# Patient Record
Sex: MALE | Race: BLACK OR AFRICAN AMERICAN | Employment: UNEMPLOYED
[De-identification: names, ages, dates, MRNs, and addresses within clinical notes are randomized per-mention and may not be internally consistent; named-entity substitution may affect disease eponyms.]

---

## 2017-01-05 ENCOUNTER — CARE COORDINATOR VISIT (OUTPATIENT)
Dept: CARE COORDINATION | Facility: CLINIC | Age: 77
End: 2017-01-05

## 2017-01-05 ENCOUNTER — OFFICE VISIT (OUTPATIENT)
Dept: INTERNAL MEDICINE | Facility: CLINIC | Age: 77
End: 2017-01-05

## 2017-01-05 VITALS
SYSTOLIC BLOOD PRESSURE: 120 MMHG | WEIGHT: 127.6 LBS | HEART RATE: 83 BPM | BODY MASS INDEX: 19.98 KG/M2 | DIASTOLIC BLOOD PRESSURE: 75 MMHG

## 2017-01-05 DIAGNOSIS — F17.200 SMOKER: ICD-10-CM

## 2017-01-05 DIAGNOSIS — N40.0 BENIGN PROSTATIC HYPERPLASIA WITHOUT LOWER URINARY TRACT SYMPTOMS, UNSPECIFIED MORPHOLOGY: ICD-10-CM

## 2017-01-05 DIAGNOSIS — G30.9 ALZHEIMER'S DEMENTIA WITHOUT BEHAVIORAL DISTURBANCE, UNSPECIFIED TIMING OF DEMENTIA ONSET: Primary | ICD-10-CM

## 2017-01-05 DIAGNOSIS — N40.0 BENIGN NON-NODULAR PROSTATIC HYPERPLASIA WITHOUT LOWER URINARY TRACT SYMPTOMS: ICD-10-CM

## 2017-01-05 DIAGNOSIS — B20 HIV (HUMAN IMMUNODEFICIENCY VIRUS INFECTION) (HCC): ICD-10-CM

## 2017-01-05 DIAGNOSIS — F02.80 ALZHEIMER'S DEMENTIA WITHOUT BEHAVIORAL DISTURBANCE, UNSPECIFIED TIMING OF DEMENTIA ONSET: Primary | ICD-10-CM

## 2017-01-05 DIAGNOSIS — M81.0 OSTEOPOROSIS: ICD-10-CM

## 2017-01-05 DIAGNOSIS — K21.9 GASTROESOPHAGEAL REFLUX DISEASE WITHOUT ESOPHAGITIS: ICD-10-CM

## 2017-01-05 PROCEDURE — 99213 OFFICE O/P EST LOW 20 MIN: CPT | Performed by: INTERNAL MEDICINE

## 2017-01-05 RX ORDER — DONEPEZIL HYDROCHLORIDE 10 MG/1
TABLET, FILM COATED ORAL
Qty: 30 TABLET | Refills: 5 | Status: SHIPPED | OUTPATIENT
Start: 2017-01-05 | End: 2017-09-16 | Stop reason: SDUPTHER

## 2017-01-05 RX ORDER — VITAMIN E 268 MG
CAPSULE ORAL
Qty: 30 CAPSULE | Refills: 5 | Status: SHIPPED | OUTPATIENT
Start: 2017-01-05 | End: 2017-03-10 | Stop reason: SDUPTHER

## 2017-01-05 RX ORDER — OMEPRAZOLE 20 MG/1
CAPSULE, DELAYED RELEASE ORAL
Qty: 30 CAPSULE | Refills: 5 | Status: SHIPPED | OUTPATIENT
Start: 2017-01-05 | End: 2017-04-29 | Stop reason: SDUPTHER

## 2017-01-05 RX ORDER — MEMANTINE HYDROCHLORIDE 5 MG/1
TABLET ORAL
Qty: 60 TABLET | Refills: 5 | Status: SHIPPED | OUTPATIENT
Start: 2017-01-05 | End: 2017-04-29 | Stop reason: SDUPTHER

## 2017-01-05 RX ORDER — FINASTERIDE 5 MG/1
TABLET, FILM COATED ORAL
Qty: 30 TABLET | Refills: 5 | Status: SHIPPED | OUTPATIENT
Start: 2017-01-05 | End: 2017-09-16 | Stop reason: SDUPTHER

## 2017-01-05 RX ORDER — ALENDRONATE SODIUM 70 MG/1
TABLET ORAL
Qty: 4 TABLET | Refills: 5 | Status: SHIPPED | OUTPATIENT
Start: 2017-01-05 | End: 2017-09-16 | Stop reason: SDUPTHER

## 2017-01-05 RX ORDER — MULTIVITAMIN WITH FOLIC ACID 400 MCG
TABLET ORAL
Qty: 60 TABLET | Refills: 5 | Status: SHIPPED | OUTPATIENT
Start: 2017-01-05 | End: 2018-02-07 | Stop reason: SDUPTHER

## 2017-01-05 RX ORDER — B-COMPLEX WITH VITAMIN C
TABLET ORAL
Qty: 60 TABLET | Refills: 5 | Status: SHIPPED | OUTPATIENT
Start: 2017-01-05 | End: 2017-06-23 | Stop reason: SDUPTHER

## 2017-01-06 ENCOUNTER — TELEPHONE (OUTPATIENT)
Dept: FAMILY MEDICINE CLINIC | Facility: CLINIC | Age: 77
End: 2017-01-06

## 2017-01-06 NOTE — TELEPHONE ENCOUNTER
Writer faxed over referral for pt to be assessed for [employer]. IKZZY/Marcelle Keenan is working with this pt and is seeking assistance for him. Writer will keep [facility] informed of the progress.

## 2017-01-09 ENCOUNTER — TELEPHONE (OUTPATIENT)
Dept: FAMILY MEDICINE CLINIC | Facility: CLINIC | Age: 77
End: 2017-01-09

## 2017-01-25 ENCOUNTER — CARE COORDINATION (OUTPATIENT)
Dept: CARE COORDINATION | Facility: CLINIC | Age: 77
End: 2017-01-25

## 2017-02-10 ENCOUNTER — CARE COORDINATION (OUTPATIENT)
Dept: CARE COORDINATION | Facility: CLINIC | Age: 77
End: 2017-02-10

## 2017-02-13 ENCOUNTER — TELEPHONE (OUTPATIENT)
Dept: FAMILY MEDICINE CLINIC | Facility: CLINIC | Age: 77
End: 2017-02-13

## 2017-02-14 ENCOUNTER — TELEPHONE (OUTPATIENT)
Dept: FAMILY MEDICINE CLINIC | Facility: CLINIC | Age: 77
End: 2017-02-14

## 2017-02-21 ENCOUNTER — HOSPITAL ENCOUNTER (EMERGENCY)
Age: 77
Discharge: HOME | End: 2017-02-21
Attending: EMERGENCY MEDICINE
Payer: MEDICARE

## 2017-02-21 VITALS
OXYGEN SATURATION: 98 % | SYSTOLIC BLOOD PRESSURE: 130 MMHG | RESPIRATION RATE: 18 BRPM | HEART RATE: 79 BPM | TEMPERATURE: 98 F | DIASTOLIC BLOOD PRESSURE: 87 MMHG

## 2017-02-21 DIAGNOSIS — K04.7 DENTAL ABSCESS: Primary | ICD-10-CM

## 2017-02-21 PROCEDURE — 41800 DRAINAGE OF GUM LESION: CPT

## 2017-02-21 PROCEDURE — 6370000000 HC RX 637 (ALT 250 FOR IP): Performed by: EMERGENCY MEDICINE

## 2017-02-21 PROCEDURE — 6360000002 HC RX W HCPCS: Performed by: EMERGENCY MEDICINE

## 2017-02-21 PROCEDURE — 90715 TDAP VACCINE 7 YRS/> IM: CPT | Performed by: EMERGENCY MEDICINE

## 2017-02-21 PROCEDURE — 90471 IMMUNIZATION ADMIN: CPT | Performed by: EMERGENCY MEDICINE

## 2017-02-21 PROCEDURE — G0381 LEV 2 HOSP TYPE B ED VISIT: HCPCS

## 2017-02-21 RX ORDER — IBUPROFEN 600 MG/1
600 TABLET ORAL EVERY 6 HOURS PRN
Qty: 30 TABLET | Refills: 0 | Status: SHIPPED | OUTPATIENT
Start: 2017-02-21 | End: 2017-10-06 | Stop reason: ALTCHOICE

## 2017-02-21 RX ORDER — PENICILLIN V POTASSIUM 500 MG/1
500 TABLET ORAL 4 TIMES DAILY
Qty: 28 TABLET | Refills: 0 | Status: SHIPPED | OUTPATIENT
Start: 2017-02-21 | End: 2017-02-28

## 2017-02-21 RX ORDER — IBUPROFEN 400 MG/1
400 TABLET ORAL ONCE
Status: COMPLETED | OUTPATIENT
Start: 2017-02-21 | End: 2017-02-21

## 2017-02-21 RX ORDER — PENICILLIN V POTASSIUM 250 MG/1
500 TABLET ORAL ONCE
Status: COMPLETED | OUTPATIENT
Start: 2017-02-21 | End: 2017-02-21

## 2017-02-21 RX ADMIN — PENICILLIN V POTASIUM 500 MG: 250 TABLET ORAL at 14:02

## 2017-02-21 RX ADMIN — BENZOCAINE: 220 GEL, DENTIFRICE DENTAL at 14:03

## 2017-02-21 RX ADMIN — IBUPROFEN 400 MG: 400 TABLET, FILM COATED ORAL at 14:02

## 2017-02-21 RX ADMIN — TETANUS TOXOID, REDUCED DIPHTHERIA TOXOID AND ACELLULAR PERTUSSIS VACCINE, ADSORBED 0.5 ML: 5; 2.5; 8; 8; 2.5 SUSPENSION INTRAMUSCULAR at 15:08

## 2017-02-21 ASSESSMENT — PAIN SCALES - GENERAL: PAINLEVEL_OUTOF10: 8

## 2017-02-21 ASSESSMENT — PAIN DESCRIPTION - PAIN TYPE: TYPE: ACUTE PAIN

## 2017-02-21 ASSESSMENT — PAIN SCALES - WONG BAKER: WONGBAKER_NUMERICALRESPONSE: 8

## 2017-02-22 ENCOUNTER — TELEPHONE (OUTPATIENT)
Dept: INTERNAL MEDICINE | Facility: CLINIC | Age: 77
End: 2017-02-22

## 2017-02-24 ENCOUNTER — OFFICE VISIT (OUTPATIENT)
Dept: PODIATRY | Facility: CLINIC | Age: 77
End: 2017-02-24

## 2017-02-24 ENCOUNTER — HOSPITAL ENCOUNTER (OUTPATIENT)
Age: 77
Discharge: HOME | End: 2017-02-24
Payer: MEDICARE

## 2017-02-24 VITALS
BODY MASS INDEX: 19.3 KG/M2 | WEIGHT: 123 LBS | DIASTOLIC BLOOD PRESSURE: 73 MMHG | HEIGHT: 67 IN | SYSTOLIC BLOOD PRESSURE: 105 MMHG | HEART RATE: 74 BPM

## 2017-02-24 DIAGNOSIS — B35.1 ONYCHOMYCOSIS: ICD-10-CM

## 2017-02-24 DIAGNOSIS — M79.672 PAIN IN BOTH FEET: ICD-10-CM

## 2017-02-24 DIAGNOSIS — B20 HIV (HUMAN IMMUNODEFICIENCY VIRUS INFECTION) (HCC): ICD-10-CM

## 2017-02-24 DIAGNOSIS — I73.9 PVD (PERIPHERAL VASCULAR DISEASE) (HCC): Primary | ICD-10-CM

## 2017-02-24 DIAGNOSIS — M79.671 PAIN IN BOTH FEET: ICD-10-CM

## 2017-02-24 PROCEDURE — 99213 OFFICE O/P EST LOW 20 MIN: CPT | Performed by: PODIATRIST

## 2017-02-24 PROCEDURE — 11721 DEBRIDE NAIL 6 OR MORE: CPT | Performed by: PODIATRIST

## 2017-03-09 DIAGNOSIS — G30.9 ALZHEIMER'S DEMENTIA WITHOUT BEHAVIORAL DISTURBANCE (HCC): ICD-10-CM

## 2017-03-09 DIAGNOSIS — F02.80 ALZHEIMER'S DEMENTIA WITHOUT BEHAVIORAL DISTURBANCE (HCC): ICD-10-CM

## 2017-03-10 RX ORDER — VITAMIN E 268 MG
CAPSULE ORAL
Qty: 30 CAPSULE | Refills: 5 | Status: SHIPPED | OUTPATIENT
Start: 2017-03-10 | End: 2017-04-13

## 2017-04-06 ENCOUNTER — HOSPITAL ENCOUNTER (OUTPATIENT)
Age: 77
Discharge: HOME | End: 2017-04-06
Payer: MEDICAID

## 2017-04-06 ENCOUNTER — HOSPITAL ENCOUNTER (OUTPATIENT)
Age: 77
Discharge: HOME | End: 2017-04-06
Payer: MEDICARE

## 2017-04-06 ENCOUNTER — OFFICE VISIT (OUTPATIENT)
Dept: INTERNAL MEDICINE | Age: 77
End: 2017-04-06

## 2017-04-06 ENCOUNTER — HOSPITAL ENCOUNTER (OUTPATIENT)
Dept: GENERAL RADIOLOGY | Age: 77
Discharge: HOME | End: 2017-04-06
Payer: MEDICAID

## 2017-04-06 VITALS
SYSTOLIC BLOOD PRESSURE: 112 MMHG | DIASTOLIC BLOOD PRESSURE: 77 MMHG | OXYGEN SATURATION: 98 % | HEIGHT: 67 IN | HEART RATE: 90 BPM | BODY MASS INDEX: 19.49 KG/M2 | WEIGHT: 124.2 LBS

## 2017-04-06 DIAGNOSIS — J18.9 CAP (COMMUNITY ACQUIRED PNEUMONIA): Primary | ICD-10-CM

## 2017-04-06 DIAGNOSIS — J18.9 CAP (COMMUNITY ACQUIRED PNEUMONIA): ICD-10-CM

## 2017-04-06 DIAGNOSIS — B20 HIV (HUMAN IMMUNODEFICIENCY VIRUS INFECTION) (HCC): ICD-10-CM

## 2017-04-06 PROCEDURE — 99213 OFFICE O/P EST LOW 20 MIN: CPT | Performed by: INTERNAL MEDICINE

## 2017-04-06 PROCEDURE — 99211 OFF/OP EST MAY X REQ PHY/QHP: CPT | Performed by: INTERNAL MEDICINE

## 2017-04-06 PROCEDURE — 71020 XR CHEST STANDARD TWO VW: CPT

## 2017-04-06 RX ORDER — LEVOFLOXACIN 500 MG/1
500 TABLET, FILM COATED ORAL DAILY
Qty: 7 TABLET | Refills: 0 | Status: SHIPPED | OUTPATIENT
Start: 2017-04-06 | End: 2017-04-13

## 2017-04-13 ENCOUNTER — OFFICE VISIT (OUTPATIENT)
Dept: INTERNAL MEDICINE | Age: 77
End: 2017-04-13
Payer: MEDICARE

## 2017-04-13 VITALS
HEIGHT: 67 IN | WEIGHT: 122 LBS | BODY MASS INDEX: 19.15 KG/M2 | DIASTOLIC BLOOD PRESSURE: 73 MMHG | HEART RATE: 82 BPM | SYSTOLIC BLOOD PRESSURE: 99 MMHG

## 2017-04-13 DIAGNOSIS — F17.200 SMOKER: ICD-10-CM

## 2017-04-13 DIAGNOSIS — F02.80 ALZHEIMER'S DEMENTIA WITHOUT BEHAVIORAL DISTURBANCE, UNSPECIFIED TIMING OF DEMENTIA ONSET: ICD-10-CM

## 2017-04-13 DIAGNOSIS — G30.9 ALZHEIMER'S DEMENTIA WITHOUT BEHAVIORAL DISTURBANCE, UNSPECIFIED TIMING OF DEMENTIA ONSET: ICD-10-CM

## 2017-04-13 DIAGNOSIS — K21.9 GASTROESOPHAGEAL REFLUX DISEASE WITHOUT ESOPHAGITIS: ICD-10-CM

## 2017-04-13 DIAGNOSIS — B20 HIV (HUMAN IMMUNODEFICIENCY VIRUS INFECTION) (HCC): Primary | ICD-10-CM

## 2017-04-13 PROCEDURE — 1123F ACP DISCUSS/DSCN MKR DOCD: CPT | Performed by: INTERNAL MEDICINE

## 2017-04-13 PROCEDURE — G8427 DOCREV CUR MEDS BY ELIG CLIN: HCPCS | Performed by: INTERNAL MEDICINE

## 2017-04-13 PROCEDURE — 99213 OFFICE O/P EST LOW 20 MIN: CPT | Performed by: INTERNAL MEDICINE

## 2017-04-13 PROCEDURE — G8419 CALC BMI OUT NRM PARAM NOF/U: HCPCS | Performed by: INTERNAL MEDICINE

## 2017-04-13 PROCEDURE — 99211 OFF/OP EST MAY X REQ PHY/QHP: CPT

## 2017-04-13 PROCEDURE — 4040F PNEUMOC VAC/ADMIN/RCVD: CPT | Performed by: INTERNAL MEDICINE

## 2017-04-13 PROCEDURE — 4004F PT TOBACCO SCREEN RCVD TLK: CPT | Performed by: INTERNAL MEDICINE

## 2017-04-13 RX ORDER — IBUPROFEN 600 MG/1
600 TABLET ORAL EVERY 6 HOURS PRN
Qty: 30 TABLET | Refills: 0 | Status: CANCELLED | OUTPATIENT
Start: 2017-04-13

## 2017-04-28 DIAGNOSIS — F02.80 ALZHEIMER'S DEMENTIA WITHOUT BEHAVIORAL DISTURBANCE (HCC): ICD-10-CM

## 2017-04-28 DIAGNOSIS — K21.9 GASTROESOPHAGEAL REFLUX DISEASE WITHOUT ESOPHAGITIS: ICD-10-CM

## 2017-04-28 DIAGNOSIS — G30.9 ALZHEIMER'S DEMENTIA WITHOUT BEHAVIORAL DISTURBANCE (HCC): ICD-10-CM

## 2017-04-29 RX ORDER — OMEPRAZOLE 20 MG/1
CAPSULE, DELAYED RELEASE ORAL
Qty: 30 CAPSULE | Refills: 3 | Status: SHIPPED | OUTPATIENT
Start: 2017-04-29 | End: 2017-08-17 | Stop reason: SDUPTHER

## 2017-04-29 RX ORDER — MEMANTINE HYDROCHLORIDE 5 MG/1
TABLET ORAL
Qty: 60 TABLET | Refills: 3 | Status: SHIPPED | OUTPATIENT
Start: 2017-04-29 | End: 2017-08-17 | Stop reason: SDUPTHER

## 2017-05-10 ENCOUNTER — HOSPITAL ENCOUNTER (OUTPATIENT)
Age: 77
Discharge: HOME | End: 2017-05-10
Payer: MEDICARE

## 2017-05-10 LAB
% NK (CD56): 18 % (ref 6–29)
AB NK (CD56): 287 /UL (ref 90–600)
ABSOLUTE CD 3: 1163 /UL (ref 411–2061)
ABSOLUTE CD 4 HELPER: 462 /UL (ref 309–1571)
ABSOLUTE CD 8 (SUPP): 669 /UL (ref 282–999)
ABSOLUTE CD19 B CELL: 80 /UL (ref 71–567)
ABSOLUTE EOS #: 0.1 K/UL (ref 0–0.4)
ABSOLUTE LYMPH #: 1.6 K/UL (ref 1–4.8)
ABSOLUTE MONO #: 0.6 K/UL (ref 0.1–1.2)
ALBUMIN SERPL-MCNC: 3.9 G/DL (ref 3.5–5.2)
ALBUMIN/GLOBULIN RATIO: 1 (ref 1–2.5)
ALP BLD-CCNC: 201 U/L (ref 40–129)
ALT SERPL-CCNC: 12 U/L (ref 5–41)
ANION GAP SERPL CALCULATED.3IONS-SCNC: 12 MMOL/L (ref 9–17)
AST SERPL-CCNC: 25 U/L
BASOPHILS # BLD: 1 %
BASOPHILS ABSOLUTE: 0 K/UL (ref 0–0.2)
BILIRUB SERPL-MCNC: 0.25 MG/DL (ref 0.3–1.2)
BUN BLDV-MCNC: 6 MG/DL (ref 8–23)
BUN/CREAT BLD: ABNORMAL (ref 9–20)
CALCIUM SERPL-MCNC: 9.1 MG/DL (ref 8.6–10.4)
CD19 B CELL: 5 % (ref 5–25)
CD3 % TOTAL T CELLS: 73 % (ref 57–94)
CD4 % HELPER T CELL: 29 % (ref 27–64)
CD4:CD8: 0.69 (ref 0.6–2.8)
CD8 % SUPPRESSOR T CELL: 42 % (ref 17–48)
CHLORIDE BLD-SCNC: 101 MMOL/L (ref 98–107)
CHOLESTEROL/HDL RATIO: 3
CHOLESTEROL: 242 MG/DL
CO2: 26 MMOL/L (ref 20–31)
CREAT SERPL-MCNC: 0.88 MG/DL (ref 0.7–1.2)
DIFFERENTIAL TYPE: ABNORMAL
EOSINOPHILS RELATIVE PERCENT: 1 %
GFR AFRICAN AMERICAN: >60 ML/MIN
GFR NON-AFRICAN AMERICAN: >60 ML/MIN
GFR SERPL CREATININE-BSD FRML MDRD: ABNORMAL ML/MIN/{1.73_M2}
GFR SERPL CREATININE-BSD FRML MDRD: ABNORMAL ML/MIN/{1.73_M2}
GLUCOSE BLD-MCNC: 114 MG/DL (ref 70–99)
HBV SURFACE AB TITR SER: <3.5 MIU/ML
HCT VFR BLD CALC: 38.9 % (ref 41–53)
HDLC SERPL-MCNC: 82 MG/DL
HEMOGLOBIN: 12.5 G/DL (ref 13.5–17.5)
HEPATITIS C ANTIBODY: NONREACTIVE
LDL CHOLESTEROL: 131 MG/DL (ref 0–130)
LYMPHOCYTES # BLD: 27 %
LYMPHOCYTES # BLD: 27 % (ref 24–44)
MCH RBC QN AUTO: 28 PG (ref 26–34)
MCHC RBC AUTO-ENTMCNC: 32.2 G/DL (ref 31–37)
MCV RBC AUTO: 87.1 FL (ref 80–100)
MONOCYTES # BLD: 10 %
PDW BLD-RTO: 15.5 % (ref 12.5–15.4)
PLATELET # BLD: 212 K/UL (ref 140–450)
PLATELET ESTIMATE: ABNORMAL
PMV BLD AUTO: 7.7 FL (ref 6–12)
POTASSIUM SERPL-SCNC: 5 MMOL/L (ref 3.7–5.3)
RBC # BLD: 4.47 M/UL (ref 4.5–5.9)
RBC # BLD: ABNORMAL 10*6/UL
SEG NEUTROPHILS: 61 %
SEGMENTED NEUTROPHILS ABSOLUTE COUNT: 3.6 K/UL (ref 1.8–7.7)
SODIUM BLD-SCNC: 139 MMOL/L (ref 135–144)
T. PALLIDUM, IGG: REACTIVE
TOTAL PROTEIN: 7.9 G/DL (ref 6.4–8.3)
TRIGL SERPL-MCNC: 146 MG/DL
VLDLC SERPL CALC-MCNC: ABNORMAL MG/DL (ref 1–30)
WBC # BLD: 5.9 K/UL (ref 3.5–11)
WBC # BLD: 5.9 K/UL (ref 3.5–11)
WBC # BLD: ABNORMAL 10*3/UL

## 2017-05-10 PROCEDURE — 86803 HEPATITIS C AB TEST: CPT

## 2017-05-10 PROCEDURE — 86706 HEP B SURFACE ANTIBODY: CPT

## 2017-05-10 PROCEDURE — 80053 COMPREHEN METABOLIC PANEL: CPT

## 2017-05-10 PROCEDURE — 80061 LIPID PANEL: CPT

## 2017-05-10 PROCEDURE — 86780 TREPONEMA PALLIDUM: CPT

## 2017-05-10 PROCEDURE — 86355 B CELLS TOTAL COUNT: CPT

## 2017-05-10 PROCEDURE — 86593 SYPHILIS TEST NON-TREP QUANT: CPT

## 2017-05-10 PROCEDURE — 87536 HIV-1 QUANT&REVRSE TRNSCRPJ: CPT

## 2017-05-10 PROCEDURE — 85025 COMPLETE CBC W/AUTO DIFF WBC: CPT

## 2017-05-10 PROCEDURE — 86357 NK CELLS TOTAL COUNT: CPT

## 2017-05-10 PROCEDURE — 86360 T CELL ABSOLUTE COUNT/RATIO: CPT

## 2017-05-10 PROCEDURE — 36415 COLL VENOUS BLD VENIPUNCTURE: CPT

## 2017-05-10 PROCEDURE — 86359 T CELLS TOTAL COUNT: CPT

## 2017-05-11 LAB — VDRL, QUANTITATIVE: 1 TITER

## 2017-05-12 LAB
DIRECT EXAM: NORMAL
Lab: NORMAL
SPECIMEN DESCRIPTION: NORMAL
STATUS: NORMAL

## 2017-06-23 DIAGNOSIS — M81.0 OSTEOPOROSIS: ICD-10-CM

## 2017-06-26 RX ORDER — B-COMPLEX WITH VITAMIN C
TABLET ORAL
Qty: 60 TABLET | Refills: 0 | Status: SHIPPED | OUTPATIENT
Start: 2017-06-26 | End: 2017-07-22 | Stop reason: SDUPTHER

## 2017-07-07 ENCOUNTER — PROCEDURE VISIT (OUTPATIENT)
Dept: PODIATRY | Age: 77
End: 2017-07-07
Payer: MEDICARE

## 2017-07-07 VITALS
SYSTOLIC BLOOD PRESSURE: 140 MMHG | WEIGHT: 118.6 LBS | HEART RATE: 59 BPM | BODY MASS INDEX: 18.62 KG/M2 | DIASTOLIC BLOOD PRESSURE: 85 MMHG | HEIGHT: 67 IN

## 2017-07-07 DIAGNOSIS — B35.1 ONYCHOMYCOSIS: ICD-10-CM

## 2017-07-07 DIAGNOSIS — M79.672 PAIN IN BOTH FEET: ICD-10-CM

## 2017-07-07 DIAGNOSIS — I73.9 PVD (PERIPHERAL VASCULAR DISEASE) (HCC): Primary | ICD-10-CM

## 2017-07-07 DIAGNOSIS — M79.671 PAIN IN BOTH FEET: ICD-10-CM

## 2017-07-07 DIAGNOSIS — B20 HIV (HUMAN IMMUNODEFICIENCY VIRUS INFECTION) (HCC): ICD-10-CM

## 2017-07-07 PROCEDURE — 4004F PT TOBACCO SCREEN RCVD TLK: CPT | Performed by: PODIATRIST

## 2017-07-07 PROCEDURE — G8420 CALC BMI NORM PARAMETERS: HCPCS | Performed by: PODIATRIST

## 2017-07-07 PROCEDURE — 4040F PNEUMOC VAC/ADMIN/RCVD: CPT | Performed by: PODIATRIST

## 2017-07-07 PROCEDURE — 11721 DEBRIDE NAIL 6 OR MORE: CPT | Performed by: PODIATRIST

## 2017-07-07 PROCEDURE — 1123F ACP DISCUSS/DSCN MKR DOCD: CPT | Performed by: PODIATRIST

## 2017-07-07 PROCEDURE — 99213 OFFICE O/P EST LOW 20 MIN: CPT

## 2017-07-07 PROCEDURE — G8427 DOCREV CUR MEDS BY ELIG CLIN: HCPCS | Performed by: PODIATRIST

## 2017-07-07 RX ORDER — VITAMIN E 268 MG
400 CAPSULE ORAL 2 TIMES DAILY
COMMUNITY
End: 2017-08-21

## 2017-07-22 DIAGNOSIS — M81.0 OSTEOPOROSIS: ICD-10-CM

## 2017-07-24 RX ORDER — B-COMPLEX WITH VITAMIN C
TABLET ORAL
Qty: 60 TABLET | Refills: 3 | Status: SHIPPED | OUTPATIENT
Start: 2017-07-24 | End: 2017-11-10 | Stop reason: SDUPTHER

## 2017-08-17 DIAGNOSIS — G30.9 ALZHEIMER'S DEMENTIA WITHOUT BEHAVIORAL DISTURBANCE (HCC): ICD-10-CM

## 2017-08-17 DIAGNOSIS — K21.9 GASTROESOPHAGEAL REFLUX DISEASE WITHOUT ESOPHAGITIS: ICD-10-CM

## 2017-08-17 DIAGNOSIS — F02.80 ALZHEIMER'S DEMENTIA WITHOUT BEHAVIORAL DISTURBANCE (HCC): ICD-10-CM

## 2017-08-21 RX ORDER — MEMANTINE HYDROCHLORIDE 5 MG/1
TABLET ORAL
Qty: 60 TABLET | Refills: 2 | Status: SHIPPED | OUTPATIENT
Start: 2017-08-21 | End: 2017-11-10 | Stop reason: SDUPTHER

## 2017-08-21 RX ORDER — VITAMIN E 268 MG
CAPSULE ORAL
Qty: 30 CAPSULE | Refills: 2 | Status: SHIPPED | OUTPATIENT
Start: 2017-08-21 | End: 2017-11-10 | Stop reason: SDUPTHER

## 2017-08-21 RX ORDER — OMEPRAZOLE 20 MG/1
CAPSULE, DELAYED RELEASE ORAL
Qty: 30 CAPSULE | Refills: 2 | Status: SHIPPED | OUTPATIENT
Start: 2017-08-21 | End: 2017-11-10 | Stop reason: SDUPTHER

## 2017-09-16 DIAGNOSIS — G30.9 ALZHEIMER'S DEMENTIA WITHOUT BEHAVIORAL DISTURBANCE, UNSPECIFIED TIMING OF DEMENTIA ONSET: ICD-10-CM

## 2017-09-16 DIAGNOSIS — F02.80 ALZHEIMER'S DEMENTIA WITHOUT BEHAVIORAL DISTURBANCE, UNSPECIFIED TIMING OF DEMENTIA ONSET: ICD-10-CM

## 2017-09-16 DIAGNOSIS — N40.0 BENIGN NON-NODULAR PROSTATIC HYPERPLASIA WITHOUT LOWER URINARY TRACT SYMPTOMS: ICD-10-CM

## 2017-09-16 DIAGNOSIS — M81.0 OSTEOPOROSIS: ICD-10-CM

## 2017-09-18 RX ORDER — ALENDRONATE SODIUM 70 MG/1
TABLET ORAL
Qty: 4 TABLET | Refills: 0 | Status: SHIPPED | OUTPATIENT
Start: 2017-09-18 | End: 2017-10-13 | Stop reason: SDUPTHER

## 2017-09-18 RX ORDER — FINASTERIDE 5 MG/1
TABLET, FILM COATED ORAL
Qty: 30 TABLET | Refills: 0 | Status: SHIPPED | OUTPATIENT
Start: 2017-09-18 | End: 2017-10-13 | Stop reason: SDUPTHER

## 2017-09-18 RX ORDER — DONEPEZIL HYDROCHLORIDE 10 MG/1
TABLET, FILM COATED ORAL
Qty: 30 TABLET | Refills: 0 | Status: SHIPPED | OUTPATIENT
Start: 2017-09-18 | End: 2017-10-13 | Stop reason: SDUPTHER

## 2017-10-06 ENCOUNTER — OFFICE VISIT (OUTPATIENT)
Dept: PODIATRY | Age: 77
End: 2017-10-06
Payer: MEDICARE

## 2017-10-06 VITALS
BODY MASS INDEX: 19.77 KG/M2 | DIASTOLIC BLOOD PRESSURE: 72 MMHG | WEIGHT: 123 LBS | HEART RATE: 75 BPM | SYSTOLIC BLOOD PRESSURE: 117 MMHG | HEIGHT: 66 IN

## 2017-10-06 DIAGNOSIS — B20 HIV (HUMAN IMMUNODEFICIENCY VIRUS INFECTION) (HCC): ICD-10-CM

## 2017-10-06 DIAGNOSIS — M79.672 PAIN IN BOTH FEET: ICD-10-CM

## 2017-10-06 DIAGNOSIS — I73.9 PVD (PERIPHERAL VASCULAR DISEASE) (HCC): Primary | ICD-10-CM

## 2017-10-06 DIAGNOSIS — B35.1 ONYCHOMYCOSIS: ICD-10-CM

## 2017-10-06 DIAGNOSIS — M79.671 PAIN IN BOTH FEET: ICD-10-CM

## 2017-10-06 PROCEDURE — 1123F ACP DISCUSS/DSCN MKR DOCD: CPT | Performed by: STUDENT IN AN ORGANIZED HEALTH CARE EDUCATION/TRAINING PROGRAM

## 2017-10-06 PROCEDURE — G8428 CUR MEDS NOT DOCUMENT: HCPCS | Performed by: STUDENT IN AN ORGANIZED HEALTH CARE EDUCATION/TRAINING PROGRAM

## 2017-10-06 PROCEDURE — 4004F PT TOBACCO SCREEN RCVD TLK: CPT | Performed by: STUDENT IN AN ORGANIZED HEALTH CARE EDUCATION/TRAINING PROGRAM

## 2017-10-06 PROCEDURE — 11721 DEBRIDE NAIL 6 OR MORE: CPT | Performed by: STUDENT IN AN ORGANIZED HEALTH CARE EDUCATION/TRAINING PROGRAM

## 2017-10-06 PROCEDURE — G8484 FLU IMMUNIZE NO ADMIN: HCPCS | Performed by: STUDENT IN AN ORGANIZED HEALTH CARE EDUCATION/TRAINING PROGRAM

## 2017-10-06 PROCEDURE — 99212 OFFICE O/P EST SF 10 MIN: CPT

## 2017-10-06 PROCEDURE — G8420 CALC BMI NORM PARAMETERS: HCPCS | Performed by: STUDENT IN AN ORGANIZED HEALTH CARE EDUCATION/TRAINING PROGRAM

## 2017-10-06 PROCEDURE — 4040F PNEUMOC VAC/ADMIN/RCVD: CPT | Performed by: STUDENT IN AN ORGANIZED HEALTH CARE EDUCATION/TRAINING PROGRAM

## 2017-10-06 NOTE — PROGRESS NOTES
Patient instructed to remove shoes and socks, instructed to sit in exam chair. Current PCP name is Dr. Darius Burch and date of last visit 4/13/17. Do you have a follow up visit scheduled?   no

## 2017-10-06 NOTE — PROGRESS NOTES
Subjective:      Patient ID: [id number]    Madeleine Palmer comes to clinic with a complaint of painful, elongated toenails. Patient is a smoker and states he smokes approximately 1/4 ppd. Patient is nondiabetic. Patient states nails are painful in shoegear. He is here to review his PVR studies. Review of Systems   Denies n/v/f/c/cp/sob. Objective:   Physical Exam     Vitals:    10/06/17 1439   BP: 117/72   Pulse: 75     General: AAO x 3 in NAD. Derm: Nails 1-5 , bilateral are brittle and thickened > 3mm, elongated, yellow, dystrophic with subungual debris. Nails are painful with direct palpation, especially the nail of hallux left foot. No open lesions, HPKs, or interidigital macerations noted, antoni. Skin is atrophic and xerotic bilateral lower extremities. Vasc: DP pulse is 1/4 bilateral. PT pulses 1/4 bilaterally. CFT < 3 seconds. No edema, erythema, or varicosities noted. Hair growth absent bilaterally. Neuro: Sensation intact via light touch, bilateral feet. M/S: Muscle strength 5/5 . ROM is WNL without pain or crepitation in bilateraly ankle and pedal joints. Reducible,  Pain on palpation to nails of bilateral feet. Assessment:    63 yo male with   1. PVD (peripheral vascular disease) [facility])  Cooper Cordero MD   2. Onychomycosis     3. Pain in both feet     4. HIV (human immunodeficiency virus infection) ([facility] [address]           Plan:      Pt was evaluated and examined. Treatment options discussed in detail. Debridement of nails 1-5, bilateral with nail nippers and dremel without incidence. Encouraged use of lotion to hydrate skin. Smoking cessation encouraged. Reviewed PVR studies which showed mild femoral popliteal occlusive disease. Patient does not have any lower extremity pain. His extremity is cool to touch and has weakly palpable pulses.  Will refer pt to vascular surgeon   Pt RTC in 4 months  Electronically signed by Anita Humphrey DPM on

## 2017-10-06 NOTE — MR AVS SNAPSHOT
After Visit Summary             Nik Palmer   10/6/2017 2:45 PM   Office Visit    Description:  Male : 1940   Provider:  Anita Humphrey DPM   Department:  [address] and Future Appointments         Below is a list of your follow-up and future appointments. This may not be a complete list as you may have made appointments directly with providers that we are not aware of or your providers may have made some for you. Please call your providers to confirm appointments. It is important to keep your appointments. Please bring your current insurance card, photo ID, co-pay, and all medication bottles to your appointment. If self-pay, payment is expected at the time of service.         Your To-Do List     Future Appointments Provider Department Dept Phone    10/27/2017 9:45 AM MD LAWRENCE Aparicio/ Tyrone Myers  [phone]    Please arrive 15 minutes prior to appointment time, bring insurance card and photo ID.     2018 2:15 PM Du[address] V[address] [phone]    Patient must bring photo ID, insurance card, co-pay and medication bottles to appointment All patient appointments must be kept or cancelled within 24 hours Patient should be prepared to provide PCP name and date of last PCP visit during time of appointment         Information from Your Visit        Department     Name Address Phone Fax    [address] [place] [address] [address] [place] [address] [phone]      You Were Seen for:         Comments    PVD (peripheral vascular disease) [facility])   [[id number]]         Vital Signs     Blood Pressure Pulse Height Weight Body Mass Index Smoking Status    117/72 (Site: Left Arm, Position: Sitting, Cuff Size: Small Adult) 75 5' 6\" (1.676 m) 123 lb (55.8 kg) 19.85 kg/m2 Current Every Day Smoker         Today's Medication Changes Osteoporosis right hip fracture dec 08      Your Goals as of 10/6/2017 at 3:13 PM              2/10/17       Lifestyle    assistance in home- pt remains safe   On track    Notes    Care Coordination Goal: Independent Living  Goal: For wife to be able to go out shop/appointments without taking pt or fearful he will wander   Barriers: impairment:  cognitive and dementia, financial and stress  Plan for overcoming my barriers: N/A  Confidence: 5/10              Immunizations as of 10/6/2017     Name Date    Influenza Vaccine, unspecified formulation 10/29/2014    Influenza Virus Vaccine 2/4/2016, 11/8/2013    Pneumococcal 13-valent Conjugate (Zobrakz19) 2/4/2016    Pneumococcal Polysaccharide ([id number]) 11/8/2013    Tdap (Boostrix, Adacel) 2/21/2017      Preventive Care        Date Due    Zoster Vaccine 10/6/2017 (Originally 10/3/1998)    Yearly Flu Vaccine (1) 10/7/2017 (Originally 9/1/2017)    Cholesterol Screening 5/10/2022    Tetanus Combination Vaccine (2 - Td) 2/21/2027            MyChart Signup           Our records indicate that you have declined MyChart signup.

## 2017-10-09 ENCOUNTER — OFFICE VISIT (OUTPATIENT)
Dept: INTERNAL MEDICINE | Age: 77
End: 2017-10-09
Payer: MEDICARE

## 2017-10-09 VITALS
DIASTOLIC BLOOD PRESSURE: 77 MMHG | SYSTOLIC BLOOD PRESSURE: 132 MMHG | HEART RATE: 76 BPM | WEIGHT: 119 LBS | BODY MASS INDEX: 19.21 KG/M2

## 2017-10-09 DIAGNOSIS — Z23 NEEDS FLU SHOT: ICD-10-CM

## 2017-10-09 DIAGNOSIS — K64.0 FIRST DEGREE HEMORRHOIDS: Primary | ICD-10-CM

## 2017-10-09 PROCEDURE — G8427 DOCREV CUR MEDS BY ELIG CLIN: HCPCS | Performed by: STUDENT IN AN ORGANIZED HEALTH CARE EDUCATION/TRAINING PROGRAM

## 2017-10-09 PROCEDURE — 1123F ACP DISCUSS/DSCN MKR DOCD: CPT | Performed by: STUDENT IN AN ORGANIZED HEALTH CARE EDUCATION/TRAINING PROGRAM

## 2017-10-09 PROCEDURE — 99211 OFF/OP EST MAY X REQ PHY/QHP: CPT

## 2017-10-09 PROCEDURE — G8484 FLU IMMUNIZE NO ADMIN: HCPCS | Performed by: STUDENT IN AN ORGANIZED HEALTH CARE EDUCATION/TRAINING PROGRAM

## 2017-10-09 PROCEDURE — G8420 CALC BMI NORM PARAMETERS: HCPCS | Performed by: STUDENT IN AN ORGANIZED HEALTH CARE EDUCATION/TRAINING PROGRAM

## 2017-10-09 PROCEDURE — 4004F PT TOBACCO SCREEN RCVD TLK: CPT | Performed by: STUDENT IN AN ORGANIZED HEALTH CARE EDUCATION/TRAINING PROGRAM

## 2017-10-09 PROCEDURE — 90471 IMMUNIZATION ADMIN: CPT | Performed by: STUDENT IN AN ORGANIZED HEALTH CARE EDUCATION/TRAINING PROGRAM

## 2017-10-09 PROCEDURE — 4040F PNEUMOC VAC/ADMIN/RCVD: CPT | Performed by: STUDENT IN AN ORGANIZED HEALTH CARE EDUCATION/TRAINING PROGRAM

## 2017-10-09 PROCEDURE — 90688 IIV4 VACCINE SPLT 0.5 ML IM: CPT

## 2017-10-09 PROCEDURE — 99213 OFFICE O/P EST LOW 20 MIN: CPT | Performed by: STUDENT IN AN ORGANIZED HEALTH CARE EDUCATION/TRAINING PROGRAM

## 2017-10-09 ASSESSMENT — PATIENT HEALTH QUESTIONNAIRE - PHQ9
2. FEELING DOWN, DEPRESSED OR HOPELESS: 0
SUM OF ALL RESPONSES TO PHQ9 QUESTIONS 1 & 2: 0
10. IF YOU CHECKED OFF ANY PROBLEMS, HOW DIFFICULT HAVE THESE PROBLEMS MADE IT FOR YOU TO DO YOUR WORK, TAKE CARE OF THINGS AT HOME, OR GET ALONG WITH OTHER PEOPLE: 0
9. THOUGHTS THAT YOU WOULD BE BETTER OFF DEAD, OR OF HURTING YOURSELF: 0
3. TROUBLE FALLING OR STAYING ASLEEP: 0
7. TROUBLE CONCENTRATING ON THINGS, SUCH AS READING THE NEWSPAPER OR WATCHING TELEVISION: 0
8. MOVING OR SPEAKING SO SLOWLY THAT OTHER PEOPLE COULD HAVE NOTICED. OR THE OPPOSITE, BEING SO FIGETY OR RESTLESS THAT YOU HAVE BEEN MOVING AROUND A LOT MORE THAN USUAL: 0
5. POOR APPETITE OR OVEREATING: 0
SUM OF ALL RESPONSES TO PHQ QUESTIONS 1-9: 0
4. FEELING TIRED OR HAVING LITTLE ENERGY: 0
6. FEELING BAD ABOUT YOURSELF - OR THAT YOU ARE A FAILURE OR HAVE LET YOURSELF OR YOUR FAMILY DOWN: 0
1. LITTLE INTEREST OR PLEASURE IN DOING THINGS: 0

## 2017-10-09 NOTE — PROGRESS NOTES
Internal Medicine Clinic Progress Note    Date of patient's visit: 10/9/2017  Patient's Name:  José Manuel Erazo                   YOB: 1940        PCP:  Allyson Underwood MD    José Manuel Erazo is a 76 y.o. male who presents for   Chief Complaint   Patient presents with    Hemorrhoids: pt has hx of hemorrhoids that were removed years ago. Pt is currently complaining of painful hemorrhoids and anne marie red blood per stool since last month. He presented to the ED on Sept 20th, for similar problem for which he received stool softener. Pt's wife reports that his stool is already soft so he did not use the stool softener. Pt used some Prep-H at home with no relief. Patient Active Problem List   Diagnosis    HIV (human immunodeficiency virus infection) ([facility] [address]    Dementia    Smoker    Osteoporosis right hip fracture dec 08    Hemorrhoids    BPH (benign prostatic hyperplasia)    GERD (gastroesophageal reflux disease)    Need for prophylactic vaccination with Streptococcus pneumoniae (Pneumococcus) and Influenza vaccines    Meralgia paraesthetica    Mixed hyperlipidemia    Herpes zoster without complication       HISTORY OF PRESENT ILLNESS:    Please refer to above note for full visit description   Patient's medications, allergies, past medical, surgical, social and family histories were reviewed and updated as appropriate. Patient's allergies, medications, past medical, surgical, social and family histories were reviewed and updated as appropriate.     ALLERGIES    No Known Allergies      MEDICATIONS:      Current Outpatient Prescriptions   Medication Sig Dispense Refill    finasteride (PROSCAR) 5 MG tablet TAKE 1 TABLET DAILY 30 tablet 0    donepezil (ARICEPT) 10 MG tablet TAKE 1 TABLET IN THE EVENING 30 tablet 0    alendronate (FOSAMAX) 70 MG tablet TAKE 1 TABLET EVERY WEEK 4 tablet 0    omeprazole (PRILOSEC) 20 MG delayed release capsule TAKE 1 CAPSULE BY MOUTH DAILY 30 capsule 2    memantine (NAMENDA) 5 MG tablet TAKE 1 TABLET BY MOUTH TWICE A DAY 60 tablet 2    vitamin E 400 UNIT capsule TAKE 1 CAPSULE TWICE A DAY 30 capsule 2    Calcium Carbonate-Vitamin D (OYSTER SHELL CALCIUM/D) 500-200 MG-UNIT TABS TAKE 2 TABLETS IN THE MORNING 60 tablet 3    Multiple Vitamin (MULTIVITAMIN) tablet TAKE 1 TABLET DAILY 60 tablet 5     No current facility-administered medications for this visit. Patient Care Team:  Patience Morales MD as PCP - General (Internal Medicine)  Marquis Jeanne MD as PCP - S Attributed Provider  Prudence Lennox, MD as Consulting Physician (Infectious Diseases)  [facility] ([facility])    PAST MEDICAL AND SURGICAL HISTORY:      Past Medical History:   Diagnosis Date    Dementia     GERD (gastroesophageal reflux disease) 4/28/2015    Headache     History of shingles     [facility] virus infection) ([facility] [address]     Human immunodeficiency virus (HIV) ([facility] [address]     Meralgia paraesthetica 2/4/2016    Mixed hyperlipidemia 5/5/2016    Osteoporosis      Past Surgical History:   Procedure Laterality Date    HERNIA REPAIR      TOTAL HIP ARTHROPLASTY         SOCIAL HISTORY      Social History   Substance Use Topics    Smoking status: Current Every Day Smoker     Packs/day: 0.50     Years: 50.00     Types: Cigarettes    Smokeless tobacco: Never Used    Alcohol use 2.4 oz/week     4 Cans of beer per week     Lemon  reports that he has been smoking Cigarettes. He has a 25.00 pack-year smoking history. He has never used smokeless tobacco.    FAMILY HISTORY:      Family History   Problem Relation Age of Onset    Heart Disease Mother     High Blood Pressure Mother     Cancer Father     Diabetes Sister     Heart Disease Sister     High Blood Pressure Sister     Heart Disease Brother     High Blood Pressure Brother        REVIEW OF SYSTEMS:    · General: no significant weight changes. · Dermatological: no abnormal pigmentation, rash, or itching. Lab Results   Component Value Date    CHOL 242 05/10/2017    TRIG 146 05/10/2017    HDL 82 05/10/2017     Thyroid functions:   Lab Results   Component Value Date    TSH 1.32 03/09/2015      Hepatic functions:   Lab Results   Component Value Date    ALT 12 05/10/2017    AST 25 05/10/2017    PROT 7.9 05/10/2017    BILITOT 0.25 05/10/2017    LABALBU 3.9 05/10/2017    LABALBU 3.9 04/18/2012     Urine Analysis: No results found for: [id number] [address]:      Health Maintenance Due   Topic Date Due    Zostavax vaccine  10/03/1998    Flu vaccine (1) 09/01/2017       ASSESSMENT AND PLAN:    1. Needs flu shot    - INFLUENZA, QUADV, 6 MO AND OLDER, IM, MDV, 0.5ML (FLULAVAL QUADV)  - IN IMMUNIZ ADMIN,1 SINGLE/COMB VAC/TOXOID    2. Hemorrhoids  Pt counseled on high fiber diet and increase flood intake in addition to stool softener. Pt might benefit from topical steroid cream     FOLLOW UP:   1. Lemon received counseling on the following healthy behaviors: nutrition    2. Reviewed prior labs and health maintenance. 3.  Discussed use, benefit, and side effects of prescribed medications. Barriers to medication compliance addressed. All patient questions answered. Pt voiced understanding. 4.  Continue current medications, diet and exercise. No orders of the defined types were placed in this encounter. Completed Refills               Requested Prescriptions      No prescriptions requested or ordered in this encounter       5. Patient given educational materials - see patient instructions    6. Was a self-tracking handout given in paper form or via [employer]? Yes  If yes, see orders or list here. Orders Placed This Encounter   Procedures    INFLUENZA, QUADV, 6 MO AND OLDER, IM, MDV, 0.5ML (FLULAVAL QUADV)    IN IMMUNIZ ADMIN,1 SINGLE/COMB VAC/TOXOID       No Follow-up on file. Patient voiced understanding and agreed to treatment plan.      Ej Mohan MD PGY-1

## 2017-10-09 NOTE — PATIENT INSTRUCTIONS
children 6 months and older who needed medical care or were in a hospital during the past year because of diabetes, chronic kidney disease, or a weak immune system (including HIV or AIDS). · Women who will be pregnant during the flu season. · People who have any condition that can make it hard to breathe or swallow (such as a brain injury or muscle disorders). · People who can give the flu to others who are at high risk for problems from the flu. This includes all health care workers and close contacts of people age 72 or older. Who should not get the flu vaccine? The person who gives the vaccine may tell you not to get it if you:  · Have a severe allergy to eggs or any part of the vaccine. · Have had a severe reaction to a flu vaccine in the past.  · Have had Guillain-Barré syndrome (GBS). · Are sick with a fever. (Get the vaccine when symptoms are gone.)  How can you care for yourself at home? · If you or your child has a sore arm or a slight fever after the shot, take an over-the-counter pain medicine, such as acetaminophen (Tylenol) or ibuprofen (Advil, Motrin). Read and follow all instructions on the label. Do not give aspirin to anyone younger than 20. It has been linked to Reye syndrome, a serious illness. · Do not take two or more pain medicines at the same time unless the doctor told you to. Many pain medicines have acetaminophen, which is Tylenol. Too much acetaminophen (Tylenol) can be harmful. When should you call for help? Call 911 anytime you think you may need emergency care. For example, call if after getting the flu vaccine:  · You have symptoms of a severe reaction to the flu vaccine. Symptoms of a severe reaction may include:  ¨ Severe difficulty breathing. ¨ Sudden raised, red areas (hives) all over your body. ¨ Severe lightheadedness.   Call your doctor now or seek immediate medical care if after getting the flu vaccine:  · You think you are having a reaction to the flu vaccine, such as a new fever. Watch closely for changes in your health, and be sure to contact your doctor if you have any problems. Where can you learn more? Go to [email or web address] and sign in to your [employer] account. Enter L941 in the [facility] box to learn more about \"Influenza (Flu) Vaccine: Care Instructions. \"     If you do not have an account, please click on the \"Sign Up Now\" link. Current as of: August 1, 2016  Content Version: 11.3  © 2781-9295 [employer]. Care instructions adapted under license by [facility] ([facility]). If you have questions about a medical condition or this instruction, always ask your healthcare professional. [place] [address] any warranty or liability for your use of this information. Follow-up appointment scheduled for 10/27/17   , AVS given to patient.   Referral to  [facility] general surgery [phone]   was placed, summary of care printed and faxed to office, phone numbers given to pt, they will contact office for an appt    TV    TV

## 2017-10-27 ENCOUNTER — OFFICE VISIT (OUTPATIENT)
Dept: INTERNAL MEDICINE | Age: 77
End: 2017-10-27
Payer: MEDICARE

## 2017-10-27 VITALS
HEIGHT: 66 IN | WEIGHT: 119 LBS | HEART RATE: 80 BPM | DIASTOLIC BLOOD PRESSURE: 73 MMHG | BODY MASS INDEX: 19.13 KG/M2 | SYSTOLIC BLOOD PRESSURE: 105 MMHG

## 2017-10-27 DIAGNOSIS — K64.0 GRADE I HEMORRHOIDS: Primary | ICD-10-CM

## 2017-10-27 PROCEDURE — 99212 OFFICE O/P EST SF 10 MIN: CPT

## 2017-10-27 PROCEDURE — G8484 FLU IMMUNIZE NO ADMIN: HCPCS | Performed by: STUDENT IN AN ORGANIZED HEALTH CARE EDUCATION/TRAINING PROGRAM

## 2017-10-27 PROCEDURE — G8427 DOCREV CUR MEDS BY ELIG CLIN: HCPCS | Performed by: STUDENT IN AN ORGANIZED HEALTH CARE EDUCATION/TRAINING PROGRAM

## 2017-10-27 PROCEDURE — 4004F PT TOBACCO SCREEN RCVD TLK: CPT | Performed by: STUDENT IN AN ORGANIZED HEALTH CARE EDUCATION/TRAINING PROGRAM

## 2017-10-27 PROCEDURE — G8420 CALC BMI NORM PARAMETERS: HCPCS | Performed by: STUDENT IN AN ORGANIZED HEALTH CARE EDUCATION/TRAINING PROGRAM

## 2017-10-27 PROCEDURE — 1123F ACP DISCUSS/DSCN MKR DOCD: CPT | Performed by: STUDENT IN AN ORGANIZED HEALTH CARE EDUCATION/TRAINING PROGRAM

## 2017-10-27 PROCEDURE — 4040F PNEUMOC VAC/ADMIN/RCVD: CPT | Performed by: STUDENT IN AN ORGANIZED HEALTH CARE EDUCATION/TRAINING PROGRAM

## 2017-10-27 PROCEDURE — 99213 OFFICE O/P EST LOW 20 MIN: CPT | Performed by: STUDENT IN AN ORGANIZED HEALTH CARE EDUCATION/TRAINING PROGRAM

## 2017-10-27 NOTE — PATIENT INSTRUCTIONS
Return To Clinic 1/29/2018. After Visit Summary  given and reviewed. --MA    It is very important for your care that you keep your appointment. If for some reason you are unable to keep your appointment it is equally important that you call our office at [phone] to cancel your appointment and reschedule. Failure to do so may result in your termination from our practice.

## 2017-10-27 NOTE — PROGRESS NOTES
[facility]    New Patient Note/History and Physical    Date of patient's visit: 10/27/2017    Name:  Balaji Gregory      YOB: 1940    Patient Care Team:  Allena Najjar, MD as PCP - General (Internal Medicine)  Shruti Hernadnez MD as PCP - [facility] Attributed Provider  Viki Naik MD as Consulting Physician (Infectious Diseases)  Jeevan Marrero DPM (Podiatry)    REASON FOR VISIT: 1 degree hemorrhoids     HISTORY OF PRESENTING ILLNESS:    History was obtained from the patient. Balaji Gregory is a 76 y.o. is here for follow up visit. The patient has dementia and history was obtained from his wife. Apparently patient has history of hemorrhoids for which he underwent surgery many years ago. The wife stated that he has bleeding per rectum on and off 2 to 3 times a week, pain +. Recent Hb done was 12.5 on 5/10/2015. He has no low blood pressure. He doesn't have any dizziness. As history of HIV with normal CD4 count. He follows up with ID specialist.  Problem list, medications and blood work reviewed. He was seen by Dr Camacho Sarkar on 10/9/2017 for the same problem and referred to General surgery . Having an appointment on 11/2 / 2017 at 1.20 pm. Pt doesn't know that and came today C/o of the same Problem which is constant.     Plan is to follow with general surgery on nov 2nd 2017              PAST MEDICAL AND SURGICAL HISTORY:          Diagnosis Date    Benign prostatic hyperplasia     Dementia     GERD (gastroesophageal reflux disease) 4/28/2015    YAYCQVXG(520.0)     History of shingles     [facility]) [facility] [address]     Human immunodeficiency virus (HIV) ([facility] [address]     Meralgia paraesthetica 2/4/2016    Mixed hyperlipidemia 5/5/2016    Osteoporosis     Syphilis     Treated           Procedure Laterality Date    HEMORRHOID SURGERY      HERNIA REPAIR      TONSILLECTOMY      TOTAL HIP ARTHROPLASTY         SOCIAL HISTORY: TOBACCO:   reports that he has been smoking Cigarettes. He has a 25.00 pack-year smoking history. He has never used smokeless tobacco.  ETOH:   reports that he drinks about 2.4 oz of alcohol per week . DRUGS:  reports that he does not use drugs. OCCUPATION:      ALLERGIES:    No Known Allergies      HOME MEDICATION:      Current Outpatient Prescriptions on File Prior to Visit   Medication Sig Dispense Refill    finasteride (PROSCAR) 5 MG tablet TAKE 1 TABLET BY MOUTH DAILY 30 tablet 0    donepezil (ARICEPT) 10 MG tablet TAKE 1 TABLET BY MOUTH EVERY EVENING 30 tablet 0    alendronate (FOSAMAX) 70 MG tablet TAKE 1 TABLET EVERY WEEK 4 tablet 0    omeprazole (PRILOSEC) 20 MG delayed release capsule TAKE 1 CAPSULE BY MOUTH DAILY 30 capsule 2    memantine (NAMENDA) 5 MG tablet TAKE 1 TABLET BY MOUTH TWICE A DAY 60 tablet 2    vitamin E 400 UNIT capsule TAKE 1 CAPSULE TWICE A DAY 30 capsule 2    Calcium Carbonate-Vitamin D (OYSTER SHELL CALCIUM/D) 500-200 MG-UNIT TABS TAKE 2 TABLETS IN THE MORNING 60 tablet 3    Multiple Vitamin (MULTIVITAMIN) tablet TAKE 1 TABLET DAILY 60 tablet 5     No current facility-administered medications on file prior to visit. FAMILY HISTORY:          Problem Relation Age of Onset    Heart Disease Mother     High Blood Pressure Mother     Cancer Father     Diabetes Sister     Heart Disease Sister     High Blood Pressure Sister     Heart Disease Brother     High Blood Pressure Brother        REVIEW OF SYSTEMS:    · General: no new significant weight loss +  · Dermatological: no abnormal pigmentation, rash, or itching.   · Ears/Nose/Throat: denies any hearing loss, abnormal smelling or throat pain  · Respiratory: no cough, pleuritic chest pain, dyspnea, or wheezing  · Cardiovascular: no pain, dyspnea on exertion, orthopnea, palpitations, or claudication  · Gastrointestinal: no nausea, vomiting, heartburn, bleeding per rectum +  · Genito-Urinary: no urinary urgency, frequency, dysuria, nocturia, hesitancy, incontinence, or pain. No hematuria  · Musculoskeletal: no arthralgia, myalgia, weakness, or morning stiffness  · Neurologic: no TIA or stroke symptoms. no paralysis, or frequent or severe headaches  · Hematologic/Lymphatic/Immunologic: no abnormal bleeding/bruising, fever, chills, night sweats orswollen glands. · Endocrine: no heat or cold intolerance and no polyuria        PHYSICAL EXAM:      Vitals:    10/27/17 0945   BP: 105/73   Site: Left Arm   Position: Sitting   Cuff Size: Medium Adult   Pulse: 80   Weight: 119 lb (54 kg)   Height: 5' 6\" (1.676 m)     General appearance - cachetic , dementic  Mental status - alert to person.  place  Eyes - pupils equal and reactive, extraocular eye movements intact  Chest - clear to auscultation, no wheezes, rales or rhonchi, symmetric air entry  Heart - normal rate, regular rhythm, normal S1, S2, no murmurs, rubs, clicks or gallops  Abdomen - soft, nontender, nondistended, no masses or organomegaly    LABORATORY FINDINGS:    CBC:   Lab Results   Component Value Date    WBC 5.9 05/10/2017    WBC 5.9 05/10/2017    HGB 12.5 05/10/2017     05/10/2017     04/18/2012     BMP:    Lab Results   Component Value Date     05/10/2017    K 5.0 05/10/2017     05/10/2017    CO2 26 05/10/2017    BUN 6 05/10/2017    CREATININE 0.88 05/10/2017    GLUCOSE 114 05/10/2017    GLUCOSE 76 04/18/2012     Hemoglobin A1C:   Lab Results   Component Value Date    LABA1C 5.8 09/15/2016     Lipid profile:   Lab Results   Component Value Date    CHOL 242 05/10/2017    TRIG 146 05/10/2017    HDL 82 05/10/2017     Thyroid functions:   Lab Results   Component Value Date    TSH 1.32 03/09/2015      Hepatic functions:   Lab Results   Component Value Date    ALT 12 05/10/2017    AST 25 05/10/2017    PROT 7.9 05/10/2017    BILITOT 0.25 05/10/2017    LABALBU 3.9 05/10/2017    LABALBU 3.9 04/18/2012     ASSESSMENT AND PLAN:      Hemorrhoids  · Pt counseled on high fiber diet  flood intake in addition to stool softener. · Pt should adher to preparation H topical cream  · Followup with general surgery on nov 2nd 2017      INSTRUCTIONS:   · No Follow-up on file. · Lemon received counseling on the following healthy behaviors: medication adherence    · Reviewed prior labs and health maintenance.         Apollo Seth MD      Department of Internal Medicine  [facility]         10/27/2017, 10:20 AM

## 2017-11-02 ENCOUNTER — OFFICE VISIT (OUTPATIENT)
Dept: SURGERY | Age: 77
End: 2017-11-02
Payer: MEDICARE

## 2017-11-02 VITALS
HEIGHT: 66 IN | BODY MASS INDEX: 18.96 KG/M2 | HEART RATE: 84 BPM | WEIGHT: 118 LBS | DIASTOLIC BLOOD PRESSURE: 74 MMHG | SYSTOLIC BLOOD PRESSURE: 109 MMHG

## 2017-11-02 DIAGNOSIS — K62.89 ANAL PAIN: ICD-10-CM

## 2017-11-02 DIAGNOSIS — K64.0 GRADE I HEMORRHOIDS: Primary | ICD-10-CM

## 2017-11-02 PROCEDURE — 4040F PNEUMOC VAC/ADMIN/RCVD: CPT | Performed by: SURGERY

## 2017-11-02 PROCEDURE — 4004F PT TOBACCO SCREEN RCVD TLK: CPT | Performed by: SURGERY

## 2017-11-02 PROCEDURE — G8420 CALC BMI NORM PARAMETERS: HCPCS | Performed by: SURGERY

## 2017-11-02 PROCEDURE — G8427 DOCREV CUR MEDS BY ELIG CLIN: HCPCS | Performed by: SURGERY

## 2017-11-02 PROCEDURE — 99204 OFFICE O/P NEW MOD 45 MIN: CPT

## 2017-11-02 PROCEDURE — G8484 FLU IMMUNIZE NO ADMIN: HCPCS | Performed by: SURGERY

## 2017-11-02 PROCEDURE — 99203 OFFICE O/P NEW LOW 30 MIN: CPT | Performed by: SURGERY

## 2017-11-02 PROCEDURE — 1123F ACP DISCUSS/DSCN MKR DOCD: CPT | Performed by: SURGERY

## 2017-11-02 RX ORDER — SODIUM PHOSPHATE, DIBASIC AND SODIUM PHOSPHATE, MONOBASIC 7; 19 G/133ML; G/133ML
1 ENEMA RECTAL ONCE
Qty: 1 BOTTLE | Refills: 0 | Status: SHIPPED | OUTPATIENT
Start: 2017-11-02 | End: 2017-11-02

## 2017-11-06 ENCOUNTER — TELEPHONE (OUTPATIENT)
Dept: SURGERY | Age: 77
End: 2017-11-06

## 2017-11-06 NOTE — TELEPHONE ENCOUNTER
Pt states that he unable to use that fleets for his bowel prep, he states that his rectum hurts. Pt's wife states that she will give him epsom salt to clean him out . Writer informed the pt and his wife that epsom salt should not be ingested that it is toxic to consume. Pt's wife states that she dose it all the time. Writer suggested that he should try Magnesium Citrate, and again warned to pt not to drink epsom salt.

## 2017-11-10 DIAGNOSIS — F02.80 ALZHEIMER'S DEMENTIA WITHOUT BEHAVIORAL DISTURBANCE (HCC): ICD-10-CM

## 2017-11-10 DIAGNOSIS — K21.9 GASTROESOPHAGEAL REFLUX DISEASE WITHOUT ESOPHAGITIS: ICD-10-CM

## 2017-11-10 DIAGNOSIS — N40.0 BENIGN NON-NODULAR PROSTATIC HYPERPLASIA WITHOUT LOWER URINARY TRACT SYMPTOMS: ICD-10-CM

## 2017-11-10 DIAGNOSIS — G30.9 ALZHEIMER'S DEMENTIA WITHOUT BEHAVIORAL DISTURBANCE (HCC): ICD-10-CM

## 2017-11-10 DIAGNOSIS — G30.9 ALZHEIMER'S DEMENTIA WITHOUT BEHAVIORAL DISTURBANCE, UNSPECIFIED TIMING OF DEMENTIA ONSET: ICD-10-CM

## 2017-11-10 DIAGNOSIS — M81.0 OSTEOPOROSIS: ICD-10-CM

## 2017-11-10 DIAGNOSIS — F02.80 ALZHEIMER'S DEMENTIA WITHOUT BEHAVIORAL DISTURBANCE, UNSPECIFIED TIMING OF DEMENTIA ONSET: ICD-10-CM

## 2017-11-10 RX ORDER — VITAMIN E 268 MG
CAPSULE ORAL
Qty: 30 CAPSULE | Refills: 0 | Status: SHIPPED | OUTPATIENT
Start: 2017-11-10 | End: 2017-12-09 | Stop reason: SDUPTHER

## 2017-11-10 NOTE — TELEPHONE ENCOUNTER
joseph request for VITAMIN E 400 UNIT CAPS  future appointment scheduled.     Health Maintenance   Topic Date Due    Zostavax vaccine  10/03/1998    Lipid screen  05/10/2022    DTaP/Tdap/Td vaccine (2 - Td) 02/21/2027    Flu vaccine  Completed    Pneumococcal high/highest risk  Completed             (applicable per patient's age: Cancer Screenings, Depression Screening, Fall Risk Screening, Immunizations)    Hemoglobin A1C (%)   Date Value   09/15/2016 5.8     LDL Cholesterol (mg/dL)   Date Value   05/10/2017 131 (H)     AST (U/L)   Date Value   05/10/2017 25     ALT (U/L)   Date Value   05/10/2017 12     BUN (mg/dL)   Date Value   05/10/2017 6 (L)      (goal A1C is < 7)   (goal LDL is <100) need 30-50% reduction from baseline     BP Readings from Last 3 Encounters:   11/02/17 109/74   10/27/17 105/73   10/09/17 132/77    (goal /80)      All Future Testing planned in [facility]:  Lab Frequency Next Occurrence   VL Arterial PVR Lower w PPG Once 11/21/2016       Next Visit Date:  Future Appointments  Date Time Provider Jocelyn Ansari   1/29/2018 10:30 AM Radha Crook MD [facility]   2/9/2018 2:15 PM [address]            Patient Active Problem List:     HIV (human immunodeficiency virus infection) ([facility] [address]     Dementia     Osteoporosis right hip fracture dec 08     Hemorrhoids     BPH (benign prostatic hyperplasia)     GERD (gastroesophageal reflux disease)     Meralgia paraesthetica     Mixed hyperlipidemia

## 2017-11-10 NOTE — TELEPHONE ENCOUNTER
Health Maintenance   Topic Date Due    Zostavax vaccine  10/03/1998    Lipid screen  05/10/2022    DTaP/Tdap/Td vaccine (2 - Td) 02/21/2027    Flu vaccine  Completed    Pneumococcal high/highest risk  Completed             (applicable per patient's age: Cancer Screenings, Depression Screening, Fall Risk Screening, Immunizations)    Hemoglobin A1C (%)   Date Value   09/15/2016 5.8     LDL Cholesterol (mg/dL)   Date Value   05/10/2017 131 (H)     AST (U/L)   Date Value   05/10/2017 25     ALT (U/L)   Date Value   05/10/2017 12     BUN (mg/dL)   Date Value   05/10/2017 6 (L)      (goal A1C is < 7)   (goal LDL is <100) need 30-50% reduction from baseline     BP Readings from Last 3 Encounters:   11/02/17 109/74   10/27/17 105/73   10/09/17 132/77    (goal /80)      All Future Testing planned in [facility]:  Lab Frequency Next Occurrence   VL Arterial PVR Lower w PPG Once 11/21/2016       Next Visit Date:  Future Appointments  Date Time Provider Jocelyn Ansari   1/29/2018 10:30 AM Angel Jaeger MD [facility]   2/9/2018 2:15 PM CEASAR Diaz 33            Patient Active Problem List:     HIV (human immunodeficiency virus infection) ([facility] [address]     Dementia     Osteoporosis right hip fracture dec 08     Hemorrhoids     BPH (benign prostatic hyperplasia)     GERD (gastroesophageal reflux disease)     Meralgia paraesthetica     Mixed hyperlipidemia

## 2017-11-12 RX ORDER — B-COMPLEX WITH VITAMIN C
TABLET ORAL
Qty: 60 TABLET | Refills: 0 | Status: SHIPPED | OUTPATIENT
Start: 2017-11-12 | End: 2017-12-09 | Stop reason: SDUPTHER

## 2017-11-12 RX ORDER — ALENDRONATE SODIUM 70 MG/1
TABLET ORAL
Qty: 4 TABLET | Refills: 0 | Status: SHIPPED | OUTPATIENT
Start: 2017-11-12 | End: 2017-12-09 | Stop reason: SDUPTHER

## 2017-11-12 RX ORDER — OMEPRAZOLE 20 MG/1
CAPSULE, DELAYED RELEASE ORAL
Qty: 30 CAPSULE | Refills: 0 | Status: SHIPPED | OUTPATIENT
Start: 2017-11-12 | End: 2017-12-09 | Stop reason: SDUPTHER

## 2017-11-12 RX ORDER — FINASTERIDE 5 MG/1
TABLET, FILM COATED ORAL
Qty: 30 TABLET | Refills: 0 | Status: SHIPPED | OUTPATIENT
Start: 2017-11-12 | End: 2017-12-09 | Stop reason: SDUPTHER

## 2017-11-12 RX ORDER — MEMANTINE HYDROCHLORIDE 5 MG/1
TABLET ORAL
Qty: 60 TABLET | Refills: 0 | Status: SHIPPED | OUTPATIENT
Start: 2017-11-12 | End: 2017-12-09 | Stop reason: SDUPTHER

## 2017-11-12 RX ORDER — DONEPEZIL HYDROCHLORIDE 10 MG/1
TABLET, FILM COATED ORAL
Qty: 30 TABLET | Refills: 0 | Status: SHIPPED | OUTPATIENT
Start: 2017-11-12 | End: 2017-12-09 | Stop reason: SDUPTHER

## 2017-11-17 ENCOUNTER — TELEPHONE (OUTPATIENT)
Dept: SURGERY | Age: 77
End: 2017-11-17

## 2017-11-22 ENCOUNTER — HOSPITAL ENCOUNTER (OUTPATIENT)
Age: 77
Setting detail: OUTPATIENT SURGERY
Discharge: HOME | End: 2017-11-22
Attending: SURGERY | Admitting: SURGERY
Payer: MEDICARE

## 2017-11-22 ENCOUNTER — ANESTHESIA EVENT (OUTPATIENT)
Dept: ENDOSCOPY | Age: 77
End: 2017-11-22
Payer: MEDICARE

## 2017-11-22 ENCOUNTER — ANESTHESIA (OUTPATIENT)
Dept: ENDOSCOPY | Age: 77
End: 2017-11-22
Payer: MEDICARE

## 2017-11-22 VITALS
TEMPERATURE: 98.6 F | HEART RATE: 68 BPM | OXYGEN SATURATION: 100 % | RESPIRATION RATE: 17 BRPM | DIASTOLIC BLOOD PRESSURE: 66 MMHG | SYSTOLIC BLOOD PRESSURE: 120 MMHG | HEIGHT: 66 IN | WEIGHT: 119 LBS | BODY MASS INDEX: 19.13 KG/M2

## 2017-11-22 VITALS
OXYGEN SATURATION: 99 % | RESPIRATION RATE: 24 BRPM | SYSTOLIC BLOOD PRESSURE: 99 MMHG | DIASTOLIC BLOOD PRESSURE: 60 MMHG

## 2017-11-22 PROCEDURE — 3700000001 HC ADD 15 MINUTES (ANESTHESIA): Performed by: SURGERY

## 2017-11-22 PROCEDURE — 7100000011 HC PHASE II RECOVERY - ADDTL 15 MIN: Performed by: SURGERY

## 2017-11-22 PROCEDURE — 2500000003 HC RX 250 WO HCPCS: Performed by: NURSE ANESTHETIST, CERTIFIED REGISTERED

## 2017-11-22 PROCEDURE — 88305 TISSUE EXAM BY PATHOLOGIST: CPT

## 2017-11-22 PROCEDURE — 2580000003 HC RX 258: Performed by: SURGERY

## 2017-11-22 PROCEDURE — 6360000002 HC RX W HCPCS: Performed by: NURSE ANESTHETIST, CERTIFIED REGISTERED

## 2017-11-22 PROCEDURE — 7100000010 HC PHASE II RECOVERY - FIRST 15 MIN: Performed by: SURGERY

## 2017-11-22 PROCEDURE — 3609008900 HC SIGMOIDOSCOPY POLYP SNARE: Performed by: SURGERY

## 2017-11-22 PROCEDURE — 3700000000 HC ANESTHESIA ATTENDED CARE: Performed by: SURGERY

## 2017-11-22 RX ORDER — PROPOFOL 10 MG/ML
INJECTION, EMULSION INTRAVENOUS PRN
Status: DISCONTINUED | OUTPATIENT
Start: 2017-11-22 | End: 2017-11-22 | Stop reason: SDUPTHER

## 2017-11-22 RX ORDER — SODIUM CHLORIDE 9 MG/ML
INJECTION, SOLUTION INTRAVENOUS CONTINUOUS
Status: DISCONTINUED | OUTPATIENT
Start: 2017-11-22 | End: 2017-11-22 | Stop reason: HOSPADM

## 2017-11-22 RX ORDER — LIDOCAINE HYDROCHLORIDE 10 MG/ML
INJECTION, SOLUTION EPIDURAL; INFILTRATION; INTRACAUDAL; PERINEURAL PRN
Status: DISCONTINUED | OUTPATIENT
Start: 2017-11-22 | End: 2017-11-22 | Stop reason: SDUPTHER

## 2017-11-22 RX ADMIN — PROPOFOL 100 MG: 10 INJECTION, EMULSION INTRAVENOUS at 11:24

## 2017-11-22 RX ADMIN — SODIUM CHLORIDE: 9 INJECTION, SOLUTION INTRAVENOUS at 08:40

## 2017-11-22 RX ADMIN — LIDOCAINE HYDROCHLORIDE 30 MG: 10 INJECTION, SOLUTION EPIDURAL; INFILTRATION; INTRACAUDAL; PERINEURAL at 11:24

## 2017-11-22 RX ADMIN — PROPOFOL 170 MG: 10 INJECTION, EMULSION INTRAVENOUS at 11:30

## 2017-11-22 ASSESSMENT — PAIN - FUNCTIONAL ASSESSMENT: PAIN_FUNCTIONAL_ASSESSMENT: 0-10

## 2017-11-22 NOTE — H&P
History and Physical    Pt Name: José Manuel Erazo  MRN: [id number]  YOB: 1940  Date of evaluation: 11/22/2017  Primary Care Physician: Allyson Underwood MD    SUBJECTIVE:   History of Chief Complaint:    José Manuel Erazo is a 76 y.o. male who is scheduled for flex sigmoid. Hx hemorrhoids. Has had hemorrhoidectomy before. Hx dementia- his wife is present and provides history. Allergies  has No Known Allergies. Medications  Prior to Admission medications    Medication Sig Start Date End Date Taking? Authorizing Provider   memantine (NAMENDA) 5 MG tablet TAKE 1 TABLET BY MOUTH TWICE A DAY 11/12/17  Yes Allyson Underwood MD   omeprazole (PRILOSEC) 20 MG delayed release capsule TAKE 1 CAPSULE BY MOUTH DAILY 11/12/17  Yes Allyson Underwood MD   Calcium Carbonate-Vitamin D (OYSTER SHELL CALCIUM/D) 500-200 MG-UNIT TABS TAKE 2 TABLETS IN THE MORNING 11/12/17  Yes Allyson Underwood MD   donepezil (ARICEPT) 10 MG tablet TAKE 1 TABLET IN THE EVENING 11/12/17  Yes Allyson Underwood MD   finasteride (PROSCAR) 5 MG tablet TAKE 1 TABLET DAILY 11/12/17  Yes Allyson Underwood MD   vitamin E 400 UNIT capsule TAKE 1 CAPSULE TWICE A DAY 11/10/17  Yes Allyson Underwood MD   Multiple Vitamin (MULTIVITAMIN) tablet TAKE 1 TABLET DAILY 1/5/17  Yes Deb Sheppard MD   alendronate (FOSAMAX) 70 MG tablet TAKE 1 TABLET EVERY WEEK 11/12/17   Allyson Underwood MD     Past Medical History    has a past medical history of Benign prostatic hyperplasia; Dementia; GERD (gastroesophageal reflux disease); Headache(784.0); Hemorrhoids; History of shingles; HIV (human immunodeficiency virus infection) ([facility] [address] Human immunodeficiency virus (HIV) ([facility] [address] Meralgia paraesthetica; Mixed hyperlipidemia; Osteoporosis; and Syphilis. Past Surgical History   has a past surgical history that includes hernia repair; Total hip arthroplasty; Tonsillectomy; and Hemorrhoid surgery. Social History   reports that he has been smoking Cigarettes.   He has a 25.00 pack-year smoking

## 2017-11-22 NOTE — OP NOTE
COLONOSCOPY NOTE      Patient:   Renny Torres    :    1940    Referring/PCP: Minna Martinez MD  Facility:  [address] [place] [address]  Procedure:   Colonoscopy with polypectomy (hot biopsy)  Date:     2017  Endoscopist:  Teir White MD      Preoperative Diagnosis:    1. Anal pain. Postoperative Diagnosis:    1. transverse colon polyp  2. Grade 2 internal hemorrhoid without bleeding  3. External hemorrhoid without bleeding or thrombosis    Anesthesia: MAC    Complications:  None    Description of Procedure:  Informed consent was obtained after explanation of the procedure including indications, description of the procedure,  benefits and possible risks and complications of the procedure, and alternatives. Questions were answered. The patient's history was reviewed and a directed physical examination was performed prior to the procedure. Patient was monitored throughout the procedure with pulse oximetry and periodic assessment of vital signs. Patient was sedated as noted above. With the patient initially in the left lateral decubitus position, a digital rectal examination was performed. The Olympus video colonoscope was placed in the patient's rectum and advanced without difficulty  to the transverse colon. The prep was poor. Examination of the mucosa was performed during both introduction and withdrawal of the colonoscope. Retroflexed view of the rectum was performed. The patient  was taken to the recovery area in good condition. Findings:     1. 6 mm transverse colon pedunculated polyp, hot snare polypectomy, polyp was retrieved. 2. Grade 2 internal hemorrhoid without hemorrhage. 3. External hemorrhoid without bleeding or thrombosis. Recommendations:   1. Await pathology. 2. Follow up with me. 3. Sitz bath.           Electronically signed by Teri White MD on 2017 at 12:01 PM

## 2017-11-24 LAB — SURGICAL PATHOLOGY REPORT: NORMAL

## 2017-11-24 NOTE — ANESTHESIA POSTPROCEDURE EVALUATION
Department of Anesthesiology  Postprocedure Note    Patient: Villa Aguilar  MRN: [id number]  YOB: 1940  Date of evaluation: 11/24/2017  Time:  11:34 AM     Procedure Summary     Date:  11/22/17 Room / Location:  [facility] 04 / [facility]    Anesthesia Start:  1121 Anesthesia Stop:  0398    Procedure:  SIGMOIDOSCOPY POLYP SNARE (N/A ) Diagnosis:  (HEMORRHOIDS )    Surgeon:  Tigist Haskins MD Responsible Provider:  Tono Washington MD    Anesthesia Type:  general, MAC ASA Status:  3          Anesthesia Type: general, MAC    Ke Phase I: Ke Score: 10    Ke Phase II: Ke Score: 10    Last vitals: Reviewed and per EMR flowsheets.        Anesthesia Post Evaluation    Patient participation: complete - patient participated  Level of consciousness: awake and alert  Airway patency: patent  Nausea & Vomiting: no nausea and no vomiting  Complications: no  Cardiovascular status: hemodynamically stable  Respiratory status: room air  Hydration status: euvolemic

## 2017-12-09 DIAGNOSIS — G30.9 ALZHEIMER'S DEMENTIA WITHOUT BEHAVIORAL DISTURBANCE (HCC): ICD-10-CM

## 2017-12-09 DIAGNOSIS — F02.80 ALZHEIMER'S DEMENTIA WITHOUT BEHAVIORAL DISTURBANCE (HCC): ICD-10-CM

## 2017-12-09 DIAGNOSIS — F02.80 ALZHEIMER'S DEMENTIA WITHOUT BEHAVIORAL DISTURBANCE, UNSPECIFIED TIMING OF DEMENTIA ONSET: ICD-10-CM

## 2017-12-09 DIAGNOSIS — M81.0 OSTEOPOROSIS: ICD-10-CM

## 2017-12-09 DIAGNOSIS — K21.9 GASTROESOPHAGEAL REFLUX DISEASE WITHOUT ESOPHAGITIS: ICD-10-CM

## 2017-12-09 DIAGNOSIS — G30.9 ALZHEIMER'S DEMENTIA WITHOUT BEHAVIORAL DISTURBANCE, UNSPECIFIED TIMING OF DEMENTIA ONSET: ICD-10-CM

## 2017-12-09 DIAGNOSIS — N40.0 BENIGN NON-NODULAR PROSTATIC HYPERPLASIA WITHOUT LOWER URINARY TRACT SYMPTOMS: ICD-10-CM

## 2017-12-11 ENCOUNTER — OFFICE VISIT (OUTPATIENT)
Dept: INFECTIOUS DISEASES | Age: 77
End: 2017-12-11
Payer: MEDICARE

## 2017-12-11 ENCOUNTER — HOSPITAL ENCOUNTER (OUTPATIENT)
Age: 77
Discharge: HOME | End: 2017-12-11
Payer: MEDICARE

## 2017-12-11 VITALS
SYSTOLIC BLOOD PRESSURE: 153 MMHG | TEMPERATURE: 97.3 F | OXYGEN SATURATION: 100 % | DIASTOLIC BLOOD PRESSURE: 86 MMHG | RESPIRATION RATE: 12 BRPM | WEIGHT: 119 LBS | HEIGHT: 67 IN | HEART RATE: 75 BPM | BODY MASS INDEX: 18.68 KG/M2

## 2017-12-11 DIAGNOSIS — G30.9 ALZHEIMER'S DEMENTIA WITHOUT BEHAVIORAL DISTURBANCE, UNSPECIFIED TIMING OF DEMENTIA ONSET: ICD-10-CM

## 2017-12-11 DIAGNOSIS — Z72.0 TOBACCO ABUSE: ICD-10-CM

## 2017-12-11 DIAGNOSIS — B20 HUMAN IMMUNODEFICIENCY VIRUS (HCC): Primary | ICD-10-CM

## 2017-12-11 DIAGNOSIS — B20 HUMAN IMMUNODEFICIENCY VIRUS (HCC): ICD-10-CM

## 2017-12-11 DIAGNOSIS — F02.80 ALZHEIMER'S DEMENTIA WITHOUT BEHAVIORAL DISTURBANCE, UNSPECIFIED TIMING OF DEMENTIA ONSET: ICD-10-CM

## 2017-12-11 DIAGNOSIS — E78.2 MIXED HYPERLIPIDEMIA: ICD-10-CM

## 2017-12-11 LAB
ALBUMIN SERPL-MCNC: 3.8 G/DL (ref 3.5–5.2)
ALBUMIN/GLOBULIN RATIO: 1 (ref 1–2.5)
ALP BLD-CCNC: 215 U/L (ref 40–129)
ALT SERPL-CCNC: 11 U/L (ref 5–41)
ANION GAP SERPL CALCULATED.3IONS-SCNC: 10 MMOL/L (ref 9–17)
AST SERPL-CCNC: 27 U/L
BILIRUB SERPL-MCNC: 0.16 MG/DL (ref 0.3–1.2)
BILIRUBIN DIRECT: <0.08 MG/DL
BILIRUBIN, INDIRECT: ABNORMAL MG/DL (ref 0–1)
BUN BLDV-MCNC: 7 MG/DL (ref 8–23)
CALCIUM SERPL-MCNC: 9.3 MG/DL (ref 8.6–10.4)
CHLORIDE BLD-SCNC: 100 MMOL/L (ref 98–107)
CO2: 27 MMOL/L (ref 20–31)
CREAT SERPL-MCNC: 0.92 MG/DL (ref 0.7–1.2)
GFR AFRICAN AMERICAN: >60 ML/MIN
GFR NON-AFRICAN AMERICAN: >60 ML/MIN
GFR SERPL CREATININE-BSD FRML MDRD: ABNORMAL ML/MIN/{1.73_M2}
GFR SERPL CREATININE-BSD FRML MDRD: ABNORMAL ML/MIN/{1.73_M2}
GLUCOSE BLD-MCNC: 104 MG/DL (ref 70–99)
HCT VFR BLD CALC: 43.1 % (ref 40.7–50.3)
HEMOGLOBIN: 13.1 G/DL (ref 13–17)
MCH RBC QN AUTO: 27.5 PG (ref 25.2–33.5)
MCHC RBC AUTO-ENTMCNC: 30.4 G/DL (ref 28.4–34.8)
MCV RBC AUTO: 90.5 FL (ref 82.6–102.9)
PDW BLD-RTO: 15.2 % (ref 11.8–14.4)
PLATELET # BLD: 247 K/UL (ref 138–453)
PMV BLD AUTO: 9.7 FL (ref 8.1–13.5)
POTASSIUM SERPL-SCNC: 4.6 MMOL/L (ref 3.7–5.3)
RBC # BLD: 4.76 M/UL (ref 4.21–5.77)
SODIUM BLD-SCNC: 137 MMOL/L (ref 135–144)
TOTAL PROTEIN: 7.8 G/DL (ref 6.4–8.3)
WBC # BLD: 8.1 K/UL (ref 3.5–11.3)

## 2017-12-11 PROCEDURE — 86357 NK CELLS TOTAL COUNT: CPT

## 2017-12-11 PROCEDURE — 86355 B CELLS TOTAL COUNT: CPT

## 2017-12-11 PROCEDURE — 85027 COMPLETE CBC AUTOMATED: CPT

## 2017-12-11 PROCEDURE — 87536 HIV-1 QUANT&REVRSE TRNSCRPJ: CPT

## 2017-12-11 PROCEDURE — 4004F PT TOBACCO SCREEN RCVD TLK: CPT | Performed by: INTERNAL MEDICINE

## 2017-12-11 PROCEDURE — 86360 T CELL ABSOLUTE COUNT/RATIO: CPT

## 2017-12-11 PROCEDURE — 36415 COLL VENOUS BLD VENIPUNCTURE: CPT

## 2017-12-11 PROCEDURE — 1123F ACP DISCUSS/DSCN MKR DOCD: CPT | Performed by: INTERNAL MEDICINE

## 2017-12-11 PROCEDURE — G8420 CALC BMI NORM PARAMETERS: HCPCS | Performed by: INTERNAL MEDICINE

## 2017-12-11 PROCEDURE — 86359 T CELLS TOTAL COUNT: CPT

## 2017-12-11 PROCEDURE — 99213 OFFICE O/P EST LOW 20 MIN: CPT | Performed by: INTERNAL MEDICINE

## 2017-12-11 PROCEDURE — G8427 DOCREV CUR MEDS BY ELIG CLIN: HCPCS | Performed by: INTERNAL MEDICINE

## 2017-12-11 PROCEDURE — 82248 BILIRUBIN DIRECT: CPT

## 2017-12-11 PROCEDURE — 80053 COMPREHEN METABOLIC PANEL: CPT

## 2017-12-11 PROCEDURE — 4040F PNEUMOC VAC/ADMIN/RCVD: CPT | Performed by: INTERNAL MEDICINE

## 2017-12-11 PROCEDURE — G8484 FLU IMMUNIZE NO ADMIN: HCPCS | Performed by: INTERNAL MEDICINE

## 2017-12-11 RX ORDER — OMEPRAZOLE 20 MG/1
CAPSULE, DELAYED RELEASE ORAL
Qty: 30 CAPSULE | Refills: 0 | Status: SHIPPED | OUTPATIENT
Start: 2017-12-11 | End: 2018-01-10 | Stop reason: SDUPTHER

## 2017-12-11 RX ORDER — ALENDRONATE SODIUM 70 MG/1
TABLET ORAL
Qty: 4 TABLET | Refills: 0 | Status: SHIPPED | OUTPATIENT
Start: 2017-12-11 | End: 2018-01-10 | Stop reason: SDUPTHER

## 2017-12-11 RX ORDER — FINASTERIDE 5 MG/1
TABLET, FILM COATED ORAL
Qty: 30 TABLET | Refills: 0 | Status: SHIPPED | OUTPATIENT
Start: 2017-12-11 | End: 2018-01-10 | Stop reason: SDUPTHER

## 2017-12-11 RX ORDER — B-COMPLEX WITH VITAMIN C
TABLET ORAL
Qty: 60 TABLET | Refills: 0 | Status: SHIPPED | OUTPATIENT
Start: 2017-12-11 | End: 2018-01-10 | Stop reason: SDUPTHER

## 2017-12-11 RX ORDER — VITAMIN E 268 MG
CAPSULE ORAL
Qty: 30 CAPSULE | Refills: 0 | Status: SHIPPED | OUTPATIENT
Start: 2017-12-11 | End: 2018-01-10 | Stop reason: SDUPTHER

## 2017-12-11 RX ORDER — MEMANTINE HYDROCHLORIDE 5 MG/1
TABLET ORAL
Qty: 60 TABLET | Refills: 0 | Status: SHIPPED | OUTPATIENT
Start: 2017-12-11 | End: 2018-01-10 | Stop reason: SDUPTHER

## 2017-12-11 RX ORDER — DONEPEZIL HYDROCHLORIDE 10 MG/1
TABLET, FILM COATED ORAL
Qty: 30 TABLET | Refills: 0 | Status: SHIPPED | OUTPATIENT
Start: 2017-12-11 | End: 2018-01-10 | Stop reason: SDUPTHER

## 2017-12-11 NOTE — TELEPHONE ENCOUNTER
E-scribing request for medications. Pt has future appt.      Health Maintenance   Topic Date Due    Zostavax vaccine  10/03/1998    Lipid screen  05/10/2022    DTaP/Tdap/Td vaccine (2 - Td) 02/21/2027    Flu vaccine  Completed    Pneumococcal high/highest risk  Completed       Hemoglobin A1C (%)   Date Value   09/15/2016 5.8             ( goal A1C is < 7)   No results found for: LABMICR  LDL Cholesterol (mg/dL)   Date Value   05/10/2017 131 (H)       (goal LDL is <100)   AST (U/L)   Date Value   05/10/2017 25     ALT (U/L)   Date Value   05/10/2017 12     BUN (mg/dL)   Date Value   05/10/2017 6 (L)     BP Readings from Last 3 Encounters:   12/11/17 (!) 153/86   11/22/17 120/66   11/22/17 99/60          (goal 120/80)      Next Visit Date:  12/9/2017    Patient Active Problem List:     HIV (human immunodeficiency virus infection) ([facility] [address]     Dementia     Osteoporosis right hip fracture dec 08     Hemorrhoids     BPH (benign prostatic hyperplasia)     GERD (gastroesophageal reflux disease)     Meralgia paraesthetica     Mixed hyperlipidemia

## 2017-12-11 NOTE — PROGRESS NOTES
1 CAPSULE BY MOUTH DAILY 30 capsule 0    Calcium Carbonate-Vitamin D (OYSTER SHELL CALCIUM/D) 500-200 MG-UNIT TABS TAKE 2 TABLETS IN THE MORNING 60 tablet 0    alendronate (FOSAMAX) 70 MG tablet TAKE 1 TABLET EVERY WEEK 4 tablet 0    donepezil (ARICEPT) 10 MG tablet TAKE 1 TABLET IN THE EVENING 30 tablet 0    finasteride (PROSCAR) 5 MG tablet TAKE 1 TABLET DAILY 30 tablet 0    vitamin E 400 UNIT capsule TAKE 1 CAPSULE TWICE A DAY 30 capsule 0    Multiple Vitamin (MULTIVITAMIN) tablet TAKE 1 TABLET DAILY 60 tablet 5     No current facility-administered medications for this visit. Social History:     Social History     Social History    Marital status:      Spouse name: N/A    Number of children: N/A    Years of education: N/A     Occupational History    Not on file. Social History Main Topics    Smoking status: Current Every Day Smoker     Packs/day: 0.50     Years: 50.00     Types: Cigarettes    Smokeless tobacco: Never Used      Comment: 10 cigarettes a day, only smokes 1/2 of cigarettes     Alcohol use 2.4 oz/week     4 Cans of beer per week    Drug use: No    Sexual activity: Yes     Partners: Female     Other Topics Concern    Not on file     Social History Narrative    No narrative on file     Family History:     Family History   Problem Relation Age of Onset    Heart Disease Mother     High Blood Pressure Mother     Cancer Father     Diabetes Sister     Heart Disease Sister     High Blood Pressure Sister     Heart Disease Brother     High Blood Pressure Brother       Allergies:   Review of patient's allergies indicates no known allergies. Review of Systems:   Review of Systems   Constitutional: Negative. HENT: Negative. Eyes: Negative. Respiratory: Negative. Cardiovascular: Negative. Gastrointestinal: Negative. Endocrine: Negative. Genitourinary: Negative. Musculoskeletal: Negative. Skin: Negative. Allergic/Immunologic: Negative. Neurological:        Poor memory   Hematological: Negative. Psychiatric/Behavioral: Negative. Physical Examination :   BP (!) 153/86 (Site: Left Arm, Position: Sitting)   Pulse 75   Temp 97.3 °F (36.3 °C)   Resp 12   Ht 5' 7\" (1.702 m)   Wt 119 lb (54 kg)   SpO2 100% Comment: Room air  BMI 18.64 kg/m²     Physical Exam   Constitutional: He is oriented to person, place, and time and well-developed, well-nourished, and in no distress. No distress. HENT:   Head: Normocephalic and atraumatic. Nose: Nose normal.   Mouth/Throat: Oropharynx is clear and moist. No oropharyngeal exudate. Eyes: EOM are normal. Pupils are equal, round, and reactive to light. No scleral icterus. Neck: Normal range of motion. Neck supple. No JVD present. No tracheal deviation present. No thyromegaly present. Cardiovascular: Normal rate, regular rhythm and normal heart sounds. Exam reveals no gallop and no friction rub. No murmur heard. Pulmonary/Chest: Effort normal and breath sounds normal. No respiratory distress. He has no wheezes. He has no rales. Abdominal: Soft. Bowel sounds are normal. He exhibits no distension and no mass. There is no tenderness. There is no guarding. Musculoskeletal: Normal range of motion. He exhibits no edema. Lymphadenopathy:     He has no cervical adenopathy. Neurological: He is alert and oriented to person, place, and time. No cranial nerve deficit. Gait normal.   Skin: Skin is warm and dry. No rash noted. He is not diaphoretic. No erythema.    Psychiatric: Affect and judgment normal.       Medical Decision Making:   I have independently reviewed the following labs:    CBC:   WBC   Date Value Ref Range Status   05/10/2017 5.9 3.5 - 11.0 k/uL Final   05/10/2017 5.9 3.5 - 11.0 k/uL Final   11/09/2016 6.3 3.5 - 11.0 k/uL Final   11/09/2016 6.3 3.5 - 11.0 k/uL Final     RBC   Date Value Ref Range Status   05/10/2017 4.47 (L) 4.5 - 5.9 m/uL Final   11/09/2016 4.52 4.5 - 5.9 m/uL 05/10/2017    ALT 12 05/10/2017    LABGLOM >60 05/10/2017    GFRAA >60 05/10/2017       HIV VIRAL LOAD:   Direct Exam   Date Value Ref Range Status   05/10/2017   Final    nts should have confirmed HIV-1 infection prior to RNA quantification. The test   05/10/2017   Final     has been developed to monitor disease progression and efficacy of Anti-HIV drug   05/10/2017  therapy. Final   05/10/2017   Final    [facility] [address] [facility], [address] [place] [address] [phone]   05/10/2017 HIV-1 RNA DETECTED, <20 CP/ML (<1.30 LOG CP/ML)  Final   05/10/2017   Final                                                            This test is a sensitive   05/10/2017   Final     method for quantitating HIV-1 RNA viral loads in plasma. It utilizes RT-PCR in   05/10/2017   Final     the FDA approved Roche Amplicor/Taqman 48 system. This test is intended for   05/10/2017   Final     detecting and quantifying HIV-1 RNA viral loads in the range of 20 - 10,000,000   05/10/2017   Final     cp/ml (1.30 - 7.00 log cp/ml). The reference value for this assay is <20 cp/ml   05/10/2017   Final     (<1.30 log cp/ml).                                                          Patie     LYMPHOCYTE SUBSET:  Absolute CD 3   Date Value Ref Range Status   05/10/2017 1163 411 - 2061 /uL Final   11/09/2016 1532 411 - 2061 /uL Final   04/28/2016 1344 411 - 2061 /uL Final     Absolute CD 4 [place]   Date Value Ref Range Status   05/10/2017 462 309 - 1571 /uL Final   11/09/2016 605 309 - 1571 /uL Final   04/28/2016 499 309 - 1571 /uL Final     CD4 % [place] T Cell   Date Value Ref Range Status   05/10/2017 29 27 - 64 % Final   11/09/2016 30 27 - 64 % Final   04/28/2016 26 (L) 27 - 64 % Final     CD4/CD8 Ratio   Date Value Ref Range Status   05/10/2017 0.69 0.6 - 2.8 Final   11/09/2016 0.65 0.6 - 2.8 Final   04/28/2016 0.60 0.6 - 2.8 Final       LIPID PANEL:  Lab Results   Component Value Date    CHOL 242 (H) 05/10/2017    CHOL 234 (H) 04/28/2016     Lab messaging: [phone]

## 2017-12-12 LAB
% NK (CD56): 17 % (ref 6–29)
AB NK (CD56): 344 /UL (ref 90–600)
ABSOLUTE CD 3: 1539 /UL (ref 411–2061)
ABSOLUTE CD 4 HELPER: 628 /UL (ref 309–1571)
ABSOLUTE CD 8 (SUPP): 911 /UL (ref 282–999)
ABSOLUTE CD19 B CELL: 101 /UL (ref 71–567)
CD19 B CELL: 5 % (ref 5–25)
CD3 % TOTAL T CELLS: 76 % (ref 57–94)
CD4 % HELPER T CELL: 31 % (ref 27–64)
CD4:CD8: 0.69 (ref 0.6–2.8)
CD8 % SUPPRESSOR T CELL: 45 % (ref 17–48)
LYMPHOCYTES # BLD: 25 % (ref 24–44)
WBC # BLD: 8.1 K/UL (ref 3.5–11)

## 2017-12-13 RX ORDER — EFAVIRENZ, EMTRICITABINE, AND TENOFOVIR DISOPROXIL FUMARATE 600; 200; 300 MG/1; MG/1; MG/1
TABLET, FILM COATED ORAL
Qty: 30 TABLET | Refills: 0 | Status: SHIPPED | OUTPATIENT
Start: 2017-12-13 | End: 2017-12-27 | Stop reason: SDUPTHER

## 2017-12-15 LAB
DIRECT EXAM: 12
DIRECT EXAM: NORMAL
Lab: NORMAL
SPECIMEN DESCRIPTION: NORMAL
STATUS: NORMAL

## 2017-12-15 ASSESSMENT — ENCOUNTER SYMPTOMS
GASTROINTESTINAL NEGATIVE: 1
ALLERGIC/IMMUNOLOGIC NEGATIVE: 1
EYES NEGATIVE: 1
RESPIRATORY NEGATIVE: 1

## 2018-01-10 DIAGNOSIS — F02.80 ALZHEIMER'S DEMENTIA WITHOUT BEHAVIORAL DISTURBANCE, UNSPECIFIED TIMING OF DEMENTIA ONSET: ICD-10-CM

## 2018-01-10 DIAGNOSIS — G30.9 ALZHEIMER'S DEMENTIA WITHOUT BEHAVIORAL DISTURBANCE (HCC): ICD-10-CM

## 2018-01-10 DIAGNOSIS — N40.0 BENIGN NON-NODULAR PROSTATIC HYPERPLASIA WITHOUT LOWER URINARY TRACT SYMPTOMS: ICD-10-CM

## 2018-01-10 DIAGNOSIS — F02.80 ALZHEIMER'S DEMENTIA WITHOUT BEHAVIORAL DISTURBANCE (HCC): ICD-10-CM

## 2018-01-10 DIAGNOSIS — K21.9 GASTROESOPHAGEAL REFLUX DISEASE WITHOUT ESOPHAGITIS: ICD-10-CM

## 2018-01-10 DIAGNOSIS — G30.9 ALZHEIMER'S DEMENTIA WITHOUT BEHAVIORAL DISTURBANCE, UNSPECIFIED TIMING OF DEMENTIA ONSET: ICD-10-CM

## 2018-01-10 DIAGNOSIS — M81.0 OSTEOPOROSIS: ICD-10-CM

## 2018-01-10 RX ORDER — MEMANTINE HYDROCHLORIDE 5 MG/1
TABLET ORAL
Qty: 60 TABLET | Refills: 0 | Status: SHIPPED | OUTPATIENT
Start: 2018-01-10 | End: 2018-02-02 | Stop reason: SDUPTHER

## 2018-01-10 RX ORDER — DONEPEZIL HYDROCHLORIDE 10 MG/1
TABLET, FILM COATED ORAL
Qty: 30 TABLET | Refills: 0 | Status: SHIPPED | OUTPATIENT
Start: 2018-01-10 | End: 2018-02-02 | Stop reason: SDUPTHER

## 2018-01-10 RX ORDER — OMEPRAZOLE 20 MG/1
CAPSULE, DELAYED RELEASE ORAL
Qty: 30 CAPSULE | Refills: 0 | Status: SHIPPED | OUTPATIENT
Start: 2018-01-10 | End: 2018-02-02 | Stop reason: SDUPTHER

## 2018-01-10 RX ORDER — VITAMIN E 268 MG
CAPSULE ORAL
Qty: 30 CAPSULE | Refills: 0 | Status: SHIPPED | OUTPATIENT
Start: 2018-01-10 | End: 2018-02-07 | Stop reason: SDUPTHER

## 2018-01-10 RX ORDER — B-COMPLEX WITH VITAMIN C
TABLET ORAL
Qty: 60 TABLET | Refills: 0 | Status: SHIPPED | OUTPATIENT
Start: 2018-01-10 | End: 2018-02-02 | Stop reason: SDUPTHER

## 2018-01-10 RX ORDER — FINASTERIDE 5 MG/1
TABLET, FILM COATED ORAL
Qty: 30 TABLET | Refills: 0 | Status: SHIPPED | OUTPATIENT
Start: 2018-01-10 | End: 2018-02-02 | Stop reason: SDUPTHER

## 2018-01-10 RX ORDER — ALENDRONATE SODIUM 70 MG/1
TABLET ORAL
Qty: 4 TABLET | Refills: 0 | Status: SHIPPED | OUTPATIENT
Start: 2018-01-10 | End: 2018-02-02 | Stop reason: SDUPTHER

## 2018-01-13 ENCOUNTER — HOSPITAL ENCOUNTER (INPATIENT)
Age: 78
LOS: 7 days | Discharge: HOME HEALTH | DRG: 100 | End: 2018-01-20
Attending: EMERGENCY MEDICINE | Admitting: FAMILY MEDICINE
Payer: MEDICARE

## 2018-01-13 ENCOUNTER — APPOINTMENT (OUTPATIENT)
Dept: CT IMAGING | Age: 78
DRG: 100 | End: 2018-01-13
Payer: MEDICARE

## 2018-01-13 ENCOUNTER — APPOINTMENT (OUTPATIENT)
Dept: GENERAL RADIOLOGY | Age: 78
DRG: 100 | End: 2018-01-13
Payer: MEDICARE

## 2018-01-13 DIAGNOSIS — E87.29 RESPIRATORY ACIDOSIS: ICD-10-CM

## 2018-01-13 DIAGNOSIS — R56.9 CONVULSIONS, UNSPECIFIED CONVULSION TYPE (HCC): ICD-10-CM

## 2018-01-13 DIAGNOSIS — R06.2 WHEEZING: ICD-10-CM

## 2018-01-13 DIAGNOSIS — R55 SYNCOPE AND COLLAPSE: Primary | ICD-10-CM

## 2018-01-13 DIAGNOSIS — R06.02 SHORTNESS OF BREATH: ICD-10-CM

## 2018-01-13 LAB
-: ABNORMAL
ABSOLUTE EOS #: 0.07 K/UL (ref 0–0.44)
ABSOLUTE IMMATURE GRANULOCYTE: 0.07 K/UL (ref 0–0.3)
ABSOLUTE LYMPH #: 2.23 K/UL (ref 1.1–3.7)
ABSOLUTE MONO #: 0.86 K/UL (ref 0.1–1.2)
ACETAMINOPHEN LEVEL: <10 UG/ML (ref 10–30)
ALLEN TEST: ABNORMAL
AMORPHOUS: ABNORMAL
ANION GAP SERPL CALCULATED.3IONS-SCNC: 12 MMOL/L (ref 9–17)
ANION GAP: 8 MMOL/L (ref 7–16)
BACTERIA: ABNORMAL
BASOPHILS # BLD: 0 % (ref 0–2)
BASOPHILS ABSOLUTE: <0.03 K/UL (ref 0–0.2)
BILIRUBIN URINE: NEGATIVE
BUN BLDV-MCNC: 7 MG/DL (ref 8–23)
BUN/CREAT BLD: ABNORMAL (ref 9–20)
CALCIUM SERPL-MCNC: 8.5 MG/DL (ref 8.6–10.4)
CASTS UA: ABNORMAL /LPF (ref 0–8)
CHLORIDE BLD-SCNC: 99 MMOL/L (ref 98–107)
CO2: 26 MMOL/L (ref 20–31)
COLOR: YELLOW
CREAT SERPL-MCNC: 0.83 MG/DL (ref 0.7–1.2)
CRYSTALS, UA: ABNORMAL /HPF
DIFFERENTIAL TYPE: ABNORMAL
EOSINOPHILS RELATIVE PERCENT: 1 % (ref 1–4)
EPITHELIAL CELLS UA: ABNORMAL /HPF (ref 0–5)
ETHANOL PERCENT: <0.01 %
ETHANOL: <10 MG/DL
FIO2: ABNORMAL
GFR AFRICAN AMERICAN: >60 ML/MIN
GFR NON-AFRICAN AMERICAN: >60 ML/MIN
GFR NON-AFRICAN AMERICAN: >60 ML/MIN
GFR SERPL CREATININE-BSD FRML MDRD: >60 ML/MIN
GFR SERPL CREATININE-BSD FRML MDRD: ABNORMAL ML/MIN/{1.73_M2}
GFR SERPL CREATININE-BSD FRML MDRD: ABNORMAL ML/MIN/{1.73_M2}
GFR SERPL CREATININE-BSD FRML MDRD: NORMAL ML/MIN/{1.73_M2}
GLUCOSE BLD-MCNC: 107 MG/DL (ref 74–100)
GLUCOSE BLD-MCNC: 114 MG/DL (ref 70–99)
GLUCOSE URINE: NEGATIVE
HCO3 VENOUS: 29.1 MMOL/L (ref 22–29)
HCT VFR BLD CALC: 40.5 % (ref 40.7–50.3)
HEMOGLOBIN: 12.4 G/DL (ref 13–17)
IMMATURE GRANULOCYTES: 1 %
KETONES, URINE: NEGATIVE
LEUKOCYTE ESTERASE, URINE: ABNORMAL
LYMPHOCYTES # BLD: 29 % (ref 24–43)
MCH RBC QN AUTO: 27.6 PG (ref 25.2–33.5)
MCHC RBC AUTO-ENTMCNC: 30.6 G/DL (ref 28.4–34.8)
MCV RBC AUTO: 90.2 FL (ref 82.6–102.9)
MODE: ABNORMAL
MONOCYTES # BLD: 11 % (ref 3–12)
MUCUS: ABNORMAL
NEGATIVE BASE EXCESS, VEN: ABNORMAL (ref 0–2)
NITRITE, URINE: NEGATIVE
O2 DEVICE/FLOW/%: ABNORMAL
O2 SAT, VEN: 27 % (ref 60–85)
OTHER OBSERVATIONS UA: ABNORMAL
PATIENT TEMP: ABNORMAL
PCO2, VEN: 61.1 MM HG (ref 41–51)
PDW BLD-RTO: 14.7 % (ref 11.8–14.4)
PH UA: 5 (ref 5–8)
PH VENOUS: 7.29 (ref 7.32–7.43)
PLATELET # BLD: 243 K/UL (ref 138–453)
PLATELET ESTIMATE: ABNORMAL
PMV BLD AUTO: 9.7 FL (ref 8.1–13.5)
PO2, VEN: 20.7 MM HG (ref 30–50)
POC CHLORIDE: 102 MMOL/L (ref 98–107)
POC CREATININE: 0.83 MG/DL (ref 0.51–1.19)
POC HEMATOCRIT: 45 % (ref 41–53)
POC HEMOGLOBIN: 15.2 G/DL (ref 13.5–17.5)
POC IONIZED CALCIUM: 1.2 MMOL/L (ref 1.15–1.33)
POC LACTIC ACID: 1.84 MMOL/L (ref 0.56–1.39)
POC PCO2 TEMP: ABNORMAL MM HG
POC PH TEMP: ABNORMAL
POC PO2 TEMP: ABNORMAL MM HG
POC POTASSIUM: 4.3 MMOL/L (ref 3.5–4.5)
POC SODIUM: 139 MMOL/L (ref 138–146)
POC TROPONIN I: 0 NG/ML (ref 0–0.1)
POC TROPONIN INTERP: NORMAL
POSITIVE BASE EXCESS, VEN: 1 (ref 0–3)
POTASSIUM SERPL-SCNC: 4.5 MMOL/L (ref 3.7–5.3)
PROTEIN UA: NEGATIVE
RBC # BLD: 4.49 M/UL (ref 4.21–5.77)
RBC # BLD: ABNORMAL 10*6/UL
RBC UA: ABNORMAL /HPF (ref 0–4)
RENAL EPITHELIAL, UA: ABNORMAL /HPF
SALICYLATE LEVEL: 1 MG/DL (ref 3–10)
SAMPLE SITE: ABNORMAL
SEG NEUTROPHILS: 58 % (ref 36–65)
SEGMENTED NEUTROPHILS ABSOLUTE COUNT: 4.57 K/UL (ref 1.5–8.1)
SODIUM BLD-SCNC: 137 MMOL/L (ref 135–144)
SPECIFIC GRAVITY UA: 1.01 (ref 1–1.03)
TOTAL CO2, VENOUS: 31 MMOL/L (ref 23–30)
TOXIC TRICYCLIC SC,BLOOD: NEGATIVE
TRICHOMONAS: ABNORMAL
TURBIDITY: CLEAR
URINE HGB: NEGATIVE
UROBILINOGEN, URINE: NORMAL
WBC # BLD: 7.8 K/UL (ref 3.5–11.3)
WBC # BLD: ABNORMAL 10*3/UL
WBC UA: ABNORMAL /HPF (ref 0–5)
YEAST: ABNORMAL

## 2018-01-13 PROCEDURE — 85014 HEMATOCRIT: CPT

## 2018-01-13 PROCEDURE — 87186 SC STD MICRODIL/AGAR DIL: CPT

## 2018-01-13 PROCEDURE — 81001 URINALYSIS AUTO W/SCOPE: CPT

## 2018-01-13 PROCEDURE — 82435 ASSAY OF BLOOD CHLORIDE: CPT

## 2018-01-13 PROCEDURE — 2580000003 HC RX 258: Performed by: NURSE PRACTITIONER

## 2018-01-13 PROCEDURE — 82565 ASSAY OF CREATININE: CPT

## 2018-01-13 PROCEDURE — 83605 ASSAY OF LACTIC ACID: CPT

## 2018-01-13 PROCEDURE — 87086 URINE CULTURE/COLONY COUNT: CPT

## 2018-01-13 PROCEDURE — 1200000000 HC SEMI PRIVATE

## 2018-01-13 PROCEDURE — 84132 ASSAY OF SERUM POTASSIUM: CPT

## 2018-01-13 PROCEDURE — 84295 ASSAY OF SERUM SODIUM: CPT

## 2018-01-13 PROCEDURE — 82803 BLOOD GASES ANY COMBINATION: CPT

## 2018-01-13 PROCEDURE — 71046 X-RAY EXAM CHEST 2 VIEWS: CPT

## 2018-01-13 PROCEDURE — 82330 ASSAY OF CALCIUM: CPT

## 2018-01-13 PROCEDURE — 87077 CULTURE AEROBIC IDENTIFY: CPT

## 2018-01-13 PROCEDURE — 70450 CT HEAD/BRAIN W/O DYE: CPT

## 2018-01-13 PROCEDURE — 84484 ASSAY OF TROPONIN QUANT: CPT

## 2018-01-13 PROCEDURE — 85025 COMPLETE CBC W/AUTO DIFF WBC: CPT

## 2018-01-13 PROCEDURE — 6370000000 HC RX 637 (ALT 250 FOR IP): Performed by: NURSE PRACTITIONER

## 2018-01-13 PROCEDURE — 99285 EMERGENCY DEPT VISIT HI MDM: CPT

## 2018-01-13 PROCEDURE — G0480 DRUG TEST DEF 1-7 CLASSES: HCPCS

## 2018-01-13 PROCEDURE — 80307 DRUG TEST PRSMV CHEM ANLYZR: CPT

## 2018-01-13 PROCEDURE — 80048 BASIC METABOLIC PNL TOTAL CA: CPT

## 2018-01-13 PROCEDURE — 82947 ASSAY GLUCOSE BLOOD QUANT: CPT

## 2018-01-13 PROCEDURE — 93005 ELECTROCARDIOGRAM TRACING: CPT

## 2018-01-13 RX ORDER — NICOTINE 21 MG/24HR
1 PATCH, TRANSDERMAL 24 HOURS TRANSDERMAL DAILY PRN
Status: DISCONTINUED | OUTPATIENT
Start: 2018-01-13 | End: 2018-01-20 | Stop reason: HOSPADM

## 2018-01-13 RX ORDER — ALBUTEROL SULFATE 2.5 MG/3ML
2.5 SOLUTION RESPIRATORY (INHALATION)
Status: DISCONTINUED | OUTPATIENT
Start: 2018-01-13 | End: 2018-01-14

## 2018-01-13 RX ORDER — MAGNESIUM SULFATE 1 G/100ML
1 INJECTION INTRAVENOUS PRN
Status: DISCONTINUED | OUTPATIENT
Start: 2018-01-13 | End: 2018-01-20 | Stop reason: HOSPADM

## 2018-01-13 RX ORDER — MEMANTINE HYDROCHLORIDE 5 MG/1
5 TABLET ORAL 2 TIMES DAILY
Status: DISCONTINUED | OUTPATIENT
Start: 2018-01-13 | End: 2018-01-20 | Stop reason: HOSPADM

## 2018-01-13 RX ORDER — VITAMIN E 268 MG
400 CAPSULE ORAL DAILY
Status: DISCONTINUED | OUTPATIENT
Start: 2018-01-14 | End: 2018-01-18

## 2018-01-13 RX ORDER — ONDANSETRON 2 MG/ML
4 INJECTION INTRAMUSCULAR; INTRAVENOUS EVERY 6 HOURS PRN
Status: DISCONTINUED | OUTPATIENT
Start: 2018-01-13 | End: 2018-01-20 | Stop reason: HOSPADM

## 2018-01-13 RX ORDER — POTASSIUM CHLORIDE 7.45 MG/ML
10 INJECTION INTRAVENOUS PRN
Status: DISCONTINUED | OUTPATIENT
Start: 2018-01-13 | End: 2018-01-20 | Stop reason: HOSPADM

## 2018-01-13 RX ORDER — SODIUM CHLORIDE 0.9 % (FLUSH) 0.9 %
10 SYRINGE (ML) INJECTION PRN
Status: DISCONTINUED | OUTPATIENT
Start: 2018-01-13 | End: 2018-01-20 | Stop reason: HOSPADM

## 2018-01-13 RX ORDER — PANTOPRAZOLE SODIUM 40 MG/1
40 TABLET, DELAYED RELEASE ORAL
Status: DISCONTINUED | OUTPATIENT
Start: 2018-01-14 | End: 2018-01-20 | Stop reason: HOSPADM

## 2018-01-13 RX ORDER — DONEPEZIL HYDROCHLORIDE 10 MG/1
10 TABLET, FILM COATED ORAL NIGHTLY
Status: DISCONTINUED | OUTPATIENT
Start: 2018-01-13 | End: 2018-01-20 | Stop reason: HOSPADM

## 2018-01-13 RX ORDER — BISACODYL 10 MG
10 SUPPOSITORY, RECTAL RECTAL DAILY PRN
Status: DISCONTINUED | OUTPATIENT
Start: 2018-01-13 | End: 2018-01-20 | Stop reason: HOSPADM

## 2018-01-13 RX ORDER — HYDROCODONE BITARTRATE AND ACETAMINOPHEN 5; 325 MG/1; MG/1
1 TABLET ORAL EVERY 4 HOURS PRN
Status: DISCONTINUED | OUTPATIENT
Start: 2018-01-13 | End: 2018-01-20 | Stop reason: HOSPADM

## 2018-01-13 RX ORDER — POTASSIUM CHLORIDE 20MEQ/15ML
40 LIQUID (ML) ORAL PRN
Status: DISCONTINUED | OUTPATIENT
Start: 2018-01-13 | End: 2018-01-20 | Stop reason: HOSPADM

## 2018-01-13 RX ORDER — SODIUM CHLORIDE 9 MG/ML
INJECTION, SOLUTION INTRAVENOUS CONTINUOUS
Status: DISCONTINUED | OUTPATIENT
Start: 2018-01-13 | End: 2018-01-18 | Stop reason: HOSPADM

## 2018-01-13 RX ORDER — FINASTERIDE 5 MG/1
5 TABLET, FILM COATED ORAL DAILY
Status: DISCONTINUED | OUTPATIENT
Start: 2018-01-14 | End: 2018-01-20 | Stop reason: HOSPADM

## 2018-01-13 RX ORDER — ACETAMINOPHEN 325 MG/1
650 TABLET ORAL EVERY 4 HOURS PRN
Status: DISCONTINUED | OUTPATIENT
Start: 2018-01-13 | End: 2018-01-20 | Stop reason: HOSPADM

## 2018-01-13 RX ORDER — POTASSIUM CHLORIDE 20 MEQ/1
40 TABLET, EXTENDED RELEASE ORAL PRN
Status: DISCONTINUED | OUTPATIENT
Start: 2018-01-13 | End: 2018-01-20 | Stop reason: HOSPADM

## 2018-01-13 RX ORDER — SODIUM CHLORIDE 0.9 % (FLUSH) 0.9 %
10 SYRINGE (ML) INJECTION EVERY 12 HOURS SCHEDULED
Status: DISCONTINUED | OUTPATIENT
Start: 2018-01-13 | End: 2018-01-20 | Stop reason: HOSPADM

## 2018-01-13 RX ADMIN — MEMANTINE HYDROCHLORIDE 5 MG: 5 TABLET, FILM COATED ORAL at 23:04

## 2018-01-13 RX ADMIN — FINASTERIDE 5 MG: 5 TABLET, FILM COATED ORAL at 23:04

## 2018-01-13 RX ADMIN — VITAMIN E CAP 400 UNIT 400 UNITS: 400 CAP at 23:05

## 2018-01-13 RX ADMIN — DONEPEZIL HYDROCHLORIDE 10 MG: 10 TABLET, FILM COATED ORAL at 23:04

## 2018-01-13 RX ADMIN — SODIUM CHLORIDE: 9 INJECTION, SOLUTION INTRAVENOUS at 22:13

## 2018-01-13 ASSESSMENT — ENCOUNTER SYMPTOMS
VOMITING: 0
SHORTNESS OF BREATH: 0
ABDOMINAL PAIN: 0
DIARRHEA: 0

## 2018-01-13 NOTE — ED PROVIDER NOTES
[facility] ED  Emergency Department Encounter  Emergency Medicine Resident     Pt Name: Ab Blanco  MRN: [id number]  [place] 1940  Date of evaluation: 1/13/18  PCP:  MD Carrillo Downing       Chief Complaint   Patient presents with    Shaking       HISTORY OF PRESENT ILLNESS  (Location/Symptom, Timing/Onset, Context/Setting, Quality, Duration, Modifying Factors, Severity.)      Ab Blanco is a 76 y.o. male who presents with Possible syncopal event. The family in the room reports that roughly 6 PM this evening patient was seated for dinner table and again to have left hand shaking, which progressed to bilateral upper extremity shaking. Family reports that patient has had upper extremity shaking intermittently for some time. Family also reports the patient had a roughly three-minute episode where the patient slumped back into his chair, and was not responsive to speech. Family reports that the patient came to, and was requesting to continue his meal however he began to have continued hand shaking, so my feeling the patient account. Patient was evaluated by EMS and was found to be asymptomatic at that time. At this time patient is denying numbness weakness or tingling in extremities, denying headaches, visual changes. No facial asymmetry noted. Patient denying chest pain, shortness of breath, abdominal pain. Family also patient is mumbling his speech before, however this is not new onset and has been going on for some time as reported. Patient does have a history of dementia, and HIV. PAST MEDICAL / SURGICAL / SOCIAL / FAMILY HISTORY      has a past medical history of Benign prostatic hyperplasia; Dementia; GERD (gastroesophageal reflux disease); Headache(784.0); Hemorrhoids; History of shingles; HIV (human immunodeficiency virus infection) ([facility] [address] Human immunodeficiency virus (HIV) ([facility] [address] Meralgia paraesthetica; Mixed hyperlipidemia;  Osteoporosis; and Syphilis. has a past surgical history that includes hernia repair; Total hip arthroplasty; Tonsillectomy; Hemorrhoid surgery; and sigmoidoscopy (N/A, 11/22/2017). Social History     Social History    Marital status:      Spouse name: N/A    Number of children: N/A    Years of education: N/A     Occupational History    Not on file. Social History Main Topics    Smoking status: Current Every Day Smoker     Packs/day: 0.50     Years: 50.00     Types: Cigarettes    Smokeless tobacco: Never Used      Comment: 10 cigarettes a day, only smokes 1/2 of cigarettes     Alcohol use 2.4 oz/week     4 Cans of beer per week    Drug use: No    Sexual activity: Yes     Partners: Female     Other Topics Concern    Not on file     Social History Narrative    No narrative on file       Family History   Problem Relation Age of Onset    Heart Disease Mother     High Blood Pressure Mother     Cancer Father     Diabetes Sister     Heart Disease Sister     High Blood Pressure Sister     Heart Disease Brother     High Blood Pressure Brother        Allergies:  Review of patient's allergies indicates no known allergies. Home Medications:  Prior to Admission medications    Medication Sig Start Date End Date Taking?  Authorizing Provider   vitamin E 400 UNIT capsule TAKE 1 CAPSULE TWICE A DAY 1/10/18  Yes Vivienne Ramos MD   alendronate (FOSAMAX) 70 MG tablet TAKE 1 TABLET EVERY WEEK 1/10/18  Yes Vivienne Ramos MD   donepezil (ARICEPT) 10 MG tablet TAKE 1 TABLET IN THE EVENING 1/10/18  Yes Vivienne Ramos MD   finasteride (PROSCAR) 5 MG tablet TAKE 1 TABLET DAILY 1/10/18  Yes Vivienne Ramos MD   memantine (NAMENDA) 5 MG tablet TAKE 1 TABLET TWICE A DAY 1/10/18  Yes Vivienne Ramos MD   omeprazole (PRILOSEC) 20 MG delayed release capsule TAKE 1 CAPSULE DAILY 1/10/18  Yes Vivienne Ramos MD   Calcium Carbonate-Vitamin D (OYSTER SHELL CALCIUM/D) 500-200 MG-UNIT TABS TAKE 2 TABLETS IN THE MORNING 1/10/18 Yes Ángel Tracy MD   vitamin E 400 UNIT capsule TAKE 1 CAPSULE TWICE A DAY 12/12/17  Yes Ángel Tracy MD   ATRIPLA 600-200-300 MG per tablet TAKE 1 TABLET BY MOUTH ONCE A DAY *BOTTLE* 12/27/17 1/26/18  Mary Ward MD   Multiple Vitamin (MULTIVITAMIN) tablet TAKE 1 TABLET DAILY 1/5/17   Penny Tijerina MD       REVIEW OF SYSTEMS    (2-9 systems for level 4, 10 or more for level 5)      Review of Systems   Constitutional: Negative for chills and fever. HENT: Negative. Eyes: Negative for visual disturbance. Respiratory: Negative for shortness of breath. Cardiovascular: Negative for chest pain. Gastrointestinal: Negative for abdominal pain, diarrhea and vomiting. Musculoskeletal: Negative for neck stiffness. Skin: Negative for pallor. Neurological: Positive for syncope. Negative for facial asymmetry, numbness and headaches. Bilateral upper extremity hand shaking   Psychiatric/Behavioral:        Dementia       PHYSICAL EXAM   (up to 7 for level 4, 8 or more for level 5)      INITIAL VITALS:   /85   Pulse 78   Temp 97.5 °F (36.4 °C) (Oral)   Resp 17   SpO2 98%     Physical Exam   Constitutional: No distress. HENT:   Head: Normocephalic and atraumatic. Eyes: EOM are normal. Pupils are equal, round, and reactive to light. Neck: Normal range of motion. Neck supple. Cardiovascular: Normal rate. Pulmonary/Chest: Effort normal.   Abdominal: Soft. There is no tenderness. Musculoskeletal: Normal range of motion. Neurological: He is alert. He has normal strength. No cranial nerve deficit or sensory deficit. GCS eye subscore is 4. GCS verbal subscore is 5. GCS motor subscore is 6. Poor finger to nose bilaterally, strength bilateral upper and lower show is intact. Cranial nerves intact, no facial droop.        DIFFERENTIAL  DIAGNOSIS     PLAN (LABS / IMAGING / EKG):  Orders Placed This Encounter   Procedures    Urine Culture    XR CHEST STANDARD (2 VW)    CT HEAD WO CONTRAST    CBC Auto Differential    BASIC METABOLIC PANEL    TOX SCR, BLD, ED    URINALYSIS WITH MICROSCOPIC    Hemoglobin and hematocrit, blood    SODIUM (POC)    POTASSIUM (POC)    CHLORIDE (POC)    CALCIUM, IONIC (POC)    Telemetry monitoring    Orthostatic blood pressure and pulse    Inpatient consult to Hospitalist    Pulse oximetry, continuous    POCT troponin    Venous Blood Gas, POC    Creatinine W/GFR Point of Care    Lactic Acid, POC    POCT Glucose    Anion Gap (Calc) POC    POCT troponin    EKG 12 Lead    Insert peripheral IV    PATIENT STATUS (FROM ED OR OR/PROCEDURAL) Inpatient       MEDICATIONS ORDERED:  No orders of the defined types were placed in this encounter.       DDX: syncope, arrhythmia, orthostasis, seizure, CVA    DIAGNOSTIC RESULTS / EMERGENCY DEPARTMENT COURSE / MDM     LABS:  Results for orders placed or performed during the hospital encounter of 01/13/18   CBC Auto Differential   Result Value Ref Range    WBC 7.8 3.5 - 11.3 k/uL    RBC 4.49 4.21 - 5.77 m/uL    Hemoglobin 12.4 (L) 13.0 - 17.0 g/dL    Hematocrit 40.5 (L) 40.7 - 50.3 %    MCV 90.2 82.6 - 102.9 fL    MCH 27.6 25.2 - 33.5 pg    MCHC 30.6 28.4 - 34.8 g/dL    RDW 14.7 (H) 11.8 - 14.4 %    Platelets 413 124 - 221 k/uL    MPV 9.7 8.1 - 13.5 fL    Differential Type NOT REPORTED     Seg Neutrophils 58 36 - 65 %    Lymphocytes 29 24 - 43 %    Monocytes 11 3 - 12 %    Eosinophils % 1 1 - 4 %    Basophils 0 0 - 2 %    Immature Granulocytes 1 (H) 0 %    Segs Absolute 4.57 1.50 - 8.10 k/uL    Absolute Lymph # 2.23 1.10 - 3.70 k/uL    Absolute Mono # 0.86 0.10 - 1.20 k/uL    Absolute Eos # 0.07 0.00 - 0.44 k/uL    Basophils # <0.03 0.00 - 0.20 k/uL    Absolute Immature Granulocyte 0.07 0.00 - 0.30 k/uL    WBC Morphology NOT REPORTED     RBC Morphology ANISOCYTOSIS PRESENT     Platelet Estimate NOT REPORTED    BASIC METABOLIC PANEL   Result Value Ref Range    Glucose 114 (H) 70 - 99 mg/dL    BUN 7 (L) 8 -

## 2018-01-14 PROBLEM — R56.9 SEIZURE-LIKE ACTIVITY (HCC): Status: ACTIVE | Noted: 2018-01-14

## 2018-01-14 PROBLEM — G40.909 SEIZURE DISORDER (HCC): Status: ACTIVE | Noted: 2018-01-14

## 2018-01-14 PROBLEM — R41.89 UNRESPONSIVENESS: Status: ACTIVE | Noted: 2018-01-14

## 2018-01-14 LAB
FOLATE: 7.4 NG/ML
HCT VFR BLD CALC: 37.6 % (ref 40.7–50.3)
HEMOGLOBIN: 11.6 G/DL (ref 13–17)
MAGNESIUM: 2.3 MG/DL (ref 1.6–2.6)
MCH RBC QN AUTO: 27.8 PG (ref 25.2–33.5)
MCHC RBC AUTO-ENTMCNC: 30.9 G/DL (ref 28.4–34.8)
MCV RBC AUTO: 90.2 FL (ref 82.6–102.9)
PDW BLD-RTO: 14.7 % (ref 11.8–14.4)
PLATELET # BLD: 204 K/UL (ref 138–453)
PMV BLD AUTO: 9.2 FL (ref 8.1–13.5)
RBC # BLD: 4.17 M/UL (ref 4.21–5.77)
SEDIMENTATION RATE, ERYTHROCYTE: 26 MM (ref 0–10)
T. PALLIDUM, IGG: REACTIVE
TOXOPLASM IGM: 0.39 INDEX
TOXOPLASMA BLOOD FOR RATIO: <0.5 IU/ML
VITAMIN B-12: 668 PG/ML (ref 232–1245)
WBC # BLD: 6.3 K/UL (ref 3.5–11.3)

## 2018-01-14 PROCEDURE — 6360000002 HC RX W HCPCS: Performed by: NURSE PRACTITIONER

## 2018-01-14 PROCEDURE — 6370000000 HC RX 637 (ALT 250 FOR IP): Performed by: HOSPITALIST

## 2018-01-14 PROCEDURE — 85027 COMPLETE CBC AUTOMATED: CPT

## 2018-01-14 PROCEDURE — 83735 ASSAY OF MAGNESIUM: CPT

## 2018-01-14 PROCEDURE — 82746 ASSAY OF FOLIC ACID SERUM: CPT

## 2018-01-14 PROCEDURE — 99222 1ST HOSP IP/OBS MODERATE 55: CPT | Performed by: INTERNAL MEDICINE

## 2018-01-14 PROCEDURE — 86777 TOXOPLASMA ANTIBODY: CPT

## 2018-01-14 PROCEDURE — 87327 CRYPTOCOCCUS NEOFORM AG IA: CPT

## 2018-01-14 PROCEDURE — 99221 1ST HOSP IP/OBS SF/LOW 40: CPT | Performed by: FAMILY MEDICINE

## 2018-01-14 PROCEDURE — 36415 COLL VENOUS BLD VENIPUNCTURE: CPT

## 2018-01-14 PROCEDURE — 6370000000 HC RX 637 (ALT 250 FOR IP): Performed by: NURSE PRACTITIONER

## 2018-01-14 PROCEDURE — 86778 TOXOPLASMA ANTIBODY IGM: CPT

## 2018-01-14 PROCEDURE — 82607 VITAMIN B-12: CPT

## 2018-01-14 PROCEDURE — G8979 MOBILITY GOAL STATUS: HCPCS

## 2018-01-14 PROCEDURE — 6370000000 HC RX 637 (ALT 250 FOR IP): Performed by: FAMILY MEDICINE

## 2018-01-14 PROCEDURE — 97162 PT EVAL MOD COMPLEX 30 MIN: CPT

## 2018-01-14 PROCEDURE — 1200000000 HC SEMI PRIVATE

## 2018-01-14 PROCEDURE — 97530 THERAPEUTIC ACTIVITIES: CPT

## 2018-01-14 PROCEDURE — 86593 SYPHILIS TEST NON-TREP QUANT: CPT

## 2018-01-14 PROCEDURE — 85651 RBC SED RATE NONAUTOMATED: CPT

## 2018-01-14 PROCEDURE — 86738 MYCOPLASMA ANTIBODY: CPT

## 2018-01-14 PROCEDURE — 86780 TREPONEMA PALLIDUM: CPT

## 2018-01-14 PROCEDURE — G8978 MOBILITY CURRENT STATUS: HCPCS

## 2018-01-14 PROCEDURE — 99222 1ST HOSP IP/OBS MODERATE 55: CPT | Performed by: PSYCHIATRY & NEUROLOGY

## 2018-01-14 RX ORDER — LORAZEPAM 2 MG/ML
1 INJECTION INTRAMUSCULAR EVERY 6 HOURS PRN
Status: DISCONTINUED | OUTPATIENT
Start: 2018-01-14 | End: 2018-01-14

## 2018-01-14 RX ORDER — LEVETIRACETAM 250 MG/1
250 TABLET ORAL 2 TIMES DAILY
Status: DISCONTINUED | OUTPATIENT
Start: 2018-01-14 | End: 2018-01-17

## 2018-01-14 RX ORDER — EFAVIRENZ, EMTRICITABINE AND TENOFOVIR DISOPROXIL FUMARATE 600; 200; 300 MG/1; MG/1; MG/1
1 TABLET, FILM COATED ORAL NIGHTLY
Status: DISCONTINUED | OUTPATIENT
Start: 2018-01-14 | End: 2018-01-20 | Stop reason: HOSPADM

## 2018-01-14 RX ORDER — ALBUTEROL SULFATE 2.5 MG/3ML
2.5 SOLUTION RESPIRATORY (INHALATION) EVERY 6 HOURS PRN
Status: DISCONTINUED | OUTPATIENT
Start: 2018-01-14 | End: 2018-01-20 | Stop reason: HOSPADM

## 2018-01-14 RX ORDER — LORAZEPAM 2 MG/ML
1 INJECTION INTRAMUSCULAR EVERY 30 MIN PRN
Status: DISCONTINUED | OUTPATIENT
Start: 2018-01-14 | End: 2018-01-20 | Stop reason: HOSPADM

## 2018-01-14 RX ADMIN — LEVETIRACETAM 250 MG: 250 TABLET, FILM COATED ORAL at 20:21

## 2018-01-14 RX ADMIN — LEVETIRACETAM 250 MG: 250 TABLET, FILM COATED ORAL at 16:21

## 2018-01-14 RX ADMIN — DONEPEZIL HYDROCHLORIDE 10 MG: 10 TABLET, FILM COATED ORAL at 20:21

## 2018-01-14 RX ADMIN — MEMANTINE HYDROCHLORIDE 5 MG: 5 TABLET, FILM COATED ORAL at 21:52

## 2018-01-14 RX ADMIN — EFAVIRENZ, EMTRICITABINE, AND TENOFOVIR DISOPROXIL FUMARATE 1 TABLET: 600; 200; 300 TABLET, FILM COATED ORAL at 20:21

## 2018-01-14 RX ADMIN — ENOXAPARIN SODIUM 40 MG: 40 INJECTION SUBCUTANEOUS at 09:15

## 2018-01-14 RX ADMIN — MEMANTINE HYDROCHLORIDE 5 MG: 5 TABLET, FILM COATED ORAL at 11:19

## 2018-01-14 RX ADMIN — PANTOPRAZOLE SODIUM 40 MG: 40 TABLET, DELAYED RELEASE ORAL at 09:15

## 2018-01-14 RX ADMIN — VITAMIN E CAP 400 UNIT 400 UNITS: 400 CAP at 09:15

## 2018-01-14 RX ADMIN — FINASTERIDE 5 MG: 5 TABLET, FILM COATED ORAL at 09:15

## 2018-01-14 NOTE — PROGRESS NOTES
Pt has taken telemetry monitor off multiple times and will not leave it on. He has also pulled out IV. Veronica White NP notified.

## 2018-01-14 NOTE — CONSULTS
NEUROLOGY INPATIENT CONSULT NOTE    1/14/2018         Carol Rodriguez is a  76 y.o. male admitted on 1/13/2018 with  Syncope and collapse [R55]      History is obtained mostly from the patient and the medical record and from the caregivers. Chart is reviewed and patient is examined. Briefly, this is a  76 y.o. male admitted on 1/13/2018 with PMH of dementia, HIV on atripla presents with possible syncopal event. Patient is unable to provide history due to dementia. Per wife they came back home around 5:30 pm. He was at the dinning table sitting when she noticed he was shaking both arms and then became unresposive for 2 minutes and his eyes rolled back. She called her granddaughter and called EMS but by the time they arrived his symptoms resolved and he was responsive. In ER there was concern for possible syncope vs seizures. CT head showed generalized atrophy with bilateral periventricular small vessel ischemia. He has had no jerking episodes while inpatient. Patient is on aricept and namenda for alzheimers. No current facility-administered medications on file prior to encounter.       Current Outpatient Prescriptions on File Prior to Encounter   Medication Sig Dispense Refill    vitamin E 400 UNIT capsule TAKE 1 CAPSULE TWICE A DAY 30 capsule 0    alendronate (FOSAMAX) 70 MG tablet TAKE 1 TABLET EVERY WEEK 4 tablet 0    donepezil (ARICEPT) 10 MG tablet TAKE 1 TABLET IN THE EVENING 30 tablet 0    finasteride (PROSCAR) 5 MG tablet TAKE 1 TABLET DAILY 30 tablet 0    memantine (NAMENDA) 5 MG tablet TAKE 1 TABLET TWICE A DAY 60 tablet 0    omeprazole (PRILOSEC) 20 MG delayed release capsule TAKE 1 CAPSULE DAILY 30 capsule 0    Calcium Carbonate-Vitamin D (OYSTER SHELL CALCIUM/D) 500-200 MG-UNIT TABS TAKE 2 TABLETS IN THE MORNING 60 tablet 0    vitamin E 400 UNIT capsule TAKE 1 CAPSULE TWICE A DAY 30 capsule 0    ATRIPLA 600-200-300 MG per tablet TAKE 1 TABLET BY MOUTH ONCE A DAY *BOTTLE* 30 tablet 5

## 2018-01-14 NOTE — CONSULTS
[facility] - Initial Consult Note  Today's Date and Time: 1/14/2018, 5:40 PM    Impression :   1. HIV infection. Initial positive test on 3/16/12  2. Dementia, also 5 cm appetite without behavioral disturbance, unspecified time of dementia onset  3. Murmurs of the upper extremities bilaterally  4. Prior history of syphilis. Status post treatment. Last quantitative VDRL 1 on 5/10/2017  5. Mixed hyperlipidemia  6. Prior history of shingles    Recommendations   · Continue antiretroviral therapy with Atripla. Most recent viral load is undetectable  · Follow-up with Dr. Leilani Shelley upon discharge for management of HIV  · Serological testing for conditions that could occur in the central nervous system with underlying HIV    Infection Control Recommendations   · [place] precautions    Antimicrobial Stewardship Recommendations   · Antiretroviral therapy with Atripla    Chief complaint/reason for consultation:   HIV with dementia and new onset tremors    History of Present Illness:   Rachele Zaman is a 76y.o.-year-old  male who was initially admitted on 1/13/2018. Patient seen at the request of Severa Slade. The patient follows with Dr. Gregg Phoenix for the management of his HIV infection. As far as I can tell the initial positive test for HIV was on 3/16/12. Patient is unable to provide information as to when he was started on antiretroviral therapy. However at the records from the outpatient office indicate that the patient has been receiving Atripla and that his viral load is undetectable with this treatment. The patient has also suffered from syphilis and has been treated for this condition. He has had multiple quantitative VDRL's done to document response. The most recent was with a value of 1 each with indicate a good previous response to syphilis treatment. This test was done on multiple occasions because of the concurrent diagnosis of dementia.  As far 4 Cans of beer per week    Drug use: No    Sexual activity: Yes     Partners: Female     Other Topics Concern    Not on file     Social History Narrative    No narrative on file       Family History:     Family History   Problem Relation Age of Onset    Heart Disease Mother     High Blood Pressure Mother     Cancer Father     Diabetes Sister     Heart Disease Sister     High Blood Pressure Sister     Heart Disease Brother     High Blood Pressure Brother         Allergies:   Review of patient's allergies indicates no known allergies. Review of Systems:   Unable to provide. Dementia      Physical Examination :   No data found. General Appearance: Awake, alert, and in no apparent distress. Thin individual  Head:  Normocephalic, no trauma  Eyes: Pupils equal, round, reactive, to light and accommodation; ; sclera anicteric; conjunctivae pink. No embolic phenomena. ENT: Oropharynx clear, without erythema, exudate, or thrush. No tenderness of sinuses. Mouth/throat: mucosa pink and moist. No lesions. Neck:Supple, without lymphadenopathy. Thyroid normal, No bruits. Pulmonary/Chest: Clear to auscultation, without wheezes, rales, or rhonchi. No dullness to percussion. Cardiovascular: Regular rate and rhythm without murmurs, rubs, or gallops. Abdomen: Soft, non tender. Bowel sounds normal. No organomegaly  All four Extremities: No cyanosis, clubbing, edema, or effusions. Neurologic: No gross sensory or motor deficits. Skin: Warm and dry with good turgor. No signs of peripheral arterial or venous insufficiency.     Medical Decision Making:   I have independently reviewed/ordered the following labs:    CBC with Differential:   Recent Labs      01/13/18   1852  01/14/18   0623   WBC  7.8  6.3   HGB  12.4*  11.6*   HCT  40.5*  37.6*   PLT  243  204   LYMPHOPCT  29   --    MONOPCT  11   --      BMP:   Recent Labs      01/13/18   1850  01/13/18   1852  01/14/18   0623   NA   --   137   --    K   -- 4.5   --    CL   --   99   --    CO2   --   26   --    BUN   --   7*   --    CREATININE  0.83  0.83   --    MG   --    --   2.3     Hepatic Function Panel: No results for input(s): PROT, LABALBU, BILIDIR, IBILI, BILITOT, ALKPHOS, ALT, AST in the last 72 hours. No results for input(s): RPR in the last 72 hours. No results for input(s): HIV in the last 72 hours. No results for input(s): BC in the last 72 hours. Lab Results   Component Value Date    MUCUS NOT REPORTED 01/13/2018    RBC 4.17 01/14/2018    RBC 4.31 04/18/2012    TRICHOMONAS NOT REPORTED 01/13/2018    WBC 6.3 01/14/2018    YEAST NOT REPORTED 01/13/2018    TURBIDITY CLEAR 01/13/2018     Lab Results   Component Value Date    CREATININE 0.83 01/13/2018    GLUCOSE 114 01/13/2018    GLUCOSE 76 04/18/2012     Thank you for allowing us to participate in the care of this patient. Please call with questions.     Evette Miguel MD  Pager: [phone] - Office: [phone]

## 2018-01-14 NOTE — PROGRESS NOTES
distress   Respiratory Rate   []  Patient Baseline [x]  Less than 20 []  Less than 20 []  20-25 []  Greater than 25 []  Greater than 25   Peak flow % of Pred or PB [x]  NA   []  Greater than 90%  []  81-90% []  71-80% []  Less than or equal to 70%  or unable to perform []  Unable due to Respiratory Distress   Dyspnea re []  Patient Baseline [x]  No SOB []  No SOB []  SOB on exertion []  SOB min activity []  At rest/acute   e FEV% Predicted       [x]  NA []  Above 69%  []  Unable []  Above 60-69%  []  Unable []  Above 50-59%  []  Unable []  Above 35-49%  []  Unable []  Less than 35%  []  Unable

## 2018-01-14 NOTE — CONSULTS
The condition is 77 yo bm with episode of tremor with unresponsiveness. Patient has history of dementia with HIV on atripla . His wife describes that he is not oriented to place needing assistance to get bathed and dressed with trouble at time recognizing family members on aricept 10 mg po bid , namenda 5 mg po bid . Over the past one week she has noticed 4 to 5 episodes lasting about 2 minutes of staring not responsive to outside stimulation along with tremor of bilateral upper extremities . Patient was sitting at kitchen table yesterday when he developed tremor of bilateral upper extremities followed by unresponsiveness with tremor lasting 2 minutes time . He denies headache or focal motor ,sensory , bulbar or visual complaint . Head CT with generalized atrophy with bilateral chronic periventricular small vessel ischemia . Significant medications aricept 10 mg po bid , namenda 5 mg po bid , atripla.  Testing Head CT with generalized atrophy wiht bilateral chronic periventricular small vessel ischemia       Past Medical History:   Diagnosis Date    Benign prostatic hyperplasia     Dementia     GERD (gastroesophageal reflux disease) 4/28/2015    VBCJTDTT(699.6)     Hemorrhoids     History of shingles     [facility] [address]     Human immunodeficiency virus ([facility]) [facility] [address]     Meralgia paraesthetica 2/4/2016    Mixed hyperlipidemia 5/5/2016    Osteoporosis     Syphilis     Treated       Past Surgical History:   Procedure Laterality Date    HEMORRHOID SURGERY      HERNIA REPAIR      SIGMOIDOSCOPY N/A 11/22/2017    SIGMOIDOSCOPY POLYP SNARE performed by Khris Galloway MD at [facility]    TONSILLECTOMY      TOTAL HIP ARTHROPLASTY         Family History   Problem Relation Age of Onset    Heart Disease Mother     High Blood Pressure Mother     Cancer Father     Diabetes Sister     Heart Disease Sister     High Blood Pressure Sister     Heart Disease Brother     High sulfate 1 g in dextrose 5% 100 mL IVPB  1 g Intravenous PRN Mary Jose NP        acetaminophen (TYLENOL) tablet 650 mg  650 mg Oral Q4H PRN Mary Jose NP        HYDROcodone-acetaminophen [facility]) 5-325 MG per tablet 1 tablet  1 tablet Oral Q4H PRN Mary Jose NP        magnesium hydroxide (MILK OF MAGNESIA) 400 MG/5ML suspension 30 mL  30 mL Oral Daily PRN Mary Jose NP        bisacodyl (DULCOLAX) suppository 10 mg  10 mg Rectal Daily PRN Mary Jose NP        ondansetron [facility]) injection 4 mg  4 mg Intravenous Q6H PRN Mary Jose NP        nicotine (NICODERM CQ) 21 MG/24HR 1 patch  1 patch Transdermal Daily PRN Mary Jose NP        enoxaparin (LOVENOX) injection 40 mg  40 mg Subcutaneous Daily Mary Jose, NP   40 mg at 01/14/18 0915    vitamin E capsule 400 Units  400 Units Oral Daily Larrie Damon, NP   400 Units at 01/14/18 0915       No Known Allergies    ROS:   Constitutional                  Negative for fever and chills   HEENT                            Negative for ear discharge, ear pain, nosebleed  Eyes                                Negative for photophobia, pain and discharge  Respiratory                      Negative for hemoptysis and sputum  Cardiovascular                Negative for orthopnea, claudication and PND  Gastrointestinal               Negative for abdominal pain, diarrhea, blood in stool  Musculoskeletal               Negative for joint pain, negative for myalgia  Skin                                 Negative for rash or itching  Endo/heme/allergies       Negative for polydipsia, environmental allergy  Psychiatric                       Negative for suicidal ideation.   Patient is not anxious    Vitals:    01/14/18 0800   BP: (!) 97/59   Pulse: 71   Resp: 14   Temp: 97.9 °F (36.6 °C)   SpO2:      Admission weight: 101 lb 12.8 oz (46.2 kg)    Neurological Examination  Constitutional .General exam well groomed   Head/ Ears /Nose/Throat/external ear . Normal exam  Neck and thyroid . Normal size. No bruits  Respiratory . Breathsounds clear bilaterally  Cardiovascular: Auscultation of heart with regular rate and rhythm   Musculoskeletal. Muscle bulk and tone normal                                                           Muscle strength 5/5 strength throughout                                                                               No dysmetria or dysdiadokinesis  No tremor   Normal fine motor  Orientation Alert and oriented x 0  Attention and concentration reduced  Short term memory 0 words out of 3 in one minute   Language process and speech normal . No aphasia   Cranial nerve 2 normal acuety and visual fields  Cranial nerve 3, 4 and 6 . Extraocular muscles are intact . Pupils are equal and reactive   Cranial nerve 5 . Intact corneal reflex. Normal facial sensation  Cranial nerve 7 normal exam   Cranial nerve 8. Grossly intact hearing   Cranial nerve 9 and 10. Symmetric palate elevation   Cranial nerve 11 , 5 out of 5 strength   Cranial Nerve 12 midline tongue . No atrophy  Sensation . Normal pinprick and light touch   Deep Tendon Reflexes normal  Plantar response flexor bilaterally    Assessment :    New onset seizure disorder . Dementia , HIV     Plan:    MRI of Head with and without contrast , EEG . N11 , folic acid , ESR , VDRL . PT/OT . Keppra 250 mg po bid .  Ativan 1-2 mg IVP PRN seizure

## 2018-01-14 NOTE — ED NOTES
Family approached nursing station stating pt wanted to leave hospital and that she can watch him at home. Dr Lety Pulido alerted. Writer re directed pt and advised him to stay. Pt now back in bed resting comfortably. Pt repeatedly removing monitoring equipment.       Ren Boyer RN  01/13/18 1695

## 2018-01-14 NOTE — PLAN OF CARE
Problem: Pain:  Goal: Pain level will decrease  Pain level will decrease   Outcome: Ongoing      Problem: Falls - Risk of:  Goal: Will remain free from falls  Will remain free from falls   Outcome: Ongoing      Problem: Falls - Risk of  Goal: Absence of falls  Outcome: Ongoing

## 2018-01-15 LAB
CRYPTOCOCCAL ANTIGEN: NEGATIVE
CULTURE: ABNORMAL
CULTURE: ABNORMAL
EKG ATRIAL RATE: 69 BPM
EKG P AXIS: 61 DEGREES
EKG P-R INTERVAL: 116 MS
EKG Q-T INTERVAL: 404 MS
EKG QRS DURATION: 66 MS
EKG QTC CALCULATION (BAZETT): 432 MS
EKG R AXIS: 69 DEGREES
EKG T AXIS: 52 DEGREES
EKG VENTRICULAR RATE: 69 BPM
Lab: ABNORMAL
MYCOPLASMA PNEUMONIAE IGM: 0.6
ORGANISM: ABNORMAL
SPECIMEN DESCRIPTION: ABNORMAL
STATUS: ABNORMAL

## 2018-01-15 PROCEDURE — 99232 SBSQ HOSP IP/OBS MODERATE 35: CPT | Performed by: NURSE PRACTITIONER

## 2018-01-15 PROCEDURE — 6360000002 HC RX W HCPCS: Performed by: NURSE PRACTITIONER

## 2018-01-15 PROCEDURE — 99233 SBSQ HOSP IP/OBS HIGH 50: CPT | Performed by: INTERNAL MEDICINE

## 2018-01-15 PROCEDURE — 6370000000 HC RX 637 (ALT 250 FOR IP): Performed by: NURSE PRACTITIONER

## 2018-01-15 PROCEDURE — 6370000000 HC RX 637 (ALT 250 FOR IP): Performed by: INTERNAL MEDICINE

## 2018-01-15 PROCEDURE — 93306 TTE W/DOPPLER COMPLETE: CPT

## 2018-01-15 PROCEDURE — 95819 EEG AWAKE AND ASLEEP: CPT

## 2018-01-15 PROCEDURE — 95819 EEG AWAKE AND ASLEEP: CPT | Performed by: PSYCHIATRY & NEUROLOGY

## 2018-01-15 PROCEDURE — 6370000000 HC RX 637 (ALT 250 FOR IP): Performed by: HOSPITALIST

## 2018-01-15 PROCEDURE — 2060000000 HC ICU INTERMEDIATE R&B

## 2018-01-15 PROCEDURE — 97116 GAIT TRAINING THERAPY: CPT

## 2018-01-15 PROCEDURE — 6370000000 HC RX 637 (ALT 250 FOR IP): Performed by: FAMILY MEDICINE

## 2018-01-15 PROCEDURE — 99232 SBSQ HOSP IP/OBS MODERATE 35: CPT | Performed by: INTERNAL MEDICINE

## 2018-01-15 RX ORDER — LORAZEPAM 2 MG/ML
1 INJECTION INTRAMUSCULAR EVERY 6 HOURS PRN
Status: DISCONTINUED | OUTPATIENT
Start: 2018-01-15 | End: 2018-01-15

## 2018-01-15 RX ORDER — LORAZEPAM 0.5 MG/1
0.5 TABLET ORAL EVERY 4 HOURS PRN
Status: DISCONTINUED | OUTPATIENT
Start: 2018-01-15 | End: 2018-01-20 | Stop reason: HOSPADM

## 2018-01-15 RX ADMIN — LEVETIRACETAM 250 MG: 250 TABLET, FILM COATED ORAL at 08:40

## 2018-01-15 RX ADMIN — VITAMIN E CAP 400 UNIT 400 UNITS: 400 CAP at 08:40

## 2018-01-15 RX ADMIN — LORAZEPAM 0.5 MG: 0.5 TABLET ORAL at 13:23

## 2018-01-15 RX ADMIN — LORAZEPAM 0.5 MG: 0.5 TABLET ORAL at 22:24

## 2018-01-15 RX ADMIN — LEVETIRACETAM 250 MG: 250 TABLET, FILM COATED ORAL at 21:41

## 2018-01-15 RX ADMIN — EFAVIRENZ, EMTRICITABINE, AND TENOFOVIR DISOPROXIL FUMARATE 1 TABLET: 600; 200; 300 TABLET, FILM COATED ORAL at 21:41

## 2018-01-15 RX ADMIN — DONEPEZIL HYDROCHLORIDE 10 MG: 10 TABLET, FILM COATED ORAL at 21:40

## 2018-01-15 RX ADMIN — LORAZEPAM 0.5 MG: 0.5 TABLET ORAL at 18:09

## 2018-01-15 RX ADMIN — MEMANTINE HYDROCHLORIDE 5 MG: 5 TABLET, FILM COATED ORAL at 08:40

## 2018-01-15 RX ADMIN — MEMANTINE HYDROCHLORIDE 5 MG: 5 TABLET, FILM COATED ORAL at 21:41

## 2018-01-15 RX ADMIN — FINASTERIDE 5 MG: 5 TABLET, FILM COATED ORAL at 08:40

## 2018-01-15 ASSESSMENT — PAIN SCALES - GENERAL: PAINLEVEL_OUTOF10: 0

## 2018-01-15 NOTE — PROCEDURES
[address]                   [facility]                            ELECTROENCEPHALOGRAM REPORT    PATIENT NAME: Ernie Patel                    :        1940  MED REC NO:   [id number]                             ROOM:         ACCOUNT NO:   [de-identified]                           ADMIT DATE: 2018  PROVIDER:     Nancy Dias MD    DATE OF EE/15/2018    HISTORY:  The patient is an 58-year-old female with syncope with an episode  of trembling of the upper extremities, dementia, and a history of treated  syphilis. The following is an awake, drowsy, and light sleep 21-channel EEG without  sedation using the 10-20 [employer] system. During the relatively more awake portion of the tracing, the background  posteriorly consists of a somewhat disorganized 8-9 per second alpha  activity, which is essentially symmetric, but not clearly reactive. The  background anteriorly consists of a moderate amount of somewhat  disorganized 6-7 per second, with some 5 per second, theta frequency  activity seen essentially symmetrically and a small-to-moderate amount of  adnexal voltage fast activity. Hyperventilation and photic stimulation  were not done. During drowsiness, there was dropout of posterior dominant  rhythm with background voltage attenuation and some increase in slow theta  frequency activities. During light sleep, there was some further  background disorganization and slowing with appearance of a small amount of  3-4 per second delta along with slow theta frequency activity seen  essentially symmetrically. There were no gross asymmetries, focal  disturbances, nor paroxysmal discharges. INTERPRETATION:  This awake, drowsy, and light sleep EEG is mildly abnormal  due to mild background disorganization and slowing during the waking  portions of the tracing.   This suggests the presence of mild

## 2018-01-15 NOTE — PROGRESS NOTES
(PRILOSEC) 20 MG delayed release capsule TAKE 1 CAPSULE DAILY 30 capsule 0    Calcium Carbonate-Vitamin D (OYSTER SHELL CALCIUM/D) 500-200 MG-UNIT TABS TAKE 2 TABLETS IN THE MORNING 60 tablet 0    vitamin E 400 UNIT capsule TAKE 1 CAPSULE TWICE A DAY 30 capsule 0    ATRIPLA 600-200-300 MG per tablet TAKE 1 TABLET BY MOUTH ONCE A DAY *BOTTLE* 30 tablet 5    Multiple Vitamin (MULTIVITAMIN) tablet TAKE 1 TABLET DAILY 60 tablet 5       Allergies: Mandeep Iverson has No Known Allergies. Past Medical History:   Diagnosis Date    Benign prostatic hyperplasia     [facility] [address]     Dementia     GERD (gastroesophageal reflux disease) 4/28/2015    TJDJVXWC(472.0)     Hemorrhoids     History of shingles     HIV (human immunodeficiency virus infection) ([facility] [address]     Human immunodeficiency virus (HIV) ([facility] [address]     Meralgia paraesthetica 2/4/2016    Mixed hyperlipidemia 5/5/2016    Osteoporosis     Syphilis     Treated       Past Surgical History:   Procedure Laterality Date    HEMORRHOID SURGERY      HERNIA REPAIR      SIGMOIDOSCOPY N/A 11/22/2017    SIGMOIDOSCOPY POLYP SNARE performed by Julius Hill MD at [facility]    TONSILLECTOMY      TOTAL HIP ARTHROPLASTY         Social History: Valente Kumar  reports that he has been smoking Cigarettes. He has a 25.00 pack-year smoking history. He has never used smokeless tobacco. He reports that he drinks about 2.4 oz of alcohol per week . He reports that he does not use drugs.     Family History   Problem Relation Age of Onset    Heart Disease Mother     High Blood Pressure Mother     Cancer Father     Diabetes Sister     Heart Disease Sister     High Blood Pressure Sister     Heart Disease Brother     High Blood Pressure Brother        Objective:   /73   Pulse 71   Temp 98.6 °F (37 °C) (Oral)   Resp 15   Ht 5' 6\" (1.676 m)   Wt 101 lb 12.8 oz (46.2 kg)   SpO2 100%   BMI 16.43 kg/m²     Blood pressure range: Systolic (21HIX), BOD:082 , 01/13/18   1850  01/13/18   1852   NA   --   137   K   --   4.5   CL   --   99   CO2   --   26   BUN   --   7*   CREATININE  0.83  0.83   GLUCOSE   --   114*         Lab Results   Component Value Date    CHOL 242 (H) 05/10/2017    LDLCHOLESTEROL 131 (H) 05/10/2017    HDL 82 05/10/2017    TRIG 146 05/10/2017    ALT 11 12/11/2017    AST 27 12/11/2017    TSH 1.32 03/09/2015    LABA1C 5.8 09/15/2016    XRJILVTR96 668 01/14/2018           Diagnostic data reviewed:  CT HEAD (1/13/18) -  No acute intracranial abnormality.       Sphenoid sinusitis.  Correlate with symptoms.       Chronic small vessel ischemic white matter disease and cerebral volume loss. BRAIN MRI - pending    ECHO - pending    EEG - pending                    Impression and Plan: Mr. Teri Oliveira is a 76 y.o. male with new onset seizure disorder. Dementia. HIV. Awaiting MRI brain with and without contrast    Awaiting EEG    Continue Keppra 250mg po bid    ID is following; quantitative VDRL pending    Continue PT/OT; ambulates 30' without device    Patient does not drive    Comorbid conditions - BPH, headaches, HLD, history of syphilis    Patient will need to follow up in the neurology outpatient office with Dr. Donna Mcpherson in 4-6 weeks time. Will follow with you.

## 2018-01-15 NOTE — PLAN OF CARE
Problem: Pain:  Goal: Pain level will decrease  Pain level will decrease   Outcome: Ongoing    Goal: Control of acute pain  Control of acute pain   Outcome: Ongoing      Problem: Falls - Risk of  Goal: Absence of falls  Outcome: Met This Shift  No falls this shift. Sitter at bedside.

## 2018-01-15 NOTE — PROGRESS NOTES
[address]    Progress Note    1/15/2018    10:21 AM    Name:   Etta Brady  MRN:     [id number]     [place]:      [de-identified]   Room:   2015/2015-01  IP Day:  2  Admit Date:  1/13/2018  6:29 PM    PCP:   Aydee Prasad MD  Code Status:  Full Code    Subjective:     C/C:   Chief Complaint   Patient presents with   Cecile Jaramillo     Interval History Status: improved. Still confused, but per his wife, this is his baseline. Drifts off to sleep during encounter. Wife says she is concerned with the hallucinations he was displaying on presentation. VSS. No seizure like activity since admission. Brief History:     The patient is a 76 y.o. Non-/non  male who presents with Shaking   and he is admitted to the hospital for the management of unresponsiveness with seizure-like activity  Patient with known history of HIV and dementia brought to ER EMS after sustaining an episode of unresponsiveness and shakiness witnessed by his wife. The episode happened during dinner with the patient slumped back in the chair and wife noticed hand shakiness and tremors, she is unable to anticipate duration of the episode but there ER report it was roughly 3 minute episodes. No postictal state, confusion, incontinence or altered mental status  No similar episodes in the past  and no known history of seizure.   In ER CT head didn't show  acute pathology, vitals including blood pressure within normal range as well as his basic blood work   during my evaluation of the history was obtained the patient, patient is very slow and unable to answer any question    Review of Systems:     Constitutional:  negative for chills, fevers, sweats  Respiratory:  negative for cough, dyspnea on exertion, hemoptysis, shortness of breath, wheezing  Cardiovascular:  negative for chest pain, chest pressure/discomfort, lower extremity edema, palpitations  Gastrointestinal:

## 2018-01-15 NOTE — PROGRESS NOTES
heard.  Pulmonary/Chest: Effort normal and breath sounds normal. No respiratory distress. He has no wheezes. Abdominal: Soft. Bowel sounds are normal. He exhibits no distension. There is no tenderness. Musculoskeletal: Normal range of motion. He exhibits no edema or deformity. Neurological: He is alert and oriented to person, place, and time. Skin: Skin is warm. Psychiatric: He has a normal mood and affect. Data Review:    CBC with Diff  Recent Labs      01/13/18 1852 01/14/18   0623   HGB  12.4*  11.6*   HCT  40.5*  37.6*   PLT  243  204   WBC  7.8  6.3   LYMPHOPCT  29   --    MONOSABS  0.86   --    LYMPHSABS  2.23   --    EOSABS  0.07   --    BASOSABS  <0.03   --    DIFFTYPE  NOT REPORTED   --        BMP  Recent Labs      01/13/18 1850 01/13/18 1852   NA   --   137   K   --   4.5   CL   --   99   CO2   --   26   BUN   --   7*   CREATININE  0.83  0.83   GLUCOSE   --   114*   CALCIUM   --   8.5*             Diana Mcneill MD  1/15/2018  9:41 AM       I have examined the patient, reviewed the patient's medical history in detail and updated the computerized patient record. Above exam and data confirmed. Christ Melo MD        This note was completed using a voice transcription system. Every effort was made to ensure accuracy. However, inadvertent computerized transcription errors may be present.

## 2018-01-15 NOTE — CARE COORDINATION
(bathing/dressing/grooming): Independent  Ability to manage medications:  Dependent  Ability to prepare food:  Needs Assistance  Ability to maintain home (clean home, laundry):  Needs Assistance  Ability to drive and/or has transportation:  Dependent  Ability to do shopping:  Dependent  Ability to manage finances:  Needs Assistance  Is patient able to live independently?:  Yes  Hearing and Vision  Visual Impairment:  Visual impairment (Glasses/contacts)  Hearing Impairment:  Hard of hearing  Care Transitions Interventions         Follow Up  Future Appointments  Date Time Provider Jocelyn Ansari   2/9/2018 2:15 PM Tanya Colbert DPM [facility]   2/12/2018 10:00 AM Bridget Rangel MD [facility] IM [facility]   6/13/2018 9:00 AM Maged Gallagher MD [facility]  There are no preventive care reminders to display for this patient.     Sammy Alaniz RN

## 2018-01-16 ENCOUNTER — APPOINTMENT (OUTPATIENT)
Dept: MRI IMAGING | Age: 78
DRG: 100 | End: 2018-01-16
Payer: MEDICARE

## 2018-01-16 LAB
ABSOLUTE EOS #: 0.09 K/UL (ref 0–0.44)
ABSOLUTE IMMATURE GRANULOCYTE: 0.03 K/UL (ref 0–0.3)
ABSOLUTE LYMPH #: 2.05 K/UL (ref 1.1–3.7)
ABSOLUTE MONO #: 0.84 K/UL (ref 0.1–1.2)
ANION GAP SERPL CALCULATED.3IONS-SCNC: 9 MMOL/L (ref 9–17)
BASOPHILS # BLD: 1 % (ref 0–2)
BASOPHILS ABSOLUTE: 0.05 K/UL (ref 0–0.2)
BUN BLDV-MCNC: 3 MG/DL (ref 8–23)
BUN/CREAT BLD: ABNORMAL (ref 9–20)
CALCIUM SERPL-MCNC: 9.1 MG/DL (ref 8.6–10.4)
CHLORIDE BLD-SCNC: 103 MMOL/L (ref 98–107)
CO2: 25 MMOL/L (ref 20–31)
CREAT SERPL-MCNC: 0.75 MG/DL (ref 0.7–1.2)
DIFFERENTIAL TYPE: ABNORMAL
EOSINOPHILS RELATIVE PERCENT: 1 % (ref 1–4)
GFR AFRICAN AMERICAN: >60 ML/MIN
GFR NON-AFRICAN AMERICAN: >60 ML/MIN
GFR SERPL CREATININE-BSD FRML MDRD: ABNORMAL ML/MIN/{1.73_M2}
GFR SERPL CREATININE-BSD FRML MDRD: ABNORMAL ML/MIN/{1.73_M2}
GLUCOSE BLD-MCNC: 86 MG/DL (ref 70–99)
HCT VFR BLD CALC: 37.5 % (ref 40.7–50.3)
HEMOGLOBIN: 11.8 G/DL (ref 13–17)
IMMATURE GRANULOCYTES: 0 %
LYMPHOCYTES # BLD: 31 % (ref 24–43)
MCH RBC QN AUTO: 27.4 PG (ref 25.2–33.5)
MCHC RBC AUTO-ENTMCNC: 31.5 G/DL (ref 28.4–34.8)
MCV RBC AUTO: 87.2 FL (ref 82.6–102.9)
MONOCYTES # BLD: 13 % (ref 3–12)
NRBC AUTOMATED: 0 PER 100 WBC
PDW BLD-RTO: 14.8 % (ref 11.8–14.4)
PLATELET # BLD: 311 K/UL (ref 138–453)
PLATELET ESTIMATE: ABNORMAL
PMV BLD AUTO: 11.2 FL (ref 8.1–13.5)
POTASSIUM SERPL-SCNC: 5.2 MMOL/L (ref 3.7–5.3)
RBC # BLD: 4.3 M/UL (ref 4.21–5.77)
RBC # BLD: ABNORMAL 10*6/UL
SEG NEUTROPHILS: 54 % (ref 36–65)
SEGMENTED NEUTROPHILS ABSOLUTE COUNT: 3.63 K/UL (ref 1.5–8.1)
SODIUM BLD-SCNC: 137 MMOL/L (ref 135–144)
VDRL, QUANTITATIVE: 2
WBC # BLD: 6.7 K/UL (ref 3.5–11.3)
WBC # BLD: ABNORMAL 10*3/UL

## 2018-01-16 PROCEDURE — 6370000000 HC RX 637 (ALT 250 FOR IP): Performed by: HOSPITALIST

## 2018-01-16 PROCEDURE — 99232 SBSQ HOSP IP/OBS MODERATE 35: CPT | Performed by: INTERNAL MEDICINE

## 2018-01-16 PROCEDURE — 6370000000 HC RX 637 (ALT 250 FOR IP): Performed by: NURSE PRACTITIONER

## 2018-01-16 PROCEDURE — 85025 COMPLETE CBC W/AUTO DIFF WBC: CPT

## 2018-01-16 PROCEDURE — 6370000000 HC RX 637 (ALT 250 FOR IP): Performed by: FAMILY MEDICINE

## 2018-01-16 PROCEDURE — 6360000004 HC RX CONTRAST MEDICATION: Performed by: HOSPITALIST

## 2018-01-16 PROCEDURE — 6360000002 HC RX W HCPCS: Performed by: NURSE PRACTITIONER

## 2018-01-16 PROCEDURE — 80048 BASIC METABOLIC PNL TOTAL CA: CPT

## 2018-01-16 PROCEDURE — 2580000003 HC RX 258: Performed by: NURSE PRACTITIONER

## 2018-01-16 PROCEDURE — 99233 SBSQ HOSP IP/OBS HIGH 50: CPT | Performed by: PSYCHIATRY & NEUROLOGY

## 2018-01-16 PROCEDURE — 36415 COLL VENOUS BLD VENIPUNCTURE: CPT

## 2018-01-16 PROCEDURE — 2060000000 HC ICU INTERMEDIATE R&B

## 2018-01-16 PROCEDURE — A9579 GAD-BASE MR CONTRAST NOS,1ML: HCPCS | Performed by: HOSPITALIST

## 2018-01-16 PROCEDURE — 70553 MRI BRAIN STEM W/O & W/DYE: CPT

## 2018-01-16 RX ORDER — SODIUM CHLORIDE 0.9 % (FLUSH) 0.9 %
10 SYRINGE (ML) INJECTION ONCE
Status: DISCONTINUED | OUTPATIENT
Start: 2018-01-16 | End: 2018-01-20 | Stop reason: HOSPADM

## 2018-01-16 RX ADMIN — MEMANTINE HYDROCHLORIDE 5 MG: 5 TABLET, FILM COATED ORAL at 22:15

## 2018-01-16 RX ADMIN — LEVETIRACETAM 250 MG: 250 TABLET, FILM COATED ORAL at 09:26

## 2018-01-16 RX ADMIN — FINASTERIDE 5 MG: 5 TABLET, FILM COATED ORAL at 09:26

## 2018-01-16 RX ADMIN — VITAMIN E CAP 400 UNIT 400 UNITS: 400 CAP at 09:25

## 2018-01-16 RX ADMIN — MEMANTINE HYDROCHLORIDE 5 MG: 5 TABLET, FILM COATED ORAL at 09:26

## 2018-01-16 RX ADMIN — ENOXAPARIN SODIUM 40 MG: 40 INJECTION SUBCUTANEOUS at 09:26

## 2018-01-16 RX ADMIN — GADOPENTETATE DIMEGLUMINE 8 ML: 469.01 INJECTION INTRAVENOUS at 16:19

## 2018-01-16 RX ADMIN — Medication 10 ML: at 22:15

## 2018-01-16 RX ADMIN — PANTOPRAZOLE SODIUM 40 MG: 40 TABLET, DELAYED RELEASE ORAL at 09:25

## 2018-01-16 NOTE — PROGRESS NOTES
[address]  Occupational Therapy Not Seen Note    Patient not available for Occupational Therapy due to:    [x] Testing: MRI    [] Hemodialysis    [] Blood Transfusion in Progress    []Refusal by Patient:    [] Surgery/Procedure:    [] Strict Bedrest    [] Sedation    [] Spine Precautions     [] Pt being transferred to palliative care at this time. Spoke with pt/family and OT services to be defered. [] Pt independent with functional mobility and functional tasks. Pt with no OT acute care needs at this time, will defer OT eval.    [] Other    Next Scheduled Treatment: Attempt on 1/17 as appropriate. Signature:  Shazia SMITH/LEDY

## 2018-01-16 NOTE — PROGRESS NOTES
not had a CSF examination to exclude central nervous syphilis, but the low VDRL titers would indicate this is unlikely to be present.     I have personally reviewed the past medical history, past surgical history, medications, social history, and family history, and I have updated the database accordingly    CURRENT EVALUATION : : 01/16/18   Afebrile  VS stable    Has been able to get some sleep  Confusion and agitation reported yesterday  EEG abnormal but no seizures    Summary of Labs    WBC : 7.8-->6.7  Toxoplasma IgG IgM : negative   Cryptococcus Ag: negative   Mycoplasma : Negative  VDRL: in process     Scheduled Meds:   levETIRAcetam  250 mg Oral BID    efavirenz-emtrictabine-tenofovir  1 tablet Oral Nightly    donepezil  10 mg Oral Nightly    finasteride  5 mg Oral Daily    memantine  5 mg Oral BID    pantoprazole  40 mg Oral QAM AC    sodium chloride flush  10 mL Intravenous 2 times per day    enoxaparin  40 mg Subcutaneous Daily    vitamin E  400 Units Oral Daily       Continuous Infusions:   sodium chloride 75 mL/hr at 01/13/18 2213         Objective:     Patient Vitals for the past 24 hrs:   BP Temp Temp src Pulse Resp   01/16/18 0016 91/74 97 °F (36.1 °C) Axillary 90 21     I/O last 3 completed shifts: In: 200 [P.O.:200]  Out: -   No intake/output data recorded. EXAM:  Physical Exam   Constitutional: He is oriented to person, place, and time. He appears well-developed and well-nourished. No distress. Shows signs of agitation, confusion   HENT:   Head: Normocephalic and atraumatic. Right Ear: External ear normal.   Left Ear: External ear normal.   Eyes: Conjunctivae are normal. Pupils are equal, round, and reactive to light. Right eye exhibits no discharge. Left eye exhibits no discharge. Neck: Normal range of motion. Neck supple. No tracheal deviation present. No thyromegaly present. Cardiovascular: Normal rate, regular rhythm and normal heart sounds.     No murmur

## 2018-01-16 NOTE — PROGRESS NOTES
appearance:  Drowsy, lethargic, drifts off to sleep during encounter  Mental Status:  Oriented to place. Lungs:  clear to auscultation bilaterally, normal effort  Heart:  regular rate and rhythm, no murmur  Abdomen:  soft, nontender, nondistended, normal bowel sounds, no masses, hepatomegaly, splenomegaly  Extremities:  no edema, redness, tenderness in the calves  Skin:  no gross lesions, rashes, induration    Assessment:        Primary Problem  Seizure-like activity [facility])    Active Hospital Problems    Diagnosis Date Noted    Seizure disorder ([facility] [address] [G40.909] 01/14/2018    Unresponsiveness [R41.89] 01/14/2018    Seizure-like activity ([address] [R56.9] 01/14/2018    Convulsions ([address] [R56.9]     Syncope and collapse [R55] 01/13/2018    Mixed hyperlipidemia [E78.2] 05/05/2016    GERD (gastroesophageal reflux disease) [K21.9] 04/28/2015    BPH (benign prostatic hyperplasia) [N40.0] 02/01/2013    Dementia without behavioral disturbance [F03.90] 07/27/2012    HIV (human immunodeficiency virus infection) ([facility] [address] [B20] 07/27/2012       Plan:        Seizure-like activity with unresponsiveness: CT negative, given the patient's history of HIV will order MRI brain with and without to rule out infection,  ID consult, EEG and neurology consult. Patient is back to baseline per wife. Per ID, \"Cryptococcus , Toxoplasma IgM and IgG negative. \". Per neurology: \"Neurologically clear pending performance and review of contrast brain MRI. \"     Dementia:  continue home medications Aricept and Namenda.     HIV: Most recent viral load undetectable. Cont Atripla.  Patient to f/u with Dr. Franky Botello on discharge for management of HIV.      BPH: continue Proscar     DVT and GI prophylaxis    Alfa Soni MD  1/16/2018  1:39 PM

## 2018-01-16 NOTE — PLAN OF CARE
Problem: Falls - Risk of:  Goal: Absence of physical injury  Absence of physical injury   Outcome: Ongoing  Patient remaining calm for periods of time with intermittent agitation. Guard at bedside for patient safety. Remains free of falls or injury at this time. Will continue to monitor.

## 2018-01-16 NOTE — PROGRESS NOTES
[facility] - Daily Progress Note  Today's Date and Time: 1/16/2018, 9:54 AM  Admission date: 1/13/18  Impression :   1. HIV infection.  Initial positive test on 3/16/12  2. Dementia, also 5 cm appetite without behavioral disturbance, unspecified time of dementia onset  3. Murmurs of the upper extremities bilaterally  4. Prior history of syphilis.  Status post treatment.  Last quantitative VDRL 1 on 5/10/2017  5. Mixed hyperlipidemia  6. Prior history of shingles     Recommendations   · Continue antiretroviral therapy with Atripla.  Most recent viral load is undetectable  · Follow-up with Dr. Gurmeet Dodge discharge for management of HIV  · Cryptococcus , Toxoplasma IgM and IgG negative. VDRL pending      Infection Control Recommendations   · [place] precautions     Antimicrobial Stewardship Recommendations   · Antiretroviral therapy with Atripla     Chief complaint/reason for consultation:   HIV with dementia and new onset tremors     History of Present Illness:   INITIAL HISTORY:     Mandeep Iverson is a 76y.o.-year-old  male who was initially admitted on 1/13/2018.  Patient seen at the request of Nicci Sharma.     The patient follows with Dr. Irina Manning for the management of his HIV infection.  As far as I can tell the initial positive test for HIV was on 3/16/12. Valeria Rafy is unable to provide information as to when he was started on antiretroviral therapy. Pao Castellano at the records from the outpatient office indicate that the patient has been receiving Atripla and that his viral load is undetectable with this treatment.     The patient has also suffered from syphilis and has been treated for this condition. Luis E Valdivia has had multiple quantitative VDRL's done to document response.  The most recent was with a value of 1 each with indicate a good previous response to syphilis treatment.  This test was done on multiple occasions because of the concurrent diagnosis of BILITOT, ALKPHOS, ALT, AST in the last 72 hours. No results found for: VANCOTROUGH       Medications:      levETIRAcetam  250 mg Oral BID    efavirenz-emtrictabine-tenofovir  1 tablet Oral Nightly    donepezil  10 mg Oral Nightly    finasteride  5 mg Oral Daily    memantine  5 mg Oral BID    pantoprazole  40 mg Oral QAM AC    sodium chloride flush  10 mL Intravenous 2 times per day    enoxaparin  40 mg Subcutaneous Daily    vitamin E  400 Units Oral Daily       Thank you for allowing us to participate in the care of this patient. Please call with questions. Vashti Mancilla  Pager: [phone]  - Office: [phone]      I have examined the patient, reviewed the patient's medical history in detail and updated the computerized patient record. Above exam and data confirmed.     Tavo Mckenzie MD

## 2018-01-16 NOTE — PROGRESS NOTES
DAILY 30 capsule 0    Calcium Carbonate-Vitamin D (OYSTER SHELL CALCIUM/D) 500-200 MG-UNIT TABS TAKE 2 TABLETS IN THE MORNING 60 tablet 0    vitamin E 400 UNIT capsule TAKE 1 CAPSULE TWICE A DAY 30 capsule 0    ATRIPLA 600-200-300 MG per tablet TAKE 1 TABLET BY MOUTH ONCE A DAY *BOTTLE* 30 tablet 5    Multiple Vitamin (MULTIVITAMIN) tablet TAKE 1 TABLET DAILY 60 tablet 5       Allergies: Mandeep Iverson has No Known Allergies.     Past Medical History:   Diagnosis Date    Benign prostatic hyperplasia     Convulsions ([address]     Dementia     GERD (gastroesophageal reflux disease) 4/28/2015    LXQBJNOI(914.3)     Hemorrhoids     History of shingles     HIV (human immunodeficiency virus infection) ([address]     Human immunodeficiency virus (HIV) ([address]     Meralgia paraesthetica 2/4/2016    Mixed hyperlipidemia 5/5/2016    Osteoporosis     Syphilis     Treated       Past Surgical History:   Procedure Laterality Date    HEMORRHOID SURGERY      HERNIA REPAIR      SIGMOIDOSCOPY N/A 11/22/2017    SIGMOIDOSCOPY POLYP SNARE performed by Julius Hill [facility] at [facility]    TONSILLECTOMY      TOTAL HIP ARTHROPLASTY             Medications:     levETIRAcetam  250 mg Oral BID    efavirenz-emtrictabine-tenofovir  1 tablet Oral Nightly    donepezil  10 mg Oral Nightly    finasteride  5 mg Oral Daily    memantine  5 mg Oral BID    pantoprazole  40 mg Oral QAM AC    sodium chloride flush  10 mL Intravenous 2 times per day    enoxaparin  40 mg Subcutaneous Daily    vitamin E  400 Units Oral Daily     PRN Meds include: LORazepam, albuterol, LORazepam, sodium chloride flush, potassium chloride **OR** potassium chloride **OR** potassium chloride, magnesium sulfate, acetaminophen, HYDROcodone 5 mg - acetaminophen, magnesium hydroxide, bisacodyl, ondansetron, nicotine    Objective:   BP 91/74   Pulse 90   Temp 97 °F (36.1 °C) (Axillary)   Resp 21   Ht 5' 6\" (1.676 m)   Wt 101 lb 12.8 oz (46.2 kg)   SpO2 100% BMI 16.43 kg/m²     Blood pressure range: Systolic (52DIP), RGJ:42 , Min:91 , AEQ:34   ; Diastolic (76VJZ), JG, Min:74, Max:74     NEUROLOGIC EXAMINATION  GENERAL  Appears comfortable and in no distress   HEENT  NC/ AT   NECK  Supple   MENTAL STATUS:  Alert, oriented to self only, normal speech, normal language, no hallucination or delusion. Able to name pen but not phone or glasses. Follows simple commands.    CRANIAL NERVES: II     -      Visual fields intact to confrontation  III,IV,VI -  EOMs full, no afferent defect, no INGRID, no ptosis  V     -     Normal facial sensation  VII    -     Normal facial symmetry  VIII   -     Intact hearing  IX,X -     Symmetrical palate  XI    -     Symmetrical shoulder shrug  XII   -     Midline tongue, no atrophy    MOTOR FUNCTION:  significant for good strength of grade 5/5 in bilateral proximal and distal muscle groups of both upper and lower extremities with normal bulk, normal tone and no involuntary movements, no tremor   SENSORY FUNCTION:  Normal touch, normal pin   CEREBELLAR FUNCTION:  Intact fine motor control over upper limbs   REFLEX FUNCTION:  Symmetric and decreased at patella, no perverted reflex, no Babinski sign   STATION and GAIT  Not tested            Data:                             ELECTROENCEPHALOGRAM REPORT     PATIENT NAMEDbetsy Meza                    :        1940  MED REC NO:   [id number]                             ROOM:       [facility] [place] [facility]  ACCOUNT NO:   [de-identified]                           ADMIT DATE: 2018  PROVIDER:     Cheryl Green MD     DATE OF EE/15/2018     HISTORY:  The patient is an 70-year-old female with syncope with an episode  of trembling of the upper extremities, dementia, and a history of treated  syphilis.     The following is an awake, drowsy, and light sleep 21-channel EEG without  sedation using the 10-20 [employer] system.     During the relatively more awake portion of the tracing, the background  posteriorly consists of a somewhat disorganized 8-9 per second alpha  activity, which is essentially symmetric, but not clearly reactive. The  background anteriorly consists of a moderate amount of somewhat  disorganized 6-7 per second, with some 5 per second, theta frequency  activity seen essentially symmetrically and a small-to-moderate amount of  adnexal voltage fast activity. Hyperventilation and photic stimulation  were not done. During drowsiness, there was dropout of posterior dominant  rhythm with background voltage attenuation and some increase in slow theta  frequency activities. During light sleep, there was some further  background disorganization and slowing with appearance of a small amount of  3-4 per second delta along with slow theta frequency activity seen  essentially symmetrically. There were no gross asymmetries, focal  disturbances, nor paroxysmal discharges.     INTERPRETATION:  This awake, drowsy, and light sleep EEG is mildly abnormal  due to mild background disorganization and slowing during the waking  portions of the tracing. This suggests the presence of mild diffuse  cerebral dysfunction of a nonspecific nature.   There were no gross  asymmetries, focal disturbances, nor paroxysmal discharges to correspond  with the diagnosis of seizure disorder; this, of course, does not rule out  such a diagnosis.       Lab Results:   CBC:   Recent Labs      01/13/18   1852  01/14/18   0623  01/16/18   0647   WBC  7.8  6.3  6.7   HGB  12.4*  11.6*  11.8*   PLT  243  204  311     BMP:    Recent Labs      01/13/18   1850  01/13/18   1852  01/16/18   0855   NA   --   137  137   K   --   4.5  5.2   CL   --   99  103   CO2   --   26  25   BUN   --   7*  3*   CREATININE  0.83  0.83  0.75   GLUCOSE   --   114*  86         Lab Results   Component Value Date    CHOL 242 (H) 05/10/2017    LDLCHOLESTEROL 131 (H) 05/10/2017    HDL 82 05/10/2017    TRIG 146 05/10/2017    ALT 11 12/11/2017    AST

## 2018-01-17 ENCOUNTER — APPOINTMENT (OUTPATIENT)
Dept: GENERAL RADIOLOGY | Age: 78
DRG: 100 | End: 2018-01-17
Payer: MEDICARE

## 2018-01-17 PROBLEM — E86.0 DEHYDRATION: Status: ACTIVE | Noted: 2018-01-17

## 2018-01-17 LAB
-: ABNORMAL
ABSOLUTE EOS #: 0.06 K/UL (ref 0–0.44)
ABSOLUTE EOS #: <0.03 K/UL (ref 0–0.44)
ABSOLUTE IMMATURE GRANULOCYTE: 0.03 K/UL (ref 0–0.3)
ABSOLUTE IMMATURE GRANULOCYTE: 0.07 K/UL (ref 0–0.3)
ABSOLUTE LYMPH #: 0.75 K/UL (ref 1.1–3.7)
ABSOLUTE LYMPH #: 1.98 K/UL (ref 1.1–3.7)
ABSOLUTE MONO #: 0.19 K/UL (ref 0.1–1.2)
ABSOLUTE MONO #: 0.81 K/UL (ref 0.1–1.2)
AMORPHOUS: ABNORMAL
ANION GAP SERPL CALCULATED.3IONS-SCNC: 16 MMOL/L (ref 9–17)
BACTERIA: ABNORMAL
BASOPHILS # BLD: 0 % (ref 0–2)
BASOPHILS # BLD: 1 % (ref 0–2)
BASOPHILS ABSOLUTE: 0.04 K/UL (ref 0–0.2)
BASOPHILS ABSOLUTE: <0.03 K/UL (ref 0–0.2)
BILIRUBIN URINE: NEGATIVE
BUN BLDV-MCNC: 9 MG/DL (ref 8–23)
BUN/CREAT BLD: ABNORMAL (ref 9–20)
CALCIUM SERPL-MCNC: 8.7 MG/DL (ref 8.6–10.4)
CASTS UA: ABNORMAL /LPF (ref 0–8)
CHLORIDE BLD-SCNC: 98 MMOL/L (ref 98–107)
CO2: 23 MMOL/L (ref 20–31)
COLOR: ABNORMAL
CREAT SERPL-MCNC: 1.31 MG/DL (ref 0.7–1.2)
CRYSTALS, UA: ABNORMAL /HPF
DIFFERENTIAL TYPE: ABNORMAL
DIFFERENTIAL TYPE: ABNORMAL
EOSINOPHILS RELATIVE PERCENT: 0 % (ref 1–4)
EOSINOPHILS RELATIVE PERCENT: 1 % (ref 1–4)
EPITHELIAL CELLS UA: ABNORMAL /HPF (ref 0–5)
GFR AFRICAN AMERICAN: >60 ML/MIN
GFR NON-AFRICAN AMERICAN: 53 ML/MIN
GFR SERPL CREATININE-BSD FRML MDRD: ABNORMAL ML/MIN/{1.73_M2}
GFR SERPL CREATININE-BSD FRML MDRD: ABNORMAL ML/MIN/{1.73_M2}
GLUCOSE BLD-MCNC: 115 MG/DL (ref 70–99)
GLUCOSE BLD-MCNC: 99 MG/DL (ref 75–110)
GLUCOSE URINE: NEGATIVE
HCT VFR BLD CALC: 38.6 % (ref 40.7–50.3)
HCT VFR BLD CALC: 41.4 % (ref 40.7–50.3)
HCT VFR BLD CALC: 42.4 % (ref 40.7–50.3)
HEMOGLOBIN: 11.9 G/DL (ref 13–17)
HEMOGLOBIN: 12.8 G/DL (ref 13–17)
HEMOGLOBIN: 12.8 G/DL (ref 13–17)
IMMATURE GRANULOCYTES: 1 %
IMMATURE GRANULOCYTES: 1 %
KETONES, URINE: NEGATIVE
LACTIC ACID, WHOLE BLOOD: 5.6 MMOL/L (ref 0.7–2.1)
LEUKOCYTE ESTERASE, URINE: ABNORMAL
LYMPHOCYTES # BLD: 32 % (ref 24–43)
LYMPHOCYTES # BLD: 6 % (ref 24–43)
MCH RBC QN AUTO: 27.4 PG (ref 25.2–33.5)
MCH RBC QN AUTO: 27.5 PG (ref 25.2–33.5)
MCHC RBC AUTO-ENTMCNC: 30.8 G/DL (ref 28.4–34.8)
MCHC RBC AUTO-ENTMCNC: 30.9 G/DL (ref 28.4–34.8)
MCV RBC AUTO: 88.7 FL (ref 82.6–102.9)
MCV RBC AUTO: 89.1 FL (ref 82.6–102.9)
MONOCYTES # BLD: 13 % (ref 3–12)
MONOCYTES # BLD: 2 % (ref 3–12)
MUCUS: ABNORMAL
NITRITE, URINE: NEGATIVE
NRBC AUTOMATED: 0 PER 100 WBC
NRBC AUTOMATED: 0 PER 100 WBC
OTHER OBSERVATIONS UA: ABNORMAL
PDW BLD-RTO: 14.5 % (ref 11.8–14.4)
PDW BLD-RTO: 14.6 % (ref 11.8–14.4)
PH UA: 8 (ref 5–8)
PLATELET # BLD: 204 K/UL (ref 138–453)
PLATELET # BLD: 258 K/UL (ref 138–453)
PLATELET ESTIMATE: ABNORMAL
PLATELET ESTIMATE: ABNORMAL
PMV BLD AUTO: 10.7 FL (ref 8.1–13.5)
PMV BLD AUTO: 9.5 FL (ref 8.1–13.5)
POTASSIUM SERPL-SCNC: 5.1 MMOL/L (ref 3.7–5.3)
PROTEIN UA: ABNORMAL
RBC # BLD: 4.33 M/UL (ref 4.21–5.77)
RBC # BLD: 4.67 M/UL (ref 4.21–5.77)
RBC # BLD: ABNORMAL 10*6/UL
RBC # BLD: ABNORMAL 10*6/UL
RBC UA: ABNORMAL /HPF (ref 0–4)
RENAL EPITHELIAL, UA: ABNORMAL /HPF
SEG NEUTROPHILS: 52 % (ref 36–65)
SEG NEUTROPHILS: 92 % (ref 36–65)
SEGMENTED NEUTROPHILS ABSOLUTE COUNT: 11.56 K/UL (ref 1.5–8.1)
SEGMENTED NEUTROPHILS ABSOLUTE COUNT: 3.3 K/UL (ref 1.5–8.1)
SODIUM BLD-SCNC: 137 MMOL/L (ref 135–144)
SPECIFIC GRAVITY UA: 1.01 (ref 1–1.03)
TRICHOMONAS: ABNORMAL
TURBIDITY: ABNORMAL
URINE HGB: ABNORMAL
UROBILINOGEN, URINE: NORMAL
WBC # BLD: 12.6 K/UL (ref 3.5–11.3)
WBC # BLD: 6.2 K/UL (ref 3.5–11.3)
WBC # BLD: ABNORMAL 10*3/UL
WBC # BLD: ABNORMAL 10*3/UL
WBC UA: ABNORMAL /HPF (ref 0–5)
YEAST: ABNORMAL

## 2018-01-17 PROCEDURE — 87149 DNA/RNA DIRECT PROBE: CPT

## 2018-01-17 PROCEDURE — 2580000003 HC RX 258: Performed by: STUDENT IN AN ORGANIZED HEALTH CARE EDUCATION/TRAINING PROGRAM

## 2018-01-17 PROCEDURE — 85014 HEMATOCRIT: CPT

## 2018-01-17 PROCEDURE — 87186 SC STD MICRODIL/AGAR DIL: CPT

## 2018-01-17 PROCEDURE — 87086 URINE CULTURE/COLONY COUNT: CPT

## 2018-01-17 PROCEDURE — 2580000003 HC RX 258: Performed by: NURSE PRACTITIONER

## 2018-01-17 PROCEDURE — 2580000003 HC RX 258: Performed by: INTERNAL MEDICINE

## 2018-01-17 PROCEDURE — 80048 BASIC METABOLIC PNL TOTAL CA: CPT

## 2018-01-17 PROCEDURE — 81001 URINALYSIS AUTO W/SCOPE: CPT

## 2018-01-17 PROCEDURE — 87088 URINE BACTERIA CULTURE: CPT

## 2018-01-17 PROCEDURE — 6370000000 HC RX 637 (ALT 250 FOR IP): Performed by: NURSE PRACTITIONER

## 2018-01-17 PROCEDURE — 99232 SBSQ HOSP IP/OBS MODERATE 35: CPT | Performed by: INTERNAL MEDICINE

## 2018-01-17 PROCEDURE — 6360000002 HC RX W HCPCS: Performed by: NURSE PRACTITIONER

## 2018-01-17 PROCEDURE — 6370000000 HC RX 637 (ALT 250 FOR IP): Performed by: HOSPITALIST

## 2018-01-17 PROCEDURE — 82947 ASSAY GLUCOSE BLOOD QUANT: CPT

## 2018-01-17 PROCEDURE — 85025 COMPLETE CBC W/AUTO DIFF WBC: CPT

## 2018-01-17 PROCEDURE — 87205 SMEAR GRAM STAIN: CPT

## 2018-01-17 PROCEDURE — 2000000000 HC ICU R&B

## 2018-01-17 PROCEDURE — 87040 BLOOD CULTURE FOR BACTERIA: CPT

## 2018-01-17 PROCEDURE — 6360000002 HC RX W HCPCS: Performed by: INTERNAL MEDICINE

## 2018-01-17 PROCEDURE — 82140 ASSAY OF AMMONIA: CPT

## 2018-01-17 PROCEDURE — 36415 COLL VENOUS BLD VENIPUNCTURE: CPT

## 2018-01-17 PROCEDURE — 99232 SBSQ HOSP IP/OBS MODERATE 35: CPT | Performed by: PSYCHIATRY & NEUROLOGY

## 2018-01-17 PROCEDURE — 99233 SBSQ HOSP IP/OBS HIGH 50: CPT | Performed by: INTERNAL MEDICINE

## 2018-01-17 PROCEDURE — 71045 X-RAY EXAM CHEST 1 VIEW: CPT

## 2018-01-17 PROCEDURE — 83605 ASSAY OF LACTIC ACID: CPT

## 2018-01-17 PROCEDURE — 85018 HEMOGLOBIN: CPT

## 2018-01-17 RX ORDER — 0.9 % SODIUM CHLORIDE 0.9 %
1000 INTRAVENOUS SOLUTION INTRAVENOUS ONCE
Status: COMPLETED | OUTPATIENT
Start: 2018-01-17 | End: 2018-01-18

## 2018-01-17 RX ORDER — ACETAMINOPHEN 650 MG/1
650 SUPPOSITORY RECTAL EVERY 4 HOURS PRN
Status: DISCONTINUED | OUTPATIENT
Start: 2018-01-17 | End: 2018-01-20 | Stop reason: HOSPADM

## 2018-01-17 RX ORDER — ACETAMINOPHEN 325 MG/1
650 TABLET ORAL EVERY 4 HOURS PRN
Status: DISCONTINUED | OUTPATIENT
Start: 2018-01-17 | End: 2018-01-20 | Stop reason: HOSPADM

## 2018-01-17 RX ORDER — VANCOMYCIN HYDROCHLORIDE 1 G/200ML
1000 INJECTION, SOLUTION INTRAVENOUS EVERY 24 HOURS
Status: DISCONTINUED | OUTPATIENT
Start: 2018-01-17 | End: 2018-01-17

## 2018-01-17 RX ORDER — 0.9 % SODIUM CHLORIDE 0.9 %
500 INTRAVENOUS SOLUTION INTRAVENOUS ONCE
Status: COMPLETED | OUTPATIENT
Start: 2018-01-17 | End: 2018-01-18

## 2018-01-17 RX ORDER — SODIUM CHLORIDE 9 MG/ML
INJECTION, SOLUTION INTRAVENOUS CONTINUOUS
Status: DISCONTINUED | OUTPATIENT
Start: 2018-01-17 | End: 2018-01-20 | Stop reason: HOSPADM

## 2018-01-17 RX ADMIN — MEMANTINE HYDROCHLORIDE 5 MG: 5 TABLET, FILM COATED ORAL at 09:06

## 2018-01-17 RX ADMIN — SODIUM CHLORIDE: 9 INJECTION, SOLUTION INTRAVENOUS at 22:30

## 2018-01-17 RX ADMIN — SODIUM CHLORIDE 1000 ML: 9 INJECTION, SOLUTION INTRAVENOUS at 19:54

## 2018-01-17 RX ADMIN — PANTOPRAZOLE SODIUM 40 MG: 40 TABLET, DELAYED RELEASE ORAL at 09:04

## 2018-01-17 RX ADMIN — SODIUM CHLORIDE 500 ML: 0.9 INJECTION, SOLUTION INTRAVENOUS at 20:59

## 2018-01-17 RX ADMIN — Medication 10 ML: at 09:07

## 2018-01-17 RX ADMIN — FINASTERIDE 5 MG: 5 TABLET, FILM COATED ORAL at 09:05

## 2018-01-17 RX ADMIN — VITAMIN E CAP 400 UNIT 400 UNITS: 400 CAP at 09:04

## 2018-01-17 RX ADMIN — VANCOMYCIN HYDROCHLORIDE 750 MG: 1 INJECTION, POWDER, LYOPHILIZED, FOR SOLUTION INTRAVENOUS at 23:55

## 2018-01-17 RX ADMIN — SODIUM CHLORIDE 1000 ML: 9 INJECTION, SOLUTION INTRAVENOUS at 20:07

## 2018-01-17 RX ADMIN — LEVETIRACETAM 250 MG: 250 TABLET, FILM COATED ORAL at 09:06

## 2018-01-17 RX ADMIN — ENOXAPARIN SODIUM 40 MG: 40 INJECTION SUBCUTANEOUS at 09:06

## 2018-01-17 ASSESSMENT — PAIN SCALES - GENERAL
PAINLEVEL_OUTOF10: 0
PAINLEVEL_OUTOF10: 0

## 2018-01-17 NOTE — PROGRESS NOTES
NEUROLOGY INPATIENT PROGRESS NOTE    1/17/2018         Subjective: Avel Oneil is a  76 y.o. male admitted on 1/13/2018 with Syncope and collapse [R55]      Briefly, this is a  76 y.o. male admitted on 1/13/2018 with H/O BPH, dementia, headaches, HIV, HLD, history of syphilis that was treated, who was admitted on 1/13/2018 with and episode of tremor with unresponsiveness. Per the records the patient's wife describes that at his baseline the patient is not oriented to place and needs assistance bathing and dressing. He has difficulty recognizing family members; takes Aricept 10mg bid and Namenda 5mg bid. Over the past week she has noticed 4-5 episodes, each lasting about 2 minutes, of the patient staring and is not responsive to outside stimuli along with tremor of BUE. On the day of admission the patient was sitting at the kitchen table when he had an episode of tremor to BUE followed with unresponsiveness, lasting 2 minutes. He denied any headache, numbness, weakness, or tingling.     CT head with generalized atrophy with bilateral chronic periventricular small vessel ischemia. MRI brain is pending.      The patient was started by initial neurology consultant on Keppra 250mg bid on which she is being maintained. EEG 1/15 with no paroxysmal activity. Contrast MRI brain 1/16 demonstrated no causal cortical lesion for patient's new onset seizure disorder, only marked atrophy and mild to moderate SVID changes. No new neurologic events nor issues last 24 hours. No current facility-administered medications on file prior to encounter.       Current Outpatient Prescriptions on File Prior to Encounter   Medication Sig Dispense Refill    vitamin E 400 UNIT capsule TAKE 1 CAPSULE TWICE A DAY 30 capsule 0    alendronate (FOSAMAX) 70 MG tablet TAKE 1 TABLET EVERY WEEK 4 tablet 0    donepezil (ARICEPT) 10 MG tablet TAKE 1 TABLET IN THE EVENING 30 tablet 0    finasteride (PROSCAR) 5 MG tablet TAKE 1 TABLET DAILY 30 tablet 0    memantine (NAMENDA) 5 MG tablet TAKE 1 TABLET TWICE A DAY 60 tablet 0    omeprazole (PRILOSEC) 20 MG delayed release capsule TAKE 1 CAPSULE DAILY 30 capsule 0    Calcium Carbonate-Vitamin D (OYSTER SHELL CALCIUM/D) 500-200 MG-UNIT TABS TAKE 2 TABLETS IN THE MORNING 60 tablet 0    vitamin E 400 UNIT capsule TAKE 1 CAPSULE TWICE A DAY 30 capsule 0    ATRIPLA 600-200-300 MG per tablet TAKE 1 TABLET BY MOUTH ONCE A DAY *BOTTLE* 30 tablet 5    Multiple Vitamin (MULTIVITAMIN) tablet TAKE 1 TABLET DAILY 60 tablet 5       Allergies: Mandeep Iverson has No Known Allergies.     Past Medical History:   Diagnosis Date    Benign prostatic hyperplasia     [facility]ulsions ([facility] [address]     Dementia     GERD (gastroesophageal reflux disease) 4/28/2015    QRYUNYMI(865.4)     Hemorrhoids     History of shingles     HIV (human immunodeficiency virus infection) ([facility] [address]     Human immunodeficiency virus (HIV) ([facility] [address]     Meralgia paraesthetica 2/4/2016    Mixed hyperlipidemia 5/5/2016    Osteoporosis     Syphilis     Treated       Past Surgical History:   Procedure Laterality Date    HEMORRHOID SURGERY      HERNIA REPAIR      SIGMOIDOSCOPY N/A 11/22/2017    SIGMOIDOSCOPY POLYP SNARE performed by Murali Knowles MD at [facility]    TONSILLECTOMY      TOTAL HIP ARTHROPLASTY             Medications:     sodium chloride flush  10 mL Intravenous Once    levETIRAcetam  250 mg Oral BID    efavirenz-emtrictabine-tenofovir  1 tablet Oral Nightly    donepezil  10 mg Oral Nightly    finasteride  5 mg Oral Daily    memantine  5 mg Oral BID    pantoprazole  40 mg Oral QAM AC    sodium chloride flush  10 mL Intravenous 2 times per day    enoxaparin  40 mg Subcutaneous Daily    vitamin E  400 Units Oral Daily     PRN Meds include: LORazepam, albuterol, LORazepam, sodium chloride flush, potassium chloride **OR** potassium chloride **OR** potassium chloride, magnesium sulfate, acetaminophen, HYDROcodone 5 mg - acetaminophen, magnesium hydroxide, bisacodyl, ondansetron, nicotine    Objective:   /60   Pulse 84   Temp 98.2 °F (36.8 °C) (Oral)   Resp 21   Ht 5' 6\" (1.676 m)   Wt 101 lb 12.8 oz (46.2 kg)   SpO2 96%   BMI 16.43 kg/m²     Blood pressure range: Systolic (32BTL), DYLLAN:598 , Min:96 , KPD:233   ; Diastolic (69IUV), BANDAR:79, Min:58, Max:83     NEUROLOGIC EXAMINATION  GENERAL  Appears comfortable and in no distress   HEENT  NC/ AT   NECK  Supple   MENTAL STATUS:  Alert, oriented to self only, normal speech, normal language, no hallucination or delusion. Able to name pen but not phone or glasses. Follows simple commands. CRANIAL NERVES: II     -      Visual fields intact to confrontation  III,IV,VI -  EOMs full, no afferent defect, no INGRID, no ptosis  V     -     Normal facial sensation  VII    -     Normal facial symmetry  VIII   -     Intact hearing  IX,X -     Symmetrical palate  XI    -     Symmetrical shoulder shrug  XII   -     Midline tongue, no atrophy    MOTOR FUNCTION:  significant for good strength of grade 5/5 in bilateral proximal and distal muscle groups of both upper and lower extremities with normal bulk, normal tone and no involuntary movements, no tremor   SENSORY FUNCTION:  Normal touch, normal pin   CEREBELLAR FUNCTION:  Intact fine motor control over upper limbs   REFLEX FUNCTION:  Symmetric and decreased at patella, no perverted reflex, no Babinski sign   STATION and GAIT  Not tested            Data:  Narrative   EXAMINATION:   MRI OF THE BRAIN WITHOUT AND WITH CONTRAST  1/16/2018 4:20 pm       TECHNIQUE:   Multiplanar multisequence MRI of the head/brain was performed without and   with the administration of intravenous contrast.       COMPARISON:   CT head January 13, 2018 and MR brain October 27, 2007       HISTORY:   ORDERING SYSTEM PROVIDED HISTORY: seizures       FINDINGS:   INTRACRANIAL STRUCTURES/VENTRICLES: Threasa Vietnamese is no acute infarct. No mass   effect or midline shift.  No

## 2018-01-17 NOTE — PROGRESS NOTES
[facility] [address]  Occupational Therapy Not Seen Note    Patient not available for Occupational Therapy due to:    [] Testing:    [] Hemodialysis    [] Blood Transfusion in Progress    []Refusal by Patient:    [] Surgery/Procedure:    [] Strict Bedrest    [] Sedation    [] Spine Precautions     [] Pt being transferred to palliative care at this time. Spoke with pt/family and OT services to be defered. [] Pt independent with functional mobility and functional tasks. Pt with no OT acute care needs at this time, will defer OT eval.    [x] Other: RN cancel d/t pt with confusion, currently sleeping, and going through an active bleed at this time. Pt was going home this pm however no longer. Next Scheduled Treatment: Attempt on 1/18 as appropriate. Signature:  Naye SMITH/LEDY

## 2018-01-17 NOTE — PROGRESS NOTES
Patient is shaking in bed and covering his head with a blanket. Attempted to get a temperature. One reading said 101.5 another said 102. Patient grabbed my hand and tried to twist it when I was getting his temperature. I tried to give him Tylenol but he refused. I Perfect served the physician. Piper Diaz RN

## 2018-01-17 NOTE — PROGRESS NOTES
[facility] - Daily Progress Note  Today's Date and Time: 1/17/2018, 9:34 AM  Admission date: 1/13/18  Impression :   1. HIV infection.  Initial positive test on 3/16/12  2. Dementia, also 5 cm appetite without behavioral disturbance, unspecified time of dementia onset  3. Murmurs of the upper extremities bilaterally  4. Prior history of syphilis.  Status post treatment.  Last quantitative VDRL 1 on 5/10/2017  5. Mixed hyperlipidemia  6. Prior history of shingles     Recommendations   · Continue antiretroviral therapy with Atripla.  Most recent viral load is undetectable  · Follow-up with Dr. Iraida Perry discharge for management of HIV  · Cryptococcus , Toxoplasma IgM and IgG negative. VDRL pending      Infection Control Recommendations   · [place] precautions     Antimicrobial Stewardship Recommendations   · Antiretroviral therapy with Atripla     Chief complaint/reason for consultation:   HIV with dementia and new onset tremors     History of Present Illness:   INITIAL HISTORY:     Mandeep Iverson is a 76y.o.-year-old  male who was initially admitted on 1/13/2018.  Patient seen at the request of Aureliano Smith.     The patient follows with Dr. Lynda Gallagher for the management of his HIV infection.  As far as I can tell the initial positive test for HIV was on 3/16/12. Aura Workman is unable to provide information as to when he was started on antiretroviral therapy. Kori Jones at the records from the outpatient office indicate that the patient has been receiving Atripla and that his viral load is undetectable with this treatment.     The patient has also suffered from syphilis and has been treated for this condition. Rakan Murray has had multiple quantitative VDRL's done to document response.  The most recent was with a value of 1 each with indicate a good previous response to syphilis treatment.  This test was done on multiple occasions because of the concurrent diagnosis of

## 2018-01-17 NOTE — PROGRESS NOTES
Nutrition Assessment    Type and Reason for Visit: Initial, Consult (supplement recommendations )    Nutrition Recommendations: Continue current diet, Start ONS. Encourage intake as tolerated. Will monitor tolerance to diet and adequacy of intake. Malnutrition Assessment:  · Malnutrition Status: Meets the criteria for severe malnutrition  · Context: Chronic illness  · Findings of the 6 clinical characteristics of malnutrition (Minimum of 2 out of 6 clinical characteristics is required to make the diagnosis of moderate or severe Protein Calorie Malnutrition based on AND/ASPEN Guidelines):  1. Energy Intake-Less than or equal to 75%, greater than or equal to 1 month   2. Weight Loss-10% loss or greater, in 1 month  3. Fat Loss-Unable to assess (pt sleeping ),    4. Muscle Loss-Unable to assess (pt sleeping),    5. Fluid Accumulation-No significant fluid accumulation (none noted per RN assessment),    6.  Strength-Not measured    Nutrition Diagnosis:   · Problem: Unintended weight loss  · Etiology: related to Catabolic illness, variable oral intake     Signs and symptoms:  as evidenced by Weight loss greater than or equal to 5% in 1 month    Nutrition Assessment:  · Subjective Assessment: Pt sleeping. Wife in room who reports pt is currently tolerating diet well. Reports pt only had coffee and juice for breakfast (which is not abnormal for pt), but ate % of lunch. Per wife, pt's intake varies at home. Pt used to have oral supplements delivered to home, but stopped receiving them. Wife reports pt has been losing weight recently; was 116 lb one month ago at doctor appt.    · Current Nutrition Therapies:  · Oral Diet Orders: Cardiac   · Oral Diet intake: variable; 1-25% of breakfast, % of lunch today  · Oral Nutrition Supplement (ONS) Orders: None  · Anthropometric Measures:  · Ht: 5' 6\" (167.6 cm)   · Current Body Wt: 101 lb 13.6 oz (46.2 kg)  · Usual Body Wt:116 lb approx one month ago per

## 2018-01-17 NOTE — PROGRESS NOTES
treatment required at this stage. No prior CSF exam noted, none during this admission. Discussed with patient, RN. Physical Examination :     Patient Vitals for the past 8 hrs:   BP Temp src Pulse Resp   18 0800 - - 84 -   18 0755 113/60 Oral 82 21     Temp (24hrs), Av.7 °F (36.5 °C), Min:97.3 °F (36.3 °C), Max:98.2 °F (36.8 °C)    General Appearance: lethargic, alert, and in no apparent distress. Mentation waxes and wanes. Eyes: Sclera anicteric; conjunctivae pink. No hemorhages, no embolic phenomena. ENT:Oropharynx clear, without erythema, exudate, or thrush. No tenderness of sinuses. No nasal drainage. Neck: Supple, without lymphadenopathy. No JVD. Pulmonary/Chest: Clear to auscultation, without wheezes, rales, or rhonchi. No areas of consolidation. Good air movement bilaterally. Cardiovascular:Regular rate and rhythm without murmurs, rubs, or gallops. S1 and S2 normal.  Abdomen: Soft, non tender. Bowel sounds normal. No cramps. No organomegaly  All four Extremities:No cyanosis, clubbing, edema, or effusions. Neurologic:No gross sensory or motor deficits. Oriented x1 (to person only)   Skin: No rash or lesions. IV site inspected: no inflammation. Medical Decision Making:   I have independently reviewed/ordered the following labs:    CBC with Differential:   Recent Labs      18   0647  18   0642   WBC  6.7  6.2   HGB  11.8*  11.9*   HCT  37.5*  38.6*   PLT  311  258   LYMPHOPCT  31  32   MONOPCT  13*  13*     BMP:   Recent Labs      18   0855   NA  137   K  5.2   CL  103   CO2  25   BUN  3*   CREATININE  0.75     Hepatic Function Panel: No results for input(s): PROT, LABALBU, BILIDIR, IBILI, BILITOT, ALKPHOS, ALT, AST in the last 72 hours.   No results found for: VANCOTROUGH       Medications:      sodium chloride flush  10 mL Intravenous Once    efavirenz-emtrictabine-tenofovir  1 tablet Oral Nightly    donepezil  10 mg Oral Nightly    finasteride  5 mg Oral Daily    memantine  5 mg Oral BID    pantoprazole  40 mg Oral QAM AC    sodium chloride flush  10 mL Intravenous 2 times per day    enoxaparin  40 mg Subcutaneous Daily    vitamin E  400 Units Oral Daily       Thank you for allowing us to participate in the care of this patient. Please call with questions. Uday Helm MD  Pager: [phone]  - Office: [phone]      I have examined the patient, reviewed the patient's medical history in detail and updated the computerized patient record. Above exam and data confirmed.     Al Gilbert MD

## 2018-01-17 NOTE — PROGRESS NOTES
Patient refusing to wear telemetry. Patient educated on use of telemetry and still refuses to wear telemetry.

## 2018-01-17 NOTE — CONSULTS
Jose Shankar, [address] [facility], [address], [place], [address]   Urology Consultation      Patient:  Lamar Gilbert  MRN: [id number]  YOB: 1940    CHIEF COMPLAINT:  Gross hematuria    HISTORY OF PRESENT ILLNESS:   The patient is a 76 y.o. male who presented with seizure-like activity. He has a history of HIV and dementia. Today he had sudden onset painless gross hematuria. Urine is pink and clear. Per wife, this has happened before and it resolved without intervention or further work-up. He has had UTIs once yearly for the past few years characterized by dysuria which resolved with antibiotics. The patient does not answer questions but the wife denies any urologic history or voiding complaints. He is on finasteride at baseline. Patient's old records, notes and chart reviewed and summarized above.     Past Medical History:    Past Medical History:   Diagnosis Date    Benign prostatic hyperplasia     [facility]ulsions ([facility]     Dementia     GERD (gastroesophageal reflux disease) 4/28/2015    NASEPEQV(678.2)     Hemorrhoids     History of shingles     HIV ([facility] immunodeficiency virus infection) ([facility]     Human immunodeficiency virus (HIV) ([facility] [address] [facility]     Meralgia paraesthetica 2/4/2016    Mixed hyperlipidemia 5/5/2016    Osteoporosis     Syphilis     Treated       Past Surgical History:    Past Surgical History:   Procedure Laterality Date    HEMORRHOID SURGERY      HERNIA REPAIR      SIGMOIDOSCOPY N/A 11/22/2017    SIGMOIDOSCOPY POLYP SNARE performed by Kaitlynn Justice MD at [facility]         Medications:      Current Facility-Administered Medications:     sodium chloride flush 0.9 % injection 10 mL, 10 mL, Intravenous, Once, Diandra Luis MD    LORazepam (ATIVAN) tablet 0.5 mg, 0.5 mg, Oral, Q4H PRN, Felicia Laguerre MD, 0.5 mg at 01/15/18 2224    albuterol (PROVENTIL) nebulizer solution 2.5 mg, 2.5 mg, Nebulization, Q6H PRN, Rolando Saldivar NP  [facility] efavirenz-emtrictabine-tenofovir (ATRIPLA) 600-200-300 MG per tablet 1 tablet, 1 tablet, Oral, Nightly, Mark Dumont MD, 1 tablet at 01/15/18 2141    LORazepam (ATIVAN) injection 1 mg, 1 mg, Intravenous, Q30 Min PRN, Veronica Clemente MD    donepezil (ARICEPT) tablet 10 mg, 10 mg, Oral, Nightly, Trini Baba, NP, 10 mg at 01/15/18 2140    finasteride (PROSCAR) tablet 5 mg, 5 mg, Oral, Daily, Trini Baba, NP, 5 mg at 01/17/18 0905    memantine (NAMENDA) tablet 5 mg, 5 mg, Oral, BID, Trini Baba, NP, 5 mg at 01/17/18 0906    pantoprazole (PROTONIX) tablet 40 mg, 40 mg, Oral, QAM AC, Trini Baba, NP, 40 mg at 01/17/18 0904    0.9 % sodium chloride infusion, , Intravenous, Continuous, Trini Baba, NP, Last Rate: 75 mL/hr at 01/13/18 2213    sodium chloride flush 0.9 % injection 10 mL, 10 mL, Intravenous, 2 times per day, Trini Baba, NP, 10 mL at 01/17/18 0907    sodium chloride flush 0.9 % injection 10 mL, 10 mL, Intravenous, PRN, Trini Baba, NP    potassium chloride (KLOR-CON M) extended release tablet 40 mEq, 40 mEq, Oral, PRN **OR** potassium chloride 20 MEQ/15ML (10%) oral solution 40 mEq, 40 mEq, Oral, PRN **OR** potassium chloride 10 mEq/100 mL IVPB (Peripheral Line), 10 mEq, Intravenous, PRN, Trini Baba, NP    magnesium sulfate 1 g in dextrose 5% 100 mL IVPB, 1 g, Intravenous, PRN, Trini Baba, NP    acetaminophen (TYLENOL) tablet 650 mg, 650 mg, Oral, Q4H PRN, Trini Baba, NP    HYDROcodone-acetaminophen (NORCO) 5-325 MG per tablet 1 tablet, 1 tablet, Oral, Q4H PRN, Trini Baba, NP    magnesium hydroxide (MILK OF MAGNESIA) 400 MG/5ML suspension 30 mL, 30 mL, Oral, Daily PRN, Trini Baba, NP    bisacodyl (DULCOLAX) suppository 10 mg, 10 mg, Rectal, Daily PRN, Trini , NP    ondansetron [facility]) injection 4 mg, 4 mg, Intravenous, Q6H PRN, 01/16/18   0647  01/17/18   0642  01/17/18   1418   WBC  6.7  6.2   --    HGB  11.8*  11.9*  12.8*   HCT  37.5*  38.6*  42.4   MCV  87.2  89.1   --    PLT  311  258   --      Recent Labs      01/16/18   0855   NA  137   K  5.2   CL  103   CO2  25   BUN  3*   CREATININE  0.75       No results for input(s): COLORU, PHUR, LABCAST, WBCUA, RBCUA, MUCUS, TRICHOMONAS, YEAST, BACTERIA, CLARITYU, SPECGRAV, LEUKOCYTESUR, UROBILINOGEN, BILIRUBINUR, BLOODU in the last 72 hours. Invalid input(s): NITRATE, GLUCOSEUKETONESUAMORPHOUS    Culture Results:    UCx P    -----------------------------------------------------------------  Imaging Results:  none    Assessment and Plan   Impression:  73-yo M with HIV, seizures, gross hematuria      Plan:   1. Gross hematuria - send urine for culture. Would like to check PVR as pt is voiding with urgency. Pt is combative and not cooperative. Will order renal US to evaluate kidneys. Will need cystoscopy and retrograde pyelograms as outpatient.      Lopez Segovia  6:14 PM 1/17/2018

## 2018-01-17 NOTE — PLAN OF CARE
Problem: Nutrition  Goal: Optimal nutrition therapy  Outcome: Ongoing  Nutrition Problem: Unintended weight loss  Intervention: Food and/or Nutrient Delivery: Continue current diet, Start ONS  Nutritional Goals: consistently meet % of estimated nutrition needs to prevent further weight loss

## 2018-01-17 NOTE — PLAN OF CARE
Problem: Falls - Risk of:  Goal: Will remain free from falls  Will remain free from falls   Outcome: Ongoing  Fall assessment preformed. Bed in low locked position with call light and tray table within reach. Education given. Will continue to monitor.

## 2018-01-17 NOTE — PROGRESS NOTES
Miriam [facility]    Progress Note    1/17/2018    8:13 AM    Name:   Aron Pratt  MRN:     [id number]     [place]:      [de-identified]   Room:   [address] [place] [address]  IP Day:  4  Admit Date:  1/13/2018  6:29 PM    PCP:   Mer Goldman MD  Code Status:  Full Code    Subjective:     C/C:   Chief Complaint   Patient presents with   Mimi Coventry     Interval History Status: improved. Patient was drowsy this AM. Noted to have hematuria today. Urology consult placed. Experienced an episode of agitation, got up out of bed, aggressively. Threw up his medications this AM.     Brief History:     The patient is a 76 y.o. Non-/non  male who presents with Shaking   and he is admitted to the hospital for the management of unresponsiveness with seizure-like activity  Patient with known history of HIV and dementia brought to ER EMS after sustaining an episode of unresponsiveness and shakiness witnessed by his wife. The episode happened during dinner with the patient slumped back in the chair and wife noticed hand shakiness and tremors, she is unable to anticipate duration of the episode but there ER report it was roughly 3 minute episodes. No postictal state, confusion, incontinence or altered mental status  No similar episodes in the past  and no known history of seizure.   In ER CT head didn't show  acute pathology, vitals including blood pressure within normal range as well as his basic blood work   during my evaluation of the history was obtained the patient, patient is very slow and unable to answer any question    Review of Systems:     Constitutional:  negative for chills, fevers, sweats  Respiratory:  negative for cough, dyspnea on exertion, hemoptysis, shortness of breath, wheezing  Cardiovascular:  negative for chest pain, chest pressure/discomfort, lower extremity edema, palpitations  Gastrointestinal:  negative for abdominal pain, constipation, soft, nontender, nondistended, normal bowel sounds, no masses, hepatomegaly, splenomegaly  Extremities:  no edema, redness, tenderness in the calves  Skin:  no gross lesions, rashes, induration  Noted to have blood on bedsheets. Blood dribbled from urethral meatus onto floor during episode of agitation. Assessment:        Primary Problem  Seizure-like activity [facility])    Active Hospital Problems    Diagnosis Date Noted    Seizure disorder ([facility] [address] [facility] [G40.909] 01/14/2018    Unresponsiveness [R41.89] 01/14/2018    Seizure-like activity ([facility] [R56.9] 01/14/2018    Convulsions ([facility] [facility] [R56.9]     Syncope and collapse [R55] 01/13/2018    Mixed hyperlipidemia [E78.2] 05/05/2016    GERD (gastroesophageal reflux disease) [K21.9] 04/28/2015    BPH (benign prostatic hyperplasia) [N40.0] 02/01/2013    Dementia without behavioral disturbance [F03.90] 07/27/2012    HIV (human immunodeficiency virus infection) ([facility] [facility] [B20] 07/27/2012       Plan:        Acute onset Hematuria. STAT CBC. ASA, lovenox held. STAT Urology consult. Seizure-like activity with unresponsiveness: CT negative, given the patient's history of HIV will order MRI brain with and without to rule out infection,  ID consult, EEG and neurology consult. Patient is back to baseline per wife. Per ID, \"Cryptococcus , Toxoplasma IgM and IgG negative. \". Per neurology: \"QZWL WVSZ new onset seizure disorder. Dementia. HIV. EEG and contrast brain MRI did not show abnormality that would increase risk of developing seizures. Discontinue AED. Neurologically stable and clear. \"    Severe Protein Calorie Malnutrition. Dietary Consult placed. Supplementation provided.      Dementia:  Continue home medications Aricept and Namenda.     HIV: Most recent viral load undetectable. Cont Atripla.  Patient to f/u with Dr. Tony Medel on discharge for management of HIV.      BPH: continue Proscar     DVT and GI prophylaxis    Sherry Gomez MD  1/17/2018  8:13 AM

## 2018-01-18 ENCOUNTER — APPOINTMENT (OUTPATIENT)
Dept: ULTRASOUND IMAGING | Age: 78
DRG: 100 | End: 2018-01-18
Payer: MEDICARE

## 2018-01-18 PROBLEM — A41.51 SEPSIS DUE TO ESCHERICHIA COLI (HCC): Status: ACTIVE | Noted: 2018-01-18

## 2018-01-18 PROBLEM — R50.9 FEVER: Status: ACTIVE | Noted: 2018-01-18

## 2018-01-18 PROBLEM — N30.01 ACUTE CYSTITIS WITH HEMATURIA: Status: ACTIVE | Noted: 2018-01-18

## 2018-01-18 LAB
ABSOLUTE EOS #: 0 K/UL (ref 0–0.4)
ABSOLUTE IMMATURE GRANULOCYTE: 0 K/UL (ref 0–0.3)
ABSOLUTE LYMPH #: 2.31 K/UL (ref 1–4.8)
ABSOLUTE MONO #: 0.87 K/UL (ref 0.1–0.8)
AMMONIA: 35 UMOL/L (ref 16–60)
ANION GAP SERPL CALCULATED.3IONS-SCNC: 11 MMOL/L (ref 9–17)
BASOPHILS # BLD: 0 % (ref 0–2)
BASOPHILS ABSOLUTE: 0 K/UL (ref 0–0.2)
BUN BLDV-MCNC: 9 MG/DL (ref 8–23)
BUN/CREAT BLD: ABNORMAL (ref 9–20)
CALCIUM SERPL-MCNC: 8.3 MG/DL (ref 8.6–10.4)
CHLORIDE BLD-SCNC: 111 MMOL/L (ref 98–107)
CO2: 20 MMOL/L (ref 20–31)
CREAT SERPL-MCNC: 0.95 MG/DL (ref 0.7–1.2)
DIFFERENTIAL TYPE: ABNORMAL
EOSINOPHILS RELATIVE PERCENT: 0 % (ref 1–4)
GFR AFRICAN AMERICAN: >60 ML/MIN
GFR NON-AFRICAN AMERICAN: >60 ML/MIN
GFR SERPL CREATININE-BSD FRML MDRD: ABNORMAL ML/MIN/{1.73_M2}
GFR SERPL CREATININE-BSD FRML MDRD: ABNORMAL ML/MIN/{1.73_M2}
GLUCOSE BLD-MCNC: 92 MG/DL (ref 70–99)
HCT VFR BLD CALC: 34.7 % (ref 40.7–50.3)
HEMOGLOBIN: 10.5 G/DL (ref 13–17)
IMMATURE GRANULOCYTES: 0 %
LACTIC ACID, WHOLE BLOOD: 1.8 MMOL/L (ref 0.7–2.1)
LACTIC ACID, WHOLE BLOOD: 2.3 MMOL/L (ref 0.7–2.1)
LYMPHOCYTES # BLD: 8 % (ref 24–44)
MCH RBC QN AUTO: 27.9 PG (ref 25.2–33.5)
MCHC RBC AUTO-ENTMCNC: 30.3 G/DL (ref 28.4–34.8)
MCV RBC AUTO: 92 FL (ref 82.6–102.9)
MONOCYTES # BLD: 3 % (ref 1–7)
MORPHOLOGY: ABNORMAL
MRSA, DNA, NASAL: NORMAL
NRBC AUTOMATED: 0 PER 100 WBC
PDW BLD-RTO: 14.8 % (ref 11.8–14.4)
PLATELET # BLD: 197 K/UL (ref 138–453)
PLATELET ESTIMATE: ABNORMAL
PMV BLD AUTO: 9.6 FL (ref 8.1–13.5)
POTASSIUM SERPL-SCNC: 5 MMOL/L (ref 3.7–5.3)
RBC # BLD: 3.77 M/UL (ref 4.21–5.77)
RBC # BLD: ABNORMAL 10*6/UL
SEG NEUTROPHILS: 89 % (ref 36–66)
SEGMENTED NEUTROPHILS ABSOLUTE COUNT: 25.72 K/UL (ref 1.8–7.7)
SODIUM BLD-SCNC: 142 MMOL/L (ref 135–144)
SPECIMEN DESCRIPTION: NORMAL
WBC # BLD: 28.9 K/UL (ref 3.5–11.3)
WBC # BLD: ABNORMAL 10*3/UL

## 2018-01-18 PROCEDURE — 87641 MR-STAPH DNA AMP PROBE: CPT

## 2018-01-18 PROCEDURE — 6370000000 HC RX 637 (ALT 250 FOR IP): Performed by: NURSE PRACTITIONER

## 2018-01-18 PROCEDURE — 94762 N-INVAS EAR/PLS OXIMTRY CONT: CPT

## 2018-01-18 PROCEDURE — 99291 CRITICAL CARE FIRST HOUR: CPT | Performed by: INTERNAL MEDICINE

## 2018-01-18 PROCEDURE — 76770 US EXAM ABDO BACK WALL COMP: CPT

## 2018-01-18 PROCEDURE — 83605 ASSAY OF LACTIC ACID: CPT

## 2018-01-18 PROCEDURE — 85025 COMPLETE CBC W/AUTO DIFF WBC: CPT

## 2018-01-18 PROCEDURE — 6370000000 HC RX 637 (ALT 250 FOR IP): Performed by: FAMILY MEDICINE

## 2018-01-18 PROCEDURE — 1200000000 HC SEMI PRIVATE

## 2018-01-18 PROCEDURE — 99221 1ST HOSP IP/OBS SF/LOW 40: CPT | Performed by: INTERNAL MEDICINE

## 2018-01-18 PROCEDURE — 97530 THERAPEUTIC ACTIVITIES: CPT

## 2018-01-18 PROCEDURE — 6360000002 HC RX W HCPCS: Performed by: STUDENT IN AN ORGANIZED HEALTH CARE EDUCATION/TRAINING PROGRAM

## 2018-01-18 PROCEDURE — 97535 SELF CARE MNGMENT TRAINING: CPT

## 2018-01-18 PROCEDURE — 99233 SBSQ HOSP IP/OBS HIGH 50: CPT | Performed by: PSYCHIATRY & NEUROLOGY

## 2018-01-18 PROCEDURE — 6360000002 HC RX W HCPCS: Performed by: HOSPITALIST

## 2018-01-18 PROCEDURE — 2500000003 HC RX 250 WO HCPCS: Performed by: HOSPITALIST

## 2018-01-18 PROCEDURE — 2580000003 HC RX 258: Performed by: HOSPITALIST

## 2018-01-18 PROCEDURE — 6360000002 HC RX W HCPCS: Performed by: NURSE PRACTITIONER

## 2018-01-18 PROCEDURE — 2580000003 HC RX 258: Performed by: NURSE PRACTITIONER

## 2018-01-18 PROCEDURE — 97166 OT EVAL MOD COMPLEX 45 MIN: CPT

## 2018-01-18 PROCEDURE — G8987 SELF CARE CURRENT STATUS: HCPCS

## 2018-01-18 PROCEDURE — 36415 COLL VENOUS BLD VENIPUNCTURE: CPT

## 2018-01-18 PROCEDURE — 2580000003 HC RX 258: Performed by: STUDENT IN AN ORGANIZED HEALTH CARE EDUCATION/TRAINING PROGRAM

## 2018-01-18 PROCEDURE — G8988 SELF CARE GOAL STATUS: HCPCS

## 2018-01-18 PROCEDURE — 2580000003 HC RX 258: Performed by: INTERNAL MEDICINE

## 2018-01-18 PROCEDURE — 80048 BASIC METABOLIC PNL TOTAL CA: CPT

## 2018-01-18 RX ADMIN — DONEPEZIL HYDROCHLORIDE 10 MG: 10 TABLET, FILM COATED ORAL at 21:30

## 2018-01-18 RX ADMIN — AMPICILLIN SODIUM AND SULBACTAM SODIUM 1.5 G: 1; .5 INJECTION, POWDER, FOR SOLUTION INTRAMUSCULAR; INTRAVENOUS at 02:45

## 2018-01-18 RX ADMIN — PANTOPRAZOLE SODIUM 40 MG: 40 TABLET, DELAYED RELEASE ORAL at 08:49

## 2018-01-18 RX ADMIN — FINASTERIDE 5 MG: 5 TABLET, FILM COATED ORAL at 08:49

## 2018-01-18 RX ADMIN — MEMANTINE HYDROCHLORIDE 5 MG: 5 TABLET, FILM COATED ORAL at 08:49

## 2018-01-18 RX ADMIN — VITAMIN E CAP 400 UNIT 400 UNITS: 400 CAP at 08:49

## 2018-01-18 RX ADMIN — CEFTRIAXONE SODIUM 1 G: 2 INJECTION, POWDER, FOR SOLUTION INTRAMUSCULAR; INTRAVENOUS at 09:49

## 2018-01-18 RX ADMIN — CEFTRIAXONE SODIUM 1 G: 1 INJECTION, POWDER, FOR SOLUTION INTRAMUSCULAR; INTRAVENOUS at 12:35

## 2018-01-18 RX ADMIN — EFAVIRENZ, EMTRICITABINE, AND TENOFOVIR DISOPROXIL FUMARATE 1 TABLET: 600; 200; 300 TABLET, FILM COATED ORAL at 21:30

## 2018-01-18 RX ADMIN — Medication 10 ML: at 21:30

## 2018-01-18 RX ADMIN — SODIUM CHLORIDE: 9 INJECTION, SOLUTION INTRAVENOUS at 14:47

## 2018-01-18 RX ADMIN — MEMANTINE HYDROCHLORIDE 5 MG: 5 TABLET, FILM COATED ORAL at 21:30

## 2018-01-18 ASSESSMENT — PAIN SCALES - PAIN ASSESSMENT IN ADVANCED DEMENTIA (PAINAD)
TOTALSCORE: 0
BREATHING: 0
TOTALSCORE: 1
BODYLANGUAGE: 1
NEGVOCALIZATION: 0
BODYLANGUAGE: 0
TOTALSCORE: 1
FACIALEXPRESSION: 0
BODYLANGUAGE: 0
NEGVOCALIZATION: 0
BREATHING: 0
FACIALEXPRESSION: 0
TOTALSCORE: 0
FACIALEXPRESSION: 0
FACIALEXPRESSION: 0
BREATHING: 0
FACIALEXPRESSION: 0
NEGVOCALIZATION: 0
NEGVOCALIZATION: 0
CONSOLABILITY: 0
BREATHING: 0
CONSOLABILITY: 0
FACIALEXPRESSION: 0
BODYLANGUAGE: 0
BODYLANGUAGE: 1
TOTALSCORE: 0
BODYLANGUAGE: 0
TOTALSCORE: 0
BODYLANGUAGE: 0
NEGVOCALIZATION: 0
CONSOLABILITY: 0
BREATHING: 0
CONSOLABILITY: 0
CONSOLABILITY: 0
NEGVOCALIZATION: 0
TOTALSCORE: 0
CONSOLABILITY: 0
CONSOLABILITY: 0
FACIALEXPRESSION: 0
NEGVOCALIZATION: 0
BREATHING: 0
BREATHING: 0

## 2018-01-18 ASSESSMENT — PAIN SCALES - GENERAL
PAINLEVEL_OUTOF10: 0
PAINLEVEL_OUTOF10: 0

## 2018-01-18 NOTE — PROGRESS NOTES
demo (I) with all bed mob and transfers  Short term goal 2: demo ADL UB/LB dressing activity seated with setup and supervision and min vc's  Short term goal 3: demo activity tolerance of 60 min  Short term goal 4: demo A&Ox3 for 3/4 sessions  Short term goal 5: demo good safety awareness during func mob around floor with least restrictive AD and CGA       Therapy Time   Individual Concurrent Group Co-treatment   Time In 1408         Time Out 1501         Minutes 53            Pt supine in bed with neuro cuffs and wrist retraints on upon arrival with wife in room upon arrival. Pt transferred to EOB and stood bedside x3 and short func mob with CGA. Pt sat for ADL bathing activity with setup and vc's. Pt confused at times however cooperated well. Pt transferred back to supine after brief change and retired with telesitter and wife in room, neuro mitts on and RN left wrist restraints off. Discharge recommendations discussed with patient during initial evaluation.     Martine Rubinstein, OTR/L

## 2018-01-18 NOTE — PROGRESS NOTES
(PROSCAR) 5 MG tablet TAKE 1 TABLET DAILY 30 tablet 0    memantine (NAMENDA) 5 MG tablet TAKE 1 TABLET TWICE A DAY 60 tablet 0    omeprazole (PRILOSEC) 20 MG delayed release capsule TAKE 1 CAPSULE DAILY 30 capsule 0    Calcium Carbonate-Vitamin D (OYSTER SHELL CALCIUM/D) 500-200 MG-UNIT TABS TAKE 2 TABLETS IN THE MORNING 60 tablet 0    vitamin E 400 UNIT capsule TAKE 1 CAPSULE TWICE A DAY 30 capsule 0    ATRIPLA 600-200-300 MG per tablet TAKE 1 TABLET BY MOUTH ONCE A DAY *BOTTLE* 30 tablet 5    Multiple Vitamin (MULTIVITAMIN) tablet TAKE 1 TABLET DAILY 60 tablet 5       Allergies: Mandeep Iverson has No Known Allergies.     Past Medical History:   Diagnosis Date    Benign prostatic hyperplasia     [facility] [address]     Dementia     GERD (gastroesophageal reflux disease) 4/28/2015    UDYECGUE(171.4)     Hemorrhoids     History of shingles     HIV (human immunodeficiency virus infection) ([facility] [address]     Human immunodeficiency virus (HIV) ([facility] [address]     Meralgia paraesthetica 2/4/2016    Mixed hyperlipidemia 5/5/2016    Osteoporosis     Syphilis     Treated       Past Surgical History:   Procedure Laterality Date    HEMORRHOID SURGERY      HERNIA REPAIR      SIGMOIDOSCOPY N/A 11/22/2017    SIGMOIDOSCOPY POLYP SNARE performed by Ceasar Street MD at [facility]             Medications:     cefTRIAXone (ROCEPHIN) IV  1 g Intravenous Once    [START ON 1/19/2018] cefTRIAXone (ROCEPHIN) IV  2 g Intravenous Q24H    sodium chloride flush  10 mL Intravenous Once    efavirenz-emtrictabine-tenofovir  1 tablet Oral Nightly    donepezil  10 mg Oral Nightly    finasteride  5 mg Oral Daily    memantine  5 mg Oral BID    pantoprazole  40 mg Oral QAM AC    sodium chloride flush  10 mL Intravenous 2 times per day     PRN Meds include: acetaminophen, acetaminophen, LORazepam, albuterol, LORazepam, sodium chloride flush, potassium chloride **OR** potassium chloride INTRACRANIAL STRUCTURES/VENTRICLES: Evern Leaven is no acute infarct. No mass   effect or midline shift. No evidence of an acute intracranial hemorrhage. The ventricles and sulci are prompt in size and configuration.  The   sellar/suprasellar regions appear unremarkable.  The normal signal voids   within the major intracranial vessels appear maintained.  No abnormal focus   of enhancement is seen within the brain.  Moderate periventricular T2   lengthening consistent with microangiopathic changes.       ORBITS: Bilateral cataract surgery.       SINUSES: Mucosal thickening and air-fluid levels in the sphenoid sinuses.       BONES/SOFT TISSUES: The bone marrow signal intensity appears normal. The soft   tissues demonstrate no acute abnormality.           Impression   Chronic involutional changes.  Sphenoid sinusitis.                                        ELECTROENCEPHALOGRAM REPORT     PATIENT NAME: Geeta Feliciano                    :        1940  MED REC NO:   [id number]                             ROOM:         ACCOUNT NO:   [de-identified]                           ADMIT DATE: 2018  PROVIDER:     Bola Durham MD     DATE OF EE/15/2018     HISTORY:  The patient is an 68-year-old female with syncope with an episode  of trembling of the upper extremities, dementia, and a history of treated  syphilis.     The following is an awake, drowsy, and light sleep 21-channel EEG without  sedation using the 10-20 [employer] system.     During the relatively more awake portion of the tracing, the background  posteriorly consists of a somewhat disorganized 8-9 per second alpha  activity, which is essentially symmetric, but not clearly reactive. The  background anteriorly consists of a moderate amount of somewhat  disorganized 6-7 per second, with some 5 per second, theta frequency  activity seen essentially symmetrically and a small-to-moderate amount of  adnexal voltage fast activity.

## 2018-01-18 NOTE — PROGRESS NOTES
[facility] - Daily Progress Note  Today's Date and Time: 1/18/2018, 12:54 PM  Admission Date: 1/13/18   Impression :   1. HIV infection.  Initial positive test on 3/16/12  2. Dementia, also 5 cm appetite without behavioral disturbance, unspecified time of dementia onset  3. Murmurs of the upper extremities bilaterally  4. Prior history of syphilis.  Status post treatment.  Last quantitative VDRL 1 on 5/10/2017  5. Mixed hyperlipidemia  6. Prior history of shingles  7. UTI with episode of sepsis     Recommendations   · Ceftriaxone for E coli UTI. · Should be able to switch to po keflex once acute sepsis is controlled. · Continue antiretroviral therapy with Atripla.  Most recent viral load is undetectable  · Follow-up with Dr. Rogelio Stokes discharge for management of HIV  · Office f/up in 4 weeks with Dr Abigail Boggs infection. Please call [phone] for appointment  · Cryptococcus , Toxoplasma IgM and IgG negative.  VDRL 1:2     Infection Control Recommendations   · [place] precautions     Antimicrobial Stewardship Recommendations   · Antiretroviral therapy with Atripla     Chief complaint/reason for consultation:   HIV with dementia and new onset tremors     History of Present Illness:   INITIAL HISTORY:     Mandeep Iverson is a 76y.o.-year-old  male who was initially admitted on 1/13/2018.  Patient seen at the request of Severa Slade.     The patient follows with Dr. Gregg Phoenix for the management of his HIV infection.  As far as I can tell the initial positive test for HIV was on 3/16/12. Lacyjane Kira is unable to provide information as to when he was started on antiretroviral therapy. Fazal Vera at the records from the outpatient office indicate that the patient has been receiving Atripla and that his viral load is undetectable with this treatment.     The patient has also suffered from syphilis and has been treated for this condition. [facility] has had multiple quantitative VDRL's done to document response.  The most recent was with a value of 1 each with indicate a good previous response to syphilis treatment.  This test was done on multiple occasions because of the concurrent diagnosis of dementia. As far as I can tell the patient has not had a CSF examination to exclude central nervous syphilis, but the low VDRL titers would indicate this is unlikely to be present.     I have personally reviewed the past medical history, past surgical history, medications, social history, and family history, and I have updated the database accordingly     CURRENT EVALUATION : 01/18/18      Afebrile  VS stable, intermittent hypotension     Plan was for possible discharge yesterday (1/17/18) but discharge was put on hold. Yesterday (1/17/18) around 6pm the patient had gross hematuria with a fever of 101.5 F - 102 F. At 830pm the rapid response team was called for hypotension. Vitals on arrival were SBP 65, MAP 53, , T 103 and patient was transferred to MICU. Urine analysis, blood cultures, lactic acid, CBC, urine cultures were all ordered and urology was consulted. Chest xray and renal ultrasound were also ordered. CBC (1/17/18)- WBC 6.2  Repeat CBC (1/17/18)- WBC 12.6  CBC (1/18/18)- WBC 28.9  Lactic acid (1/17/18)- 5.6  Lactic acid (1/18/18)- 1.8 => 2.3   Blood culture (1/17/18)- no growth at 13 hours  Urine culture (1/17/18)- in progress (positive on 1/13/18 for E.Coli)   UA with microscopic (1/17/18)- red, cloudy, large Hgb, trace protein, moderate leuk devonte, RBC too numerous to count   Urine protein (1/17/18)- pending  MRSA nasal probe (1/18/18)- negative   Ammonia (1/18/18)- 35      Chest x-ray (1/17/18)- no acute process, COPD. Renal ultrasound (1/18/18)-   Kidneys:       The right kidney measures 9.5 cm in length and the left kidney measures 9.1   cm in length.       Possible mildly increased echogenicity of the renal parenchyma bilaterally.    Otherwise no acute

## 2018-01-18 NOTE — PROGRESS NOTES
Jose Singh, Cyndee Salcedo, Veronica Perkins  Urology Progress Note     Subjective:     No acute urologic events overnight. Was transferred to MICU for an episode of hypotension. Pt has not been on maintenance fluids and has low PO intake, thought to be the cause. Responded immediately to IV fluids. Mental status unchanged. Floor nurses unable to check PVR due to patient combativeness. Current Diet: DIET CARDIAC;   Dietary Nutrition Supplements: Clear Liquid Oral Supplement     Patient Vitals for the past 24 hrs:   BP Temp Temp src Pulse Resp SpO2 Weight   01/18/18 0645 94/63 - - 82 18 - -   01/18/18 0630 97/60 - - 81 18 - -   01/18/18 0615 109/65 - - 83 20 - -   01/18/18 0600 95/71 - - 84 19 100 % -   01/18/18 0545 104/62 - - 86 20 100 % -   01/18/18 0530 103/63 - - 93 19 100 % -   01/18/18 0515 (!) 122/95 - - 97 21 100 % -   01/18/18 0500 100/72 - - 85 18 100 % -   01/18/18 0445 89/62 - - 86 19 100 % -   01/18/18 0430 93/65 - - 87 18 100 % -   01/18/18 0415 94/65 - - 95 21 100 % -   01/18/18 0400 98/67 98.2 °F (36.8 °C) Oral 86 19 100 % -   01/18/18 0345 (!) 96/56 - - 86 18 100 % -   01/18/18 0330 (!) 98/59 - - 86 18 100 % -   01/18/18 0315 86/66 - - 85 18 - -   01/18/18 0300 96/62 - - 91 19 100 % -   01/18/18 0245 92/62 - - 91 19 100 % -   01/18/18 0230 96/64 - - 95 20 100 % -   01/18/18 0215 (!) 85/55 - - 90 18 100 % -   01/18/18 0200 87/62 - - 95 18 100 % -   01/18/18 0145 (!) 80/56 - - 96 21 100 % 113 lb 5.1 oz (51.4 kg)   01/18/18 0130 (!) 87/57 - - 96 20 100 % -   01/18/18 0115 90/60 - - 100 22 100 % -   01/18/18 0100 (!) 89/57 - - 98 22 100 % -   01/18/18 0045 88/61 - - 101 18 100 % -   01/18/18 0030 (!) 85/57 - - 100 22 100 % -   01/18/18 0015 90/60 - - 105 19 99 % -   01/18/18 0000 97/63 - - 106 21 99 % -   01/17/18 2345 94/63 - - 104 20 100 % -   01/17/18 2330 87/60 - - 104 22 100 % -   01/17/18 2315 104/64 - - 112 20 100 % -   01/17/18 2300 (!) 97/57 - - 105 21 100 % -   01/17/18 2245 (!) 90/58 - CL  103  98   CO2  25  23   BUN  3*  9   CREATININE  0.75  1.31*       Recent Labs      01/17/18   2214   COLORU  RED*   PHUR  8.0   WBCUA  20 TO 48   RBCUA  TOO NUMEROUS TO COUNT   MUCUS  NOT REPORTED   TRICHOMONAS  NOT REPORTED   YEAST  NOT REPORTED   BACTERIA  NOT REPORTED   SPECGRAV  1.006   LEUKOCYTESUR  MODERATE*   UROBILINOGEN  Normal   BILIRUBINUR  NEGATIVE       Additional Lab/culture results: UCx P    Physical Exam:     NAD  AOx3  RRR  Respirations nonlabored, symmetric chest rise bilaterally  Abdomen Soft, NT, ND  Incision- clean/dry/intact; no drainage or erythema  No calf ttp bilaterally  EPC cuffs on and functioning billaterally    Interval Imaging Findings:   I independently reviewed and verified the images and reports from  [facility] 1/17/18: no hydronephrosis bilat. Possible evidence of medical renal disease. No evidence of nephrolithiasis. Bladder volume at time of  with small amt debris in bladder. Impression:  73-yo M with dementia, HIV, gross hematuria    Plan:   Renal US does not show any cause for hematuria and UCx was negative. For completion of hematuria workup, will need cystoscopy and retrograde pyelograms as outpatient. Ideally we would check a PVR, but patient is combative. There is no evidence of UTI, hydronephrosis or renal insufficiency that can be attributed to retention and his bladder volume was within normal limits on US. If he did have elevated PVR, there is no absolute indication for treatment at this time and he likely would not tolerate holden or intermittent cath. Continue home meds.    Please call urology with Nikos Catalan  PGY-5, Urology    7:38 AM 1/18/2018

## 2018-01-18 NOTE — PROGRESS NOTES
[facility] [address]  Occupational Therapy Not Seen Note    Patient not available for Occupational Therapy due to:    [] Testing:    [] Hemodialysis    [] Blood Transfusion in Progress    []Refusal by Patient:    [] Surgery/Procedure:    [] Strict Bedrest    [] Sedation    [] Spine Precautions     [] Pt being transferred to palliative care at this time. Spoke with pt/family and OT services to be defered. [] Pt independent with functional mobility and functional tasks. Pt with no OT acute care needs at this time, will defer OT eval.    [x] Other: Pt with complex medical history including HIV/dementia and currently sleeping in ICU room. WIfe wants to hold OT eval until pt transfers out of ICU. Next Scheduled Treatment: Attempt this pm as time allows or on 1/19 as appropriate. Signature:  Kika SMITH/LEDY

## 2018-01-18 NOTE — CONSULTS
Medication Sig Dispense Refill    vitamin E 400 UNIT capsule TAKE 1 CAPSULE TWICE A DAY 30 capsule 0    alendronate (FOSAMAX) 70 MG tablet TAKE 1 TABLET EVERY WEEK 4 tablet 0    donepezil (ARICEPT) 10 MG tablet TAKE 1 TABLET IN THE EVENING 30 tablet 0    finasteride (PROSCAR) 5 MG tablet TAKE 1 TABLET DAILY 30 tablet 0    memantine (NAMENDA) 5 MG tablet TAKE 1 TABLET TWICE A DAY 60 tablet 0    omeprazole (PRILOSEC) 20 MG delayed release capsule TAKE 1 CAPSULE DAILY 30 capsule 0    Calcium Carbonate-Vitamin D (OYSTER SHELL CALCIUM/D) 500-200 MG-UNIT TABS TAKE 2 TABLETS IN THE MORNING 60 tablet 0    vitamin E 400 UNIT capsule TAKE 1 CAPSULE TWICE A DAY 30 capsule 0    ATRIPLA 600-200-300 MG per tablet TAKE 1 TABLET BY MOUTH ONCE A DAY *BOTTLE* 30 tablet 5    Multiple Vitamin (MULTIVITAMIN) tablet TAKE 1 TABLET DAILY 60 tablet 5       Data         /66   Pulse 81   Temp 97.2 °F (36.2 °C) (Axillary)   Resp 22   Ht 5' 6\" (1.676 m)   Wt 113 lb 5.1 oz (51.4 kg)   SpO2 100%   BMI 18.29 kg/m²     Wt Readings from Last 3 Encounters:   01/18/18 113 lb 5.1 oz (51.4 kg)   12/11/17 119 lb (54 kg)   11/22/17 119 lb (54 kg)        Code Status: Full Code     ADVANCED CARE PLANNING:  Patient has capacity for medical decisions: no  Health Care Power of : yes per wife; copy requested  Living Will: not asked     Personal, Social, and Family History  Marital Status: 55 years to [address] situation:with family:  spouse  Importance of loraine/Yazidism/spiritual beliefs: ? Very ? Somewhat ? Not   Psychological Distress: mild  Does patient understand diagnosis/treatment? not asked  Does caregiver understand diagnosis/treatment? yes      Assessment            Palliative Performance Scale:  _X_60%  Ambulation reduced; Significant disease; Can't do hobbies/housework; intake normal or reduced; occasional assist; LOC full/confusion  ___50%  Mainly sit/lie;  Extensive disease; Can't do any work; Considerable

## 2018-01-18 NOTE — H&P
Critical Care - History and Physical Examination    Patient's name:  Alexx Ramirez Record Number: [id number]  Patient's account/billing number: [de-identified]  Patient's YOB: 1940  Age: 76 y.o. Date of Admission: 1/13/2018  6:29 PM  Date of History and Physical Examination: 1/17/2018      Primary Care Physician: Yany Zhang MD  Attending Physician:    Code Status: Full Code    Chief complaint: Tremor    HISTORY OF PRESENT ILLNESS:   History was obtained from chart review. Valente Kumar is a 76 y.o. with tremor and unresponsiveness witnessed by his wife admitted for poosible seizure activity. Per wife had 3 episodes. Denies postictal state, confusion, incontinence or altered mental status  Known case of HIV on Atripla, viral load undetectable (3/16/12), Hx of syphilis that is treated and dementia on Aricept and Namenda. Neurology following the patient for new onset seizure disorder. Initially initiated on Keppra and discontinued today as there was no evidence of seizure in EEG and imaging. EEG s/o diffuse cerebral dysfunction. CT head was negative for any acute abnormality. Showed generalized atrophy with bilateral chronic periventricular small vessel ischemia. MRI brain shows chronic involutional changes. Sphenoid sinusitis. ID following for HIV infection patient was continued on antiretroviral therapy. Work up for other opportunistic infections have been negative - Toxoplasma Ig M, Ig G, VDRL 1:2.    Urology is consulted for Hematuria. Following up with US kidneys. And other work up as OP. A RR was called in evening for hypotension, patient had fever T max 103, blood cultures urine cultures were obtained. patient was given a L of bolus and was transferred to ICU for hypotension. Urine cultures positive for E. Coli. Started on Unasyn and Vancomycin. On my evaluation patient is awake minimally responsive. Is hypotensive responding to fluids.  CXR appears normal. Vitally stable 90/60 MAP 65, Saturating well on room air. Lab show Se creatinine of 1.31 ( base line around 0.8 , BUN 9, lactic acid 5.6   2D ECHO with no comment of EF. Has leukocytosis with left shift. Past Medical History:        Diagnosis Date    Benign prostatic hyperplasia     [facility]     Dementia     GERD (gastroesophageal reflux disease) 4/28/2015    [id number](604.1)     Hemorrhoids     History of shingles     HIV [facility] immunodeficiency virus infection) [facility]     [facility]     Meralgia paraesthetica 2/4/2016    Mixed hyperlipidemia 5/5/2016    Osteoporosis     Syphilis     Treated       Past Surgical History:        Procedure Laterality Date    HEMORRHOID SURGERY      HERNIA REPAIR      SIGMOIDOSCOPY N/A 11/22/2017    SIGMOIDOSCOPY POLYP SNARE performed [facility] Adalgisa Carroll MD [facility]         Allergies:    No Known Allergies      Home Meds:   Prior to Admission medications    Medication Sig Start Date End Date Taking?  Authorizing Provider   vitamin E 400 UNIT capsule TAKE 1 CAPSULE TWICE A DAY 1/10/18  Yes Alessandra Peralta MD   alendronate (FOSAMAX) 70 MG tablet TAKE 1 TABLET EVERY WEEK 1/10/18  Yes Alessandra Peralta MD   donepezil (ARICEPT) 10 MG tablet TAKE 1 TABLET IN THE EVENING 1/10/18  Yes Alessandra Peralta MD   finasteride (PROSCAR) 5 MG tablet TAKE 1 TABLET DAILY 1/10/18  Yes Aelssandra Peralta MD   memantine (NAMENDA) 5 MG tablet TAKE 1 TABLET TWICE A DAY 1/10/18  Yes Alessandra Peralta MD   omeprazole (PRILOSEC) 20 MG delayed release capsule TAKE 1 CAPSULE DAILY 1/10/18  Yes Alessandra Peralta MD   Calcium Carbonate-Vitamin D (OYSTER SHELL CALCIUM/D) 500-200 MG-UNIT TABS TAKE 2 TABLETS IN THE MORNING 1/10/18  Yes Alessandra Peralta MD   vitamin E 400 UNIT capsule TAKE 1 CAPSULE TWICE A DAY 12/12/17  Yes Alessandra Peralta MD   ATRIPLA 600-200-300 MG per tablet TAKE 1 TABLET BY MOUTH ONCE A DAY *BOTTLE* 12/27/17 chronic microvascular ischemic change. ORBITS: The visualized portion of the orbits demonstrate no acute abnormality. SINUSES: Moderate mucosal thickening with frothy secretions within bilateral sphenoid sinuses. The remainder of the paranasal sinuses are clear. The mastoid air cells are clear. SOFT TISSUES/SKULL: No acute abnormality of the visualized skull or soft tissues. No acute intracranial abnormality. Sphenoid sinusitis. Correlate with symptoms. Chronic small vessel ischemic white matter disease and cerebral volume loss. Mri Brain W Wo Contrast    Result Date: 1/16/2018  EXAMINATION: MRI OF THE BRAIN WITHOUT AND WITH CONTRAST  1/16/2018 4:20 pm TECHNIQUE: Multiplanar multisequence MRI of the head/brain was performed without and with the administration of intravenous contrast. COMPARISON: CT head January 13, 2018 and MR brain October 27, 2007 HISTORY: ORDERING SYSTEM PROVIDED HISTORY: seizures FINDINGS: INTRACRANIAL STRUCTURES/VENTRICLES:  There is no acute infarct. No mass effect or midline shift. No evidence of an acute intracranial hemorrhage. The ventricles and sulci are prompt in size and configuration. The sellar/suprasellar regions appear unremarkable. The normal signal voids within the major intracranial vessels appear maintained. No abnormal focus of enhancement is seen within the brain. Moderate periventricular T2 lengthening consistent with microangiopathic changes. ORBITS: Bilateral cataract surgery. SINUSES: Mucosal thickening and air-fluid levels in the sphenoid sinuses. BONES/SOFT TISSUES: The bone marrow signal intensity appears normal. The soft tissues demonstrate no acute abnormality. Chronic involutional changes. Sphenoid sinusitis.       Assessment and Plan       Patient Active Problem List   Diagnosis    [facility] immunodeficiency virus infection) [facility]    Dementia without behavioral disturbance    Osteoporosis right hip fracture dec 08    Hemorrhoids    BPH

## 2018-01-19 LAB
ABSOLUTE EOS #: 0.06 K/UL (ref 0–0.44)
ABSOLUTE IMMATURE GRANULOCYTE: 0.1 K/UL (ref 0–0.3)
ABSOLUTE LYMPH #: 1.42 K/UL (ref 1.1–3.7)
ABSOLUTE MONO #: 1.19 K/UL (ref 0.1–1.2)
BASOPHILS # BLD: 0 % (ref 0–2)
BASOPHILS ABSOLUTE: 0.03 K/UL (ref 0–0.2)
CULTURE: ABNORMAL
CULTURE: ABNORMAL
DIFFERENTIAL TYPE: ABNORMAL
EOSINOPHILS RELATIVE PERCENT: 0 % (ref 1–4)
HCT VFR BLD CALC: 32.6 % (ref 40.7–50.3)
HEMOGLOBIN: 10 G/DL (ref 13–17)
IMMATURE GRANULOCYTES: 1 %
LYMPHOCYTES # BLD: 10 % (ref 24–43)
Lab: ABNORMAL
MCH RBC QN AUTO: 28.1 PG (ref 25.2–33.5)
MCHC RBC AUTO-ENTMCNC: 30.7 G/DL (ref 28.4–34.8)
MCV RBC AUTO: 91.6 FL (ref 82.6–102.9)
MONOCYTES # BLD: 8 % (ref 3–12)
NRBC AUTOMATED: 0 PER 100 WBC
ORGANISM: ABNORMAL
PDW BLD-RTO: 14.9 % (ref 11.8–14.4)
PLATELET # BLD: 223 K/UL (ref 138–453)
PLATELET ESTIMATE: ABNORMAL
PMV BLD AUTO: 10.6 FL (ref 8.1–13.5)
RBC # BLD: 3.56 M/UL (ref 4.21–5.77)
RBC # BLD: ABNORMAL 10*6/UL
SEG NEUTROPHILS: 81 % (ref 36–65)
SEGMENTED NEUTROPHILS ABSOLUTE COUNT: 11.77 K/UL (ref 1.5–8.1)
SPECIMEN DESCRIPTION: ABNORMAL
STATUS: ABNORMAL
WBC # BLD: 14.6 K/UL (ref 3.5–11.3)
WBC # BLD: ABNORMAL 10*3/UL

## 2018-01-19 PROCEDURE — 99232 SBSQ HOSP IP/OBS MODERATE 35: CPT | Performed by: INTERNAL MEDICINE

## 2018-01-19 PROCEDURE — 1200000000 HC SEMI PRIVATE

## 2018-01-19 PROCEDURE — 94762 N-INVAS EAR/PLS OXIMTRY CONT: CPT

## 2018-01-19 PROCEDURE — 6370000000 HC RX 637 (ALT 250 FOR IP): Performed by: STUDENT IN AN ORGANIZED HEALTH CARE EDUCATION/TRAINING PROGRAM

## 2018-01-19 PROCEDURE — 2500000003 HC RX 250 WO HCPCS: Performed by: HOSPITALIST

## 2018-01-19 PROCEDURE — 6370000000 HC RX 637 (ALT 250 FOR IP): Performed by: FAMILY MEDICINE

## 2018-01-19 PROCEDURE — 6370000000 HC RX 637 (ALT 250 FOR IP): Performed by: NURSE PRACTITIONER

## 2018-01-19 PROCEDURE — 6370000000 HC RX 637 (ALT 250 FOR IP): Performed by: INTERNAL MEDICINE

## 2018-01-19 PROCEDURE — 97116 GAIT TRAINING THERAPY: CPT

## 2018-01-19 PROCEDURE — 85025 COMPLETE CBC W/AUTO DIFF WBC: CPT

## 2018-01-19 PROCEDURE — 99232 SBSQ HOSP IP/OBS MODERATE 35: CPT | Performed by: NURSE PRACTITIONER

## 2018-01-19 PROCEDURE — 6360000002 HC RX W HCPCS: Performed by: HOSPITALIST

## 2018-01-19 PROCEDURE — 2580000003 HC RX 258: Performed by: INTERNAL MEDICINE

## 2018-01-19 PROCEDURE — 99221 1ST HOSP IP/OBS SF/LOW 40: CPT | Performed by: INTERNAL MEDICINE

## 2018-01-19 PROCEDURE — 36415 COLL VENOUS BLD VENIPUNCTURE: CPT

## 2018-01-19 PROCEDURE — 6360000002 HC RX W HCPCS: Performed by: PSYCHIATRY & NEUROLOGY

## 2018-01-19 PROCEDURE — 97530 THERAPEUTIC ACTIVITIES: CPT

## 2018-01-19 RX ORDER — CEPHALEXIN 500 MG/1
500 CAPSULE ORAL EVERY 6 HOURS SCHEDULED
Status: DISCONTINUED | OUTPATIENT
Start: 2018-01-19 | End: 2018-01-20 | Stop reason: HOSPADM

## 2018-01-19 RX ADMIN — CEFTRIAXONE SODIUM 2 G: 2 INJECTION, POWDER, FOR SOLUTION INTRAMUSCULAR; INTRAVENOUS at 08:43

## 2018-01-19 RX ADMIN — CEPHALEXIN 500 MG: 500 CAPSULE ORAL at 20:00

## 2018-01-19 RX ADMIN — SODIUM CHLORIDE: 9 INJECTION, SOLUTION INTRAVENOUS at 01:53

## 2018-01-19 RX ADMIN — LORAZEPAM 1 MG: 2 INJECTION INTRAMUSCULAR at 07:59

## 2018-01-19 RX ADMIN — LORAZEPAM 0.5 MG: 0.5 TABLET ORAL at 22:05

## 2018-01-19 RX ADMIN — CEPHALEXIN 500 MG: 500 CAPSULE ORAL at 15:40

## 2018-01-19 RX ADMIN — FINASTERIDE 5 MG: 5 TABLET, FILM COATED ORAL at 08:42

## 2018-01-19 RX ADMIN — EFAVIRENZ, EMTRICITABINE, AND TENOFOVIR DISOPROXIL FUMARATE 1 TABLET: 600; 200; 300 TABLET, FILM COATED ORAL at 22:04

## 2018-01-19 RX ADMIN — DONEPEZIL HYDROCHLORIDE 10 MG: 10 TABLET, FILM COATED ORAL at 22:05

## 2018-01-19 RX ADMIN — MEMANTINE HYDROCHLORIDE 5 MG: 5 TABLET, FILM COATED ORAL at 08:42

## 2018-01-19 RX ADMIN — MEMANTINE HYDROCHLORIDE 5 MG: 5 TABLET, FILM COATED ORAL at 22:05

## 2018-01-19 RX ADMIN — SODIUM CHLORIDE: 9 INJECTION, SOLUTION INTRAVENOUS at 06:00

## 2018-01-19 RX ADMIN — PANTOPRAZOLE SODIUM 40 MG: 40 TABLET, DELAYED RELEASE ORAL at 08:01

## 2018-01-19 RX ADMIN — LORAZEPAM 0.5 MG: 0.5 TABLET ORAL at 15:55

## 2018-01-19 ASSESSMENT — PAIN SCALES - PAIN ASSESSMENT IN ADVANCED DEMENTIA (PAINAD)
FACIALEXPRESSION: 0
BREATHING: 0
CONSOLABILITY: 0
CONSOLABILITY: 0
BREATHING: 0
TOTALSCORE: 0
TOTALSCORE: 0
FACIALEXPRESSION: 0
BODYLANGUAGE: 0
CONSOLABILITY: 0
NEGVOCALIZATION: 0
BODYLANGUAGE: 0
NEGVOCALIZATION: 0
CONSOLABILITY: 0
BODYLANGUAGE: 0
TOTALSCORE: 0
TOTALSCORE: 0
BODYLANGUAGE: 0
FACIALEXPRESSION: 0
NEGVOCALIZATION: 0
FACIALEXPRESSION: 0
CONSOLABILITY: 0
TOTALSCORE: 0
TOTALSCORE: 0
BREATHING: 0
NEGVOCALIZATION: 0
FACIALEXPRESSION: 0
FACIALEXPRESSION: 0
BODYLANGUAGE: 0
TOTALSCORE: 0
FACIALEXPRESSION: 0
FACIALEXPRESSION: 0
BREATHING: 0
CONSOLABILITY: 0
CONSOLABILITY: 0
NEGVOCALIZATION: 0
TOTALSCORE: 0
NEGVOCALIZATION: 0
TOTALSCORE: 0
TOTALSCORE: 0
CONSOLABILITY: 0
NEGVOCALIZATION: 0
BREATHING: 0
BODYLANGUAGE: 0
BODYLANGUAGE: 0
TOTALSCORE: 0
BODYLANGUAGE: 0
NEGVOCALIZATION: 0
FACIALEXPRESSION: 0
FACIALEXPRESSION: 0
BREATHING: 0
NEGVOCALIZATION: 0
NEGVOCALIZATION: 0
FACIALEXPRESSION: 0
BODYLANGUAGE: 0
CONSOLABILITY: 0
CONSOLABILITY: 0
TOTALSCORE: 0
NEGVOCALIZATION: 0
FACIALEXPRESSION: 0
NEGVOCALIZATION: 0
BODYLANGUAGE: 0
BODYLANGUAGE: 0
CONSOLABILITY: 0
NEGVOCALIZATION: 0
NEGVOCALIZATION: 0
FACIALEXPRESSION: 0
BREATHING: 0
NEGVOCALIZATION: 0
TOTALSCORE: 0
BREATHING: 0
CONSOLABILITY: 0
BREATHING: 0
FACIALEXPRESSION: 0
CONSOLABILITY: 0
FACIALEXPRESSION: 0
TOTALSCORE: 0
CONSOLABILITY: 0
TOTALSCORE: 0
BODYLANGUAGE: 0
TOTALSCORE: 0
BREATHING: 0
NEGVOCALIZATION: 0
BREATHING: 0
CONSOLABILITY: 0
BREATHING: 0
BODYLANGUAGE: 0
CONSOLABILITY: 0
FACIALEXPRESSION: 0
CONSOLABILITY: 0
BREATHING: 0
BREATHING: 0
TOTALSCORE: 0
CONSOLABILITY: 0
CONSOLABILITY: 0
BODYLANGUAGE: 0
NEGVOCALIZATION: 0
NEGVOCALIZATION: 0
FACIALEXPRESSION: 0
CONSOLABILITY: 0
BODYLANGUAGE: 0
CONSOLABILITY: 0
TOTALSCORE: 0
BODYLANGUAGE: 0
BREATHING: 0
BREATHING: 0
NEGVOCALIZATION: 0
BREATHING: 0
CONSOLABILITY: 0
BREATHING: 0
BREATHING: 0
FACIALEXPRESSION: 0
TOTALSCORE: 0
BODYLANGUAGE: 0
FACIALEXPRESSION: 0

## 2018-01-19 ASSESSMENT — PAIN SCALES - GENERAL
PAINLEVEL_OUTOF10: 0
PAINLEVEL_OUTOF10: 0

## 2018-01-19 NOTE — PROGRESS NOTES
Exercise Program, Safety Education & Training  Safety Devices  Type of devices: Left in bed, Nurse notified, Call light within reach, Telesitter in use  Restraints  Initially in place: No     Therapy Time   Individual Concurrent Group Co-treatment   Time In 1520         Time Out 1550         Minutes 4 [place], [place]

## 2018-01-19 NOTE — PROGRESS NOTES
Nutrition Assessment    Type and Reason for Visit: Reassess    Nutrition Recommendations: Continue current diet, Continue current ONS. Will continue to monitor tolerance to diet and adequacy of intake. Malnutrition Assessment:  · Malnutrition Status: Meets the criteria for severe malnutrition  · Context: Chronic illness  · Findings of the 6 clinical characteristics of malnutrition (Minimum of 2 out of 6 clinical characteristics is required to make the diagnosis of moderate or severe Protein Calorie Malnutrition based on AND/ASPEN Guidelines):  1. Energy Intake-Less than or equal to 75%, greater than or equal to 1 month   2. Weight Loss-10% loss or greater, in 1 month  3. Fat Loss-Unable to assess (pt sleeping ),    4. Muscle Loss-Moderate muscle mass loss, [place] (temporalis muscle)  5. Fluid Accumulation-No significant fluid accumulation (none noted per RN assessment),    6.  Strength-Not measured    Nutrition Diagnosis:   · Problem: Unintended weight loss  · Etiology: related to Catabolic illness, variable oral intake    Signs and symptoms:  as evidenced by Weight loss greater than or equal to 5% in 1 month    Nutrition Assessment:  · Subjective Assessment: Pt sleeping. Wife reports pt is eating better today than he has over past couple of days. States pt ate 3/4-all of breakfast and lunch today (only took approx 1-25% of meals past 2 days). Likes Ensure Clear supplements that he has been receiving. States pt also likes shake supplements (strawberry).   · Current Nutrition Therapies:  · Oral Diet Orders: Cardiac   · Oral Diet intake: variable: 1-100% of meals per wife    · Oral Nutrition Supplement (ONS) Orders: Clear Liquid Oral Supplement  · ONS intake:  %  · Anthropometric Measures:  · Ht: 5' 6\" (167.6 cm)   · Current Body Wt: 113 lb 5.1 oz (51.4 kg)  · Admission Body Wt: 101 lb 13.6 oz (46.2 kg)  · Usual Body Wt: 116 lb approx one month ago per wife  · % Weight Change: 12%,  one month

## 2018-01-19 NOTE — PROGRESS NOTES
[facility] - Daily Progress Note  Today's Date and Time: 1/19/2018, 1:38 PM  Admission date: 1/13/18  Impression :   1. HIV infection.  Initial positive test on 3/16/12  2. Dementia, also 5 cm appetite without behavioral disturbance, unspecified time of dementia onset  3. Tremors of the upper extremities bilaterally  4. Prior history of syphilis.  Status post treatment.  Last quantitative VDRL 1 on 5/10/2017  5. Mixed hyperlipidemia  6. Prior history of shingles  7. UTI with episode of sepsis     Recommendations   · D/c Ceftriaxone for presumptive E coli UTI. · Switch to po keflex 500 mg po qid starting 1-10-18 . Stop date 1-27-18  · Continue antiretroviral therapy with Atripla.  Most recent viral load is undetectable  · Follow-up with Dr. De La Torre Pock discharge for management of HIV  · Office f/up in 4 weeks with Dr Aaron Cohen infection. Please call [phone] for appointment     Infection Control Recommendations   · [place] precautions     Antimicrobial Stewardship Recommendations   · Antiretroviral therapy with Atripla     Chief complaint/reason for consultation:   HIV with dementia and new onset tremors     History of Present Illness:   INITIAL HISTORY:     Mandeep Iverson is a 76y.o.-year-old  male who was initially admitted on 1/13/2018.  Patient seen at the request of Jacy Hernandez.     The patient follows with Dr. Arelis Munson for the management of his HIV infection.  As far as I can tell the initial positive test for HIV was on 3/16/12. Gilford Sanes is unable to provide information as to when he was started on antiretroviral therapy. Minerva Lr at the records from the outpatient office indicate that the patient has been receiving Atripla and that his viral load is undetectable with this treatment.     The patient has also suffered from syphilis and has been treated for this condition. Karlee Cabrera has had multiple quantitative VDRL's done to document response.  The the benefit of the doubt and treat as presumptive urinary sepsis. Discussed with patient, RN. Physical Examination :     Patient Vitals for the past 8 hrs:   BP Temp Temp src Pulse Resp SpO2   18 1137 (!) 99/53 98.4 °F (36.9 °C) Oral 74 21 100 %   18 0800 136/82 97.3 °F (36.3 °C) Oral 85 16 98 %     Temp (24hrs), Av °F (36.7 °C), Min:97.3 °F (36.3 °C), Max:98.4 °F (36.9 °C)    General Appearance: sleeping in bed and appears to be comfortable   Eyes: Sclera anicteric; conjunctivae pink. No hemorhages, no embolic phenomena. ENT:Oropharynx clear, without erythema, exudate, or thrush. No tenderness of sinuses. No nasal drainage. Neck: Supple, without lymphadenopathy. No JVD. Pulmonary/Chest: Clear to auscultation, without wheezes, rales, or rhonchi. No areas of consolidation. Good air movement bilaterally. Cardiovascular:Regular rate and rhythm without murmurs, rubs, or gallops. S1 and S2 normal.  Abdomen: Soft, non tender. Bowel sounds normal. No cramps. No organomegaly  All four Extremities:No cyanosis, clubbing, edema, or effusions. Neurologic:No gross sensory or motor deficits. Skin: No rash or lesions. IV site inspected: no inflammation. Medical Decision Making:   I have independently reviewed/ordered the following labs:    CBC with Differential:   Recent Labs      18   0606  18   0843   WBC  28.9*  14.6*   HGB  10.5*  10.0*   HCT  34.7*  32.6*   PLT  197  223   LYMPHOPCT  8*  10*   MONOPCT  3  8     BMP:   Recent Labs      18   2029  18   0606   NA  137  142   K  5.1  5.0   CL  98  111*   CO2  23  20   BUN  9  9   CREATININE  1.31*  0.95     Hepatic Function Panel: No results for input(s): PROT, LABALBU, BILIDIR, IBILI, BILITOT, ALKPHOS, ALT, AST in the last 72 hours.   No results found for: VANCOTROUGH       Medications:      cefTRIAXone (ROCEPHIN) IV  2 g Intravenous Q24H    sodium chloride flush  10 mL Intravenous Once    efavirenz-emtrictabine-tenofovir  1 tablet Oral Nightly    donepezil  10 mg Oral Nightly    finasteride  5 mg Oral Daily    memantine  5 mg Oral BID    pantoprazole  40 mg Oral QAM AC    sodium chloride flush  10 mL Intravenous 2 times per day       Thank you for allowing us to participate in the care of this patient. Please call with questions.     Brendon Stevens MD  Pager: [phone]  - Office: [phone]

## 2018-01-19 NOTE — PLAN OF CARE
Problem: Pain:  Goal: Pain level will decrease  Pain level will decrease   Outcome: Ongoing    Goal: Control of acute pain  Control of acute pain   Outcome: Ongoing    Goal: Control of chronic pain  Control of chronic pain   Outcome: Ongoing      Problem: Falls - Risk of:  Goal: Will remain free from falls  Will remain free from falls   Outcome: Ongoing    Goal: Absence of physical injury  Absence of physical injury   Outcome: Ongoing      Problem: Falls - Risk of  Goal: Absence of falls  Outcome: Ongoing      Problem: Restraint Use - Nonviolent/Non-Self-Destructive Behavior:  Goal: Absence of restraint indications  Absence of restraint indications   Outcome: Ongoing    Goal: Absence of restraint-related injury  Absence of restraint-related injury   Outcome: Ongoing

## 2018-01-19 NOTE — PROGRESS NOTES
particular component, type FL in place of the number or the star (Name:FL). To obtain the last result for a particular component, type the component name without a colon, number, or star. Example: . BRIEFLAB[MCH:3,MCV:*,HCT  }    Medications:      cefTRIAXone (ROCEPHIN) IV  2 g Intravenous Q24H    sodium chloride flush  10 mL Intravenous Once    efavirenz-emtrictabine-tenofovir  1 tablet Oral Nightly    donepezil  10 mg Oral Nightly    finasteride  5 mg Oral Daily    memantine  5 mg Oral BID    pantoprazole  40 mg Oral QAM AC    sodium chloride flush  10 mL Intravenous 2 times per day       Thank you for allowing us to participate in the care of this patient. Please call with questions.     Rey Hewitt  Pager: [phone]  - Office: [phone]

## 2018-01-19 NOTE — PROGRESS NOTES
lb 5.1 oz (51.4 kg)   SpO2 100%   BMI 18.29 kg/m²   Temp (24hrs), Av °F (36.7 °C), Min:97.3 °F (36.3 °C), Max:98.4 °F (36.9 °C)    Recent Labs      18   POCGLU  99       I/O (24Hr): Intake/Output Summary (Last 24 hours) at 18 1330  Last data filed at 18   Gross per 24 hour   Intake              723 ml   Output                0 ml   Net              723 ml       Labs:    Hematology:  Recent Labs      18   0606  18   0843   WBC  12.6*  28.9*  14.6*   RBC  4.67  3.77*  3.56*   HGB  12.8*  10.5*  10.0*   HCT  41.4  34.7*  32.6*   MCV  88.7  92.0  91.6   MCH  27.4  27.9  28.1   MCHC  30.9  30.3  30.7   RDW  14.5*  14.8*  14.9*   PLT  204  197  223   MPV  9.5  9.6  10.6     Chemistry:  Recent Labs      18   0100  18   0606   NA  137   --    --   142   K  5.1   --    --   5.0   CL  98   --    --   111*   CO2  23   --    --   20   GLUCOSE  115*   --    --   92   BUN  9   --    --   9   CREATININE  1.31*   --    --   0.95   ANIONGAP  16   --    --   11   LABGLOM  53*   --    --   >60   GFRAA  >60   --    --   >60   CALCIUM  8.7   --    --   8.3*   LACTACIDWB   --   5.6*  1.8  2.3*     Recent Labs      18   0100   AMMONIA   --   35   POCGLU  99   --          Lab Results   Component Value Date/Time    SPECIAL NOT REPORTED 2018 10:14 PM     Lab Results   Component Value Date/Time    CULTURE ESCHERICHIA COLI 10 to 50,000 CFU/ML (A) 2018 10:14 PM    CULTURE  2018 10:14 PM     [facility] [address] [facility], [address] [place] [address] [phone]       Lab Results   Component Value Date    FIO2 NOT REPORTED 2018       Radiology:    CT OF THE HEAD WITHOUT CONTRAST  2018 7:06 pm  No acute intracranial abnormality. Sphenoid sinusitis.  Correlate with symptoms.     Physical Examination:        General appearance:  Drowsy, lethargic, drifts off to sleep during encounter, then becomes acutely agitated. Mental Status:  Oriented to place. Lungs:  clear to auscultation bilaterally, normal effort  Heart:  regular rate and rhythm, no murmur  Abdomen:  soft, nontender, nondistended, normal bowel sounds, no masses, hepatomegaly, splenomegaly  Extremities:  no edema, redness, tenderness in the calves  Skin:  no gross lesions, rashes, induration  Noted to have blood on bedsheets. Blood dribbled from urethral meatus onto floor during episode of agitation. Assessment:        Primary Problem  Seizure-like activity [facility])    Active Hospital Problems    Diagnosis Date Noted    Fever [R50.9] 01/18/2018    Acute cystitis with hematuria [N30.01] 01/18/2018    Sepsis due to Escherichia coli ([facility] [address] [A41.51] 01/18/2018    Dehydration [E86.0] 01/17/2018    Seizure disorder ([facility] [address] [G40.909] 01/14/2018    Unresponsiveness [R41.89] 01/14/2018    Seizure-like activity ([facility] [address] [R56.9] 01/14/2018    Convulsions ([address] [R56.9]     Syncope and collapse [R55] 01/13/2018    Mixed hyperlipidemia [E78.2] 05/05/2016    GERD (gastroesophageal reflux disease) [K21.9] 04/28/2015    BPH (benign prostatic hyperplasia) [N40.0] 02/01/2013    Dementia without behavioral disturbance [F03.90] 07/27/2012    HIV (human immunodeficiency virus infection) ([facility] [address] [B20] 07/27/2012       Plan:        Gram Positive Septicemia. BCx growing GPCs in clusters 1/18/18    Sepsis 2/2 E. Coli UTI. Per ID. Cont ceftriaxone. BP has improved. Acute onset Hematuria, resolved. Urology on board. ASA, lovenox held. Outpatient f/u with urology recommended. Seizure-like activity with unresponsiveness: CT negative, given the patient's history of HIV will order MRI brain with and without to rule out infection,  ID consult, EEG and neurology consult. Patient is back to baseline per wife. Per ID, \"Cryptococcus , Toxoplasma IgM and IgG negative. \". Per neurology: \"FQVT WLOA new onset seizure disorder. Dementia.  HIV. EEG and contrast brain MRI did not show abnormality that would increase risk of developing seizures. Discontinue AED. Neurologically stable and clear. \"    Severe Protein Calorie Malnutrition. Dietary Consult placed. Supplementation provided.      Dementia:  Continue home medications Aricept and Namenda.     HIV: Most recent viral load undetectable. Cont Atripla.  Patient to f/u with Dr. Sandoval Ran on discharge for management of HIV.      BPH: continue Proscar    PT/OT     DVT PPx: SCDs only    Heather Bloom MD  1/19/2018  1:30 PM

## 2018-01-20 VITALS
OXYGEN SATURATION: 98 % | DIASTOLIC BLOOD PRESSURE: 77 MMHG | TEMPERATURE: 98.6 F | SYSTOLIC BLOOD PRESSURE: 134 MMHG | HEIGHT: 66 IN | HEART RATE: 96 BPM | WEIGHT: 113.32 LBS | BODY MASS INDEX: 18.21 KG/M2 | RESPIRATION RATE: 15 BRPM

## 2018-01-20 LAB
ABSOLUTE EOS #: 0.07 K/UL (ref 0–0.44)
ABSOLUTE IMMATURE GRANULOCYTE: 0.11 K/UL (ref 0–0.3)
ABSOLUTE LYMPH #: 1.37 K/UL (ref 1.1–3.7)
ABSOLUTE MONO #: 0.89 K/UL (ref 0.1–1.2)
ANION GAP SERPL CALCULATED.3IONS-SCNC: 11 MMOL/L (ref 9–17)
BASOPHILS # BLD: 0 % (ref 0–2)
BASOPHILS ABSOLUTE: 0.04 K/UL (ref 0–0.2)
BUN BLDV-MCNC: 3 MG/DL (ref 8–23)
BUN/CREAT BLD: ABNORMAL (ref 9–20)
CALCIUM SERPL-MCNC: 8.8 MG/DL (ref 8.6–10.4)
CHLORIDE BLD-SCNC: 106 MMOL/L (ref 98–107)
CO2: 21 MMOL/L (ref 20–31)
CREAT SERPL-MCNC: 0.57 MG/DL (ref 0.7–1.2)
CULTURE: ABNORMAL
DIFFERENTIAL TYPE: ABNORMAL
EOSINOPHILS RELATIVE PERCENT: 1 % (ref 1–4)
GFR AFRICAN AMERICAN: >60 ML/MIN
GFR NON-AFRICAN AMERICAN: >60 ML/MIN
GFR SERPL CREATININE-BSD FRML MDRD: ABNORMAL ML/MIN/{1.73_M2}
GFR SERPL CREATININE-BSD FRML MDRD: ABNORMAL ML/MIN/{1.73_M2}
GLUCOSE BLD-MCNC: 102 MG/DL (ref 70–99)
HCT VFR BLD CALC: 34.7 % (ref 40.7–50.3)
HEMOGLOBIN: 10.8 G/DL (ref 13–17)
IMMATURE GRANULOCYTES: 1 %
LYMPHOCYTES # BLD: 13 % (ref 24–43)
Lab: ABNORMAL
MCH RBC QN AUTO: 26.9 PG (ref 25.2–33.5)
MCHC RBC AUTO-ENTMCNC: 31.1 G/DL (ref 28.4–34.8)
MCV RBC AUTO: 86.5 FL (ref 82.6–102.9)
MONOCYTES # BLD: 9 % (ref 3–12)
NRBC AUTOMATED: 0 PER 100 WBC
PDW BLD-RTO: 14.8 % (ref 11.8–14.4)
PLATELET # BLD: 158 K/UL (ref 138–453)
PLATELET ESTIMATE: ABNORMAL
PMV BLD AUTO: 11.6 FL (ref 8.1–13.5)
POTASSIUM SERPL-SCNC: 3.7 MMOL/L (ref 3.7–5.3)
RBC # BLD: 4.01 M/UL (ref 4.21–5.77)
RBC # BLD: ABNORMAL 10*6/UL
SEG NEUTROPHILS: 76 % (ref 36–65)
SEGMENTED NEUTROPHILS ABSOLUTE COUNT: 7.72 K/UL (ref 1.5–8.1)
SODIUM BLD-SCNC: 138 MMOL/L (ref 135–144)
SPECIMEN DESCRIPTION: ABNORMAL
STATUS: ABNORMAL
WBC # BLD: 10.2 K/UL (ref 3.5–11.3)
WBC # BLD: ABNORMAL 10*3/UL

## 2018-01-20 PROCEDURE — 94762 N-INVAS EAR/PLS OXIMTRY CONT: CPT

## 2018-01-20 PROCEDURE — 80048 BASIC METABOLIC PNL TOTAL CA: CPT

## 2018-01-20 PROCEDURE — 36415 COLL VENOUS BLD VENIPUNCTURE: CPT

## 2018-01-20 PROCEDURE — 6370000000 HC RX 637 (ALT 250 FOR IP): Performed by: NURSE PRACTITIONER

## 2018-01-20 PROCEDURE — 85025 COMPLETE CBC W/AUTO DIFF WBC: CPT

## 2018-01-20 PROCEDURE — 99232 SBSQ HOSP IP/OBS MODERATE 35: CPT | Performed by: PSYCHIATRY & NEUROLOGY

## 2018-01-20 PROCEDURE — 6370000000 HC RX 637 (ALT 250 FOR IP): Performed by: STUDENT IN AN ORGANIZED HEALTH CARE EDUCATION/TRAINING PROGRAM

## 2018-01-20 PROCEDURE — 99239 HOSP IP/OBS DSCHRG MGMT >30: CPT | Performed by: INTERNAL MEDICINE

## 2018-01-20 PROCEDURE — 97116 GAIT TRAINING THERAPY: CPT

## 2018-01-20 PROCEDURE — 6370000000 HC RX 637 (ALT 250 FOR IP): Performed by: INTERNAL MEDICINE

## 2018-01-20 PROCEDURE — 99232 SBSQ HOSP IP/OBS MODERATE 35: CPT | Performed by: INTERNAL MEDICINE

## 2018-01-20 RX ORDER — ALBUTEROL SULFATE 2.5 MG/3ML
2.5 SOLUTION RESPIRATORY (INHALATION) EVERY 6 HOURS PRN
Qty: 120 EACH | Refills: 3 | Status: SHIPPED | OUTPATIENT
Start: 2018-01-20 | End: 2018-01-20 | Stop reason: HOSPADM

## 2018-01-20 RX ORDER — CEPHALEXIN 500 MG/1
500 CAPSULE ORAL 4 TIMES DAILY
Qty: 28 CAPSULE | Refills: 0 | Status: SHIPPED | OUTPATIENT
Start: 2018-01-20 | End: 2018-01-27

## 2018-01-20 RX ADMIN — FINASTERIDE 5 MG: 5 TABLET, FILM COATED ORAL at 07:57

## 2018-01-20 RX ADMIN — LORAZEPAM 0.5 MG: 0.5 TABLET ORAL at 07:57

## 2018-01-20 RX ADMIN — CEPHALEXIN 500 MG: 500 CAPSULE ORAL at 07:57

## 2018-01-20 RX ADMIN — PANTOPRAZOLE SODIUM 40 MG: 40 TABLET, DELAYED RELEASE ORAL at 07:57

## 2018-01-20 RX ADMIN — CEPHALEXIN 500 MG: 500 CAPSULE ORAL at 00:38

## 2018-01-20 RX ADMIN — CEPHALEXIN 500 MG: 500 CAPSULE ORAL at 12:00

## 2018-01-20 RX ADMIN — CEPHALEXIN 500 MG: 500 CAPSULE ORAL at 16:46

## 2018-01-20 RX ADMIN — MEMANTINE HYDROCHLORIDE 5 MG: 5 TABLET, FILM COATED ORAL at 07:57

## 2018-01-20 ASSESSMENT — PAIN SCALES - PAIN ASSESSMENT IN ADVANCED DEMENTIA (PAINAD)
CONSOLABILITY: 0

## 2018-01-20 ASSESSMENT — ENCOUNTER SYMPTOMS
RESPIRATORY NEGATIVE: 1
GASTROINTESTINAL NEGATIVE: 1
EYES NEGATIVE: 1
ALLERGIC/IMMUNOLOGIC NEGATIVE: 1

## 2018-01-20 ASSESSMENT — PAIN SCALES - GENERAL: PAINLEVEL_OUTOF10: 0

## 2018-01-20 NOTE — PROGRESS NOTES
Miriam Grier 19    Progress Note    1/20/2018    2:24 PM    Name:   Santos Conn  MRN:     [id number]     [place]:      [de-identified]   Room:   [address] [place] [address]  IP Day:  7  Admit Date:  1/13/2018  6:29 PM    PCP:   Shaneka Silva MD  Code Status:  Full Code    Subjective:     C/C:   Chief Complaint   Patient presents with   Lindaajustus Chavezs     Interval History Status: unchanged    Doing well today. Mitts off. Wife by bedside. Working with PT. Per wife, would like patient to go home with home health. Patient both HD and clinically stable. Okay for discharge. Brief History:     The patient is a 76 y.o. Non-/non  male who presents with Shaking   and he is admitted to the hospital for the management of unresponsiveness with seizure-like activity  Patient with known history of HIV and dementia brought to ER EMS after sustaining an episode of unresponsiveness and shakiness witnessed by his wife. The episode happened during dinner with the patient slumped back in the chair and wife noticed hand shakiness and tremors, she is unable to anticipate duration of the episode but there ER report it was roughly 3 minute episodes. No postictal state, confusion, incontinence or altered mental status  No similar episodes in the past  and no known history of seizure.   In ER CT head didn't show  acute pathology, vitals including blood pressure within normal range as well as his basic blood work   during my evaluation of the history was obtained the patient, patient is very slow and unable to answer any question    Review of Systems:     Constitutional:  negative for chills, fevers, sweats  Respiratory:  negative for cough, dyspnea on exertion, hemoptysis, shortness of breath, wheezing  Cardiovascular:  negative for chest pain, chest pressure/discomfort, lower extremity edema, palpitations  Gastrointestinal:  negative for abdominal pain, constipation, diarrhea,

## 2018-01-20 NOTE — PROGRESS NOTES
agree with the assessment, plan and orders as documented by the resident.     Telmaroyce Villalba, Infectious Diseases

## 2018-01-20 NOTE — PROGRESS NOTES
chloride, magnesium sulfate, acetaminophen, HYDROcodone 5 mg - acetaminophen, magnesium hydroxide, bisacodyl, ondansetron, nicotine    Objective:   BP (!) 146/78   Pulse 85   Temp 98.2 °F (36.8 °C)   Resp 19   Ht 5' 6\" (1.676 m)   Wt 113 lb 5.1 oz (51.4 kg)   SpO2 97%   BMI 18.29 kg/m²     Blood pressure range: Systolic (37TSU), IIM:791 , Min:99 , OQZ:571   ; Diastolic (45YFB), NWM:61, Min:53, Max:96   T-max 101.5    NEUROLOGIC EXAMINATION  GENERAL  Appears comfortable and in no distress   HEENT  NC/ AT   NECK  Supple   MENTAL STATUS:  Alert, oriented to self only, normal speech, normal language, no hallucination or delusion. Able to name pen but not phone or glasses. Follows simple commands.    CRANIAL NERVES: II     -      Visual fields intact to confrontation  III,IV,VI -  EOMs full, no afferent defect, no INGRID, no ptosis  V     -     Normal facial sensation  VII    -     Normal facial symmetry  VIII   -     Intact hearing  IX,X -     Symmetrical palate  XI    -     Symmetrical shoulder shrug  XII   -     Midline tongue, no atrophy    MOTOR FUNCTION:  significant for good strength of grade 5/5 in bilateral proximal and distal muscle groups of both upper and lower extremities with normal bulk, normal tone and no involuntary movements, no tremor   SENSORY FUNCTION:  Normal touch, normal pin   CEREBELLAR FUNCTION:  Intact fine motor control over upper limbs   REFLEX FUNCTION:  Symmetric and decreased at patella, no perverted reflex, no Babinski sign   STATION and GAIT  Not tested            Data:  Narrative   EXAMINATION:   MRI OF THE BRAIN WITHOUT AND WITH CONTRAST  1/16/2018 4:20 pm       TECHNIQUE:   Multiplanar multisequence MRI of the head/brain was performed without and   with the administration of intravenous contrast.       COMPARISON:   CT head January 13, 2018 and MR brain October 27, 2007       HISTORY:   ORDERING SYSTEM PROVIDED HISTORY: seizures       FINDINGS:   INTRACRANIAL STRUCTURES/VENTRICLES:

## 2018-01-21 ENCOUNTER — CARE COORDINATION (OUTPATIENT)
Dept: CASE MANAGEMENT | Age: 78
End: 2018-01-21

## 2018-01-21 DIAGNOSIS — N30.01 ACUTE CYSTITIS WITH HEMATURIA: ICD-10-CM

## 2018-01-21 DIAGNOSIS — R41.89 UNRESPONSIVENESS: ICD-10-CM

## 2018-01-21 DIAGNOSIS — R55 SYNCOPE AND COLLAPSE: Primary | ICD-10-CM

## 2018-01-21 DIAGNOSIS — E86.0 DEHYDRATION: ICD-10-CM

## 2018-01-21 DIAGNOSIS — R25.1 OCCASIONAL TREMORS: ICD-10-CM

## 2018-01-21 DIAGNOSIS — R50.9 FEVER, UNSPECIFIED FEVER CAUSE: ICD-10-CM

## 2018-01-21 DIAGNOSIS — A41.51 SEPSIS DUE TO ESCHERICHIA COLI (HCC): ICD-10-CM

## 2018-01-21 PROCEDURE — 1111F DSCHRG MED/CURRENT MED MERGE: CPT | Performed by: STUDENT IN AN ORGANIZED HEALTH CARE EDUCATION/TRAINING PROGRAM

## 2018-01-21 NOTE — CARE COORDINATION
Jan 45 Transitions Initial Follow Up Call    Call within 2 business days of discharge: Yes    Patient: Corona Leon Patient : 1940   MRN: [id number]    Reason for Admission: shaking, unresponsive with seizure like activity  Discharge Date: 18   RARS: [facility] Risk Score: 7.5, CMRS 4     Spoke with: Merced Zuleta, pt wife     Call to patient wife who states patient still having scant blood in his urine and notes he was incontinent 4 times overnight. Writer advises purchasing depends/ briefs to help alleviate the clean up associated. Writer instructs her to continue to monitor the urine- if anne marie blood then call MD (dr Zaira Hearn) or advised to call [phone] or go to ER if symptoms worsen or continue- v/u. Writer advised her to enourage fluids for patient has he had UTI in the hospital- v/u. Instructs to monitor for pain/ burning with voiding, lower abdominal pain, anne marie blood and contact ctc or pcp/ urology if questions or concerns  States pt received Rx for abx prior to leaving hospital and she admin last night and again this morning- v/u pt is to receive this 4 times daily for 7 days. States he did receive rolling walker prior to leaving as well and pt did use it going home yesterday. States he does not need it in the house as \"I am around him all the time\". Writer advised encouraging its use if he appears to need it- v/u  Medications reviewed and reconciled. 1111F completed   Merced Zuleta states she will call pcp, Id, neuro and urology tomorrow to make appointments- states understanding importance of follow up in outpatient setting  States home care is to start tomorrow- writer to f/u with [facility] tomorrow to ensure they have everything they need for patient. States she was given list of SNF's and is looking it over/ thinking about it. States she is so tired (did not sleep well last night because pt had multiple incontinent episodes.    Encouraged call if questions or concerns- v/u     Facility: [facility] [place]

## 2018-01-23 LAB
CULTURE: NORMAL
CULTURE: NORMAL
Lab: NORMAL
SPECIMEN DESCRIPTION: NORMAL
STATUS: NORMAL

## 2018-01-24 ENCOUNTER — CARE COORDINATION (OUTPATIENT)
Dept: CASE MANAGEMENT | Age: 78
End: 2018-01-24

## 2018-01-26 ENCOUNTER — OFFICE VISIT (OUTPATIENT)
Dept: PODIATRY | Age: 78
End: 2018-01-26
Payer: MEDICARE

## 2018-01-26 VITALS
WEIGHT: 121 LBS | HEIGHT: 66 IN | SYSTOLIC BLOOD PRESSURE: 126 MMHG | DIASTOLIC BLOOD PRESSURE: 77 MMHG | BODY MASS INDEX: 19.44 KG/M2 | HEART RATE: 68 BPM

## 2018-01-26 DIAGNOSIS — M79.671 PAIN IN BOTH FEET: ICD-10-CM

## 2018-01-26 DIAGNOSIS — M79.672 PAIN IN BOTH FEET: ICD-10-CM

## 2018-01-26 DIAGNOSIS — B20 HIV (HUMAN IMMUNODEFICIENCY VIRUS INFECTION) (HCC): ICD-10-CM

## 2018-01-26 DIAGNOSIS — B35.1 ONYCHOMYCOSIS: ICD-10-CM

## 2018-01-26 DIAGNOSIS — I73.9 PVD (PERIPHERAL VASCULAR DISEASE) (HCC): Primary | ICD-10-CM

## 2018-01-26 PROCEDURE — 99213 OFFICE O/P EST LOW 20 MIN: CPT | Performed by: PODIATRIST

## 2018-01-26 PROCEDURE — 11721 DEBRIDE NAIL 6 OR MORE: CPT | Performed by: PODIATRIST

## 2018-01-29 NOTE — DISCHARGE SUMMARY
Miriam Grier 19    Discharge Summary     Patient ID: Carol Rodriguez  :  1940   MRN: [id number]     ACCOUNT:  [de-identified]   Patient's PCP: Dale Coleman MD  Admit Date: 2018   Discharge Date: 18    Length of Stay: 7  Code Status:  Prior  Admitting Physician: Troy Castaneda MD  Discharge Physician: Sherry Gomez MD     Active Discharge Diagnoses:     Primary Problem  Sepsis due to Escherichia coli [facility])      [place] Problems    Diagnosis Date Noted    Syncope and collapse [R55]     Syphilis in male [A53.9]     Occasional tremors [R25.1]     Fever [R50.9] 2018    Acute cystitis with hematuria [N30.01] 2018    Sepsis due to Escherichia coli ([facility] [address] [A41.51] 2018    Dehydration [E86.0] 2018    Unresponsiveness [R41.89] 2018    Mixed hyperlipidemia [E78.2] 2016    GERD (gastroesophageal reflux disease) [K21.9] 2015    BPH (benign prostatic hyperplasia) [N40.0] 2013    Dementia without behavioral disturbance [F03.90] 2012    HIV (human immunodeficiency virus infection) ([facility] [address] [B20] 2012       Admission Condition:  fair     Discharged Condition: good    Hospital Stay:     Hospital Course:  Carol Rodriguez is a 76 y.o. male who was admitted for the management of   Sepsis due to Escherichia coli [facility]) , presented to ER with Shaking    The patient is a 76 y.o.  Non-/non  male who presents with Shaking   and he is admitted to the hospital for the management of unresponsiveness with seizure-like activity  Patient with known history of HIV and dementia brought to ER EMS after sustaining an episode of unresponsiveness and shakiness witnessed by his wife.  The episode happened during dinner with the patient slumped back in the chair and wife noticed hand shakiness and tremors, she is unable to anticipate duration of the episode but there MCV 86.5 01/20/2018    MCH 26.9 01/20/2018    MCHC 31.1 01/20/2018    RDW 14.8 01/20/2018     01/20/2018     04/18/2012     BMP:    Lab Results   Component Value Date    GLUCOSE 102 01/20/2018    GLUCOSE 76 04/18/2012     01/20/2018    K 3.7 01/20/2018     01/20/2018    CO2 21 01/20/2018    ANIONGAP 11 01/20/2018    BUN 3 01/20/2018    CREATININE 0.57 01/20/2018    BUNCRER NOT REPORTED 01/20/2018    CALCIUM 8.8 01/20/2018    LABGLOM >60 01/20/2018    GFRAA >60 01/20/2018    GFR      01/20/2018    GFR NOT REPORTED 01/20/2018     HFP:    Lab Results   Component Value Date    PROT 7.8 12/11/2017     CMP:    Lab Results   Component Value Date    GLUCOSE 102 01/20/2018    GLUCOSE 76 04/18/2012     01/20/2018    K 3.7 01/20/2018     01/20/2018    CO2 21 01/20/2018    BUN 3 01/20/2018    CREATININE 0.57 01/20/2018    ANIONGAP 11 01/20/2018    ALKPHOS 215 12/11/2017    ALT 11 12/11/2017    AST 27 12/11/2017    BILITOT 0.16 12/11/2017    LABALBU 3.8 12/11/2017    LABALBU 3.9 04/18/2012    ALBUMIN 1.0 12/11/2017    LABGLOM >60 01/20/2018    GFRAA >60 01/20/2018    GFR      01/20/2018    GFR NOT REPORTED 01/20/2018    PROT 7.8 12/11/2017    CALCIUM 8.8 01/20/2018     PT/INR:  No results found for: PTINR  PTT: No results found for: APTT  FLP:    Lab Results   Component Value Date    CHOL 242 05/10/2017    TRIG 146 05/10/2017    HDL 82 05/10/2017     U/A:    Lab Results   Component Value Date    COLORU RED 01/17/2018    TURBIDITY CLOUDY 01/17/2018    SPECGRAV 1.006 01/17/2018    HGBUR LARGE 01/17/2018    PHUR 8.0 01/17/2018    PROTEINU TRACE 01/17/2018    GLUCOSEU NEGATIVE 01/17/2018    KETUA NEGATIVE 01/17/2018    BILIRUBINUR NEGATIVE 01/17/2018    UROBILINOGEN Normal 01/17/2018    NITRU NEGATIVE 01/17/2018    LEUKOCYTESUR MODERATE 01/17/2018     TSH:    Lab Results   Component Value Date    TSH 1.32 03/09/2015       Radiology:       CT OF THE HEAD WITHOUT CONTRAST  1/13/2018 7:06 pm  No acute intracranial abnormality. Sphenoid sinusitis.  Correlate with symptoms. Consultations:    Consults:     Final Specialist Recommendations/Findings:   IP CONSULT TO HOSPITALIST  IP CONSULT TO SOCIAL WORK  IP CONSULT TO NEUROLOGY  IP CONSULT TO INFECTIOUS DISEASES  IP CONSULT TO DIETITIAN  IP CONSULT TO UROLOGY  IP CONSULT TO CRITICAL CARE  PALLIATIVE CARE EVAL  IP CONSULT TO HOME CARE NEEDS      The patient was seen and examined on day of discharge and this discharge summary is in conjunction with any daily progress note from day of discharge.     Discharge plan:     Disposition: Home with [facility]    Physician Follow Up:     Adrienne Brasher MD  [address] [facility] [address] [facility] 909  [phone]    Schedule an appointment as soon as possible for a visit in 1 week      Neema Underwood, [address]  [address] [place] [address] [phone]          Juan [address] [place] [address]  [phone]    In 4 weeks      Phong Mitchell MD  [facility] [address] [facility] [phone]    In 4 weeks  For hematuria work up    Arnoldo Damian MD  [address] [place] [address] 675, [address] [facility]  [phone]    Schedule an appointment as soon as possible for a visit in 2 weeks         Requiring Further Evaluation/Follow Up POST HOSPITALIZATION/Incidental Findings: F/U with PCP, ID    Diet: Regular diet with supplementation    Activity: As tolerated    Instructions to Patient: F/U as advised    Discharge Medications:      Medication List      CONTINUE taking these medications    alendronate 70 MG tablet  Commonly known as:  FOSAMAX  TAKE 1 TABLET EVERY WEEK     ATRIPLA 600-200-300 MG per tablet  Generic drug:  efavirenz-emtrictabine-tenofovir  TAKE 1 TABLET BY MOUTH ONCE A DAY *BOTTLE*     donepezil 10 MG tablet  Commonly known as:  ARICEPT  TAKE 1 TABLET IN THE EVENING     finasteride 5 MG tablet  Commonly known as:  PROSCAR  TAKE 1 TABLET

## 2018-01-30 ENCOUNTER — CARE COORDINATION (OUTPATIENT)
Dept: CASE MANAGEMENT | Age: 78
End: 2018-01-30

## 2018-01-30 NOTE — CARE COORDINATION
Jan 45 Transitions Follow Up Call    2018    Patient: Janette Villalba  Patient : 1940   MRN: [id number]  Reason for Admission: There are no discharge diagnoses documented for the most recent discharge. Discharge Date: 18 RARS: Risk Score: 7.5       Spoke with: wife Marquis Jung requests call back later as she is at a  this morning. Care Transitions Subsequent and Final Call    Subsequent and Final Calls  Are you currently active with any services?:  Home Health  Care Transitions Interventions  Other Interventions:             Follow Up  Future Appointments  Date Time Provider Jocelyn Ansari   2018 9:15 AM Teodoro Dubose MD INFT DISEASE [facility]   2018 8:30 AM [facility]   2018 10:00 AM Dorinda Trujillo MD [address] [place] [address] [address] IM [facility]   2018 9:20 AM Ross Orlando CNP [facility]   2018 1:45 PM Cristiane Gomez DPM [address] [facility]   2018 9:00 AM Teodoro Dubose MD INFT DISEASE Aureliano Herrera RN

## 2018-01-31 ENCOUNTER — CARE COORDINATION (OUTPATIENT)
Dept: CASE MANAGEMENT | Age: 78
End: 2018-01-31

## 2018-02-01 ENCOUNTER — CARE COORDINATION (OUTPATIENT)
Dept: CASE MANAGEMENT | Age: 78
End: 2018-02-01

## 2018-02-02 DIAGNOSIS — F02.80 ALZHEIMER'S DEMENTIA WITHOUT BEHAVIORAL DISTURBANCE (HCC): ICD-10-CM

## 2018-02-02 DIAGNOSIS — F02.80 ALZHEIMER'S DEMENTIA WITHOUT BEHAVIORAL DISTURBANCE, UNSPECIFIED TIMING OF DEMENTIA ONSET: ICD-10-CM

## 2018-02-02 DIAGNOSIS — G30.9 ALZHEIMER'S DEMENTIA WITHOUT BEHAVIORAL DISTURBANCE, UNSPECIFIED TIMING OF DEMENTIA ONSET: ICD-10-CM

## 2018-02-02 DIAGNOSIS — K21.9 GASTROESOPHAGEAL REFLUX DISEASE WITHOUT ESOPHAGITIS: ICD-10-CM

## 2018-02-02 DIAGNOSIS — G30.9 ALZHEIMER'S DEMENTIA WITHOUT BEHAVIORAL DISTURBANCE (HCC): ICD-10-CM

## 2018-02-02 DIAGNOSIS — N40.0 BENIGN NON-NODULAR PROSTATIC HYPERPLASIA WITHOUT LOWER URINARY TRACT SYMPTOMS: ICD-10-CM

## 2018-02-02 DIAGNOSIS — M81.0 OSTEOPOROSIS: ICD-10-CM

## 2018-02-02 RX ORDER — FINASTERIDE 5 MG/1
TABLET, FILM COATED ORAL
Qty: 30 TABLET | Refills: 0 | Status: SHIPPED | OUTPATIENT
Start: 2018-02-02 | End: 2018-03-02 | Stop reason: SDUPTHER

## 2018-02-02 RX ORDER — MEMANTINE HYDROCHLORIDE 5 MG/1
TABLET ORAL
Qty: 60 TABLET | Refills: 0 | Status: SHIPPED | OUTPATIENT
Start: 2018-02-02 | End: 2018-03-02 | Stop reason: SDUPTHER

## 2018-02-02 RX ORDER — DONEPEZIL HYDROCHLORIDE 10 MG/1
TABLET, FILM COATED ORAL
Qty: 30 TABLET | Refills: 0 | Status: SHIPPED | OUTPATIENT
Start: 2018-02-02 | End: 2018-03-02 | Stop reason: SDUPTHER

## 2018-02-02 RX ORDER — B-COMPLEX WITH VITAMIN C
TABLET ORAL
Qty: 60 TABLET | Refills: 0 | Status: SHIPPED | OUTPATIENT
Start: 2018-02-02 | End: 2018-03-02 | Stop reason: SDUPTHER

## 2018-02-02 RX ORDER — OMEPRAZOLE 20 MG/1
CAPSULE, DELAYED RELEASE ORAL
Qty: 30 CAPSULE | Refills: 0 | Status: SHIPPED | OUTPATIENT
Start: 2018-02-02 | End: 2018-03-02 | Stop reason: SDUPTHER

## 2018-02-02 RX ORDER — ALENDRONATE SODIUM 70 MG/1
TABLET ORAL
Qty: 4 TABLET | Refills: 0 | Status: SHIPPED | OUTPATIENT
Start: 2018-02-02 | End: 2018-03-02 | Stop reason: SDUPTHER

## 2018-02-02 NOTE — TELEPHONE ENCOUNTER
E-scribe request from [facility] for [employer] Calcium, Omeprazole 20 mg, Memantine 5 mg, Donepezil 10 mg, Finasteride 5 mg, and Alendronate 70 mg..         Health Maintenance   Topic Date Due    Zostavax vaccine  10/03/1998    Lipid screen  05/10/2022    DTaP/Tdap/Td vaccine (2 - Td) 02/21/2027    Flu vaccine  Completed    Pneumococcal high/highest risk  Completed       Hemoglobin A1C (%)   Date Value   09/15/2016 5.8             ( goal A1C is < 7)   No results found for: LABMICR  LDL Cholesterol (mg/dL)   Date Value   05/10/2017 131 (H)       (goal LDL is <100)   AST (U/L)   Date Value   12/11/2017 27     ALT (U/L)   Date Value   12/11/2017 11     BUN (mg/dL)   Date Value   01/20/2018 3 (L)     BP Readings from Last 3 Encounters:   01/26/18 126/77   01/20/18 134/77   12/11/17 (!) 153/86          (goal 120/80)      Next Visit Date:  02/12/2018    Patient Active Problem List:     HIV (human immunodeficiency virus infection) ([facility]     Dementia without behavioral disturbance     Osteoporosis right hip fracture dec 08     Hemorrhoids     BPH (benign prostatic hyperplasia)     GERD (gastroesophageal reflux disease)     Meralgia paraesthetica     Mixed hyperlipidemia     Unresponsiveness     Dehydration     Fever     Acute cystitis with hematuria     Sepsis due to Escherichia coli (HCC)     Occasional tremors     Syncope and collapse     Syphilis in male

## 2018-02-05 ENCOUNTER — CARE COORDINATION (OUTPATIENT)
Dept: CASE MANAGEMENT | Age: 78
End: 2018-02-05

## 2018-02-07 ENCOUNTER — OFFICE VISIT (OUTPATIENT)
Dept: INFECTIOUS DISEASES | Age: 78
End: 2018-02-07
Payer: MEDICARE

## 2018-02-07 VITALS
BODY MASS INDEX: 18.48 KG/M2 | OXYGEN SATURATION: 97 % | SYSTOLIC BLOOD PRESSURE: 130 MMHG | RESPIRATION RATE: 14 BRPM | TEMPERATURE: 97 F | HEIGHT: 66 IN | WEIGHT: 115 LBS | DIASTOLIC BLOOD PRESSURE: 81 MMHG | HEART RATE: 78 BPM

## 2018-02-07 DIAGNOSIS — A41.51 SEPSIS DUE TO ESCHERICHIA COLI (HCC): ICD-10-CM

## 2018-02-07 DIAGNOSIS — M81.0 OSTEOPOROSIS: ICD-10-CM

## 2018-02-07 DIAGNOSIS — G30.9 ALZHEIMER'S DEMENTIA WITHOUT BEHAVIORAL DISTURBANCE, UNSPECIFIED TIMING OF DEMENTIA ONSET: ICD-10-CM

## 2018-02-07 DIAGNOSIS — A53.9 SYPHILIS IN MALE: ICD-10-CM

## 2018-02-07 DIAGNOSIS — F02.80 ALZHEIMER'S DEMENTIA WITHOUT BEHAVIORAL DISTURBANCE, UNSPECIFIED TIMING OF DEMENTIA ONSET: ICD-10-CM

## 2018-02-07 DIAGNOSIS — F17.200 SMOKER: ICD-10-CM

## 2018-02-07 DIAGNOSIS — B20 HUMAN IMMUNODEFICIENCY VIRUS (HCC): Primary | ICD-10-CM

## 2018-02-07 PROCEDURE — 99214 OFFICE O/P EST MOD 30 MIN: CPT | Performed by: INTERNAL MEDICINE

## 2018-02-07 RX ORDER — MULTIVITAMIN WITH FOLIC ACID 400 MCG
TABLET ORAL
Qty: 60 TABLET | Refills: 0 | Status: SHIPPED | OUTPATIENT
Start: 2018-02-07 | End: 2018-03-30 | Stop reason: SDUPTHER

## 2018-02-07 ASSESSMENT — ENCOUNTER SYMPTOMS
EYES NEGATIVE: 1
GASTROINTESTINAL NEGATIVE: 1
ALLERGIC/IMMUNOLOGIC NEGATIVE: 1
RESPIRATORY NEGATIVE: 1

## 2018-02-07 NOTE — TELEPHONE ENCOUNTER
Escribe refill request from [facility] for Multiple Vitamin.     Health Maintenance   Topic Date Due    Zostavax vaccine  10/03/1998    Lipid screen  05/10/2022    DTaP/Tdap/Td vaccine (2 - Td) 02/21/2027    Flu vaccine  Completed    Pneumococcal high/highest risk  Completed             (applicable per patient's age: Cancer Screenings, Depression Screening, Fall Risk Screening, Immunizations)    Hemoglobin A1C (%)   Date Value   09/15/2016 5.8     LDL Cholesterol (mg/dL)   Date Value   05/10/2017 131 (H)     AST (U/L)   Date Value   12/11/2017 27     ALT (U/L)   Date Value   12/11/2017 11     BUN (mg/dL)   Date Value   01/20/2018 3 (L)      (goal A1C is < 7)   (goal LDL is <100) need 30-50% reduction from baseline     BP Readings from Last 3 Encounters:   01/26/18 126/77   01/20/18 134/77   12/11/17 (!) 153/86    (goal /80)      All Future Testing planned in [facility]:  Lab Frequency Next Occurrence   T. pallidum Ab Once 06/11/2018   Lipid Panel Once 06/11/2018       Next Visit Date:  Future Appointments  Date Time Provider Jocelyn Prajapatiisti   2/7/2018 9:15 AM [facility]   2/8/2018 8:30 AM Luiz Albert MD heartvasc [facility]   2/12/2018 10:00 AM Vickie Borges MD [facility] IM [facility]   2/16/2018 9:10 AM SCHEDULE, P ACC UROLOGY [facility] Urology [facility]   2/28/2018 9:20 AM Bhaskar Jaeger CNP Neuro Spec [facility]   3/6/2018 3:30 PM MD Jessa Llanes  [address] [facility]   4/27/2018 1:45 PM Kandi Quigley DPM [facility] Podiatry [facility]   6/13/2018 9:00 AM Junie Flores MD INFT DISEASE [facility]            Patient Active Problem List:     HIV (human immunodeficiency virus infection) ([address]     Dementia without behavioral disturbance     Osteoporosis right hip fracture dec 08     Hemorrhoids     BPH (benign prostatic hyperplasia)     GERD (gastroesophageal reflux disease)     Meralgia paraesthetica     Mixed hyperlipidemia     Unresponsiveness     Dehydration     Fever

## 2018-02-07 NOTE — PROGRESS NOTES
Value Ref Range Status   05/10/2017 <3.50 <10 mIU/mL Final     Comment:           REFERENCE RANGE:  <10.0      NON-REACTIVE/NOT IMMUNE  >=10.0     REACTIVE/IMMUNE  [address] [phone]       CHLAMYDIA GC DNA URINE:  No results found for: Prasanna Bran AB:  T. pallidum, IgG   Date Value Ref Range Status   01/14/2018 REACTIVE (A) NR Final     Comment:           Detection of T. pallidum antibodies may indicate recent, remote or previously   treated infections. A positive serological test for syphilis is not diagnostic   of infection, as false positives occur and become more likely in low prevalence   populations. Confirmatory test will be performed (VDRL, Quantitative). Results reported to the appropriate [address] [place] [address] [phone]     05/10/2017 REACTIVE (A) NR Final     Comment:           Detection of T. pallidum antibodies may indicate recent, remote or previously   treated infections. A positive serological test for syphilis is not diagnostic   of infection, as false positives occur and become more likely in low prevalence   populations. Confirmatory test will be performed (VDRL, Quantitative). [address] [phone]       VDRL, QUANTITATIVE 01/14/2018  8:49  [address]   2    Comment:          T.pallidum Ab Screen: POSITIVE   VDRL, Quantitative:   POSITIVE      Toxoplasm IgM 0.39  Index Final 01/14/2018  8:49 PM [id number] [facility]           REFERENCE RANGE:   <0.90            NON-REACTIVE   0.90 TO  1.00    INDETERMINANT   >=1.10           REACTIVE      Toxoplasma IgG <0.5  IU/mL Final 01/14/2018  8:49  [address] Final 01/14/2018  8:49  [address]               Thank you for allowing us to participate in the care of this patient. Please call with questions. Mehreen Romo MD  [employer] messaging: [phone]      This note is created with the assistance of a speech recognition program.  While intending to generate a document that actually reflects the content of the visit, the document can still have some errors including those of syntax and sound a like substitutions which may escape proof reading. It such instances, actual meaning can be extrapolated by contextual diversion.

## 2018-02-08 ENCOUNTER — INITIAL CONSULT (OUTPATIENT)
Dept: VASCULAR SURGERY | Age: 78
End: 2018-02-08
Payer: MEDICARE

## 2018-02-08 VITALS
WEIGHT: 115.08 LBS | BODY MASS INDEX: 18.49 KG/M2 | DIASTOLIC BLOOD PRESSURE: 62 MMHG | HEART RATE: 76 BPM | HEIGHT: 66 IN | SYSTOLIC BLOOD PRESSURE: 106 MMHG | RESPIRATION RATE: 17 BRPM | OXYGEN SATURATION: 98 %

## 2018-02-08 DIAGNOSIS — F17.200 SMOKING: ICD-10-CM

## 2018-02-08 DIAGNOSIS — I73.9 PVD (PERIPHERAL VASCULAR DISEASE) (HCC): Primary | ICD-10-CM

## 2018-02-08 PROCEDURE — 99203 OFFICE O/P NEW LOW 30 MIN: CPT | Performed by: SURGERY

## 2018-02-08 NOTE — PROGRESS NOTES
Division of Vascular Surgery          New Consult      Name: Christopher Martinez  MRN: [id number]       Physician Requesting Consult:  Dr. Natacha Espinoza - Podiatry    Reason for Consult:   PVD    Chief Complaint:      \"I dont know why I am here\"    History of Present Illness:      Christopher Martinez is a 76 y.o.  male who presents with a history of PVD. PT is followed by podiatry and they are noting poor vascular flow in the feet. Patient states that he is able to walk but he doesn't walk very fast and that he really doesn't elicit any symptoms of claudication. He says occasionally he gets tired so he'll stop and then walk again later. Patient denies any rest pain, nonhealing wounds. He denies any shortness of breath, chest pain, abdominal pain. His wife is with him and although he is not the greatest historian he says that he has no history of cardiac events or TIAs or strokes. Patient still smokes and he's been encouraged to stop and his wife stop smoking due to a personal history of stroke    Past Medical History:     Past Medical History:   Diagnosis Date    Benign prostatic hyperplasia     [facility]     Dementia     GERD (gastroesophageal reflux disease) 4/28/2015    PHQNZKDU(885.9)     Hemorrhoids     History of shingles     HIV (human immunodeficiency virus infection) [facility]     Human immunodeficiency virus (HIV) ([facility]     Meralgia paraesthetica 2/4/2016    Mixed hyperlipidemia 5/5/2016    Osteoporosis     Syphilis     Treated        Past Surgical History:     Past Surgical History:   Procedure Laterality Date    HEMORRHOID SURGERY      HERNIA REPAIR      SIGMOIDOSCOPY N/A 11/22/2017    SIGMOIDOSCOPY POLYP SNARE performed by Ashley Gamble MD at [facility]          Medications Prior to Admission:       Prior to Admission medications    Medication Sig Start Date End Date Taking?  Authorizing Provider   vitamin E 400 UNIT capsule TAKE 1 CAPSULE TWICE A DAY 2/7/18   Yany Zhang MD   Multiple Vitamin (MULTIVITAMIN) tablet TAKE 1 TABLET DAILY 2/7/18   Yany Zhang MD   Calcium Carbonate-Vitamin D (OYSTER SHELL CALCIUM/D) 500-200 MG-UNIT TABS TAKE 2 TABLETS IN THE MORNING 2/2/18   Yany Zhang MD   omeprazole (PRILOSEC) 20 MG delayed release capsule TAKE 1 CAPSULE DAILY 2/2/18   Yany Zhang MD   memantine (NAMENDA) 5 MG tablet TAKE 1 TABLET TWICE A DAY 2/2/18   Yany Zhang MD   donepezil (ARICEPT) 10 MG tablet TAKE 1 TABLET IN THE EVENING 2/2/18   Yany Zhang MD   finasteride (PROSCAR) 5 MG tablet TAKE 1 TABLET DAILY 2/2/18   Yany Zhang MD   alendronate (FOSAMAX) 70 MG tablet TAKE 1 TABLET EVERY WEEK 2/2/18   Yany Zhang MD   ATRIPLA [id number] MG per tablet TAKE 1 TABLET BY MOUTH ONCE A DAY *BOTTLE* 12/27/17 1/26/18  Ayaz Coles MD   vitamin E 400 UNIT capsule TAKE 1 CAPSULE TWICE A DAY 12/12/17   Yany Zhang MD        Allergies:       Review of patient's allergies indicates no known allergies. Social History:     Tobacco:    reports that he has been smoking Cigarettes. He has a 25.00 pack-year smoking history. He has never used smokeless tobacco.  Alcohol:      reports that he drinks about 2.4 oz of alcohol per week . Drug Use:  reports that he does not use drugs.     Family History:     Family History   Problem Relation Age of Onset    Heart Disease Mother     High Blood Pressure Mother     Cancer Father     Diabetes Sister     Heart Disease Sister     High Blood Pressure Sister     Heart Disease Brother     High Blood Pressure Brother        Review of Systems:     Positive and Negative as described in HPI      Constitutional:  negative for  fevers, chills, sweats, fatigue, and weight loss  HEENT:  negative for vision or hearing changes,   Respiratory:  negative for shortness of breath, cough, or congestion  Cardiovascular:  negative for  chest pain, palpitations  Gastrointestinal: negative for nausea, vomiting, diarrhea, constipation, abdominal pain  Genitourinary:  negative for frequency, dysuria  Integument:  negative for rash, skin lesions  Chest/Breast:  No painful inspiration or expiration, no rib sternal pain  Musculoskeletal:  negative for muscle aches or joint pain  Neurological:  negative for headaches, dizziness, lightheadedness, numbness, pain and tingling extremities  Lymphatics: no lymphadenopathy or painful masses  Behavior/Psych:  negative for depression and anxiety      Physical Exam:     Vitals:  /62 (Site: Right Arm, Position: Sitting, Cuff Size: Medium Adult)   Pulse 76   Resp 17   Ht 5' 5.98\" (1.676 m)   Wt 115 lb 1.3 oz (52.2 kg)   SpO2 98%   BMI 18.58 kg/m²       General appearance - alert, thin appearing and in no acute distress  Mental status - appears to have some dementia but is oriented to place and time with normal affect   Head - normocephalic and atraumatic  Eyes - pupils equal and reactive, extraocular eye movements intact, conjunctiva clear  Ears - hearing appears to be intact  Nose - no drainage noted  Mouth - mucous membranes moist  Neck - supple, no carotid bruits, thyroid not palpable, no JVD  Chest - clear to auscultation, normal effort  Heart - normal rate, regular rhythm, no murmurs  Abdomen - soft, non-tender, non-distended, bowel sounds present all four quadrants, no masses   Neurological - normal speech, no focal findings or movement disorder noted, cranial nerves II through XII grossly intact  Extremities - thin frail limbs, no edema, no wounds  Skin - no gross lesions, rashes, or induration noted  Foot Exam - toe nail fungus but no open wounds. No palpable pulses.       Vascular Exam:  R brachial 1+ L brachial 1+   R radial 1+ L radial 1+   R femoral 1+ L femoral 1+   R popliteal 0+ L popliteal 0+   R posterior tibial 0+ L posterior tibial 0+   R dorsalis pedis 0+ L dorsalis pedis 0+     Patient has signals in his feet but no palpable pulses distally    Imagin/16:       - Brachial Pressure:Right: 116. Left:119.         - TIFFANY:Right: 0.89. Left: 0.84. Assessment:     Elif Marion is a 76 y.o.  male who    1. Appearance of dementia  2. Personal history of smoking  3. Evidence of peripheral vascular disease which is asymptomatic      Plan:     1. Wife has been instructed as well as the patient to continue to not smoke  2. Continue medical management and blood pressure control  3. Recommend use of statin therapy  4. No surgical intervention at this time  5. We'll obtain ABIs PVRs in an office visit in 6 months and if stable will start seeing less frequently      ----------------------------------------    Darlene Spear M.D., FACS, RVT, [address]  Medical Director of Vascular Services  Medical Director of the Vascular Lab      [address] [place] [address],[address]: [phone]  C: [phone]  Email: [email or web address]    Carlos dictated, not read.

## 2018-02-08 NOTE — Clinical Note
He has asymptomatic peripheral vascular disease so all disc it repeat testing and follow him unless he develops a wound

## 2018-02-16 ENCOUNTER — OFFICE VISIT (OUTPATIENT)
Dept: UROLOGY | Age: 78
End: 2018-02-16
Payer: MEDICARE

## 2018-02-16 ENCOUNTER — HOSPITAL ENCOUNTER (OUTPATIENT)
Age: 78
Setting detail: SPECIMEN
Discharge: HOME | End: 2018-02-16
Payer: MEDICARE

## 2018-02-16 VITALS
WEIGHT: 115 LBS | BODY MASS INDEX: 18.48 KG/M2 | HEART RATE: 65 BPM | SYSTOLIC BLOOD PRESSURE: 109 MMHG | HEIGHT: 66 IN | DIASTOLIC BLOOD PRESSURE: 75 MMHG

## 2018-02-16 DIAGNOSIS — R31.0 GROSS HEMATURIA: Primary | ICD-10-CM

## 2018-02-16 DIAGNOSIS — N30.01 ACUTE CYSTITIS WITH HEMATURIA: ICD-10-CM

## 2018-02-16 DIAGNOSIS — R35.1 NOCTURIA: ICD-10-CM

## 2018-02-16 LAB
-: ABNORMAL
AMORPHOUS: ABNORMAL
BACTERIA: ABNORMAL
BILIRUBIN URINE: NEGATIVE
BILIRUBIN, POC: ABNORMAL
BLOOD URINE, POC: NEGATIVE
CASTS UA: ABNORMAL /LPF (ref 0–2)
CLARITY, POC: ABNORMAL
COLOR, POC: YELLOW
COLOR: YELLOW
CRYSTALS, UA: ABNORMAL /HPF
EPITHELIAL CELLS UA: ABNORMAL /HPF (ref 0–5)
GLUCOSE URINE, POC: NEGATIVE
GLUCOSE URINE: NEGATIVE
KETONES, POC: ABNORMAL
KETONES, URINE: ABNORMAL
LEUKOCYTE EST, POC: ABNORMAL
LEUKOCYTE ESTERASE, URINE: ABNORMAL
MUCUS: ABNORMAL
NITRITE, POC: POSITIVE
NITRITE, URINE: POSITIVE
OTHER OBSERVATIONS UA: ABNORMAL
PH UA: 5 (ref 5–8)
PH, POC: 6
PROTEIN UA: ABNORMAL
PROTEIN, POC: 30
RBC UA: ABNORMAL /HPF (ref 0–2)
RENAL EPITHELIAL, UA: ABNORMAL /HPF
SPECIFIC GRAVITY UA: 1.02 (ref 1–1.03)
SPECIFIC GRAVITY, POC: 1.03
TRICHOMONAS: ABNORMAL
TURBIDITY: ABNORMAL
URINE HGB: ABNORMAL
UROBILINOGEN, POC: 0.2
UROBILINOGEN, URINE: NORMAL
WBC UA: ABNORMAL /HPF (ref 0–5)
YEAST: ABNORMAL

## 2018-02-16 PROCEDURE — 99999 URINALYSIS WITH MICROSCOPIC: CPT | Performed by: UROLOGY

## 2018-02-16 PROCEDURE — 99204 OFFICE O/P NEW MOD 45 MIN: CPT | Performed by: UROLOGY

## 2018-02-16 PROCEDURE — 81002 URINALYSIS NONAUTO W/O SCOPE: CPT | Performed by: UROLOGY

## 2018-02-16 PROCEDURE — 51798 US URINE CAPACITY MEASURE: CPT | Performed by: UROLOGY

## 2018-02-16 NOTE — PROGRESS NOTES
hold treatment for now as pt is not symptomatic     Return in about 2 weeks (around 3/2/2018) for cystoscopy retrograde pyelogram, Surgery. Prescriptions Ordered:  No orders of the defined types were placed in this encounter. Orders Placed:  Orders Placed This Encounter   Procedures    Urinalysis With Microscopic     Order Specific Question:   SPECIFY(EX-CATH,MIDSTREAM,CYSTO,ETC)? Answer:   voided    POCT Urinalysis no Micro       I have discussed the care of this patient including pertinent history and exam findings, with the resident. I have seen and examined the patient and the key elements of all parts of the encounter have been performed by me. I agree with the assessment, plan and orders as documented by the resident.   Alexandre Cortes MD

## 2018-02-16 NOTE — PATIENT INSTRUCTIONS
OPERATION    PAIN  It is normal to have pain after the operation. It will be most severe in the area where the surgery was done. This is caused by inflammation and will improve slowly. USE OF NARCOTIC PAIN MEDICATION  Narcotic pain medication will be available to help with your pain after your surgery. Narcotics are the most effective drugs known for pain relief. They have other effects besides pain relief. These other effects are called side effects. They include sleepiness, nausea, flushing, sweating, euphoria (a high, intoxicated or confused feeling), and possibly constipation. If you find any of these side effects particularly bothersome, you may ask the nurse to give you one-half the specified dose. You will have to strike a compromise between the side effects and your need for pain relief by adjusting the dose. DIET/FLUIDS  Your diet will be based on the type of surgery you had. The intravenous (IV) line will likely be in place following your operation and can be used to administer fluids, pain medication, and antibiotics. ELIMINATION  A urinary catheter may be in place to drain urine from your bladder for the first one (1) or two (2) days. Following removal of the catheter, some people experience difficulty urinating. Catheterization, the passage of a sterile catheter (tube) into your bladder, may be necessary at intervals to drain urine. For the first few days after surgery, you may be slightly constipated. Stool softeners and laxatives may be ordered for your use. If you feel you need such medication, request it form your nurse. Drinking ample fluids and eating foods high in fiber, such as whole grains and raw fruits and vegetables, may also help you regain your normal bowel habits. PULMONARY (LUNG) HYGIENE  It is very important to breathe deeply and fully expand your lungs after surgery to prevent complications.   Several times an hour - take a deep breath and blow comfortable doing so and are no longer taking Narcotic pain medication. Third Week at [facility] May return to light work at home.  Avoid lifting more than ten (10) pounds. Fourth Week at [facility] may resume most normal activities.  Avoid repetitive bending and lifting over 10 - 20 pounds. REHABILITATION  You may be enrolled in a physical therapy/aquatics/kinesiotherapy/reconditioning program four weeks after your surgery. A prescription for this therapy will be provided to you if indicated. Because your insurance may limit the location and frequency of specific therapy programs, please check with them prior to your initial evaluation. DISABILITY  The period of convalescence will vary depending on the type of surgery you had. During the period when you cannot work, your employer will likely need to be advised in writing that you are unable to work and of the date you are expected to return to work. Our office will provide the necessary information required by your employer. All matters pertaining to your disability status can be handled by contacting our office.

## 2018-02-23 ENCOUNTER — HOSPITAL ENCOUNTER (OUTPATIENT)
Dept: VASCULAR LAB | Age: 78
Discharge: HOME | End: 2018-02-23
Payer: MEDICARE

## 2018-02-23 ENCOUNTER — TELEPHONE (OUTPATIENT)
Dept: UROLOGY | Age: 78
End: 2018-02-23

## 2018-02-23 DIAGNOSIS — F17.200 SMOKING: ICD-10-CM

## 2018-02-23 DIAGNOSIS — I73.9 PVD (PERIPHERAL VASCULAR DISEASE) (HCC): ICD-10-CM

## 2018-02-23 PROCEDURE — 93924 LWR XTR VASC STDY BILAT: CPT

## 2018-02-23 NOTE — TELEPHONE ENCOUNTER
Patient is scheduled for cysto on 3/6/18 at 2:00PM.   Patient's wife confirmed. Letter mailed out today.

## 2018-02-26 ENCOUNTER — OFFICE VISIT (OUTPATIENT)
Dept: INTERNAL MEDICINE | Age: 78
End: 2018-02-26
Payer: MEDICARE

## 2018-02-26 VITALS
BODY MASS INDEX: 18.24 KG/M2 | HEART RATE: 72 BPM | DIASTOLIC BLOOD PRESSURE: 61 MMHG | SYSTOLIC BLOOD PRESSURE: 105 MMHG | WEIGHT: 113 LBS

## 2018-02-26 DIAGNOSIS — Z23 NEED FOR SHINGLES VACCINE: Primary | ICD-10-CM

## 2018-02-26 DIAGNOSIS — R31.9 HEMATURIA, UNSPECIFIED TYPE: ICD-10-CM

## 2018-02-26 PROBLEM — E86.0 DEHYDRATION: Status: RESOLVED | Noted: 2018-01-17 | Resolved: 2018-02-26

## 2018-02-26 PROBLEM — A41.51 SEPSIS DUE TO ESCHERICHIA COLI (HCC): Status: RESOLVED | Noted: 2018-01-18 | Resolved: 2018-02-26

## 2018-02-26 PROBLEM — N30.01 ACUTE CYSTITIS WITH HEMATURIA: Status: RESOLVED | Noted: 2018-01-18 | Resolved: 2018-02-26

## 2018-02-26 PROBLEM — R50.9 FEVER: Status: RESOLVED | Noted: 2018-01-18 | Resolved: 2018-02-26

## 2018-02-26 PROBLEM — R41.89 UNRESPONSIVENESS: Status: RESOLVED | Noted: 2018-01-14 | Resolved: 2018-02-26

## 2018-02-26 PROCEDURE — 99213 OFFICE O/P EST LOW 20 MIN: CPT | Performed by: STUDENT IN AN ORGANIZED HEALTH CARE EDUCATION/TRAINING PROGRAM

## 2018-02-26 NOTE — PATIENT INSTRUCTIONS
Return To Clinic 6/4/18. After Visit Summary given and reviewed. BB    It is very important for your care that you keep your appointment. If for some reason you are unable to keep your appointment it is equally important that you call our office at [phone] to cancel your appointment and reschedule. Failure to do so may result in your termination from our practice.

## 2018-02-26 NOTE — PROGRESS NOTES
Attending Physician Statement GC  I have discussed the care of [address], including pertinent history and exam findings with the resident. I have reviewed the key elements of all parts of the encounter with the resident. I have seen and examined the patient with the resident and the key elements of all parts of the encounter have been performed by me. Hospital admission in Jan 2018 for sepsis, UTI- was Dced on Keflex, doing much better now  Hematuria-scheduled for cysto  HIV- Follows with ID.  On Artripla  Dementia- on Nemanda, Aricept  Script for Zoster vaccine    Follow up in 3 months      Markus Aburto MD

## 2018-02-28 ENCOUNTER — OFFICE VISIT (OUTPATIENT)
Dept: NEUROLOGY | Age: 78
End: 2018-02-28
Payer: MEDICARE

## 2018-02-28 VITALS
HEART RATE: 77 BPM | HEIGHT: 66 IN | DIASTOLIC BLOOD PRESSURE: 70 MMHG | BODY MASS INDEX: 18.26 KG/M2 | WEIGHT: 113.6 LBS | SYSTOLIC BLOOD PRESSURE: 104 MMHG

## 2018-02-28 DIAGNOSIS — F03.90 DEMENTIA WITHOUT BEHAVIORAL DISTURBANCE, UNSPECIFIED DEMENTIA TYPE: Primary | ICD-10-CM

## 2018-02-28 DIAGNOSIS — R25.1 OCCASIONAL TREMORS: ICD-10-CM

## 2018-02-28 DIAGNOSIS — R56.9 SEIZURE-LIKE ACTIVITY (HCC): ICD-10-CM

## 2018-02-28 DIAGNOSIS — B20 HIV (HUMAN IMMUNODEFICIENCY VIRUS INFECTION) (HCC): ICD-10-CM

## 2018-02-28 PROCEDURE — 99214 OFFICE O/P EST MOD 30 MIN: CPT | Performed by: NURSE PRACTITIONER

## 2018-02-28 NOTE — PROGRESS NOTES
[facility]  [facility], [address], [facility]  Dept: [phone]  Dept Fax: [phone]                                                                Sara Smiley, [address] [facility]      2/28/2018    HPI:      Your patient, Eleonora Briones returns for continuing neurologic care. Patient is an 1-year-old man who was seen at [facility] on January 13, 2018 with an episode suggestive of a seizure. He had an episode of staring with tremor of his bilateral upper extremities and then loss of consciousness. The unresponsive episode lasted for approximately 2 minutes. His wife had also noticed previous episodes of staring as well as tremor of bilateral upper extremities. Patient has a history of dementia, headaches, HIV, and syphilis. MRI of the brain demonstrated mild to moderate small vessel ischemic disease and marked cerebral atrophy. EEG showed no paroxysmal activity. Patient had initially been treated with Keppra 250 mg twice daily, but following the diagnostic testing this was discontinued by Dr. Rabia marsh. Since his discharge on January 20, he has had no episodes of seizure-like activity. He does occasionally get tremor in his arms bilaterally but there've been no unresponsive episodes. Other testing completed while hospitalized include cholesterol 242 HDL 82, , and triglycerides 146. His hemoglobin A1c was 5.8, TSH 1.3 to, and vitamin B12 668.     MRI Brain January 2018                  Past Medical History:   Diagnosis Date    Benign prostatic hyperplasia     Convulsions ([address]     Dementia     GERD (gastroesophageal reflux disease) 4/28/2015    GHEKEUPG(189.5)     Hemorrhoids     History of shingles     HIV (human immunodeficiency virus infection) ([address]     Human immunodeficiency virus (HIV) ([address]     Meralgia paraesthetica 2/4/2016    Mixed hyperlipidemia 5/5/2016    Osteoporosis     Syphilis     Treated changes: absent           No Known Allergies        Current Outpatient Prescriptions   Medication Sig Dispense Refill    vitamin E 400 UNIT capsule TAKE 1 CAPSULE TWICE A DAY 30 capsule 0    Multiple Vitamin (MULTIVITAMIN) tablet TAKE 1 TABLET DAILY 60 tablet 0    Calcium Carbonate-Vitamin D (OYSTER SHELL CALCIUM/D) 500-200 MG-UNIT TABS TAKE 2 TABLETS IN THE MORNING 60 tablet 0    omeprazole (PRILOSEC) 20 MG delayed release capsule TAKE 1 CAPSULE DAILY 30 capsule 0    memantine (NAMENDA) 5 MG tablet TAKE 1 TABLET TWICE A DAY 60 tablet 0    donepezil (ARICEPT) 10 MG tablet TAKE 1 TABLET IN THE EVENING 30 tablet 0    finasteride (PROSCAR) 5 MG tablet TAKE 1 TABLET DAILY 30 tablet 0    alendronate (FOSAMAX) 70 MG tablet TAKE 1 TABLET EVERY WEEK 4 tablet 0     No current facility-administered medications for this visit. PHYSICAL EXAMINATION       /70 (Site: Left Arm, Position: Sitting)   Pulse 77   Ht 5' 6\" (1.676 m)   Wt 113 lb 9.6 oz (51.5 kg)   BMI 18.34 kg/m²                                             .                                                                                                       General Appearance:  Alert, cooperative, no signs of distress, appears stated age   Head:  Normocephalic, no signs of trauma   Eyes:  Conjunctiva/corneas clear;  eyelids intact   Ears:  Normal external ear and canals   Nose: Nares normal, mucosa normal, no drainage    Throat: Lips and tongue normal; teeth normal;  gums normal   Neck: Supple, intact flexion, extension and rotation;   trachea midline;  no adenopathy;   thyroid: not enlarged;   no carotid pulse abnormality   Back:   Symmetric, no curvature, ROM adequate   Lungs:   Respirations unlabored   Heart:  Regular rate and rhythm           Extremities: Extremities normal, no cyanosis, no edema   Pulses: Symmetric over head and neck   Skin: Skin color, texture normal, no rashes, no lesions NEUROLOGIC EXAMINATION    Neurologic Exam     Mental Status   Oriented to person. Disoriented to place. Disoriented to year, month, date and day. Attention: normal.   Speech: speech is normal   Level of consciousness: alert  Normal comprehension. Cranial Nerves     CN II   Visual fields full to confrontation. CN III, IV, VI   Pupils are equal, round, and reactive to light. Extraocular motions are normal.     CN V   Facial sensation intact. CN VII   Facial expression full, symmetric. CN VIII   CN VIII normal.     CN IX, X   CN IX normal.     CN XI   CN XI normal.     CN XII   CN XII normal.     Motor Exam   Muscle bulk: normal  Overall muscle tone: normal  Right arm tone: normal  Left arm tone: normal  Right arm pronator drift: absent  Left arm pronator drift: absent  Right leg tone: normal  Left leg tone: normal    Strength   Strength 5/5 throughout. Sensory Exam   Light touch normal.   Vibration normal.   Pinprick normal.     Gait, Coordination, and Reflexes     Gait  Gait: normal    Coordination   Finger to nose coordination: normal    Tremor   Resting tremor: absent    Reflexes   Right brachioradialis: 2+  Left brachioradialis: 2+  Right biceps: 2+  Left biceps: 2+  Right triceps: 2+  Left triceps: 2+  Right patellar: 2+  Left patellar: 2+  Right achilles: 2+  Left achilles: 2+            ASSESSMENT/PLAN:       In summary, your patient, Avel Oneil exhibits the following, with associated plan:    1. Seizure-like episode on January 13, 2018, with no recurrent events. Patient had initially been treated with Keppra, but was discontinued upon discharge from the hospital.  1. Patient's wife was instructed on the signs and symptoms of seizure activity and seizure precautions. If she notices any seizure-like activity she will notify the office immediately.   If patient experiences a seizure lasting more than 3-4 minutes, she will call paramedics. 2. Return for reevaluation in 6 months  2. Dementia  1. Continue Aricept 10 mg daily  2.  Continue Namenda 10 mg twice daily            Signed: Bree Doyle CNP

## 2018-03-02 DIAGNOSIS — F02.80 ALZHEIMER'S DEMENTIA WITHOUT BEHAVIORAL DISTURBANCE (HCC): ICD-10-CM

## 2018-03-02 DIAGNOSIS — G30.9 ALZHEIMER'S DEMENTIA WITHOUT BEHAVIORAL DISTURBANCE (HCC): ICD-10-CM

## 2018-03-02 DIAGNOSIS — K21.9 GASTROESOPHAGEAL REFLUX DISEASE WITHOUT ESOPHAGITIS: ICD-10-CM

## 2018-03-02 DIAGNOSIS — M81.0 OSTEOPOROSIS: ICD-10-CM

## 2018-03-02 DIAGNOSIS — F02.80 ALZHEIMER'S DEMENTIA WITHOUT BEHAVIORAL DISTURBANCE, UNSPECIFIED TIMING OF DEMENTIA ONSET: ICD-10-CM

## 2018-03-02 DIAGNOSIS — N40.0 BENIGN NON-NODULAR PROSTATIC HYPERPLASIA WITHOUT LOWER URINARY TRACT SYMPTOMS: ICD-10-CM

## 2018-03-02 DIAGNOSIS — G30.9 ALZHEIMER'S DEMENTIA WITHOUT BEHAVIORAL DISTURBANCE, UNSPECIFIED TIMING OF DEMENTIA ONSET: ICD-10-CM

## 2018-03-02 RX ORDER — B-COMPLEX WITH VITAMIN C
TABLET ORAL
Qty: 60 TABLET | Refills: 0 | Status: SHIPPED | OUTPATIENT
Start: 2018-03-02 | End: 2018-03-30 | Stop reason: SDUPTHER

## 2018-03-02 RX ORDER — DONEPEZIL HYDROCHLORIDE 10 MG/1
TABLET, FILM COATED ORAL
Qty: 30 TABLET | Refills: 0 | Status: SHIPPED | OUTPATIENT
Start: 2018-03-02 | End: 2018-03-30 | Stop reason: SDUPTHER

## 2018-03-02 RX ORDER — MEMANTINE HYDROCHLORIDE 5 MG/1
TABLET ORAL
Qty: 60 TABLET | Refills: 0 | Status: SHIPPED | OUTPATIENT
Start: 2018-03-02 | End: 2018-03-30 | Stop reason: SDUPTHER

## 2018-03-02 RX ORDER — OMEPRAZOLE 20 MG/1
CAPSULE, DELAYED RELEASE ORAL
Qty: 30 CAPSULE | Refills: 0 | Status: SHIPPED | OUTPATIENT
Start: 2018-03-02 | End: 2018-03-30 | Stop reason: SDUPTHER

## 2018-03-02 RX ORDER — VITAMIN E 268 MG
CAPSULE ORAL
Qty: 30 CAPSULE | Refills: 0 | Status: SHIPPED | OUTPATIENT
Start: 2018-03-02 | End: 2018-03-27 | Stop reason: SDUPTHER

## 2018-03-02 RX ORDER — FINASTERIDE 5 MG/1
TABLET, FILM COATED ORAL
Qty: 30 TABLET | Refills: 0 | Status: SHIPPED | OUTPATIENT
Start: 2018-03-02 | End: 2018-03-30 | Stop reason: SDUPTHER

## 2018-03-02 RX ORDER — ALENDRONATE SODIUM 70 MG/1
TABLET ORAL
Qty: 4 TABLET | Refills: 0 | Status: SHIPPED | OUTPATIENT
Start: 2018-03-02 | End: 2018-03-30 | Stop reason: SDUPTHER

## 2018-03-05 ENCOUNTER — ANESTHESIA EVENT (OUTPATIENT)
Dept: OPERATING ROOM | Age: 78
End: 2018-03-05
Payer: MEDICARE

## 2018-03-05 RX ORDER — SODIUM CHLORIDE, SODIUM LACTATE, POTASSIUM CHLORIDE, CALCIUM CHLORIDE 600; 310; 30; 20 MG/100ML; MG/100ML; MG/100ML; MG/100ML
INJECTION, SOLUTION INTRAVENOUS CONTINUOUS
Status: CANCELLED | OUTPATIENT
Start: 2018-03-05

## 2018-03-06 ENCOUNTER — APPOINTMENT (OUTPATIENT)
Dept: GENERAL RADIOLOGY | Age: 78
End: 2018-03-06
Attending: UROLOGY
Payer: MEDICARE

## 2018-03-06 ENCOUNTER — HOSPITAL ENCOUNTER (OUTPATIENT)
Age: 78
Setting detail: OUTPATIENT SURGERY
Discharge: HOME | End: 2018-03-06
Attending: UROLOGY | Admitting: UROLOGY
Payer: MEDICARE

## 2018-03-06 ENCOUNTER — ANESTHESIA (OUTPATIENT)
Dept: OPERATING ROOM | Age: 78
End: 2018-03-06
Payer: MEDICARE

## 2018-03-06 VITALS
SYSTOLIC BLOOD PRESSURE: 110 MMHG | RESPIRATION RATE: 14 BRPM | OXYGEN SATURATION: 100 % | DIASTOLIC BLOOD PRESSURE: 62 MMHG

## 2018-03-06 VITALS
HEART RATE: 73 BPM | HEIGHT: 66 IN | SYSTOLIC BLOOD PRESSURE: 118 MMHG | TEMPERATURE: 97.3 F | DIASTOLIC BLOOD PRESSURE: 72 MMHG | RESPIRATION RATE: 16 BRPM | WEIGHT: 113 LBS | OXYGEN SATURATION: 100 % | BODY MASS INDEX: 18.16 KG/M2

## 2018-03-06 PROCEDURE — C1758 CATHETER, URETERAL: HCPCS | Performed by: UROLOGY

## 2018-03-06 PROCEDURE — 74420 UROGRAPHY RTRGR +-KUB: CPT

## 2018-03-06 PROCEDURE — 2500000003 HC RX 250 WO HCPCS: Performed by: SPECIALIST

## 2018-03-06 PROCEDURE — 7100000040 HC SPAR PHASE II RECOVERY - FIRST 15 MIN: Performed by: UROLOGY

## 2018-03-06 PROCEDURE — 3600000012 HC SURGERY LEVEL 2 ADDTL 15MIN: Performed by: UROLOGY

## 2018-03-06 PROCEDURE — C1769 GUIDE WIRE: HCPCS | Performed by: UROLOGY

## 2018-03-06 PROCEDURE — 6360000002 HC RX W HCPCS

## 2018-03-06 PROCEDURE — 3700000000 HC ANESTHESIA ATTENDED CARE: Performed by: UROLOGY

## 2018-03-06 PROCEDURE — 6360000002 HC RX W HCPCS: Performed by: SPECIALIST

## 2018-03-06 PROCEDURE — 3600000002 HC SURGERY LEVEL 2 BASE: Performed by: UROLOGY

## 2018-03-06 PROCEDURE — 3700000001 HC ADD 15 MINUTES (ANESTHESIA): Performed by: UROLOGY

## 2018-03-06 PROCEDURE — 7100000041 HC SPAR PHASE II RECOVERY - ADDTL 15 MIN: Performed by: UROLOGY

## 2018-03-06 PROCEDURE — 2580000003 HC RX 258: Performed by: SPECIALIST

## 2018-03-06 PROCEDURE — 6360000004 HC RX CONTRAST MEDICATION: Performed by: UROLOGY

## 2018-03-06 RX ORDER — PROPOFOL 10 MG/ML
INJECTION, EMULSION INTRAVENOUS PRN
Status: DISCONTINUED | OUTPATIENT
Start: 2018-03-06 | End: 2018-03-06 | Stop reason: SDUPTHER

## 2018-03-06 RX ORDER — LIDOCAINE HYDROCHLORIDE 10 MG/ML
INJECTION, SOLUTION EPIDURAL; INFILTRATION; INTRACAUDAL; PERINEURAL PRN
Status: DISCONTINUED | OUTPATIENT
Start: 2018-03-06 | End: 2018-03-06 | Stop reason: SDUPTHER

## 2018-03-06 RX ORDER — SODIUM CHLORIDE, SODIUM LACTATE, POTASSIUM CHLORIDE, CALCIUM CHLORIDE 600; 310; 30; 20 MG/100ML; MG/100ML; MG/100ML; MG/100ML
INJECTION, SOLUTION INTRAVENOUS CONTINUOUS PRN
Status: DISCONTINUED | OUTPATIENT
Start: 2018-03-06 | End: 2018-03-06 | Stop reason: SDUPTHER

## 2018-03-06 RX ADMIN — LIDOCAINE HYDROCHLORIDE 40 MG: 10 INJECTION, SOLUTION EPIDURAL; INFILTRATION; INTRACAUDAL; PERINEURAL at 14:26

## 2018-03-06 RX ADMIN — PROPOFOL 200 MG: 10 INJECTION, EMULSION INTRAVENOUS at 14:26

## 2018-03-06 RX ADMIN — PROPOFOL 40 MG: 10 INJECTION, EMULSION INTRAVENOUS at 14:38

## 2018-03-06 RX ADMIN — SODIUM CHLORIDE, POTASSIUM CHLORIDE, SODIUM LACTATE AND CALCIUM CHLORIDE: 600; 310; 30; 20 INJECTION, SOLUTION INTRAVENOUS at 14:21

## 2018-03-06 ASSESSMENT — PULMONARY FUNCTION TESTS
PIF_VALUE: 0
PIF_VALUE: 1
PIF_VALUE: 0

## 2018-03-06 ASSESSMENT — PAIN SCALES - GENERAL
PAINLEVEL_OUTOF10: 0

## 2018-03-06 ASSESSMENT — PAIN - FUNCTIONAL ASSESSMENT: PAIN_FUNCTIONAL_ASSESSMENT: 0-10

## 2018-03-06 ASSESSMENT — LIFESTYLE VARIABLES: SMOKING_STATUS: 1

## 2018-03-06 ASSESSMENT — ENCOUNTER SYMPTOMS: SHORTNESS OF BREATH: 0

## 2018-03-06 NOTE — H&P
hip fracture dec 08    Hemorrhoids    BPH (benign prostatic hyperplasia)    GERD (gastroesophageal reflux disease)    Meralgia paraesthetica    Mixed hyperlipidemia    Occasional tremors    Syphilis in male    PVD (peripheral vascular disease) ([address]    Seizure-like activity ([facility]     76year old male with new onset gross hematuria since January 2018.     Plan:     OR for cystoscopy and retrograde pyelograms bilaterally for gross hematuria workup      Laquita Bloodgood  2:03 PM 3/6/2018

## 2018-03-06 NOTE — OP NOTE
friable prostate tissue that bled easily at the bladder neck, which was likely the cause of his hematuria. The bladder was drained and the cystoscope was removed. The patient tolerated the procedure well and was sent to the PACU for postoperative monitoring.      Plan:  The patient was discharged home in stable condition with instructions to  follow up in clinic. He will continue his finasteride.

## 2018-03-06 NOTE — ANESTHESIA PRE PROCEDURE
Department of Anesthesiology  Preprocedure Note       Name:  Aron Pratt   Age:  76 y.o.  :  1940                                          MRN:  [id number]         Date:  3/6/2018      Surgeon: Wyatt Coelho):  Dmitriy Lee MD    Procedure: Procedure(s):  CYSTOSCOPY RETROGRADE PYELOGRAM    Medications prior to admission:   Prior to Admission medications    Medication Sig Start Date End Date Taking? Authorizing Provider   vitamin E 400 UNIT capsule TAKE 1 CAPSULE TWICE A DAY 3/2/18  Yes Mer Goldman MD   alendronate (FOSAMAX) 70 MG tablet TAKE 1 TABLET EVERY WEEK 3/2/18  Yes Mer Goldman MD   finasteride (PROSCAR) 5 MG tablet TAKE 1 TABLET DAILY 3/2/18  Yes Mer Goldman MD   donepezil (ARICEPT) 10 MG tablet TAKE 1 TABLET IN THE EVENING 3/2/18  Yes Mer Goldman MD   memantine (NAMENDA) 5 MG tablet TAKE 1 TABLET TWICE A DAY 3/2/18  Yes eMr Goldman MD   omeprazole (PRILOSEC) 20 MG delayed release capsule TAKE 1 CAPSULE DAILY 3/2/18  Yes Mer Goldman MD   Calcium Carbonate-Vitamin D (OYSTER SHELL CALCIUM/D) 500-200 MG-UNIT TABS TAKE 2 TABLETS IN THE MORNING 3/2/18  Yes Mer Goldman MD   Multiple Vitamin (MULTIVITAMIN) tablet TAKE 1 TABLET DAILY 18  Yes Mer Goldman MD       Current medications:    No current facility-administered medications for this encounter.         Allergies:  No Known Allergies    Problem List:    Patient Active Problem List   Diagnosis Code    HIV (human immunodeficiency virus infection) ([address] B20    Dementia without behavioral disturbance F03.90    Osteoporosis right hip fracture dec 08 M81.0    Hemorrhoids K64.9    BPH (benign prostatic hyperplasia) N40.0    GERD (gastroesophageal reflux disease) K21.9    Meralgia paraesthetica G57.10    Mixed hyperlipidemia E78.2    Occasional tremors R25.1    Syphilis in male A53.10    PVD [facility]peripheral [facility] disease) ([facility] I73.9    Seizure-like activity ([facility] R56.9       Past Medical History:        Diagnosis Date spouse. Plan discussed with CRNA.                   Lorraine Reynolds MD   3/6/2018

## 2018-03-27 DIAGNOSIS — G30.9 ALZHEIMER'S DEMENTIA WITHOUT BEHAVIORAL DISTURBANCE (HCC): ICD-10-CM

## 2018-03-27 DIAGNOSIS — F02.80 ALZHEIMER'S DEMENTIA WITHOUT BEHAVIORAL DISTURBANCE (HCC): ICD-10-CM

## 2018-03-27 RX ORDER — VITAMIN E 268 MG
CAPSULE ORAL
Qty: 30 CAPSULE | Refills: 0 | Status: SHIPPED | OUTPATIENT
Start: 2018-03-27 | End: 2018-04-26 | Stop reason: SDUPTHER

## 2018-03-30 DIAGNOSIS — F17.200 SMOKER: ICD-10-CM

## 2018-03-30 DIAGNOSIS — M81.0 OSTEOPOROSIS: ICD-10-CM

## 2018-03-30 DIAGNOSIS — G30.9 ALZHEIMER'S DEMENTIA WITHOUT BEHAVIORAL DISTURBANCE, UNSPECIFIED TIMING OF DEMENTIA ONSET: ICD-10-CM

## 2018-03-30 DIAGNOSIS — K21.9 GASTROESOPHAGEAL REFLUX DISEASE WITHOUT ESOPHAGITIS: ICD-10-CM

## 2018-03-30 DIAGNOSIS — N40.0 BENIGN NON-NODULAR PROSTATIC HYPERPLASIA WITHOUT LOWER URINARY TRACT SYMPTOMS: ICD-10-CM

## 2018-03-30 DIAGNOSIS — F02.80 ALZHEIMER'S DEMENTIA WITHOUT BEHAVIORAL DISTURBANCE (HCC): ICD-10-CM

## 2018-03-30 DIAGNOSIS — G30.9 ALZHEIMER'S DEMENTIA WITHOUT BEHAVIORAL DISTURBANCE (HCC): ICD-10-CM

## 2018-03-30 DIAGNOSIS — F02.80 ALZHEIMER'S DEMENTIA WITHOUT BEHAVIORAL DISTURBANCE, UNSPECIFIED TIMING OF DEMENTIA ONSET: ICD-10-CM

## 2018-04-02 RX ORDER — DONEPEZIL HYDROCHLORIDE 10 MG/1
TABLET, FILM COATED ORAL
Qty: 30 TABLET | Refills: 0 | Status: SHIPPED | OUTPATIENT
Start: 2018-04-02 | End: 2018-04-26 | Stop reason: SDUPTHER

## 2018-04-02 RX ORDER — MEMANTINE HYDROCHLORIDE 5 MG/1
TABLET ORAL
Qty: 60 TABLET | Refills: 0 | Status: SHIPPED | OUTPATIENT
Start: 2018-04-02 | End: 2018-04-26 | Stop reason: SDUPTHER

## 2018-04-02 RX ORDER — FINASTERIDE 5 MG/1
TABLET, FILM COATED ORAL
Qty: 30 TABLET | Refills: 0 | Status: SHIPPED | OUTPATIENT
Start: 2018-04-02 | End: 2018-04-26 | Stop reason: SDUPTHER

## 2018-04-02 RX ORDER — B-COMPLEX WITH VITAMIN C
TABLET ORAL
Qty: 60 TABLET | Refills: 0 | Status: SHIPPED | OUTPATIENT
Start: 2018-04-02 | End: 2018-04-26 | Stop reason: SDUPTHER

## 2018-04-02 RX ORDER — MULTIVITAMIN WITH FOLIC ACID 400 MCG
TABLET ORAL
Qty: 60 TABLET | Refills: 0 | Status: SHIPPED | OUTPATIENT
Start: 2018-04-02 | End: 2018-04-26 | Stop reason: SDUPTHER

## 2018-04-02 RX ORDER — OMEPRAZOLE 20 MG/1
CAPSULE, DELAYED RELEASE ORAL
Qty: 30 CAPSULE | Refills: 0 | Status: SHIPPED | OUTPATIENT
Start: 2018-04-02 | End: 2018-04-26 | Stop reason: SDUPTHER

## 2018-04-02 RX ORDER — ALENDRONATE SODIUM 70 MG/1
TABLET ORAL
Qty: 4 TABLET | Refills: 0 | Status: SHIPPED | OUTPATIENT
Start: 2018-04-02 | End: 2018-04-26 | Stop reason: SDUPTHER

## 2018-04-26 DIAGNOSIS — F02.80 ALZHEIMER'S DEMENTIA WITHOUT BEHAVIORAL DISTURBANCE (HCC): ICD-10-CM

## 2018-04-26 DIAGNOSIS — G30.9 ALZHEIMER'S DEMENTIA WITHOUT BEHAVIORAL DISTURBANCE (HCC): ICD-10-CM

## 2018-04-26 DIAGNOSIS — K21.9 GASTROESOPHAGEAL REFLUX DISEASE WITHOUT ESOPHAGITIS: ICD-10-CM

## 2018-04-26 DIAGNOSIS — N40.0 BENIGN NON-NODULAR PROSTATIC HYPERPLASIA WITHOUT LOWER URINARY TRACT SYMPTOMS: ICD-10-CM

## 2018-04-26 DIAGNOSIS — M81.0 OSTEOPOROSIS: ICD-10-CM

## 2018-04-26 DIAGNOSIS — G30.9 ALZHEIMER'S DEMENTIA WITHOUT BEHAVIORAL DISTURBANCE, UNSPECIFIED TIMING OF DEMENTIA ONSET: ICD-10-CM

## 2018-04-26 DIAGNOSIS — F17.200 SMOKER: ICD-10-CM

## 2018-04-26 DIAGNOSIS — F02.80 ALZHEIMER'S DEMENTIA WITHOUT BEHAVIORAL DISTURBANCE, UNSPECIFIED TIMING OF DEMENTIA ONSET: ICD-10-CM

## 2018-04-27 ENCOUNTER — OFFICE VISIT (OUTPATIENT)
Dept: PODIATRY | Age: 78
End: 2018-04-27
Payer: MEDICARE

## 2018-04-27 VITALS
HEIGHT: 67 IN | WEIGHT: 116.2 LBS | SYSTOLIC BLOOD PRESSURE: 107 MMHG | HEART RATE: 71 BPM | DIASTOLIC BLOOD PRESSURE: 71 MMHG | BODY MASS INDEX: 18.24 KG/M2

## 2018-04-27 DIAGNOSIS — B35.1 ONYCHOMYCOSIS: Primary | ICD-10-CM

## 2018-04-27 DIAGNOSIS — M79.671 PAIN IN BOTH FEET: ICD-10-CM

## 2018-04-27 DIAGNOSIS — I73.9 PVD (PERIPHERAL VASCULAR DISEASE) (HCC): ICD-10-CM

## 2018-04-27 DIAGNOSIS — M79.672 PAIN IN BOTH FEET: ICD-10-CM

## 2018-04-27 PROCEDURE — 11721 DEBRIDE NAIL 6 OR MORE: CPT | Performed by: PODIATRIST

## 2018-04-27 PROCEDURE — 99213 OFFICE O/P EST LOW 20 MIN: CPT | Performed by: PODIATRIST

## 2018-04-28 RX ORDER — MULTIVITAMIN WITH FOLIC ACID 400 MCG
TABLET ORAL
Qty: 60 TABLET | Refills: 0 | Status: SHIPPED | OUTPATIENT
Start: 2018-04-28 | End: 2018-05-24 | Stop reason: SDUPTHER

## 2018-04-28 RX ORDER — B-COMPLEX WITH VITAMIN C
TABLET ORAL
Qty: 60 TABLET | Refills: 0 | Status: SHIPPED | OUTPATIENT
Start: 2018-04-28 | End: 2018-05-24 | Stop reason: SDUPTHER

## 2018-04-28 RX ORDER — OMEPRAZOLE 20 MG/1
CAPSULE, DELAYED RELEASE ORAL
Qty: 30 CAPSULE | Refills: 0 | Status: SHIPPED | OUTPATIENT
Start: 2018-04-28 | End: 2018-05-24 | Stop reason: SDUPTHER

## 2018-04-28 RX ORDER — MEMANTINE HYDROCHLORIDE 5 MG/1
TABLET ORAL
Qty: 60 TABLET | Refills: 0 | Status: SHIPPED | OUTPATIENT
Start: 2018-04-28 | End: 2018-05-24 | Stop reason: SDUPTHER

## 2018-04-28 RX ORDER — ALENDRONATE SODIUM 70 MG/1
TABLET ORAL
Qty: 4 TABLET | Refills: 0 | Status: SHIPPED | OUTPATIENT
Start: 2018-04-28 | End: 2018-05-25 | Stop reason: SDUPTHER

## 2018-04-28 RX ORDER — FINASTERIDE 5 MG/1
TABLET, FILM COATED ORAL
Qty: 30 TABLET | Refills: 0 | Status: SHIPPED | OUTPATIENT
Start: 2018-04-28 | End: 2018-05-24 | Stop reason: SDUPTHER

## 2018-04-28 RX ORDER — VITAMIN E 268 MG
CAPSULE ORAL
Qty: 30 CAPSULE | Refills: 0 | Status: SHIPPED | OUTPATIENT
Start: 2018-04-28 | End: 2018-06-04 | Stop reason: SDUPTHER

## 2018-04-28 RX ORDER — DONEPEZIL HYDROCHLORIDE 10 MG/1
TABLET, FILM COATED ORAL
Qty: 30 TABLET | Refills: 0 | Status: SHIPPED | OUTPATIENT
Start: 2018-04-28 | End: 2018-05-24 | Stop reason: SDUPTHER

## 2018-05-24 DIAGNOSIS — G30.9 ALZHEIMER'S DEMENTIA WITHOUT BEHAVIORAL DISTURBANCE (HCC): ICD-10-CM

## 2018-05-24 DIAGNOSIS — F02.80 ALZHEIMER'S DEMENTIA WITHOUT BEHAVIORAL DISTURBANCE, UNSPECIFIED TIMING OF DEMENTIA ONSET: ICD-10-CM

## 2018-05-24 DIAGNOSIS — F17.200 SMOKER: ICD-10-CM

## 2018-05-24 DIAGNOSIS — N40.0 BENIGN NON-NODULAR PROSTATIC HYPERPLASIA WITHOUT LOWER URINARY TRACT SYMPTOMS: ICD-10-CM

## 2018-05-24 DIAGNOSIS — G30.9 ALZHEIMER'S DEMENTIA WITHOUT BEHAVIORAL DISTURBANCE, UNSPECIFIED TIMING OF DEMENTIA ONSET: ICD-10-CM

## 2018-05-24 DIAGNOSIS — K21.9 GASTROESOPHAGEAL REFLUX DISEASE WITHOUT ESOPHAGITIS: ICD-10-CM

## 2018-05-24 DIAGNOSIS — F02.80 ALZHEIMER'S DEMENTIA WITHOUT BEHAVIORAL DISTURBANCE (HCC): ICD-10-CM

## 2018-05-24 DIAGNOSIS — M81.0 OSTEOPOROSIS: ICD-10-CM

## 2018-05-24 RX ORDER — DONEPEZIL HYDROCHLORIDE 10 MG/1
TABLET, FILM COATED ORAL
Qty: 30 TABLET | Refills: 0 | Status: SHIPPED | OUTPATIENT
Start: 2018-05-24 | End: 2018-06-04 | Stop reason: SDUPTHER

## 2018-05-24 RX ORDER — MULTIVITAMIN WITH FOLIC ACID 400 MCG
TABLET ORAL
Qty: 60 TABLET | Refills: 0 | Status: SHIPPED | OUTPATIENT
Start: 2018-05-24 | End: 2018-06-04 | Stop reason: SDUPTHER

## 2018-05-24 RX ORDER — FINASTERIDE 5 MG/1
TABLET, FILM COATED ORAL
Qty: 30 TABLET | Refills: 0 | Status: SHIPPED | OUTPATIENT
Start: 2018-05-24 | End: 2018-06-04 | Stop reason: SDUPTHER

## 2018-05-24 RX ORDER — OMEPRAZOLE 20 MG/1
CAPSULE, DELAYED RELEASE ORAL
Qty: 30 CAPSULE | Refills: 0 | Status: SHIPPED | OUTPATIENT
Start: 2018-05-24 | End: 2018-06-04 | Stop reason: SDUPTHER

## 2018-05-24 RX ORDER — B-COMPLEX WITH VITAMIN C
TABLET ORAL
Qty: 60 TABLET | Refills: 0 | Status: SHIPPED | OUTPATIENT
Start: 2018-05-24 | End: 2018-06-04 | Stop reason: SDUPTHER

## 2018-05-24 RX ORDER — MEMANTINE HYDROCHLORIDE 5 MG/1
TABLET ORAL
Qty: 60 TABLET | Refills: 0 | Status: SHIPPED | OUTPATIENT
Start: 2018-05-24 | End: 2018-06-04 | Stop reason: SDUPTHER

## 2018-05-25 DIAGNOSIS — M81.0 OSTEOPOROSIS: ICD-10-CM

## 2018-05-25 RX ORDER — ALENDRONATE SODIUM 70 MG/1
TABLET ORAL
Qty: 4 TABLET | Refills: 0 | Status: SHIPPED | OUTPATIENT
Start: 2018-05-25 | End: 2018-06-04 | Stop reason: SDUPTHER

## 2018-06-04 ENCOUNTER — HOSPITAL ENCOUNTER (OUTPATIENT)
Age: 78
Setting detail: SPECIMEN
Discharge: HOME | End: 2018-06-04
Payer: MEDICARE

## 2018-06-04 ENCOUNTER — OFFICE VISIT (OUTPATIENT)
Dept: INTERNAL MEDICINE | Age: 78
End: 2018-06-04
Payer: MEDICARE

## 2018-06-04 VITALS
SYSTOLIC BLOOD PRESSURE: 105 MMHG | WEIGHT: 111.4 LBS | HEART RATE: 79 BPM | DIASTOLIC BLOOD PRESSURE: 66 MMHG | BODY MASS INDEX: 17.45 KG/M2

## 2018-06-04 DIAGNOSIS — I73.9 PVD (PERIPHERAL VASCULAR DISEASE) (HCC): Primary | ICD-10-CM

## 2018-06-04 DIAGNOSIS — K21.9 GASTROESOPHAGEAL REFLUX DISEASE, ESOPHAGITIS PRESENCE NOT SPECIFIED: ICD-10-CM

## 2018-06-04 DIAGNOSIS — F02.80 ALZHEIMER'S DEMENTIA WITHOUT BEHAVIORAL DISTURBANCE, UNSPECIFIED TIMING OF DEMENTIA ONSET: ICD-10-CM

## 2018-06-04 DIAGNOSIS — G30.9 ALZHEIMER'S DEMENTIA WITHOUT BEHAVIORAL DISTURBANCE, UNSPECIFIED TIMING OF DEMENTIA ONSET: ICD-10-CM

## 2018-06-04 DIAGNOSIS — B20 HIV (HUMAN IMMUNODEFICIENCY VIRUS INFECTION) (HCC): ICD-10-CM

## 2018-06-04 DIAGNOSIS — F17.200 SMOKER: ICD-10-CM

## 2018-06-04 DIAGNOSIS — I73.9 PVD (PERIPHERAL VASCULAR DISEASE) (HCC): ICD-10-CM

## 2018-06-04 DIAGNOSIS — M81.0 AGE-RELATED OSTEOPOROSIS WITHOUT CURRENT PATHOLOGICAL FRACTURE: Primary | ICD-10-CM

## 2018-06-04 DIAGNOSIS — K21.9 GASTROESOPHAGEAL REFLUX DISEASE WITHOUT ESOPHAGITIS: ICD-10-CM

## 2018-06-04 DIAGNOSIS — N40.0 BENIGN NON-NODULAR PROSTATIC HYPERPLASIA WITHOUT LOWER URINARY TRACT SYMPTOMS: ICD-10-CM

## 2018-06-04 DIAGNOSIS — E78.2 MIXED HYPERLIPIDEMIA: ICD-10-CM

## 2018-06-04 DIAGNOSIS — N40.0 BENIGN PROSTATIC HYPERPLASIA WITHOUT LOWER URINARY TRACT SYMPTOMS: ICD-10-CM

## 2018-06-04 LAB
ALBUMIN SERPL-MCNC: 3.9 G/DL (ref 3.5–5.2)
ALBUMIN/GLOBULIN RATIO: 0.9 (ref 1–2.5)
ALP BLD-CCNC: 205 U/L (ref 40–129)
ALT SERPL-CCNC: 16 U/L (ref 5–41)
ANION GAP SERPL CALCULATED.3IONS-SCNC: 9 MMOL/L (ref 9–17)
AST SERPL-CCNC: 32 U/L
BILIRUB SERPL-MCNC: 0.15 MG/DL (ref 0.3–1.2)
BILIRUBIN DIRECT: <0.08 MG/DL
BILIRUBIN, INDIRECT: ABNORMAL MG/DL (ref 0–1)
BUN BLDV-MCNC: 5 MG/DL (ref 8–23)
CALCIUM SERPL-MCNC: 9 MG/DL (ref 8.6–10.4)
CHLORIDE BLD-SCNC: 101 MMOL/L (ref 98–107)
CHOLESTEROL/HDL RATIO: 3.1
CHOLESTEROL: 241 MG/DL
CO2: 28 MMOL/L (ref 20–31)
CREAT SERPL-MCNC: 0.9 MG/DL (ref 0.7–1.2)
GFR AFRICAN AMERICAN: >60 ML/MIN
GFR NON-AFRICAN AMERICAN: >60 ML/MIN
GFR SERPL CREATININE-BSD FRML MDRD: ABNORMAL ML/MIN/{1.73_M2}
GFR SERPL CREATININE-BSD FRML MDRD: ABNORMAL ML/MIN/{1.73_M2}
GLUCOSE BLD-MCNC: 92 MG/DL (ref 70–99)
HCT VFR BLD CALC: 41.7 % (ref 40.7–50.3)
HDLC SERPL-MCNC: 78 MG/DL
HEMOGLOBIN: 12.4 G/DL (ref 13–17)
LDL CHOLESTEROL: 128 MG/DL (ref 0–130)
MCH RBC QN AUTO: 26.6 PG (ref 25.2–33.5)
MCHC RBC AUTO-ENTMCNC: 29.7 G/DL (ref 28.4–34.8)
MCV RBC AUTO: 89.3 FL (ref 82.6–102.9)
NRBC AUTOMATED: 0 PER 100 WBC
PDW BLD-RTO: 15.8 % (ref 11.8–14.4)
PLATELET # BLD: 293 K/UL (ref 138–453)
PMV BLD AUTO: 10.3 FL (ref 8.1–13.5)
POTASSIUM SERPL-SCNC: 4.3 MMOL/L (ref 3.7–5.3)
RBC # BLD: 4.67 M/UL (ref 4.21–5.77)
SODIUM BLD-SCNC: 138 MMOL/L (ref 135–144)
T. PALLIDUM, IGG: REACTIVE
TOTAL PROTEIN: 8.3 G/DL (ref 6.4–8.3)
TRIGL SERPL-MCNC: 176 MG/DL
VLDLC SERPL CALC-MCNC: ABNORMAL MG/DL (ref 1–30)
WBC # BLD: 6.2 K/UL (ref 3.5–11.3)

## 2018-06-04 PROCEDURE — 85027 COMPLETE CBC AUTOMATED: CPT

## 2018-06-04 PROCEDURE — 86355 B CELLS TOTAL COUNT: CPT

## 2018-06-04 PROCEDURE — 80053 COMPREHEN METABOLIC PANEL: CPT

## 2018-06-04 PROCEDURE — 86593 SYPHILIS TEST NON-TREP QUANT: CPT

## 2018-06-04 PROCEDURE — 87536 HIV-1 QUANT&REVRSE TRNSCRPJ: CPT

## 2018-06-04 PROCEDURE — 86780 TREPONEMA PALLIDUM: CPT

## 2018-06-04 PROCEDURE — 80061 LIPID PANEL: CPT

## 2018-06-04 PROCEDURE — 86360 T CELL ABSOLUTE COUNT/RATIO: CPT

## 2018-06-04 PROCEDURE — 99213 OFFICE O/P EST LOW 20 MIN: CPT | Performed by: STUDENT IN AN ORGANIZED HEALTH CARE EDUCATION/TRAINING PROGRAM

## 2018-06-04 PROCEDURE — 86359 T CELLS TOTAL COUNT: CPT

## 2018-06-04 PROCEDURE — 36415 COLL VENOUS BLD VENIPUNCTURE: CPT

## 2018-06-04 PROCEDURE — 82248 BILIRUBIN DIRECT: CPT

## 2018-06-04 PROCEDURE — 86357 NK CELLS TOTAL COUNT: CPT

## 2018-06-04 PROCEDURE — 99213 OFFICE O/P EST LOW 20 MIN: CPT | Performed by: INTERNAL MEDICINE

## 2018-06-04 RX ORDER — ATORVASTATIN CALCIUM 40 MG/1
40 TABLET, FILM COATED ORAL DAILY
Qty: 30 TABLET | Refills: 5 | Status: SHIPPED | OUTPATIENT
Start: 2018-06-04 | End: 2018-11-07 | Stop reason: SDUPTHER

## 2018-06-04 RX ORDER — MEMANTINE HYDROCHLORIDE 5 MG/1
TABLET ORAL
Qty: 60 TABLET | Refills: 5 | Status: SHIPPED | OUTPATIENT
Start: 2018-06-04 | End: 2018-08-29 | Stop reason: SDUPTHER

## 2018-06-04 RX ORDER — MULTIVITAMIN WITH FOLIC ACID 400 MCG
TABLET ORAL
Qty: 60 TABLET | Refills: 5 | Status: SHIPPED | OUTPATIENT
Start: 2018-06-04 | End: 2018-11-07 | Stop reason: SDUPTHER

## 2018-06-04 RX ORDER — ALENDRONATE SODIUM 70 MG/1
TABLET ORAL
Qty: 4 TABLET | Refills: 5 | Status: SHIPPED | OUTPATIENT
Start: 2018-06-04 | End: 2018-11-07 | Stop reason: SDUPTHER

## 2018-06-04 RX ORDER — DONEPEZIL HYDROCHLORIDE 10 MG/1
TABLET, FILM COATED ORAL
Qty: 30 TABLET | Refills: 5 | Status: SHIPPED | OUTPATIENT
Start: 2018-06-04 | End: 2018-12-11 | Stop reason: SDUPTHER

## 2018-06-04 RX ORDER — OMEPRAZOLE 20 MG/1
CAPSULE, DELAYED RELEASE ORAL
Qty: 30 CAPSULE | Refills: 5 | Status: SHIPPED | OUTPATIENT
Start: 2018-06-04 | End: 2018-11-07 | Stop reason: SDUPTHER

## 2018-06-04 RX ORDER — ASPIRIN 81 MG/1
81 TABLET ORAL DAILY
Qty: 30 TABLET | Refills: 5 | Status: SHIPPED | OUTPATIENT
Start: 2018-06-04 | End: 2018-11-07 | Stop reason: SDUPTHER

## 2018-06-04 RX ORDER — B-COMPLEX WITH VITAMIN C
TABLET ORAL
Qty: 60 TABLET | Refills: 5 | Status: SHIPPED | OUTPATIENT
Start: 2018-06-04 | End: 2018-11-07 | Stop reason: SDUPTHER

## 2018-06-04 RX ORDER — VITAMIN E 268 MG
CAPSULE ORAL
Qty: 30 CAPSULE | Refills: 5 | Status: SHIPPED | OUTPATIENT
Start: 2018-06-04 | End: 2018-11-07 | Stop reason: SDUPTHER

## 2018-06-04 RX ORDER — FINASTERIDE 5 MG/1
TABLET, FILM COATED ORAL
Qty: 30 TABLET | Refills: 5 | Status: SHIPPED | OUTPATIENT
Start: 2018-06-04 | End: 2018-11-07 | Stop reason: SDUPTHER

## 2018-06-05 LAB
% NK (CD56): 18 % (ref 6–29)
AB NK (CD56): 357 /UL (ref 90–600)
ABSOLUTE CD 3: 1468 /UL (ref 411–2061)
ABSOLUTE CD 4 HELPER: 496 /UL (ref 309–1571)
ABSOLUTE CD 8 (SUPP): 932 /UL (ref 282–999)
ABSOLUTE CD19 B CELL: 99 /UL (ref 71–567)
CD19 B CELL: 5 % (ref 5–25)
CD3 % TOTAL T CELLS: 74 % (ref 57–94)
CD4 % HELPER T CELL: 25 % (ref 27–64)
CD4:CD8: 0.53 (ref 0.6–2.8)
CD8 % SUPPRESSOR T CELL: 47 % (ref 17–48)
LYMPHOCYTES # BLD: 32 % (ref 24–44)
VDRL, QUANTITATIVE: 4
WBC # BLD: 6.2 K/UL (ref 3.5–11)

## 2018-06-06 LAB
DIRECT EXAM: NORMAL
Lab: NORMAL
SPECIMEN DESCRIPTION: NORMAL
STATUS: NORMAL

## 2018-06-12 ENCOUNTER — HOSPITAL ENCOUNTER (OUTPATIENT)
Dept: VASCULAR LAB | Age: 78
Discharge: HOME | End: 2018-06-12
Payer: MEDICARE

## 2018-06-12 DIAGNOSIS — I73.9 PVD (PERIPHERAL VASCULAR DISEASE) (HCC): ICD-10-CM

## 2018-06-12 PROCEDURE — 93923 UPR/LXTR ART STDY 3+ LVLS: CPT

## 2018-06-13 ENCOUNTER — OFFICE VISIT (OUTPATIENT)
Dept: INFECTIOUS DISEASES | Age: 78
End: 2018-06-13
Payer: MEDICARE

## 2018-06-13 VITALS
DIASTOLIC BLOOD PRESSURE: 72 MMHG | TEMPERATURE: 98.2 F | HEART RATE: 84 BPM | WEIGHT: 111 LBS | SYSTOLIC BLOOD PRESSURE: 97 MMHG | HEIGHT: 67 IN | BODY MASS INDEX: 17.42 KG/M2

## 2018-06-13 DIAGNOSIS — G30.9 ALZHEIMER'S DEMENTIA WITHOUT BEHAVIORAL DISTURBANCE, UNSPECIFIED TIMING OF DEMENTIA ONSET: ICD-10-CM

## 2018-06-13 DIAGNOSIS — B20 HUMAN IMMUNODEFICIENCY VIRUS (HCC): Primary | ICD-10-CM

## 2018-06-13 DIAGNOSIS — A53.9 SYPHILIS IN MALE: ICD-10-CM

## 2018-06-13 DIAGNOSIS — F02.80 ALZHEIMER'S DEMENTIA WITHOUT BEHAVIORAL DISTURBANCE, UNSPECIFIED TIMING OF DEMENTIA ONSET: ICD-10-CM

## 2018-06-13 PROCEDURE — 99214 OFFICE O/P EST MOD 30 MIN: CPT | Performed by: INTERNAL MEDICINE

## 2018-06-13 ASSESSMENT — ENCOUNTER SYMPTOMS
GASTROINTESTINAL NEGATIVE: 1
RESPIRATORY NEGATIVE: 1
EYES NEGATIVE: 1
ALLERGIC/IMMUNOLOGIC NEGATIVE: 1

## 2018-06-14 ENCOUNTER — OFFICE VISIT (OUTPATIENT)
Dept: VASCULAR SURGERY | Age: 78
End: 2018-06-14
Payer: MEDICARE

## 2018-06-14 VITALS
BODY MASS INDEX: 17.4 KG/M2 | WEIGHT: 110.89 LBS | OXYGEN SATURATION: 95 % | RESPIRATION RATE: 18 BRPM | HEART RATE: 86 BPM | SYSTOLIC BLOOD PRESSURE: 96 MMHG | DIASTOLIC BLOOD PRESSURE: 60 MMHG | HEIGHT: 67 IN

## 2018-06-14 DIAGNOSIS — I73.9 PVD (PERIPHERAL VASCULAR DISEASE) (HCC): Primary | ICD-10-CM

## 2018-06-14 PROCEDURE — 99213 OFFICE O/P EST LOW 20 MIN: CPT | Performed by: SURGERY

## 2018-07-27 ENCOUNTER — OFFICE VISIT (OUTPATIENT)
Dept: PODIATRY | Age: 78
End: 2018-07-27
Payer: MEDICARE

## 2018-07-27 VITALS
WEIGHT: 117 LBS | DIASTOLIC BLOOD PRESSURE: 67 MMHG | SYSTOLIC BLOOD PRESSURE: 115 MMHG | HEART RATE: 67 BPM | HEIGHT: 67 IN | BODY MASS INDEX: 18.36 KG/M2

## 2018-07-27 DIAGNOSIS — B35.1 ONYCHOMYCOSIS: Primary | ICD-10-CM

## 2018-07-27 DIAGNOSIS — M79.671 PAIN IN BOTH FEET: ICD-10-CM

## 2018-07-27 DIAGNOSIS — M79.672 PAIN IN BOTH FEET: ICD-10-CM

## 2018-07-27 PROCEDURE — 11721 DEBRIDE NAIL 6 OR MORE: CPT | Performed by: PODIATRIST

## 2018-07-27 PROCEDURE — 99213 OFFICE O/P EST LOW 20 MIN: CPT | Performed by: PODIATRIST

## 2018-07-27 NOTE — PROGRESS NOTES
Patient instructed to remove shoes and socks, instructed to sit in exam chair. Current PCP name is Dr madeline Orellana and date of last visit 6-4-18. Do you have a follow up visit scheduled?   Yes or no    If yes the date is 11-7-18

## 2018-08-03 NOTE — TELEPHONE ENCOUNTER
Dr Ivania Chacon, patient is current and due in for an appointment with you on 12/19/18. Per your last dictation he will continue on this medication. Please sign for refill if ok to fill. Thank you.

## 2018-08-16 RX ORDER — EFAVIRENZ, EMTRICITABINE, AND TENOFOVIR DISOPROXIL FUMARATE 600; 200; 300 MG/1; MG/1; MG/1
TABLET, FILM COATED ORAL
Qty: 30 TABLET | Refills: 4 | Status: SHIPPED | OUTPATIENT
Start: 2018-08-16 | End: 2018-12-13 | Stop reason: SDUPTHER

## 2018-08-29 ENCOUNTER — OFFICE VISIT (OUTPATIENT)
Dept: NEUROLOGY | Age: 78
End: 2018-08-29
Payer: MEDICARE

## 2018-08-29 VITALS
SYSTOLIC BLOOD PRESSURE: 107 MMHG | HEART RATE: 75 BPM | HEIGHT: 66 IN | WEIGHT: 114.2 LBS | DIASTOLIC BLOOD PRESSURE: 72 MMHG | BODY MASS INDEX: 18.35 KG/M2

## 2018-08-29 DIAGNOSIS — G30.9 ALZHEIMER'S DEMENTIA WITHOUT BEHAVIORAL DISTURBANCE, UNSPECIFIED TIMING OF DEMENTIA ONSET: Primary | ICD-10-CM

## 2018-08-29 DIAGNOSIS — B20 HIV (HUMAN IMMUNODEFICIENCY VIRUS INFECTION) (HCC): ICD-10-CM

## 2018-08-29 DIAGNOSIS — G25.0 TREMOR, ESSENTIAL: ICD-10-CM

## 2018-08-29 DIAGNOSIS — F02.80 ALZHEIMER'S DEMENTIA WITHOUT BEHAVIORAL DISTURBANCE, UNSPECIFIED TIMING OF DEMENTIA ONSET: Primary | ICD-10-CM

## 2018-08-29 DIAGNOSIS — R56.9 SEIZURE-LIKE ACTIVITY (HCC): ICD-10-CM

## 2018-08-29 PROCEDURE — 99214 OFFICE O/P EST MOD 30 MIN: CPT | Performed by: NURSE PRACTITIONER

## 2018-08-29 RX ORDER — MEMANTINE HYDROCHLORIDE 10 MG/1
TABLET ORAL
Qty: 60 TABLET | Refills: 5 | Status: SHIPPED | OUTPATIENT
Start: 2018-08-29 | End: 2019-05-08 | Stop reason: SDUPTHER

## 2018-11-02 ENCOUNTER — OFFICE VISIT (OUTPATIENT)
Dept: PODIATRY | Age: 78
End: 2018-11-02
Payer: MEDICARE

## 2018-11-02 VITALS
HEIGHT: 67 IN | WEIGHT: 116.8 LBS | BODY MASS INDEX: 18.33 KG/M2 | HEART RATE: 80 BPM | SYSTOLIC BLOOD PRESSURE: 108 MMHG | DIASTOLIC BLOOD PRESSURE: 76 MMHG

## 2018-11-02 DIAGNOSIS — I73.9 PVD (PERIPHERAL VASCULAR DISEASE) (HCC): ICD-10-CM

## 2018-11-02 DIAGNOSIS — M79.671 PAIN IN BOTH FEET: ICD-10-CM

## 2018-11-02 DIAGNOSIS — B35.1 ONYCHOMYCOSIS: Primary | ICD-10-CM

## 2018-11-02 DIAGNOSIS — M79.672 PAIN IN BOTH FEET: ICD-10-CM

## 2018-11-02 PROCEDURE — 11721 DEBRIDE NAIL 6 OR MORE: CPT | Performed by: PODIATRIST

## 2018-11-02 PROCEDURE — 99212 OFFICE O/P EST SF 10 MIN: CPT | Performed by: PODIATRIST

## 2018-11-07 ENCOUNTER — OFFICE VISIT (OUTPATIENT)
Dept: INTERNAL MEDICINE | Age: 78
End: 2018-11-07
Payer: MEDICARE

## 2018-11-07 ENCOUNTER — HOSPITAL ENCOUNTER (OUTPATIENT)
Age: 78
Setting detail: SPECIMEN
Discharge: HOME | End: 2018-11-07
Payer: MEDICARE

## 2018-11-07 VITALS
HEART RATE: 80 BPM | BODY MASS INDEX: 17.99 KG/M2 | WEIGHT: 114.6 LBS | HEIGHT: 67 IN | SYSTOLIC BLOOD PRESSURE: 99 MMHG | DIASTOLIC BLOOD PRESSURE: 67 MMHG

## 2018-11-07 DIAGNOSIS — M81.0 AGE-RELATED OSTEOPOROSIS WITHOUT CURRENT PATHOLOGICAL FRACTURE: ICD-10-CM

## 2018-11-07 DIAGNOSIS — B20 HIV (HUMAN IMMUNODEFICIENCY VIRUS INFECTION) (HCC): ICD-10-CM

## 2018-11-07 DIAGNOSIS — N40.0 BENIGN PROSTATIC HYPERPLASIA WITHOUT LOWER URINARY TRACT SYMPTOMS: ICD-10-CM

## 2018-11-07 DIAGNOSIS — E55.9 VITAMIN D DEFICIENCY: ICD-10-CM

## 2018-11-07 DIAGNOSIS — F03.90 DEMENTIA WITHOUT BEHAVIORAL DISTURBANCE, UNSPECIFIED DEMENTIA TYPE: ICD-10-CM

## 2018-11-07 DIAGNOSIS — I73.9 PVD (PERIPHERAL VASCULAR DISEASE) (HCC): ICD-10-CM

## 2018-11-07 DIAGNOSIS — R25.1 OCCASIONAL TREMORS: ICD-10-CM

## 2018-11-07 DIAGNOSIS — N40.0 BENIGN NON-NODULAR PROSTATIC HYPERPLASIA WITHOUT LOWER URINARY TRACT SYMPTOMS: ICD-10-CM

## 2018-11-07 DIAGNOSIS — F17.200 SMOKER: ICD-10-CM

## 2018-11-07 DIAGNOSIS — Z23 NEED FOR SHINGLES VACCINE: Primary | ICD-10-CM

## 2018-11-07 DIAGNOSIS — G30.9 ALZHEIMER'S DEMENTIA WITHOUT BEHAVIORAL DISTURBANCE, UNSPECIFIED TIMING OF DEMENTIA ONSET: ICD-10-CM

## 2018-11-07 DIAGNOSIS — K21.9 GASTROESOPHAGEAL REFLUX DISEASE WITHOUT ESOPHAGITIS: ICD-10-CM

## 2018-11-07 DIAGNOSIS — F02.80 ALZHEIMER'S DEMENTIA WITHOUT BEHAVIORAL DISTURBANCE, UNSPECIFIED TIMING OF DEMENTIA ONSET: ICD-10-CM

## 2018-11-07 PROCEDURE — 99211 OFF/OP EST MAY X REQ PHY/QHP: CPT | Performed by: INTERNAL MEDICINE

## 2018-11-07 PROCEDURE — 99214 OFFICE O/P EST MOD 30 MIN: CPT | Performed by: STUDENT IN AN ORGANIZED HEALTH CARE EDUCATION/TRAINING PROGRAM

## 2018-11-07 RX ORDER — ASPIRIN 81 MG/1
81 TABLET ORAL DAILY
Qty: 30 TABLET | Refills: 5 | Status: SHIPPED | OUTPATIENT
Start: 2018-11-07 | End: 2019-05-08 | Stop reason: SDUPTHER

## 2018-11-07 RX ORDER — B-COMPLEX WITH VITAMIN C
2 TABLET ORAL DAILY
Qty: 60 TABLET | Refills: 5 | Status: SHIPPED | OUTPATIENT
Start: 2018-11-07 | End: 2019-05-08 | Stop reason: SDUPTHER

## 2018-11-07 RX ORDER — VITAMIN E 268 MG
400 CAPSULE ORAL DAILY
Qty: 30 CAPSULE | Refills: 5 | Status: ON HOLD | OUTPATIENT
Start: 2018-11-07 | End: 2019-09-12 | Stop reason: HOSPADM

## 2018-11-07 RX ORDER — OMEPRAZOLE 20 MG/1
20 CAPSULE, DELAYED RELEASE ORAL DAILY
Qty: 30 CAPSULE | Refills: 5 | Status: SHIPPED | OUTPATIENT
Start: 2018-11-07 | End: 2019-05-08 | Stop reason: SDUPTHER

## 2018-11-07 RX ORDER — ATORVASTATIN CALCIUM 40 MG/1
40 TABLET, FILM COATED ORAL DAILY
Qty: 30 TABLET | Refills: 5 | Status: SHIPPED | OUTPATIENT
Start: 2018-11-07 | End: 2019-05-08 | Stop reason: SDUPTHER

## 2018-11-07 RX ORDER — ALENDRONATE SODIUM 70 MG/1
70 TABLET ORAL
Qty: 4 TABLET | Refills: 5 | Status: SHIPPED | OUTPATIENT
Start: 2018-11-07 | End: 2018-11-20 | Stop reason: SDUPTHER

## 2018-11-07 RX ORDER — FINASTERIDE 5 MG/1
5 TABLET, FILM COATED ORAL DAILY
Qty: 30 TABLET | Refills: 5 | Status: SHIPPED | OUTPATIENT
Start: 2018-11-07 | End: 2019-07-01 | Stop reason: SDUPTHER

## 2018-11-07 RX ORDER — MULTIVITAMIN WITH FOLIC ACID 400 MCG
1 TABLET ORAL DAILY
Qty: 30 TABLET | Refills: 5 | Status: ON HOLD | OUTPATIENT
Start: 2018-11-07 | End: 2019-09-12 | Stop reason: HOSPADM

## 2018-11-07 ASSESSMENT — PATIENT HEALTH QUESTIONNAIRE - PHQ9: DEPRESSION UNABLE TO ASSESS: FUNCTIONAL CAPACITY MOTIVATION LIMITS ACCURACY

## 2018-11-07 NOTE — PATIENT INSTRUCTIONS
Return To Clinic 5/8/19. After Visit Summary given and reviewed. TV    It is very important for your care that you keep your appointment. If for some reason you are unable to keep your appointment it is equally important that you call our office at [phone] to cancel your appointment and reschedule. Failure to do so may result in your termination from our practice.      Shingles vaccine script given to pt

## 2018-11-20 DIAGNOSIS — M81.0 AGE-RELATED OSTEOPOROSIS WITHOUT CURRENT PATHOLOGICAL FRACTURE: ICD-10-CM

## 2018-11-21 RX ORDER — ALENDRONATE SODIUM 70 MG/1
70 TABLET ORAL
Qty: 4 TABLET | Refills: 5 | Status: SHIPPED | OUTPATIENT
Start: 2018-11-21 | End: 2019-05-08 | Stop reason: SDUPTHER

## 2018-11-21 NOTE — TELEPHONE ENCOUNTER
Fosamax refilled
Occasional tremors     Syphilis in male     PVD (peripheral vascular disease) ([facility]     Seizure-like activity ([facility]

## 2018-11-30 ENCOUNTER — HOSPITAL ENCOUNTER (OUTPATIENT)
Age: 78
Setting detail: SPECIMEN
Discharge: HOME | End: 2018-11-30
Payer: MEDICARE

## 2018-11-30 DIAGNOSIS — B20 HUMAN IMMUNODEFICIENCY VIRUS (HCC): ICD-10-CM

## 2018-11-30 DIAGNOSIS — E55.9 VITAMIN D DEFICIENCY: ICD-10-CM

## 2018-11-30 DIAGNOSIS — F03.90 DEMENTIA WITHOUT BEHAVIORAL DISTURBANCE, UNSPECIFIED DEMENTIA TYPE: ICD-10-CM

## 2018-11-30 LAB
ALBUMIN SERPL-MCNC: 3.7 G/DL (ref 3.5–5.2)
ALBUMIN/GLOBULIN RATIO: 0.9 (ref 1–2.5)
ALP BLD-CCNC: 195 U/L (ref 40–129)
ALT SERPL-CCNC: 15 U/L (ref 5–41)
ANION GAP SERPL CALCULATED.3IONS-SCNC: 9 MMOL/L (ref 9–17)
AST SERPL-CCNC: 36 U/L
BILIRUB SERPL-MCNC: <0.1 MG/DL (ref 0.3–1.2)
BILIRUBIN DIRECT: <0.08 MG/DL
BILIRUBIN, INDIRECT: ABNORMAL MG/DL (ref 0–1)
BUN BLDV-MCNC: 9 MG/DL (ref 8–23)
CALCIUM SERPL-MCNC: 9.2 MG/DL (ref 8.6–10.4)
CHLORIDE BLD-SCNC: 104 MMOL/L (ref 98–107)
CO2: 29 MMOL/L (ref 20–31)
CREAT SERPL-MCNC: 1 MG/DL (ref 0.7–1.2)
FOLATE: 10.8 NG/ML
GFR AFRICAN AMERICAN: >60 ML/MIN
GFR NON-AFRICAN AMERICAN: >60 ML/MIN
GFR SERPL CREATININE-BSD FRML MDRD: ABNORMAL ML/MIN/{1.73_M2}
GFR SERPL CREATININE-BSD FRML MDRD: ABNORMAL ML/MIN/{1.73_M2}
GLUCOSE BLD-MCNC: 82 MG/DL (ref 70–99)
POTASSIUM SERPL-SCNC: 5.2 MMOL/L (ref 3.7–5.3)
SODIUM BLD-SCNC: 142 MMOL/L (ref 135–144)
TOTAL PROTEIN: 7.8 G/DL (ref 6.4–8.3)
TSH SERPL DL<=0.05 MIU/L-ACNC: 1.8 MIU/L (ref 0.3–5)
VITAMIN B-12: 868 PG/ML (ref 232–1245)
VITAMIN D 25-HYDROXY: 60 NG/ML (ref 30–100)

## 2018-11-30 PROCEDURE — 84443 ASSAY THYROID STIM HORMONE: CPT

## 2018-11-30 PROCEDURE — 82306 VITAMIN D 25 HYDROXY: CPT

## 2018-11-30 PROCEDURE — 36415 COLL VENOUS BLD VENIPUNCTURE: CPT

## 2018-11-30 PROCEDURE — 82746 ASSAY OF FOLIC ACID SERUM: CPT

## 2018-11-30 PROCEDURE — 82607 VITAMIN B-12: CPT

## 2018-11-30 PROCEDURE — 82248 BILIRUBIN DIRECT: CPT

## 2018-11-30 PROCEDURE — 80053 COMPREHEN METABOLIC PANEL: CPT

## 2018-12-11 DIAGNOSIS — G30.9 ALZHEIMER'S DEMENTIA WITHOUT BEHAVIORAL DISTURBANCE, UNSPECIFIED TIMING OF DEMENTIA ONSET: ICD-10-CM

## 2018-12-11 DIAGNOSIS — F02.80 ALZHEIMER'S DEMENTIA WITHOUT BEHAVIORAL DISTURBANCE, UNSPECIFIED TIMING OF DEMENTIA ONSET: ICD-10-CM

## 2018-12-12 RX ORDER — DONEPEZIL HYDROCHLORIDE 10 MG/1
TABLET, FILM COATED ORAL
Qty: 30 TABLET | Refills: 5 | Status: SHIPPED | OUTPATIENT
Start: 2018-12-12 | End: 2019-05-08 | Stop reason: SDUPTHER

## 2018-12-13 ENCOUNTER — OFFICE VISIT (OUTPATIENT)
Dept: INFECTIOUS DISEASES | Age: 78
End: 2018-12-13
Payer: MEDICARE

## 2018-12-13 VITALS
HEART RATE: 80 BPM | WEIGHT: 117 LBS | OXYGEN SATURATION: 96 % | BODY MASS INDEX: 18.36 KG/M2 | SYSTOLIC BLOOD PRESSURE: 117 MMHG | HEIGHT: 67 IN | DIASTOLIC BLOOD PRESSURE: 76 MMHG | RESPIRATION RATE: 14 BRPM

## 2018-12-13 DIAGNOSIS — G30.9 ALZHEIMER'S DEMENTIA WITHOUT BEHAVIORAL DISTURBANCE, UNSPECIFIED TIMING OF DEMENTIA ONSET: ICD-10-CM

## 2018-12-13 DIAGNOSIS — E78.2 MIXED HYPERLIPIDEMIA: ICD-10-CM

## 2018-12-13 DIAGNOSIS — B20 HUMAN IMMUNODEFICIENCY VIRUS (HCC): Primary | ICD-10-CM

## 2018-12-13 DIAGNOSIS — F02.80 ALZHEIMER'S DEMENTIA WITHOUT BEHAVIORAL DISTURBANCE, UNSPECIFIED TIMING OF DEMENTIA ONSET: ICD-10-CM

## 2018-12-13 DIAGNOSIS — Z86.19 HISTORY OF SYPHILIS: ICD-10-CM

## 2018-12-13 PROCEDURE — 99214 OFFICE O/P EST MOD 30 MIN: CPT | Performed by: INTERNAL MEDICINE

## 2018-12-13 RX ORDER — EFAVIRENZ, EMTRICITABINE AND TENOFOVIR DISOPROXIL FUMARATE 600; 200; 300 MG/1; MG/1; MG/1
1 TABLET, FILM COATED ORAL NIGHTLY
Qty: 30 TABLET | Refills: 5 | Status: SHIPPED | OUTPATIENT
Start: 2018-12-13 | End: 2020-02-26

## 2018-12-13 ASSESSMENT — ENCOUNTER SYMPTOMS
GASTROINTESTINAL NEGATIVE: 1
RESPIRATORY NEGATIVE: 1

## 2018-12-13 NOTE — PROGRESS NOTES
[facility]  Office Consult Note  Today's Date and Time: 12/13/2018, 9:58 AM    Impression:     1. Human immunodeficiency virus ([address]    2. Alzheimer's dementia without behavioral disturbance, unspecified timing of dementia onset    3. History of syphilis    4. Mixed hyperlipidemia         Recommendations   · The patient will continue on antiretroviral therapy with Atripla. · The wife will continue to work with him to try ensure that he takes the medications daily and she has tried to make sure that that is the 1st pill he takes. · We will attempt to have his labs drawn prior to his next visit and I didn't ask the wife to try watch out for his good days and get the labs done on such days.   · I did also offer to try give him some Ativan to hopefully calm him down and allow for the lab testing  · Weekend and try to do the lab testing once a year to help with this    I have ordered the followingmedications/ labs:  Orders Placed This Encounter   Procedures    CBC     Standing Status:   Future     Standing Expiration Date:   87/99/6858    Comp Metabolic w Bili Profile     Standing Status:   Future     Standing Expiration Date:   12/13/2019    Lipid Panel     Standing Status:   Future     Standing Expiration Date:   12/13/2019     Order Specific Question:   Is Patient Fasting?/# of Hours     Answer:   6 hours fasting    HIV RNA, Quantitative, PCR     Standing Status:   Future     Standing Expiration Date:   12/13/2019    Lymphocyte Subset     Standing Status:   Future     Standing Expiration Date:   12/13/2019      Orders Placed This Encounter   Medications    efavirenz-emtrictabine-tenofovir (ATRIPLA) 600-200-300 MG per tablet     Sig: Take 1 tablet by mouth nightly     Dispense:  30 tablet     Refill:  5       Chiefcomplaint/reason for consultation:     Chief Complaint   Patient presents with    HIV        History of Present Illness:   Frank Dukes is a [de-identified]y.o.-year-old male who I am Final    HIV-1 RNA NOT DETECTED Results reported to the appropriate Health Department   06/04/2018   Final                                                            This test is a sensitive   06/04/2018   Final     method for quantitating HIV-1 RNA viral loads in plasma. It utilizes RT-PCR in   06/04/2018   Final     the FDA approved Roche Amplicor/Taqman 48 system. This test is intended for   06/04/2018   Final     detecting and quantifying HIV-1 RNA viral loads in the range of 20 - 10,000,000   06/04/2018   Final     cp/ml (1.30 - 7.00 log cp/ml). The reference value for this assay is <20 cp/ml   06/04/2018   Final     (<1.30 log cp/ml).                                                          Patie     LYMPHOCYTE SUBSET:  Absolute CD 3   Date Value Ref Range Status   06/04/2018 1468 411 - 2061 /uL Final   12/11/2017 1539 411 - 2061 /uL Final   05/10/2017 1163 411 - 2061 /uL Final     Absolute CD 4 [place]   Date Value Ref Range Status   06/04/2018 496 309 - 1571 /uL Final   12/11/2017 628 309 - 1571 /uL Final   05/10/2017 462 309 - 1571 /uL Final     CD4 % [place] T Cell   Date Value Ref Range Status   06/04/2018 25 (L) 27 - 64 % Final   12/11/2017 31 27 - 64 % Final   05/10/2017 29 27 - 64 % Final     CD4/CD8 Ratio   Date Value Ref Range Status   06/04/2018 0.53 (L) 0.6 - 2.8 Final   12/11/2017 0.69 0.6 - 2.8 Final   05/10/2017 0.69 0.6 - 2.8 Final       LIPID PANEL:  Lab Results   Component Value Date    CHOL 241 (H) 06/04/2018    CHOL 242 (H) 05/10/2017     Lab Results   Component Value Date    TRIG 176 (H) 06/04/2018    TRIG 146 05/10/2017     Lab Results   Component Value Date    HDL 78 06/04/2018    HDL 82 05/10/2017     Lab Results   Component Value Date    LDLCHOLESTEROL 128 06/04/2018    LDLCHOLESTEROL 131 (H) 05/10/2017     Lab Results   Component Value Date    VLDL NOT REPORTED 06/04/2018    VLDL NOT REPORTED 05/10/2017     Lab Results   Component Value Date    CHOLHDLRATIO 3.1 06/04/2018

## 2019-02-08 ENCOUNTER — OFFICE VISIT (OUTPATIENT)
Dept: PODIATRY | Age: 79
End: 2019-02-08
Payer: COMMERCIAL

## 2019-02-08 VITALS
HEIGHT: 67 IN | WEIGHT: 122 LBS | BODY MASS INDEX: 19.15 KG/M2 | SYSTOLIC BLOOD PRESSURE: 113 MMHG | DIASTOLIC BLOOD PRESSURE: 68 MMHG | HEART RATE: 67 BPM

## 2019-02-08 DIAGNOSIS — M79.672 PAIN IN BOTH FEET: ICD-10-CM

## 2019-02-08 DIAGNOSIS — B20 HIV (HUMAN IMMUNODEFICIENCY VIRUS INFECTION) (HCC): ICD-10-CM

## 2019-02-08 DIAGNOSIS — I73.9 PVD (PERIPHERAL VASCULAR DISEASE) (HCC): ICD-10-CM

## 2019-02-08 DIAGNOSIS — B35.1 ONYCHOMYCOSIS: Primary | ICD-10-CM

## 2019-02-08 DIAGNOSIS — M79.671 PAIN IN BOTH FEET: ICD-10-CM

## 2019-02-08 PROCEDURE — 99212 OFFICE O/P EST SF 10 MIN: CPT | Performed by: PODIATRIST

## 2019-02-08 PROCEDURE — 99213 OFFICE O/P EST LOW 20 MIN: CPT | Performed by: PODIATRIST

## 2019-02-08 PROCEDURE — 11721 DEBRIDE NAIL 6 OR MORE: CPT | Performed by: PODIATRIST

## 2019-02-10 ENCOUNTER — HOSPITAL ENCOUNTER (INPATIENT)
Age: 79
LOS: 3 days | Discharge: NURSING, REHAB OR LONG-TERM CARE | DRG: 977 | End: 2019-02-13
Attending: EMERGENCY MEDICINE | Admitting: INTERNAL MEDICINE
Payer: COMMERCIAL

## 2019-02-10 ENCOUNTER — APPOINTMENT (OUTPATIENT)
Dept: GENERAL RADIOLOGY | Age: 79
DRG: 977 | End: 2019-02-10
Payer: COMMERCIAL

## 2019-02-10 ENCOUNTER — APPOINTMENT (OUTPATIENT)
Dept: CT IMAGING | Age: 79
DRG: 977 | End: 2019-02-10
Payer: COMMERCIAL

## 2019-02-10 DIAGNOSIS — R41.82 ALTERED MENTAL STATUS, UNSPECIFIED ALTERED MENTAL STATUS TYPE: Primary | ICD-10-CM

## 2019-02-10 DIAGNOSIS — N30.00 ACUTE CYSTITIS WITHOUT HEMATURIA: ICD-10-CM

## 2019-02-10 PROBLEM — N39.0 URINARY TRACT INFECTION: Status: ACTIVE | Noted: 2019-02-10

## 2019-02-10 LAB
-: ABNORMAL
-: NORMAL
ABSOLUTE EOS #: 0.03 K/UL (ref 0–0.44)
ABSOLUTE IMMATURE GRANULOCYTE: <0.03 K/UL (ref 0–0.3)
ABSOLUTE LYMPH #: 1.78 K/UL (ref 1.1–3.7)
ABSOLUTE MONO #: 0.86 K/UL (ref 0.1–1.2)
ALBUMIN SERPL-MCNC: 3.8 G/DL (ref 3.5–5.2)
ALBUMIN/GLOBULIN RATIO: 0.9 (ref 1–2.5)
ALP BLD-CCNC: 185 U/L (ref 40–129)
ALT SERPL-CCNC: 16 U/L (ref 5–41)
AMMONIA: 38 UMOL/L (ref 16–60)
AMORPHOUS: ABNORMAL
AMPHETAMINE SCREEN URINE: NEGATIVE
ANION GAP SERPL CALCULATED.3IONS-SCNC: 10 MMOL/L (ref 9–17)
AST SERPL-CCNC: 35 U/L
BACTERIA: ABNORMAL
BARBITURATE SCREEN URINE: NEGATIVE
BASOPHILS # BLD: 1 % (ref 0–2)
BASOPHILS ABSOLUTE: 0.04 K/UL (ref 0–0.2)
BENZODIAZEPINE SCREEN, URINE: NEGATIVE
BILIRUB SERPL-MCNC: 0.2 MG/DL (ref 0.3–1.2)
BILIRUBIN URINE: NEGATIVE
BUN BLDV-MCNC: 8 MG/DL (ref 8–23)
BUN/CREAT BLD: ABNORMAL (ref 9–20)
BUPRENORPHINE URINE: NORMAL
CALCIUM SERPL-MCNC: 9.8 MG/DL (ref 8.6–10.4)
CANNABINOID SCREEN URINE: NEGATIVE
CASTS UA: ABNORMAL /LPF (ref 0–8)
CHLORIDE BLD-SCNC: 107 MMOL/L (ref 98–107)
CO2: 23 MMOL/L (ref 20–31)
COCAINE METABOLITE, URINE: NEGATIVE
COLOR: ABNORMAL
COMMENT UA: ABNORMAL
CREAT SERPL-MCNC: 0.95 MG/DL (ref 0.7–1.2)
CRYSTALS, UA: ABNORMAL /HPF
DIFFERENTIAL TYPE: ABNORMAL
EOSINOPHILS RELATIVE PERCENT: 1 % (ref 1–4)
EPITHELIAL CELLS UA: ABNORMAL /HPF (ref 0–5)
GFR AFRICAN AMERICAN: >60 ML/MIN
GFR NON-AFRICAN AMERICAN: >60 ML/MIN
GFR SERPL CREATININE-BSD FRML MDRD: ABNORMAL ML/MIN/{1.73_M2}
GFR SERPL CREATININE-BSD FRML MDRD: ABNORMAL ML/MIN/{1.73_M2}
GLUCOSE BLD-MCNC: 114 MG/DL (ref 70–99)
GLUCOSE URINE: NEGATIVE
HCT VFR BLD CALC: 38.7 % (ref 40.7–50.3)
HEMOGLOBIN: 11.7 G/DL (ref 13–17)
IMMATURE GRANULOCYTES: 0 %
KETONES, URINE: ABNORMAL
LEUKOCYTE ESTERASE, URINE: ABNORMAL
LYMPHOCYTES # BLD: 28 % (ref 24–43)
MCH RBC QN AUTO: 27.1 PG (ref 25.2–33.5)
MCHC RBC AUTO-ENTMCNC: 30.2 G/DL (ref 28.4–34.8)
MCV RBC AUTO: 89.6 FL (ref 82.6–102.9)
MDMA URINE: NORMAL
METHADONE SCREEN, URINE: NEGATIVE
METHAMPHETAMINE, URINE: NORMAL
MONOCYTES # BLD: 14 % (ref 3–12)
MUCUS: ABNORMAL
NITRITE, URINE: POSITIVE
NRBC AUTOMATED: 0 PER 100 WBC
OPIATES, URINE: NEGATIVE
OTHER OBSERVATIONS UA: ABNORMAL
OXYCODONE SCREEN URINE: NEGATIVE
PDW BLD-RTO: 14.6 % (ref 11.8–14.4)
PH UA: 5.5 (ref 5–8)
PHENCYCLIDINE, URINE: NEGATIVE
PLATELET # BLD: 220 K/UL (ref 138–453)
PLATELET ESTIMATE: ABNORMAL
PMV BLD AUTO: 9.7 FL (ref 8.1–13.5)
POTASSIUM SERPL-SCNC: 4.7 MMOL/L (ref 3.7–5.3)
POTASSIUM SERPL-SCNC: 7.6 MMOL/L (ref 3.7–5.3)
PROPOXYPHENE, URINE: NORMAL
PROTEIN UA: NEGATIVE
RBC # BLD: 4.32 M/UL (ref 4.21–5.77)
RBC # BLD: ABNORMAL 10*6/UL
RBC UA: ABNORMAL /HPF (ref 0–4)
REASON FOR REJECTION: NORMAL
RENAL EPITHELIAL, UA: ABNORMAL /HPF
SEG NEUTROPHILS: 56 % (ref 36–65)
SEGMENTED NEUTROPHILS ABSOLUTE COUNT: 3.65 K/UL (ref 1.5–8.1)
SODIUM BLD-SCNC: 140 MMOL/L (ref 135–144)
SPECIFIC GRAVITY UA: 1.02 (ref 1–1.03)
TEST INFORMATION: NORMAL
TOTAL PROTEIN: 7.9 G/DL (ref 6.4–8.3)
TRICHOMONAS: ABNORMAL
TRICYCLIC ANTIDEPRESSANTS, UR: NORMAL
TROPONIN INTERP: NORMAL
TROPONIN T: NORMAL NG/ML
TROPONIN, HIGH SENSITIVITY: <6 NG/L (ref 0–22)
TURBIDITY: ABNORMAL
URINE HGB: ABNORMAL
UROBILINOGEN, URINE: NORMAL
WBC # BLD: 6.4 K/UL (ref 3.5–11.3)
WBC # BLD: ABNORMAL 10*3/UL
WBC UA: ABNORMAL /HPF (ref 0–5)
YEAST: ABNORMAL
ZZ NTE CLEAN UP: ORDERED TEST: NORMAL
ZZ NTE WITH NAME CLEAN UP: SPECIMEN SOURCE: NORMAL

## 2019-02-10 PROCEDURE — 2580000003 HC RX 258: Performed by: STUDENT IN AN ORGANIZED HEALTH CARE EDUCATION/TRAINING PROGRAM

## 2019-02-10 PROCEDURE — 82140 ASSAY OF AMMONIA: CPT

## 2019-02-10 PROCEDURE — 80307 DRUG TEST PRSMV CHEM ANLYZR: CPT

## 2019-02-10 PROCEDURE — 93005 ELECTROCARDIOGRAM TRACING: CPT

## 2019-02-10 PROCEDURE — 70470 CT HEAD/BRAIN W/O & W/DYE: CPT

## 2019-02-10 PROCEDURE — 84484 ASSAY OF TROPONIN QUANT: CPT

## 2019-02-10 PROCEDURE — 6360000002 HC RX W HCPCS: Performed by: STUDENT IN AN ORGANIZED HEALTH CARE EDUCATION/TRAINING PROGRAM

## 2019-02-10 PROCEDURE — 94760 N-INVAS EAR/PLS OXIMETRY 1: CPT

## 2019-02-10 PROCEDURE — 6360000004 HC RX CONTRAST MEDICATION: Performed by: EMERGENCY MEDICINE

## 2019-02-10 PROCEDURE — 87536 HIV-1 QUANT&REVRSE TRNSCRPJ: CPT

## 2019-02-10 PROCEDURE — 1200000000 HC SEMI PRIVATE

## 2019-02-10 PROCEDURE — 87086 URINE CULTURE/COLONY COUNT: CPT

## 2019-02-10 PROCEDURE — 6370000000 HC RX 637 (ALT 250 FOR IP): Performed by: EMERGENCY MEDICINE

## 2019-02-10 PROCEDURE — G0480 DRUG TEST DEF 1-7 CLASSES: HCPCS

## 2019-02-10 PROCEDURE — 86361 T CELL ABSOLUTE COUNT: CPT

## 2019-02-10 PROCEDURE — 81001 URINALYSIS AUTO W/SCOPE: CPT

## 2019-02-10 PROCEDURE — 87088 URINE BACTERIA CULTURE: CPT

## 2019-02-10 PROCEDURE — 80061 LIPID PANEL: CPT

## 2019-02-10 PROCEDURE — G0384 LEV 5 HOSP TYPE B ED VISIT: HCPCS

## 2019-02-10 PROCEDURE — 87186 SC STD MICRODIL/AGAR DIL: CPT

## 2019-02-10 PROCEDURE — 71045 X-RAY EXAM CHEST 1 VIEW: CPT

## 2019-02-10 PROCEDURE — 6370000000 HC RX 637 (ALT 250 FOR IP): Performed by: STUDENT IN AN ORGANIZED HEALTH CARE EDUCATION/TRAINING PROGRAM

## 2019-02-10 PROCEDURE — 80053 COMPREHEN METABOLIC PANEL: CPT

## 2019-02-10 PROCEDURE — 83036 HEMOGLOBIN GLYCOSYLATED A1C: CPT

## 2019-02-10 PROCEDURE — 85025 COMPLETE CBC W/AUTO DIFF WBC: CPT

## 2019-02-10 PROCEDURE — 36415 COLL VENOUS BLD VENIPUNCTURE: CPT

## 2019-02-10 PROCEDURE — 84132 ASSAY OF SERUM POTASSIUM: CPT

## 2019-02-10 RX ORDER — SODIUM CHLORIDE 0.9 % (FLUSH) 0.9 %
10 SYRINGE (ML) INJECTION EVERY 12 HOURS SCHEDULED
Status: CANCELLED | OUTPATIENT
Start: 2019-02-10

## 2019-02-10 RX ORDER — ONDANSETRON 4 MG/1
4 TABLET, ORALLY DISINTEGRATING ORAL EVERY 8 HOURS PRN
Status: CANCELLED | OUTPATIENT
Start: 2019-02-10

## 2019-02-10 RX ORDER — SODIUM CHLORIDE 9 MG/ML
INJECTION, SOLUTION INTRAVENOUS CONTINUOUS
Status: DISCONTINUED | OUTPATIENT
Start: 2019-02-10 | End: 2019-02-14 | Stop reason: HOSPADM

## 2019-02-10 RX ORDER — MEMANTINE HYDROCHLORIDE 5 MG/1
10 TABLET ORAL 2 TIMES DAILY
Status: DISCONTINUED | OUTPATIENT
Start: 2019-02-10 | End: 2019-02-14 | Stop reason: HOSPADM

## 2019-02-10 RX ORDER — OLANZAPINE 5 MG/1
2.5 TABLET ORAL NIGHTLY
Status: DISCONTINUED | OUTPATIENT
Start: 2019-02-10 | End: 2019-02-11

## 2019-02-10 RX ORDER — ACETAMINOPHEN 325 MG/1
650 TABLET ORAL EVERY 4 HOURS PRN
Status: DISCONTINUED | OUTPATIENT
Start: 2019-02-10 | End: 2019-02-14 | Stop reason: HOSPADM

## 2019-02-10 RX ORDER — SODIUM CHLORIDE 0.9 % (FLUSH) 0.9 %
10 SYRINGE (ML) INJECTION PRN
Status: CANCELLED | OUTPATIENT
Start: 2019-02-10

## 2019-02-10 RX ORDER — ATORVASTATIN CALCIUM 40 MG/1
40 TABLET, FILM COATED ORAL DAILY
Status: DISCONTINUED | OUTPATIENT
Start: 2019-02-10 | End: 2019-02-14 | Stop reason: HOSPADM

## 2019-02-10 RX ORDER — TAMSULOSIN HYDROCHLORIDE 0.4 MG/1
0.4 CAPSULE ORAL DAILY
Status: DISCONTINUED | OUTPATIENT
Start: 2019-02-10 | End: 2019-02-14 | Stop reason: HOSPADM

## 2019-02-10 RX ORDER — EFAVIRENZ, EMTRICITABINE AND TENOFOVIR DISOPROXIL FUMARATE 600; 200; 300 MG/1; MG/1; MG/1
1 TABLET, FILM COATED ORAL NIGHTLY
Status: DISCONTINUED | OUTPATIENT
Start: 2019-02-10 | End: 2019-02-14 | Stop reason: HOSPADM

## 2019-02-10 RX ORDER — ASPIRIN 81 MG/1
81 TABLET ORAL DAILY
Status: DISCONTINUED | OUTPATIENT
Start: 2019-02-10 | End: 2019-02-14 | Stop reason: HOSPADM

## 2019-02-10 RX ORDER — ONDANSETRON 2 MG/ML
4 INJECTION INTRAMUSCULAR; INTRAVENOUS EVERY 6 HOURS PRN
Status: DISCONTINUED | OUTPATIENT
Start: 2019-02-10 | End: 2019-02-14 | Stop reason: HOSPADM

## 2019-02-10 RX ORDER — HALOPERIDOL 5 MG/ML
5 INJECTION INTRAMUSCULAR ONCE
Status: DISCONTINUED | OUTPATIENT
Start: 2019-02-10 | End: 2019-02-10

## 2019-02-10 RX ORDER — FINASTERIDE 5 MG/1
5 TABLET, FILM COATED ORAL DAILY
Status: DISCONTINUED | OUTPATIENT
Start: 2019-02-10 | End: 2019-02-14 | Stop reason: HOSPADM

## 2019-02-10 RX ORDER — CEPHALEXIN 500 MG/1
500 CAPSULE ORAL ONCE
Status: COMPLETED | OUTPATIENT
Start: 2019-02-10 | End: 2019-02-10

## 2019-02-10 RX ORDER — SODIUM CHLORIDE 0.9 % (FLUSH) 0.9 %
10 SYRINGE (ML) INJECTION PRN
Status: DISCONTINUED | OUTPATIENT
Start: 2019-02-10 | End: 2019-02-14 | Stop reason: HOSPADM

## 2019-02-10 RX ORDER — MAGNESIUM SULFATE 1 G/100ML
1 INJECTION INTRAVENOUS PRN
Status: DISCONTINUED | OUTPATIENT
Start: 2019-02-10 | End: 2019-02-14 | Stop reason: HOSPADM

## 2019-02-10 RX ORDER — PANTOPRAZOLE SODIUM 40 MG/1
40 TABLET, DELAYED RELEASE ORAL
Status: DISCONTINUED | OUTPATIENT
Start: 2019-02-11 | End: 2019-02-14 | Stop reason: HOSPADM

## 2019-02-10 RX ORDER — DONEPEZIL HYDROCHLORIDE 10 MG/1
10 TABLET, FILM COATED ORAL NIGHTLY
Status: DISCONTINUED | OUTPATIENT
Start: 2019-02-10 | End: 2019-02-14 | Stop reason: HOSPADM

## 2019-02-10 RX ORDER — HALOPERIDOL 5 MG/ML
5 INJECTION INTRAMUSCULAR ONCE
Status: DISCONTINUED | OUTPATIENT
Start: 2019-02-10 | End: 2019-02-11

## 2019-02-10 RX ORDER — POTASSIUM CHLORIDE 7.45 MG/ML
10 INJECTION INTRAVENOUS PRN
Status: DISCONTINUED | OUTPATIENT
Start: 2019-02-10 | End: 2019-02-14 | Stop reason: HOSPADM

## 2019-02-10 RX ORDER — ALENDRONATE SODIUM 70 MG/1
70 TABLET ORAL
Status: CANCELLED | OUTPATIENT
Start: 2019-02-10

## 2019-02-10 RX ORDER — SODIUM CHLORIDE 0.9 % (FLUSH) 0.9 %
10 SYRINGE (ML) INJECTION EVERY 12 HOURS SCHEDULED
Status: DISCONTINUED | OUTPATIENT
Start: 2019-02-10 | End: 2019-02-14 | Stop reason: HOSPADM

## 2019-02-10 RX ORDER — POTASSIUM CHLORIDE 20MEQ/15ML
40 LIQUID (ML) ORAL PRN
Status: DISCONTINUED | OUTPATIENT
Start: 2019-02-10 | End: 2019-02-14 | Stop reason: HOSPADM

## 2019-02-10 RX ORDER — POTASSIUM CHLORIDE 20 MEQ/1
40 TABLET, EXTENDED RELEASE ORAL PRN
Status: DISCONTINUED | OUTPATIENT
Start: 2019-02-10 | End: 2019-02-14 | Stop reason: HOSPADM

## 2019-02-10 RX ADMIN — DESMOPRESSIN ACETATE 40 MG: 0.2 TABLET ORAL at 22:08

## 2019-02-10 RX ADMIN — TAMSULOSIN HYDROCHLORIDE 0.4 MG: 0.4 CAPSULE ORAL at 22:08

## 2019-02-10 RX ADMIN — FINASTERIDE 5 MG: 5 TABLET, FILM COATED ORAL at 22:21

## 2019-02-10 RX ADMIN — IOPAMIDOL 75 ML: 755 INJECTION, SOLUTION INTRAVENOUS at 13:12

## 2019-02-10 RX ADMIN — SODIUM CHLORIDE: 9 INJECTION, SOLUTION INTRAVENOUS at 18:00

## 2019-02-10 RX ADMIN — DONEPEZIL HYDROCHLORIDE 10 MG: 10 TABLET, FILM COATED ORAL at 22:08

## 2019-02-10 RX ADMIN — MEMANTINE HYDROCHLORIDE 10 MG: 5 TABLET, FILM COATED ORAL at 22:08

## 2019-02-10 RX ADMIN — CEFTRIAXONE SODIUM 1 G: 1 INJECTION, POWDER, FOR SOLUTION INTRAMUSCULAR; INTRAVENOUS at 18:08

## 2019-02-10 RX ADMIN — EFAVIRENZ, EMTRICITABINE, AND TENOFOVIR DISOPROXIL FUMARATE 1 TABLET: 600; 200; 300 TABLET, FILM COATED ORAL at 22:08

## 2019-02-10 RX ADMIN — ASPIRIN 81 MG: 81 TABLET ORAL at 22:08

## 2019-02-10 RX ADMIN — CEPHALEXIN 500 MG: 500 CAPSULE ORAL at 12:02

## 2019-02-10 RX ADMIN — ENOXAPARIN SODIUM 40 MG: 40 INJECTION SUBCUTANEOUS at 22:09

## 2019-02-10 RX ADMIN — OLANZAPINE 2.5 MG: 5 TABLET, FILM COATED ORAL at 22:08

## 2019-02-10 ASSESSMENT — ENCOUNTER SYMPTOMS
STRIDOR: 0
ABDOMINAL PAIN: 0
ABDOMINAL DISTENTION: 0
SHORTNESS OF BREATH: 0
COUGH: 0
EYE DISCHARGE: 0
CHEST TIGHTNESS: 0
APNEA: 0
CHOKING: 0
BACK PAIN: 0
EYE ITCHING: 0

## 2019-02-10 ASSESSMENT — PAIN SCALES - GENERAL: PAINLEVEL_OUTOF10: 0

## 2019-02-11 PROBLEM — R41.0 ACUTE DELIRIUM: Status: ACTIVE | Noted: 2019-02-11

## 2019-02-11 PROBLEM — G30.9 ALZHEIMER'S DEMENTIA WITH BEHAVIORAL DISTURBANCE (HCC): Status: ACTIVE | Noted: 2019-02-11

## 2019-02-11 PROBLEM — G93.41 ACUTE METABOLIC ENCEPHALOPATHY: Status: ACTIVE | Noted: 2019-02-11

## 2019-02-11 PROBLEM — F02.818 ALZHEIMER'S DEMENTIA WITH BEHAVIORAL DISTURBANCE (HCC): Status: ACTIVE | Noted: 2019-02-11

## 2019-02-11 LAB
ABSOLUTE CD 4 HELPER: 573 /UL (ref 309–1571)
ACETAMINOPHEN LEVEL: <5 UG/ML (ref 10–30)
CD4 % HELPER T CELL: 32 % (ref 27–64)
CHOLESTEROL/HDL RATIO: 2.6
CHOLESTEROL: 185 MG/DL
CULTURE: ABNORMAL
ESTIMATED AVERAGE GLUCOSE: 108 MG/DL
ETHANOL PERCENT: <0.01 %
ETHANOL: <10 MG/DL
HBA1C MFR BLD: 5.4 % (ref 4–6)
HDLC SERPL-MCNC: 72 MG/DL
LDL CHOLESTEROL: 85 MG/DL (ref 0–130)
LYMPHOCYTES # BLD: 28 % (ref 24–44)
Lab: ABNORMAL
SALICYLATE LEVEL: <1 MG/DL (ref 3–10)
SPECIMEN DESCRIPTION: ABNORMAL
TOXIC TRICYCLIC SC,BLOOD: NEGATIVE
TRIGL SERPL-MCNC: 139 MG/DL
VLDLC SERPL CALC-MCNC: NORMAL MG/DL (ref 1–30)
WBC # BLD: 6.4 K/UL (ref 3.5–11)

## 2019-02-11 PROCEDURE — 97530 THERAPEUTIC ACTIVITIES: CPT

## 2019-02-11 PROCEDURE — 99222 1ST HOSP IP/OBS MODERATE 55: CPT | Performed by: PSYCHIATRY & NEUROLOGY

## 2019-02-11 PROCEDURE — 97162 PT EVAL MOD COMPLEX 30 MIN: CPT

## 2019-02-11 PROCEDURE — 99222 1ST HOSP IP/OBS MODERATE 55: CPT | Performed by: INTERNAL MEDICINE

## 2019-02-11 PROCEDURE — 2500000003 HC RX 250 WO HCPCS: Performed by: STUDENT IN AN ORGANIZED HEALTH CARE EDUCATION/TRAINING PROGRAM

## 2019-02-11 PROCEDURE — 94760 N-INVAS EAR/PLS OXIMETRY 1: CPT

## 2019-02-11 PROCEDURE — 2580000003 HC RX 258: Performed by: STUDENT IN AN ORGANIZED HEALTH CARE EDUCATION/TRAINING PROGRAM

## 2019-02-11 PROCEDURE — 6370000000 HC RX 637 (ALT 250 FOR IP): Performed by: STUDENT IN AN ORGANIZED HEALTH CARE EDUCATION/TRAINING PROGRAM

## 2019-02-11 PROCEDURE — 97166 OT EVAL MOD COMPLEX 45 MIN: CPT

## 2019-02-11 PROCEDURE — 6360000002 HC RX W HCPCS: Performed by: STUDENT IN AN ORGANIZED HEALTH CARE EDUCATION/TRAINING PROGRAM

## 2019-02-11 PROCEDURE — 1200000000 HC SEMI PRIVATE

## 2019-02-11 RX ORDER — OLANZAPINE 5 MG/1
2.5 TABLET ORAL NIGHTLY
Status: DISCONTINUED | OUTPATIENT
Start: 2019-02-12 | End: 2019-02-13

## 2019-02-11 RX ORDER — OLANZAPINE 10 MG/1
2.5 INJECTION, POWDER, LYOPHILIZED, FOR SOLUTION INTRAMUSCULAR ONCE
Status: COMPLETED | OUTPATIENT
Start: 2019-02-11 | End: 2019-02-11

## 2019-02-11 RX ADMIN — SODIUM CHLORIDE, PRESERVATIVE FREE 10 ML: 5 INJECTION INTRAVENOUS at 08:42

## 2019-02-11 RX ADMIN — DESMOPRESSIN ACETATE 40 MG: 0.2 TABLET ORAL at 08:42

## 2019-02-11 RX ADMIN — ENOXAPARIN SODIUM 40 MG: 40 INJECTION SUBCUTANEOUS at 08:41

## 2019-02-11 RX ADMIN — PANTOPRAZOLE SODIUM 40 MG: 40 TABLET, DELAYED RELEASE ORAL at 08:41

## 2019-02-11 RX ADMIN — WATER 2.1 ML: 1 INJECTION INTRAMUSCULAR; INTRAVENOUS; SUBCUTANEOUS at 20:16

## 2019-02-11 RX ADMIN — CEFTRIAXONE SODIUM 1 G: 1 INJECTION, POWDER, FOR SOLUTION INTRAMUSCULAR; INTRAVENOUS at 14:38

## 2019-02-11 RX ADMIN — FINASTERIDE 5 MG: 5 TABLET, FILM COATED ORAL at 08:41

## 2019-02-11 RX ADMIN — ASPIRIN 81 MG: 81 TABLET ORAL at 08:42

## 2019-02-11 RX ADMIN — MEMANTINE HYDROCHLORIDE 10 MG: 5 TABLET, FILM COATED ORAL at 08:41

## 2019-02-11 RX ADMIN — OLANZAPINE 2.5 MG: 10 INJECTION, POWDER, FOR SOLUTION INTRAMUSCULAR at 20:16

## 2019-02-11 RX ADMIN — TAMSULOSIN HYDROCHLORIDE 0.4 MG: 0.4 CAPSULE ORAL at 08:42

## 2019-02-11 ASSESSMENT — PAIN SCALES - GENERAL
PAINLEVEL_OUTOF10: 0
PAINLEVEL_OUTOF10: 0

## 2019-02-12 LAB
ABSOLUTE EOS #: 0.2 K/UL (ref 0–0.44)
ABSOLUTE IMMATURE GRANULOCYTE: 0.03 K/UL (ref 0–0.3)
ABSOLUTE LYMPH #: 2.05 K/UL (ref 1.1–3.7)
ABSOLUTE MONO #: 0.9 K/UL (ref 0.1–1.2)
ANION GAP SERPL CALCULATED.3IONS-SCNC: 9 MMOL/L (ref 9–17)
BASOPHILS # BLD: 1 % (ref 0–2)
BASOPHILS ABSOLUTE: 0.06 K/UL (ref 0–0.2)
BUN BLDV-MCNC: 5 MG/DL (ref 8–23)
BUN/CREAT BLD: ABNORMAL (ref 9–20)
CALCIUM SERPL-MCNC: 8.1 MG/DL (ref 8.6–10.4)
CHLORIDE BLD-SCNC: 111 MMOL/L (ref 98–107)
CO2: 21 MMOL/L (ref 20–31)
CREAT SERPL-MCNC: 0.82 MG/DL (ref 0.7–1.2)
DIFFERENTIAL TYPE: ABNORMAL
DIRECT EXAM: NORMAL
EKG ATRIAL RATE: 61 BPM
EKG P-R INTERVAL: 118 MS
EKG Q-T INTERVAL: 406 MS
EKG QRS DURATION: 80 MS
EKG QTC CALCULATION (BAZETT): 408 MS
EKG R AXIS: -78 DEGREES
EKG T AXIS: -83 DEGREES
EKG VENTRICULAR RATE: 61 BPM
EOSINOPHILS RELATIVE PERCENT: 3 % (ref 1–4)
GFR AFRICAN AMERICAN: >60 ML/MIN
GFR NON-AFRICAN AMERICAN: >60 ML/MIN
GFR SERPL CREATININE-BSD FRML MDRD: ABNORMAL ML/MIN/{1.73_M2}
GFR SERPL CREATININE-BSD FRML MDRD: ABNORMAL ML/MIN/{1.73_M2}
GLUCOSE BLD-MCNC: 88 MG/DL (ref 70–99)
HCT VFR BLD CALC: 34 % (ref 40.7–50.3)
HEMOGLOBIN: 10.4 G/DL (ref 13–17)
IMMATURE GRANULOCYTES: 1 %
LYMPHOCYTES # BLD: 32 % (ref 24–43)
Lab: NORMAL
MCH RBC QN AUTO: 27.6 PG (ref 25.2–33.5)
MCHC RBC AUTO-ENTMCNC: 30.6 G/DL (ref 28.4–34.8)
MCV RBC AUTO: 90.2 FL (ref 82.6–102.9)
MONOCYTES # BLD: 14 % (ref 3–12)
NRBC AUTOMATED: 0 PER 100 WBC
PDW BLD-RTO: 15.2 % (ref 11.8–14.4)
PLATELET # BLD: 258 K/UL (ref 138–453)
PLATELET ESTIMATE: ABNORMAL
PMV BLD AUTO: 10.8 FL (ref 8.1–13.5)
POTASSIUM SERPL-SCNC: 4.3 MMOL/L (ref 3.7–5.3)
RBC # BLD: 3.77 M/UL (ref 4.21–5.77)
RBC # BLD: ABNORMAL 10*6/UL
SEG NEUTROPHILS: 49 % (ref 36–65)
SEGMENTED NEUTROPHILS ABSOLUTE COUNT: 3.26 K/UL (ref 1.5–8.1)
SODIUM BLD-SCNC: 141 MMOL/L (ref 135–144)
SPECIMEN DESCRIPTION: NORMAL
WBC # BLD: 6.5 K/UL (ref 3.5–11.3)
WBC # BLD: ABNORMAL 10*3/UL

## 2019-02-12 PROCEDURE — 1200000000 HC SEMI PRIVATE

## 2019-02-12 PROCEDURE — 36415 COLL VENOUS BLD VENIPUNCTURE: CPT

## 2019-02-12 PROCEDURE — 85025 COMPLETE CBC W/AUTO DIFF WBC: CPT

## 2019-02-12 PROCEDURE — 80048 BASIC METABOLIC PNL TOTAL CA: CPT

## 2019-02-12 PROCEDURE — 2500000003 HC RX 250 WO HCPCS: Performed by: STUDENT IN AN ORGANIZED HEALTH CARE EDUCATION/TRAINING PROGRAM

## 2019-02-12 PROCEDURE — 2580000003 HC RX 258: Performed by: STUDENT IN AN ORGANIZED HEALTH CARE EDUCATION/TRAINING PROGRAM

## 2019-02-12 PROCEDURE — 99232 SBSQ HOSP IP/OBS MODERATE 35: CPT | Performed by: INTERNAL MEDICINE

## 2019-02-12 PROCEDURE — 6370000000 HC RX 637 (ALT 250 FOR IP): Performed by: STUDENT IN AN ORGANIZED HEALTH CARE EDUCATION/TRAINING PROGRAM

## 2019-02-12 RX ORDER — HALOPERIDOL 5 MG/ML
2 INJECTION INTRAMUSCULAR ONCE
Status: DISCONTINUED | OUTPATIENT
Start: 2019-02-12 | End: 2019-02-12

## 2019-02-12 RX ORDER — OLANZAPINE 10 MG/1
2.5 INJECTION, POWDER, LYOPHILIZED, FOR SOLUTION INTRAMUSCULAR DAILY PRN
Status: DISCONTINUED | OUTPATIENT
Start: 2019-02-12 | End: 2019-02-14 | Stop reason: HOSPADM

## 2019-02-12 RX ADMIN — SODIUM CHLORIDE: 9 INJECTION, SOLUTION INTRAVENOUS at 01:23

## 2019-02-12 RX ADMIN — DESMOPRESSIN ACETATE 40 MG: 0.2 TABLET ORAL at 09:42

## 2019-02-12 RX ADMIN — MEMANTINE HYDROCHLORIDE 10 MG: 5 TABLET, FILM COATED ORAL at 09:42

## 2019-02-12 RX ADMIN — TAMSULOSIN HYDROCHLORIDE 0.4 MG: 0.4 CAPSULE ORAL at 09:42

## 2019-02-12 RX ADMIN — FINASTERIDE 5 MG: 5 TABLET, FILM COATED ORAL at 09:42

## 2019-02-12 RX ADMIN — ASPIRIN 81 MG: 81 TABLET ORAL at 09:42

## 2019-02-12 RX ADMIN — WATER 2.1 ML: 1 INJECTION INTRAMUSCULAR; INTRAVENOUS; SUBCUTANEOUS at 15:17

## 2019-02-12 RX ADMIN — OLANZAPINE 2.5 MG: 10 INJECTION, POWDER, FOR SOLUTION INTRAMUSCULAR at 15:17

## 2019-02-13 PROCEDURE — 99223 1ST HOSP IP/OBS HIGH 75: CPT | Performed by: FAMILY MEDICINE

## 2019-02-13 PROCEDURE — 99239 HOSP IP/OBS DSCHRG MGMT >30: CPT | Performed by: INTERNAL MEDICINE

## 2019-02-13 PROCEDURE — 6370000000 HC RX 637 (ALT 250 FOR IP): Performed by: STUDENT IN AN ORGANIZED HEALTH CARE EDUCATION/TRAINING PROGRAM

## 2019-02-13 PROCEDURE — 1200000000 HC SEMI PRIVATE

## 2019-02-13 RX ORDER — TAMSULOSIN HYDROCHLORIDE 0.4 MG/1
0.4 CAPSULE ORAL DAILY
Qty: 30 CAPSULE | Refills: 3 | Status: SHIPPED | OUTPATIENT
Start: 2019-02-14 | End: 2019-05-08 | Stop reason: SDUPTHER

## 2019-02-13 RX ORDER — CEPHALEXIN 500 MG/1
500 CAPSULE ORAL EVERY 12 HOURS SCHEDULED
Qty: 10 CAPSULE | Refills: 0 | Status: SHIPPED | OUTPATIENT
Start: 2019-02-13 | End: 2019-02-18

## 2019-02-13 RX ORDER — OLANZAPINE 5 MG/1
5 TABLET, ORALLY DISINTEGRATING ORAL NIGHTLY
Status: DISCONTINUED | OUTPATIENT
Start: 2019-02-13 | End: 2019-02-14 | Stop reason: HOSPADM

## 2019-02-13 RX ORDER — CEPHALEXIN 500 MG/1
500 CAPSULE ORAL EVERY 12 HOURS SCHEDULED
Status: DISCONTINUED | OUTPATIENT
Start: 2019-02-13 | End: 2019-02-14 | Stop reason: HOSPADM

## 2019-02-13 RX ORDER — OLANZAPINE 5 MG/1
2.5 TABLET, ORALLY DISINTEGRATING ORAL NIGHTLY
Qty: 30 TABLET | Refills: 0 | Status: ON HOLD | OUTPATIENT
Start: 2019-02-13 | End: 2019-09-12 | Stop reason: HOSPADM

## 2019-02-13 RX ORDER — OLANZAPINE 5 MG/1
5 TABLET, ORALLY DISINTEGRATING ORAL NIGHTLY
Qty: 30 TABLET | Refills: 0 | Status: SHIPPED | OUTPATIENT
Start: 2019-02-13 | End: 2019-02-13

## 2019-02-13 RX ADMIN — DESMOPRESSIN ACETATE 40 MG: 0.2 TABLET ORAL at 08:48

## 2019-02-13 RX ADMIN — CEPHALEXIN 500 MG: 500 CAPSULE ORAL at 20:21

## 2019-02-13 RX ADMIN — ASPIRIN 81 MG: 81 TABLET ORAL at 08:43

## 2019-02-13 RX ADMIN — PANTOPRAZOLE SODIUM 40 MG: 40 TABLET, DELAYED RELEASE ORAL at 08:43

## 2019-02-13 RX ADMIN — CEPHALEXIN 500 MG: 500 CAPSULE ORAL at 08:43

## 2019-02-13 RX ADMIN — MEMANTINE HYDROCHLORIDE 10 MG: 5 TABLET, FILM COATED ORAL at 08:43

## 2019-02-13 RX ADMIN — FINASTERIDE 5 MG: 5 TABLET, FILM COATED ORAL at 08:43

## 2019-02-13 RX ADMIN — TAMSULOSIN HYDROCHLORIDE 0.4 MG: 0.4 CAPSULE ORAL at 08:43

## 2019-02-13 ASSESSMENT — PAIN SCALES - GENERAL: PAINLEVEL_OUTOF10: 0

## 2019-02-14 VITALS
TEMPERATURE: 98.2 F | BODY MASS INDEX: 19.15 KG/M2 | WEIGHT: 122 LBS | OXYGEN SATURATION: 97 % | HEIGHT: 67 IN | HEART RATE: 76 BPM | DIASTOLIC BLOOD PRESSURE: 73 MMHG | SYSTOLIC BLOOD PRESSURE: 124 MMHG | RESPIRATION RATE: 18 BRPM

## 2019-02-14 PROCEDURE — 2580000003 HC RX 258: Performed by: STUDENT IN AN ORGANIZED HEALTH CARE EDUCATION/TRAINING PROGRAM

## 2019-02-14 PROCEDURE — 6370000000 HC RX 637 (ALT 250 FOR IP): Performed by: STUDENT IN AN ORGANIZED HEALTH CARE EDUCATION/TRAINING PROGRAM

## 2019-02-14 PROCEDURE — 6360000002 HC RX W HCPCS: Performed by: STUDENT IN AN ORGANIZED HEALTH CARE EDUCATION/TRAINING PROGRAM

## 2019-02-14 RX ADMIN — ASPIRIN 81 MG: 81 TABLET ORAL at 09:06

## 2019-02-14 RX ADMIN — FINASTERIDE 5 MG: 5 TABLET, FILM COATED ORAL at 09:06

## 2019-02-14 RX ADMIN — SODIUM CHLORIDE, PRESERVATIVE FREE 10 ML: 5 INJECTION INTRAVENOUS at 09:07

## 2019-02-14 RX ADMIN — DESMOPRESSIN ACETATE 40 MG: 0.2 TABLET ORAL at 09:06

## 2019-02-14 RX ADMIN — ENOXAPARIN SODIUM 40 MG: 40 INJECTION SUBCUTANEOUS at 09:05

## 2019-02-14 RX ADMIN — MEMANTINE HYDROCHLORIDE 10 MG: 5 TABLET, FILM COATED ORAL at 09:06

## 2019-02-14 RX ADMIN — CEPHALEXIN 500 MG: 500 CAPSULE ORAL at 09:06

## 2019-02-14 RX ADMIN — TAMSULOSIN HYDROCHLORIDE 0.4 MG: 0.4 CAPSULE ORAL at 09:07

## 2019-03-12 PROBLEM — N39.0 URINARY TRACT INFECTION: Status: RESOLVED | Noted: 2019-02-10 | Resolved: 2019-03-12

## 2019-04-10 ENCOUNTER — TELEPHONE (OUTPATIENT)
Dept: INTERNAL MEDICINE | Age: 79
End: 2019-04-10

## 2019-04-11 NOTE — TELEPHONE ENCOUNTER
RYAN from Josiane at [facility] regarding if the office will follow for home care. She was informed that Dr Alex Jimenez will do home care.

## 2019-05-08 ENCOUNTER — OFFICE VISIT (OUTPATIENT)
Dept: INTERNAL MEDICINE | Age: 79
End: 2019-05-08
Payer: COMMERCIAL

## 2019-05-08 VITALS
DIASTOLIC BLOOD PRESSURE: 75 MMHG | HEIGHT: 67 IN | WEIGHT: 126 LBS | SYSTOLIC BLOOD PRESSURE: 101 MMHG | HEART RATE: 76 BPM | BODY MASS INDEX: 19.78 KG/M2

## 2019-05-08 DIAGNOSIS — G30.9 ALZHEIMER'S DEMENTIA WITHOUT BEHAVIORAL DISTURBANCE, UNSPECIFIED TIMING OF DEMENTIA ONSET: ICD-10-CM

## 2019-05-08 DIAGNOSIS — F02.80 ALZHEIMER'S DEMENTIA WITHOUT BEHAVIORAL DISTURBANCE, UNSPECIFIED TIMING OF DEMENTIA ONSET: ICD-10-CM

## 2019-05-08 DIAGNOSIS — M81.0 AGE-RELATED OSTEOPOROSIS WITHOUT CURRENT PATHOLOGICAL FRACTURE: ICD-10-CM

## 2019-05-08 DIAGNOSIS — Z91.81 AT HIGH RISK FOR FALLS: ICD-10-CM

## 2019-05-08 DIAGNOSIS — K21.9 GASTROESOPHAGEAL REFLUX DISEASE WITHOUT ESOPHAGITIS: ICD-10-CM

## 2019-05-08 DIAGNOSIS — N40.0 BENIGN PROSTATIC HYPERPLASIA WITHOUT LOWER URINARY TRACT SYMPTOMS: Primary | ICD-10-CM

## 2019-05-08 DIAGNOSIS — I73.9 PVD (PERIPHERAL VASCULAR DISEASE) (HCC): ICD-10-CM

## 2019-05-08 PROCEDURE — 4004F PT TOBACCO SCREEN RCVD TLK: CPT | Performed by: STUDENT IN AN ORGANIZED HEALTH CARE EDUCATION/TRAINING PROGRAM

## 2019-05-08 PROCEDURE — G8420 CALC BMI NORM PARAMETERS: HCPCS | Performed by: STUDENT IN AN ORGANIZED HEALTH CARE EDUCATION/TRAINING PROGRAM

## 2019-05-08 PROCEDURE — 99213 OFFICE O/P EST LOW 20 MIN: CPT | Performed by: STUDENT IN AN ORGANIZED HEALTH CARE EDUCATION/TRAINING PROGRAM

## 2019-05-08 PROCEDURE — 1123F ACP DISCUSS/DSCN MKR DOCD: CPT | Performed by: STUDENT IN AN ORGANIZED HEALTH CARE EDUCATION/TRAINING PROGRAM

## 2019-05-08 PROCEDURE — 99211 OFF/OP EST MAY X REQ PHY/QHP: CPT | Performed by: INTERNAL MEDICINE

## 2019-05-08 PROCEDURE — G8427 DOCREV CUR MEDS BY ELIG CLIN: HCPCS | Performed by: STUDENT IN AN ORGANIZED HEALTH CARE EDUCATION/TRAINING PROGRAM

## 2019-05-08 PROCEDURE — 4040F PNEUMOC VAC/ADMIN/RCVD: CPT | Performed by: STUDENT IN AN ORGANIZED HEALTH CARE EDUCATION/TRAINING PROGRAM

## 2019-05-08 RX ORDER — ASPIRIN 81 MG/1
81 TABLET ORAL DAILY
Qty: 30 TABLET | Refills: 5 | Status: ON HOLD | OUTPATIENT
Start: 2019-05-08 | End: 2019-10-16 | Stop reason: SDUPTHER

## 2019-05-08 RX ORDER — ATORVASTATIN CALCIUM 40 MG/1
40 TABLET, FILM COATED ORAL DAILY
Qty: 30 TABLET | Refills: 5 | Status: ON HOLD | OUTPATIENT
Start: 2019-05-08 | End: 2019-09-12 | Stop reason: HOSPADM

## 2019-05-08 RX ORDER — TAMSULOSIN HYDROCHLORIDE 0.4 MG/1
0.4 CAPSULE ORAL DAILY
Qty: 30 CAPSULE | Refills: 3 | Status: ON HOLD | OUTPATIENT
Start: 2019-05-08 | End: 2019-09-12 | Stop reason: HOSPADM

## 2019-05-08 RX ORDER — MEMANTINE HYDROCHLORIDE 10 MG/1
TABLET ORAL
Qty: 60 TABLET | Refills: 5 | Status: ON HOLD | OUTPATIENT
Start: 2019-05-08 | End: 2019-09-12 | Stop reason: HOSPADM

## 2019-05-08 RX ORDER — ALENDRONATE SODIUM 70 MG/1
70 TABLET ORAL
Qty: 4 TABLET | Refills: 5 | Status: SHIPPED | OUTPATIENT
Start: 2019-05-08 | End: 2019-07-01 | Stop reason: SDUPTHER

## 2019-05-08 RX ORDER — OMEPRAZOLE 20 MG/1
20 CAPSULE, DELAYED RELEASE ORAL DAILY
Qty: 30 CAPSULE | Refills: 5 | Status: SHIPPED | OUTPATIENT
Start: 2019-05-08 | End: 2019-07-01 | Stop reason: SDUPTHER

## 2019-05-08 RX ORDER — B-COMPLEX WITH VITAMIN C
2 TABLET ORAL DAILY
Qty: 60 TABLET | Refills: 5 | Status: ON HOLD | OUTPATIENT
Start: 2019-05-08 | End: 2019-09-12 | Stop reason: HOSPADM

## 2019-05-08 RX ORDER — DONEPEZIL HYDROCHLORIDE 10 MG/1
TABLET, FILM COATED ORAL
Qty: 30 TABLET | Refills: 5 | Status: SHIPPED | OUTPATIENT
Start: 2019-05-08 | End: 2019-07-01 | Stop reason: SDUPTHER

## 2019-05-08 ASSESSMENT — PATIENT HEALTH QUESTIONNAIRE - PHQ9
2. FEELING DOWN, DEPRESSED OR HOPELESS: 0
1. LITTLE INTEREST OR PLEASURE IN DOING THINGS: 0
SUM OF ALL RESPONSES TO PHQ9 QUESTIONS 1 & 2: 0
SUM OF ALL RESPONSES TO PHQ QUESTIONS 1-9: 0
SUM OF ALL RESPONSES TO PHQ QUESTIONS 1-9: 0

## 2019-05-08 NOTE — PROGRESS NOTES
And here for follow-up with his wife. He has a history of dementia. He is on Donazepril and memantine and is doing well. Is also started on Zyprexa at night and he is tolerating it. Sleep is better. He was in an ECF to last month. He has missed his neurology follow-up. He follows up with HIV clinic. He is compliant with meds. He has history of BPH but currently denies any symptoms. Medications were refilled. Labs and tests reviewed. Attending Physician Statement  I have discussed the care of Lilian Oviedo, including pertinent history and exam findings,  with the resident. I have reviewed the key elements of all parts of the encounter with the resident. I agree with the assessment, plan and orders as documented by the resident.   (GE Modifier)

## 2019-05-08 NOTE — PATIENT INSTRUCTIONS
Follow-up appointment scheduled for     12/03/19, AVS given to patient. Medications e-scribe to pharmacy of pt's choice.        tv

## 2019-05-08 NOTE — PROGRESS NOTES
[facility]  [facility] [address] [facility]  [address] [facility] [address]  Dept: [phone]  Dept Fax: [phone]    Office Progress/Follow Up Note  Date of patient's visit: 5/8/2019  Patient's Name:  Noreen Stringer YOB: 1940            Patient Care Team:  Keegan Estes MD as PCP - General (Internal Medicine)  Gris Sosa MD as Consulting Physician (Infectious Diseases)  Una Hernandez DPM (Podiatry)    REASON FOR VISIT: Routine outpatient follow up    HISTORY OF PRESENT ILLNESS:      Chief Complaint   Patient presents with    Dementia     6 months f/u   [facility]     pt state he had the shigrix at [facility] aid on [place]       History was obtained from the patient. Noreen Stringer is a [de-identified] y.o. is here for a follow up visit. Patient has history of dementia, HIV. He had recent admission in February for complaints of hallucinations, agitation and increased confusion. He was evaluated by neurology at that point and was thought to be due to UTI and was discharged to a SNF with oral Keflex. Per wife patient has been doing a lot better since discharge. Zyprexa was started for nightly agitation. Wife states he has not been taking it but has been doing well. Continues to be on donepezil and Namenda. Patient also has history of HIV and follows with . On Atripla at home. CD4 counts checked during 2 months back stable. During previous admission his code status was changed to DNR CCA with no intubation. He has history of BPH and medication show he is on both Flomax and finasteride. He is also on aspirin and Lipitor. Patient is on Fosamax and calcium and vitamin D supplements for osteoporosis.     Patient Active Problem List   Diagnosis    HIV (human immunodeficiency virus infection) ([facility]    Dementia without behavioral disturbance    Osteoporosis right hip fracture dec 08    Hemorrhoids    BPH (benign prostatic facility-administered medications for this visit. SOCIAL HISTORY    Reviewed and no change from previous record. Mandeep  reports that he has been smoking cigarettes. He started smoking about 68 years ago. He has been smoking about 0.00 packs per day for the past 50.00 years.  He has never used smokeless tobacco.    FAMILY HISTORY:    Reviewed and No change from previous visit    REVIEW OF SYSTEMS:    CONSTITUTIONAL: Denies: fever, chills  PSYCH: Denies: anxiety, depression  ALLERGIES: Denies: urticaria  EYES: Denies: blurry vision, decreased vision, photophobia  ENT: Denies: sore throat, nasal congestion  CARDIOVASCULAR: Denies: chest pain, dyspnea on exertion  RESPIRATORY: Denies: cough, hemoptysis, shortness of breath  GI: Denies: Denies: abdominal pain, flank pain  : Denies: Denies: dysuria, frequency/urgency  NEURO: Denies: dizzy/vertigo, headache  MUSCULOSKELETAL: Denies: back pain, joint pain  SKIN: Denies: rash, itching    PHYSICAL EXAM:      Vitals:    05/08/19 0839   BP: 101/75   Site: Right Upper Arm   Position: Sitting   Cuff Size: Medium Adult   Pulse: 76   Weight: 126 lb (57.2 kg)   Height: 5' 7\" (1.702 m)     BP Readings from Last 3 Encounters:   05/08/19 101/75   02/13/19 124/73   02/08/19 113/68      General appearance - alert, well appearing, and in no distress  Mental status - alert, oriented to person, place, and time  Mouth - mucous membranes moist, pharynx normal without lesions  Neck - supple, no significant adenopathy  Chest - clear to auscultation, no wheezes, rales or rhonchi, symmetric air entry  Heart - normal rate, regular rhythm, normal S1, S2, no murmurs  Abdomen - soft, nontender, nondistended, no masses or organomegaly  Neurological - alert, oriented, normal speech, no focal findings or movement disorder noted  Extremities - peripheral pulses normal, no pedal edema, no clubbing or cyanosis  Skin - normal coloration and turgor, no rashes, no suspicious skin lesions noted    LABORATORY FINDINGS:    CBC:  Lab Results   Component Value Date    WBC 6.5 02/12/2019    HGB 10.4 02/12/2019     02/12/2019     04/18/2012       BMP:    Lab Results   Component Value Date     02/12/2019    K 4.3 02/12/2019     02/12/2019    CO2 21 02/12/2019    BUN 5 02/12/2019    CREATININE 0.82 02/12/2019    GLUCOSE 88 02/12/2019    GLUCOSE 76 04/18/2012       HEMOGLOBIN A1C:   Lab Results   Component Value Date    LABA1C 5.4 02/10/2019       FASTING LIPID PANEL:  Lab Results   Component Value Date    CHOL 185 02/10/2019    HDL 72 02/10/2019    TRIG 139 02/10/2019       ASSESSMENT AND PLAN:    Kimberlee Castañeda was seen today for dementia and health maintenance. Diagnoses and all orders for this visit:    Benign prostatic hyperplasia without lower urinary tract symptoms  -     tamsulosin (FLOMAX) 0.4 MG capsule; Take 1 capsule by mouth daily    Alzheimer's dementia without behavioral disturbance, unspecified timing of dementia onset  -     donepezil (ARICEPT) 10 MG tablet; TAKE 1 TABLET IN THE EVENING  -     memantine (NAMENDA) 10 MG tablet; TAKE 1 TABLET TWICE A DAY    Age-related osteoporosis without current pathological fracture  -     alendronate (FOSAMAX) 70 MG tablet; Take 1 tablet by mouth every 7 days  -     Calcium Carbonate-Vitamin D (OYSTER SHELL CALCIUM/D) 500-200 MG-UNIT TABS; Take 2 tablets by mouth daily    Gastroesophageal reflux disease without esophagitis  -     omeprazole (PRILOSEC) 20 MG delayed release capsule; Take 1 capsule by mouth Daily    PVD (peripheral vascular disease) ([facility])  -     aspirin 81 MG EC tablet; Take 1 tablet by mouth daily    At high risk for falls    Other orders  -     atorvastatin (LIPITOR) 40 MG tablet; Take 1 tablet by mouth daily      FOLLOW UP AND INSTRUCTIONS:   Return in about 6 months (around 11/8/2019).     · Lemon received counseling on the following healthy behaviors: nutrition, exercise and medication adherence    · Discussed use, benefit, and side effects of prescribed medications. Barriers to medication compliance addressed. All patient questions answered. Pt voiced understanding. · Patient given educational materials - see patient instructions    Harmeet Matias  5/8/2019, 10:18 AM    On the basis of positive falls risk screening, assessment and plan is as follows: home safety tips provided.

## 2019-05-08 NOTE — PROGRESS NOTES
Visit Information    Have you changed or started any medications since your last visit including any over-the-counter medicines, vitamins, or herbal medicines? no   Have you stopped taking any of your medications? Is so, why? -  no  Are you having any side effects from any of your medications? - no    Have you seen any other physician or provider since your last visit?  no   Have you had any other diagnostic tests since your last visit?  no   Have you been seen in the emergency room and/or had an admission in a hospital since we last saw you?  no   Have you had your routine dental cleaning in the past 6 months?  no     Do you have an active MyChart account? If no, what is the barrier?   No:     Patient Care Team:  Jennifer Jenkins MD as PCP - General (Internal Medicine)  Clarence Alba MD as Consulting Physician (Infectious Diseases)  Sierra Daily DPM (Podiatry)    Medical History Review  Past Medical, Family, and Social History reviewed and does contribute to the patient presenting condition    Health Maintenance   Topic Date Due    Meningococcal (ACWY) Vaccine (1 - Risk 2-dose series) 10/03/1940    Autumn Pock (MMR) vaccine (1 of 2 - Risk 2-dose series) 10/03/1956    Hepatitis B Vaccine (1 of 3 - Risk 3-dose series) 10/03/1957    Shingles Vaccine (1 of 2) 10/03/1988    DTaP/Tdap/Td vaccine (2 - Td) 02/21/2027    Flu vaccine  Completed    Pneumococcal 65+ years Vaccine  Completed    Hib Vaccine  Aged Out    HPV vaccine  Aged Out

## 2019-05-13 ENCOUNTER — OFFICE VISIT (OUTPATIENT)
Dept: PODIATRY | Age: 79
End: 2019-05-13
Payer: COMMERCIAL

## 2019-05-13 VITALS
WEIGHT: 126.4 LBS | DIASTOLIC BLOOD PRESSURE: 71 MMHG | SYSTOLIC BLOOD PRESSURE: 102 MMHG | HEART RATE: 63 BPM | BODY MASS INDEX: 19.8 KG/M2

## 2019-05-13 DIAGNOSIS — B35.1 ONYCHOMYCOSIS: Primary | ICD-10-CM

## 2019-05-13 DIAGNOSIS — M79.671 PAIN IN BOTH FEET: ICD-10-CM

## 2019-05-13 DIAGNOSIS — I73.9 PVD (PERIPHERAL VASCULAR DISEASE) (HCC): ICD-10-CM

## 2019-05-13 DIAGNOSIS — M79.672 PAIN IN BOTH FEET: ICD-10-CM

## 2019-05-13 PROCEDURE — 99211 OFF/OP EST MAY X REQ PHY/QHP: CPT | Performed by: STUDENT IN AN ORGANIZED HEALTH CARE EDUCATION/TRAINING PROGRAM

## 2019-05-13 PROCEDURE — G8427 DOCREV CUR MEDS BY ELIG CLIN: HCPCS | Performed by: STUDENT IN AN ORGANIZED HEALTH CARE EDUCATION/TRAINING PROGRAM

## 2019-05-13 PROCEDURE — G8420 CALC BMI NORM PARAMETERS: HCPCS | Performed by: STUDENT IN AN ORGANIZED HEALTH CARE EDUCATION/TRAINING PROGRAM

## 2019-05-13 NOTE — PROGRESS NOTES
Subjective:      Patient ID: [id number]    Other     Foot Pain     Mandeep Iverson presents to clinic for routine DM foot care & nail debridement. Patient has dementia and presents with wife. Unable to obtain subjective due to patient's mental status. Review of Systems   Denies n/v/f/c/cp/sob. Past Medical History:   Diagnosis Date    Dementia     GERD (gastroesophageal reflux disease) 4/28/2015    Hemorrhoids     [facility]     Meralgia paraesthetica 2/4/2016    Mixed hyperlipidemia 5/5/2016    Osteoporosis     Wears glasses      Current Outpatient Medications on File Prior to Visit   Medication Sig Dispense Refill    donepezil (ARICEPT) 10 MG tablet TAKE 1 TABLET IN THE EVENING 30 tablet 5    alendronate (FOSAMAX) 70 MG tablet Take 1 tablet by mouth every 7 days 4 tablet 5    omeprazole (PRILOSEC) 20 MG delayed release capsule Take 1 capsule by mouth Daily 30 capsule 5    Calcium Carbonate-Vitamin D (OYSTER SHELL CALCIUM/D) 500-200 MG-UNIT TABS Take 2 tablets by mouth daily 60 tablet 5    atorvastatin (LIPITOR) 40 MG tablet Take 1 tablet by mouth daily 30 tablet 5    aspirin 81 MG EC tablet Take 1 tablet by mouth daily 30 tablet 5    memantine (NAMENDA) 10 MG tablet TAKE 1 TABLET TWICE A DAY 60 tablet 5    tamsulosin (FLOMAX) 0.4 MG capsule Take 1 capsule by mouth daily 30 capsule 3    OLANZapine zydis (ZYPREXA) 5 MG disintegrating tablet Take 0.5 tablets by mouth nightly 30 tablet 0    efavirenz-emtrictabine-tenofovir (ATRIPLA) 600-200-300 MG per tablet Take 1 tablet by mouth nightly 30 tablet 5    finasteride (PROSCAR) 5 MG tablet Take 1 tablet by mouth daily 30 tablet 5    vitamin E 400 UNIT capsule Take 1 capsule by mouth daily 30 capsule 5    Multiple Vitamin (MULTIVITAMIN) tablet Take 1 tablet by mouth daily 30 tablet 5     No current facility-administered medications on file prior to visit.         Objective:   Physical Exam     Vitals: 05/13/19 1306   BP: 102/71   Pulse: 63        Lab Results   Component Value Date    LABA1C 5.4 02/10/2019       General: AAO x 3 in NAD. Derm: Nails 1-5, bilateral are brittle and thickened > 3mm, elongated, yellow, dystrophic with subungual debris. No open lesions, HPKs, or interidigital maceration noted, antoni. Skin is atrophic and xerotic bilateral lower extremities. Vasc: DP/PT pulses not palpable, Bilateral. CFT < 5 seconds tp the digits. Skin temp warm to warm from knee to the toes. No edema, erythema, or varicosities noted. Hair growth absent bilaterally. Neuro: Sensation intact via light touch, bilateral feet. M/S: Muscle strength 5/5 . ROM is WNL without pain or crepitation in bilateraly ankle and pedal joints. Assessment:   Pt is a [de-identified] y.o. male with   -Onychomycosis  -Dementia  -DM type 2, controlled  -PAD     Diagnosis Orders   1. Onychomycosis     2. Pain in both feet     3.  PVD (peripheral vascular disease) ([facility] [address]           Plan:      -Patient examined  -Patient will not allow me to debride nails, multiple attempts  -Wife to bring patient back in June when she has her appt  -RTC in 2 months or sooner if issues arise    Electronically signed by Shelley Medina DPM on 5/20/2019 at 3:12 PM

## 2019-05-13 NOTE — PROGRESS NOTES
Patient instructed to remove shoes and socks, instructed to sit in exam chair. Current PCP name is Dr Mainor Stewart and date of last visit 5/8/2019. Do you have a follow up visit scheduled?   Yes   If yes the date is 12/03/2019

## 2019-05-17 ENCOUNTER — TELEPHONE (OUTPATIENT)
Dept: INTERNAL MEDICINE | Age: 79
End: 2019-05-17

## 2019-05-17 NOTE — TELEPHONE ENCOUNTER
Received call from [facility] requesting office notes from last visit. Progress notes from 05/08/19 faxed to [phone] with confirmation received.

## 2019-06-03 ENCOUNTER — OFFICE VISIT (OUTPATIENT)
Dept: PODIATRY | Age: 79
End: 2019-06-03
Payer: COMMERCIAL

## 2019-06-03 VITALS
HEIGHT: 66 IN | DIASTOLIC BLOOD PRESSURE: 62 MMHG | RESPIRATION RATE: 18 BRPM | WEIGHT: 127 LBS | BODY MASS INDEX: 20.41 KG/M2 | SYSTOLIC BLOOD PRESSURE: 102 MMHG | HEART RATE: 68 BPM

## 2019-06-03 DIAGNOSIS — B20 HIV (HUMAN IMMUNODEFICIENCY VIRUS INFECTION) (HCC): ICD-10-CM

## 2019-06-03 DIAGNOSIS — M79.672 PAIN IN BOTH FEET: ICD-10-CM

## 2019-06-03 DIAGNOSIS — I73.9 PVD (PERIPHERAL VASCULAR DISEASE) (HCC): ICD-10-CM

## 2019-06-03 DIAGNOSIS — M79.671 PAIN IN BOTH FEET: ICD-10-CM

## 2019-06-03 DIAGNOSIS — B35.1 ONYCHOMYCOSIS: Primary | ICD-10-CM

## 2019-06-03 DIAGNOSIS — F03.90 DEMENTIA WITHOUT BEHAVIORAL DISTURBANCE, UNSPECIFIED DEMENTIA TYPE: ICD-10-CM

## 2019-06-03 PROCEDURE — 99212 OFFICE O/P EST SF 10 MIN: CPT | Performed by: STUDENT IN AN ORGANIZED HEALTH CARE EDUCATION/TRAINING PROGRAM

## 2019-06-03 PROCEDURE — G8420 CALC BMI NORM PARAMETERS: HCPCS | Performed by: STUDENT IN AN ORGANIZED HEALTH CARE EDUCATION/TRAINING PROGRAM

## 2019-06-03 PROCEDURE — 4040F PNEUMOC VAC/ADMIN/RCVD: CPT | Performed by: STUDENT IN AN ORGANIZED HEALTH CARE EDUCATION/TRAINING PROGRAM

## 2019-06-03 PROCEDURE — G8427 DOCREV CUR MEDS BY ELIG CLIN: HCPCS | Performed by: STUDENT IN AN ORGANIZED HEALTH CARE EDUCATION/TRAINING PROGRAM

## 2019-06-03 PROCEDURE — 4004F PT TOBACCO SCREEN RCVD TLK: CPT | Performed by: STUDENT IN AN ORGANIZED HEALTH CARE EDUCATION/TRAINING PROGRAM

## 2019-06-03 PROCEDURE — 11721 DEBRIDE NAIL 6 OR MORE: CPT | Performed by: STUDENT IN AN ORGANIZED HEALTH CARE EDUCATION/TRAINING PROGRAM

## 2019-06-03 PROCEDURE — 1123F ACP DISCUSS/DSCN MKR DOCD: CPT | Performed by: STUDENT IN AN ORGANIZED HEALTH CARE EDUCATION/TRAINING PROGRAM

## 2019-06-03 RX ORDER — NITROFURANTOIN 25; 75 MG/1; MG/1
CAPSULE ORAL
Status: ON HOLD | COMMUNITY
Start: 2019-03-26 | End: 2019-09-12 | Stop reason: HOSPADM

## 2019-06-03 RX ORDER — DIVALPROEX SODIUM 500 MG/1
TABLET, DELAYED RELEASE ORAL
Refills: 0 | Status: ON HOLD | COMMUNITY
Start: 2019-05-06 | End: 2019-09-12 | Stop reason: HOSPADM

## 2019-06-03 RX ORDER — DIVALPROEX SODIUM 250 MG/1
TABLET, DELAYED RELEASE ORAL
Status: ON HOLD | COMMUNITY
Start: 2019-03-26 | End: 2019-09-12 | Stop reason: HOSPADM

## 2019-06-03 RX ORDER — OLANZAPINE 2.5 MG/1
TABLET ORAL
Refills: 0 | COMMUNITY
Start: 2019-05-06 | End: 2019-06-20

## 2019-06-03 NOTE — PROGRESS NOTES
Patient instructed to remove shoes and socks, instructed to sit in exam chair. Current PCP name is Jason Gomez and date of last visit 5-8-19. Do you have a follow up visit scheduled?   Yes or no    If yes the date is 12-3-19
tablet Take 1 tablet by mouth daily 30 tablet 5    vitamin E 400 UNIT capsule Take 1 capsule by mouth daily 30 capsule 5    Multiple Vitamin (MULTIVITAMIN) tablet Take 1 tablet by mouth daily 30 tablet 5     No current facility-administered medications on file prior to visit. Objective:   Physical Exam     Vitals:    06/03/19 1411   BP: 102/62   Pulse: 68   Resp: 18        Lab Results   Component Value Date    LABA1C 5.4 02/10/2019       General: AAO x 3 in NAD. Derm: Nails 1-5, bilateral are brittle and thickened > 3mm, elongated, yellow, dystrophic with subungual debris. No open lesions, HPKs, or interidigital maceration noted, antoni. Skin is atrophic and xerotic bilateral lower extremities. Vasc: DP/PT pulses not palpable, Bilateral. Patient refusing further exam. CFT < 5 seconds tp the digits. Skin temp warm to warm from knee to the toes. No edema, erythema, or varicosities noted. Hair growth absent bilaterally. Neuro: Sensation intact via light touch, bilateral feet. M/S: Muscle strength 5/5 . ROM is WNL without pain or crepitation in bilateraly ankle and pedal joints. Assessment:   Pt is a [de-identified] y.o. male with   -Onychomycosis  -Dementia  -DM type 2, controlled  -PAD     Diagnosis Orders   1. Onychomycosis  [place] DEBRIDEMENT OF NAILS, 6 OR MORE   2. Pain in both feet  [place] DEBRIDEMENT OF NAILS, 6 OR MORE   3. PVD (peripheral vascular disease) ([facility] [address]     4. Dementia without behavioral disturbance, unspecified dementia type  [place] DEBRIDEMENT OF NAILS, 6 OR MORE   5. HIV (human immunodeficiency virus infection) ([facility] [address]           Plan:      -Patient examined  -Patient allowed trimming of nails x9.   -Debrided nails 1-5 R and 1-4 L with sterile nail nippers without incident.     -RTC in 3 months or sooner if issues arise    Electronically signed by Mikhail Curiel DPM on 6/3/2019 at 3:14 PM

## 2019-06-19 ENCOUNTER — HOSPITAL ENCOUNTER (OUTPATIENT)
Age: 79
Discharge: HOME | End: 2019-06-19
Payer: COMMERCIAL

## 2019-06-19 DIAGNOSIS — E78.2 MIXED HYPERLIPIDEMIA: ICD-10-CM

## 2019-06-19 DIAGNOSIS — B20 HUMAN IMMUNODEFICIENCY VIRUS (HCC): ICD-10-CM

## 2019-06-19 PROCEDURE — 82248 BILIRUBIN DIRECT: CPT

## 2019-06-19 PROCEDURE — 86357 NK CELLS TOTAL COUNT: CPT

## 2019-06-19 PROCEDURE — 86360 T CELL ABSOLUTE COUNT/RATIO: CPT

## 2019-06-19 PROCEDURE — 86359 T CELLS TOTAL COUNT: CPT

## 2019-06-19 PROCEDURE — 87536 HIV-1 QUANT&REVRSE TRNSCRPJ: CPT

## 2019-06-19 PROCEDURE — 80061 LIPID PANEL: CPT

## 2019-06-19 PROCEDURE — 85027 COMPLETE CBC AUTOMATED: CPT

## 2019-06-19 PROCEDURE — 80053 COMPREHEN METABOLIC PANEL: CPT

## 2019-06-19 PROCEDURE — 86355 B CELLS TOTAL COUNT: CPT

## 2019-06-20 ENCOUNTER — OFFICE VISIT (OUTPATIENT)
Dept: INFECTIOUS DISEASES | Age: 79
End: 2019-06-20
Payer: COMMERCIAL

## 2019-06-20 VITALS
SYSTOLIC BLOOD PRESSURE: 124 MMHG | BODY MASS INDEX: 20.22 KG/M2 | DIASTOLIC BLOOD PRESSURE: 62 MMHG | WEIGHT: 125.8 LBS | RESPIRATION RATE: 12 BRPM | HEIGHT: 66 IN | HEART RATE: 70 BPM

## 2019-06-20 DIAGNOSIS — G30.9 ALZHEIMER'S DEMENTIA WITHOUT BEHAVIORAL DISTURBANCE, UNSPECIFIED TIMING OF DEMENTIA ONSET: ICD-10-CM

## 2019-06-20 DIAGNOSIS — F02.80 ALZHEIMER'S DEMENTIA WITHOUT BEHAVIORAL DISTURBANCE, UNSPECIFIED TIMING OF DEMENTIA ONSET: ICD-10-CM

## 2019-06-20 DIAGNOSIS — B20 HIV INFECTION, UNSPECIFIED SYMPTOM STATUS (HCC): Primary | ICD-10-CM

## 2019-06-20 LAB
-: NORMAL
REASON FOR REJECTION: NORMAL
ZZ NTE CLEAN UP: ORDERED TEST: NORMAL
ZZ NTE WITH NAME CLEAN UP: SPECIMEN SOURCE: NORMAL

## 2019-06-20 PROCEDURE — 4004F PT TOBACCO SCREEN RCVD TLK: CPT | Performed by: INTERNAL MEDICINE

## 2019-06-20 PROCEDURE — G8427 DOCREV CUR MEDS BY ELIG CLIN: HCPCS | Performed by: INTERNAL MEDICINE

## 2019-06-20 PROCEDURE — 1123F ACP DISCUSS/DSCN MKR DOCD: CPT | Performed by: INTERNAL MEDICINE

## 2019-06-20 PROCEDURE — 99213 OFFICE O/P EST LOW 20 MIN: CPT | Performed by: INTERNAL MEDICINE

## 2019-06-20 PROCEDURE — G8420 CALC BMI NORM PARAMETERS: HCPCS | Performed by: INTERNAL MEDICINE

## 2019-06-20 PROCEDURE — 4040F PNEUMOC VAC/ADMIN/RCVD: CPT | Performed by: INTERNAL MEDICINE

## 2019-06-20 ASSESSMENT — ENCOUNTER SYMPTOMS
RESPIRATORY NEGATIVE: 1
GASTROINTESTINAL NEGATIVE: 1

## 2019-06-20 NOTE — PROGRESS NOTES
many years and he tolerated the medication well. He does have underlying dementia and has been getting more and more confused/combative and the wife who comes with him that all his visits tells me that he is been having more bad days and on those days he refuses to take his medications. She was not able to get him to allow the lab technicians to draw his labs and therefore no lab studies were done prior to this visit  Patient himself is currently pleasant and does not have any complaints to report. I have personally reviewed the past medical history, past surgical history, medications, social history, and family history, and I have updated the database accordingly.   Past Medical History:     Past Medical History:   Diagnosis Date    Acute delirium 2/11/2019    Acute metabolic encephalopathy 4/35/7598    Alzheimer's dementia with behavioral disturbance 2/11/2019    Atrophy, cortical     BPH (benign prostatic hyperplasia) 2/1/2013    Dementia     Dementia without behavioral disturbance 7/27/2012    GERD (gastroesophageal reflux disease) 4/28/2015    Hemorrhoids     [facility]     Meralgia paraesthetica 2/4/2016    Mixed hyperlipidemia 5/5/2016    Occasional tremors     Osteoporosis     PVD (peripheral vascular disease) ([facility] 2/8/2018    Seizure-like activity ([facility] 2/28/2018    Syphilis in male     Wears glasses      Past Surgical  History:     Past Surgical History:   Procedure Laterality Date    COLONOSCOPY      HEMORRHOID SURGERY      HERNIA REPAIR      umbilical    DC X-RAY RETROGRADE PYELOGRAM Bilateral 3/6/2018    CYSTOSCOPY RETROGRADE PYELOGRAM performed by Aleida Yeager MD at [address] 11/22/2017    SIGMOIDOSCOPY POLYP SNARE performed by Nirav Carroll MD at [facility]      Medications:     Current Outpatient Medications   Medication Sig Dispense Refill    divalproex (DEPAKOTE) 250 MG DR tablet       divalproex (DEPAKOTE) 500 MG DR tablet   0    nitrofurantoin, macrocrystal-monohydrate, (MACROBID) 100 MG capsule       donepezil (ARICEPT) 10 MG tablet TAKE 1 TABLET IN THE EVENING 30 tablet 5    alendronate (FOSAMAX) 70 MG tablet Take 1 tablet by mouth every 7 days 4 tablet 5    omeprazole (PRILOSEC) 20 MG delayed release capsule Take 1 capsule by mouth Daily 30 capsule 5    Calcium Carbonate-Vitamin D (OYSTER SHELL CALCIUM/D) 500-200 MG-UNIT TABS Take 2 tablets by mouth daily 60 tablet 5    atorvastatin (LIPITOR) 40 MG tablet Take 1 tablet by mouth daily 30 tablet 5    aspirin 81 MG EC tablet Take 1 tablet by mouth daily 30 tablet 5    memantine (NAMENDA) 10 MG tablet TAKE 1 TABLET TWICE A DAY 60 tablet 5    tamsulosin (FLOMAX) 0.4 MG capsule Take 1 capsule by mouth daily 30 capsule 3    OLANZapine zydis (ZYPREXA) 5 MG disintegrating tablet Take 0.5 tablets by mouth nightly 30 tablet 0    efavirenz-emtrictabine-tenofovir (ATRIPLA) 600-200-300 MG per tablet Take 1 tablet by mouth nightly 30 tablet 5    finasteride (PROSCAR) 5 MG tablet Take 1 tablet by mouth daily 30 tablet 5    vitamin E 400 UNIT capsule Take 1 capsule by mouth daily 30 capsule 5    Multiple Vitamin (MULTIVITAMIN) tablet Take 1 tablet by mouth daily 30 tablet 5     No current facility-administered medications for this visit.        Social History:      Social History     Socioeconomic History    Marital status:      Spouse name: Not on file    Number of children: Not on file    Years of education: Not on file    Highest education level: Not on file   Occupational History    Not on file   Social Needs    Financial resource strain: Not on file    Food insecurity:     Worry: Not on file     Inability: Not on file    Transportation needs:     Medical: Not on file     Non-medical: Not on file   Tobacco Use    Smoking status: Light Tobacco Smoker     Packs/day: 0.00     Years: 50.00 and time. He appears well-developed and well-nourished. HENT:   Head: Normocephalic and atraumatic. Neck: Normal range of motion. Neck supple. Cardiovascular: Normal rate and normal heart sounds. Exam reveals no gallop and no friction rub. Pulmonary/Chest: Effort normal and breath sounds normal. He has no wheezes. Abdominal: Soft. Bowel sounds are normal. He exhibits no mass. There is no tenderness. Musculoskeletal: Normal range of motion. He exhibits no edema. Lymphadenopathy:     He has no cervical adenopathy. Neurological: He is alert and oriented to person, place, and time. Skin: Skin is warm and dry.        Medical Decision Making:   I have independently reviewed the following labs:    CBC:   WBC   Date Value Ref Range Status   02/12/2019 6.5 3.5 - 11.3 k/uL Final   02/10/2019 6.4 3.5 - 11.0 k/uL Final   02/10/2019 6.4 3.5 - 11.3 k/uL Final     RBC   Date Value Ref Range Status   02/12/2019 3.77 (L) 4.21 - 5.77 m/uL Final   02/10/2019 4.32 4.21 - 5.77 m/uL Final   06/04/2018 4.67 4.21 - 5.77 m/uL Final   04/18/2012 4.31 (L) 4.5 - 5.9 m/uL Final   03/16/2012 4.43 (L) 4.5 - 5.9 m/uL Final   10/12/2011 3.99 (L) 4.5 - 5.9 m/uL Final     Hemoglobin   Date Value Ref Range Status   02/12/2019 10.4 (L) 13.0 - 17.0 g/dL Final   02/10/2019 11.7 (L) 13.0 - 17.0 g/dL Final   06/04/2018 12.4 (L) 13.0 - 17.0 g/dL Final     Hematocrit   Date Value Ref Range Status   02/12/2019 34.0 (L) 40.7 - 50.3 % Final   02/10/2019 38.7 (L) 40.7 - 50.3 % Final   06/04/2018 41.7 40.7 - 50.3 % Final     MCV   Date Value Ref Range Status   02/12/2019 90.2 82.6 - 102.9 fL Final   02/10/2019 89.6 82.6 - 102.9 fL Final   06/04/2018 89.3 82.6 - 102.9 fL Final     MCH   Date Value Ref Range Status   02/12/2019 27.6 25.2 - 33.5 pg Final   02/10/2019 27.1 25.2 - 33.5 pg Final   06/04/2018 26.6 25.2 - 33.5 pg Final     MCHC   Date Value Ref Range Status   02/12/2019 30.6 28.4 - 34.8 g/dL Final   02/10/2019 30.2 28.4 - 34.8 g/dL Final 06/04/2018 29.7 28.4 - 34.8 g/dL Final     RDW   Date Value Ref Range Status   02/12/2019 15.2 (H) 11.8 - 14.4 % Final   02/10/2019 14.6 (H) 11.8 - 14.4 % Final   06/04/2018 15.8 (H) 11.8 - 14.4 % Final     Platelet Count   Date Value Ref Range Status   04/18/2012 224 140 - 450 k/uL Final   03/16/2012 234 140 - 450 k/uL Final   10/12/2011 219 140 - 450 k/uL Final     Platelets   Date Value Ref Range Status   02/12/2019 258 138 - 453 k/uL Final   02/10/2019 220 138 - 453 k/uL Final   06/04/2018 293 138 - 453 k/uL Final     MPV   Date Value Ref Range Status   02/12/2019 10.8 8.1 - 13.5 fL Final   02/10/2019 9.7 8.1 - 13.5 fL Final   06/04/2018 10.3 8.1 - 13.5 fL Final     CMP:  Lab Results   Component Value Date     02/12/2019    K 4.3 02/12/2019     (H) 02/12/2019    CO2 21 02/12/2019    BUN 5 (L) 02/12/2019    CREATININE 0.82 02/12/2019    GLUCOSE 88 02/12/2019    CALCIUM 8.1 (L) 02/12/2019    PROT 7.9 02/10/2019    LABALBU 3.8 02/10/2019    BILITOT 0.20 (L) 02/10/2019    ALKPHOS 185 (H) 02/10/2019    AST 35 02/10/2019    ALT 16 02/10/2019    LABGLOM >60 02/12/2019    GFRAA >60 02/12/2019       HIV VIRALLOAD:   Direct Exam   Date Value Ref Range Status   02/10/2019 HIV-1 RNA NOT DETECTED  Final   02/10/2019   Final                                                            This test is a sensitive method for quantitating HIV-1 RNA viral loads in plasma. It utilizes RT-PCR in the FDA approved Roche Amplicor/Taqman 48 system. This test is intended for detecting and quantifying HIV-1 RNA viral loads in the range of 20 - 10,000,000 cp/ml (1.30 - 7.00 log cp/ml). The reference value for this assay is <20 cp/ml (<1.30 log cp/ml). Patients should have confirmed HIV-1 infection prior to RNA quantification. The test has been developed to monitor disease progression and efficacy of Anti-HIV drug therapy. 02/10/2019   Final    Results reported to the appropriate Health Department     LYMPHOCYTE SUBSET:  Absolute CD 3   Date Value Ref Range Status   06/04/2018 1468 411 - 2061 /uL Final   12/11/2017 1539 411 - 2061 /uL Final   05/10/2017 1163 411 - 2061 /uL Final     Absolute CD 4 [place]   Date Value Ref Range Status   02/10/2019 573 309 - 1571 /uL Final   06/04/2018 496 309 - 1571 /uL Final   12/11/2017 628 309 - 1571 /uL Final     CD4 % [place] T Cell   Date Value Ref Range Status   02/10/2019 32 27 - 64 % Final   06/04/2018 25 (L) 27 - 64 % Final   12/11/2017 31 27 - 64 % Final     CD4/CD8 Ratio   Date Value Ref Range Status   06/04/2018 0.53 (L) 0.6 - 2.8 Final   12/11/2017 0.69 0.6 - 2.8 Final   05/10/2017 0.69 0.6 - 2.8 Final       LIPID PANEL:  Lab Results   Component Value Date    CHOL 185 02/10/2019    CHOL 241 (H) 06/04/2018     Lab Results   Component Value Date    TRIG 139 02/10/2019    TRIG 176 (H) 06/04/2018     Lab Results   Component Value Date    HDL 72 02/10/2019    HDL 78 06/04/2018     Lab Results   Component Value Date    LDLCHOLESTEROL 85 02/10/2019    LDLCHOLESTEROL 128 06/04/2018     Lab Results   Component Value Date    VLDL NOT REPORTED 02/10/2019    VLDL NOT REPORTED 06/04/2018     Lab Results   Component Value Date    CHOLHDLRATIO 2.6 02/10/2019    CHOLHDLRATIO 3.1 06/04/2018       HEP B SURFACE ANTIBODY:  Hep B S Ab   Date Value Ref Range Status   05/10/2017 <3.50 <10 mIU/mL Final     Comment:           REFERENCE RANGE:  <10.0      NON-REACTIVE/NOT IMMUNE  >=10.0     REACTIVE/IMMUNE  [facility] [address] [facility], [address] [place] [address] [phone]       CHLAMYDIA GC DNA URINE:  No results found for: Ekaterina Fagan AB:  T. pallidum, IgG   Date Value Ref Range Status   06/04/2018 REACTIVE (A) NR Final     Comment:           Detection of T. pallidum antibodies may indicate recent, remote or previously   treated infections.   A positive serological test for syphilis is not diagnostic   of infection, as false positives occur and become more likely in low prevalence   populations. Confirmatory test will be performed (VDRL, Quantitative). Results reported to the appropriate [address] [place] [address] [id number] [place] [address], [facility] [phone]     01/14/2018 REACTIVE (A) NR Final     Comment:           Detection of T. pallidum antibodies may indicate recent, remote or previously   treated infections. A positive serological test for syphilis is not diagnostic   of infection, as false positives occur and become more likely in low prevalence   populations. Confirmatory test will be performed (VDRL, Quantitative). Results reported to the appropriate [address] [facility] [id number] [place] [address], [address] [facility] [phone]           Thank you for allowing us to participate in the care of this patient. Please call with questions. Matilde Dennis MD  Perfect Serve messaging: [phone]      This note is created with the assistance of a speechrecognition program.  While intending to generate a document that actually reflects the content of the visit, the document can still have some errors including those of syntax and sound a like substitutions which may escapeproof reading. It such instances, actual meaning can be extrapolated by contextual diversion.

## 2019-07-01 DIAGNOSIS — F02.80 ALZHEIMER'S DEMENTIA WITHOUT BEHAVIORAL DISTURBANCE, UNSPECIFIED TIMING OF DEMENTIA ONSET: ICD-10-CM

## 2019-07-01 DIAGNOSIS — G30.9 ALZHEIMER'S DEMENTIA WITHOUT BEHAVIORAL DISTURBANCE, UNSPECIFIED TIMING OF DEMENTIA ONSET: ICD-10-CM

## 2019-07-01 DIAGNOSIS — M81.0 AGE-RELATED OSTEOPOROSIS WITHOUT CURRENT PATHOLOGICAL FRACTURE: ICD-10-CM

## 2019-07-01 DIAGNOSIS — N40.0 BENIGN NON-NODULAR PROSTATIC HYPERPLASIA WITHOUT LOWER URINARY TRACT SYMPTOMS: ICD-10-CM

## 2019-07-01 DIAGNOSIS — K21.9 GASTROESOPHAGEAL REFLUX DISEASE WITHOUT ESOPHAGITIS: ICD-10-CM

## 2019-07-01 NOTE — TELEPHONE ENCOUNTER
Refill request for meds pended          Next Visit Date:  Future Appointments   Date Time Provider Jocelyn Ansrai   9/9/2019  2:15 PM Luis Light DPM [address] [facility]   12/3/2019  9:50 AM Chris Jones MD [facility] IM [facility]   12/12/2019  9:00 AM Micki Lennon MD INFT DISEASE [address] Maintenance   Topic Date Due    Meningococcal (ACWY) Vaccine (1 - Risk 2-dose series) 10/03/1942    Measles,Mumps,Rubella (MMR) vaccine (1 of 2 - Risk 2-dose series) 10/03/1956    Hepatitis B Vaccine (1 of 3 - Risk 3-dose series) 10/03/1957    Shingles Vaccine (1 of 2) 10/03/1988    Annual Wellness Visit (AWV)  10/03/2001    Flu vaccine (1) 09/01/2019    DTaP/Tdap/Td vaccine (2 - Td) 02/21/2027    Pneumococcal 65+ years Vaccine  Completed    Hib Vaccine  Aged Out    HPV vaccine  Aged Out       Hemoglobin A1C (%)   Date Value   02/10/2019 5.4   09/15/2016 5.8             ( goal A1C is < 7)   No results found for: LABMICR  LDL Cholesterol (mg/dL)   Date Value   02/10/2019 85   06/04/2018 128       (goal LDL is <100)   AST (U/L)   Date Value   02/10/2019 35     ALT (U/L)   Date Value   02/10/2019 16     BUN (mg/dL)   Date Value   02/12/2019 5 (L)     BP Readings from Last 3 Encounters:   06/20/19 124/62   06/03/19 102/62   05/13/19 102/71          (goal 120/80)    All Future Testing planned in [facility]  Lab Frequency Next Occurrence   CBC Once 17/28/7226   Comp Metabolic w Bili Profile Once 12/20/2019   Lymphocyte Subset Once 12/20/2019   HIV RNA, Quantitative, PCR Once 12/20/2019               Patient Active Problem List:     HIV (human immunodeficiency virus infection) ([address]     Dementia without behavioral disturbance     Osteoporosis right hip fracture dec 08     Hemorrhoids     BPH (benign prostatic hyperplasia)     GERD (gastroesophageal reflux disease)     Meralgia paraesthetica     Mixed hyperlipidemia     Occasional tremors     Syphilis in male     [facility] Seizure-like activity (HCC)     Acute metabolic encephalopathy     Acute delirium     Alzheimer's dementia with behavioral disturbance     Atrophy, cortical

## 2019-07-02 RX ORDER — FINASTERIDE 5 MG/1
TABLET, FILM COATED ORAL
Qty: 30 TABLET | Refills: 5 | Status: ON HOLD | OUTPATIENT
Start: 2019-07-02 | End: 2019-12-11 | Stop reason: SDUPTHER

## 2019-07-02 RX ORDER — OMEPRAZOLE 20 MG/1
CAPSULE, DELAYED RELEASE ORAL
Qty: 30 CAPSULE | Refills: 5 | Status: ON HOLD | OUTPATIENT
Start: 2019-07-02 | End: 2019-12-11 | Stop reason: SDUPTHER

## 2019-07-02 RX ORDER — ALENDRONATE SODIUM 70 MG/1
TABLET ORAL
Qty: 4 TABLET | Refills: 5 | Status: ON HOLD | OUTPATIENT
Start: 2019-07-02 | End: 2019-12-11 | Stop reason: SDUPTHER

## 2019-07-02 RX ORDER — DONEPEZIL HYDROCHLORIDE 10 MG/1
TABLET, FILM COATED ORAL
Qty: 30 TABLET | Refills: 5 | Status: ON HOLD | OUTPATIENT
Start: 2019-07-02 | End: 2019-12-11 | Stop reason: SDUPTHER

## 2019-07-26 ENCOUNTER — TELEPHONE (OUTPATIENT)
Dept: INTERNAL MEDICINE | Age: 79
End: 2019-07-26

## 2019-08-20 ENCOUNTER — TELEPHONE (OUTPATIENT)
Dept: INTERNAL MEDICINE | Age: 79
End: 2019-08-20

## 2019-08-22 NOTE — TELEPHONE ENCOUNTER
Patient is scheduled for TB test on 8/27 and 8/29.   The results of TB test will need to be faxed to [address] fax number [phone], The form has already been faxed to them and is in media

## 2019-08-27 ENCOUNTER — NURSE ONLY (OUTPATIENT)
Dept: INTERNAL MEDICINE | Age: 79
End: 2019-08-27
Payer: COMMERCIAL

## 2019-08-27 DIAGNOSIS — Z11.1 TUBERCULOSIS SCREENING: Primary | ICD-10-CM

## 2019-08-27 PROCEDURE — 86580 TB INTRADERMAL TEST: CPT | Performed by: INTERNAL MEDICINE

## 2019-08-27 PROCEDURE — 96372 THER/PROPH/DIAG INJ SC/IM: CPT | Performed by: INTERNAL MEDICINE

## 2019-08-29 ENCOUNTER — NURSE ONLY (OUTPATIENT)
Dept: INTERNAL MEDICINE | Age: 79
End: 2019-08-29
Payer: COMMERCIAL

## 2019-08-29 DIAGNOSIS — Z11.1 ENCOUNTER FOR PPD SKIN TEST READING: Primary | ICD-10-CM

## 2019-08-29 LAB
INDURATION: NORMAL
TB SKIN TEST: NEGATIVE

## 2019-08-29 PROCEDURE — 99211 OFF/OP EST MAY X REQ PHY/QHP: CPT | Performed by: INTERNAL MEDICINE

## 2019-08-29 NOTE — PROGRESS NOTES
S: pt presents at clinic today for PPD reading. O: pt oriented times 2 , Skin warm ,dry. A: PPD test reading, Right forearm assessed  0 induration , 0 redness,  0 raised area. P:The results of TB test  faxed to [address] fax number [phone],OSVGUNE to media and ,copy given to pt.   / pt return to clinic as per PCP

## 2019-09-09 ENCOUNTER — OFFICE VISIT (OUTPATIENT)
Dept: PODIATRY | Age: 79
End: 2019-09-09
Payer: COMMERCIAL

## 2019-09-09 VITALS
DIASTOLIC BLOOD PRESSURE: 73 MMHG | HEART RATE: 67 BPM | SYSTOLIC BLOOD PRESSURE: 112 MMHG | WEIGHT: 117 LBS | BODY MASS INDEX: 18.88 KG/M2

## 2019-09-09 DIAGNOSIS — I73.9 PVD (PERIPHERAL VASCULAR DISEASE) (HCC): ICD-10-CM

## 2019-09-09 DIAGNOSIS — B35.1 ONYCHOMYCOSIS: Primary | ICD-10-CM

## 2019-09-09 DIAGNOSIS — F03.90 DEMENTIA WITHOUT BEHAVIORAL DISTURBANCE, UNSPECIFIED DEMENTIA TYPE: ICD-10-CM

## 2019-09-09 DIAGNOSIS — M79.672 PAIN IN BOTH FEET: ICD-10-CM

## 2019-09-09 DIAGNOSIS — M79.671 PAIN IN BOTH FEET: ICD-10-CM

## 2019-09-09 PROCEDURE — 99213 OFFICE O/P EST LOW 20 MIN: CPT | Performed by: STUDENT IN AN ORGANIZED HEALTH CARE EDUCATION/TRAINING PROGRAM

## 2019-09-09 PROCEDURE — 99212 OFFICE O/P EST SF 10 MIN: CPT | Performed by: STUDENT IN AN ORGANIZED HEALTH CARE EDUCATION/TRAINING PROGRAM

## 2019-09-10 ENCOUNTER — HOSPITAL ENCOUNTER (INPATIENT)
Age: 79
LOS: 2 days | Discharge: HOME | DRG: 690 | End: 2019-09-12
Attending: EMERGENCY MEDICINE | Admitting: FAMILY MEDICINE
Payer: COMMERCIAL

## 2019-09-10 ENCOUNTER — APPOINTMENT (OUTPATIENT)
Dept: GENERAL RADIOLOGY | Age: 79
DRG: 690 | End: 2019-09-10
Payer: COMMERCIAL

## 2019-09-10 DIAGNOSIS — R41.82 ALTERED MENTAL STATUS, UNSPECIFIED ALTERED MENTAL STATUS TYPE: ICD-10-CM

## 2019-09-10 DIAGNOSIS — A49.9 BACTERIAL UTI: Primary | ICD-10-CM

## 2019-09-10 DIAGNOSIS — N39.0 BACTERIAL UTI: Primary | ICD-10-CM

## 2019-09-10 LAB
-: ABNORMAL
ABSOLUTE EOS #: 0.07 K/UL (ref 0–0.44)
ABSOLUTE IMMATURE GRANULOCYTE: 0.04 K/UL (ref 0–0.3)
ABSOLUTE LYMPH #: 3.39 K/UL (ref 1.1–3.7)
ABSOLUTE MONO #: 0.91 K/UL (ref 0.1–1.2)
ALBUMIN SERPL-MCNC: 3.4 G/DL (ref 3.5–5.2)
ALBUMIN/GLOBULIN RATIO: 0.9 (ref 1–2.5)
ALP BLD-CCNC: 106 U/L (ref 40–129)
ALT SERPL-CCNC: 6 U/L (ref 5–41)
AMORPHOUS: ABNORMAL
ANION GAP SERPL CALCULATED.3IONS-SCNC: 12 MMOL/L (ref 9–17)
AST SERPL-CCNC: 18 U/L
BACTERIA: ABNORMAL
BASOPHILS # BLD: 1 % (ref 0–2)
BASOPHILS ABSOLUTE: 0.05 K/UL (ref 0–0.2)
BILIRUB SERPL-MCNC: 0.39 MG/DL (ref 0.3–1.2)
BILIRUBIN URINE: NEGATIVE
BUN BLDV-MCNC: 10 MG/DL (ref 8–23)
BUN/CREAT BLD: ABNORMAL (ref 9–20)
CALCIUM SERPL-MCNC: 8.9 MG/DL (ref 8.6–10.4)
CASTS UA: ABNORMAL /LPF (ref 0–8)
CHLORIDE BLD-SCNC: 102 MMOL/L (ref 98–107)
CO2: 19 MMOL/L (ref 20–31)
COLOR: YELLOW
COMMENT UA: ABNORMAL
CREAT SERPL-MCNC: 1.25 MG/DL (ref 0.7–1.2)
CRYSTALS, UA: ABNORMAL /HPF
DIFFERENTIAL TYPE: ABNORMAL
EOSINOPHILS RELATIVE PERCENT: 1 % (ref 1–4)
EPITHELIAL CELLS UA: ABNORMAL /HPF (ref 0–5)
GFR AFRICAN AMERICAN: >60 ML/MIN
GFR NON-AFRICAN AMERICAN: 56 ML/MIN
GFR SERPL CREATININE-BSD FRML MDRD: ABNORMAL ML/MIN/{1.73_M2}
GFR SERPL CREATININE-BSD FRML MDRD: ABNORMAL ML/MIN/{1.73_M2}
GLUCOSE BLD-MCNC: 195 MG/DL (ref 70–99)
GLUCOSE URINE: NEGATIVE
HCT VFR BLD CALC: 39.3 % (ref 40.7–50.3)
HEMOGLOBIN: 11.9 G/DL (ref 13–17)
IMMATURE GRANULOCYTES: 0 %
KETONES, URINE: NEGATIVE
LEUKOCYTE ESTERASE, URINE: ABNORMAL
LYMPHOCYTES # BLD: 36 % (ref 24–43)
MCH RBC QN AUTO: 25.3 PG (ref 25.2–33.5)
MCHC RBC AUTO-ENTMCNC: 30.3 G/DL (ref 28.4–34.8)
MCV RBC AUTO: 83.6 FL (ref 82.6–102.9)
MONOCYTES # BLD: 10 % (ref 3–12)
MUCUS: ABNORMAL
NITRITE, URINE: NEGATIVE
NRBC AUTOMATED: 0 PER 100 WBC
OTHER OBSERVATIONS UA: ABNORMAL
PDW BLD-RTO: 16.8 % (ref 11.8–14.4)
PH UA: 6 (ref 5–8)
PLATELET # BLD: 173 K/UL (ref 138–453)
PLATELET ESTIMATE: ABNORMAL
PMV BLD AUTO: 11.4 FL (ref 8.1–13.5)
POTASSIUM SERPL-SCNC: 4.8 MMOL/L (ref 3.7–5.3)
PROTEIN UA: NEGATIVE
RBC # BLD: 4.7 M/UL (ref 4.21–5.77)
RBC # BLD: ABNORMAL 10*6/UL
RBC UA: ABNORMAL /HPF (ref 0–4)
RENAL EPITHELIAL, UA: ABNORMAL /HPF
SEG NEUTROPHILS: 52 % (ref 36–65)
SEGMENTED NEUTROPHILS ABSOLUTE COUNT: 4.89 K/UL (ref 1.5–8.1)
SODIUM BLD-SCNC: 133 MMOL/L (ref 135–144)
SPECIFIC GRAVITY UA: 1.01 (ref 1–1.03)
TOTAL PROTEIN: 7.3 G/DL (ref 6.4–8.3)
TRICHOMONAS: ABNORMAL
TROPONIN INTERP: NORMAL
TROPONIN T: NORMAL NG/ML
TROPONIN, HIGH SENSITIVITY: 10 NG/L (ref 0–22)
TURBIDITY: CLEAR
URINE HGB: NEGATIVE
UROBILINOGEN, URINE: NORMAL
VALPROIC ACID LEVEL: <3 UG/ML (ref 50–125)
VALPROIC DATE LAST DOSE: ABNORMAL
VALPROIC DOSE AMOUNT: ABNORMAL
VALPROIC TIME LAST DOSE: ABNORMAL
WBC # BLD: 9.4 K/UL (ref 3.5–11.3)
WBC # BLD: ABNORMAL 10*3/UL
WBC UA: ABNORMAL /HPF (ref 0–5)
YEAST: ABNORMAL

## 2019-09-10 PROCEDURE — 2580000003 HC RX 258: Performed by: NURSE PRACTITIONER

## 2019-09-10 PROCEDURE — 6360000002 HC RX W HCPCS: Performed by: NURSE PRACTITIONER

## 2019-09-10 PROCEDURE — 84484 ASSAY OF TROPONIN QUANT: CPT

## 2019-09-10 PROCEDURE — 81001 URINALYSIS AUTO W/SCOPE: CPT

## 2019-09-10 PROCEDURE — 6370000000 HC RX 637 (ALT 250 FOR IP): Performed by: STUDENT IN AN ORGANIZED HEALTH CARE EDUCATION/TRAINING PROGRAM

## 2019-09-10 PROCEDURE — 6360000002 HC RX W HCPCS: Performed by: STUDENT IN AN ORGANIZED HEALTH CARE EDUCATION/TRAINING PROGRAM

## 2019-09-10 PROCEDURE — 96372 THER/PROPH/DIAG INJ SC/IM: CPT

## 2019-09-10 PROCEDURE — 87086 URINE CULTURE/COLONY COUNT: CPT

## 2019-09-10 PROCEDURE — 80053 COMPREHEN METABOLIC PANEL: CPT

## 2019-09-10 PROCEDURE — 96365 THER/PROPH/DIAG IV INF INIT: CPT

## 2019-09-10 PROCEDURE — 80307 DRUG TEST PRSMV CHEM ANLYZR: CPT

## 2019-09-10 PROCEDURE — 85025 COMPLETE CBC W/AUTO DIFF WBC: CPT

## 2019-09-10 PROCEDURE — 71045 X-RAY EXAM CHEST 1 VIEW: CPT

## 2019-09-10 PROCEDURE — 6370000000 HC RX 637 (ALT 250 FOR IP): Performed by: NURSE PRACTITIONER

## 2019-09-10 PROCEDURE — 2580000003 HC RX 258: Performed by: STUDENT IN AN ORGANIZED HEALTH CARE EDUCATION/TRAINING PROGRAM

## 2019-09-10 PROCEDURE — 99285 EMERGENCY DEPT VISIT HI MDM: CPT

## 2019-09-10 PROCEDURE — 87186 SC STD MICRODIL/AGAR DIL: CPT

## 2019-09-10 PROCEDURE — 87077 CULTURE AEROBIC IDENTIFY: CPT

## 2019-09-10 PROCEDURE — 80164 ASSAY DIPROPYLACETIC ACD TOT: CPT

## 2019-09-10 PROCEDURE — 1200000000 HC SEMI PRIVATE

## 2019-09-10 PROCEDURE — 93005 ELECTROCARDIOGRAM TRACING: CPT | Performed by: STUDENT IN AN ORGANIZED HEALTH CARE EDUCATION/TRAINING PROGRAM

## 2019-09-10 PROCEDURE — 99223 1ST HOSP IP/OBS HIGH 75: CPT | Performed by: NURSE PRACTITIONER

## 2019-09-10 RX ORDER — EFAVIRENZ, EMTRICITABINE AND TENOFOVIR DISOPROXIL FUMARATE 600; 200; 300 MG/1; MG/1; MG/1
1 TABLET, FILM COATED ORAL NIGHTLY
Status: DISCONTINUED | OUTPATIENT
Start: 2019-09-10 | End: 2019-09-12 | Stop reason: HOSPADM

## 2019-09-10 RX ORDER — 0.9 % SODIUM CHLORIDE 0.9 %
1000 INTRAVENOUS SOLUTION INTRAVENOUS ONCE
Status: COMPLETED | OUTPATIENT
Start: 2019-09-10 | End: 2019-09-10

## 2019-09-10 RX ORDER — TAMSULOSIN HYDROCHLORIDE 0.4 MG/1
0.4 CAPSULE ORAL DAILY
Status: DISCONTINUED | OUTPATIENT
Start: 2019-09-10 | End: 2019-09-12 | Stop reason: HOSPADM

## 2019-09-10 RX ORDER — ASPIRIN 81 MG/1
81 TABLET ORAL DAILY
Status: DISCONTINUED | OUTPATIENT
Start: 2019-09-10 | End: 2019-09-12 | Stop reason: HOSPADM

## 2019-09-10 RX ORDER — OLANZAPINE 5 MG/1
2.5 TABLET, ORALLY DISINTEGRATING ORAL NIGHTLY
Status: DISCONTINUED | OUTPATIENT
Start: 2019-09-10 | End: 2019-09-11

## 2019-09-10 RX ORDER — SODIUM CHLORIDE 0.9 % (FLUSH) 0.9 %
10 SYRINGE (ML) INJECTION EVERY 12 HOURS SCHEDULED
Status: DISCONTINUED | OUTPATIENT
Start: 2019-09-10 | End: 2019-09-12 | Stop reason: HOSPADM

## 2019-09-10 RX ORDER — QUETIAPINE FUMARATE 25 MG/1
25 TABLET, FILM COATED ORAL ONCE
Status: COMPLETED | OUTPATIENT
Start: 2019-09-10 | End: 2019-09-10

## 2019-09-10 RX ORDER — VITAMIN E 268 MG
400 CAPSULE ORAL DAILY
Status: DISCONTINUED | OUTPATIENT
Start: 2019-09-10 | End: 2019-09-11

## 2019-09-10 RX ORDER — MEMANTINE HYDROCHLORIDE 5 MG/1
10 TABLET ORAL 2 TIMES DAILY
Status: DISCONTINUED | OUTPATIENT
Start: 2019-09-10 | End: 2019-09-12 | Stop reason: HOSPADM

## 2019-09-10 RX ORDER — ATORVASTATIN CALCIUM 80 MG/1
40 TABLET, FILM COATED ORAL DAILY
Status: DISCONTINUED | OUTPATIENT
Start: 2019-09-10 | End: 2019-09-11

## 2019-09-10 RX ORDER — MULTIVITAMIN WITH FOLIC ACID 400 MCG
1 TABLET ORAL DAILY
Status: DISCONTINUED | OUTPATIENT
Start: 2019-09-10 | End: 2019-09-12 | Stop reason: HOSPADM

## 2019-09-10 RX ORDER — SODIUM CHLORIDE 0.9 % (FLUSH) 0.9 %
10 SYRINGE (ML) INJECTION PRN
Status: DISCONTINUED | OUTPATIENT
Start: 2019-09-10 | End: 2019-09-12 | Stop reason: HOSPADM

## 2019-09-10 RX ORDER — SODIUM CHLORIDE 9 MG/ML
INJECTION, SOLUTION INTRAVENOUS CONTINUOUS
Status: DISCONTINUED | OUTPATIENT
Start: 2019-09-10 | End: 2019-09-12 | Stop reason: HOSPADM

## 2019-09-10 RX ORDER — DONEPEZIL HYDROCHLORIDE 10 MG/1
10 TABLET, FILM COATED ORAL NIGHTLY
Status: DISCONTINUED | OUTPATIENT
Start: 2019-09-10 | End: 2019-09-12 | Stop reason: HOSPADM

## 2019-09-10 RX ORDER — ONDANSETRON 2 MG/ML
4 INJECTION INTRAMUSCULAR; INTRAVENOUS EVERY 6 HOURS PRN
Status: DISCONTINUED | OUTPATIENT
Start: 2019-09-10 | End: 2019-09-12 | Stop reason: HOSPADM

## 2019-09-10 RX ORDER — ACETAMINOPHEN 325 MG/1
650 TABLET ORAL EVERY 4 HOURS PRN
Status: DISCONTINUED | OUTPATIENT
Start: 2019-09-10 | End: 2019-09-12 | Stop reason: HOSPADM

## 2019-09-10 RX ORDER — FINASTERIDE 5 MG/1
5 TABLET, FILM COATED ORAL DAILY
Status: DISCONTINUED | OUTPATIENT
Start: 2019-09-10 | End: 2019-09-12 | Stop reason: HOSPADM

## 2019-09-10 RX ADMIN — SODIUM CHLORIDE, PRESERVATIVE FREE 10 ML: 5 INJECTION INTRAVENOUS at 21:03

## 2019-09-10 RX ADMIN — SODIUM CHLORIDE: 9 INJECTION, SOLUTION INTRAVENOUS at 21:43

## 2019-09-10 RX ADMIN — THERA TABS 1 TABLET: TAB at 21:45

## 2019-09-10 RX ADMIN — FINASTERIDE 5 MG: 5 TABLET, FILM COATED ORAL at 21:45

## 2019-09-10 RX ADMIN — Medication 81 MG: at 21:45

## 2019-09-10 RX ADMIN — VITAMIN E CAP 400 UNIT 400 UNITS: 400 CAP at 21:45

## 2019-09-10 RX ADMIN — OLANZAPINE 2.5 MG: 5 TABLET, ORALLY DISINTEGRATING ORAL at 21:44

## 2019-09-10 RX ADMIN — MEMANTINE HYDROCHLORIDE 10 MG: 5 TABLET, FILM COATED ORAL at 21:45

## 2019-09-10 RX ADMIN — TAMSULOSIN HYDROCHLORIDE 0.4 MG: 0.4 CAPSULE ORAL at 21:46

## 2019-09-10 RX ADMIN — ATORVASTATIN CALCIUM 40 MG: 80 TABLET, FILM COATED ORAL at 21:46

## 2019-09-10 RX ADMIN — Medication 2 TABLET: at 21:45

## 2019-09-10 RX ADMIN — QUETIAPINE FUMARATE 25 MG: 25 TABLET ORAL at 21:45

## 2019-09-10 RX ADMIN — EFAVIRENZ, EMTRICITABINE, AND TENOFOVIR DISOPROXIL FUMARATE 1 TABLET: 600; 200; 300 TABLET, FILM COATED ORAL at 21:45

## 2019-09-10 RX ADMIN — SODIUM CHLORIDE 1000 ML: 9 INJECTION, SOLUTION INTRAVENOUS at 17:20

## 2019-09-10 RX ADMIN — CEFTRIAXONE SODIUM 1 G: 1 INJECTION, POWDER, FOR SOLUTION INTRAMUSCULAR; INTRAVENOUS at 19:37

## 2019-09-10 RX ADMIN — DONEPEZIL HYDROCHLORIDE 10 MG: 10 TABLET, FILM COATED ORAL at 21:45

## 2019-09-10 RX ADMIN — ENOXAPARIN SODIUM 40 MG: 40 INJECTION SUBCUTANEOUS at 21:46

## 2019-09-10 NOTE — ED PROVIDER NOTES
[facility] ED  Emergency Department Encounter  EmergencyMedicine Resident     Pt Kishore Amador  MRN: [id number]  [place] 1940  Date of evaluation: 9/10/19  PCP:  Elizabeth Hernandez MD    CHIEF COMPLAINT       Chief Complaint   Patient presents with    Altered Mental Status     hx dementia, found by family in couch with incontinence, poss seizure, vomited at home        HISTORY OF PRESENT ILLNESS  (Location/Symptom, Timing/Onset, Context/Setting, Quality, Duration, Modifying Factors, Severity.)      Conchis Colon is a [de-identified] y.o. male who presents with episode of unresponsiveness w/ vomiting and incontinence. Went to smoke after eating breakfast and after not returning from outside, was found to be slumped down (maintaining postuiral tone) and unresponsive w/ vomit in his mouth and on his clothes. EMS activated and transported. Vitally stable w/ light blood pressures. Hx dementia, septic cystitis, BPH, Alzhimer's, delerium, and seizures. No trauma, previously at baseline. PAST MEDICAL / SURGICAL / SOCIAL / FAMILY HISTORY      has a past medical history of Acute delirium, Acute metabolic encephalopathy, Alzheimer's dementia with behavioral disturbance, Atrophy, cortical, BPH (benign prostatic hyperplasia), Dementia, Dementia without behavioral disturbance, GERD (gastroesophageal reflux disease), Hemorrhoids, HIV (human immunodeficiency virus infection) ([address] Meralgia paraesthetica, Mixed hyperlipidemia, Occasional tremors, Osteoporosis, PVD (peripheral vascular disease) ([address] Seizure-like activity ([address] Syphilis in male, and Wears glasses. has a past surgical history that includes Total hip arthroplasty (Left); Tonsillectomy; Hemorrhoid surgery; sigmoidoscopy (N/A, 11/22/2017); Colonoscopy; hernia repair; and pr x-ray retrograde pyelogram (Bilateral, 3/6/2018).     Social History     Socioeconomic History    Marital status:      Spouse name: Not on file    Number of 7/2/19   Ronda Pepper MD   donepezil (ARICEPT) 10 MG tablet TAKE 1 TABLET IN THE EVENING 7/2/19   Ronda Pepper MD   alendronate (FOSAMAX) 70 MG tablet TAKE 1 TABLET EVERY WEEK  7/2/19   Ronda Pepper MD   omeprazole (PRILOSEC) 20 MG delayed release capsule TAKE 1 CAPSULE DAILY 7/2/19   Ronda Pepper MD   divalproex (DEPAKOTE) 250 MG DR tablet  3/26/19   Historical Provider, MD   divalproex (DEPAKOTE) 500 MG DR tablet  5/6/19   Historical Provider, MD   nitrofurantoin, macrocrystal-monohydrate, (MACROBID) 100 MG capsule  3/26/19   Historical Provider, MD   Calcium Carbonate-Vitamin D (OYSTER SHELL CALCIUM/D) 500-200 MG-UNIT TABS Take 2 tablets by mouth daily 5/8/19   Librado Lucas MD   atorvastatin (LIPITOR) 40 MG tablet Take 1 tablet by mouth daily 5/8/19   Librado Lucas MD   aspirin 81 MG EC tablet Take 1 tablet by mouth daily 5/8/19   Librado Lucas MD   memantine (NAMENDA) 10 MG tablet TAKE 1 TABLET TWICE A DAY 5/8/19   Librado Lucas MD   tamsulosin [facility]) 0.4 MG capsule Take 1 capsule by mouth daily 5/8/19   Librado Lucas MD   OLANZapine zydis (ZYPREXA) 5 MG disintegrating tablet Take 0.5 tablets by mouth nightly 2/13/19   Vikash Rosado MD   efavirenz-emtrictabine-tenofovir (ATRIPLA) 795-571-199 MG per tablet Take 1 tablet by mouth nightly 12/13/18 5/13/20  Leonel Price MD   vitamin E 400 UNIT capsule Take 1 capsule by mouth daily 11/7/18   Nadja Montes MD   Multiple Vitamin (MULTIVITAMIN) tablet Take 1 tablet by mouth daily 11/7/18   Nadja Montes MD       REVIEW OF SYSTEMS    (2-9 systems for level 4, 10 or more for level 5)      Review of Systems   Unable to perform ROS: Dementia       PHYSICAL EXAM   (up to 7 for level 4, 8 or more for level 5)      INITIAL VITALS:   /68   Pulse 57   Temp 97.1 °F (36.2 °C) (Axillary)   Resp 20   Wt 117 lb (53.1 kg)   SpO2 98%   BMI 18.88 kg/m²     Physical Exam   Constitutional: He appears cachectic.  He is easily (ROCEPHIN) 1 g IVPB in 50 mL D5W minibag    QUEtiapine (SEROQUEL) tablet 25 mg    aspirin EC tablet 81 mg    atorvastatin (LIPITOR) tablet 40 mg    calcium carbonate-vitamin D (CALTRATE) 600-400 MG-UNIT per tab 2 tablet    donepezil (ARICEPT) tablet 10 mg    efavirenz-emtrictabine-tenofovir (ATRIPLA) 600-200-300 MG per tablet 1 tablet    finasteride (PROSCAR) tablet 5 mg    memantine (NAMENDA) tablet 10 mg    multivitamin 1 tablet    OLANZapine zydis (ZYPREXA) disintegrating tablet 2.5 mg    tamsulosin (FLOMAX) capsule 0.4 mg    vitamin E capsule 400 Units    sodium chloride flush 0.9 % injection 10 mL    sodium chloride flush 0.9 % injection 10 mL    magnesium hydroxide (MILK OF MAGNESIA) 400 MG/5ML suspension 30 mL    ondansetron (ZOFRAN) injection 4 mg    enoxaparin (LOVENOX) injection 40 mg    0.9 % sodium chloride infusion    acetaminophen (TYLENOL) tablet 650 mg    cefTRIAXone (ROCEPHIN) 1 g IVPB in 50 mL D5W minibag       DDX: Ingestion, infectious, trauma, seizure, AMS, electrolytes, encephalopathy, insulin, opiates, uremia, toxins, tumor, thyrotoxicosis, psychiatric, sepsis, stroke, N/V, seizure, headache, elderly age, alcohol / drugs / toxidromes, signs of trauma      DIAGNOSTIC RESULTS / [address] [place] [address] / [facility]     LABS:  Results for orders placed or performed during the hospital encounter of 09/10/19   CBC Auto Differential   Result Value Ref Range    WBC 9.4 3.5 - 11.3 k/uL    RBC 4.70 4.21 - 5.77 m/uL    Hemoglobin 11.9 (L) 13.0 - 17.0 g/dL    Hematocrit 39.3 (L) 40.7 - 50.3 %    MCV 83.6 82.6 - 102.9 fL    MCH 25.3 25.2 - 33.5 pg    MCHC 30.3 28.4 - 34.8 g/dL    RDW 16.8 (H) 11.8 - 14.4 %    Platelets 880 609 - 083 k/uL    MPV 11.4 8.1 - 13.5 fL    NRBC Automated 0.0 0.0 per 100 WBC    Differential Type NOT REPORTED     Seg Neutrophils 52 36 - 65 %    Lymphocytes 36 24 - 43 %    Monocytes 10 3 - 12 %    Eosinophils % 1 1 - 4 %    Basophils 1 0 - 2 %    Immature Granulocytes 0 0 %    Segs Absolute 4.89 1.50 - 8.10 k/uL    Absolute Lymph # 3.39 1.10 - 3.70 k/uL    Absolute Mono # 0.91 0.10 - 1.20 k/uL    Absolute Eos # 0.07 0.00 - 0.44 k/uL    Basophils Absolute 0.05 0.00 - 0.20 k/uL    Absolute Immature Granulocyte 0.04 0.00 - 0.30 k/uL    WBC Morphology NOT REPORTED     RBC Morphology ANISOCYTOSIS PRESENT     Platelet Estimate NOT REPORTED    Comprehensive Metabolic Panel w/ Reflex to MG   Result Value Ref Range    Glucose 195 (H) 70 - 99 mg/dL    BUN 10 8 - 23 mg/dL    CREATININE 1.25 (H) 0.70 - 1.20 mg/dL    Bun/Cre Ratio NOT REPORTED 9 - 20    Calcium 8.9 8.6 - 10.4 mg/dL    Sodium 133 (L) 135 - 144 mmol/L    Potassium 4.8 3.7 - 5.3 mmol/L    Chloride 102 98 - 107 mmol/L    CO2 19 (L) 20 - 31 mmol/L    Anion Gap 12 9 - 17 mmol/L    Alkaline Phosphatase 106 40 - 129 U/L    ALT 6 5 - 41 U/L    AST 18 <40 U/L    Total Bilirubin 0.39 0.3 - 1.2 mg/dL    Total Protein 7.3 6.4 - 8.3 g/dL    Alb 3.4 (L) 3.5 - 5.2 g/dL    Albumin/Globulin Ratio 0.9 (L) 1.0 - 2.5    GFR Non- 56 (L) >60 mL/min    GFR African American >60 >60 mL/min    GFR Comment          GFR Staging NOT REPORTED    Troponin   Result Value Ref Range    Troponin, High Sensitivity 10 0 - 22 ng/L    Troponin T NOT REPORTED <0.03 ng/mL    Troponin Interp NOT REPORTED    Urinalysis, reflex to microscopic   Result Value Ref Range    Color, UA YELLOW YELLOW    Turbidity UA CLEAR CLEAR    Glucose, Ur NEGATIVE NEGATIVE    Bilirubin Urine NEGATIVE NEGATIVE    Ketones, Urine NEGATIVE NEGATIVE    Specific Gravity, UA 1.012 1.005 - 1.030    Urine Hgb NEGATIVE NEGATIVE    pH, UA 6.0 5.0 - 8.0    Protein, UA NEGATIVE NEGATIVE    Urobilinogen, Urine Normal Normal    Nitrite, Urine NEGATIVE NEGATIVE    Leukocyte Esterase, Urine MODERATE (A) NEGATIVE    Urinalysis Comments NOT REPORTED    Valproic acid level, total   Result Value Ref Range    Valproic Acid Lvl <3 (L) 50 - 125 ug/mL    Valproic Dose amount NOT REPORTED     Valproic Date last dose NOT REPORTED     Valproic Time last dose NOT REPORTED    Microscopic Urinalysis   Result Value Ref Range    -          WBC, UA 5 TO 10 0 - 5 /HPF    RBC, UA 0 TO 2 0 - 4 /HPF    Casts UA  0 - 8 /LPF     2 TO 5 HYALINE Reference range defined for non-centrifuged specimen. Crystals UA NOT REPORTED None /HPF    Epithelial Cells UA 5 TO 10 0 - 5 /HPF    Renal Epithelial, Urine NOT REPORTED 0 /HPF    Bacteria, UA FEW (A) None    Mucus, UA NOT REPORTED None    Trichomonas, UA NOT REPORTED None    Amorphous, UA NOT REPORTED None    Other Observations UA NOT REPORTED NOT REQ. Yeast, UA NOT REPORTED None         RADIOLOGY:  Xr Chest Portable    Result Date: 9/10/2019  EXAMINATION: ONE XRAY VIEW OF THE CHEST 9/10/2019 6:35 pm COMPARISON: None. HISTORY: ORDERING SYSTEM PROVIDED HISTORY: AMS after vomiting TECHNOLOGIST PROVIDED HISTORY: AMS after vomiting Reason for Exam: port Upright Acuity: Unknown Type of Exam: Unknown FINDINGS: No infiltrate or consolidation or effusion is identified. The heart size is normal.     Clear lungs. EKG  EKG Interpretation    Interpreted by emergency department physician    Rhythm: normal sinus   Rate: normal  Axis: left  Ectopy: none  Conduction: normal  ST Segments: no acute change  T Waves: non specific changes  Q Waves: inferior leads    Clinical Impression: non-specific EKG, low voltage, significant changes since [id number]    Michelle Lowe      All EKG's are interpreted by the Emergency Department Physician who either signs or Co-signs this chart in the absence of a cardiologist.    EMERGENCY DEPARTMENT COURSE:  Patient brought by EMS, initial report was EMS called for unresponsiveness. Patient hypotensive of during transport. Chart review indicate history of septic cystitis, hx of BPH causing obstruction. Workup initiated, abnormal but nonspecific EKG. Patient unable to provide urinary sample, unable to straight cath due to BPH obstruction. Family initially unavailable, patient mumbling incoherent words. Freely moves all extremities, trying to prevent lead stickers from being placed. Vitally stable, started 1L NS fluid bolus. Strength equal and bilateral to attempts to move him. No reports of trauma, no blood thinners. Initial labs remarkable for EMELIA. Initial report of seizure hx, depakote level ordered. 15 min after arrival family at bedside. Reports patient is acting normally per his baseline. Family notes the patient has not been taking his medications for the last months as he will cheek them and then spit them into the garbage. Initially delay in UA/UC from straight cath failure. With the assistance of family patient was able to urinate on his own. Patient no longer takes antiepileptic medications as it is believed his seizure disorder has resolved. Code status is DNR/CCA. At the time of signout the UA was not available. Signed out to Dr. Arnetta Alpers pending UA and dispo. Likely admit given EMELIA, more likely if UA positive for cystitis. PROCEDURES:  None    CONSULTS:  IP CONSULT TO HOSPITALIST    CRITICAL CARE:  None    FINAL IMPRESSION      1. Bacterial UTI    2.  Altered mental status, unspecified altered mental status type          DISPOSITION / [address] Admitted 09/10/2019 08:09:10 PM      PATIENT REFERRED TO:  Nathalia Tillman MD  [facility] [address] [phone]            DISCHARGE MEDICATIONS:  New Prescriptions    No medications on file       Terra Yeager MD  Emergency Medicine Resident    (Please note that portions of thisnote were completed with a voice recognition program.  Efforts were made to edit the dictations but occasionally words are mis-transcribed.)        Terra Yeager MD  Resident  09/10/19 [id number] [address], MD  Resident  09/10/19 3417

## 2019-09-10 NOTE — ED NOTES
Report received from [facility]. Pt resting on stretcher in NAD. Pt on telemetry monitoring with alarms set. Per wife, pt currently at his baseline.   Awaiting further results, will continue to monitor      Basim Bosch RN  09/10/19 1930

## 2019-09-11 PROBLEM — E87.1 HYPONATREMIA: Status: ACTIVE | Noted: 2019-09-11

## 2019-09-11 PROBLEM — R79.89 ELEVATED SERUM CREATININE: Status: ACTIVE | Noted: 2019-09-11

## 2019-09-11 LAB
% NK (CD56): 18 % (ref 6–29)
AB NK (CD56): 603 /UL (ref 90–600)
ABSOLUTE CD 3: 2411 /UL (ref 411–2061)
ABSOLUTE CD 4 HELPER: 870 /UL (ref 309–1571)
ABSOLUTE CD 8 (SUPP): 1473 /UL (ref 282–999)
ABSOLUTE CD19 B CELL: 234 /UL (ref 71–567)
AMPHETAMINE SCREEN URINE: NEGATIVE
ANION GAP SERPL CALCULATED.3IONS-SCNC: 11 MMOL/L (ref 9–17)
BARBITURATE SCREEN URINE: NEGATIVE
BENZODIAZEPINE SCREEN, URINE: NEGATIVE
BUN BLDV-MCNC: 8 MG/DL (ref 8–23)
BUN/CREAT BLD: NORMAL (ref 9–20)
BUPRENORPHINE URINE: ABNORMAL
CALCIUM SERPL-MCNC: 9.9 MG/DL (ref 8.6–10.4)
CANNABINOID SCREEN URINE: POSITIVE
CD19 B CELL: 7 % (ref 5–25)
CD3 % TOTAL T CELLS: 72 % (ref 57–94)
CD4 % HELPER T CELL: 26 % (ref 27–64)
CD4:CD8: 0.59 (ref 0.6–2.8)
CD8 % SUPPRESSOR T CELL: 44 % (ref 17–48)
CHLORIDE BLD-SCNC: 106 MMOL/L (ref 98–107)
CO2: 25 MMOL/L (ref 20–31)
COCAINE METABOLITE, URINE: NEGATIVE
CREAT SERPL-MCNC: 0.85 MG/DL (ref 0.7–1.2)
EKG ATRIAL RATE: 73 BPM
EKG P AXIS: 48 DEGREES
EKG P-R INTERVAL: 122 MS
EKG Q-T INTERVAL: 372 MS
EKG QRS DURATION: 72 MS
EKG QTC CALCULATION (BAZETT): 409 MS
EKG R AXIS: 74 DEGREES
EKG T AXIS: 84 DEGREES
EKG VENTRICULAR RATE: 73 BPM
GFR AFRICAN AMERICAN: >60 ML/MIN
GFR NON-AFRICAN AMERICAN: >60 ML/MIN
GFR SERPL CREATININE-BSD FRML MDRD: NORMAL ML/MIN/{1.73_M2}
GFR SERPL CREATININE-BSD FRML MDRD: NORMAL ML/MIN/{1.73_M2}
GLUCOSE BLD-MCNC: 90 MG/DL (ref 70–99)
HCT VFR BLD CALC: 41.1 % (ref 40.7–50.3)
HEMOGLOBIN: 12.3 G/DL (ref 13–17)
LYMPHOCYTES # BLD: 36 % (ref 24–44)
MCH RBC QN AUTO: 24.7 PG (ref 25.2–33.5)
MCHC RBC AUTO-ENTMCNC: 29.9 G/DL (ref 28.4–34.8)
MCV RBC AUTO: 82.7 FL (ref 82.6–102.9)
MDMA URINE: ABNORMAL
METHADONE SCREEN, URINE: NEGATIVE
METHAMPHETAMINE, URINE: ABNORMAL
NRBC AUTOMATED: 0 PER 100 WBC
OPIATES, URINE: NEGATIVE
OXYCODONE SCREEN URINE: NEGATIVE
PDW BLD-RTO: 16.7 % (ref 11.8–14.4)
PHENCYCLIDINE, URINE: NEGATIVE
PLATELET # BLD: 227 K/UL (ref 138–453)
PMV BLD AUTO: 11.3 FL (ref 8.1–13.5)
POTASSIUM SERPL-SCNC: 4.9 MMOL/L (ref 3.7–5.3)
PROPOXYPHENE, URINE: ABNORMAL
RBC # BLD: 4.97 M/UL (ref 4.21–5.77)
SODIUM BLD-SCNC: 142 MMOL/L (ref 135–144)
TEST INFORMATION: ABNORMAL
TRICYCLIC ANTIDEPRESSANTS, UR: ABNORMAL
WBC # BLD: 8.5 K/UL (ref 3.5–11.3)
WBC # BLD: 9.3 K/UL (ref 3.5–11)

## 2019-09-11 PROCEDURE — 99222 1ST HOSP IP/OBS MODERATE 55: CPT | Performed by: FAMILY MEDICINE

## 2019-09-11 PROCEDURE — 6370000000 HC RX 637 (ALT 250 FOR IP): Performed by: NURSE PRACTITIONER

## 2019-09-11 PROCEDURE — 86360 T CELL ABSOLUTE COUNT/RATIO: CPT

## 2019-09-11 PROCEDURE — 6360000002 HC RX W HCPCS: Performed by: NURSE PRACTITIONER

## 2019-09-11 PROCEDURE — 6370000000 HC RX 637 (ALT 250 FOR IP): Performed by: FAMILY MEDICINE

## 2019-09-11 PROCEDURE — 1200000000 HC SEMI PRIVATE

## 2019-09-11 PROCEDURE — 86355 B CELLS TOTAL COUNT: CPT

## 2019-09-11 PROCEDURE — 51798 US URINE CAPACITY MEASURE: CPT

## 2019-09-11 PROCEDURE — 6360000002 HC RX W HCPCS: Performed by: FAMILY MEDICINE

## 2019-09-11 PROCEDURE — 80048 BASIC METABOLIC PNL TOTAL CA: CPT

## 2019-09-11 PROCEDURE — 2580000003 HC RX 258: Performed by: NURSE PRACTITIONER

## 2019-09-11 PROCEDURE — 86359 T CELLS TOTAL COUNT: CPT

## 2019-09-11 PROCEDURE — 85027 COMPLETE CBC AUTOMATED: CPT

## 2019-09-11 PROCEDURE — 87536 HIV-1 QUANT&REVRSE TRNSCRPJ: CPT

## 2019-09-11 PROCEDURE — 93010 ELECTROCARDIOGRAM REPORT: CPT | Performed by: INTERNAL MEDICINE

## 2019-09-11 PROCEDURE — 86357 NK CELLS TOTAL COUNT: CPT

## 2019-09-11 RX ORDER — ATORVASTATIN CALCIUM 20 MG/1
20 TABLET, FILM COATED ORAL DAILY
Status: DISCONTINUED | OUTPATIENT
Start: 2019-09-12 | End: 2019-09-12 | Stop reason: HOSPADM

## 2019-09-11 RX ORDER — LORAZEPAM 2 MG/ML
0.5 INJECTION INTRAMUSCULAR EVERY 6 HOURS PRN
Status: DISCONTINUED | OUTPATIENT
Start: 2019-09-11 | End: 2019-09-12 | Stop reason: HOSPADM

## 2019-09-11 RX ORDER — OLANZAPINE 5 MG/1
5 TABLET, ORALLY DISINTEGRATING ORAL NIGHTLY
Status: DISCONTINUED | OUTPATIENT
Start: 2019-09-11 | End: 2019-09-12 | Stop reason: HOSPADM

## 2019-09-11 RX ORDER — DIVALPROEX SODIUM 125 MG/1
125 CAPSULE, COATED PELLETS ORAL EVERY 8 HOURS SCHEDULED
Status: DISCONTINUED | OUTPATIENT
Start: 2019-09-11 | End: 2019-09-12 | Stop reason: HOSPADM

## 2019-09-11 RX ADMIN — LORAZEPAM 0.5 MG: 2 INJECTION INTRAMUSCULAR; INTRAVENOUS at 19:36

## 2019-09-11 RX ADMIN — OLANZAPINE 5 MG: 5 TABLET, ORALLY DISINTEGRATING ORAL at 20:39

## 2019-09-11 RX ADMIN — SODIUM CHLORIDE: 9 INJECTION, SOLUTION INTRAVENOUS at 14:27

## 2019-09-11 RX ADMIN — MEMANTINE HYDROCHLORIDE 10 MG: 5 TABLET, FILM COATED ORAL at 20:38

## 2019-09-11 RX ADMIN — CEFTRIAXONE SODIUM 1 G: 1 INJECTION, POWDER, FOR SOLUTION INTRAMUSCULAR; INTRAVENOUS at 20:00

## 2019-09-11 RX ADMIN — DIVALPROEX SODIUM 125 MG: 125 CAPSULE, COATED PELLETS ORAL at 17:13

## 2019-09-11 RX ADMIN — DONEPEZIL HYDROCHLORIDE 10 MG: 10 TABLET, FILM COATED ORAL at 19:55

## 2019-09-11 RX ADMIN — SODIUM CHLORIDE, PRESERVATIVE FREE 10 ML: 5 INJECTION INTRAVENOUS at 11:07

## 2019-09-11 RX ADMIN — TAMSULOSIN HYDROCHLORIDE 0.4 MG: 0.4 CAPSULE ORAL at 10:59

## 2019-09-11 RX ADMIN — DIVALPROEX SODIUM 125 MG: 125 CAPSULE, COATED PELLETS ORAL at 21:18

## 2019-09-11 ASSESSMENT — PAIN SCALES - GENERAL: PAINLEVEL_OUTOF10: 0

## 2019-09-11 NOTE — ED PROVIDER NOTES
[facility]  Emergency Department  Emergency Medicine Resident Sign-out     Care of Giselle Sung was assumed from Dr. Maryuri Freeman and is being seen for Altered Mental Status (hx dementia, found by family in couch with incontinence, poss seizure, vomited at home )  . The patient's initial evaluation and plan have been discussed with the prior provider who initially evaluated the patient.      EMERGENCY DEPARTMENT COURSE / MEDICAL DECISION MAKING:       MEDICATIONS GIVEN:  Orders Placed This Encounter   Medications    0.9 % sodium chloride bolus    cefTRIAXone (ROCEPHIN) 1 g IVPB in 50 mL D5W minibag    QUEtiapine (SEROQUEL) tablet 25 mg    aspirin EC tablet 81 mg    atorvastatin (LIPITOR) tablet 40 mg    calcium carbonate-vitamin D (CALTRATE) 600-400 MG-UNIT per tab 2 tablet    donepezil (ARICEPT) tablet 10 mg    efavirenz-emtrictabine-tenofovir (ATRIPLA) 600-200-300 MG per tablet 1 tablet    finasteride (PROSCAR) tablet 5 mg    memantine (NAMENDA) tablet 10 mg    multivitamin 1 tablet    OLANZapine zydis (ZYPREXA) disintegrating tablet 2.5 mg    tamsulosin (FLOMAX) capsule 0.4 mg    vitamin E capsule 400 Units    sodium chloride flush 0.9 % injection 10 mL    sodium chloride flush 0.9 % injection 10 mL    magnesium hydroxide (MILK OF MAGNESIA) 400 MG/5ML suspension 30 mL    ondansetron (ZOFRAN) injection 4 mg    enoxaparin (LOVENOX) injection 40 mg    0.9 % sodium chloride infusion    acetaminophen (TYLENOL) tablet 650 mg    cefTRIAXone (ROCEPHIN) 1 g IVPB in 50 mL D5W minibag    divalproex (DEPAKOTE SPRINKLE) capsule 125 mg       LABS / RADIOLOGY:     Labs Reviewed   CBC WITH AUTO DIFFERENTIAL - Abnormal; Notable for the following components:       Result Value    Hemoglobin 11.9 (*)     Hematocrit 39.3 (*)     RDW 16.8 (*)     All other components within normal limits   COMPREHENSIVE METABOLIC PANEL W/ REFLEX TO MG FOR LOW K - Abnormal; Notable for the following

## 2019-09-11 NOTE — ED NOTES
Report given to Finesse Watters on [address] [facility], [address] [place] [address]  09/11/19 3077

## 2019-09-11 NOTE — ED NOTES
Pt ambulating in room and refusing to stay in bed. Pt when up urinated all over the floor and pulling at IV, refusing to keep pulse ox and telemetry  Monitoring on. Dr. Sheri Dominique notified and at bedside with pt.     Complete linen change performed, pt cleaned up, and assisted back to bed      Tayo Spears RN  09/10/19 2016

## 2019-09-11 NOTE — H&P
[facility]    HISTORY AND PHYSICAL EXAMINATION            Date:   9/10/2019  Patient name:  Ed Coto  Date of admission:  9/10/2019  4:24 PM  MRN:   [id number]  Account:  [de-identified]  YOB: 1940  PCP:    Michael Newell MD  Room:   14/14  Code Status:    Full Code    Chief Complaint:     Chief Complaint   Patient presents with    Altered Mental Status     hx dementia, found by family in couch with incontinence, poss seizure, vomited at home        History Obtained From:     electronic medical record    History of Present Illness:   Patient is unable to give HPI and there is no family present to discuss care with, therefore per the ER documentation:   Ed Coto is a [de-identified] y.o. male who presents with episode of unresponsiveness w/ vomiting and incontinence. Went to smoke after eating breakfast and after not returning from outside, was found to be slumped down (maintaining postuiral tone) and unresponsive w/ vomit in his mouth and on his clothes.  EMS activated and transported    Work up in the emergency room revealed   CBC- HH 11.9/39.3 no leukocytosis, however HIV+ and not compliant with medication or lab draws d/t dementia  CMP- sodium 133, creat 1.25, previous 0.85 2/19  Valproric acid level less than 3  CXR no acute pathology to suspect aspiration      Past Medical History:     Past Medical History:   Diagnosis Date    Acute delirium 2/11/2019    Acute metabolic encephalopathy 1/47/5207    Alzheimer's dementia with behavioral disturbance 2/11/2019    Atrophy, cortical     BPH (benign prostatic hyperplasia) 2/1/2013    Dementia     Dementia without behavioral disturbance 7/27/2012    GERD (gastroesophageal reflux disease) 4/28/2015    Hemorrhoids     [facility]) [facility]     Meralgia paraesthetica 2/4/2016    Mixed hyperlipidemia 5/5/2016    Occasional tremors     Osteoporosis     uncooperative. He does not have a sickly appearance. No distress. HENT:   Head: Normocephalic and atraumatic. Right Ear: Hearing normal.   Left Ear: Hearing normal.   Nose: Nose normal. No rhinorrhea. Mouth/Throat: Oropharynx is clear and moist and mucous membranes are normal.   Eyes: Pupils are equal, round, and reactive to light. Conjunctivae, EOM and lids are normal. Right conjunctiva is not injected. Left conjunctiva is not injected. Right eye exhibits normal extraocular motion. Left eye exhibits normal extraocular motion. Right pupil is reactive. Left pupil is reactive. Pupils are equal.   Neck: Trachea normal and phonation normal. Neck supple. Carotid bruit is not present. No tracheal deviation present. No thyromegaly present. Cardiovascular: Normal rate, regular rhythm, normal heart sounds, intact distal pulses and normal pulses. No murmur heard. Pulmonary/Chest: Effort normal and breath sounds normal. No stridor. No respiratory distress. He has no decreased breath sounds. Abdominal: Soft. Bowel sounds are normal. He exhibits no distension and no mass. There is no hepatosplenomegaly. There is no tenderness. There is no guarding. Musculoskeletal: He exhibits no edema or tenderness. Neurological: He is alert and oriented to person, place, and time. He is not disoriented. No cranial nerve deficit. GCS eye subscore is 4. GCS verbal subscore is 5. GCS motor subscore is 6. Moves all extremities, simple commands occasionally. Difficult given underlying dementia  Dozier to name only-   No family present to state if this is his baseline mentation   Skin: Skin is warm, dry and intact. No lesion and no rash noted. He is not diaphoretic. No erythema. Skin assessment to the best of ability as he was not cooperative and became agitated with to much stimuli   Psychiatric: His mood appears anxious. His speech is slurred (garbled, unsure if this is baseline or not). He is agitated and hyperactive.  He is not actively hallucinating. Cognition and memory are normal. He expresses impulsivity. Nursing note and vitals reviewed.       Investigations:      Laboratory Testing:  Recent Results (from the past 24 hour(s))   CBC Auto Differential    Collection Time: 09/10/19  4:52 PM   Result Value Ref Range    WBC 9.4 3.5 - 11.3 k/uL    RBC 4.70 4.21 - 5.77 m/uL    Hemoglobin 11.9 (L) 13.0 - 17.0 g/dL    Hematocrit 39.3 (L) 40.7 - 50.3 %    MCV 83.6 82.6 - 102.9 fL    MCH 25.3 25.2 - 33.5 pg    MCHC 30.3 28.4 - 34.8 g/dL    RDW 16.8 (H) 11.8 - 14.4 %    Platelets 379 356 - 494 k/uL    MPV 11.4 8.1 - 13.5 fL    NRBC Automated 0.0 0.0 per 100 WBC    Differential Type NOT REPORTED     Seg Neutrophils 52 36 - 65 %    Lymphocytes 36 24 - 43 %    Monocytes 10 3 - 12 %    Eosinophils % 1 1 - 4 %    Basophils 1 0 - 2 %    Immature Granulocytes 0 0 %    Segs Absolute 4.89 1.50 - 8.10 k/uL    Absolute Lymph # 3.39 1.10 - 3.70 k/uL    Absolute Mono # 0.91 0.10 - 1.20 k/uL    Absolute Eos # 0.07 0.00 - 0.44 k/uL    Basophils Absolute 0.05 0.00 - 0.20 k/uL    Absolute Immature Granulocyte 0.04 0.00 - 0.30 k/uL    WBC Morphology NOT REPORTED     RBC Morphology ANISOCYTOSIS PRESENT     Platelet Estimate NOT REPORTED    Comprehensive Metabolic Panel w/ Reflex to MG    Collection Time: 09/10/19  4:52 PM   Result Value Ref Range    Glucose 195 (H) 70 - 99 mg/dL    BUN 10 8 - 23 mg/dL    CREATININE 1.25 (H) 0.70 - 1.20 mg/dL    Bun/Cre Ratio NOT REPORTED 9 - 20    Calcium 8.9 8.6 - 10.4 mg/dL    Sodium 133 (L) 135 - 144 mmol/L    Potassium 4.8 3.7 - 5.3 mmol/L    Chloride 102 98 - 107 mmol/L    CO2 19 (L) 20 - 31 mmol/L    Anion Gap 12 9 - 17 mmol/L    Alkaline Phosphatase 106 40 - 129 U/L    ALT 6 5 - 41 U/L    AST 18 <40 U/L    Total Bilirubin 0.39 0.3 - 1.2 mg/dL    Total Protein 7.3 6.4 - 8.3 g/dL    Alb 3.4 (L) 3.5 - 5.2 g/dL    Albumin/Globulin Ratio 0.9 (L) 1.0 - 2.5    GFR Non- 56 (L) >60 mL/min    GFR

## 2019-09-12 VITALS
SYSTOLIC BLOOD PRESSURE: 130 MMHG | TEMPERATURE: 97.7 F | HEART RATE: 84 BPM | OXYGEN SATURATION: 97 % | DIASTOLIC BLOOD PRESSURE: 64 MMHG | WEIGHT: 121 LBS | BODY MASS INDEX: 19.44 KG/M2 | RESPIRATION RATE: 20 BRPM | HEIGHT: 66 IN

## 2019-09-12 PROBLEM — E44.0 MODERATE MALNUTRITION (HCC): Status: ACTIVE | Noted: 2019-09-12

## 2019-09-12 LAB
CULTURE: ABNORMAL
HCT VFR BLD CALC: 40.1 % (ref 40.7–50.3)
HEMOGLOBIN: 11.6 G/DL (ref 13–17)
Lab: ABNORMAL
MCH RBC QN AUTO: 24.4 PG (ref 25.2–33.5)
MCHC RBC AUTO-ENTMCNC: 28.9 G/DL (ref 28.4–34.8)
MCV RBC AUTO: 84.2 FL (ref 82.6–102.9)
NRBC AUTOMATED: 0 PER 100 WBC
PDW BLD-RTO: 16.8 % (ref 11.8–14.4)
PLATELET # BLD: 229 K/UL (ref 138–453)
PMV BLD AUTO: 10.5 FL (ref 8.1–13.5)
RBC # BLD: 4.76 M/UL (ref 4.21–5.77)
SPECIMEN DESCRIPTION: ABNORMAL
WBC # BLD: 6.6 K/UL (ref 3.5–11.3)

## 2019-09-12 PROCEDURE — 85027 COMPLETE CBC AUTOMATED: CPT

## 2019-09-12 PROCEDURE — 6360000002 HC RX W HCPCS: Performed by: NURSE PRACTITIONER

## 2019-09-12 PROCEDURE — 6370000000 HC RX 637 (ALT 250 FOR IP): Performed by: NURSE PRACTITIONER

## 2019-09-12 PROCEDURE — 36415 COLL VENOUS BLD VENIPUNCTURE: CPT

## 2019-09-12 PROCEDURE — 2580000003 HC RX 258: Performed by: FAMILY MEDICINE

## 2019-09-12 PROCEDURE — 6370000000 HC RX 637 (ALT 250 FOR IP): Performed by: FAMILY MEDICINE

## 2019-09-12 PROCEDURE — 6360000002 HC RX W HCPCS: Performed by: FAMILY MEDICINE

## 2019-09-12 PROCEDURE — 99238 HOSP IP/OBS DSCHRG MGMT 30/<: CPT | Performed by: FAMILY MEDICINE

## 2019-09-12 PROCEDURE — 2580000003 HC RX 258: Performed by: NURSE PRACTITIONER

## 2019-09-12 RX ORDER — DIVALPROEX SODIUM 125 MG/1
250 CAPSULE, COATED PELLETS ORAL 2 TIMES DAILY
Qty: 60 CAPSULE | Refills: 3 | Status: SHIPPED | OUTPATIENT
Start: 2019-09-12 | End: 2019-10-03 | Stop reason: SDUPTHER

## 2019-09-12 RX ORDER — CEFDINIR 300 MG/1
300 CAPSULE ORAL 2 TIMES DAILY
Qty: 28 CAPSULE | Refills: 0 | Status: SHIPPED | OUTPATIENT
Start: 2019-09-12 | End: 2019-09-26

## 2019-09-12 RX ADMIN — SODIUM CHLORIDE: 9 INJECTION, SOLUTION INTRAVENOUS at 00:57

## 2019-09-12 RX ADMIN — LORAZEPAM 0.5 MG: 2 INJECTION INTRAMUSCULAR; INTRAVENOUS at 02:28

## 2019-09-12 RX ADMIN — DIVALPROEX SODIUM 125 MG: 125 CAPSULE, COATED PELLETS ORAL at 09:16

## 2019-09-12 RX ADMIN — ATORVASTATIN CALCIUM 20 MG: 20 TABLET, FILM COATED ORAL at 09:13

## 2019-09-12 RX ADMIN — THERA TABS 1 TABLET: TAB at 09:15

## 2019-09-12 RX ADMIN — ENOXAPARIN SODIUM 40 MG: 40 INJECTION SUBCUTANEOUS at 09:17

## 2019-09-12 RX ADMIN — TAMSULOSIN HYDROCHLORIDE 0.4 MG: 0.4 CAPSULE ORAL at 09:16

## 2019-09-12 RX ADMIN — SODIUM CHLORIDE: 9 INJECTION, SOLUTION INTRAVENOUS at 11:05

## 2019-09-12 RX ADMIN — Medication 2 TABLET: at 09:13

## 2019-09-12 RX ADMIN — CEFTRIAXONE SODIUM 1 G: 1 INJECTION, POWDER, FOR SOLUTION INTRAMUSCULAR; INTRAVENOUS at 14:31

## 2019-09-12 RX ADMIN — MEMANTINE HYDROCHLORIDE 10 MG: 5 TABLET, FILM COATED ORAL at 09:15

## 2019-09-12 RX ADMIN — FINASTERIDE 5 MG: 5 TABLET, FILM COATED ORAL at 09:14

## 2019-09-12 ASSESSMENT — PAIN SCALES - PAIN ASSESSMENT IN ADVANCED DEMENTIA (PAINAD)
BREATHING: 0
FACIALEXPRESSION: 0
CONSOLABILITY: 0
TOTALSCORE: 0
BODYLANGUAGE: 0
CONSOLABILITY: 0
BREATHING: 0
NEGVOCALIZATION: 0
FACIALEXPRESSION: 0
TOTALSCORE: 0
BREATHING: 0
TOTALSCORE: 0
FACIALEXPRESSION: 0
CONSOLABILITY: 0
BREATHING: 0
TOTALSCORE: 0
NEGVOCALIZATION: 0
BREATHING: 0
BREATHING: 0
BODYLANGUAGE: 0
TOTALSCORE: 0
NEGVOCALIZATION: 0
CONSOLABILITY: 0
FACIALEXPRESSION: 0
TOTALSCORE: 0
FACIALEXPRESSION: 0
BODYLANGUAGE: 0
BODYLANGUAGE: 0
BREATHING: 0
BODYLANGUAGE: 0
CONSOLABILITY: 0
CONSOLABILITY: 0
NEGVOCALIZATION: 0
FACIALEXPRESSION: 0
CONSOLABILITY: 0
TOTALSCORE: 0
BODYLANGUAGE: 0
FACIALEXPRESSION: 0
CONSOLABILITY: 0
BREATHING: 0
TOTALSCORE: 0
CONSOLABILITY: 0
NEGVOCALIZATION: 0
BODYLANGUAGE: 0
TOTALSCORE: 0
TOTALSCORE: 0
BREATHING: 0
TOTALSCORE: 0
NEGVOCALIZATION: 0
NEGVOCALIZATION: 0
CONSOLABILITY: 0
FACIALEXPRESSION: 0
BODYLANGUAGE: 0
BREATHING: 0
FACIALEXPRESSION: 0
FACIALEXPRESSION: 0
CONSOLABILITY: 0
BODYLANGUAGE: 0
FACIALEXPRESSION: 0
NEGVOCALIZATION: 0
CONSOLABILITY: 0
BREATHING: 0
TOTALSCORE: 0
NEGVOCALIZATION: 0
NEGVOCALIZATION: 0
BODYLANGUAGE: 0
NEGVOCALIZATION: 0
BODYLANGUAGE: 0
BREATHING: 0
BODYLANGUAGE: 0
NEGVOCALIZATION: 0
FACIALEXPRESSION: 0

## 2019-09-12 ASSESSMENT — PAIN SCALES - GENERAL: PAINLEVEL_OUTOF10: 0

## 2019-09-12 ASSESSMENT — PAIN SCALES - WONG BAKER
WONGBAKER_NUMERICALRESPONSE: 0

## 2019-09-12 NOTE — H&P
[address]     HISTORY AND PHYSICAL EXAMINATION            Date:   9/11/2019  Patient name:  Padilla Zhang  Date of admission:  9/10/2019  4:24 PM  MRN:   [id number]  Account:  [de-identified]  YOB: 1940  PCP:    Alena Burgos MD  Room:   2011/2011-01  Code Status:    Full Code    Chief Complaint:     Chief Complaint   Patient presents with    Altered Mental Status     hx dementia, found by family in couch with incontinence, poss seizure, vomited at home        History Obtained From:     patient, electronic medical record    History of Present Illness: This is a 64-year-old male patient with a past medical history significant for Alzheimer's dementia with behavior disorder, questionable seizure disorder, BPH, peripheral vascular disease, hyperlipidemia is in the room with his wife who states that she will he usually gets about 2-3 UTIs in a year. She noticed that patient was having twitching movements and was having difficulty following commands with incontinence of urine and had nausea and vomiting x1.   EMS was called was brought to the emergency room and urinalysis was positive with lactose fermenting rods and hence admitted for mental status changes secondary to UTI and further management        Past Medical History:     Past Medical History:   Diagnosis Date    Acute delirium 2/11/2019    Acute metabolic encephalopathy 8/30/0104    Alzheimer's dementia with behavioral disturbance 2/11/2019    Atrophy, cortical     BPH (benign prostatic hyperplasia) 2/1/2013    Dementia     Dementia without behavioral disturbance 7/27/2012    GERD (gastroesophageal reflux disease) 4/28/2015    Hemorrhoids     [facility]     Meralgia paraesthetica 2/4/2016    Mixed hyperlipidemia 5/5/2016    Occasional tremors     Osteoporosis     PVD (peripheral vascular disease) ([facility] 2/8/2018    negative for headaches, dizziness, lightheadedness, numbness, pain, tingling extremities, increasing confusion  BEHAVIOR/PSYCH:  negative for depression, anxiety    Physical Exam:   /75   Pulse 88   Temp 97.4 °F (36.3 °C) (Oral)   Resp 20   Wt 120 lb 1.6 oz (54.5 kg)   SpO2 100%   BMI 19.38 kg/m²   Temp (24hrs), Av.4 °F (36.3 °C), Min:97.4 °F (36.3 °C), Max:97.4 °F (36.3 °C)    No results for input(s): POCGLU in the last 72 hours.     Intake/Output Summary (Last 24 hours) at 2019  Last data filed at 2019 1830  Gross per 24 hour   Intake 1652.6 ml   Output 1305 ml   Net 347.6 ml       General Appearance: Ill appearing, and in no acute distress  Mental status: Not oriented to person, place, and time  Head: normocephalic, atraumatic  Eye: no icterus, redness, pupils equal and reactive, extraocular eye movements intact, conjunctiva clear  Ear: normal external ear, no discharge, hearing intact  Nose: no drainage noted  Mouth: mucous membranes moist  Neck: supple, no carotid bruits, thyroid not palpable  Lungs: Bilateral equal air entry, clear to ausculation, no wheezing, rales or rhonchi, normal effort  Cardiovascular: normal rate, regular rhythm, no murmur, gallop, rub  Abdomen: Soft, nontender, nondistended, normal bowel sounds, no hepatomegaly or splenomegaly  Neurologic: There are no new focal motor or sensory deficits, normal muscle tone and bulk, no abnormal sensation, normal speech, cranial nerves II through XII grossly intact  Skin: No gross lesions, rashes, bruising or bleeding on exposed skin area  Extremities: peripheral pulses palpable, no pedal edema or calf pain with palpation  Psych: normal affect    Investigations:      Laboratory Testing:  Recent Results (from the past 24 hour(s))   BASIC METABOLIC PANEL    Collection Time: 19  5:47 AM   Result Value Ref Range    Glucose 90 70 - 99 mg/dL    BUN 8 8 - 23 mg/dL    CREATININE 0.85 0.70 - 1.20 mg/dL    Bun/Cre Ratio NOT REPORTED 9 - 20    Calcium 9.9 8.6 - 10.4 mg/dL    Sodium 142 135 - 144 mmol/L    Potassium 4.9 3.7 - 5.3 mmol/L    Chloride 106 98 - 107 mmol/L    CO2 25 20 - 31 mmol/L    Anion Gap 11 9 - 17 mmol/L    GFR Non-African American >60 >60 mL/min    GFR African American >60 >60 mL/min    GFR Comment          GFR Staging NOT REPORTED    CBC    Collection Time: 09/11/19  5:47 AM   Result Value Ref Range    WBC 8.5 3.5 - 11.3 k/uL    RBC 4.97 4.21 - 5.77 m/uL    Hemoglobin 12.3 (L) 13.0 - 17.0 g/dL    Hematocrit 41.1 40.7 - 50.3 %    MCV 82.7 82.6 - 102.9 fL    MCH 24.7 (L) 25.2 - 33.5 pg    MCHC 29.9 28.4 - 34.8 g/dL    RDW 16.7 (H) 11.8 - 14.4 %    Platelets 912 258 - 215 k/uL    MPV 11.3 8.1 - 13.5 fL    NRBC Automated 0.0 0.0 per 100 WBC   LYMPHOCYTE SUBSET    Collection Time: 09/11/19  5:47 AM   Result Value Ref Range    CD3 % Total T Cell 72 57 - 94 %    Absolute CD 3 2411 (H) 411 - 2061 /uL    CD19 B Cell 7 5 - 25 %    Absolute CD19 B Cell 234 71 - 567 /uL    CD4 % [place] T Cell 26 (L) 27 - 64 %    Absolute CD 4 [place] 870 309 - 1571 /uL    CD8 % Suppressor T Cell 44 17 - 48 %    Absolute CD 8 (Supp) 1473 (H) 282 - 999 /uL    % NK (CD56) 18 6 - 29 %    Ab NK (CD56) 603 (H) 90 - 600 /uL    CD4/CD8 Ratio 0.59 (L) 0.6 - 2.8    WBC 9.3 3.5 - 11.0 k/uL    Lymphocytes 36 24 - 44 %       Imaging/Diagnostics:  Xr Chest Portable    Result Date: 9/10/2019  Clear lungs.        Assessment :      Hospital Problems           Last Modified POA    * (Principal) Se[facility] 9/11/2019 Yes    HIV (human immunodeficiency virus infection) ([facility] 9/11/2019 Yes    Mixed hyperlipidemia 9/11/2019 Yes    PVD (peripheral vascular disease) ([facility] 9/11/2019 Yes    Alzheimer's dementia with behavioral disturbance 9/11/2019 Yes    UTI (urinary tract infection) 9/11/2019 Yes    Hyponatremia 9/11/2019 Yes    Elevated serum creatinine 9/11/2019 Yes          Plan:     Patient status Admit as inpatient in the  Progressive Unit/Step down    1. Start Rocephin 1 g IV daily. 2. Start IV fluids 75 cc/h  3. Will advise restraints or can get Ativan as wife states that it has worked in the past for him when he is trying to pull the tubes or IV fluid. 4. The Zyprexa to 5 mg nightly for patient behaviors which have been controlled at home. 5. Check bladder scan to make sure he is not having any postvoid residual.  6. Continue Aricept and memantine for dementia. 7. Decrease Lipitor to 20 mg p.o. daily    Consultations:   IP CONSULT TO HOSPITALIST     Patient is admitted as inpatient status because of co-morbidities listed above, severity of signs and symptoms as outlined, requirement for current medical therapies and most importantly because of direct risk to patient if care not provided in a hospital setting.     Lori Waldrop MD  9/11/2019  8:59 PM    Copy sent to Dr. Tuan Harris MD

## 2019-09-12 NOTE — PLAN OF CARE
Ongoing  Goal: Participates in care planning  Description  Participates in care planning  9/12/2019 1008 by Valeria Calvillo RN  Outcome: Ongoing  9/12/2019 0607 by Ermalene Kawasaki, RN  Outcome: Ongoing     Problem: Injury - Risk of, Physical Injury:  Goal: Will remain free from falls  Description  Will remain free from falls  9/12/2019 1008 by Valeria Calvillo RN  Outcome: Ongoing  9/12/2019 0607 by Ermalene Kawasaki, RN  Outcome: Met This Shift  Goal: Absence of physical injury  Description  Absence of physical injury  9/12/2019 1008 by Valeria Calvillo RN  Outcome: Ongoing  9/12/2019 0607 by Ermalene Kawasaki, RN  Outcome: Met This Shift     Problem: Mood - Altered:  Goal: Mood stable  Description  Mood stable  9/12/2019 1008 by Valeria Calvillo RN  Outcome: Ongoing  9/12/2019 0607 by Ermalene Kawasaki, RN  Outcome: Ongoing  Goal: Absence of abusive behavior  Description  Absence of abusive behavior  9/12/2019 1008 by Valeria Calvillo RN  Outcome: Ongoing  9/12/2019 0607 by Ermalene Kawasaki, RN  Outcome: Ongoing  Goal: Verbalizations of feeling emotionally comfortable while being cared for will increase  Description  Verbalizations of feeling emotionally comfortable while being cared for will increase  9/12/2019 1008 by Valeria Calvillo RN  Outcome: Ongoing  9/12/2019 0607 by Ermalene Kawasaki, RN  Outcome: Ongoing     Problem: Psychomotor Activity - Altered:  Goal: Absence of psychomotor disturbance signs and symptoms  Description  Absence of psychomotor disturbance signs and symptoms  Outcome: Ongoing     Problem: Sensory Perception - Impaired:  Goal: Demonstrations of improved sensory functioning will increase  Description  Demonstrations of improved sensory functioning will increase  Outcome: Ongoing  Goal: Decrease in sensory misperception frequency  Description  Decrease in sensory misperception frequency  Outcome: Ongoing  Goal: Able to refrain from responding to false sensory perceptions  Description  Able to refrain from responding to false sensory perceptions  Outcome: Ongoing  Goal: Demonstrates accurate environmental perceptions  Description  Demonstrates accurate environmental perceptions  Outcome: Ongoing  Goal: Able to distinguish between reality-based and nonreality-based thinking  Description  Able to distinguish between reality-based and nonreality-based thinking  Outcome: Ongoing  Goal: Able to interrupt nonreality-based thinking  Description  Able to interrupt nonreality-based thinking  Outcome: Ongoing     Problem: Sleep Pattern Disturbance:  Goal: Appears well-rested  Description  Appears well-rested  9/12/2019 1008 by Mariola Pacheco RN  Outcome: Ongoing  9/12/2019 0607 by Mykel Borjas RN     Problem: Restraint Use - Nonviolent/Non-Self-Destructive Behavior:  Goal: Absence of restraint indications  Description  Absence of restraint indications  9/12/2019 1008 by Mariola Pacheco RN  Outcome: Ongoing  9/12/2019 0607 by Mykel Borjas RN  Outcome: Met This Shift  Goal: Absence of restraint-related injury  Description  Absence of restraint-related injury  9/12/2019 1008 by Mariola Pacheco RN  Outcome: Ongoing  9/12/2019 0607 by Mykel Borjas RN  Outcome: Met This Shift

## 2019-09-13 ENCOUNTER — CARE COORDINATION (OUTPATIENT)
Dept: CASE MANAGEMENT | Age: 79
End: 2019-09-13

## 2019-09-13 ENCOUNTER — TELEPHONE (OUTPATIENT)
Dept: INTERNAL MEDICINE | Age: 79
End: 2019-09-13

## 2019-09-13 DIAGNOSIS — R41.0 ACUTE DELIRIUM: Primary | ICD-10-CM

## 2019-09-13 NOTE — CARE COORDINATION
Jan 45 Transitions Initial Follow Up Call    Call within 2 business days of discharge: Yes    Patient: Ed Coto Patient : 1940   MRN: [id number]  Reason for Admission: UTI, AMS  Discharge Date: 19 RARS: Readmission Risk Score: 18      Last Discharge [facility]       Complaint Diagnosis Description Type Department Provider    9/10/19 Altered Mental Status Bacterial UTI . .. ED to Hosp-Admission (Discharged) (ADMITTED) [facility] CAR 2 Dereck Menon MD; M... Spoke with: spouse, Marie Denisulevard: STV    Non-face-to-face services provided:  Scheduled appointment with PCP-spouse called to schedule  Obtained and reviewed discharge summary and/or continuity of care documents  Assessment and support for treatment adherence and medication management-reviewed meds with spouse - has omnicef - depakote will be delivered by [facility] tomorrow   [id number] completed    Spoke with patient's spouse, Jayden Calderon, who informs me that patient is feeling well & is outside eating ice cream.  She has reorganized his medications - most medications were stopped & he is starting omnicef twice daily for 14 days. Spouse has called [phone] will be delivered tomorrow by [facility]. Care Transitions will continue follow. CTN contact information given.     Care Transitions 24 Hour Call    Schedule Follow Up Appointment with PCP:  Completed  Do you have any ongoing symptoms?:  No  Do you have a copy of your discharge instructions?:  Yes  Do you have all of your prescriptions and are they filled?:  No (Comment: has [facility] is delivering depakote tomorrow)  Have you been contacted by a Antonio Tracy?:  No  Have you scheduled your follow up appointment?:  Yes  How are you going to get to your appointment?:  Car - family or friend to transport  Were you discharged with any Home Care or Post Acute Services:  No  Post Acute Services:  Home Health  Do you

## 2019-09-13 NOTE — CARE COORDINATION
The office themselves schedule these appointments. They do not have any availability that is sooner.

## 2019-09-14 LAB
DIRECT EXAM: NORMAL
Lab: NORMAL
SPECIMEN DESCRIPTION: NORMAL

## 2019-09-15 NOTE — PROGRESS NOTES
I did not see, evaluate, or participate in the care of this patient. I signed up for this patient in error. Lydia Vaz Naper  Emergency Medicine Resident Physician, PGY-2  09/10/19 4:32 PM
Pt assisted to bathroom, unable to follow commands, grabbing RN arms and resisting guidance. Attempting to removed brief. Attempting to remove IV. Pulling at ekg wires, resisting redirection. Pt safely returned to bed where he fought ekg wire replacement, became agitated and grabbing RN hands during BP cuff placement, RN needed to placed O2 monitor on finger as ear and toe not giving good readings, pt states \"I will tear your ass up\" as well as gripping RN wrists and swatting at nurse.  Electronically signed by Marsha Montes RN on 9/11/2019 at 6:00 PM
RN bladder scanned pt, 70ml in fladder, pt voiding often and in good quantity
perfectserved physician asking for prn sedative for pt
the 24 hours ending 09/15/19 1616    Labs:  Hematology:No results for input(s): WBC, RBC, HGB, HCT, MCV, MCH, MCHC, RDW, PLT, MPV, SEDRATE, CRP, INR, DDIMER, OL8REBTY, LABABSO in the last 72 hours. Invalid input(s): PT  Chemistry:No results for input(s): NA, K, CL, CO2, GLUCOSE, BUN, CREATININE, MG, ANIONGAP, LABGLOM, GFRAA, CALCIUM, CAION, PHOS, PSA, PROBNP, TROPHS, CKTOTAL, CKMB, CKMBINDEX, MYOGLOBIN, DIGOXIN, LACTACIDWB in the last 72 hours. No results for input(s): PROT, LABALBU, LABA1C, J7AXAJM, D6RIONW, FT4, TSH, AST, ALT, LDH, GGT, ALKPHOS, LABGGT, BILITOT, BILIDIR, AMMONIA, AMYLASE, LIPASE, LACTATE, CHOL, HDL, LDLCHOLESTEROL, CHOLHDLRATIO, TRIG, VLDL, OTW77CP, PHENYTOIN, PHENYF, URICACID, POCGLU in the last 72 hours. ABG:  Lab Results   Component Value Date    FIO2 NOT REPORTED 01/13/2018     Lab Results   Component Value Date/Time    SPECIAL NOT REPORTED 09/11/2019 05:49 AM     Lab Results   Component Value Date/Time    CULTURE ESCHERICHIA COLI >375686 CFU/ML (A) 09/10/2019 07:02 PM       Radiology:  Xr Chest Portable    Result Date: 9/10/2019  Clear lungs.        Physical Examination:        General appearance:  alert, cooperative and no distress  Mental Status:  oriented to person, place and time and normal affect  Lungs:  clear to auscultation bilaterally, normal effort  Heart:  regular rate and rhythm, no murmur  Abdomen:  soft, nontender, nondistended, normal bowel sounds, no masses, hepatomegaly, splenomegaly  Extremities:  no edema, redness, tenderness in the calves  Skin:  no gross lesions, rashes, induration    Assessment:        Hospital Problems           Last Modified POA    * (Principal) Seizure-like activity ([facility] 9/11/2019 Yes    HIV (human immunodeficiency virus infection) ([facility] 9/11/2019 Yes    Mixed hyperlipidemia 9/11/2019 Yes    PVD (peripheral vascular disease) ([address] 9/11/2019 Yes    Alzheimer's dementia with behavioral disturbance 9/11/2019 Yes    UTI (urinary tract infection)

## 2019-09-18 ENCOUNTER — CARE COORDINATION (OUTPATIENT)
Dept: CASE MANAGEMENT | Age: 79
End: 2019-09-18

## 2019-09-18 NOTE — CARE COORDINATION
Jan 45 Transitions Follow Up Call  2019    Patient: Dana Mccann  Patient : 1940   MRN: [id number]  Reason for Admission: UTI, AMS  Discharge Date: 19 RARS: Readmission Risk Score: 25      Spoke with: spouse. Reports that patient is \"doing alright. \"  Taking a nap now, denies any change in mentation. Continues newly prescribed meds - omnicef & depakote. Reviewed with spouse to inform CTN of any symptoms - scheduled for hospital f/u on 10/1 - noted unable to move up sooner. Care Transitions Subsequent and Final Call    Schedule Follow Up Appointment with PCP:  Completed  Subsequent and Final Calls  Do you have any ongoing symptoms?:  No  Have your medications changed?:  No  Do you have any questions related to your medications?:  No  Do you currently have any active services?:  No  Are you currently active with any services?:  Home Health  Do you have any needs or concerns that I can assist you with?:  No  Care Transitions Interventions  Other Interventions:             Follow Up  Future Appointments   Date Time Provider Jocelyn Ansari   10/1/2019  9:10 AM Arliss Dakin, MD [facility]   12/3/2019  9:50 AM Arliss Dakin, MD [facility]   2019  1:15 PM Rubén Dukes DPM [facility] Podiatry [facility]   2019  9:00 AM Arnold Gutierrez MD INFT DISEASE [facility]       Teri Sorto RN

## 2019-09-20 ENCOUNTER — CARE COORDINATION (OUTPATIENT)
Dept: CASE MANAGEMENT | Age: 79
End: 2019-09-20

## 2019-09-20 DIAGNOSIS — N40.0 BENIGN PROSTATIC HYPERPLASIA WITHOUT LOWER URINARY TRACT SYMPTOMS: ICD-10-CM

## 2019-09-21 RX ORDER — TAMSULOSIN HYDROCHLORIDE 0.4 MG/1
CAPSULE ORAL
Qty: 30 CAPSULE | Refills: 3 | Status: SHIPPED | OUTPATIENT
Start: 2019-09-21 | End: 2020-01-10

## 2019-09-25 ENCOUNTER — CARE COORDINATION (OUTPATIENT)
Dept: CASE MANAGEMENT | Age: 79
End: 2019-09-25

## 2019-09-25 NOTE — CARE COORDINATION
Jan 45 Transitions Follow Up Call    2019    Patient: Shawna Winston  Patient : 1940   MRN: <[id number]>  Reason for Admission: UTI, AMS  Discharge Date: 19 RARS: Readmission Risk Score: 25         Spoke with: [facility] Transitions Subsequent and Final Call    Subsequent and Final Calls  Do you have any ongoing symptoms?:  No  Have your medications changed?:  No  Do you have any questions related to your medications?:  No  Do you currently have any active services?:  Yes  Are you currently active with any services?:  Home Health  Do you have any needs or concerns that I can assist you with?:  No  Identified Barriers:  Impairment  Care Transitions Interventions  No Identified Needs  Other Interventions:        Called & spoke with pt wife, pt was napping. Wife stated the pt is doing good. Pt still a little confused, which is usual.  Pt does not like to take his meds, Chris Collins has put in his food & then the pt takes without any complaints. Wife denied the pt has any fever, chills or sweating. Pt denied any needs or concerns. Pt informed this is the final transition of care call. Instructed to call the primary care provider for any concerns. Please call during the regular office hours if possible, for urgent problems,  there is a provider on call. Call the office & follow the prompts. The answering service is able to make appointments for the following day. Call 911 for emergencies.     Follow Up  Future Appointments   Date Time Provider Jocelyn Ansari   10/1/2019  9:10 AM Jerome Feliciano MD [facility]   12/3/2019  9:50 AM Jerome Feliicano MD [facility]   2019  1:15 PM Michael Haider DPM [facility]   2019  9:00 AM Bridgett De La Vega MD INFT DISEASE [facility]       Linn Lewis RN  Care Transition Coordinator  [phone]

## 2019-10-03 ENCOUNTER — TELEPHONE (OUTPATIENT)
Dept: INTERNAL MEDICINE | Age: 79
End: 2019-10-03

## 2019-10-03 RX ORDER — DIVALPROEX SODIUM 125 MG/1
250 CAPSULE, COATED PELLETS ORAL 2 TIMES DAILY
Qty: 120 CAPSULE | Refills: 3 | Status: SHIPPED | OUTPATIENT
Start: 2019-10-03 | End: 2020-01-10

## 2019-10-11 PROBLEM — N39.0 UTI (URINARY TRACT INFECTION): Status: RESOLVED | Noted: 2019-09-10 | Resolved: 2019-10-11

## 2019-10-16 DIAGNOSIS — I73.9 PVD (PERIPHERAL VASCULAR DISEASE) (HCC): ICD-10-CM

## 2019-10-16 RX ORDER — ASPIRIN 81 MG/1
TABLET, COATED ORAL
Qty: 30 TABLET | Refills: 5 | Status: SHIPPED | OUTPATIENT
Start: 2019-10-16 | End: 2020-04-02

## 2019-10-22 ENCOUNTER — OFFICE VISIT (OUTPATIENT)
Dept: INTERNAL MEDICINE | Age: 79
End: 2019-10-22
Payer: COMMERCIAL

## 2019-10-22 ENCOUNTER — CARE COORDINATION (OUTPATIENT)
Dept: INTERNAL MEDICINE | Age: 79
End: 2019-10-22

## 2019-10-22 VITALS
HEART RATE: 78 BPM | WEIGHT: 114.4 LBS | DIASTOLIC BLOOD PRESSURE: 76 MMHG | BODY MASS INDEX: 18.39 KG/M2 | SYSTOLIC BLOOD PRESSURE: 109 MMHG | HEIGHT: 66 IN

## 2019-10-22 DIAGNOSIS — G30.9 ALZHEIMER'S DEMENTIA WITHOUT BEHAVIORAL DISTURBANCE, UNSPECIFIED TIMING OF DEMENTIA ONSET: Primary | ICD-10-CM

## 2019-10-22 DIAGNOSIS — F02.80 ALZHEIMER'S DEMENTIA WITHOUT BEHAVIORAL DISTURBANCE, UNSPECIFIED TIMING OF DEMENTIA ONSET: Primary | ICD-10-CM

## 2019-10-22 DIAGNOSIS — Z21 ASYMPTOMATIC HIV INFECTION (HCC): ICD-10-CM

## 2019-10-22 DIAGNOSIS — R56.9 SEIZURE-LIKE ACTIVITY (HCC): ICD-10-CM

## 2019-10-22 DIAGNOSIS — N40.0 BENIGN PROSTATIC HYPERPLASIA WITHOUT LOWER URINARY TRACT SYMPTOMS: ICD-10-CM

## 2019-10-22 DIAGNOSIS — K21.9 GASTROESOPHAGEAL REFLUX DISEASE, ESOPHAGITIS PRESENCE NOT SPECIFIED: ICD-10-CM

## 2019-10-22 PROCEDURE — 1123F ACP DISCUSS/DSCN MKR DOCD: CPT | Performed by: STUDENT IN AN ORGANIZED HEALTH CARE EDUCATION/TRAINING PROGRAM

## 2019-10-22 PROCEDURE — G8419 CALC BMI OUT NRM PARAM NOF/U: HCPCS | Performed by: STUDENT IN AN ORGANIZED HEALTH CARE EDUCATION/TRAINING PROGRAM

## 2019-10-22 PROCEDURE — 99214 OFFICE O/P EST MOD 30 MIN: CPT | Performed by: STUDENT IN AN ORGANIZED HEALTH CARE EDUCATION/TRAINING PROGRAM

## 2019-10-22 PROCEDURE — 99211 OFF/OP EST MAY X REQ PHY/QHP: CPT | Performed by: INTERNAL MEDICINE

## 2019-10-22 PROCEDURE — 4004F PT TOBACCO SCREEN RCVD TLK: CPT | Performed by: STUDENT IN AN ORGANIZED HEALTH CARE EDUCATION/TRAINING PROGRAM

## 2019-10-22 PROCEDURE — 4040F PNEUMOC VAC/ADMIN/RCVD: CPT | Performed by: STUDENT IN AN ORGANIZED HEALTH CARE EDUCATION/TRAINING PROGRAM

## 2019-10-22 PROCEDURE — G8427 DOCREV CUR MEDS BY ELIG CLIN: HCPCS | Performed by: STUDENT IN AN ORGANIZED HEALTH CARE EDUCATION/TRAINING PROGRAM

## 2019-10-22 PROCEDURE — G8484 FLU IMMUNIZE NO ADMIN: HCPCS | Performed by: STUDENT IN AN ORGANIZED HEALTH CARE EDUCATION/TRAINING PROGRAM

## 2019-10-22 RX ORDER — MEMANTINE HYDROCHLORIDE 10 MG/1
10 TABLET ORAL DAILY
Refills: 5 | COMMUNITY
Start: 2019-09-23 | End: 2021-10-16

## 2019-10-22 RX ORDER — VITAMIN E 268 MG
400 CAPSULE ORAL DAILY
Refills: 5 | COMMUNITY
Start: 2019-09-23

## 2019-10-22 RX ORDER — OMEPRAZOLE 20 MG/1
20 CAPSULE, DELAYED RELEASE ORAL DAILY
Refills: 5 | COMMUNITY
Start: 2019-09-23 | End: 2020-06-01

## 2019-10-22 RX ORDER — FINASTERIDE 5 MG/1
5 TABLET, FILM COATED ORAL DAILY
Refills: 5 | COMMUNITY
Start: 2019-09-23 | End: 2020-06-01

## 2019-10-22 RX ORDER — MULTIVITAMIN WITH FOLIC ACID 400 MCG
1 TABLET ORAL DAILY
COMMUNITY
Start: 2019-09-10 | End: 2020-01-10

## 2019-10-22 RX ORDER — B-COMPLEX WITH VITAMIN C
500 TABLET ORAL DAILY
Refills: 5 | COMMUNITY
Start: 2019-09-23 | End: 2021-11-05

## 2019-10-22 RX ORDER — DONEPEZIL HYDROCHLORIDE 10 MG/1
10 TABLET, FILM COATED ORAL DAILY
Refills: 5 | COMMUNITY
Start: 2019-09-23 | End: 2021-10-16

## 2019-10-22 SDOH — SOCIAL STABILITY: SOCIAL NETWORK
DO YOU BELONG TO ANY CLUBS OR ORGANIZATIONS SUCH AS CHURCH GROUPS UNIONS, FRATERNAL OR ATHLETIC GROUPS, OR SCHOOL GROUPS?: YES

## 2019-10-22 SDOH — SOCIAL STABILITY: SOCIAL NETWORK: ARE YOU MARRIED, WIDOWED, DIVORCED, SEPARATED, NEVER MARRIED, OR LIVING WITH A PARTNER?: MARRIED

## 2019-10-22 SDOH — HEALTH STABILITY: PHYSICAL HEALTH: ON AVERAGE, HOW MANY MINUTES DO YOU ENGAGE IN EXERCISE AT THIS LEVEL?: 30 MIN

## 2019-10-22 SDOH — ECONOMIC STABILITY: TRANSPORTATION INSECURITY
IN THE PAST 12 MONTHS, HAS THE LACK OF TRANSPORTATION KEPT YOU FROM MEDICAL APPOINTMENTS OR FROM GETTING MEDICATIONS?: NO

## 2019-10-22 SDOH — HEALTH STABILITY: MENTAL HEALTH
STRESS IS WHEN SOMEONE FEELS TENSE, NERVOUS, ANXIOUS, OR CAN'T SLEEP AT NIGHT BECAUSE THEIR MIND IS TROUBLED. HOW STRESSED ARE YOU?: NOT AT ALL

## 2019-10-22 SDOH — SOCIAL STABILITY: SOCIAL NETWORK: HOW OFTEN DO YOU GET TOGETHER WITH FRIENDS OR RELATIVES?: ONCE A WEEK

## 2019-10-22 SDOH — SOCIAL STABILITY: SOCIAL NETWORK
IN A TYPICAL WEEK, HOW MANY TIMES DO YOU TALK ON THE PHONE WITH FAMILY, FRIENDS, OR NEIGHBORS?: MORE THAN THREE TIMES A WEEK

## 2019-10-22 SDOH — SOCIAL STABILITY: SOCIAL NETWORK: HOW OFTEN DO YOU ATTEND CHURCH OR RELIGIOUS SERVICES?: MORE THAN 4 TIMES PER YEAR

## 2019-10-22 SDOH — HEALTH STABILITY: MENTAL HEALTH: HOW OFTEN DO YOU HAVE A DRINK CONTAINING ALCOHOL?: 2-4 TIMES A MONTH

## 2019-10-22 SDOH — SOCIAL STABILITY: SOCIAL NETWORK: HOW OFTEN DO YOU ATTENT MEETINGS OF THE CLUB OR ORGANIZATION YOU BELONG TO?: MORE THAN 4 TIMES PER YEAR

## 2019-10-22 SDOH — ECONOMIC STABILITY: TRANSPORTATION INSECURITY
IN THE PAST 12 MONTHS, HAS LACK OF TRANSPORTATION KEPT YOU FROM MEETINGS, WORK, OR FROM GETTING THINGS NEEDED FOR DAILY LIVING?: NO

## 2019-10-22 SDOH — ECONOMIC STABILITY: INCOME INSECURITY: HOW HARD IS IT FOR YOU TO PAY FOR THE VERY BASICS LIKE FOOD, HOUSING, MEDICAL CARE, AND HEATING?: NOT HARD AT ALL

## 2019-10-22 SDOH — HEALTH STABILITY: MENTAL HEALTH: HOW MANY STANDARD DRINKS CONTAINING ALCOHOL DO YOU HAVE ON A TYPICAL DAY?: 1 OR 2

## 2019-10-22 SDOH — ECONOMIC STABILITY: FOOD INSECURITY: WITHIN THE PAST 12 MONTHS, THE FOOD YOU BOUGHT JUST DIDN'T LAST AND YOU DIDN'T HAVE MONEY TO GET MORE.: NEVER TRUE

## 2019-10-22 SDOH — HEALTH STABILITY: PHYSICAL HEALTH: ON AVERAGE, HOW MANY DAYS PER WEEK DO YOU ENGAGE IN MODERATE TO STRENUOUS EXERCISE (LIKE A BRISK WALK)?: 3 DAYS

## 2019-10-22 SDOH — ECONOMIC STABILITY: FOOD INSECURITY: WITHIN THE PAST 12 MONTHS, YOU WORRIED THAT YOUR FOOD WOULD RUN OUT BEFORE YOU GOT MONEY TO BUY MORE.: NEVER TRUE

## 2019-10-22 ASSESSMENT — PATIENT HEALTH QUESTIONNAIRE - PHQ9: SUM OF ALL RESPONSES TO PHQ QUESTIONS 1-9: 5

## 2019-11-18 DIAGNOSIS — M81.0 AGE-RELATED OSTEOPOROSIS WITHOUT CURRENT PATHOLOGICAL FRACTURE: ICD-10-CM

## 2019-11-19 RX ORDER — ATORVASTATIN CALCIUM 40 MG/1
TABLET, FILM COATED ORAL
Qty: 30 TABLET | Refills: 5 | Status: SHIPPED | OUTPATIENT
Start: 2019-11-19 | End: 2020-04-30 | Stop reason: SDUPTHER

## 2019-11-19 RX ORDER — B-COMPLEX WITH VITAMIN C
TABLET ORAL
Qty: 60 TABLET | Refills: 5 | Status: SHIPPED | OUTPATIENT
Start: 2019-11-19 | End: 2020-04-30 | Stop reason: SDUPTHER

## 2019-12-11 DIAGNOSIS — F02.80 ALZHEIMER'S DEMENTIA WITHOUT BEHAVIORAL DISTURBANCE, UNSPECIFIED TIMING OF DEMENTIA ONSET: ICD-10-CM

## 2019-12-11 DIAGNOSIS — G30.9 ALZHEIMER'S DEMENTIA WITHOUT BEHAVIORAL DISTURBANCE, UNSPECIFIED TIMING OF DEMENTIA ONSET: ICD-10-CM

## 2019-12-11 DIAGNOSIS — K21.9 GASTROESOPHAGEAL REFLUX DISEASE WITHOUT ESOPHAGITIS: ICD-10-CM

## 2019-12-11 DIAGNOSIS — N40.0 BENIGN NON-NODULAR PROSTATIC HYPERPLASIA WITHOUT LOWER URINARY TRACT SYMPTOMS: ICD-10-CM

## 2019-12-11 DIAGNOSIS — M81.0 AGE-RELATED OSTEOPOROSIS WITHOUT CURRENT PATHOLOGICAL FRACTURE: ICD-10-CM

## 2019-12-11 RX ORDER — ALENDRONATE SODIUM 70 MG/1
TABLET ORAL
Qty: 4 TABLET | Refills: 5 | Status: SHIPPED | OUTPATIENT
Start: 2019-12-11 | End: 2020-03-04

## 2019-12-11 RX ORDER — DONEPEZIL HYDROCHLORIDE 10 MG/1
TABLET, FILM COATED ORAL
Qty: 30 TABLET | Refills: 5 | Status: SHIPPED | OUTPATIENT
Start: 2019-12-11 | End: 2020-06-01

## 2019-12-11 RX ORDER — OMEPRAZOLE 20 MG/1
CAPSULE, DELAYED RELEASE ORAL
Qty: 30 CAPSULE | Refills: 5 | Status: SHIPPED | OUTPATIENT
Start: 2019-12-11 | End: 2020-03-04

## 2019-12-11 RX ORDER — FINASTERIDE 5 MG/1
TABLET, FILM COATED ORAL
Qty: 30 TABLET | Refills: 5 | Status: SHIPPED | OUTPATIENT
Start: 2019-12-11 | End: 2020-03-04

## 2019-12-20 RX ORDER — MEMANTINE HYDROCHLORIDE 10 MG/1
TABLET ORAL
Qty: 60 TABLET | Refills: 5 | Status: SHIPPED | OUTPATIENT
Start: 2019-12-20 | End: 2020-06-01

## 2019-12-31 ENCOUNTER — TELEPHONE (OUTPATIENT)
Dept: INTERNAL MEDICINE | Age: 79
End: 2019-12-31

## 2020-01-08 NOTE — TELEPHONE ENCOUNTER
Request for multi vit - medication pended. Please fill if appropriate. Next Visit Date:  No future appointments.     Health Maintenance   Topic Date Due    Meningococcal (ACWY) Vaccine (1 - Risk start before 7 months 4-dose series) 1940   Carlen Caulk (MMR) vaccine (1 of 2 - Risk 2-dose series) 10/03/1956    Hepatitis B vaccine (1 of 3 - Risk 3-dose series) 10/03/1957    Shingles Vaccine (1 of 2) 10/03/1988    Annual Wellness Visit (AWV)  06/19/2019    Flu vaccine (1) 09/01/2019    Lipid screen  02/10/2020    DTaP/Tdap/Td vaccine (2 - Td) 02/21/2027    Pneumococcal 65+ years Vaccine  Completed    Hib Vaccine  Aged Out    HPV vaccine  Aged Out       Hemoglobin A1C (%)   Date Value   02/10/2019 5.4   09/15/2016 5.8             ( goal A1C is < 7)   No results found for: LABMICR  LDL Cholesterol (mg/dL)   Date Value   02/10/2019 85       (goal LDL is <100)   AST (U/L)   Date Value   09/10/2019 18     ALT (U/L)   Date Value   09/10/2019 6     BUN (mg/dL)   Date Value   09/11/2019 8     BP Readings from Last 3 Encounters:   10/22/19 109/76   09/12/19 130/64   09/09/19 112/73          (goal 120/80)    All Future Testing planned in [facility]  Lab Frequency Next Occurrence   CBC Once 42/23/4038   Comp Metabolic w Bili Profile Once 12/20/2019   Lymphocyte Subset Once 12/20/2019   HIV RNA, Quantitative, PCR Once 12/20/2019         Patient Active Problem List:     HIV (human immunodeficiency virus infection) ([address]     Dementia without behavioral disturbance ([address]     Osteoporosis right hip fracture dec 08     Hemorrhoids     BPH (benign prostatic hyperplasia)     GERD (gastroesophageal reflux disease)     Meralgia paraesthetica     Mixed hyperlipidemia     Occasional tremors     Syphilis in male     PVD (peripheral vascular disease) ([address]     Seizure-like activity ([address]     Acute metabolic encephalopathy     Acute delirium     Alzheimer's dementia with behavioral disturbance (HCC)     Atrophy,

## 2020-01-08 NOTE — TELEPHONE ENCOUNTER
Request for tamsulosin, divalproex - medications pended. Please fill if appropriate. Next Visit Date:  No future appointments.     Health Maintenance   Topic Date Due    Meningococcal (ACWY) Vaccine (1 - Risk start before 7 months 4-dose series) 1940   Marylene Doom (MMR) vaccine (1 of 2 - Risk 2-dose series) 10/03/1956    Hepatitis B vaccine (1 of 3 - Risk 3-dose series) 10/03/1957    Shingles Vaccine (1 of 2) 10/03/1988    Annual Wellness Visit (AWV)  06/19/2019    Flu vaccine (1) 09/01/2019    Lipid screen  02/10/2020    DTaP/Tdap/Td vaccine (2 - Td) 02/21/2027    Pneumococcal 65+ years Vaccine  Completed    Hib Vaccine  Aged Out    HPV vaccine  Aged Out       Hemoglobin A1C (%)   Date Value   02/10/2019 5.4   09/15/2016 5.8             ( goal A1C is < 7)   No results found for: LABMICR  LDL Cholesterol (mg/dL)   Date Value   02/10/2019 85       (goal LDL is <100)   AST (U/L)   Date Value   09/10/2019 18     ALT (U/L)   Date Value   09/10/2019 6     BUN (mg/dL)   Date Value   09/11/2019 8     BP Readings from Last 3 Encounters:   10/22/19 109/76   09/12/19 130/64   09/09/19 112/73          (goal 120/80)    All Future Testing planned in [facility]  Lab Frequency Next Occurrence   CBC Once 79/60/4440   Comp Metabolic w Bili Profile Once 12/20/2019   Lymphocyte Subset Once 12/20/2019   HIV RNA, Quantitative, PCR Once 12/20/2019         Patient Active Problem List:     HIV (human immunodeficiency virus infection) ([address]     Dementia without behavioral disturbance ([address]     Osteoporosis right hip fracture dec 08     Hemorrhoids     BPH (benign prostatic hyperplasia)     GERD (gastroesophageal reflux disease)     Meralgia paraesthetica     Mixed hyperlipidemia     Occasional tremors     Syphilis in male     PVD (peripheral vascular disease) ([address]     Seizure-like activity ([address]     Acute metabolic encephalopathy     Acute delirium     Alzheimer's dementia with behavioral disturbance ([address]

## 2020-01-10 RX ORDER — TAMSULOSIN HYDROCHLORIDE 0.4 MG/1
CAPSULE ORAL
Qty: 30 CAPSULE | Refills: 3 | Status: SHIPPED | OUTPATIENT
Start: 2020-01-10 | End: 2020-04-30 | Stop reason: SDUPTHER

## 2020-01-10 RX ORDER — MULTIVITAMIN WITH FOLIC ACID 400 MCG
TABLET ORAL
Qty: 30 TABLET | Refills: 5 | Status: SHIPPED | OUTPATIENT
Start: 2020-01-10 | End: 2020-06-25 | Stop reason: SDUPTHER

## 2020-01-10 RX ORDER — DIVALPROEX SODIUM 125 MG/1
CAPSULE, COATED PELLETS ORAL
Qty: 120 CAPSULE | Refills: 3 | Status: SHIPPED | OUTPATIENT
Start: 2020-01-10 | End: 2020-04-30 | Stop reason: SDUPTHER

## 2020-01-20 NOTE — CARE COORDINATION
Ambulatory Care Coordination Note  1/20/2020  CM Risk Score: 1  Charlson 10 Year Mortality Risk Score: 100%     ACC: Ketan Cruz, RN    Summary Note: Patient will be receiving case management thru [place] [address] Coordination Protocol  Week 1 - Initial Assessment     Do you have all of your prescriptions and are they filled?:  Yes (Comment: has new [facility] pharmacy is delivering depakote tomorrow)  Barriers to medication adherence:  None  Are you able to afford your medications?:  Yes  How often do you have trouble taking your medications the way you have been told to take them?:  I always take them as prescribed. Do you have Home O2 Therapy?:  No      Ability to seek help/take action for Emergent Urgent situations i.e. fire, crime, inclement weather or health crisis.:  Needs Assistance  Ability to ambulate to restroom:  Needs Assistance  Ability handle personal hygeine needs (bathing/dressing/grooming):  Dependent  Ability to manage Medications:  Dependent  Ability to prepare Food Preparation:  Needs Assistance  Ability to maintain home (clean home, laundry):  Dependent  Ability to drive and/or has transportation:  Dependent  Ability to do shopping:  Dependent  Ability to manage finances:  Dependent  Is patient able to live independently?:  Yes     Current Housing:  Private Residence           Frequent urination at night?:  Yes  Do you use rails/bars?:  Yes  Do you have a non-slip tub mat?:  No     Are you experiencing loss of meaning?:  No  Are you experiencing loss of hope and peace?:  No     Suggested Interventions and Community Resources                          No future appointments. Care Coordination Interventions    Program Enrollment:  Rising Risk  Referral from Primary Care Provider:  Yes  Suggested Interventions and Community Resources         Goals Addressed    None           No future appointments.

## 2020-02-26 RX ORDER — EFAVIRENZ, EMTRICITABINE, AND TENOFOVIR DISOPROXIL FUMARATE 600; 200; 300 MG/1; MG/1; MG/1
TABLET, FILM COATED ORAL
Qty: 30 TABLET | Refills: 5 | Status: SHIPPED | OUTPATIENT
Start: 2020-02-26 | End: 2020-09-09 | Stop reason: SDUPTHER

## 2020-03-04 ENCOUNTER — OFFICE VISIT (OUTPATIENT)
Dept: INFECTIOUS DISEASES | Age: 80
End: 2020-03-04
Payer: COMMERCIAL

## 2020-03-04 VITALS
TEMPERATURE: 97.3 F | HEART RATE: 68 BPM | BODY MASS INDEX: 19.61 KG/M2 | SYSTOLIC BLOOD PRESSURE: 102 MMHG | HEIGHT: 66 IN | DIASTOLIC BLOOD PRESSURE: 64 MMHG | WEIGHT: 122 LBS

## 2020-03-04 PROCEDURE — 1123F ACP DISCUSS/DSCN MKR DOCD: CPT | Performed by: INTERNAL MEDICINE

## 2020-03-04 PROCEDURE — G8484 FLU IMMUNIZE NO ADMIN: HCPCS | Performed by: INTERNAL MEDICINE

## 2020-03-04 PROCEDURE — 4004F PT TOBACCO SCREEN RCVD TLK: CPT | Performed by: INTERNAL MEDICINE

## 2020-03-04 PROCEDURE — G8427 DOCREV CUR MEDS BY ELIG CLIN: HCPCS | Performed by: INTERNAL MEDICINE

## 2020-03-04 PROCEDURE — G8420 CALC BMI NORM PARAMETERS: HCPCS | Performed by: INTERNAL MEDICINE

## 2020-03-04 PROCEDURE — 4040F PNEUMOC VAC/ADMIN/RCVD: CPT | Performed by: INTERNAL MEDICINE

## 2020-03-04 PROCEDURE — 99214 OFFICE O/P EST MOD 30 MIN: CPT | Performed by: INTERNAL MEDICINE

## 2020-03-04 RX ORDER — ALENDRONATE SODIUM 70 MG/1
70 TABLET ORAL
Qty: 4 TABLET | Refills: 5 | Status: SHIPPED | OUTPATIENT
Start: 2020-03-04 | End: 2020-11-13

## 2020-03-04 ASSESSMENT — ENCOUNTER SYMPTOMS
RESPIRATORY NEGATIVE: 1
GASTROINTESTINAL NEGATIVE: 1
ALLERGIC/IMMUNOLOGIC NEGATIVE: 1

## 2020-03-04 NOTE — PROGRESS NOTES
[facility]  Outpatient follow-up HIV care  Today's Date and Time: 3/4/2020, 12:25 PM    Impression:     1. HIV infection, unspecified symptom status ([address]    2. Benign non-nodular prostatic hyperplasia without lower urinary tract symptoms    3. Age-related osteoporosis without current pathological fracture            · HIV-CD4 count/viral load -  · Mandeep has a low-grade viremia at 105 copies and this is likely related to him not being fully compliant with his antiretroviral therapy at the time of this lab testing was done in September 2019  · His CD4 count at that time has remained stable at 870   · He continues on Genvoya  · Due to the underlying dementia it has been difficult to have him do his lab testing routinely at every 6 months so we are hoping to have them do them once a year at least  · The wife will be trying to see if he will allow lab testing today because he is currently agreeing to do it and I have put in stable for 6 months from now in case he does not do today's testing. · Health maintenance:   · Annual STD check -he is no longer sexually active and STD checks have been deferred  · Counseling:  · Medication adherence/compliance-she has been quite compliant with her meds so far this year according to the wife after having lots of difficulty with him taking them last year.     I have ordered the followingmedications/ labs:  Orders Placed This Encounter   Procedures    CBC     Standing Status:   Future     Standing Expiration Date:   7/98/0516    Comp Metabolic w Bili Profile     Standing Status:   Future     Standing Expiration Date:   4/30/2020    HIV RNA, Quantitative, PCR     Standing Status:   Future     Standing Expiration Date:   4/30/2020    Lymphocyte Subset     Standing Status:   Future     Standing Expiration Date:   4/30/2020    CBC     Standing Status:   Future     Standing Expiration Date:   5/0/8628    Comp Metabolic w Bili Profile     Standing Status:   Future 7/27/2012    GERD (gastroesophageal reflux disease) 4/28/2015    Hemorrhoids     HIV (human immunodeficiency virus infection) ([facility]     Meralgia paraesthetica 2/4/2016    Mixed hyperlipidemia 5/5/2016    Occasional tremors     Osteoporosis     PVD (peripheral vascular disease) ([facility] 2/8/2018    Seizure-like activity ([facility] 2/28/2018    Syphilis in male     Wears glasses      Medications:     Current Outpatient Medications   Medication Sig Dispense Refill    alendronate (FOSAMAX) 70 MG tablet Take 1 tablet by mouth every 7 days 4 tablet 5    ATRIPLA 600-200-300 MG per tablet TAKE 1 TABLET AT BEDTIME BOTTLE 30 tablet 5    divalproex (DEPAKOTE SPRINKLE) 125 MG capsule TAKE 2 CAPSULES TWICE DAILY 120 capsule 3    tamsulosin (FLOMAX) 0.4 MG capsule TAKE 1 CAPSULE BY MOUTH DAILY 30 capsule 3    Multiple Vitamin (MULTIVITAMIN) tablet TAKE 1 TABLET DAILY 30 tablet 5    memantine (NAMENDA) 10 MG tablet TAKE 1 TABLET TWICE DAILY 60 tablet 5    donepezil (ARICEPT) 10 MG tablet TAKE 1 TABLET IN THE EVENING 30 tablet 5    Calcium Carbonate-Vitamin D (OYSTER SHELL CALCIUM/D) 500-200 MG-UNIT TABS TAKE 2 TABLETS DAILY 60 tablet 5    atorvastatin (LIPITOR) 40 MG tablet TAKE 1 TABLET DAILY 30 tablet 5    finasteride (PROSCAR) 5 MG tablet Take 5 tablets by mouth daily  5    omeprazole (PRILOSEC) 20 MG delayed release capsule Take 20 mg by mouth daily  5    vitamin E 400 UNIT capsule Take 400 Units by mouth daily  5    ASPIRIN LOW DOSE 81 MG EC tablet TAKE 1 TABLET DAILY 30 tablet 5    Calcium Carbonate-Vitamin D (OYSTER SHELL CALCIUM/D) 500-200 MG-UNIT TABS Take 500 tablets by mouth daily  5    donepezil (ARICEPT) 10 MG tablet Take 10 tablets by mouth daily  5    memantine (NAMENDA) 10 MG tablet Take 10 mg by mouth daily  5     No current facility-administered medications for this visit. Allergies:   Patient has no known allergies. Review of Systems:   Review of Systems   Constitutional: Negative. Respiratory: Negative. Cardiovascular: Negative. Gastrointestinal: Negative. Genitourinary: Negative. Musculoskeletal: Negative. Allergic/Immunologic: Negative. Neurological: Negative. Psychiatric/Behavioral: Negative. Physical Examination :   /64   Pulse 68   Temp 97.3 °F (36.3 °C)   Ht 5' 5.98\" (1.676 m)   Wt 122 lb (55.3 kg)   BMI 19.70 kg/m²     Physical Exam  Constitutional:       Appearance: He is well-developed. HENT:      Head: Normocephalic and atraumatic. Neck:      Musculoskeletal: Normal range of motion and neck supple. Cardiovascular:      Rate and Rhythm: Normal rate. Heart sounds: Normal heart sounds. No friction rub. No gallop. Pulmonary:      Effort: Pulmonary effort is normal.      Breath sounds: Normal breath sounds. No wheezing. Abdominal:      General: Bowel sounds are normal.      Palpations: Abdomen is soft. There is no mass. Tenderness: There is no abdominal tenderness. Musculoskeletal: Normal range of motion. Lymphadenopathy:      Cervical: No cervical adenopathy. Skin:     General: Skin is warm and dry. Neurological:      General: No focal deficit present. Mental Status: He is alert and oriented to person, place, and time.          Medical Decision Making:   I have independently reviewed the following labs:    CBC:   WBC   Date Value Ref Range Status   09/12/2019 6.6 3.5 - 11.3 k/uL Final   09/11/2019 8.5 3.5 - 11.3 k/uL Final   09/11/2019 9.3 3.5 - 11.0 k/uL Final     RBC   Date Value Ref Range Status   09/12/2019 4.76 4.21 - 5.77 m/uL Final   09/11/2019 4.97 4.21 - 5.77 m/uL Final   09/10/2019 4.70 4.21 - 5.77 m/uL Final   04/18/2012 4.31 (L) 4.5 - 5.9 m/uL Final   03/16/2012 4.43 (L) 4.5 - 5.9 m/uL Final   10/12/2011 3.99 (L) 4.5 - 5.9 m/uL Final     Hemoglobin   Date Value Ref Range Status   09/12/2019 11.6 (L) 13.0 - 17.0 g/dL Final   09/11/2019 12.3 (L) 13.0 - 17.0 g/dL Final   09/10/2019 11.9 (L) 13.0 - 17.0 test is a sensitive method for quantitating HIV-1 RNA viral loads in plasma. It utilizes RT-PCR in the FDA approved Roche Amplicor/Taqman 48 system. This test is intended for detecting and quantifying HIV-1 RNA viral loads in the range of 20 - 10,000,000 cp/ml (1.30 - 7.00 log cp/ml). The reference value for this assay is <20 cp/ml (<1.30 log cp/ml). Patients should have confirmed HIV-1 infection prior to RNA quantification. The test has been developed to monitor disease progression and efficacy of Anti-HIV drug therapy.                                                            09/11/2019   Final    Results reported to the appropriate Health Department       LYMPHOCYTE SUBSET:  Absolute CD 3   Date Value Ref Range Status   09/11/2019 2411 (H) 411 - 2061 /uL Final   06/04/2018 1468 411 - 2061 /uL Final   12/11/2017 1539 411 - 2061 /uL Final     Absolute CD 4 Kevin   Date Value Ref Range Status   09/11/2019 870 309 - 1571 /uL Final   02/10/2019 573 309 - 1571 /uL Final   06/04/2018 496 309 - 1571 /uL Final     CD4 % Kevin T Cell   Date Value Ref Range Status   09/11/2019 26 (L) 27 - 64 % Final   02/10/2019 32 27 - 64 % Final   06/04/2018 25 (L) 27 - 64 % Final     CD4/CD8 Ratio   Date Value Ref Range Status   09/11/2019 0.59 (L) 0.6 - 2.8 Final   06/04/2018 0.53 (L) 0.6 - 2.8 Final   12/11/2017 0.69 0.6 - 2.8 Final       LIPID PANEL:  Lab Results   Component Value Date    CHOL 185 02/10/2019    CHOL 241 (H) 06/04/2018     Lab Results   Component Value Date    TRIG 139 02/10/2019    TRIG 176 (H) 06/04/2018     Lab Results   Component Value Date    HDL 72 02/10/2019    HDL 78 06/04/2018     Lab Results   Component Value Date    LDLCHOLESTEROL 85 02/10/2019    LDLCHOLESTEROL 128 06/04/2018     Lab Results   Component Value Date    VLDL NOT REPORTED 02/10/2019    VLDL NOT REPORTED 06/04/2018     Lab Results   Component Value Date    CHOLHDLRATIO 2.6 02/10/2019 meaningcan be extrapolated by contextual diversion.

## 2020-04-02 RX ORDER — ASPIRIN 81 MG/1
TABLET ORAL
Qty: 30 TABLET | Refills: 5 | Status: SHIPPED | OUTPATIENT
Start: 2020-04-02 | End: 2020-10-15

## 2020-04-02 NOTE — TELEPHONE ENCOUNTER
Refill request for Aspirin. If appropriate please send medication(s) to patients pharmacy. Next appt: None currently. Note to pharmacy to have patient contact the office to schedule appointment.     Last appt: 10/22/2019    Health Maintenance   Topic Date Due    Meningococcal (ACWY) vaccine (1 - Risk start before 7 months 4-dose series) 1940   Lyles Cedric (MMR) vaccine (1 of 2 - Risk 2-dose series) 10/03/1956    Hepatitis B vaccine (1 of 3 - Risk 3-dose series) 10/03/1957    Shingles Vaccine (1 of 2) 10/03/1988    Annual Wellness Visit (AWV)  06/19/2019    Lipid screen  02/10/2020    Flu vaccine (Season Ended) 09/01/2020    DTaP/Tdap/Td vaccine (2 - Td) 02/21/2027    Pneumococcal 65+ yrs at Risk Vaccine  Completed    Hepatitis A vaccine  Aged Out    Hib vaccine  Aged Out       Hemoglobin A1C (%)   Date Value   02/10/2019 5.4   09/15/2016 5.8             ( goal A1C is < 7)   No results found for: LABMICR  LDL Cholesterol (mg/dL)   Date Value   02/10/2019 85       (goal LDL is <100)   AST (U/L)   Date Value   09/10/2019 18     ALT (U/L)   Date Value   09/10/2019 6     BUN (mg/dL)   Date Value   09/11/2019 8     BP Readings from Last 3 Encounters:   03/04/20 102/64   10/22/19 109/76   09/12/19 130/64          (goal 120/80)          Patient Active Problem List:     HIV (human immunodeficiency virus infection) ([address]     Dementia without behavioral disturbance (HCC)     Osteoporosis right hip fracture dec 08     Hemorrhoids     BPH (benign prostatic hyperplasia)     GERD (gastroesophageal reflux disease)     Meralgia paraesthetica     Mixed hyperlipidemia     Occasional tremors     Syphilis in male     PVD (peripheral vascular disease) ([address]     Seizure-like activity ([address]     Acute metabolic encephalopathy     Acute delirium     Alzheimer's dementia with behavioral disturbance (HCC)     Atrophy, cortical (HCC)     Hyponatremia     Elevated serum creatinine     Moderate malnutrition ([address]

## 2020-04-29 ENCOUNTER — OFFICE VISIT (OUTPATIENT)
Dept: PRIMARY CARE CLINIC | Age: 80
End: 2020-04-29
Payer: COMMERCIAL

## 2020-04-29 VITALS
DIASTOLIC BLOOD PRESSURE: 72 MMHG | HEART RATE: 71 BPM | TEMPERATURE: 96.1 F | HEIGHT: 66 IN | WEIGHT: 115 LBS | SYSTOLIC BLOOD PRESSURE: 129 MMHG | BODY MASS INDEX: 18.48 KG/M2 | OXYGEN SATURATION: 89 %

## 2020-04-29 PROCEDURE — G8420 CALC BMI NORM PARAMETERS: HCPCS | Performed by: INTERNAL MEDICINE

## 2020-04-29 PROCEDURE — G8428 CUR MEDS NOT DOCUMENT: HCPCS | Performed by: INTERNAL MEDICINE

## 2020-04-29 PROCEDURE — 1123F ACP DISCUSS/DSCN MKR DOCD: CPT | Performed by: INTERNAL MEDICINE

## 2020-04-29 PROCEDURE — 99213 OFFICE O/P EST LOW 20 MIN: CPT | Performed by: INTERNAL MEDICINE

## 2020-04-29 PROCEDURE — 4040F PNEUMOC VAC/ADMIN/RCVD: CPT | Performed by: INTERNAL MEDICINE

## 2020-04-29 PROCEDURE — 4004F PT TOBACCO SCREEN RCVD TLK: CPT | Performed by: INTERNAL MEDICINE

## 2020-04-29 ASSESSMENT — ENCOUNTER SYMPTOMS
DIARRHEA: 0
NAUSEA: 0
COUGH: 0
SORE THROAT: 1
VOMITING: 0
WHEEZING: 0
ABDOMINAL PAIN: 0

## 2020-04-29 NOTE — PROGRESS NOTES
negative. Objective:     /72 (Site: Right Upper Arm, Position: Sitting, Cuff Size: Medium Adult)   Pulse 71   Temp 96.1 °F (35.6 °C) (Oral)   Ht 5' 6\" (1.676 m)   Wt 115 lb (52.2 kg)   SpO2 (!) 89%   BMI 18.56 kg/m²   Physical Exam  Vitals signs and nursing note reviewed. Constitutional:       Appearance: He is well-developed. Cardiovascular:      Rate and Rhythm: Normal rate and regular rhythm. Heart sounds: Normal heart sounds. No murmur. No friction rub. No gallop. Pulmonary:      Effort: Pulmonary effort is normal. No respiratory distress. Breath sounds: Normal breath sounds. No wheezing. Abdominal:      General: Bowel sounds are normal.      Palpations: Abdomen is soft. There is no mass. Tenderness: There is no abdominal tenderness. There is no guarding. Musculoskeletal: Normal range of motion. Neurological:      Mental Status: He is alert. Assessment/Plan:     1. Sore throat  Doing wlel, sore throat resolved   Afebrile in clinic     No follow-ups on file. Patient given educational materials- see patient instructions. Discussed use, benefit, and side effects of prescribedmedications. All patient questions answered. Pt voiced understanding. Reviewedhealth maintenance. Instructed to continue current medications, diet and exercise. Patient agreed with treatment plan. Follow up as directed.      Electronically signedby Tamiko Varma MD on 4/29/2020

## 2020-04-30 RX ORDER — ATORVASTATIN CALCIUM 40 MG/1
TABLET, FILM COATED ORAL
Qty: 30 TABLET | Refills: 5 | Status: SHIPPED | OUTPATIENT
Start: 2020-04-30 | End: 2020-07-31 | Stop reason: ALTCHOICE

## 2020-04-30 RX ORDER — B-COMPLEX WITH VITAMIN C
TABLET ORAL
Qty: 60 TABLET | Refills: 5 | Status: SHIPPED | OUTPATIENT
Start: 2020-04-30 | End: 2020-10-15

## 2020-04-30 RX ORDER — TAMSULOSIN HYDROCHLORIDE 0.4 MG/1
CAPSULE ORAL
Qty: 30 CAPSULE | Refills: 3 | Status: SHIPPED | OUTPATIENT
Start: 2020-04-30 | End: 2020-08-27

## 2020-04-30 RX ORDER — DIVALPROEX SODIUM 125 MG/1
CAPSULE, COATED PELLETS ORAL
Qty: 120 CAPSULE | Refills: 3 | Status: SHIPPED | OUTPATIENT
Start: 2020-04-30 | End: 2020-08-27

## 2020-06-01 RX ORDER — FINASTERIDE 5 MG/1
TABLET, FILM COATED ORAL
Qty: 30 TABLET | Refills: 5 | Status: SHIPPED | OUTPATIENT
Start: 2020-06-01 | End: 2020-11-13

## 2020-06-01 RX ORDER — MEMANTINE HYDROCHLORIDE 10 MG/1
TABLET ORAL
Qty: 60 TABLET | Refills: 5 | Status: SHIPPED | OUTPATIENT
Start: 2020-06-01 | End: 2020-11-13

## 2020-06-01 RX ORDER — OMEPRAZOLE 20 MG/1
CAPSULE, DELAYED RELEASE ORAL
Qty: 30 CAPSULE | Refills: 5 | Status: SHIPPED | OUTPATIENT
Start: 2020-06-01 | End: 2020-11-13

## 2020-06-01 RX ORDER — DONEPEZIL HYDROCHLORIDE 10 MG/1
TABLET, FILM COATED ORAL
Qty: 30 TABLET | Refills: 5 | Status: SHIPPED | OUTPATIENT
Start: 2020-06-01 | End: 2020-11-13

## 2020-06-01 NOTE — TELEPHONE ENCOUNTER
Request for omeprazole, finasteride, donepezil, memantine - medications pended. Please fill if appropriate.       Next Visit Date:  Future Appointments   Date Time Provider Jocelyn Terani   9/9/2020  9:30 AM Jarret Sanches MD INFT DISEASE [address] Maintenance   Topic Date Due    Meningococcal (ACWY) vaccine (1 - Risk start before 7 months 4-dose series) 1940    Randye Creamer (MMR) vaccine (1 of 2 - Risk 2-dose series) 10/03/1956    Hepatitis B vaccine (1 of 3 - Risk 3-dose series) 10/03/1957    Shingles Vaccine (1 of 2) 10/03/1988    Annual Wellness Visit (AWV)  06/19/2019    Lipid screen  02/10/2020    Flu vaccine (Season Ended) 09/01/2020    DTaP/Tdap/Td vaccine (2 - Td) 02/21/2027    Pneumococcal 65+ yrs at Risk Vaccine  Completed    Hepatitis A vaccine  Aged Out    Hib vaccine  Aged Out       Hemoglobin A1C (%)   Date Value   02/10/2019 5.4   09/15/2016 5.8             ( goal A1C is < 7)   No results found for: LABMICR  LDL Cholesterol (mg/dL)   Date Value   02/10/2019 85       (goal LDL is <100)   AST (U/L)   Date Value   09/10/2019 18     ALT (U/L)   Date Value   09/10/2019 6     BUN (mg/dL)   Date Value   09/11/2019 8     BP Readings from Last 3 Encounters:   04/29/20 129/72   03/04/20 102/64   10/22/19 109/76          (goal 120/80)    All Future Testing planned in [facility]  Lab Frequency Next Occurrence   HIV RNA, Quantitative, PCR Once 08/06/2020   CBC Once 40/77/4364   Comp Metabolic w Bili Profile Once 09/04/2020   HIV RNA, Quantitative, PCR Once 09/04/2020   Lymphocyte Subset Once 09/04/2020         Patient Active Problem List:     HIV (human immunodeficiency virus infection) ([address]     Dementia without behavioral disturbance ([address]     Osteoporosis right hip fracture dec 08     Hemorrhoids     BPH (benign prostatic hyperplasia)     GERD (gastroesophageal reflux disease)     Meralgia paraesthetica     Mixed hyperlipidemia     Occasional tremors     Syphilis

## 2020-06-05 ENCOUNTER — APPOINTMENT (OUTPATIENT)
Dept: GENERAL RADIOLOGY | Age: 80
End: 2020-06-05
Payer: COMMERCIAL

## 2020-06-05 ENCOUNTER — HOSPITAL ENCOUNTER (EMERGENCY)
Age: 80
Discharge: HOME | End: 2020-06-05
Attending: EMERGENCY MEDICINE
Payer: COMMERCIAL

## 2020-06-05 VITALS
HEIGHT: 66 IN | HEART RATE: 63 BPM | OXYGEN SATURATION: 99 % | WEIGHT: 115 LBS | RESPIRATION RATE: 20 BRPM | SYSTOLIC BLOOD PRESSURE: 112 MMHG | BODY MASS INDEX: 18.48 KG/M2 | TEMPERATURE: 98.5 F | DIASTOLIC BLOOD PRESSURE: 81 MMHG

## 2020-06-05 LAB
ABSOLUTE EOS #: 0.1 K/UL (ref 0–0.44)
ABSOLUTE IMMATURE GRANULOCYTE: 0.07 K/UL (ref 0–0.3)
ABSOLUTE LYMPH #: 2.26 K/UL (ref 1.1–3.7)
ABSOLUTE MONO #: 1.15 K/UL (ref 0.1–1.2)
ALBUMIN SERPL-MCNC: 3.7 G/DL (ref 3.5–5.2)
ALBUMIN/GLOBULIN RATIO: 0.9 (ref 1–2.5)
ALP BLD-CCNC: 156 U/L (ref 40–129)
ALT SERPL-CCNC: 11 U/L (ref 5–41)
ANION GAP SERPL CALCULATED.3IONS-SCNC: 14 MMOL/L (ref 9–17)
AST SERPL-CCNC: 25 U/L
BASOPHILS # BLD: 1 % (ref 0–2)
BASOPHILS ABSOLUTE: 0.06 K/UL (ref 0–0.2)
BILIRUB SERPL-MCNC: 0.2 MG/DL (ref 0.3–1.2)
BILIRUBIN DIRECT: <0.08 MG/DL
BILIRUBIN, INDIRECT: ABNORMAL MG/DL (ref 0–1)
BUN BLDV-MCNC: 13 MG/DL (ref 8–23)
CALCIUM SERPL-MCNC: 8.9 MG/DL (ref 8.6–10.4)
CHLORIDE BLD-SCNC: 98 MMOL/L (ref 98–107)
CO2: 22 MMOL/L (ref 20–31)
CREAT SERPL-MCNC: 1.14 MG/DL (ref 0.7–1.2)
DIFFERENTIAL TYPE: ABNORMAL
EOSINOPHILS RELATIVE PERCENT: 1 % (ref 1–4)
GFR AFRICAN AMERICAN: >60 ML/MIN
GFR NON-AFRICAN AMERICAN: >60 ML/MIN
GFR SERPL CREATININE-BSD FRML MDRD: ABNORMAL ML/MIN/{1.73_M2}
GFR SERPL CREATININE-BSD FRML MDRD: ABNORMAL ML/MIN/{1.73_M2}
GLUCOSE BLD-MCNC: 120 MG/DL (ref 70–99)
HCT VFR BLD CALC: 40.4 % (ref 40.7–50.3)
HEMOGLOBIN: 12.5 G/DL (ref 13–17)
IMMATURE GRANULOCYTES: 1 %
LYMPHOCYTES # BLD: 26 % (ref 24–43)
MCH RBC QN AUTO: 28.3 PG (ref 25.2–33.5)
MCHC RBC AUTO-ENTMCNC: 30.9 G/DL (ref 28.4–34.8)
MCV RBC AUTO: 91.4 FL (ref 82.6–102.9)
MONOCYTES # BLD: 13 % (ref 3–12)
NRBC AUTOMATED: 0 PER 100 WBC
PDW BLD-RTO: 14.4 % (ref 11.8–14.4)
PLATELET # BLD: 194 K/UL (ref 138–453)
PLATELET ESTIMATE: ABNORMAL
PMV BLD AUTO: 10.8 FL (ref 8.1–13.5)
POTASSIUM SERPL-SCNC: 5.4 MMOL/L (ref 3.7–5.3)
RBC # BLD: 4.42 M/UL (ref 4.21–5.77)
RBC # BLD: ABNORMAL 10*6/UL
SEG NEUTROPHILS: 58 % (ref 36–65)
SEGMENTED NEUTROPHILS ABSOLUTE COUNT: 5.12 K/UL (ref 1.5–8.1)
SODIUM BLD-SCNC: 134 MMOL/L (ref 135–144)
TOTAL PROTEIN: 7.6 G/DL (ref 6.4–8.3)
TROPONIN INTERP: NORMAL
TROPONIN INTERP: NORMAL
TROPONIN T: NORMAL NG/ML
TROPONIN T: NORMAL NG/ML
TROPONIN, HIGH SENSITIVITY: 11 NG/L (ref 0–22)
TROPONIN, HIGH SENSITIVITY: 8 NG/L (ref 0–22)
WBC # BLD: 8.8 K/UL (ref 3.5–11.3)
WBC # BLD: ABNORMAL 10*3/UL

## 2020-06-05 PROCEDURE — 86360 T CELL ABSOLUTE COUNT/RATIO: CPT

## 2020-06-05 PROCEDURE — 2580000003 HC RX 258: Performed by: STUDENT IN AN ORGANIZED HEALTH CARE EDUCATION/TRAINING PROGRAM

## 2020-06-05 PROCEDURE — 87536 HIV-1 QUANT&REVRSE TRNSCRPJ: CPT

## 2020-06-05 PROCEDURE — 82248 BILIRUBIN DIRECT: CPT

## 2020-06-05 PROCEDURE — 71045 X-RAY EXAM CHEST 1 VIEW: CPT

## 2020-06-05 PROCEDURE — 86357 NK CELLS TOTAL COUNT: CPT

## 2020-06-05 PROCEDURE — 86355 B CELLS TOTAL COUNT: CPT

## 2020-06-05 PROCEDURE — 85025 COMPLETE CBC W/AUTO DIFF WBC: CPT

## 2020-06-05 PROCEDURE — 80053 COMPREHEN METABOLIC PANEL: CPT

## 2020-06-05 PROCEDURE — 86359 T CELLS TOTAL COUNT: CPT

## 2020-06-05 PROCEDURE — 93005 ELECTROCARDIOGRAM TRACING: CPT | Performed by: STUDENT IN AN ORGANIZED HEALTH CARE EDUCATION/TRAINING PROGRAM

## 2020-06-05 PROCEDURE — 84484 ASSAY OF TROPONIN QUANT: CPT

## 2020-06-05 PROCEDURE — 99285 EMERGENCY DEPT VISIT HI MDM: CPT

## 2020-06-05 RX ORDER — SODIUM CHLORIDE, SODIUM LACTATE, POTASSIUM CHLORIDE, AND CALCIUM CHLORIDE .6; .31; .03; .02 G/100ML; G/100ML; G/100ML; G/100ML
2000 INJECTION, SOLUTION INTRAVENOUS ONCE
Status: COMPLETED | OUTPATIENT
Start: 2020-06-05 | End: 2020-06-05

## 2020-06-05 RX ADMIN — SODIUM CHLORIDE, POTASSIUM CHLORIDE, SODIUM LACTATE AND CALCIUM CHLORIDE 2000 ML: 600; 310; 30; 20 INJECTION, SOLUTION INTRAVENOUS at 17:11

## 2020-06-05 NOTE — ED PROVIDER NOTES
[facility]  Emergency Department  Faculty Attestation     I performed a history and physical examination of the patient and discussed management with the resident. I reviewed the residents note and agree with the documented findings and plan of care. Any areas of disagreement are noted on the chart. I was personally present for the key portions of any procedures. I have documented in the chart those procedures where I was not present during the key portions. I have reviewed the emergency nurses triage note. I agree with the chief complaint, past medical history, past surgical history, allergies, medications, social and family history as documented unless otherwise noted below. For Physician Assistant/ Nurse Practitioner cases/documentation I have personally evaluated this patient and have completed at least one if not all key elements of the E/M (history, physical exam, and MDM). Additional findings are as noted. Primary Care Physician:  Ashlyn Das MD    Screenings:  [unfilled]    CHIEF COMPLAINT       Chief Complaint   Patient presents with    Altered Mental Status       RECENT VITALS:   Temp: 98.5 °F (36.9 °C),  Pulse: 63, Resp: 20, BP: 112/81    LABS:  Labs Reviewed   CBC WITH AUTO DIFFERENTIAL - Abnormal; Notable for the following components:       Result Value    Hemoglobin 12.5 (*)     Hematocrit 40.4 (*)     Monocytes 13 (*)     Immature Granulocytes 1 (*)     All other components within normal limits   COMPREHENSIVE METABOLIC W/ BILI PROFILE W/ REFLEX TO MG - Abnormal; Notable for the following components:    Albumin/Globulin Ratio 0.9 (*)     Alkaline Phosphatase 156 (*)     Total Bilirubin 0.20 (*)     Glucose 120 (*)     Sodium 134 (*)     Potassium 5.4 (*)     All other components within normal limits   TROPONIN   HIV RNA, QUANTITATIVE, PCR   LYMPHOCYTE SUBSET       Radiology  XR CHEST PORTABLE   Final Result   No acute process.              CRITICAL

## 2020-06-05 NOTE — ED NOTES
Pt to ED by EMS d/t near syncope. Per wife, patient was sitting outside when he stumbled and had a near syncope episode. Per wife, patient never fainted. Pt did not hit head. Wife think patient was out in the sun for too long. For EMS, patient was hypotensive in the 80s/50s. Vital signs stable in the ED. Patient does have diagnosed dementia. Pt is confused and is only oriented to self. Per wife, he is acting himself, just a little more lethargic than usual.    Pt placed on full cardiac monitor. IVs established and labs drawn. Dr. Pat Gonzales and Hawa Toribio at bedside.      Trisha Daniel RN  06/05/20 2582

## 2020-06-05 NOTE — ED PROVIDER NOTES
Medications:  Prior to Admission medications    Medication Sig Start Date End Date Taking?  Authorizing Provider   memantine (NAMENDA) 10 MG tablet TAKE 1 TABLET TWICE DAILY 6/1/20   Rengabriele Shoulders, MD   donepezil (ARICEPT) 10 MG tablet TAKE 1 TABLET IN THE EVENING 6/1/20   Renella Shoulders, MD   finasteride (PROSCAR) 5 MG tablet TAKE 1 TABLET DAILY 6/1/20   Renella Shoulders, MD   omeprazole (PRILOSEC) 20 MG delayed release capsule TAKE 1 CAPSULE DAILY 6/1/20   Renella Shoulders, MD   atorvastatin (LIPITOR) 40 MG tablet TAKE 1 TABLET DAILY 4/30/20   Renella Shoulders, MD   divalproex (DEPAKOTE SPRINKLE) 125 MG capsule TAKE 2 CAPSULES BY MOUTH 2 TIMES A DAY 4/30/20   Renella Shoulders, MD   tamsulosin (FLOMAX) 0.4 MG capsule TAKE 1 CAPSULE BY MOUTH DAILY 4/30/20   Renella Shoulders, MD   Calcium Carbonate-Vitamin D (OYSTER SHELL CALCIUM/D) 500-200 MG-UNIT TABS TAKE 2 TABLETS DAILY 4/30/20   Renella Shoulders, MD   aspirin 81 MG EC tablet TAKE 1 TABLET DAILY 4/2/20   Renella Shoulders, MD   alendronate (FOSAMAX) 70 MG tablet Take 1 tablet by mouth every 7 days 3/4/20   Renetta Barton MD   ATRIPLA 600-200-300 MG per tablet TAKE 1 TABLET AT BEDTIME BOTTLE 2/26/20   Renetta Barton MD   Multiple Vitamin (MULTIVITAMIN) tablet TAKE 1 TABLET DAILY 1/10/20   [facility], [facility]   Calcium Carbonate-Vitamin D (OYSTER SHELL CALCIUM/D) 500-200 MG-UNIT TABS Take 500 tablets by mouth daily 9/23/19   Historical Provider, MD   donepezil (ARICEPT) 10 MG tablet Take 10 tablets by mouth daily 9/23/19   Historical Provider, MD   memantine (NAMENDA) 10 MG tablet Take 10 mg by mouth daily 9/23/19   Historical Provider, MD   vitamin E 400 UNIT capsule Take 400 Units by mouth daily 9/23/19   Historical Provider, MD       REVIEW OF SYSTEMS    (2-9 systems for level 4, 10 or more forlevel 5)      Review of Systems   Unable to perform ROS: Dementia       PHYSICAL EXAM   (up to 7 for level 4, 8 or more forlevel 5)      ED TRIAGE VITALS BP: 113/62, Temp: (SHIRLEY: squad temp of 98.5), Pulse: 62, Resp: 24, SpO2: 94 %    Vitals:    06/05/20 1636 06/05/20 1648 06/05/20 1716   BP: 113/62  112/81   Pulse: 62  63   Resp: 24  20   Temp:  98.5 °F (36.9 °C)    TempSrc:  Tympanic    SpO2: 94%  99%   Weight: 115 lb (52.2 kg)     Height: 5' 6\" (1.676 m)           Physical Exam  Constitutional:       Appearance: He is well-developed. He is not ill-appearing, toxic-appearing or diaphoretic. Comments: Alert appears comfortable, not ill or toxic. Appears slightly dehydrated. HENT:      Head: Normocephalic and atraumatic. Right Ear: External ear normal.      Left Ear: External ear normal.      Nose: Nose normal.      Mouth/Throat:      Mouth: Mucous membranes are moist.   Eyes:      General: No scleral icterus. Right eye: No discharge. Left eye: No discharge. Extraocular Movements: Extraocular movements intact. Pupils: Pupils are equal, round, and reactive to light. Neck:      Musculoskeletal: Normal range of motion. Trachea: No tracheal deviation. Cardiovascular:      Rate and Rhythm: Normal rate and regular rhythm. Heart sounds: Normal heart sounds. No murmur. No friction rub. No gallop. Pulmonary:      Effort: Pulmonary effort is normal. No respiratory distress. Breath sounds: Normal breath sounds. No wheezing, rhonchi or rales. Abdominal:      General: Bowel sounds are normal. There is no distension. Palpations: Abdomen is soft. There is no mass. Tenderness: There is no abdominal tenderness. There is no guarding. Musculoskeletal: Normal range of motion. Skin:     General: Skin is warm and dry. Capillary Refill: Capillary refill takes less than 2 seconds. Findings: No rash. Neurological:      Mental Status: He is oriented to person, place, and time. Motor: No abnormal muscle tone.       Coordination: Coordination normal.           DIFFERENTIAL  DIAGNOSIS     PLAN ([facility] / [facility] / EKG):  Orders Placed This Encounter   Procedures    XR CHEST PORTABLE    CBC WITH AUTO DIFFERENTIAL    Troponin    Comprehensive Metabolic w/ Bili Profile w/ Reflex to MG    HIV RNA, QUANTITATIVE, PCR    LYMPHOCYTE SUBSET    Troponin    EKG 12 Lead       MEDICATIONS ORDERED:  ED Medication Orders (From admission, onward)    Start Ordered     Status Ordering Provider    06/05/20 1645 06/05/20 1638  lactated ringers bolus  ONCE      Last MAR action:  Stopped - by Bailey Dinero on 06[place]05/20 at [place] [facility]          DDX: Heat exhaustion, heat stroke, syncope, MI/ACS, PE    DIAGNOSTIC RESULTS / EMERGENCY DEPARTMENT COURSE / MDM     IMPRESSION & INITIAL PLAN:  78-year-old male with history of dementia, HIV, hyperlipidemia, hypertension, who presents emergency department after syncopal event while outside in the hot sun. On exam, patient appears comfortable, not ill or toxic appearing. Appears slightly dehydrated. Cardiac RRR, no murmurs, rubs, gallops, Lungs are CTA-B, no wheezes, rales, rhonchi, Abd soft, nontender, nondistended. Will treat with fluids and will check basic labs. Wife brought in documentation from infectious disease physician who requested specific labs to be drawn.     LABS:  Results for orders placed or performed during the hospital encounter of 06/05/20   CBC WITH AUTO DIFFERENTIAL   Result Value Ref Range    WBC 8.8 3.5 - 11.3 k/uL    RBC 4.42 4.21 - 5.77 m/uL    Hemoglobin 12.5 (L) 13.0 - 17.0 g/dL    Hematocrit 40.4 (L) 40.7 - 50.3 %    MCV 91.4 82.6 - 102.9 fL    MCH 28.3 25.2 - 33.5 pg    MCHC 30.9 28.4 - 34.8 g/dL    RDW 14.4 11.8 - 14.4 %    Platelets 930 544 - 414 k/uL    MPV 10.8 8.1 - 13.5 fL    NRBC Automated 0.0 0.0 per 100 WBC    Differential Type NOT REPORTED     Seg Neutrophils 58 36 - 65 %    Lymphocytes 26 24 - 43 %    Monocytes 13 (H) 3 - 12 %    Eosinophils % 1 1 - 4 %    Basophils 1 0 - 2 %    Immature Granulocytes 1 (H) 0 %    Segs Absolute 5.12 1.50 - 8.10 k/uL

## 2020-06-06 LAB
EKG ATRIAL RATE: 681 BPM
EKG Q-T INTERVAL: 368 MS
EKG QRS DURATION: 64 MS
EKG QTC CALCULATION (BAZETT): 416 MS
EKG R AXIS: 64 DEGREES
EKG T AXIS: 47 DEGREES
EKG VENTRICULAR RATE: 77 BPM

## 2020-06-07 LAB
% NK (CD56): 22 % (ref 6–29)
AB NK (CD56): 503 /UL (ref 90–600)
ABSOLUTE CD 3: 1602 /UL (ref 411–2061)
ABSOLUTE CD 4 HELPER: 526 /UL (ref 309–1571)
ABSOLUTE CD 8 (SUPP): 1052 /UL (ref 282–999)
ABSOLUTE CD19 B CELL: 92 /UL (ref 71–567)
CD19 B CELL: 4 % (ref 5–25)
CD3 % TOTAL T CELLS: 70 % (ref 57–94)
CD4 % HELPER T CELL: 23 % (ref 27–64)
CD4:CD8: 0.5 (ref 0.6–2.8)
CD8 % SUPPRESSOR T CELL: 46 % (ref 17–48)
DIRECT EXAM: NORMAL
DIRECT EXAM: NORMAL
LYMPHOCYTES # BLD: 26 % (ref 24–44)
Lab: NORMAL
SPECIMEN DESCRIPTION: NORMAL
WBC # BLD: 8.8 K/UL (ref 3.5–11)

## 2020-06-25 RX ORDER — MULTIVITAMIN WITH FOLIC ACID 400 MCG
TABLET ORAL
Qty: 30 TABLET | Refills: 5 | Status: CANCELLED | OUTPATIENT
Start: 2020-06-25

## 2020-06-25 NOTE — TELEPHONE ENCOUNTER
Pt requesting medication refill, multiple vitamin   Next Visit Date:  Future Appointments   Date Time Provider Jocelyn Ansari   9/9/2020  9:30 AM Eli Fagan MD INFT DISEASE [address] Maintenance   Topic Date Due    Meningococcal (ACWY) vaccine (1 - Risk start before 7 months 4-dose series) 1940    Measles,Mumps,Rubella (MMR) vaccine (1 of 2 - Risk 2-dose series) 10/03/1956    Hepatitis B vaccine (1 of 3 - Risk 3-dose series) 10/03/1957    Shingles Vaccine (1 of 2) 10/03/1988    Annual Wellness Visit (AWV)  06/19/2019    Lipid screen  02/10/2020    Flu vaccine (Season Ended) 09/01/2020    DTaP/Tdap/Td vaccine (2 - Td) 02/21/2027    Pneumococcal 65+ yrs at Risk Vaccine  Completed    Hepatitis A vaccine  Aged Out    Hib vaccine  Aged Out       Hemoglobin A1C (%)   Date Value   02/10/2019 5.4   09/15/2016 5.8             ( goal A1C is < 7)   No results found for: LABMICR  LDL Cholesterol (mg/dL)   Date Value   02/10/2019 85   06/04/2018 128       (goal LDL is <100)   AST (U/L)   Date Value   06/05/2020 25     ALT (U/L)   Date Value   06/05/2020 11     BUN (mg/dL)   Date Value   06/05/2020 13     BP Readings from Last 3 Encounters:   06/05/20 112/81   04/29/20 129/72   03/04/20 102/64          (goal 120/80)    All Future Testing planned in [facility]  Lab Frequency Next Occurrence   HIV RNA, Quantitative, PCR Once 08/06/2020   CBC Once 32/17/1141   Comp Metabolic w Bili Profile Once 09/04/2020   HIV RNA, Quantitative, PCR Once 09/04/2020   Lymphocyte Subset Once 09/04/2020               Patient Active Problem List:     HIV (human immunodeficiency virus infection) ([facility]     Dementia without behavioral disturbance ([facility]     Osteoporosis right hip fracture dec 08     Hemorrhoids     BPH (benign prostatic hyperplasia)     GERD (gastroesophageal reflux disease)     Meralgia paraesthetica     Mixed hyperlipidemia     Occasional tremors     Syphilis in male     PVD (peripheral vascular disease) ([facility]     [facility]     Acute metabolic encephalopathy     Acute delirium     Alzheimer's dementia with behavioral disturbance (HCC)     Atrophy, cortical (HCC)     Hyponatremia     Elevated serum creatinine     Moderate malnutrition ([facility]

## 2020-07-01 RX ORDER — MULTIVITAMIN WITH FOLIC ACID 400 MCG
TABLET ORAL
Qty: 30 TABLET | Refills: 5 | Status: SHIPPED | OUTPATIENT
Start: 2020-07-01 | End: 2020-12-17

## 2020-07-31 ENCOUNTER — OFFICE VISIT (OUTPATIENT)
Dept: INTERNAL MEDICINE | Age: 80
End: 2020-07-31
Payer: COMMERCIAL

## 2020-07-31 VITALS
DIASTOLIC BLOOD PRESSURE: 97 MMHG | BODY MASS INDEX: 19.61 KG/M2 | HEIGHT: 66 IN | HEART RATE: 75 BPM | SYSTOLIC BLOOD PRESSURE: 123 MMHG | WEIGHT: 122 LBS

## 2020-07-31 PROBLEM — G93.41 ACUTE METABOLIC ENCEPHALOPATHY: Status: RESOLVED | Noted: 2019-02-11 | Resolved: 2020-07-31

## 2020-07-31 PROBLEM — R41.0 ACUTE DELIRIUM: Status: RESOLVED | Noted: 2019-02-11 | Resolved: 2020-07-31

## 2020-07-31 PROCEDURE — 99213 OFFICE O/P EST LOW 20 MIN: CPT | Performed by: STUDENT IN AN ORGANIZED HEALTH CARE EDUCATION/TRAINING PROGRAM

## 2020-07-31 PROCEDURE — 4004F PT TOBACCO SCREEN RCVD TLK: CPT | Performed by: STUDENT IN AN ORGANIZED HEALTH CARE EDUCATION/TRAINING PROGRAM

## 2020-07-31 PROCEDURE — 1123F ACP DISCUSS/DSCN MKR DOCD: CPT | Performed by: STUDENT IN AN ORGANIZED HEALTH CARE EDUCATION/TRAINING PROGRAM

## 2020-07-31 PROCEDURE — G8420 CALC BMI NORM PARAMETERS: HCPCS | Performed by: STUDENT IN AN ORGANIZED HEALTH CARE EDUCATION/TRAINING PROGRAM

## 2020-07-31 PROCEDURE — 4040F PNEUMOC VAC/ADMIN/RCVD: CPT | Performed by: STUDENT IN AN ORGANIZED HEALTH CARE EDUCATION/TRAINING PROGRAM

## 2020-07-31 PROCEDURE — 99211 OFF/OP EST MAY X REQ PHY/QHP: CPT | Performed by: INTERNAL MEDICINE

## 2020-07-31 PROCEDURE — G8427 DOCREV CUR MEDS BY ELIG CLIN: HCPCS | Performed by: STUDENT IN AN ORGANIZED HEALTH CARE EDUCATION/TRAINING PROGRAM

## 2020-07-31 RX ORDER — ATORVASTATIN CALCIUM 40 MG/1
40 TABLET, FILM COATED ORAL DAILY
Qty: 30 TABLET | Refills: 0 | Status: SHIPPED | OUTPATIENT
Start: 2020-07-31 | End: 2020-10-15

## 2020-07-31 ASSESSMENT — PATIENT HEALTH QUESTIONNAIRE - PHQ9: DEPRESSION UNABLE TO ASSESS: FUNCTIONAL CAPACITY MOTIVATION LIMITS ACCURACY

## 2020-07-31 NOTE — PATIENT INSTRUCTIONS
Labs given to patient, they will have them done before their next visit. Medications e-scribed to pharmacy of patient's choice. Order for      FOR BONE DENSITY TEST   faxed to [address] [place] [address] they will all pt for appt. Please call [phone] in not heard within 2 weeks.          TV

## 2020-07-31 NOTE — PROGRESS NOTES
[facility] [address] [address]  Dept: [phone]  Dept Fax: [phone]    Office Progress/Follow Up Note  Date of patient's visit: 7/31/2020  Patient's Name:  Tono Collins YOB: 1940            Patient Care Team:  Faith Reynolds MD as PCP - General (Internal Medicine)  Eryn Fitzpatrick MD as Consulting Physician (Infectious Diseases)  Lee Andrews DPM (Podiatry)    REASON FOR VISIT: Routine outpatient follow up    HISTORY OF PRESENT ILLNESS:      Chief Complaint   Patient presents with    Established New Doctor       History was obtained from the patient. Tono Collins is a 78 y.o. is here for a follow-up appointment. Patient has severe dementia, lethargic and uncooperative on examination. Patient takes memantine and donepezil for his dementia. Patient also takes Depakote for seizure-like activities, though wife present in room states he has not had seizures in many years. Patient also has HIV, follows with Dr. Zahida Godwin -most recent CD4 count on 6/5/2020 was 526, patient takes Atripla. Patient's wife assists patient with ADLs, medication. Patient takes Prilosec for his GERD, finasteride for his BPH and Lipitor for his HLD -all well controlled. No acute complaints today. No refills required.       Patient Active Problem List   Diagnosis    HIV (human immunodeficiency virus infection) ([address]    Dementia without behavioral disturbance ([address]    Osteoporosis right hip fracture dec 08    Hemorrhoids    BPH (benign prostatic hyperplasia)    GERD (gastroesophageal reflux disease)    Mixed hyperlipidemia    Occasional tremors    PVD (peripheral vascular disease) ([address]    Seizure-like activity ([address]    Alzheimer's dementia with behavioral disturbance ([address]    Atrophy, cortical ([address]    Hyponatremia    Elevated serum creatinine    Moderate malnutrition ([address]         Health Maintenance Due   Topic Date Due    Annual Wellness Visit (AWV)  06/19/2019    Lipid screen  02/10/2020         No Known Allergies      MEDICATIONS:      Current Outpatient Medications   Medication Sig Dispense Refill    Multiple Vitamins-Minerals (MULTIVITAMIN) tablet TAKE 1 TABLET DAILY 30 tablet 5    memantine (NAMENDA) 10 MG tablet TAKE 1 TABLET TWICE DAILY 60 tablet 5    donepezil (ARICEPT) 10 MG tablet TAKE 1 TABLET IN THE EVENING 30 tablet 5    finasteride (PROSCAR) 5 MG tablet TAKE 1 TABLET DAILY 30 tablet 5    omeprazole (PRILOSEC) 20 MG delayed release capsule TAKE 1 CAPSULE DAILY 30 capsule 5    atorvastatin (LIPITOR) 40 MG tablet TAKE 1 TABLET DAILY 30 tablet 5    divalproex (DEPAKOTE SPRINKLE) 125 MG capsule TAKE 2 CAPSULES BY MOUTH 2 TIMES A  capsule 3    tamsulosin (FLOMAX) 0.4 MG capsule TAKE 1 CAPSULE BY MOUTH DAILY 30 capsule 3    Calcium Carbonate-Vitamin D (OYSTER SHELL CALCIUM/D) 500-200 MG-UNIT TABS TAKE 2 TABLETS DAILY 60 tablet 5    aspirin 81 MG EC tablet TAKE 1 TABLET DAILY 30 tablet 5    alendronate (FOSAMAX) 70 MG tablet Take 1 tablet by mouth every 7 days 4 tablet 5    ATRIPLA 600-200-300 MG per tablet TAKE 1 TABLET AT BEDTIME BOTTLE 30 tablet 5    Calcium Carbonate-Vitamin D (OYSTER SHELL CALCIUM/D) 500-200 MG-UNIT TABS Take 500 tablets by mouth daily  5    donepezil (ARICEPT) 10 MG tablet Take 10 tablets by mouth daily  5    memantine (NAMENDA) 10 MG tablet Take 10 mg by mouth daily  5    vitamin E 400 UNIT capsule Take 400 Units by mouth daily  5     No current facility-administered medications for this visit. SOCIAL HISTORY    Reviewed and no change from previous record. Mandeep  reports that he has been smoking cigarettes. He started smoking about 69 years ago. He has been smoking about 0.00 packs per day for the past 50.00 years.  He has never used smokeless tobacco.    FAMILY HISTORY:    Reviewed and No change from previous visit    REVIEW OF SYSTEMS:    CONSTITUTIONAL: Denies: fever, chills  PSYCH: Denies: anxiety, depression  ALLERGIES: Denies: urticaria  EYES: Denies: blurry vision, decreased vision, photophobia  ENT: Denies: sore throat, nasal congestion  CARDIOVASCULAR: Denies: chest pain, dyspnea on exertion  RESPIRATORY: Denies: cough, hemoptysis, shortness of breath  GI: Denies: Denies: abdominal pain, flank pain  : Denies: Denies: dysuria, frequency/urgency  NEURO: Denies: dizzy/vertigo, headache  MUSCULOSKELETAL: Denies: back pain, joint pain  SKIN: Denies: rash, itching    PHYSICAL EXAM:      Vitals:    07/31/20 1415   BP: (!) 123/97   Site: Right Upper Arm   Position: Sitting   Cuff Size: Medium Adult   Pulse: 75   Weight: 122 lb (55.3 kg)   Height: 5' 6\" (1.676 m)     BP Readings from Last 3 Encounters:   07/31/20 (!) 123/97   06/05/20 112/81   04/29/20 129/72      General appearance -malnutrition, lethargic, uncooperative with examination  Eyes - pupils equal and reactive, extraocular eye movements intact  Chest - clear to auscultation, no wheezes, rales or rhonchi, symmetric air entry  Heart - normal rate, regular rhythm, normal S1, S2, no murmurs, rubs, clicks or gallops  Abdomen - soft, nontender, nondistended, no masses or organomegaly  Back exam - full range of motion, no tenderness, palpable spasm or pain on motion  Skin - normal coloration and turgor, no rashes, no suspicious skin lesions noted    LABORATORY FINDINGS:    CBC:  Lab Results   Component Value Date    WBC 8.8 06/05/2020    WBC 8.8 06/05/2020    HGB 12.5 06/05/2020     06/05/2020     04/18/2012       BMP:    Lab Results   Component Value Date     06/05/2020    K 5.4 06/05/2020    CL 98 06/05/2020    CO2 22 06/05/2020    BUN 13 06/05/2020    CREATININE 1.14 06/05/2020    GLUCOSE 120 06/05/2020    GLUCOSE 76 04/18/2012       HEMOGLOBIN A1C:   Lab Results   Component Value Date    LABA1C 5.4 02/10/2019       FASTING LIPID PANEL:  Lab Results   Component Value Date    CHOL 185 02/10/2019    HDL 72 02/10/2019    TRIG 139 02/10/2019       ASSESSMENT AND PLAN:    Anne Marie Rinaldi was seen today for established new doctor. Diagnoses and all orders for this visit:    Alzheimer's dementia with behavioral disturbance, unspecified timing of dementia onset ([facility]  -Continue memantine, donepezil    Seizure-like activity ([facility]  -Depakote    Mixed hyperlipidemia  -Lipitor    Asymptomatic HIV infection ([facility]  -Continue Atripla. Follows with Dr. Aurelio Garcia. CD4 count 526 (6/5/2020.)    Benign prostatic hyperplasia without lower urinary tract symptoms  -Flomax    Age-related osteoporosis with pathological fracture  -DEXA scan. Results to determine treatment. Until then, continue fosomax      FOLLOW UP AND INSTRUCTIONS:   · No follow-ups on file. · Lemon received counseling on the following healthy behaviors: exercise and medication adherence    · Discussed use, benefit, and side effects of prescribed medications. Barriers to medication compliance addressed. All patient questions answered. Pt voiced understanding. · Patient given educational materials - see patient instructions    Aurelio Dukes MD  Internal Medicine Resident, PGY-2  [address], [place]  7/31/2020, 2:54 PM

## 2020-08-20 NOTE — TELEPHONE ENCOUNTER
Request for tamsulosin, divalproex - meds pended. Please fill if appropriate.       Next Visit Date:  Future Appointments   Date Time Provider Jocelyn Ansari   9/9/2020 10:15 AM Michael Shafer MD INFT DISEASE [address] Maintenance   Topic Date Due    Meningococcal (ACWY) vaccine (1 - Risk start before 7 months 4-dose series) 1940    Annual Wellness Visit (AWV)  06/19/2019    Lipid screen  02/10/2020    Hepatitis A vaccine (1 of 2 - Risk 2-dose series) 07/31/2021 (Originally 10/3/1941)    Hepatitis B vaccine (1 of 3 - Risk 3-dose series) 07/31/2021 (Originally 10/3/1957)    Measles,Mumps,Rubella (MMR) vaccine (1 of 2 - Risk 2-dose series) 07/31/2021 (Originally 10/3/1956)    Shingles Vaccine (1 of 2) 07/31/2021 (Originally 10/3/1988)    Flu vaccine (1) 09/01/2020    DTaP/Tdap/Td vaccine (2 - Td) 02/21/2027    Pneumococcal 65+ yrs at Risk Vaccine  Completed    Hib vaccine  Aged Out       Hemoglobin A1C (%)   Date Value   02/10/2019 5.4   09/15/2016 5.8             ( goal A1C is < 7)   No results found for: LABMICR  LDL Cholesterol (mg/dL)   Date Value   02/10/2019 85       (goal LDL is <100)   AST (U/L)   Date Value   06/05/2020 25     ALT (U/L)   Date Value   06/05/2020 11     BUN (mg/dL)   Date Value   06/05/2020 13     BP Readings from Last 3 Encounters:   07/31/20 (!) 123/97   06/05/20 112/81   04/29/20 129/72          (goal 120/80)    All Future Testing planned in [facility]  Lab Frequency Next Occurrence   HIV RNA, Quantitative, PCR Once 08/26/2020   CBC Once 46/58/9781   Comp Metabolic w Bili Profile Once 09/04/2020   HIV RNA, Quantitative, PCR Once 09/04/2020   Lymphocyte Subset Once 09/04/2020   Vitamin D 25 Hydroxy Once 11/10/2020   DEXA BONE DENSITY 2 SITES Once 08/07/2020         Patient Active Problem List:     [facility]     Dementia without behavioral disturbance ([facility]     Osteoporosis right hip fracture dec 08

## 2020-08-24 ENCOUNTER — TELEPHONE (OUTPATIENT)
Dept: INTERNAL MEDICINE | Age: 80
End: 2020-08-24

## 2020-08-27 RX ORDER — DIVALPROEX SODIUM 125 MG/1
CAPSULE, COATED PELLETS ORAL
Qty: 120 CAPSULE | Refills: 3 | Status: SHIPPED | OUTPATIENT
Start: 2020-08-27 | End: 2020-12-17

## 2020-08-27 RX ORDER — TAMSULOSIN HYDROCHLORIDE 0.4 MG/1
CAPSULE ORAL
Qty: 30 CAPSULE | Refills: 3 | Status: SHIPPED | OUTPATIENT
Start: 2020-08-27 | End: 2020-12-17

## 2020-09-09 ENCOUNTER — OFFICE VISIT (OUTPATIENT)
Dept: INFECTIOUS DISEASES | Age: 80
End: 2020-09-09
Payer: COMMERCIAL

## 2020-09-09 VITALS
TEMPERATURE: 97 F | OXYGEN SATURATION: 99 % | HEIGHT: 66 IN | RESPIRATION RATE: 16 BRPM | WEIGHT: 122.2 LBS | BODY MASS INDEX: 19.64 KG/M2 | HEART RATE: 67 BPM | DIASTOLIC BLOOD PRESSURE: 75 MMHG | SYSTOLIC BLOOD PRESSURE: 128 MMHG

## 2020-09-09 PROCEDURE — G8427 DOCREV CUR MEDS BY ELIG CLIN: HCPCS | Performed by: INTERNAL MEDICINE

## 2020-09-09 PROCEDURE — 99213 OFFICE O/P EST LOW 20 MIN: CPT | Performed by: INTERNAL MEDICINE

## 2020-09-09 PROCEDURE — G8420 CALC BMI NORM PARAMETERS: HCPCS | Performed by: INTERNAL MEDICINE

## 2020-09-09 PROCEDURE — 4040F PNEUMOC VAC/ADMIN/RCVD: CPT | Performed by: INTERNAL MEDICINE

## 2020-09-09 PROCEDURE — 1123F ACP DISCUSS/DSCN MKR DOCD: CPT | Performed by: INTERNAL MEDICINE

## 2020-09-09 PROCEDURE — 4004F PT TOBACCO SCREEN RCVD TLK: CPT | Performed by: INTERNAL MEDICINE

## 2020-09-09 RX ORDER — EFAVIRENZ, EMTRICITABINE, AND TENOFOVIR DISOPROXIL FUMARATE 600; 200; 300 MG/1; MG/1; MG/1
TABLET, FILM COATED ORAL
Qty: 30 TABLET | Refills: 5 | Status: SHIPPED | OUTPATIENT
Start: 2020-09-09 | End: 2021-03-10 | Stop reason: SDUPTHER

## 2020-09-09 NOTE — PROGRESS NOTES
[facility]  Outpatient follow-up HIV care  Today's Date and Time: 9/9/2020, 10:38 AM    Impression:     1. Asymptomatic HIV infection ([address]    2. Vascular dementia without behavioral disturbance (HCC)            · HIV-CD4 count/viral load : The patient continues on Atripla and his viral load is undetected and the CD4 count is at 526  · We will again attempt repeat lab testing in 6 months  · There is no need for any OI prophylaxis  · Health maintenance:   · Vaccines I did reiterate for him to get the influenza as well as the COVID-19 virus vaccination once they are available  · I have deferred the annual STD check  · Counseling:  · We did discuss medication adherence/compliance and trying to ensure that he takes his medications even over the weekend as much as possible. I have ordered the followingmedications/ labs:  Orders Placed This Encounter   Procedures    CBC     Standing Status:   Future     Standing Expiration Date:   1/3/5450    Comp Metabolic w Bili Profile     Standing Status:   Future     Standing Expiration Date:   9/9/2021    HIV RNA, Quantitative, PCR     Standing Status:   Future     Standing Expiration Date:   9/9/2021    Lipid Panel     Standing Status:   Future     Standing Expiration Date:   9/9/2021     Order Specific Question:   Is Patient Fasting?/# of Hours     Answer:   6 hours fasting    Lymphocyte Subset     Standing Status:   Future     Standing Expiration Date:   9/9/2021      Orders Placed This Encounter   Medications    ATRIPLA 600-200-300 MG per tablet     Sig: TAKE 1 TABLET AT BEDTIME BOTTLE     Dispense:  30 tablet     Refill:  5       Chiefcomplaint:   Follow-up for HIV infection    History of Present Illness:   Tono Collins is a 78y.o.-year-old male who is seen 9/9/2020 in follow-up for HIV infection. Candace Valero comes in to see me with his wife and has severe dementia for which she is on memantine teen and the donezepil.   The patient cannot really give me much of the history and the wife has been able to tell me that he continues to take the Atripla though she has to crush it up and give it to him with his medications/Ensure. The patient has been taking the medications Monday through Friday and she has not been giving it to him on weekends as at times it is difficult to get him to do the medications. He also has been refusing labs and had his labs drawn the last time they were in the emergency department when he was dehydrated. There are no other new issues to report according to the wife. I have personally reviewed the past medical history, past surgical history, medications, social history, and family history, and I haveupdated the database accordingly.   Past Medical History:     Past Medical History:   Diagnosis Date    Acute delirium 2/11/2019    Acute metabolic encephalopathy 9/16/6124    Alzheimer's dementia with behavioral disturbance ([facility] 2/11/2019    Atrophy, cortical (HCC)     BPH (benign prostatic hyperplasia) 2/1/2013    Dementia ([facility]     Dementia without behavioral disturbance (HCC) 7/27/2012    GERD (gastroesophageal reflux disease) 4/28/2015    Hemorrhoids     HIV (human immunodeficiency virus infection) ([facility]     Meralgia paraesthetica 2/4/2016    Mixed hyperlipidemia 5/5/2016    Occasional tremors     Osteoporosis     PVD (peripheral vascular disease) ([facility] 2/8/2018    Seizure-like activity ([facility] 2/28/2018    Syphilis in male     Wears glasses      Medications:     Current Outpatient Medications   Medication Sig Dispense Refill    ATRIPLA 600-200-300 MG per tablet TAKE 1 TABLET AT BEDTIME BOTTLE 30 tablet 5    divalproex (DEPAKOTE SPRINKLE) 125 MG capsule TAKE 2 CAPSULES BY MOUTH 2 TIMES A  capsule 3    tamsulosin (FLOMAX) 0.4 MG capsule TAKE 1 CAPSULE BY MOUTH DAILY 30 capsule 3    atorvastatin (LIPITOR) 40 MG tablet Take 1 tablet by mouth daily 30 tablet 0    Multiple Vitamins-Minerals (MULTIVITAMIN) tablet TAKE 1 TABLET DAILY 30 tablet 5    memantine (NAMENDA) 10 MG tablet TAKE 1 TABLET TWICE DAILY 60 tablet 5    donepezil (ARICEPT) 10 MG tablet TAKE 1 TABLET IN THE EVENING 30 tablet 5    finasteride (PROSCAR) 5 MG tablet TAKE 1 TABLET DAILY 30 tablet 5    omeprazole (PRILOSEC) 20 MG delayed release capsule TAKE 1 CAPSULE DAILY 30 capsule 5    Calcium Carbonate-Vitamin D (OYSTER SHELL CALCIUM/D) 500-200 MG-UNIT TABS TAKE 2 TABLETS DAILY 60 tablet 5    aspirin 81 MG EC tablet TAKE 1 TABLET DAILY 30 tablet 5    alendronate (FOSAMAX) 70 MG tablet Take 1 tablet by mouth every 7 days 4 tablet 5    Calcium Carbonate-Vitamin D (OYSTER SHELL CALCIUM/D) 500-200 MG-UNIT TABS Take 500 tablets by mouth daily  5    donepezil (ARICEPT) 10 MG tablet Take 10 tablets by mouth daily  5    memantine (NAMENDA) 10 MG tablet Take 10 mg by mouth daily  5    vitamin E 400 UNIT capsule Take 400 Units by mouth daily  5     No current facility-administered medications for this visit. Allergies:   Patient has no known allergies. Review of Systems:   Review of Systems   Unable to perform ROS: Dementia        Physical Examination :   /75 (Site: Left Upper Arm, Position: Sitting, Cuff Size: Small Adult)   Pulse 67   Temp 97 °F (36.1 °C) (Temporal)   Resp 16   Ht 5' 6\" (1.676 m)   Wt 122 lb 3.2 oz (55.4 kg)   SpO2 99% Comment: room air at rest  BMI 19.72 kg/m²     Physical Exam  Constitutional:       Appearance: He is well-developed. He is cachectic. HENT:      Head: Normocephalic and atraumatic. Neck:      Musculoskeletal: Normal range of motion and neck supple. Cardiovascular:      Rate and Rhythm: Normal rate. Heart sounds: Normal heart sounds. No friction rub. No gallop. Pulmonary:      Effort: Pulmonary effort is normal.      Breath sounds: Normal breath sounds. No wheezing. Abdominal:      General: Bowel sounds are normal.      Palpations: Abdomen is soft. There is no mass.       Tenderness: There is no abdominal tenderness. Musculoskeletal: Normal range of motion. Lymphadenopathy:      Cervical: No cervical adenopathy. Skin:     General: Skin is warm and dry. Neurological:      Mental Status: He is alert.          Medical Decision Making:   I have independently reviewed the following labs:    CBC:   WBC   Date Value Ref Range Status   06/05/2020 8.8 3.5 - 11.3 k/uL Final   06/05/2020 8.8 3.5 - 11.0 k/uL Final   09/12/2019 6.6 3.5 - 11.3 k/uL Final     RBC   Date Value Ref Range Status   06/05/2020 4.42 4.21 - 5.77 m/uL Final   09/12/2019 4.76 4.21 - 5.77 m/uL Final   09/11/2019 4.97 4.21 - 5.77 m/uL Final   04/18/2012 4.31 (L) 4.5 - 5.9 m/uL Final   03/16/2012 4.43 (L) 4.5 - 5.9 m/uL Final   10/12/2011 3.99 (L) 4.5 - 5.9 m/uL Final     Hemoglobin   Date Value Ref Range Status   06/05/2020 12.5 (L) 13.0 - 17.0 g/dL Final   09/12/2019 11.6 (L) 13.0 - 17.0 g/dL Final   09/11/2019 12.3 (L) 13.0 - 17.0 g/dL Final     Hematocrit   Date Value Ref Range Status   06/05/2020 40.4 (L) 40.7 - 50.3 % Final   09/12/2019 40.1 (L) 40.7 - 50.3 % Final   09/11/2019 41.1 40.7 - 50.3 % Final     MCV   Date Value Ref Range Status   06/05/2020 91.4 82.6 - 102.9 fL Final   09/12/2019 84.2 82.6 - 102.9 fL Final   09/11/2019 82.7 82.6 - 102.9 fL Final     MCH   Date Value Ref Range Status   06/05/2020 28.3 25.2 - 33.5 pg Final   09/12/2019 24.4 (L) 25.2 - 33.5 pg Final   09/11/2019 24.7 (L) 25.2 - 33.5 pg Final     MCHC   Date Value Ref Range Status   06/05/2020 30.9 28.4 - 34.8 g/dL Final   09/12/2019 28.9 28.4 - 34.8 g/dL Final   09/11/2019 29.9 28.4 - 34.8 g/dL Final     RDW   Date Value Ref Range Status   06/05/2020 14.4 11.8 - 14.4 % Final   09/12/2019 16.8 (H) 11.8 - 14.4 % Final   09/11/2019 16.7 (H) 11.8 - 14.4 % Final     Platelet Count   Date Value Ref Range Status   04/18/2012 224 140 - 450 k/uL Final   03/16/2012 234 140 - 450 k/uL Final   10/12/2011 219 140 - 450 k/uL Final     Platelets   Date Value Ref 02/10/2019 573 309 - 1571 /uL Final     CD4 % Skippers T Cell   Date Value Ref Range Status   06/05/2020 23 (L) 27 - 64 % Final   09/11/2019 26 (L) 27 - 64 % Final   02/10/2019 32 27 - 64 % Final     CD4/CD8 Ratio   Date Value Ref Range Status   06/05/2020 0.50 (L) 0.6 - 2.8 Final   09/11/2019 0.59 (L) 0.6 - 2.8 Final   06/04/2018 0.53 (L) 0.6 - 2.8 Final       LIPID PANEL:  Lab Results   Component Value Date    CHOL 185 02/10/2019    CHOL 241 (H) 06/04/2018     Lab Results   Component Value Date    TRIG 139 02/10/2019    TRIG 176 (H) 06/04/2018     Lab Results   Component Value Date    HDL 72 02/10/2019    HDL 78 06/04/2018     Lab Results   Component Value Date    LDLCHOLESTEROL 85 02/10/2019    LDLCHOLESTEROL 128 06/04/2018     Lab Results   Component Value Date    VLDL NOT REPORTED 02/10/2019    VLDL NOT REPORTED 06/04/2018     Lab Results   Component Value Date    CHOLHDLRATIO 2.6 02/10/2019    CHOLHDLRATIO 3.1 06/04/2018       HEP B SURFACE ANTIBODY:  Hep B S Ab   Date Value Ref Range Status   05/10/2017 <3.50 <10 mIU/mL Final     Comment:           REFERENCE RANGE:  <10.0      NON-REACTIVE/NOT IMMUNE  >=10.0     REACTIVE/IMMUNE  [address] [phone]       GC DNA URINE:  No results found for: Johnie Raisin PALLIDUM AB:  T. pallidum, IgG   Date Value Ref Range Status   06/04/2018 REACTIVE (A) NR Final     Comment:           Detection of T. pallidum antibodies may indicate recent, remote or previously   treated infections. A positive serological test for syphilis is not diagnostic   of infection, as false positives occur and become more likely in low prevalence   populations. Confirmatory test will be performed (VDRL, Quantitative).         Results reported to the appropriate [address] [place] [address], [address] [place] [address] [phone]     01/14/2018 REACTIVE (A) NR Final     Comment:           Detection of T. pallidum antibodies may indicate recent, remote or previously   treated infections. A positive serological test for syphilis is not diagnostic   of infection, as false positives occur and become more likely in low prevalence   populations. Confirmatory test will be performed (VDRL, Quantitative). Results reported to the appropriate [address] [place] [address], [address] [place] [address] [phone]           Thank you for allowing us toparticipate in the care of this patient. Please call with questions. Digna Hammond MD  Perfect [employer] messaging: [phone]    This note is created with the assistance of a speech recognition program.  While intending to generate adocument that actually reflects the content of the visit, the document can still have some errors including those of syntax and sound a like substitutions which may escape proof reading. It such instances, actual meaningcan be extrapolated by contextual diversion.

## 2020-10-15 RX ORDER — ATORVASTATIN CALCIUM 40 MG/1
TABLET, FILM COATED ORAL
Qty: 28 TABLET | Refills: 5 | Status: SHIPPED | OUTPATIENT
Start: 2020-10-15 | End: 2021-03-31

## 2020-10-15 RX ORDER — B-COMPLEX WITH VITAMIN C
TABLET ORAL
Qty: 56 TABLET | Refills: 5 | Status: SHIPPED | OUTPATIENT
Start: 2020-10-15 | End: 2021-03-31

## 2020-10-15 RX ORDER — ASPIRIN 81 MG/1
TABLET ORAL
Qty: 28 TABLET | Refills: 5 | Status: SHIPPED | OUTPATIENT
Start: 2020-10-15 | End: 2021-03-31

## 2020-10-15 NOTE — TELEPHONE ENCOUNTER
Request for atorvastatin, aspirin, calcium vitamin D - meds pended. Please fill if appropriate.       Next Visit Date:  Future Appointments   Date Time Provider Jocelyn Ansari   3/10/2021 10:00 AM Maurice Tellez MD [facility] Maintenance   Topic Date Due    Meningococcal (ACWY) vaccine (1 - Risk start before 7 months 4-dose series) 1938    Annual Wellness Visit (AWV)  06/19/2019    Lipid screen  02/10/2020    Flu vaccine (1) 09/01/2020    Hepatitis A vaccine (1 of 2 - Risk 2-dose series) 07/31/2021 (Originally 10/3/1941)    Hepatitis B vaccine (1 of 3 - Risk 3-dose series) 07/31/2021 (Originally [birth year]3/1959)    Measles,Mumps,Rubella (MMR) vaccine (1 of 2 - Risk 2-dose series) 07/31/2021 (Originally [birth year]/3/1958)    Shingles Vaccine (1 of 2) 07/31/2021 (Originally 10/3/1988)    DTaP/Tdap/Td vaccine (2 - Td) 02/21/2027    Pneumococcal 65+ yrs at Risk Vaccine  Completed    Hib vaccine  Aged Out       Hemoglobin A1C (%)   Date Value   02/10/2019 5.4   09/15/2016 5.8             ( goal A1C is < 7)   No results found for: LABMICR  LDL Cholesterol (mg/dL)   Date Value   02/10/2019 85       (goal LDL is <100)   AST (U/L)   Date Value   06/05/2020 25     ALT (U/L)   Date Value   06/05/2020 11     BUN (mg/dL)   Date Value   06/05/2020 13     BP Readings from Last 3 Encounters:   09/09/20 128/75   07/31/20 (!) 123/97   06/05/20 112/81          (goal 120/80)    All Future Testing planned in [facility]  Lab Frequency Next Occurrence   CBC Once 58/44/7509   Comp Metabolic w Bili Profile Once 09/04/2020   HIV RNA, Quantitative, PCR Once 03/01/2021   Lymphocyte Subset Once 09/04/2020   Vitamin D 25 Hydroxy Once 11/10/2020   DEXA BONE DENSITY 2 SITES Once 10/24/2020   CBC Once 75/93/9045   Comp Metabolic w Bili Profile Once 03/09/2021   HIV RNA, Quantitative, PCR Once 03/09/2021   Lipid Panel Once 03/09/2021   Lymphocyte Subset Once 03/09/2021         Patient Active Problem List:     HIV (human immunodeficiency virus infection) ([facility]     Dementia without behavioral disturbance (HCC)     Osteoporosis right hip fracture dec 08     Hemorrhoids     BPH (benign prostatic hyperplasia)     GERD (gastroesophageal reflux disease)     Mixed hyperlipidemia     Occasional tremors     PVD (peripheral vascular disease) (HCC)     Seizure-like activity (HCC)     Alzheimer's dementia with behavioral disturbance (HCC)     Atrophy, cortical     Hyponatremia     Elevated serum creatinine     Moderate malnutrition ([facility]

## 2020-11-13 RX ORDER — DONEPEZIL HYDROCHLORIDE 10 MG/1
TABLET, FILM COATED ORAL
Qty: 28 TABLET | Refills: 5 | Status: SHIPPED | OUTPATIENT
Start: 2020-11-13 | End: 2021-05-03

## 2020-11-13 RX ORDER — ALENDRONATE SODIUM 70 MG/1
TABLET ORAL
Qty: 4 TABLET | Refills: 4 | Status: SHIPPED | OUTPATIENT
Start: 2020-11-13 | End: 2021-03-10

## 2020-11-13 RX ORDER — FINASTERIDE 5 MG/1
TABLET, FILM COATED ORAL
Qty: 28 TABLET | Refills: 5 | Status: SHIPPED | OUTPATIENT
Start: 2020-11-13 | End: 2021-05-03

## 2020-11-13 RX ORDER — MEMANTINE HYDROCHLORIDE 10 MG/1
TABLET ORAL
Qty: 56 TABLET | Refills: 5 | Status: SHIPPED | OUTPATIENT
Start: 2020-11-13 | End: 2021-05-03

## 2020-11-13 RX ORDER — OMEPRAZOLE 20 MG/1
CAPSULE, DELAYED RELEASE ORAL
Qty: 28 CAPSULE | Refills: 5 | Status: SHIPPED | OUTPATIENT
Start: 2020-11-13 | End: 2021-05-03

## 2020-11-13 NOTE — TELEPHONE ENCOUNTER
Dr Juan Patel, patient is current and due in for an appointment on 3/10/21. Last prescribed on 3/4/20 with five refills. Please sign for refill if ok. Thank you.

## 2020-11-13 NOTE — TELEPHONE ENCOUNTER
Request for omeprazole, memantine, finasteride, donepezil - meds pended. Please fill if appropriate.       Next Visit Date:  Future Appointments   Date Time Provider Jocelyn Ansari   3/10/2021 10:00 AM Lazarus Gutta, MD [facility] Maintenance   Topic Date Due    Meningococcal (ACWY) vaccine (1 - Risk start before 7 months 4-dose series) 1938    Annual Wellness Visit (AWV)  06/19/2019    Lipid screen  02/10/2020    Flu vaccine (1) 09/01/2020    Hepatitis A vaccine (1 of 2 - Risk 2-dose series) 07/31/2021 (Originally 10/3/1941)    Hepatitis B vaccine (1 of 3 - Risk 3-dose series) 07/31/2021 (Originally [birth year]3/1959)    Measles,Mumps,Rubella (MMR) vaccine (1 of 2 - Risk 2-dose series) 07/31/2021 (Originally 10/3/1956)    Shingles Vaccine (1 of 2) 07/31/2021 (Originally 10/3/1988)    DTaP/Tdap/Td vaccine (2 - Td) 02/21/2027    Pneumococcal 65+ yrs at Risk Vaccine  Completed    Hib vaccine  Aged Out       Hemoglobin A1C (%)   Date Value   02/10/2019 5.4   09/15/2016 5.8             ( goal A1C is < 7)   No results found for: LABMICR  LDL Cholesterol (mg/dL)   Date Value   02/10/2019 85       (goal LDL is <100)   AST (U/L)   Date Value   06/05/2020 25     ALT (U/L)   Date Value   06/05/2020 11     BUN (mg/dL)   Date Value   06/05/2020 13     BP Readings from Last 3 Encounters:   09/09/20 128/75   07/31/20 (!) 123/97   06/05/20 112/81          (goal 120/80)    All Future Testing planned in [facility]  Lab Frequency Next Occurrence   CBC Once 23/97/5052   Comp Metabolic w Bili Profile Once 09/04/2020   HIV RNA, Quantitative, PCR Once 03/01/2021   Lymphocyte Subset Once 09/04/2020   Vitamin D 25 Hydroxy Once 11/10/2020   DEXA BONE DENSITY 2 SITES Once 10/24/2020   CBC Once 96/25/0477   Comp Metabolic w Bili Profile Once 03/09/2021   HIV RNA, Quantitative, PCR Once 03/09/2021   Lipid Panel Once 03/09/2021   Lymphocyte Subset Once 03/09/2021         Patient Active Problem List: HIV (human immunodeficiency virus infection) ([facility]     Dementia without behavioral disturbance (HCC)     Osteoporosis right hip fracture dec 08     Hemorrhoids     BPH (benign prostatic hyperplasia)     GERD (gastroesophageal reflux disease)     Mixed hyperlipidemia     Occasional tremors     PVD (peripheral vascular disease) (HCC)     Seizure-like activity (HCC)     Alzheimer's dementia with behavioral disturbance (HCC)     Atrophy, cortical     Hyponatremia     Elevated serum creatinine     Moderate malnutrition ([facility]

## 2020-12-15 NOTE — TELEPHONE ENCOUNTER
Pt on wait list    Multi vitamin refill        Next Visit Date:  Future Appointments   Date Time Provider Jocelyn Ansari   3/10/2021 10:00 AM Meme Madsen MD [facility] Maintenance   Topic Date Due    Meningococcal (ACWY) vaccine (1 - Risk start before 7 months 4-dose series) 1938    Annual Wellness Visit (AWV)  06/19/2019    Lipid screen  02/10/2020    Flu vaccine (1) 09/01/2020    Hepatitis A vaccine (1 of 2 - Risk 2-dose series) 07/31/2021 (Originally 10/3/1941)    Hepatitis B vaccine (1 of 3 - Risk 3-dose series) 07/31/2021 (Originally 10/3/1959)    Measles,Mumps,Rubella (MMR) vaccine (1 of 2 - Risk 2-dose series) 07/31/2021 (Originally [birth year]/3/1958)    Shingles Vaccine (1 of 2) 07/31/2021 (Originally 10/3/1988)    DTaP/Tdap/Td vaccine (2 - Td) 02/21/2027    Pneumococcal 65+ yrs at Risk Vaccine  Completed    Hib vaccine  Aged Out       Hemoglobin A1C (%)   Date Value   02/10/2019 5.4   09/15/2016 5.8             ( goal A1C is < 7)   No results found for: LABMICR  LDL Cholesterol (mg/dL)   Date Value   02/10/2019 85   06/04/2018 128       (goal LDL is <100)   AST (U/L)   Date Value   06/05/2020 25     ALT (U/L)   Date Value   06/05/2020 11     BUN (mg/dL)   Date Value   06/05/2020 13     BP Readings from Last 3 Encounters:   09/09/20 128/75   07/31/20 (!) 123/97   06/05/20 112/81          (goal 120/80)    All Future Testing planned in [facility]  Lab Frequency Next Occurrence   CBC Once 57/46/2871   Comp Metabolic w Bili Profile Once 09/04/2020   HIV RNA, Quantitative, PCR Once 03/01/2021   Lymphocyte Subset Once 09/04/2020   Vitamin D 25 Hydroxy Once 02/19/2021   DEXA BONE DENSITY 2 SITES Once 02/19/2021   CBC Once 91/97/4526   Comp Metabolic w Bili Profile Once 03/09/2021   HIV RNA, Quantitative, PCR Once 03/09/2021   Lipid Panel Once 03/09/2021   Lymphocyte Subset Once 03/09/2021               Patient Active Problem List:     HIV (human immunodeficiency virus infection) ([facility]     Dementia without behavioral disturbance (HCC)     Osteoporosis right hip fracture dec 08     Hemorrhoids     BPH (benign prostatic hyperplasia)     GERD (gastroesophageal reflux disease)     Mixed hyperlipidemia     Occasional tremors     PVD (peripheral vascular disease) (HCC)     Seizure-like activity (HCC)     Alzheimer's dementia with behavioral disturbance (HCC)     Atrophy, cortical     Hyponatremia     Elevated serum creatinine     Moderate malnutrition ([facility]

## 2020-12-16 NOTE — TELEPHONE ENCOUNTER
immunodeficiency virus infection) ([facility]     Dementia without behavioral disturbance (HCC)     Osteoporosis right hip fracture dec 08     Hemorrhoids     BPH (benign prostatic hyperplasia)     GERD (gastroesophageal reflux disease)     Mixed hyperlipidemia     Occasional tremors     PVD (peripheral vascular disease) (HCC)     Seizure-like activity (HCC)     Alzheimer's dementia with behavioral disturbance (HCC)     Atrophy, cortical     Hyponatremia     Elevated serum creatinine     Moderate malnutrition ([facility]

## 2020-12-17 RX ORDER — DIVALPROEX SODIUM 125 MG/1
CAPSULE, COATED PELLETS ORAL
Qty: 112 CAPSULE | Refills: 3 | Status: SHIPPED | OUTPATIENT
Start: 2020-12-17 | End: 2021-03-31

## 2020-12-17 RX ORDER — MULTIVITAMIN WITH FOLIC ACID 400 MCG
TABLET ORAL
Qty: 28 TABLET | Refills: 5 | Status: SHIPPED | OUTPATIENT
Start: 2020-12-17 | End: 2021-03-10 | Stop reason: ALTCHOICE

## 2020-12-17 RX ORDER — TAMSULOSIN HYDROCHLORIDE 0.4 MG/1
CAPSULE ORAL
Qty: 28 CAPSULE | Refills: 3 | Status: SHIPPED | OUTPATIENT
Start: 2020-12-17 | End: 2021-03-31

## 2021-02-15 ENCOUNTER — TELEPHONE (OUTPATIENT)
Dept: INFECTIOUS DISEASES | Age: 81
End: 2021-02-15

## 2021-02-17 NOTE — TELEPHONE ENCOUNTER
So I received a denial on this pt's Atripla I phoned Janene spoke with pharmacists I informed him that this needs billed to Medicare D according to the rejection. But after reviewing this again it looks like it was being billed to wrong insurance do I did another PA for this under pt's allwell coverage. Waiting for a reply.

## 2021-02-17 NOTE — TELEPHONE ENCOUNTER
[address] back spoke with Brown Parekh informed her of the approval.     Outcome   Approvedtoday   Approved. The requested medication is covered on the formulary and does not require a Coverage Determination Request. Please call the pharmacy to process the prescription claim.

## 2021-03-01 ENCOUNTER — TELEPHONE (OUTPATIENT)
Dept: INTERNAL MEDICINE | Age: 81
End: 2021-03-01

## 2021-03-01 ENCOUNTER — TELEPHONE (OUTPATIENT)
Dept: INFECTIOUS DISEASES | Age: 81
End: 2021-03-01

## 2021-03-01 NOTE — LETTER
[address] [address]  Phone: [phone]  Fax: [phone]    Valeria Henry MD        March 1, 2021    [place]  [address] [place] [id number]      Dear Hardy Yang: This letter is a reminder that you may have diagnostic testing that has not been completed. It is important to your well-being that these test(s) are performed. Please find the outstanding test(s) attached. If you could please have these completed before your next appointment. You can have the test completed at any [address] [place] [address] or Lab. Please see the order for scheduling instructions. Any testing that needs completed other than blood work or xray's please call [phone] to schedule an appointment. Otherwise can be done at any [facility]. Please call our office at Dept: [phone] for additional information on the outstanding tests or let us know if they have been completed so we may update your chart. If you have any questions or concerns, please don't hesitate to call.     Sincerely,        Valeria Henry MD

## 2021-03-01 NOTE — TELEPHONE ENCOUNTER
Pt's wife answered phone I stated Yannick Robles needs to get his labs drawn she said \" oh that probably jaimie to be a problem\" I stated irs was important to get them drawn she stated his appt is the the 10th right I said yes

## 2021-03-10 ENCOUNTER — OFFICE VISIT (OUTPATIENT)
Dept: INFECTIOUS DISEASES | Age: 81
End: 2021-03-10
Payer: MEDICAID

## 2021-03-10 VITALS
RESPIRATION RATE: 16 BRPM | OXYGEN SATURATION: 98 % | TEMPERATURE: 97.3 F | BODY MASS INDEX: 20.28 KG/M2 | HEIGHT: 66 IN | WEIGHT: 126.2 LBS | HEART RATE: 74 BPM | DIASTOLIC BLOOD PRESSURE: 75 MMHG | SYSTOLIC BLOOD PRESSURE: 119 MMHG

## 2021-03-10 DIAGNOSIS — F01.50 VASCULAR DEMENTIA WITHOUT BEHAVIORAL DISTURBANCE (HCC): ICD-10-CM

## 2021-03-10 DIAGNOSIS — M81.0 AGE-RELATED OSTEOPOROSIS WITHOUT CURRENT PATHOLOGICAL FRACTURE: ICD-10-CM

## 2021-03-10 DIAGNOSIS — Z21 ASYMPTOMATIC HIV INFECTION (HCC): Primary | ICD-10-CM

## 2021-03-10 PROCEDURE — G8484 FLU IMMUNIZE NO ADMIN: HCPCS | Performed by: INTERNAL MEDICINE

## 2021-03-10 PROCEDURE — 4040F PNEUMOC VAC/ADMIN/RCVD: CPT | Performed by: INTERNAL MEDICINE

## 2021-03-10 PROCEDURE — 1036F TOBACCO NON-USER: CPT | Performed by: INTERNAL MEDICINE

## 2021-03-10 PROCEDURE — G8427 DOCREV CUR MEDS BY ELIG CLIN: HCPCS | Performed by: INTERNAL MEDICINE

## 2021-03-10 PROCEDURE — G8420 CALC BMI NORM PARAMETERS: HCPCS | Performed by: INTERNAL MEDICINE

## 2021-03-10 PROCEDURE — 99213 OFFICE O/P EST LOW 20 MIN: CPT | Performed by: INTERNAL MEDICINE

## 2021-03-10 PROCEDURE — 1123F ACP DISCUSS/DSCN MKR DOCD: CPT | Performed by: INTERNAL MEDICINE

## 2021-03-10 RX ORDER — ALENDRONATE SODIUM 70 MG/1
70 TABLET ORAL
Qty: 4 TABLET | Refills: 4
Start: 2021-03-10 | End: 2021-04-02

## 2021-03-10 RX ORDER — EFAVIRENZ, EMTRICITABINE, AND TENOFOVIR DISOPROXIL FUMARATE 600; 200; 300 MG/1; MG/1; MG/1
TABLET, FILM COATED ORAL
Qty: 30 TABLET | Refills: 5 | Status: SHIPPED | OUTPATIENT
Start: 2021-03-10 | End: 2021-07-07

## 2021-03-10 NOTE — PROGRESS NOTES
[facility]  Outpatient follow-up HIV care  Today's Date and Time: 3/10/2021, 1:04 PM    Impression:     1.  Asymptomatic HIV infection ([address]    2. 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 Age-related osteoporosis without current pathological fracture            · HIV-CD4 count/viral load -unfortunately was not able to get any lab testing done but the wife does report he has been very compliant with his antiretroviral therapy with Atripla  · I have reordered lab testing and asked the wife to try to get them done at any point in time that he is within the health system  · Health maintenance:   · Vaccines-up-to-date but he has not received the COVID-19 vaccination  · Annual STD check-unable to do  · Counseling:  · Has continued to have good medication adherence/compliance    I have ordered the followingmedications/ labs:  Orders Placed This Encounter   Procedures    CBC     Standing Status:   Future     Standing Expiration Date:   2/26/9244    Comp Metabolic w Bili Profile     Standing Status:   Future     Standing Expiration Date:   3/10/2022    Hepatic Function Panel     Standing Status:   Future     Standing Expiration Date:   3/10/2022    HIV RNA, Quantitative, PCR     Standing Status:   Future     Standing Expiration Date:   3/10/2022    Lipid Panel     Standing Status:   Future     Standing Expiration Date:   3/10/2022     Order Specific Question:   Is Patient Fasting?/# of Hours     Answer:   6 hours fasting    Lymphocyte Subset     Standing Status:   Future     Standing Expiration Date:   3/10/2022      Orders Placed This Encounter   Medications    ATRIPLA 600-200-300 MG per tablet     Sig: TAKE 1 TABLET AT BEDTIME BOTTLE     Dispense:  30 tablet     Refill:  5    alendronate (FOSAMAX) 70 MG tablet     Sig: Take 1 tablet by mouth every 7 days     Dispense:  4 tablet     Refill:  4       Chiefcomplaint:   Follow-up for HIV infection    History of Present Illness: Coni De Paz is a 78y.o.-year-old male who is seen 3/10/2021 in follow-up for HIV infection. Amadeo Grant is seen and evaluated and he is here with his wife. He has a longstanding history of HIV infection for which he has been on antiretroviral therapy with Atripla for many years. He has had progressing vascular dementia over the years and it is now very difficult to try to get him to get lab testing done. He also had some issues taking his antiretroviral medications but his wife has been able to have them take this consistently by putting the Atripla in his ensure. The patient has not been able to get any lab testing done as he refused the labs once they are about to be drawn. The only time the wife feels that the labs have been obtained as typically when she brings him into the emergency department and they are able to get the testing done. The wife reports that he spends most of his day sleeping typically gets up around 11 and is usually walking around the house. Mandeep himself does not have any complaints at this time and the wife does not report any major/aggressive behaviors. I have personally reviewed the past medical history, past surgical history, medications, social history, and family history, and I haveupdated the database accordingly.   Past Medical History:     Past Medical History:   Diagnosis Date    Acute delirium 2/11/2019    Acute metabolic encephalopathy 7/67/3557    Alzheimer's dementia with behavioral disturbance ([address] 2/11/2019    Atrophy, cortical     BPH (benign prostatic hyperplasia) 2/1/2013    Dementia ([address]     Dementia without behavioral disturbance ([address] 7/27/2012    GERD (gastroesophageal reflux disease) 4/28/2015    Hemorrhoids     HIV (human immunodeficiency virus infection) ([address]     Meralgia paraesthetica 2/4/2016    Mixed hyperlipidemia 5/5/2016    Occasional tremors     Osteoporosis     PVD (peripheral vascular disease) ([address] 2/8/2018    Seizure-like activity ([facility] 2/28/2018    Syphilis in male     Wears glasses      Medications:     Current Outpatient Medications   Medication Sig Dispense Refill    ATRIPLA 600-200-300 MG per tablet TAKE 1 TABLET AT BEDTIME BOTTLE 30 tablet 5    alendronate (FOSAMAX) 70 MG tablet Take 1 tablet by mouth every 7 days 4 tablet 4    tamsulosin (FLOMAX) 0.4 MG capsule TAKE 1 CAPSULE BY MOUTH DAILY 28 capsule 3    divalproex (DEPAKOTE SPRINKLE) 125 MG capsule TAKE 2 CAPSULES BY MOUTH 2 TIMES A  capsule 3    donepezil (ARICEPT) 10 MG tablet TAKE 1 TABLET IN THE EVENING 28 tablet 5    finasteride (PROSCAR) 5 MG tablet TAKE 1 TABLET BY MOUTH DAILY 28 tablet 5    memantine (NAMENDA) 10 MG tablet TAKE 1 TABLET TWICE DAILY 56 tablet 5    omeprazole (PRILOSEC) 20 MG delayed release capsule TAKE 1 CAPSULE DAILY 28 capsule 5    Calcium Carbonate-Vitamin D (OYSTER SHELL CALCIUM/D) 500-200 MG-UNIT TABS TAKE 2 TABLETS DAILY 56 tablet 5    ASPIRIN ADULT LOW STRENGTH 81 MG EC tablet TAKE 1 TABLET DAILY 28 tablet 5    atorvastatin (LIPITOR) 40 MG tablet TAKE 1 TABLET DAILY 28 tablet 5    Calcium Carbonate-Vitamin D (OYSTER SHELL CALCIUM/D) 500-200 MG-UNIT TABS Take 500 tablets by mouth daily  5    donepezil (ARICEPT) 10 MG tablet Take 10 tablets by mouth daily  5    memantine (NAMENDA) 10 MG tablet Take 10 mg by mouth daily  5    vitamin E 400 UNIT capsule Take 400 Units by mouth daily  5     No current facility-administered medications for this visit. Allergies:   Patient has no known allergies.      Review of Systems:   Review of Systems   Unable to perform ROS: Dementia        Physical Examination :   /75 (Site: Left Upper Arm, Position: Sitting, Cuff Size: Medium Adult)   Pulse 74   Temp 97.3 °F (36.3 °C) (Temporal)   Resp 16   Ht 5' 6\" (1.676 m)   Wt 126 lb 3.2 oz (57.2 kg)   SpO2 98% Comment: room air at rest  BMI 20.37 kg/m²     Physical Exam  Constitutional:       Appearance: He is 33.5 pg Final   09/11/2019 24.7 (L) 25.2 - 33.5 pg Final     MCHC   Date Value Ref Range Status   06/05/2020 30.9 28.4 - 34.8 g/dL Final   09/12/2019 28.9 28.4 - 34.8 g/dL Final   09/11/2019 29.9 28.4 - 34.8 g/dL Final     RDW   Date Value Ref Range Status   06/05/2020 14.4 11.8 - 14.4 % Final   09/12/2019 16.8 (H) 11.8 - 14.4 % Final   09/11/2019 16.7 (H) 11.8 - 14.4 % Final     Platelet Count   Date Value Ref Range Status   04/18/2012 224 140 - 450 k/uL Final   03/16/2012 234 140 - 450 k/uL Final   10/12/2011 219 140 - 450 k/uL Final     Platelets   Date Value Ref Range Status   06/05/2020 194 138 - 453 k/uL Final   09/12/2019 229 138 - 453 k/uL Final   09/11/2019 227 138 - 453 k/uL Final     MPV   Date Value Ref Range Status   06/05/2020 10.8 8.1 - 13.5 fL Final   09/12/2019 10.5 8.1 - 13.5 fL Final   09/11/2019 11.3 8.1 - 13.5 fL Final       CMP:    Lab Results   Component Value Date     (L) 06/05/2020    K 5.4 (H) 06/05/2020    CL 98 06/05/2020    CO2 22 06/05/2020    BUN 13 06/05/2020    CREATININE 1.14 06/05/2020    GLUCOSE 120 (H) 06/05/2020    CALCIUM 8.9 06/05/2020    PROT 7.6 06/05/2020    LABALBU 3.7 06/05/2020    BILITOT 0.20 (L) 06/05/2020    ALKPHOS 156 (H) 06/05/2020    AST 25 06/05/2020    ALT 11 06/05/2020    LABGLOM >60 06/05/2020    GFRAA >60 06/05/2020       HIV VIRAL LOAD:   Direct Exam   Date Value Ref Range Status   06/05/2020   Final    HIV-1 RNA NOT DETECTED Results reported to the appropriate Health Department   06/05/2020   Final                                                            This test is a sensitive method for quantitating HIV-1 RNA viral loads in plasma. It utilizes RT-PCR in the FDA approved Roche Amplicor/Taqman 48 system. This test is intended for detecting and quantifying HIV-1 RNA viral loads in the range of 20 - 10,000,000 cp/ml (1.30 - 7.00 log cp/ml). The reference value for this assay is <20 cp/ml (<1.30 log cp/ml). Patients should have confirmed HIV-1 infection prior to RNA quantification. The test has been developed to monitor disease progression and efficacy of Anti-HIV drug therapy.                                                                LYMPHOCYTE SUBSET:  Absolute CD 3   Date Value Ref Range Status   06/05/2020 1602 411 - 2061 /uL Final   09/11/2019 2411 (H) 411 - 2061 /uL Final   06/04/2018 1468 411 - 2061 /uL Final     Absolute CD 4 [place]   Date Value Ref Range Status   06/05/2020 526 309 - 1571 /uL Final   09/11/2019 870 309 - 1571 /uL Final   02/10/2019 573 309 - 1571 /uL Final     CD4 % [place] T Cell   Date Value Ref Range Status   06/05/2020 23 (L) 27 - 64 % Final   09/11/2019 26 (L) 27 - 64 % Final   02/10/2019 32 27 - 64 % Final     CD4/CD8 Ratio   Date Value Ref Range Status   06/05/2020 0.50 (L) 0.6 - 2.8 Final   09/11/2019 0.59 (L) 0.6 - 2.8 Final   06/04/2018 0.53 (L) 0.6 - 2.8 Final       LIPID PANEL:  Lab Results   Component Value Date    CHOL 185 02/10/2019    CHOL 241 (H) 06/04/2018     Lab Results   Component Value Date    TRIG 139 02/10/2019    TRIG 176 (H) 06/04/2018     Lab Results   Component Value Date    HDL 72 02/10/2019    HDL 78 06/04/2018     Lab Results   Component Value Date    LDLCHOLESTEROL 85 02/10/2019    LDLCHOLESTEROL 128 06/04/2018     Lab Results   Component Value Date    VLDL NOT REPORTED 02/10/2019    VLDL NOT REPORTED 06/04/2018     Lab Results   Component Value Date    CHOLHDLRATIO 2.6 02/10/2019    CHOLHDLRATIO 3.1 06/04/2018       HEP B SURFACE ANTIBODY:  Hep B S Ab   Date Value Ref Range Status   05/10/2017 <3.50 <10 mIU/mL Final     Comment:           REFERENCE RANGE:  <10.0      NON-REACTIVE/NOT IMMUNE  >=10.0     REACTIVE/IMMUNE  [facility] [address] [facility], [address] [place] [address] [phone]       GC DNA URINE:  No results found for: [facility] Prom PALLIDUM AB:  T. pallidum, IgG   Date Value Ref Range Status   06/04/2018 REACTIVE (A) NR Final     Comment:           Detection of T. pallidum antibodies may indicate recent, remote or previously   treated infections. A positive serological test for syphilis is not diagnostic   of infection, as false positives occur and become more likely in low prevalence   populations. Confirmatory test will be performed (VDRL, Quantitative). Results reported to the appropriate [address] [place] [address], [address] [place] [address] [phone]     01/14/2018 REACTIVE (A) NR Final     Comment:           Detection of T. pallidum antibodies may indicate recent, remote or previously   treated infections. A positive serological test for syphilis is not diagnostic   of infection, as false positives occur and become more likely in low prevalence   populations. Confirmatory test will be performed (VDRL, Quantitative). Results reported to the appropriate [address] [place] [address], [address] [place] [address] [phone]           Thank you for allowing us toparticipate in the care of this patient. Please call with questions. Kristin Wood MD  Perfect [employer] messaging: [phone]    This note is created with the assistance of a speech recognition program.  While intending to generate adocument that actually reflects the content of the visit, the document can still have some errors including those of syntax and sound a like substitutions which may escape proof reading. It such instances, actual meaningcan be extrapolated by contextual diversion.

## 2021-03-30 DIAGNOSIS — I73.9 PVD (PERIPHERAL VASCULAR DISEASE) (HCC): ICD-10-CM

## 2021-03-30 DIAGNOSIS — M81.0 AGE-RELATED OSTEOPOROSIS WITHOUT CURRENT PATHOLOGICAL FRACTURE: ICD-10-CM

## 2021-03-30 DIAGNOSIS — N40.0 BENIGN PROSTATIC HYPERPLASIA WITHOUT LOWER URINARY TRACT SYMPTOMS: ICD-10-CM

## 2021-03-30 NOTE — TELEPHONE ENCOUNTER
Request for depakote, lipitor, aspirin.       Next Visit Date:  Future Appointments   Date Time Provider Department Center   5/21/2021  3:30 PM Raciel Reyes MD [facility]   9/8/2021  9:45 AM [address], MD INFT DISEASE [address] Maintenance   Topic Date Due    Meningococcal (ACWY) vaccine (1 - Risk start before 7 months 4-dose series) Never done    COVID-19 Vaccine (1) Never done    Annual Wellness Visit (AWV)  Never done    Lipid screen  02/10/2020    Flu vaccine (1) 09/01/2020    Hepatitis A vaccine (1 of 2 - Risk 2-dose series) 07/31/2021 (Originally 10/3/1939)    Hepatitis B vaccine (1 of 3 - Risk 3-dose series) 07/31/2021 (Originally 10/3/1957)    Measles,Mumps,Rubella (MMR) vaccine (1 of 2 - Risk 2-dose series) 07/31/2021 (Originally 10/3/1956)    Shingles Vaccine (1 of 2) 07/31/2021 (Originally 10/3/1988)    DTaP/Tdap/Td vaccine (2 - Td) 02/21/2027    Pneumococcal 65+ yrs at Risk Vaccine  Completed    Hib vaccine  Aged Out       Hemoglobin A1C (%)   Date Value   02/10/2019 5.4   09/15/2016 5.8             ( goal A1C is < 7)   No results found for: LABMICR  LDL Cholesterol (mg/dL)   Date Value   02/10/2019 85       (goal LDL is <100)   AST (U/L)   Date Value   06/05/2020 25     ALT (U/L)   Date Value   06/05/2020 11     BUN (mg/dL)   Date Value   06/05/2020 13     BP Readings from Last 3 Encounters:   03/10/21 119/75   09/09/20 128/75   07/31/20 (!) 123/97          (goal 120/80)    All Future Testing planned in [facility]  Lab Frequency Next Occurrence   HIV RNA, Quantitative, PCR Once 05/21/2021   Vitamin D 25 Hydroxy Once 06/01/2021   DEXA BONE DENSITY 2 SITES Once 03/29/2021   CBC Once 28/75/5413   Comp Metabolic w Bili Profile Once 03/09/2021   HIV RNA, Quantitative, PCR Once 08/20/2021   Lipid Panel Once 03/09/2021   Lymphocyte Subset Once 03/09/2021   CBC Once 15/35/5891   Comp Metabolic w Bili Profile Once 09/10/2021   Hepatic Function Panel Once 03/10/2021   HIV RNA, Quantitative, PCR Once 09/10/2021   Lipid Panel Once 09/10/2021   Lymphocyte Subset Once 09/10/2021         Patient Active Problem List:     HIV (human immunodeficiency virus infection) ([facility]     Dementia without behavioral disturbance ([facility]     Osteoporosis right hip fracture dec 08     Hemorrhoids     BPH (benign prostatic hyperplasia)     GERD (gastroesophageal reflux disease)     Mixed hyperlipidemia     Occasional tremors     PVD (peripheral vascular disease) ([facility]     Seizure-like activity (HCC)     Alzheimer's dementia with behavioral disturbance (HCC)     Atrophy, cortical     Hyponatremia     Elevated serum creatinine     Moderate malnutrition ([facility]

## 2021-03-30 NOTE — TELEPHONE ENCOUNTER
Request for flomax and oyster shell.       Next Visit Date:  Future Appointments   Date Time Provider Department Center   5/21/2021  3:30 PM Lon Erazo MD [facility]   9/8/2021  9:45 AM [address], MD INFT DISEASE [address] Maintenance   Topic Date Due    Meningococcal (ACWY) vaccine (1 - Risk start before 7 months 4-dose series) Never done    COVID-19 Vaccine (1) Never done    Annual Wellness Visit (AWV)  Never done    Lipid screen  02/10/2020    Flu vaccine (1) 09/01/2020    Hepatitis A vaccine (1 of 2 - Risk 2-dose series) 07/31/2021 (Originally 10/3/1939)    Hepatitis B vaccine (1 of 3 - Risk 3-dose series) 07/31/2021 (Originally 10/3/1957)    Measles,Mumps,Rubella (MMR) vaccine (1 of 2 - Risk 2-dose series) 07/31/2021 (Originally 10/3/1956)    Shingles Vaccine (1 of 2) 07/31/2021 (Originally 10/3/1988)    DTaP/Tdap/Td vaccine (2 - Td) 02/21/2027    Pneumococcal 65+ yrs at Risk Vaccine  Completed    Hib vaccine  Aged Out       Hemoglobin A1C (%)   Date Value   02/10/2019 5.4   09/15/2016 5.8             ( goal A1C is < 7)   No results found for: LABMICR  LDL Cholesterol (mg/dL)   Date Value   02/10/2019 85       (goal LDL is <100)   AST (U/L)   Date Value   06/05/2020 25     ALT (U/L)   Date Value   06/05/2020 11     BUN (mg/dL)   Date Value   06/05/2020 13     BP Readings from Last 3 Encounters:   03/10/21 119/75   09/09/20 128/75   07/31/20 (!) 123/97          (goal 120/80)    All Future Testing planned in [facility]  Lab Frequency Next Occurrence   HIV RNA, Quantitative, PCR Once 05/21/2021   Vitamin D 25 Hydroxy Once 06/01/2021   DEXA BONE DENSITY 2 SITES Once 03/29/2021   CBC Once 07/12/7990   Comp Metabolic w Bili Profile Once 03/09/2021   HIV RNA, Quantitative, PCR Once 08/20/2021   Lipid Panel Once 03/09/2021   Lymphocyte Subset Once 03/09/2021   CBC Once 19/13/9124   Comp Metabolic w Bili Profile Once 09/10/2021   Hepatic Function Panel Once 03/10/2021   HIV RNA, Quantitative, PCR Once 09/10/2021   Lipid Panel Once 09/10/2021   Lymphocyte Subset Once 09/10/2021         Patient Active Problem List:     HIV (human immunodeficiency virus infection) ([facility]     Dementia without behavioral disturbance ([facility]     Osteoporosis right hip fracture dec 08     Hemorrhoids     BPH (benign prostatic hyperplasia)     GERD (gastroesophageal reflux disease)     Mixed hyperlipidemia     Occasional tremors     PVD (peripheral vascular disease) ([facility]     Seizure-like activity (HCC)     Alzheimer's dementia with behavioral disturbance (HCC)     Atrophy, cortical     Hyponatremia     Elevated serum creatinine     Moderate malnutrition ([facility]

## 2021-03-31 RX ORDER — ATORVASTATIN CALCIUM 40 MG/1
TABLET, FILM COATED ORAL
Qty: 28 TABLET | Refills: 5 | Status: SHIPPED | OUTPATIENT
Start: 2021-03-31 | End: 2021-09-20

## 2021-03-31 RX ORDER — DIVALPROEX SODIUM 125 MG/1
CAPSULE, COATED PELLETS ORAL
Qty: 112 CAPSULE | Refills: 3 | Status: SHIPPED | OUTPATIENT
Start: 2021-03-31 | End: 2021-07-26

## 2021-03-31 RX ORDER — TAMSULOSIN HYDROCHLORIDE 0.4 MG/1
CAPSULE ORAL
Qty: 28 CAPSULE | Refills: 3 | Status: SHIPPED | OUTPATIENT
Start: 2021-03-31 | End: 2021-07-26

## 2021-03-31 RX ORDER — B-COMPLEX WITH VITAMIN C
TABLET ORAL
Qty: 56 TABLET | Refills: 5 | Status: SHIPPED | OUTPATIENT
Start: 2021-03-31 | End: 2021-09-20

## 2021-03-31 RX ORDER — ASPIRIN 81 MG/1
TABLET, DELAYED RELEASE ORAL
Qty: 28 TABLET | Refills: 5 | Status: SHIPPED | OUTPATIENT
Start: 2021-03-31 | End: 2021-09-20

## 2021-04-02 DIAGNOSIS — M81.0 AGE-RELATED OSTEOPOROSIS WITHOUT CURRENT PATHOLOGICAL FRACTURE: ICD-10-CM

## 2021-04-02 RX ORDER — ALENDRONATE SODIUM 70 MG/1
TABLET ORAL
Qty: 4 TABLET | Refills: 4 | Status: SHIPPED | OUTPATIENT
Start: 2021-04-02 | End: 2021-05-21

## 2021-04-02 NOTE — TELEPHONE ENCOUNTER
Dr Shimon Salas, you prescribed this medication on 3/10/21 with four refills but the script was not received by pharmacy according to documentation in patient's chart. Please sign for refill if ok. Thank you.

## 2021-05-03 DIAGNOSIS — F02.80 ALZHEIMER'S DEMENTIA WITHOUT BEHAVIORAL DISTURBANCE, UNSPECIFIED TIMING OF DEMENTIA ONSET: ICD-10-CM

## 2021-05-03 DIAGNOSIS — G30.9 ALZHEIMER'S DEMENTIA WITHOUT BEHAVIORAL DISTURBANCE, UNSPECIFIED TIMING OF DEMENTIA ONSET: ICD-10-CM

## 2021-05-03 RX ORDER — MEMANTINE HYDROCHLORIDE 10 MG/1
TABLET ORAL
Qty: 56 TABLET | Refills: 5 | Status: SHIPPED | OUTPATIENT
Start: 2021-05-03 | End: 2021-05-21

## 2021-05-03 RX ORDER — DONEPEZIL HYDROCHLORIDE 10 MG/1
TABLET, FILM COATED ORAL
Qty: 28 TABLET | Refills: 5 | Status: SHIPPED | OUTPATIENT
Start: 2021-05-03 | End: 2021-05-21

## 2021-05-03 RX ORDER — FINASTERIDE 5 MG/1
TABLET, FILM COATED ORAL
Qty: 28 TABLET | Refills: 5 | Status: SHIPPED | OUTPATIENT
Start: 2021-05-03 | End: 2021-10-16

## 2021-05-03 RX ORDER — OMEPRAZOLE 20 MG/1
CAPSULE, DELAYED RELEASE ORAL
Qty: 28 CAPSULE | Refills: 5 | Status: SHIPPED | OUTPATIENT
Start: 2021-05-03 | End: 2021-05-21 | Stop reason: ALTCHOICE

## 2021-05-03 NOTE — TELEPHONE ENCOUNTER
Multiple meds pended  Pt has a future appointment        Next Visit Date:  Future Appointments   Date Time Provider Jocelyn Coty   5/21/2021  3:30 PM Grupo Mcmillan MD [facility]   9/8/2021  9:45 AM Fahad Medina MD INFT DISEASE [address] Maintenance   Topic Date Due    Meningococcal (ACWY) vaccine (1 - Risk start before 7 months 4-dose series) Never done    COVID-19 Vaccine (1) Never done    Lipid screen  02/10/2020    Hepatitis A vaccine (1 of 2 - Risk 2-dose series) 07/31/2021 (Originally 10/3/1939)    Hepatitis B vaccine (1 of 3 - Risk 3-dose series) 07/31/2021 (Originally 10/3/1957)    Measles,Mumps,Rubella (MMR) vaccine (1 of 2 - Risk 2-dose series) 07/31/2021 (Originally 10/3/1956)    Shingles Vaccine (1 of 2) 07/31/2021 (Originally 10/3/1988)    Flu vaccine (Season Ended) 09/01/2021    DTaP/Tdap/Td vaccine (2 - Td) 02/21/2027    Pneumococcal 65+ yrs at Risk Vaccine  Completed    Hib vaccine  Aged Out       Hemoglobin A1C (%)   Date Value   02/10/2019 5.4   09/15/2016 5.8             ( goal A1C is < 7)   No results found for: LABMICR  LDL Cholesterol (mg/dL)   Date Value   02/10/2019 85   06/04/2018 128       (goal LDL is <100)   AST (U/L)   Date Value   06/05/2020 25     ALT (U/L)   Date Value   06/05/2020 11     BUN (mg/dL)   Date Value   06/05/2020 13     BP Readings from Last 3 Encounters:   03/10/21 119/75   09/09/20 128/75   07/31/20 (!) 123/97          (goal 120/80)    All Future Testing planned in [facility]  Lab Frequency Next Occurrence   HIV RNA, Quantitative, PCR Once 05/21/2021   Vitamin D 25 Hydroxy Once 06/01/2021   DEXA BONE DENSITY 2 SITES Once 07/31/2021   CBC Once 78/79/7684   Comp Metabolic w Bili Profile Once 03/09/2021   HIV RNA, Quantitative, PCR Once 08/20/2021   Lipid Panel Once 03/09/2021   Lymphocyte Subset Once 03/09/2021   CBC Once 77/99/9980   Comp Metabolic w Bili Profile Once 09/10/2021   Hepatic Function Panel Once 03/10/2021   HIV RNA, Quantitative, PCR Once 09/10/2021   Lipid Panel Once 09/10/2021   Lymphocyte Subset Once 09/10/2021               Patient Active Problem List:     HIV (human immunodeficiency virus infection) ([facility] [facility]     Dementia without behavioral disturbance ([facility]     Osteoporosis right hip fracture dec 08     Hemorrhoids     BPH (benign prostatic hyperplasia)     GERD (gastroesophageal reflux disease)     Mixed hyperlipidemia     Occasional tremors     PVD (peripheral vascular disease) ([facility]     Seizure-like activity (HCC)     Alzheimer's dementia with behavioral disturbance (HCC)     Atrophy, cortical     Hyponatremia     Elevated serum creatinine     Moderate malnutrition ([facility]

## 2021-05-21 ENCOUNTER — OFFICE VISIT (OUTPATIENT)
Dept: INTERNAL MEDICINE | Age: 81
End: 2021-05-21
Payer: MEDICARE

## 2021-05-21 VITALS
DIASTOLIC BLOOD PRESSURE: 72 MMHG | HEART RATE: 92 BPM | BODY MASS INDEX: 20.09 KG/M2 | HEIGHT: 66 IN | SYSTOLIC BLOOD PRESSURE: 112 MMHG | WEIGHT: 125 LBS

## 2021-05-21 DIAGNOSIS — F02.80 ALZHEIMER'S DEMENTIA WITHOUT BEHAVIORAL DISTURBANCE, UNSPECIFIED TIMING OF DEMENTIA ONSET: ICD-10-CM

## 2021-05-21 DIAGNOSIS — I73.9 PVD (PERIPHERAL VASCULAR DISEASE) (HCC): ICD-10-CM

## 2021-05-21 DIAGNOSIS — F02.81 ALZHEIMER'S DEMENTIA WITH BEHAVIORAL DISTURBANCE, UNSPECIFIED TIMING OF DEMENTIA ONSET: ICD-10-CM

## 2021-05-21 DIAGNOSIS — G30.9 ALZHEIMER'S DEMENTIA WITH BEHAVIORAL DISTURBANCE, UNSPECIFIED TIMING OF DEMENTIA ONSET: ICD-10-CM

## 2021-05-21 DIAGNOSIS — Z21 ASYMPTOMATIC HIV INFECTION (HCC): ICD-10-CM

## 2021-05-21 DIAGNOSIS — E44.0 MODERATE MALNUTRITION (HCC): ICD-10-CM

## 2021-05-21 DIAGNOSIS — K21.9 GASTROESOPHAGEAL REFLUX DISEASE WITHOUT ESOPHAGITIS: Primary | ICD-10-CM

## 2021-05-21 DIAGNOSIS — R39.81 URINARY INCONTINENCE DUE TO COGNITIVE IMPAIRMENT: ICD-10-CM

## 2021-05-21 DIAGNOSIS — R56.9 SEIZURE-LIKE ACTIVITY (HCC): ICD-10-CM

## 2021-05-21 DIAGNOSIS — G30.9 ALZHEIMER'S DEMENTIA WITHOUT BEHAVIORAL DISTURBANCE, UNSPECIFIED TIMING OF DEMENTIA ONSET: ICD-10-CM

## 2021-05-21 DIAGNOSIS — N40.0 BENIGN PROSTATIC HYPERPLASIA WITHOUT LOWER URINARY TRACT SYMPTOMS: ICD-10-CM

## 2021-05-21 PROCEDURE — 99211 OFF/OP EST MAY X REQ PHY/QHP: CPT | Performed by: INTERNAL MEDICINE

## 2021-05-21 PROCEDURE — 99213 OFFICE O/P EST LOW 20 MIN: CPT | Performed by: STUDENT IN AN ORGANIZED HEALTH CARE EDUCATION/TRAINING PROGRAM

## 2021-05-21 RX ORDER — GASTROSTOMY TUBE 18 FR
1 KIT MISCELLANEOUS DAILY
Qty: 30 CAN | Refills: 3 | Status: SHIPPED | OUTPATIENT
Start: 2021-05-21 | End: 2021-05-21

## 2021-05-21 RX ORDER — GASTROSTOMY TUBE 18 FR
1 KIT MISCELLANEOUS DAILY
Qty: 30 CAN | Refills: 3 | Status: SHIPPED | OUTPATIENT
Start: 2021-05-21 | End: 2022-03-14 | Stop reason: SDUPTHER

## 2021-05-21 RX ORDER — BROMPHENIRAMIN/PSEUDOEPHEDRINE 1-15MG/5ML
1 LIQUID (ML) ORAL DAILY
Qty: 30 BOTTLE | Refills: 3 | Status: SHIPPED | OUTPATIENT
Start: 2021-05-21 | End: 2021-05-21

## 2021-05-21 RX ORDER — BROMPHENIRAMIN/PSEUDOEPHEDRINE 1-15MG/5ML
1 LIQUID (ML) ORAL DAILY
Qty: 30 BOTTLE | Refills: 3 | Status: SHIPPED | OUTPATIENT
Start: 2021-05-21 | End: 2022-02-10 | Stop reason: SDUPTHER

## 2021-05-21 SDOH — ECONOMIC STABILITY: FOOD INSECURITY: WITHIN THE PAST 12 MONTHS, YOU WORRIED THAT YOUR FOOD WOULD RUN OUT BEFORE YOU GOT MONEY TO BUY MORE.: NEVER TRUE

## 2021-05-21 SDOH — ECONOMIC STABILITY: FOOD INSECURITY: WITHIN THE PAST 12 MONTHS, THE FOOD YOU BOUGHT JUST DIDN'T LAST AND YOU DIDN'T HAVE MONEY TO GET MORE.: NEVER TRUE

## 2021-05-21 ASSESSMENT — PATIENT HEALTH QUESTIONNAIRE - PHQ9: DEPRESSION UNABLE TO ASSESS: FUNCTIONAL CAPACITY MOTIVATION LIMITS ACCURACY

## 2021-05-21 ASSESSMENT — SOCIAL DETERMINANTS OF HEALTH (SDOH): HOW HARD IS IT FOR YOU TO PAY FOR THE VERY BASICS LIKE FOOD, HOUSING, MEDICAL CARE, AND HEATING?: NOT VERY HARD

## 2021-05-21 NOTE — PROGRESS NOTES
Attending Physician Statement  I have discussed the care of [address] including pertinent history and exam findings,  with the resident. I have reviewed the key elements of all parts of the encounter with the resident. I agree with the assessment, plan and orders as documented by the resident. (GE Modifier)    LIVING WILL,   Stop fosamax on account of duration of therapy for more than 5 years.    Continue aspirin and statin on account of severe PVD   Consult pastoral care to go over the willing live paper work with the patient     Landon Dalal MD [place] [address]  Attending Physician, [address] [place] [address], Internal Medicine Residency Program  [address] [place] [address]  5/21/2021, 3:55 PM No

## 2021-05-21 NOTE — PROGRESS NOTES
[facility] [address] [id number] [facility] [address]  Dept: [phone]  Dept Fax: [phone]    Office Progress/Follow Up Note  Date of patient's visit: 5/21/2021  Patient's Name:  Alana Santo YOB: 1940            Patient Care Team:  Frank Cisneros MD as PCP - General (Internal Medicine)  Kevin Guillory MD as Consulting Physician (Infectious Diseases)  Tate Parsons DPM (Podiatry)    REASON FOR VISIT: Routine outpatient follow up    HISTORY OF PRESENT ILLNESS:      Chief Complaint   Patient presents with    Gastroesophageal Reflux    Dementia     9 month follow up       History was obtained from the patient. Alana Santo is a 78 y.o. is here for a routine follow-up. Patient last seen by me on 7/31/2020, new patient at that time. Patient very lethargic on examination. Patient has severe Alzheimer's dementia, currently on memantine and donepezil. Patient also has history of HIV infection, most recent CD4 count 526. Patient follows with Dr. Francisco Atwood. Patient is due for a number of labs including HIV RNA, CMP, however refuses to get blood drawn due to fear of needles. Patient also has GERD well-controlled with Prilosec, also takes Lipitor for hyperlipidemia. Patient recently completed both COVID-19 mandated vaccinations. No acute complaints at this time.       Patient Active Problem List   Diagnosis    HIV (human immunodeficiency virus infection) ([address]    Dementia without behavioral disturbance ([address]    Osteoporosis right hip fracture dec 08    Hemorrhoids    BPH (benign prostatic hyperplasia)    GERD (gastroesophageal reflux disease)    Mixed hyperlipidemia    Occasional tremors    PVD (peripheral vascular disease) ([address]    Seizure-like activity ([address]    Alzheimer's dementia with behavioral disturbance (HCC)    Atrophy, cortical    Hyponatremia    Elevated serum creatinine    Moderate malnutrition ([address] Health Maintenance Due   Topic Date Due    Meningococcal (ACWY) vaccine (1 - Risk start before 7 months 4-dose series) Never done    Lipid screen  02/10/2020         No Known Allergies      MEDICATIONS:          SOCIAL HISTORY    Reviewed and no change from previous record. Mandeep  reports that he quit smoking about 4 months ago. His smoking use included cigarettes. He started smoking about 70 years ago. He smoked 0.00 packs per day for 50.00 years.  He has never used smokeless tobacco.    FAMILY HISTORY:    Reviewed and No change from previous visit    REVIEW OF SYSTEMS:    CONSTITUTIONAL: Denies: fever, chills  PSYCH: Denies: anxiety, depression  ALLERGIES: Denies: urticaria  EYES: Denies: blurry vision, decreased vision, photophobia  ENT: Denies: sore throat, nasal congestion  CARDIOVASCULAR: Denies: chest pain, dyspnea on exertion  RESPIRATORY: Denies: cough, hemoptysis, shortness of breath  GI: Denies: Denies: abdominal pain, flank pain  : Denies: Denies: dysuria, frequency/urgency  NEURO: Denies: dizzy/vertigo, headache  MUSCULOSKELETAL: Denies: back pain, joint pain  SKIN: Denies: rash, itching    PHYSICAL EXAM:      Vitals:    05/21/21 1511   BP: 112/72   Site: Left Upper Arm   Position: Sitting   Cuff Size: Medium Adult   Pulse: 92   Weight: 125 lb (56.7 kg)   Height: 5' 6\" (1.676 m)     BP Readings from Last 3 Encounters:   05/21/21 112/72   03/10/21 119/75   09/09/20 128/75      General appearance - lethargic, unresponsive, cachectic  Mental status - alert, oriented to person, place, and time, uncooperative  Chest - clear to auscultation, no wheezes, rales or rhonchi, symmetric air entry  Heart - normal rate, regular rhythm, normal S1, S2, no murmurs, rubs, clicks or gallops  Neurological - alert, oriented, normal speech, no focal findings or movement disorder noted  Musculoskeletal - no joint tenderness, deformity or swelling  Extremities - peripheral pulses normal, no pedal edema, no clubbing or cyanosis  Skin - normal coloration and turgor, no rashes, no suspicious skin lesions noted    LABORATORY FINDINGS:    CBC:  Lab Results   Component Value Date    WBC 8.8 06/05/2020    WBC 8.8 06/05/2020    HGB 12.5 06/05/2020     06/05/2020     04/18/2012       BMP:    Lab Results   Component Value Date     06/05/2020    K 5.4 06/05/2020    CL 98 06/05/2020    CO2 22 06/05/2020    BUN 13 06/05/2020    CREATININE 1.14 06/05/2020    GLUCOSE 120 06/05/2020    GLUCOSE 76 04/18/2012       HEMOGLOBIN A1C:   Lab Results   Component Value Date    LABA1C 5.4 02/10/2019       FASTING LIPID PANEL:  Lab Results   Component Value Date    CHOL 185 02/10/2019    HDL 72 02/10/2019    TRIG 139 02/10/2019       ASSESSMENT AND PLAN:    Krystle Love was seen today for gastroesophageal reflux and dementia. 1. Gastroesophageal reflux disease without esophagitis  -rilosec    2. Alzheimer's dementia with behavioral disturbance, unspecified timing of dementia onset (HCC)  Donepezil, memantine    3. Asymptomatic HIV infection ([facility]  Currently takes a Atripla. Follows with Dr. Sukhjinder Fortune    4. Moderate malnutrition (HCC)  Ensure    5. Seizure-like activity ([facility]  Well controlled. depakote    6. Benign prostatic hyperplasia without lower urinary tract symptoms  proscar        FOLLOW UP AND INSTRUCTIONS:   · No follow-ups on file. · Lemon received counseling on the following healthy behaviors: nutrition and medication adherence    · Discussed use, benefit, and side effects of prescribed medications. Barriers to medication compliance addressed. All patient questions answered. Pt voiced understanding. · Patient given educational materials - see patient instructions    Marquis Davidson MD  Internal Medicine Resident, PGY-2  [facility] [facility];  [facility]  5/21/2021, 3:32 PM

## 2021-05-24 ENCOUNTER — TELEPHONE (OUTPATIENT)
Dept: INTERNAL MEDICINE | Age: 81
End: 2021-05-24

## 2021-05-24 NOTE — TELEPHONE ENCOUNTER
Dr Meg Peña could you please sign pt office note from 05/21/2021 we need it for his DME supplys     Thank you

## 2021-05-26 ENCOUNTER — TELEPHONE (OUTPATIENT)
Dept: INTERNAL MEDICINE | Age: 81
End: 2021-05-26

## 2021-05-26 NOTE — TELEPHONE ENCOUNTER
2 scripts were given to pt 1 for adult briefs and that info was faxed to [facility] at [phone] ph # [phone] received script for ensure and its being processed ph # is [phone]

## 2021-05-28 RX ORDER — MULTIVITAMIN WITH FOLIC ACID 400 MCG
TABLET ORAL
Qty: 28 TABLET | Refills: 5 | Status: SHIPPED | OUTPATIENT
Start: 2021-05-28 | End: 2021-11-12

## 2021-05-28 NOTE — TELEPHONE ENCOUNTER
Request for Multi Vitamins. The original prescription was discontinued on 3/10/2021 by Gary Jacob MD for the following reason: Therapy completed.  Renewing this prescription may not be appropriate    Next Visit Date:  Future Appointments   Date Time Provider Jocelyn Ansari   9/8/2021  9:45 AM Gary Jacob MD INFT DISEASE [address] Maintenance   Topic Date Due    Meningococcal (ACWY) vaccine (1 - Risk start before 7 months 4-dose series) Never done    Lipid screen  02/10/2020    Annual Wellness Visit (AWV)  Never done    Hepatitis A vaccine (1 of 2 - Risk 2-dose series) 07/31/2021 (Originally 10/3/1939)    Hepatitis B vaccine (1 of 3 - Risk 3-dose series) 07/31/2021 (Originally 10/3/1957)    Gillermina Pelt (MMR) vaccine (1 of 2 - Risk 2-dose series) 07/31/2021 (Originally 10/3/1956)    Shingles Vaccine (1 of 2) 07/31/2021 (Originally 10/3/1988)    Flu vaccine (Season Ended) 09/01/2021    DTaP/Tdap/Td vaccine (2 - Td) 02/21/2027    Pneumococcal 65+ yrs at Risk Vaccine  Completed    COVID-19 Vaccine  Completed    Hib vaccine  Aged Out       Hemoglobin A1C (%)   Date Value   02/10/2019 5.4   09/15/2016 5.8             ( goal A1C is < 7)   No results found for: LABMICR  LDL Cholesterol (mg/dL)   Date Value   02/10/2019 85       (goal LDL is <100)   AST (U/L)   Date Value   06/05/2020 25     ALT (U/L)   Date Value   06/05/2020 11     BUN (mg/dL)   Date Value   06/05/2020 13     BP Readings from Last 3 Encounters:   05/21/21 112/72   03/10/21 119/75   09/09/20 128/75          (goal 120/80)    All Future Testing planned in [facility]  Lab Frequency Next Occurrence   Vitamin D 25 Hydroxy Once 06/01/2021   DEXA BONE DENSITY 2 SITES Once 07/31/2021   CBC Once 83/07/0939   Comp Metabolic w Bili Profile Once 03/09/2021   HIV RNA, Quantitative, PCR Once 08/20/2021   Lipid Panel Once 03/09/2021   Lymphocyte Subset Once 03/09/2021   CBC Once 10/63/1836   Comp Metabolic w Bili Profile Once 09/10/2021   Hepatic Function Panel Once 03/10/2021   HIV RNA, Quantitative, PCR Once 09/10/2021   Lipid Panel Once 09/10/2021   Lymphocyte Subset Once 09/10/2021         Patient Active Problem List:     HIV (human immunodeficiency virus infection) ([facility]     Dementia without behavioral disturbance ([facility]     Osteoporosis right hip fracture dec 08     Hemorrhoids     BPH (benign prostatic hyperplasia)     GERD (gastroesophageal reflux disease)     Mixed hyperlipidemia     Occasional tremors     PVD (peripheral vascular disease) ([facility]     Seizure-like activity (HCC)     Alzheimer's dementia with behavioral disturbance (HCC)     Atrophy, cortical     Hyponatremia     Elevated serum creatinine     Moderate malnutrition ([address]

## 2021-07-07 DIAGNOSIS — Z21 ASYMPTOMATIC HIV INFECTION (HCC): ICD-10-CM

## 2021-07-07 RX ORDER — EFAVIRENZ, EMTRICITABINE, AND TENOFOVIR DISOPROXIL FUMARATE 600; 200; 300 MG/1; MG/1; MG/1
TABLET, FILM COATED ORAL
Qty: 30 TABLET | Refills: 5 | Status: SHIPPED | OUTPATIENT
Start: 2021-07-07 | End: 2021-07-27 | Stop reason: SDUPTHER

## 2021-07-23 DIAGNOSIS — N40.0 BENIGN PROSTATIC HYPERPLASIA WITHOUT LOWER URINARY TRACT SYMPTOMS: ICD-10-CM

## 2021-07-23 NOTE — TELEPHONE ENCOUNTER
Request for Flomax and Depakote.       Next Visit Date:  Future Appointments   Date Time Provider Jocelyn Ansari   9/8/2021  9:45 AM Yas Prince MD INFT DISEASE [address] Maintenance   Topic Date Due    Meningococcal (ACWY) vaccine (1 - Risk start before 7 months 4-dose series) Never done    Lipid screen  02/10/2020    Annual Wellness Visit (AWV)  Never done    Hepatitis A vaccine (1 of 2 - Risk 2-dose series) 07/31/2021 (Originally 10/3/1939)    Hepatitis B vaccine (1 of 3 - Risk 3-dose series) 07/31/2021 (Originally 10/3/1957)    [place] Client (MMR) vaccine (1 of 2 - Risk 2-dose series) 07/31/2021 (Originally 10/3/1956)    Shingles Vaccine (1 of 2) 07/31/2021 (Originally 10/3/1988)    Flu vaccine (1) 09/01/2021    DTaP/Tdap/Td vaccine (2 - Td or Tdap) 02/21/2027    Pneumococcal 65+ yrs at Risk Vaccine  Completed    COVID-19 Vaccine  Completed    Hib vaccine  Aged Out       Hemoglobin A1C (%)   Date Value   02/10/2019 5.4   09/15/2016 5.8             ( goal A1C is < 7)   No results found for: LABMICR  LDL Cholesterol (mg/dL)   Date Value   02/10/2019 85       (goal LDL is <100)   AST (U/L)   Date Value   06/05/2020 25     ALT (U/L)   Date Value   06/05/2020 11     BUN (mg/dL)   Date Value   06/05/2020 13     BP Readings from Last 3 Encounters:   05/21/21 112/72   03/10/21 119/75   09/09/20 128/75          (goal 120/80)    All Future Testing planned in [facility]  Lab Frequency Next Occurrence   Vitamin D 25 Hydroxy Once 07/31/2021   DEXA BONE DENSITY 2 SITES Once 07/31/2021   CBC Once 38/16/5262   Comp Metabolic w Bili Profile Once 03/09/2021   HIV RNA, Quantitative, PCR Once 08/20/2021   Lipid Panel Once 03/09/2021   Lymphocyte Subset Once 03/09/2021   CBC Once 05/93/1398   Comp Metabolic w Bili Profile Once 09/10/2021   Hepatic Function Panel Once 03/10/2021   HIV RNA, Quantitative, PCR Once 09/10/2021   Lipid Panel Once 09/10/2021   Lymphocyte Subset Once 09/10/2021

## 2021-07-26 RX ORDER — DIVALPROEX SODIUM 125 MG/1
CAPSULE, COATED PELLETS ORAL
Qty: 112 CAPSULE | Refills: 3 | Status: SHIPPED | OUTPATIENT
Start: 2021-07-26 | End: 2021-11-12

## 2021-07-26 RX ORDER — TAMSULOSIN HYDROCHLORIDE 0.4 MG/1
CAPSULE ORAL
Qty: 28 CAPSULE | Refills: 3 | Status: SHIPPED | OUTPATIENT
Start: 2021-07-26 | End: 2021-11-12

## 2021-07-27 DIAGNOSIS — Z21 ASYMPTOMATIC HIV INFECTION (HCC): ICD-10-CM

## 2021-07-28 RX ORDER — EFAVIRENZ, EMTRICITABINE, AND TENOFOVIR DISOPROXIL FUMARATE 600; 200; 300 MG/1; MG/1; MG/1
TABLET, FILM COATED ORAL
Qty: 30 TABLET | Refills: 5 | Status: SHIPPED | OUTPATIENT
Start: 2021-07-28 | End: 2021-09-08

## 2021-08-27 DIAGNOSIS — M81.0 AGE-RELATED OSTEOPOROSIS WITHOUT CURRENT PATHOLOGICAL FRACTURE: ICD-10-CM

## 2021-08-30 RX ORDER — ALENDRONATE SODIUM 70 MG/1
TABLET ORAL
Qty: 4 TABLET | Refills: 4 | OUTPATIENT
Start: 2021-08-30

## 2021-09-08 ENCOUNTER — OFFICE VISIT (OUTPATIENT)
Dept: INFECTIOUS DISEASES | Age: 81
End: 2021-09-08
Payer: MEDICARE

## 2021-09-08 VITALS
HEIGHT: 66 IN | OXYGEN SATURATION: 99 % | DIASTOLIC BLOOD PRESSURE: 65 MMHG | HEART RATE: 65 BPM | TEMPERATURE: 96.7 F | SYSTOLIC BLOOD PRESSURE: 97 MMHG | RESPIRATION RATE: 18 BRPM | WEIGHT: 124.8 LBS | BODY MASS INDEX: 20.06 KG/M2

## 2021-09-08 DIAGNOSIS — Z21 ASYMPTOMATIC HIV INFECTION (HCC): Primary | ICD-10-CM

## 2021-09-08 PROCEDURE — 99213 OFFICE O/P EST LOW 20 MIN: CPT | Performed by: INTERNAL MEDICINE

## 2021-09-08 NOTE — PROGRESS NOTES
[facility]  Outpatient follow-up HIV care  Today's Date and Time: 9/8/2021, 9:44 AM    Impression:     1. Asymptomatic HIV infection ([address]            · HIV-CD4 count/viral load -he had continued on antiretroviral therapy with Atripla but there have been issues getting the Atripla therefore we made the decision to switch him and I have given a new prescription for Biktarvy. · The patient has not been able to get any labs drawn as he has been taken to the lab on 2 different occasions and essentially fights the phlebotomist so lab testing is yet to be done. · The wife does report that the easiest place for lab testing to be done is in the emergency department but she is thankful that they have not had to do a visit there as of yet. · I have encouraged the wife to continue to do the best that she can with him in terms of getting him his medications and trying to go to take him to get lab testing done. · Though we have no labs I will continue his antiretroviral therapy  · Health maintenance:   · Vaccines - COVID shots received - MODERNA April/May2021  · Counseling:  · The patient mostly has continued to take his medications and medication adherence/compliance has mostly been good though at times he has refused meds.     I have ordered the followingmedications/ labs:  Orders Placed This Encounter   Procedures    CBC     Standing Status:   Future     Standing Expiration Date:   0/5/5637    Comp Metabolic w Bili Profile     Standing Status:   Future     Standing Expiration Date:   9/8/2022    HIV RNA, Quantitative, PCR     Standing Status:   Future     Standing Expiration Date:   9/8/2022    Lymphocyte Subset     Standing Status:   Future     Standing Expiration Date:   9/8/2022      Orders Placed This Encounter   Medications    bictegravir-emtricitab-tenofovir alafenamide (BIKTARVY) -25 MG TABS per tablet     Sig: Take 1 tablet by mouth daily     Dispense:  30 tablet     Refill:  5 Chiefcomplaint:   Follow-up for HIV infection    History of Present Illness:   Jeff Unger is a 78y.o.-year-old male who is seen 9/8/2021 in follow-up for HIV infection. Anjana Issa is seen and evaluated and he is here with his wife. He has a longstanding history of HIV infection for which he has been on antiretroviral therapy with Atripla for many years. He has had progressing vascular dementia over the years and it is now very difficult to try to get him to get lab testing done and he also had some issues taking his antiretroviral medications consistently. The wife reports that the same issues continue and she has not been able to get him to get any lab testing done. She was able to get him to get the COVID-19 vaccine which he got in April and May of this year and he did receive both shots of the Moderna vaccine. He has not had his medications for about 3 days now she has had some issues getting the Atripla and reports that [facility] was not able to get Atripla to her so she switched to [facility] and so far she has not been able to get her Atripla from [employer] as well as his prescription ran out. She wanted to know if there is a different antiretroviral that could be used given the issues she is having with the Atripla. Limit himself is awake and alert and he did mumble a few words but was not able to elucidate what he was trying to say. He did not answer any other questions for me. I have personally reviewed the past medical history, past surgical history, medications, social history, and family history, and I haveupdated the database accordingly.   Past Medical History:     Past Medical History:   Diagnosis Date    Acute delirium 2/11/2019    Acute metabolic encephalopathy 2/17/0893    Alzheimer's dementia with [facility] disturbance ([facility] 2/11/2019    Atrophy, cortical     BPH (benign prostatic hyperplasia) 2/1/2013    Dementia [facility]     Dementia without behavioral disturbance ([facility] 7/27/2012    GERD (gastroesophageal reflux disease) 4/28/2015    Hemorrhoids     HIV (human immunodeficiency virus infection) ([facility]     Meralgia paraesthetica 2/4/2016    Mixed hyperlipidemia 5/5/2016    Occasional tremors     Osteoporosis     [facility]VD [facility]per[facility] disease) [facility] 2/8/2018    Seizure-like activity ([facility] 2/28/2018    Syphilis in male     Wears glasses      Medications:     Current Outpatient Medications   Medication Sig Dispense Refill    bictegravir-emtricitab-tenofovir alafenamide (BIKTARVY) -25 MG TABS per tablet Take 1 tablet by mouth daily 30 tablet 5    divalproex (DEPAKOTE SPRINKLE) 125 MG capsule TAKE 2 CAPSULES BY MOUTH 2 TIMES A  capsule 3    tamsulosin (FLOMAX) 0.4 MG capsule TAKE 1 CAPSULE BY MOUTH DAILY 28 capsule 3    Multiple Vitamins-Minerals (MULTIVITAMIN) tablet TAKE 1 TABLET DAILY 28 tablet 5    Incontinence Supply Disposable (BRIEFS OVERNIGHT MEDIUM) MISC 1 box by Does not apply route daily 30 Bottle 3    Nutritional Supplements (ENSURE NUTRA SHAKE HI-CRICKET) LIQD Take 1 Bottle by mouth daily 30 Can 3    finasteride (PROSCAR) 5 MG tablet TAKE 1 TABLET BY MOUTH DAILY 28 tablet 5    HM ASPIRIN EC LOW DOSE 81 MG EC tablet TAKE 1 TABLET DAILY 28 tablet 5    atorvastatin (LIPITOR) 40 MG tablet TAKE 1 TABLET DAILY 28 tablet 5    Calcium Carbonate-Vitamin D (OYSTER SHELL CALCIUM/D) 500-200 MG-UNIT TABS TAKE 2 TABLETS DAILY 56 tablet 5    Calcium Carbonate-Vitamin D (OYSTER SHELL CALCIUM/D) 500-200 MG-UNIT TABS Take 500 tablets by mouth daily  5    donepezil (ARICEPT) 10 MG tablet Take 10 tablets by mouth daily  5    memantine (NAMENDA) 10 MG tablet Take 10 mg by mouth daily  5    vitamin E 400 UNIT capsule Take 400 Units by mouth daily  5     No current facility-administered medications for this visit. Allergies:   Patient has no known allergies.      Review of Systems:   Review of Systems   Unable to perform ROS: Dementia Physical Examination :   BP 97/65 (Site: Left Upper Arm, Position: Sitting, Cuff Size: Medium Adult)   Pulse 65   Temp 96.7 °F (35.9 °C) (Temporal)   Resp 18   Ht 5' 6\" (1.676 m)   Wt 124 lb 12.8 oz (56.6 kg)   SpO2 99% Comment: room air at rest  BMI 20.14 kg/m²     Physical Exam  Constitutional:       Appearance: He is well-developed. HENT:      Head: Normocephalic and atraumatic. Cardiovascular:      Rate and Rhythm: Normal rate. Heart sounds: Normal heart sounds. No friction rub. No gallop. Pulmonary:      Effort: Pulmonary effort is normal.      Breath sounds: Normal breath sounds. No wheezing. Abdominal:      General: Bowel sounds are normal.      Palpations: Abdomen is soft. There is no mass. Tenderness: There is no abdominal tenderness. Musculoskeletal:         General: Normal range of motion. Cervical back: Normal range of motion and neck supple. Lymphadenopathy:      Cervical: No cervical adenopathy. Skin:     General: Skin is warm and dry. Neurological:      Mental Status: He is alert and oriented to person, place, and time.          Medical Decision Making:   I have independently reviewed the following labs:    CBC:   WBC   Date Value Ref Range Status   06/05/2020 8.8 3.5 - 11.3 k/uL Final   06/05/2020 8.8 3.5 - 11.0 k/uL Final   09/12/2019 6.6 3.5 - 11.3 k/uL Final     RBC   Date Value Ref Range Status   06/05/2020 4.42 4.21 - 5.77 m/uL Final   09/12/2019 4.76 4.21 - 5.77 m/uL Final   09/11/2019 4.97 4.21 - 5.77 m/uL Final   04/18/2012 4.31 (L) 4.5 - 5.9 m/uL Final   03/16/2012 4.43 (L) 4.5 - 5.9 m/uL Final   10/12/2011 3.99 (L) 4.5 - 5.9 m/uL Final     Hemoglobin   Date Value Ref Range Status   06/05/2020 12.5 (L) 13.0 - 17.0 g/dL Final   09/12/2019 11.6 (L) 13.0 - 17.0 g/dL Final   09/11/2019 12.3 (L) 13.0 - 17.0 g/dL Final     Hematocrit   Date Value Ref Range Status   06/05/2020 40.4 (L) 40.7 - 50.3 % Final   09/12/2019 40.1 (L) 40.7 - 50.3 % Final 09/11/2019 41.1 40.7 - 50.3 % Final     MCV   Date Value Ref Range Status   06/05/2020 91.4 82.6 - 102.9 fL Final   09/12/2019 84.2 82.6 - 102.9 fL Final   09/11/2019 82.7 82.6 - 102.9 fL Final     MCH   Date Value Ref Range Status   06/05/2020 28.3 25.2 - 33.5 pg Final   09/12/2019 24.4 (L) 25.2 - 33.5 pg Final   09/11/2019 24.7 (L) 25.2 - 33.5 pg Final     MCHC   Date Value Ref Range Status   06/05/2020 30.9 28.4 - 34.8 g/dL Final   09/12/2019 28.9 28.4 - 34.8 g/dL Final   09/11/2019 29.9 28.4 - 34.8 g/dL Final     RDW   Date Value Ref Range Status   06/05/2020 14.4 11.8 - 14.4 % Final   09/12/2019 16.8 (H) 11.8 - 14.4 % Final   09/11/2019 16.7 (H) 11.8 - 14.4 % Final     Platelet Count   Date Value Ref Range Status   04/18/2012 224 140 - 450 k/uL Final   03/16/2012 234 140 - 450 k/uL Final   10/12/2011 219 140 - 450 k/uL Final     Platelets   Date Value Ref Range Status   06/05/2020 194 138 - 453 k/uL Final   09/12/2019 229 138 - 453 k/uL Final   09/11/2019 227 138 - 453 k/uL Final     MPV   Date Value Ref Range Status   06/05/2020 10.8 8.1 - 13.5 fL Final   09/12/2019 10.5 8.1 - 13.5 fL Final   09/11/2019 11.3 8.1 - 13.5 fL Final       CMP:    Lab Results   Component Value Date     (L) 06/05/2020    K 5.4 (H) 06/05/2020    CL 98 06/05/2020    CO2 22 06/05/2020    BUN 13 06/05/2020    CREATININE 1.14 06/05/2020    GLUCOSE 120 (H) 06/05/2020    CALCIUM 8.9 06/05/2020    PROT 7.6 06/05/2020    LABALBU 3.7 06/05/2020    BILITOT 0.20 (L) 06/05/2020    ALKPHOS 156 (H) 06/05/2020    AST 25 06/05/2020    ALT 11 06/05/2020    LABGLOM >60 06/05/2020    GFRAA >60 06/05/2020       HIV VIRAL LOAD:   Direct Exam   Date Value Ref Range Status   06/05/2020   Final    HIV-1 RNA NOT DETECTED Results reported to the appropriate Health Department   06/05/2020   Final                                                            This test is a sensitive method for quantitating HIV-1 RNA viral loads in plasma.   It utilizes RT-PCR in the FDA approved Roche Amplicor/Taqman 48 system. This test is intended for detecting and quantifying HIV-1 RNA viral loads in the range of 20 - 10,000,000 cp/ml (1.30 - 7.00 log cp/ml). The reference value for this assay is <20 cp/ml (<1.30 log cp/ml). Patients should have confirmed HIV-1 infection prior to RNA quantification. The test has been developed to monitor disease progression and efficacy of Anti-HIV drug therapy.                                                                LYMPHOCYTE SUBSET:  Absolute CD 3   Date Value Ref Range Status   06/05/2020 1602 411 - 2061 /uL Final   09/11/2019 2411 (H) 411 - 2061 /uL Final   06/04/2018 1468 411 - 2061 /uL Final     Absolute CD 4 [place]   Date Value Ref Range Status   06/05/2020 526 309 - 1571 /uL Final   09/11/2019 870 309 - 1571 /uL Final   02/10/2019 573 309 - 1571 /uL Final     CD4 % [place] T Cell   Date Value Ref Range Status   06/05/2020 23 (L) 27 - 64 % Final   09/11/2019 26 (L) 27 - 64 % Final   02/10/2019 32 27 - 64 % Final     CD4/CD8 Ratio   Date Value Ref Range Status   06/05/2020 0.50 (L) 0.6 - 2.8 Final   09/11/2019 0.59 (L) 0.6 - 2.8 Final   06/04/2018 0.53 (L) 0.6 - 2.8 Final       LIPID PANEL:  Lab Results   Component Value Date    CHOL 185 02/10/2019    CHOL 241 (H) 06/04/2018     Lab Results   Component Value Date    TRIG 139 02/10/2019    TRIG 176 (H) 06/04/2018     Lab Results   Component Value Date    HDL 72 02/10/2019    HDL 78 06/04/2018     Lab Results   Component Value Date    LDLCHOLESTEROL 85 02/10/2019    LDLCHOLESTEROL 128 06/04/2018     Lab Results   Component Value Date    VLDL NOT REPORTED 02/10/2019    VLDL NOT REPORTED 06/04/2018     Lab Results   Component Value Date    CHOLHDLRATIO 2.6 02/10/2019    CHOLHDLRATIO 3.1 06/04/2018       HEP B SURFACE ANTIBODY:  Hep B S Ab   Date Value Ref Range Status   05/10/2017 <3.50 <10 mIU/mL Final     Comment: REFERENCE RANGE:  <10.0      NON-REACTIVE/NOT IMMUNE  >=10.0     REACTIVE/IMMUNE  [facility] [address] [facility], [address] [place] [address] [phone]       GC DNA URINE:  No results found for: [place] AB:  T. pallidum, IgG   Date Value Ref Range Status   06/04/2018 REACTIVE (A) NR Final     Comment:           Detection of T. pallidum antibodies may indicate recent, remote or previously   treated infections. A positive serological test for syphilis is not diagnostic   of infection, as false positives occur and become more likely in low prevalence   populations. Confirmatory test will be performed (VDRL, Quantitative). Results reported to the appropriate [address], [address] [place] [address] [phone]     01/14/2018 REACTIVE (A) NR Final     Comment:           Detection of T. pallidum antibodies may indicate recent, remote or previously   treated infections. A positive serological test for syphilis is not diagnostic   of infection, as false positives occur and become more likely in low prevalence   populations. Confirmatory test will be performed (VDRL, Quantitative). Results reported to the appropriate [address] [phone]           Thank you for allowing us toparticipate in the care of this patient. Please call with questions. Tani Rodriguez MD  Perfect Serve messaging: [phone]    This note is created with the assistance of a speech recognition program.  While intending to generate adocument that actually reflects the content of the visit, the document can still have some errors including those of syntax and sound a like substitutions which may escape proof reading. It such instances, actual meaningcan be extrapolated by contextual diversion.

## 2021-09-17 DIAGNOSIS — I73.9 PVD (PERIPHERAL VASCULAR DISEASE) (HCC): ICD-10-CM

## 2021-09-17 DIAGNOSIS — M81.0 AGE-RELATED OSTEOPOROSIS WITHOUT CURRENT PATHOLOGICAL FRACTURE: ICD-10-CM

## 2021-09-17 NOTE — TELEPHONE ENCOUNTER
Osteoporosis right hip fracture dec 08     Hemorrhoids     BPH (benign prostatic hyperplasia)     GERD (gastroesophageal reflux disease)     Mixed hyperlipidemia     Occasional tremors     PVD (peripheral vascular disease) (HCC)     Seizure-like activity (HCC)     Alzheimer's dementia with behavioral disturbance (HCC)     Atrophy, cortical     Hyponatremia     Elevated serum creatinine     Moderate malnutrition ([facility]

## 2021-09-20 RX ORDER — ATORVASTATIN CALCIUM 40 MG/1
TABLET, FILM COATED ORAL
Qty: 28 TABLET | Refills: 5 | Status: SHIPPED | OUTPATIENT
Start: 2021-09-20 | End: 2022-03-07 | Stop reason: SDUPTHER

## 2021-09-20 RX ORDER — B-COMPLEX WITH VITAMIN C
TABLET ORAL
Qty: 56 TABLET | Refills: 5 | Status: SHIPPED | OUTPATIENT
Start: 2021-09-20 | End: 2022-03-09 | Stop reason: ALTCHOICE

## 2021-09-20 RX ORDER — ASPIRIN 81 MG/1
TABLET, DELAYED RELEASE ORAL
Qty: 28 TABLET | Refills: 5 | Status: SHIPPED | OUTPATIENT
Start: 2021-09-20 | End: 2022-03-07 | Stop reason: SDUPTHER

## 2021-10-15 NOTE — TELEPHONE ENCOUNTER
Refill request for pended medicatons. If appropriate please send medication(s) to patients pharmacy. Next appt: 11/5/2021    The original prescription for Omeprazole was discontinued on 5/21/2021 by Primitivo Arauz MD for the following reason: Therapy completed. Renewing this prescription may not be appropriate.     Health Maintenance   Topic Date Due    Meningococcal (ACWY) vaccine (1 - Risk start before 7 months 4-dose series) Never done    Hepatitis A vaccine (1 of 2 - Risk 2-dose series) Never done    Measles,Mumps,Rubella (MMR) vaccine (1 of 2 - Risk 2-dose series) Never done    Hepatitis B vaccine (1 of 3 - Risk 3-dose series) Never done    Shingles Vaccine (1 of 2) Never done    Lipid screen  02/10/2020    Annual Wellness Visit (AWV)  Never done    COVID-19 Vaccine (3 - Moderna risk 3-dose series) 06/17/2021    Flu vaccine (1) 09/01/2021    DTaP/Tdap/Td vaccine (2 - Td or Tdap) 02/21/2027    Pneumococcal 65+ yrs at Risk Vaccine  Completed    Hib vaccine  Aged Out       Hemoglobin A1C (%)   Date Value   02/10/2019 5.4   09/15/2016 5.8             ( goal A1C is < 7)   No results found for: LABMICR  LDL Cholesterol (mg/dL)   Date Value   02/10/2019 85       (goal LDL is <100)   AST (U/L)   Date Value   06/05/2020 25     ALT (U/L)   Date Value   06/05/2020 11     BUN (mg/dL)   Date Value   06/05/2020 13     BP Readings from Last 3 Encounters:   09/08/21 97/65   05/21/21 112/72   03/10/21 119/75          (goal 120/80)          Patient Active Problem List:     HIV (human immunodeficiency virus infection) ([facility]     Dementia without behavioral disturbance (HCC)     Osteoporosis right hip fracture dec 08     Hemorrhoids     BPH (benign prostatic hyperplasia)     GERD (gastroesophageal reflux disease)     Mixed hyperlipidemia     Occasional tremors     PVD (peripheral vascular disease) ([facility]     Seizure-like activity (HCC)     Alzheimer's dementia with behavioral disturbance (HCC)     Atrophy, cortical Hyponatremia     Elevated serum creatinine     Moderate malnutrition ([facility]

## 2021-10-16 RX ORDER — OMEPRAZOLE 20 MG/1
CAPSULE, DELAYED RELEASE ORAL
Qty: 28 CAPSULE | Refills: 5 | Status: SHIPPED | OUTPATIENT
Start: 2021-10-16 | End: 2022-03-07 | Stop reason: SDUPTHER

## 2021-10-16 RX ORDER — DONEPEZIL HYDROCHLORIDE 10 MG/1
TABLET, FILM COATED ORAL
Qty: 28 TABLET | Refills: 5 | Status: SHIPPED | OUTPATIENT
Start: 2021-10-16 | End: 2022-03-07 | Stop reason: SDUPTHER

## 2021-10-16 RX ORDER — MEMANTINE HYDROCHLORIDE 10 MG/1
TABLET ORAL
Qty: 56 TABLET | Refills: 5 | Status: SHIPPED | OUTPATIENT
Start: 2021-10-16 | End: 2022-03-07 | Stop reason: SDUPTHER

## 2021-10-16 RX ORDER — FINASTERIDE 5 MG/1
TABLET, FILM COATED ORAL
Qty: 28 TABLET | Refills: 5 | Status: SHIPPED | OUTPATIENT
Start: 2021-10-16 | End: 2022-03-07 | Stop reason: SDUPTHER

## 2021-11-05 ENCOUNTER — OFFICE VISIT (OUTPATIENT)
Dept: INTERNAL MEDICINE | Age: 81
End: 2021-11-05
Payer: MEDICARE

## 2021-11-05 VITALS
HEIGHT: 66 IN | SYSTOLIC BLOOD PRESSURE: 104 MMHG | TEMPERATURE: 97.2 F | BODY MASS INDEX: 20.14 KG/M2 | DIASTOLIC BLOOD PRESSURE: 62 MMHG

## 2021-11-05 DIAGNOSIS — I73.9 PVD (PERIPHERAL VASCULAR DISEASE) (HCC): Primary | ICD-10-CM

## 2021-11-05 DIAGNOSIS — E44.0 MODERATE MALNUTRITION (HCC): ICD-10-CM

## 2021-11-05 DIAGNOSIS — Z21 ASYMPTOMATIC HIV INFECTION, WITH NO HISTORY OF HIV-RELATED ILLNESS (HCC): ICD-10-CM

## 2021-11-05 DIAGNOSIS — G30.9 ALZHEIMER'S DEMENTIA WITH BEHAVIORAL DISTURBANCE, UNSPECIFIED TIMING OF DEMENTIA ONSET: ICD-10-CM

## 2021-11-05 DIAGNOSIS — E78.2 MIXED HYPERLIPIDEMIA: ICD-10-CM

## 2021-11-05 DIAGNOSIS — F02.81 ALZHEIMER'S DEMENTIA WITH BEHAVIORAL DISTURBANCE, UNSPECIFIED TIMING OF DEMENTIA ONSET: ICD-10-CM

## 2021-11-05 PROCEDURE — 99213 OFFICE O/P EST LOW 20 MIN: CPT | Performed by: STUDENT IN AN ORGANIZED HEALTH CARE EDUCATION/TRAINING PROGRAM

## 2021-11-05 PROCEDURE — 99211 OFF/OP EST MAY X REQ PHY/QHP: CPT | Performed by: INTERNAL MEDICINE

## 2021-11-05 RX ORDER — FEEDER CONTAINER WITH PUMP SET
1 EACH MISCELLANEOUS 3 TIMES DAILY
Qty: 1 EACH | Refills: 2 | Status: SHIPPED | OUTPATIENT
Start: 2021-11-05 | End: 2022-08-04 | Stop reason: SDUPTHER

## 2021-11-05 ASSESSMENT — PATIENT HEALTH QUESTIONNAIRE - PHQ9: DEPRESSION UNABLE TO ASSESS: FUNCTIONAL CAPACITY MOTIVATION LIMITS ACCURACY

## 2021-11-05 NOTE — PROGRESS NOTES
Attending Physician Statement  I have discussed the care of [address] including pertinent history and exam findings,  with the resident. I have reviewed the key elements of all parts of the encounter with the resident. I agree with the assessment, plan and orders as documented by the resident.   (GE Modifier)    MD OLEGARIO Sun  Attending Physician, [facility]   Faculty, Internal Medicine Residency Program  [address] [place] [address]  11/5/2021, 2:20 PM

## 2021-11-05 NOTE — PATIENT INSTRUCTIONS
Return To Clinic 3/4/2022 for 4 month follow up. Please bring all of your medications with you to this appointment. After Visit Summary given and reviewed. The medication list included in this document is our record of what you are currently taking, including any changes that were made at today's visit. If you find any differences when compared to your medications at home, or have any questions that were not answered at your visit, please contact the office. It is very important for your care that you keep your appointment. If for some reason you are unable to keep your appointment, it is equally important that you call our office at [phone] to cancel your appointment and reschedule. Failure to do so may result in your termination from our practice. ANNUAL WELLNESS VISIT : 11/16/2021 at 10:00 AM via telephone. Medications have been e-scribed to pharmacy of patients choice.      -LUDWIN Murphy

## 2021-11-05 NOTE — PROGRESS NOTES
[facility] [id number] [facility] [address]  Dept: [phone]  Dept Fax: [phone]    Office Progress/Follow Up Note  Date of patient's visit: 11/5/2021  Patient's Name:  Eleanor French YOB: 1940            Patient Care Team:  Farzaneh Paredes MD as PCP - General (Internal Medicine)  Génesis Crowell MD as PCP - [facility]  Javier Santos MD as Consulting Physician (Infectious Diseases)  Jason Casiano DPM (Podiatry)    REASON FOR VISIT: Routine outpatient follow up    HISTORY OF PRESENT ILLNESS:      Chief Complaint   Patient presents with    Follow-up    Health Maintenance     Vaccines declined, AWV scheduled    Dementia       History was obtained from the patient. Eleanor French is a 78 y.o. is here for a routine follow-up. Patient last seen by me approximately 6 months ago. Patient with significant past medical history of Alzheimer's dementia on donepezil and memantine, HIV, follows with . Due to insurance issues, patient has recently been changed from a Atripla to [place]. Apparently patient is very uncomfortable getting blood work done, nearly fighting with phlebotomist on multiple occasion so he has not received blood work in over 1 year. Most recent CD4 count 526. No other acute complaints per patient or caregiver.     Patient Active Problem List   Diagnosis    HIV (human immunodeficiency virus infection) ([address]    Dementia without behavioral disturbance ([address]    Osteoporosis right hip fracture dec 08    Hemorrhoids    BPH (benign prostatic hyperplasia)    GERD (gastroesophageal reflux disease)    Mixed hyperlipidemia    Occasional tremors    PVD (peripheral vascular disease) ([address]    Seizure-like activity ([address]    Alzheimer's dementia with behavioral disturbance (HCC)    Atrophy, cortical    Hyponatremia    Elevated serum creatinine    Moderate malnutrition ([address]         Health urticaria  EYES: Denies: blurry vision, decreased vision, photophobia  ENT: Denies: sore throat, nasal congestion  CARDIOVASCULAR: Denies: chest pain, dyspnea on exertion  RESPIRATORY: Denies: cough, hemoptysis, shortness of breath  GI: Denies: Denies: abdominal pain, flank pain  : Denies: Denies: dysuria, frequency/urgency  NEURO: Denies: dizzy/vertigo, headache  MUSCULOSKELETAL: Denies: back pain, joint pain  SKIN: Denies: rash, itching    PHYSICAL EXAM:      Vitals:    11/05/21 1327   BP: 104/62   Temp: 97.2 °F (36.2 °C)   TempSrc: Temporal   Height: 5' 6\" (1.676 m)     BP Readings from Last 3 Encounters:   11/05/21 104/62   09/08/21 97/65   05/21/21 112/72      General appearance - uncooperative, lethargic, unresponsive  Lymphatics - no palpable lymphadenopathy, no hepatosplenomegaly  Chest - clear to auscultation, no wheezes, rales or rhonchi, symmetric air entry  Heart - normal rate, regular rhythm, normal S1, S2, no murmurs, rubs, clicks or gallops  Abdomen - soft, nontender, nondistended, no masses or organomegaly  Musculoskeletal - no joint tenderness, deformity or swelling  Extremities - peripheral pulses normal, no pedal edema, no clubbing or cyanosis    LABORATORY FINDINGS:    CBC:  Lab Results   Component Value Date    WBC 8.8 06/05/2020    WBC 8.8 06/05/2020    HGB 12.5 06/05/2020     06/05/2020     04/18/2012       BMP:    Lab Results   Component Value Date     06/05/2020    K 5.4 06/05/2020    CL 98 06/05/2020    CO2 22 06/05/2020    BUN 13 06/05/2020    CREATININE 1.14 06/05/2020    GLUCOSE 120 06/05/2020    GLUCOSE 76 04/18/2012       HEMOGLOBIN A1C:   Lab Results   Component Value Date    LABA1C 5.4 02/10/2019       FASTING LIPID PANEL:  Lab Results   Component Value Date    CHOL 185 02/10/2019    HDL 72 02/10/2019    TRIG 139 02/10/2019       ASSESSMENT AND PLAN:    1.  Alzheimer's dementia with behavioral disturbance, unspecified timing of dementia onset ([facility]  Memantine and donepezil    2. Asymptomatic HIV infection, with no history of HIV-related illness ([facility]  Follows with Dr. Elyse Pena  Recently switched to [facility]    3. PVD (peripheral vascular disease) (HCC)  Continue aspirin    4. Mixed hyperlipidemia  atorvastatin    5. Moderate malnutrition ([facility]  ensure        FOLLOW UP AND INSTRUCTIONS:   · No follow-ups on file. · Lemon received counseling on the following healthy behaviors: nutrition and exercise    · Discussed use, benefit, and side effects of prescribed medications. Barriers to medication compliance addressed. All patient questions answered. Pt voiced understanding. · Patient given educational materials - see patient instructions    Isaura Thakur MD  Internal Medicine Resident, PGY-2  [facility];  [place], [place]  11/5/2021, 2:06 PM

## 2021-11-12 DIAGNOSIS — N40.0 BENIGN PROSTATIC HYPERPLASIA WITHOUT LOWER URINARY TRACT SYMPTOMS: ICD-10-CM

## 2021-11-12 RX ORDER — TAMSULOSIN HYDROCHLORIDE 0.4 MG/1
CAPSULE ORAL
Qty: 28 CAPSULE | Refills: 3 | Status: SHIPPED | OUTPATIENT
Start: 2021-11-12 | End: 2022-03-07 | Stop reason: SDUPTHER

## 2021-11-12 RX ORDER — MULTIVITAMIN WITH FOLIC ACID 400 MCG
TABLET ORAL
Qty: 28 TABLET | Refills: 5 | Status: SHIPPED | OUTPATIENT
Start: 2021-11-12 | End: 2022-03-07 | Stop reason: SDUPTHER

## 2021-11-12 RX ORDER — DIVALPROEX SODIUM 125 MG/1
CAPSULE, COATED PELLETS ORAL
Qty: 112 CAPSULE | Refills: 3 | Status: SHIPPED | OUTPATIENT
Start: 2021-11-12 | End: 2022-03-07 | Stop reason: SDUPTHER

## 2021-11-12 NOTE — TELEPHONE ENCOUNTER
Refill request for pended medications. If appropriate please send medication(s) to patients pharmacy.     Next appt: 3/4/2022      Health Maintenance   Topic Date Due    Meningococcal (ACWY) vaccine (1 - Risk start 2-23 months series) Never done    Lipid screen  02/10/2020    Annual Wellness Visit (AWV)  Never done    COVID-19 Vaccine (3 - Moderna risk 4-dose series) 06/17/2021    Hepatitis A vaccine (1 of 2 - Risk 2-dose series) 11/05/2022 (Originally 10/3/1941)    Hepatitis B vaccine (1 of 3 - Risk 3-dose series) 11/05/2022 (Originally 10/3/1959)    Measles,Mumps,Rubella (MMR) vaccine (1 of 2 - Risk 2-dose series) 11/05/2022 (Originally 10/3/1958)    Flu vaccine (1) 11/05/2022 (Originally 9/1/2021)    Shingles Vaccine (1 of 2) 11/05/2022 (Originally 10/3/1988)    DTaP/Tdap/Td vaccine (2 - Td or Tdap) 02/21/2027    Pneumococcal 65+ yrs at Risk Vaccine  Completed    Hib vaccine  Aged Out       Hemoglobin A1C (%)   Date Value   02/10/2019 5.4   09/15/2016 5.8             ( goal A1C is < 7)   No results found for: LABMICR  LDL Cholesterol (mg/dL)   Date Value   02/10/2019 85       (goal LDL is <100)   AST (U/L)   Date Value   06/05/2020 25     ALT (U/L)   Date Value   06/05/2020 11     BUN (mg/dL)   Date Value   06/05/2020 13     BP Readings from Last 3 Encounters:   11/05/21 104/62   09/08/21 97/65   05/21/21 112/72          (goal 120/80)          Patient Active Problem List:     HIV (human immunodeficiency virus infection) ([address]     Dementia without behavioral disturbance ([address]     Osteoporosis right hip fracture dec 08     Hemorrhoids     BPH (benign prostatic hyperplasia)     GERD (gastroesophageal reflux disease)     Mixed hyperlipidemia     Occasional tremors     PVD (peripheral vascular disease) ([address]     Seizure-like activity (HCC)     Alzheimer's dementia with behavioral disturbance (HCC)     Atrophy, cortical     Hyponatremia     Elevated serum creatinine     Moderate malnutrition ([address]

## 2021-11-14 ENCOUNTER — HOSPITAL ENCOUNTER (INPATIENT)
Age: 81
LOS: 5 days | Discharge: NURSING, REHAB OR LONG-TERM CARE | DRG: 177 | End: 2021-11-19
Attending: EMERGENCY MEDICINE | Admitting: INTERNAL MEDICINE
Payer: MEDICARE

## 2021-11-14 ENCOUNTER — APPOINTMENT (OUTPATIENT)
Dept: GENERAL RADIOLOGY | Age: 81
DRG: 177 | End: 2021-11-14
Payer: MEDICARE

## 2021-11-14 DIAGNOSIS — U07.1 COVID-19: Primary | ICD-10-CM

## 2021-11-14 DIAGNOSIS — R09.02 HYPOXIA: ICD-10-CM

## 2021-11-14 LAB
ABSOLUTE EOS #: 0.09 K/UL (ref 0–0.44)
ABSOLUTE IMMATURE GRANULOCYTE: 0.03 K/UL (ref 0–0.3)
ABSOLUTE LYMPH #: 2.12 K/UL (ref 1.1–3.7)
ABSOLUTE MONO #: 1.42 K/UL (ref 0.1–1.2)
ALBUMIN SERPL-MCNC: 3.6 G/DL (ref 3.5–5.2)
ALBUMIN/GLOBULIN RATIO: 0.9 (ref 1–2.5)
ALP BLD-CCNC: 142 U/L (ref 40–129)
ALT SERPL-CCNC: 13 U/L (ref 5–41)
ANION GAP SERPL CALCULATED.3IONS-SCNC: 10 MMOL/L (ref 9–17)
AST SERPL-CCNC: 32 U/L
BASOPHILS # BLD: 1 % (ref 0–2)
BASOPHILS ABSOLUTE: 0.05 K/UL (ref 0–0.2)
BILIRUB SERPL-MCNC: 0.21 MG/DL (ref 0.3–1.2)
BILIRUBIN DIRECT: <0.08 MG/DL
BILIRUBIN, INDIRECT: ABNORMAL MG/DL (ref 0–1)
BNP INTERPRETATION: ABNORMAL
BUN BLDV-MCNC: 12 MG/DL (ref 8–23)
BUN/CREAT BLD: ABNORMAL (ref 9–20)
C-REACTIVE PROTEIN: 14.4 MG/L (ref 0–5)
CALCIUM SERPL-MCNC: 8.9 MG/DL (ref 8.6–10.4)
CHLORIDE BLD-SCNC: 105 MMOL/L (ref 98–107)
CO2: 24 MMOL/L (ref 20–31)
CREAT SERPL-MCNC: 0.94 MG/DL (ref 0.7–1.2)
D-DIMER QUANTITATIVE: 0.49 MG/L FEU
DIFFERENTIAL TYPE: ABNORMAL
EOSINOPHILS RELATIVE PERCENT: 1 % (ref 1–4)
FERRITIN: 57 UG/L (ref 30–400)
FIBRINOGEN: 376 MG/DL (ref 140–420)
GFR AFRICAN AMERICAN: >60 ML/MIN
GFR NON-AFRICAN AMERICAN: >60 ML/MIN
GFR SERPL CREATININE-BSD FRML MDRD: ABNORMAL ML/MIN/{1.73_M2}
GFR SERPL CREATININE-BSD FRML MDRD: ABNORMAL ML/MIN/{1.73_M2}
GLOBULIN: ABNORMAL G/DL (ref 1.5–3.8)
GLUCOSE BLD-MCNC: 111 MG/DL (ref 70–99)
HCT VFR BLD CALC: 38.7 % (ref 40.7–50.3)
HEMOGLOBIN: 11.9 G/DL (ref 13–17)
IMMATURE GRANULOCYTES: 0 %
LACTATE DEHYDROGENASE: 231 U/L (ref 135–225)
LYMPHOCYTES # BLD: 30 % (ref 24–43)
MCH RBC QN AUTO: 28.5 PG (ref 25.2–33.5)
MCHC RBC AUTO-ENTMCNC: 30.7 G/DL (ref 28.4–34.8)
MCV RBC AUTO: 92.8 FL (ref 82.6–102.9)
MONOCYTES # BLD: 20 % (ref 3–12)
NRBC AUTOMATED: 0 PER 100 WBC
PDW BLD-RTO: 14.6 % (ref 11.8–14.4)
PLATELET # BLD: 158 K/UL (ref 138–453)
PLATELET ESTIMATE: ABNORMAL
PMV BLD AUTO: 10.9 FL (ref 8.1–13.5)
POTASSIUM SERPL-SCNC: 5.4 MMOL/L (ref 3.7–5.3)
PRO-BNP: 397 PG/ML
PROCALCITONIN: 0.21 NG/ML
RBC # BLD: 4.17 M/UL (ref 4.21–5.77)
RBC # BLD: ABNORMAL 10*6/UL
SARS-COV-2, RAPID: DETECTED
SEG NEUTROPHILS: 48 % (ref 36–65)
SEGMENTED NEUTROPHILS ABSOLUTE COUNT: 3.29 K/UL (ref 1.5–8.1)
SODIUM BLD-SCNC: 139 MMOL/L (ref 135–144)
SPECIMEN DESCRIPTION: ABNORMAL
TOTAL PROTEIN: 7.6 G/DL (ref 6.4–8.3)
TROPONIN INTERP: NORMAL
TROPONIN T: NORMAL NG/ML
TROPONIN, HIGH SENSITIVITY: 13 NG/L (ref 0–22)
WBC # BLD: 7 K/UL (ref 3.5–11.3)
WBC # BLD: ABNORMAL 10*3/UL

## 2021-11-14 PROCEDURE — 86140 C-REACTIVE PROTEIN: CPT

## 2021-11-14 PROCEDURE — 84145 PROCALCITONIN (PCT): CPT

## 2021-11-14 PROCEDURE — 85379 FIBRIN DEGRADATION QUANT: CPT

## 2021-11-14 PROCEDURE — 2580000003 HC RX 258

## 2021-11-14 PROCEDURE — 6370000000 HC RX 637 (ALT 250 FOR IP)

## 2021-11-14 PROCEDURE — 71045 X-RAY EXAM CHEST 1 VIEW: CPT

## 2021-11-14 PROCEDURE — 84484 ASSAY OF TROPONIN QUANT: CPT

## 2021-11-14 PROCEDURE — 99285 EMERGENCY DEPT VISIT HI MDM: CPT

## 2021-11-14 PROCEDURE — 80048 BASIC METABOLIC PNL TOTAL CA: CPT

## 2021-11-14 PROCEDURE — 1200000000 HC SEMI PRIVATE

## 2021-11-14 PROCEDURE — 80076 HEPATIC FUNCTION PANEL: CPT

## 2021-11-14 PROCEDURE — APPSS30 APP SPLIT SHARED TIME 16-30 MINUTES: Performed by: NURSE PRACTITIONER

## 2021-11-14 PROCEDURE — 83615 LACTATE (LD) (LDH) ENZYME: CPT

## 2021-11-14 PROCEDURE — 86359 T CELLS TOTAL COUNT: CPT

## 2021-11-14 PROCEDURE — 6360000002 HC RX W HCPCS

## 2021-11-14 PROCEDURE — 82728 ASSAY OF FERRITIN: CPT

## 2021-11-14 PROCEDURE — 85025 COMPLETE CBC W/AUTO DIFF WBC: CPT

## 2021-11-14 PROCEDURE — 85384 FIBRINOGEN ACTIVITY: CPT

## 2021-11-14 PROCEDURE — 87536 HIV-1 QUANT&REVRSE TRNSCRPJ: CPT

## 2021-11-14 PROCEDURE — 93005 ELECTROCARDIOGRAM TRACING: CPT | Performed by: EMERGENCY MEDICINE

## 2021-11-14 PROCEDURE — 86357 NK CELLS TOTAL COUNT: CPT

## 2021-11-14 PROCEDURE — 99222 1ST HOSP IP/OBS MODERATE 55: CPT | Performed by: INTERNAL MEDICINE

## 2021-11-14 PROCEDURE — 99223 1ST HOSP IP/OBS HIGH 75: CPT | Performed by: INTERNAL MEDICINE

## 2021-11-14 PROCEDURE — 6360000002 HC RX W HCPCS: Performed by: STUDENT IN AN ORGANIZED HEALTH CARE EDUCATION/TRAINING PROGRAM

## 2021-11-14 PROCEDURE — 83880 ASSAY OF NATRIURETIC PEPTIDE: CPT

## 2021-11-14 PROCEDURE — 86360 T CELL ABSOLUTE COUNT/RATIO: CPT

## 2021-11-14 PROCEDURE — 87635 SARS-COV-2 COVID-19 AMP PRB: CPT

## 2021-11-14 PROCEDURE — 86355 B CELLS TOTAL COUNT: CPT

## 2021-11-14 RX ORDER — TAMSULOSIN HYDROCHLORIDE 0.4 MG/1
0.4 CAPSULE ORAL DAILY
Status: DISCONTINUED | OUTPATIENT
Start: 2021-11-14 | End: 2021-11-19 | Stop reason: HOSPADM

## 2021-11-14 RX ORDER — ONDANSETRON 2 MG/ML
4 INJECTION INTRAMUSCULAR; INTRAVENOUS EVERY 6 HOURS PRN
Status: DISCONTINUED | OUTPATIENT
Start: 2021-11-14 | End: 2021-11-19 | Stop reason: HOSPADM

## 2021-11-14 RX ORDER — ASPIRIN 81 MG/1
81 TABLET ORAL DAILY
Status: DISCONTINUED | OUTPATIENT
Start: 2021-11-14 | End: 2021-11-15

## 2021-11-14 RX ORDER — DONEPEZIL HYDROCHLORIDE 10 MG/1
10 TABLET, FILM COATED ORAL NIGHTLY
Status: DISCONTINUED | OUTPATIENT
Start: 2021-11-14 | End: 2021-11-19 | Stop reason: HOSPADM

## 2021-11-14 RX ORDER — ONDANSETRON 4 MG/1
4 TABLET, ORALLY DISINTEGRATING ORAL EVERY 8 HOURS PRN
Status: DISCONTINUED | OUTPATIENT
Start: 2021-11-14 | End: 2021-11-19 | Stop reason: HOSPADM

## 2021-11-14 RX ORDER — SODIUM CHLORIDE 0.9 % (FLUSH) 0.9 %
5-40 SYRINGE (ML) INJECTION EVERY 12 HOURS SCHEDULED
Status: DISCONTINUED | OUTPATIENT
Start: 2021-11-14 | End: 2021-11-19 | Stop reason: HOSPADM

## 2021-11-14 RX ORDER — POLYETHYLENE GLYCOL 3350 17 G/17G
17 POWDER, FOR SOLUTION ORAL DAILY PRN
Status: DISCONTINUED | OUTPATIENT
Start: 2021-11-14 | End: 2021-11-19 | Stop reason: HOSPADM

## 2021-11-14 RX ORDER — ASPIRIN 81 MG/1
81 TABLET ORAL ONCE
Status: DISCONTINUED | OUTPATIENT
Start: 2021-11-14 | End: 2021-11-14

## 2021-11-14 RX ORDER — ACETAMINOPHEN 325 MG/1
650 TABLET ORAL EVERY 6 HOURS PRN
Status: DISCONTINUED | OUTPATIENT
Start: 2021-11-14 | End: 2021-11-19 | Stop reason: HOSPADM

## 2021-11-14 RX ORDER — ACETAMINOPHEN 650 MG/1
650 SUPPOSITORY RECTAL EVERY 6 HOURS PRN
Status: DISCONTINUED | OUTPATIENT
Start: 2021-11-14 | End: 2021-11-19 | Stop reason: HOSPADM

## 2021-11-14 RX ORDER — ATORVASTATIN CALCIUM 40 MG/1
40 TABLET, FILM COATED ORAL NIGHTLY
Status: DISCONTINUED | OUTPATIENT
Start: 2021-11-14 | End: 2021-11-19 | Stop reason: HOSPADM

## 2021-11-14 RX ORDER — HALOPERIDOL 5 MG/ML
5 INJECTION INTRAMUSCULAR
Status: COMPLETED | OUTPATIENT
Start: 2021-11-14 | End: 2021-11-14

## 2021-11-14 RX ORDER — DIVALPROEX SODIUM 125 MG/1
250 CAPSULE, COATED PELLETS ORAL EVERY 12 HOURS SCHEDULED
Status: DISCONTINUED | OUTPATIENT
Start: 2021-11-14 | End: 2021-11-19 | Stop reason: HOSPADM

## 2021-11-14 RX ORDER — SODIUM CHLORIDE 0.9 % (FLUSH) 0.9 %
5-40 SYRINGE (ML) INJECTION PRN
Status: DISCONTINUED | OUTPATIENT
Start: 2021-11-14 | End: 2021-11-19 | Stop reason: HOSPADM

## 2021-11-14 RX ORDER — MEMANTINE HYDROCHLORIDE 5 MG/1
10 TABLET ORAL 2 TIMES DAILY
Status: DISCONTINUED | OUTPATIENT
Start: 2021-11-14 | End: 2021-11-19 | Stop reason: HOSPADM

## 2021-11-14 RX ORDER — SODIUM CHLORIDE 9 MG/ML
25 INJECTION, SOLUTION INTRAVENOUS PRN
Status: DISCONTINUED | OUTPATIENT
Start: 2021-11-14 | End: 2021-11-19 | Stop reason: HOSPADM

## 2021-11-14 RX ADMIN — DONEPEZIL HYDROCHLORIDE 10 MG: 10 TABLET, FILM COATED ORAL at 21:16

## 2021-11-14 RX ADMIN — MEMANTINE HYDROCHLORIDE 10 MG: 5 TABLET, FILM COATED ORAL at 21:16

## 2021-11-14 RX ADMIN — DIVALPROEX SODIUM 250 MG: 125 CAPSULE, COATED PELLETS ORAL at 21:16

## 2021-11-14 RX ADMIN — TAMSULOSIN HYDROCHLORIDE 0.4 MG: 0.4 CAPSULE ORAL at 21:16

## 2021-11-14 RX ADMIN — DESMOPRESSIN ACETATE 40 MG: 0.2 TABLET ORAL at 21:16

## 2021-11-14 RX ADMIN — SODIUM CHLORIDE, PRESERVATIVE FREE 10 ML: 5 INJECTION INTRAVENOUS at 21:17

## 2021-11-14 RX ADMIN — HALOPERIDOL LACTATE 5 MG: 5 INJECTION, SOLUTION INTRAMUSCULAR at 17:55

## 2021-11-14 RX ADMIN — Medication 81 MG: at 21:16

## 2021-11-14 RX ADMIN — ENOXAPARIN SODIUM 40 MG: 100 INJECTION SUBCUTANEOUS at 21:17

## 2021-11-14 ASSESSMENT — PAIN SCALES - GENERAL
PAINLEVEL_OUTOF10: 0

## 2021-11-14 ASSESSMENT — ENCOUNTER SYMPTOMS
RHINORRHEA: 0
EYE REDNESS: 0
SHORTNESS OF BREATH: 0
COUGH: 1
ABDOMINAL DISTENTION: 0
ABDOMINAL PAIN: 0

## 2021-11-14 NOTE — Clinical Note
Patient Class: Inpatient [101]   REQUIRED: Diagnosis: COVID [[id number]]   Estimated Length of Stay: Estimated stay of more than 2 midnights   Admitting Provider: Lisa Richard [[id number]]   Telemetry/Cardiac Monitoring Required?: Yes

## 2021-11-14 NOTE — ED PROVIDER NOTES
Faculty Sign-Out Attestation  Handoff taken on the following patient from prior Attending Physician: Huber Figueredo    I was available and discussed any additional care issues that arose and coordinated the management plans with the resident(s) caring for the patient during my duty period. Any areas of disagreement with residents documentation of care or procedures are noted on the chart. I was personally present for the key portions of any/all procedures during my duty period. I have documented in the chart those procedures where I was not present during the key portions. 49-year-old male with a history of dementia, nonverbal presenting with Covid. 92% on room air at rest.  Being admitted for further management, bed assigned, awaiting transport upstairs.     Charline Herrera MD  Attending Physician        Charline Herrera MD  11/14/21 [id number]

## 2021-11-14 NOTE — CONSULTS
[facility] - Initial Consult Note COVID 19 Patient  Today's Date and Time: 11/14/2021, 2:50 PM    Impression :     · COVID 19 Confirmed Infection  · Covid tests:  · 11/14/21: Positive  · HIV  · Alzheimer's dementia with behavioral disturbances  · Hx of Syphilis  · Fully vaccinated with Moderna    Recommendations:   · Antibiotic treatment:  · Monitor off antibiotics  · Continue HIV treatments  · Covid Rx:  · Remdesivir-Out of window  · Decadron-Does not meet requirements  · Actemra-Does not meet requirements at this time  · Monitor CRP    Medical Decision Making/Summary/Discussion:11/14/2021     · Patient admitted with suspected COVID 19 infection  · Covid test confirmed positive. Infection Control Recommendations   · Universal Precautions  · Airborne isolation  · Droplet Isolation    Antimicrobial Stewardship Recommendations     · Discontinuation of therapy  Coordination of Outpatient Care:   · Estimated Length of IV antimicrobials:TBD  · Patient will need Midline Catheter Insertion: TBD  · Patient will need PICC line Insertion: No  · Patient will need: Home IV , [place],  SNF,  LTAC:TBD  · Patient will need outpatient wound care:No    Chief complaint/reason for consultation:   · Concern for COVID infection      History of Present Illness:   Mckenna Mcgovern is a 78y.o.-year-old male who was initially admitted on 11/14/2021. Patient seen at the request of Dr. Chan Velez:    Patient presented through ER with complaints of dry cough and fatigue X 2 weeks per his wife. He has also been experiencing difficulty urinating. The patient is non-verbal and may be aggressive at times secondary to dementia. He follow with Dr. George Maharaj for his HIV and takes Atripla and [place]. His Covid swab resulted as positive. No acute process noted on CXR.     Patient admitted because of concerns with COVID 19.    CURRENT EVALUATION : 11/14/2021    Afebrile  VS  Osteoporosis     PVD (peripheral vascular disease) ([facility] 2018    Seizure-like activity ([facility] 2018    Syphilis in male     Wears glasses        Past Surgical  History:     Past Surgical History:   Procedure Laterality Date    COLONOSCOPY      HEMORRHOID SURGERY      HERNIA REPAIR      umbilical    GA X-RAY RETROGRADE PYELOGRAM Bilateral 3/6/2018    CYSTOSCOPY RETROGRADE PYELOGRAM performed by Belgica Parra MD at [address] [place] [address] N/A 2017    SIGMOIDOSCOPY POLYP SNARE performed by Erlinda Rodrigues MD at [facility]        Medications:       Social History:     Social History     Socioeconomic History    Marital status:      Spouse name: Not on file    Number of children: 5    Years of education: 3    Highest education level: 3rd grade   Occupational History    Not on file   Tobacco Use    Smoking status: Former Smoker     Packs/day: 0.00     Years: 50.00     Pack years: 0.00     Types: Cigarettes     Start date:      Quit date: 2021     Years since quittin.8    Smokeless tobacco: Never Used    Tobacco comment:  cigerettes per day   Vaping Use    Vaping Use: Never used   Substance and Sexual Activity    Alcohol use: Yes     Alcohol/week: 4.0 standard drinks     Types: 4 Cans of beer per week    Drug use: No    Sexual activity: Not on file   Other Topics Concern    Not on file   Social History Narrative    Not on file     Social Determinants of Health     Financial Resource Strain: Low Risk     Difficulty of Paying Living Expenses: Not very hard   Food Insecurity: No Food Insecurity    Worried About Running Out of Food in the Last Year: Never true    Mirta of Food in the Last Year: Never true   Transportation Needs:     Lack of Transportation (Medical): Not on file    Lack of Transportation (Non-Medical):  Not on file   Physical Activity:     Days of Exercise per Week: Not on file    Minutes of Exercise per Session: Not on file   Stress:     Feeling of Stress : Not on file   Social Connections:     Frequency of Communication with Friends and Family: Not on file    Frequency of Social Gatherings with Friends and Family: Not on file    Attends [employer]ism Services: Not on file    Active Member of Clubs or Organizations: Not on file    Attends Club or Organization Meetings: Not on file    Marital Status: Not on file   Intimate Partner Violence:     Fear of Current or Ex-Partner: Not on file    Emotionally Abused: Not on file    Physically Abused: Not on file    Sexually Abused: Not on file   Housing Stability:     Unable to Pay for Housing in the Last Year: Not on file    Number of [place] in the Last Year: Not on file    Unstable Housing in the Last Year: Not on file       Family History:     Family History   Problem Relation Age of Onset    Heart Disease Mother     High Blood Pressure Mother     Cancer Father     Diabetes Sister     Heart Disease Sister     High Blood Pressure Sister     Heart Disease Brother     High Blood Pressure Brother         Allergies:   Patient has no known allergies. Review of Systems:     SHIRLEY-Dementia  Constitutional: No fevers or chills. No systemic complaints  Head: No headaches  Eyes: No double vision or blurry vision. No conjunctival inflammation. ENT: No sore throat or runny nose. . No hearing loss, tinnitus or vertigo. Cardiovascular: No chest pain or palpitations. No Shortness of breath. No BENJAMIN  Lung: No Shortness of breath or cough. No sputum production  Abdomen: No nausea, vomiting, diarrhea, or abdominal pain. [place] No cramps. Genitourinary: No increased urinary frequency, or dysuria. No hematuria. No suprapubic or CVA pain  Musculoskeletal: No muscle aches or pains. No joint effusions, swelling or deformities  Hematologic: No bleeding or bruising. Neurologic: No headache, weakness, numbness, or tingling.   Integument: No rash, no TRICHOMONAS NOT REPORTED 09/10/2019    WBC 7.0 11/14/2021    YEAST NOT REPORTED 09/10/2019    TURBIDITY CLEAR 09/10/2019     Lab Results   Component Value Date    CREATININE 0.94 11/14/2021    GLUCOSE 111 11/14/2021    GLUCOSE 76 04/18/2012       Medical Decision Making-Imaging:       Medical Decision Enpnci-Lktjcnhq-Vaxub:       Medical Decision Making-Other:     Note:  · Labs, medications, radiologic studies were reviewed with personal review of films  · Large amounts of data were reviewed  · Discussed with nursing Staff, Discharge planner  · Infection Control and Prevention measures reviewed  · All prior entries were reviewed  · Administer medications as ordered  · Prognosis: Guarded  · Discharge planning reviewed      Thank you for allowing us to participate in the care of this patient. Please call with questions. Lalitha Reece APRN - CNP     ATTESTATION:    I have discussed the case, including pertinent history and exam findings with the APRN. I have evaluated the  History, physical findings and pictures of the patient and the key elements of the encounter have been performed by me. I have reviewed the laboratory data, other diagnostic studies and discussed them with the APRN. I have updated the medical record where necessary. I agree with the assessment, plan and orders as documented by the APRN.     Emily Talley MD.      Pager: [phone] - Office: [phone]

## 2021-11-14 NOTE — ED PROVIDER NOTES
[facility] ED  Emergency Department Encounter  Emergency Medicine Resident     Pt Name: Praneeth Vega  MRN: [id number]  [place] 1940  Date of evaluation: 11/14/21  PCP:  Lucia Patton MD    CHIEF COMPLAINT       Chief Complaint   Patient presents with    Fatigue    Cough     HISTORY OFPRESENT ILLNESS  (Location/Symptom, Timing/Onset, Context/Setting, Quality, Duration, Modifying Factors,Severity.)      Praneeth Vega is a 78 y.o. male with history of HIV, dementia who presents with cough, fatigue and increased sleeping over the last 2 weeks. His daughter states that this started after they got their flu vaccination. Patient's wife is also presenting to the emergency room with similar symptoms. Per daughter and wife mentation is at baseline for the patient. At baseline he does not speak, and is fairly agitated, not wanting people to touch him. Per wife he has not been complaining of any pain symptoms. Covid vaccinated x2 with Moderna. Wife is the patient's power of  who states she would like a full work-up performed on this patient to evaluate for cause of his fatigue. She understands his agitation and noncompliance may be an issue for establishing IV and agrees with use of medication if needed to get an IV established. Spoke with patient's wife about Mr. Ruben Montero code status. She states he would not like anyone performing CPR on him and she believes he would want DNR CC. Currently no paperwork clarifying POA, so formal paperwork not done.     PAST MEDICAL / SURGICAL / SOCIAL / FAMILY HISTORY      has a past medical history of Acute delirium, Acute metabolic encephalopathy, Alzheimer's dementia with behavioral disturbance ([address] Atrophy, cortical, BPH (benign prostatic hyperplasia), Dementia ([address] Dementia without behavioral disturbance ([address] GERD (gastroesophageal reflux disease), Hemorrhoids, HIV (human immunodeficiency virus infection) ([address] Meralgia paraesthetica, Mixed hyperlipidemia, Occasional tremors, Osteoporosis, PVD (peripheral vascular disease) ([facility] Seizure-like activity ([facility] Syphilis in male, and Wears glasses. has a past surgical history that includes Total hip arthroplasty (Left); Tonsillectomy; Hemorrhoid surgery; sigmoidoscopy (N/A, 2017); Colonoscopy; hernia repair; and pr x-ray retrograde pyelogram (Bilateral, 3/6/2018). Social History     Socioeconomic History    Marital status:      Spouse name: Not on file    Number of children: 11    Years of education: 3    Highest education level: 3rd grade   Occupational History    Not on file   Tobacco Use    Smoking status: Former Smoker     Packs/day: 0.00     Years: 50.00     Pack years: 0.00     Types: Cigarettes     Start date:      Quit date: 2021     Years since quittin.8    Smokeless tobacco: Never Used    Tobacco comment:  cigerettes per day   Vaping Use    Vaping Use: Never used   Substance and Sexual Activity    Alcohol use: Yes     Alcohol/week: 4.0 standard drinks     Types: 4 Cans of beer per week    Drug use: No    Sexual activity: Not on file   Other Topics Concern    Not on file   Social History Narrative    Not on file     Social Determinants of Health     Financial Resource Strain: Low Risk     Difficulty of Paying Living Expenses: Not very hard   Food Insecurity: No Food Insecurity    Worried About Running Out of Food in the Last Year: Never true    Mirta of Food in the Last Year: Never true   Transportation Needs:     Lack of Transportation (Medical): Not on file    Lack of Transportation (Non-Medical):  Not on file   Physical Activity:     Days of Exercise per Week: Not on file    Minutes of Exercise per Session: Not on file   Stress:     Feeling of Stress : Not on file   Social Connections:     Frequency of Communication with Friends and Family: Not on file    Frequency of Social Gatherings with Friends and Family: Not on Steff Lopez MD   Calcium Carbonate-Vitamin D (OYSTER SHELL CALCIUM/D) 500-200 MG-UNIT TABS TAKE 2 TABLETS DAILY 9/20/21   Veronica Hansen MD   ATRIPLA 600-200-300 MG per tablet TAKE 1 TABLET BY MOUTH DAILY AT BEDTIME 7/28/21 9/8/21  Stephany Alberto MD   Incontinence Supply Disposable (BRIEFS OVERNIGHT MEDIUM) MISC 1 box by Does not apply route daily 5/21/21   Saray Mo MD   Nutritional Supplements (ENSURE NUTRA SHAKE HI-CRICKET) LIQD Take 1 Bottle by mouth daily 5/21/21   Saray Mo MD   vitamin E 400 UNIT capsule Take 400 Units by mouth daily 9/23/19   Historical Provider, MD     REVIEW OF SYSTEMS    (2-9 systems for level 4, 10 or more for level 5)      Review of Systems   Constitutional: Positive for fatigue. Negative for activity change, appetite change and fever. HENT: Positive for congestion. Negative for rhinorrhea. Eyes: Negative for redness. Respiratory: Positive for cough. Negative for shortness of breath. Cardiovascular: Negative for chest pain. Gastrointestinal: Negative for abdominal distention and abdominal pain. Genitourinary: Negative for dysuria and hematuria. Musculoskeletal: Negative for joint swelling. Skin: Negative for rash and wound. Neurological: Negative for headaches. Psychiatric/Behavioral: Positive for agitation (baseline). PHYSICAL EXAM   (up to 7 for level 4, 8 or more for level 5)     INITIAL VITALS:    oral temperature is 97.3 °F (36.3 °C). His blood pressure is 117/69 and his pulse is 88. His respiration is 20 and oxygen saturation is 95%. Physical Exam  Constitutional:       Appearance: He is not ill-appearing, toxic-appearing or diaphoretic. HENT:      Head: Normocephalic and atraumatic. Nose: No congestion. Comments: Bilateral nasal turbinates erythematous but not edematous  Eyes:      Extraocular Movements: Extraocular movements intact. Pupils: Pupils are equal, round, and reactive to light. Comments: Bilateral cataracts. Pupils 2mm bilaterally and minimally but equally reactive to light   Cardiovascular:      Rate and Rhythm: Normal rate and regular rhythm. Pulses: Normal pulses. Heart sounds: Normal heart sounds. No murmur heard. Pulmonary:      Effort: Pulmonary effort is normal.      Breath sounds: Wheezing present. Comments: Wheezes and crackles heard over left lower lung base. Dry cough  Chest:      Chest wall: No tenderness. Abdominal:      General: There is no distension. Palpations: Abdomen is soft. Tenderness: There is no abdominal tenderness. There is no guarding. Musculoskeletal:      Cervical back: Normal range of motion. No rigidity. Comments: Mild edema noted to left lower extremity but non tender and no warmth. Minimal to no edema noted to right lower leg without tenderness or warmth. Lymphadenopathy:      Cervical: No cervical adenopathy. Skin:     General: Skin is warm and dry. Findings: No erythema, lesion or rash. Neurological:      Cranial Nerves: No cranial nerve deficit. Motor: No weakness. Comments: Patient refusing to answer questions but shakes head when asked if he has pain.        DIFFERENTIAL  DIAGNOSIS     PLAN (LABS / IMAGING / EKG):  Orders Placed This Encounter   Procedures    COVID-19, Rapid    XR CHEST PORTABLE    CBC WITH AUTO DIFFERENTIAL    Troponin    Brain Natriuretic Peptide    Basic Metabolic Panel w/ Reflex to MG    HEPATIC FUNCTION PANEL    HIV RNA, QUANTITATIVE, PCR    LYMPHOCYTE SUBSET    URINALYSIS WITH MICROSCOPIC    LACTATE DEHYDROGENASE    C-REACTIVE PROTEIN    Procalcitonin    FERRITIN    FIBRINOGEN    D-DIMER, QUANTITATIVE    Telemetry monitoring - continuous duration    Inpatient consult to Internal Medicine    PATIENT STATUS (FROM ED OR OR/PROCEDURAL) Inpatient     MEDICATIONS ORDERED:  Orders Placed This Encounter   Medications    haloperidol lactate (HALDOL) injection 5 mg     DDX: pneumonia, covid, URI, UTI, NSTEMI, PE    Initial MDM/Plan: 78 y.o. male who presents with dry cough, and fatigue x2 weeks. No complaints of pain currently or to wife/daughter over the last two weeks. High suspicion for pneumonia given crackles over left lower lung field. Low suspicion for UTI given patient is a male and has had no difficulty urinating over the last few weeks per wife. Given agitation, spoke with wife and daughter who agreed with the use of medication to obtain IV line. Wife also requesting outpatient lab work be drawn at this time. Will obtain CXR, COVID test and basic lab work to rule out cardiac origin for fatigue. Consider admit pending workup and SpO2.      DIAGNOSTIC RESULTS / EMERGENCY DEPARTMENT COURSE / MDM     LABS:  Labs Reviewed   COVID-19, RAPID - Abnormal; Notable for the following components:       Result Value    SARS-CoV-2, Rapid DETECTED (*)     All other components within normal limits   CBC WITH AUTO DIFFERENTIAL - Abnormal; Notable for the following components:    RBC 4.17 (*)     Hemoglobin 11.9 (*)     Hematocrit 38.7 (*)     RDW 14.6 (*)     Monocytes 20 (*)     Absolute Mono # 1.42 (*)     All other components within normal limits   BRAIN NATRIURETIC PEPTIDE - Abnormal; Notable for the following components:    Pro- (*)     All other components within normal limits   BASIC METABOLIC PANEL W/ REFLEX TO MG FOR LOW K - Abnormal; Notable for the following components:    Glucose 111 (*)     Potassium 5.4 (*)     All other components within normal limits   HEPATIC FUNCTION PANEL - Abnormal; Notable for the following components:    Alkaline Phosphatase 142 (*)     Total Bilirubin 0.21 (*)     Albumin/Globulin Ratio 0.9 (*)     All other components within normal limits   TROPONIN   HIV RNA, QUANTITATIVE, PCR   LYMPHOCYTE SUBSET   URINALYSIS WITH MICROSCOPIC   LACTATE DEHYDROGENASE   C-REACTIVE PROTEIN   PROCALCITONIN   FERRITIN   FIBRINOGEN   D-DIMER, QUANTITATIVE     RADIOLOGY:  XR CHEST PORTABLE    Result Date: 11/14/2021  EXAMINATION: ONE XRAY VIEW OF THE CHEST 11/14/2021 10:12 am COMPARISON: 06/05/2020 HISTORY: ORDERING SYSTEM PROVIDED HISTORY: cough, concern for pneumonia TECHNOLOGIST PROVIDED HISTORY: cough, concern for pneumonia Reason for Exam: portable upright FINDINGS: The lungs are without acute focal process. There is no effusion or pneumothorax. The cardiomediastinal silhouette is stable. The osseous structures are stable. No acute process. EKG  Normal sinus. Normal axis. [place] interval normal at 130ms. QRS not wide. QTC normal at 397ms. No T wave inversion or ST elevation. All EKG's are interpreted by the Emergency Department Physician who either signs or Co-signs this chart in the absence of a cardiologist.    EMERGENCY DEPARTMENT COURSE:  ED Course as of 11/14/21 1345   Sun Nov 14, 2021   1050 SARS-CoV-2, Rapid(!): DETECTED  COVID positive [CP]   1100 Pro-BNP(!): 397  BNP slightly elevated. No previous to compare to at [facility]. V or in Care everywhere.  [CP]   1101 Troponin, High Sensitivity: 13  Troponin not elevated. EKG non concerning. No complaint of chest pain. [CP]   8818 Daughter Lord Calderon cell phone numbers: [phone] and [phone]  [CP]   [phone] Patient with resting SpO2 between 90-93%. With movement/activity, dropping into 80s. [CP]   4367 Placed on 2L NC [CP]      ED Course User Index  [CP] Gertrudis Malone MD     PROCEDURES:  None    CONSULTS:  IP CONSULT TO INTERNAL MEDICINE    CRITICAL CARE:  Please see attending note    FINAL IMPRESSION      1. COVID-19    2. Hypoxia        DISPOSITION / PLAN     DISPOSITION Admitted 11/14/2021 01:06:07 PM    Admit    PATIENTREFERRED TO:  No follow-up provider specified.     DISCHARGE MEDICATIONS:  Current Discharge Medication List        Jalil Reis MD  Emergency Medicine Resident    (Please note that portions of this note were completed with a voice recognition program.  Efforts were made to edit the dictations but occasionally words are mis-transcribed.)       Da Wood MD  Resident  11/14/21 1311       Da Wood MD  Resident  11/14/21 1345       Da Wood MD  Resident  11/15/21 1004

## 2021-11-14 NOTE — PLAN OF CARE
Problem: Airway Clearance - Ineffective  Goal: Achieve or maintain patent airway  Outcome: Ongoing     Problem: Gas Exchange - Impaired  Goal: Absence of hypoxia  Outcome: Ongoing  Goal: Promote optimal lung function  Outcome: Ongoing     Problem: Breathing Pattern - Ineffective  Goal: Ability to achieve and maintain a regular respiratory rate  Outcome: Ongoing     Problem:  Body Temperature -  Risk of, Imbalanced  Goal: Ability to maintain a body temperature within defined limits  Outcome: Ongoing  Goal: Will regain or maintain usual level of consciousness  Outcome: Ongoing  Goal: Complications related to the disease process, condition or treatment will be avoided or minimized  Outcome: Ongoing     Problem: Isolation Precautions - Risk of Spread of Infection  Goal: Prevent transmission of infection  Outcome: Ongoing     Problem: Nutrition Deficits  Goal: Optimize nutritional status  Outcome: Ongoing     Problem: Risk for Fluid Volume Deficit  Goal: Maintain normal heart rhythm  Outcome: Ongoing  Goal: Maintain absence of muscle cramping  Outcome: Ongoing  Goal: Maintain normal serum potassium, sodium, calcium, phosphorus, and pH  Outcome: Ongoing     Problem: Loneliness or Risk for Loneliness  Goal: Demonstrate positive use of time alone when socialization is not possible  Outcome: Ongoing     Problem: Fatigue  Goal: Verbalize increase energy and improved vitality  Outcome: Ongoing     Problem: Patient Education: Go to Patient Education Activity  Goal: Patient/Family Education  Outcome: Ongoing     Problem: Skin Integrity:  Goal: Will show no infection signs and symptoms  Description: Will show no infection signs and symptoms  Outcome: Ongoing  Goal: Absence of new skin breakdown  Description: Absence of new skin breakdown  Outcome: Ongoing

## 2021-11-14 NOTE — ED NOTES
Brought to ED by daughter. Pt received the flu vaccine x 1 week ago and has been fatigued and frequent coughing spells. Pt has also been vaccinated for covid. Pt changed into a work, EKG done, and hooked up to cardiac monitor. Vital signs stable. Has a hx of dementia, not receptive to being touched, and likes to pull at objects.          Nasreen Heard RN  11/14/21 1700

## 2021-11-14 NOTE — H&P
infection) ([facility]     Meralgia paraesthetica 2/4/2016    Mixed hyperlipidemia 5/5/2016    Occasional tremors     Osteoporosis     PVD (peripheral vascular disease) ([facility] [address] 2/8/2018    Seizure-like activity ([facility] 2/28/2018    Syphilis in male     Wears glasses        PAST SURGICAL HISTORY     Past Surgical History:   Procedure Laterality Date    COLONOSCOPY      HEMORRHOID SURGERY      HERNIA REPAIR      umbilical    OK X-RAY RETROGRADE PYELOGRAM Bilateral 3/6/2018    CYSTOSCOPY RETROGRADE PYELOGRAM performed by Anne-Marie Varma MD at [address] 11/22/2017    SIGMOIDOSCOPY POLYP SNARE performed by Anthony Mae MD at [facility] Left        ALLERGIES     Patient has no known allergies. MEDICATIONS PRIOR TO ADMISSION     Prior to Admission medications    Medication Sig Start Date End Date Taking?  Authorizing Provider   divalproex (DEPAKOTE SPRINKLE) 125 MG capsule TAKE 2 CAPSULES BY MOUTH 2 TIMES A DAY 11/12/21   Dayton Swanson MD   Multiple Vitamin (MULTIVITAMIN) tablet TAKE 1 TABLET DAILY 11/12/21   Dayton Swanson MD   tamsulosin (FLOMAX) 0.4 MG capsule TAKE 1 CAPSULE BY MOUTH DAILY 11/12/21   Dayton Swanson MD   Nutritional Supplements (ENSURE HIGH PROTEIN) LIQD Take 1 Bottle by mouth 3 times daily 11/5/21   Tucker Simmons MD   donepezil (ARICEPT) 10 MG tablet TAKE 1 TABLET IN THE EVENING 10/16/21   Dayton Swanson MD   finasteride (PROSCAR) 5 MG tablet TAKE 1 TABLET BY MOUTH DAILY 10/16/21   Dayton Swanson MD   memantine (NAMENDA) 10 MG tablet TAKE 1 TABLET TWICE DAILY 10/16/21   Dayton Swanson MD   omeprazole (PRILOSEC) 20 MG delayed release capsule TAKE 1 CAPSULE DAILY 10/16/21   Dayton Swanson MD   HM ASPIRIN EC LOW DOSE 81 MG EC tablet TAKE 1 TABLET DAILY 9/20/21   Dayton Swanson MD   atorvastatin (LIPITOR) 40 MG tablet TAKE 1 TABLET DAILY 9/20/21   Dayton Swanson MD   Calcium Carbonate-Vitamin D (OYSTER SHELL CALCIUM/D) 500-200 MG-UNIT TABS TAKE 2 TABLETS DAILY 21   Andreina Andrea MD   ATRIPLA 600-200-300 MG per tablet TAKE 1 TABLET BY MOUTH DAILY AT BEDTIME 21  Patricia Roberts MD   Incontinence Supply Disposable (BRIEFS OVERNIGHT MEDIUM) MISC 1 box by Does not apply route daily 21   Mila Lewis MD   Nutritional Supplements (ENSURE NUTRA SHAKE HI-CRICKET) LIQD Take 1 Bottle by mouth daily 21   Mila Lewis MD   vitamin E 400 UNIT capsule Take 400 Units by mouth daily 19   Historical Provider, MD       SOCIAL HISTORY     Tobacco: Former tobacco smoker, around one PPD for > 50 years. Alcohol: Yes, socially  Illicits: Patient denies    FAMILY HISTORY     Family History   Problem Relation Age of Onset    Heart Disease Mother     High Blood Pressure Mother     Cancer Father     Diabetes Sister     Heart Disease Sister     High Blood Pressure Sister     Heart Disease Brother     High Blood Pressure Brother        PHYSICAL EXAM     Vitals: /69   Pulse 88   Temp 97.3 °F (36.3 °C) (Oral)   Resp 20   SpO2 95%   Tmax: Temp (24hrs), Av.3 °F (36.3 °C), Min:97.3 °F (36.3 °C), Max:97.3 °F (36.3 °C)    Last Body weight:   Wt Readings from Last 3 Encounters:   21 124 lb 12.8 oz (56.6 kg)   21 125 lb (56.7 kg)   03/10/21 126 lb 3.2 oz (57.2 kg)     Body Mass Index : There is no height or weight on file to calculate BMI. PHYSICAL EXAMINATION:  Constitutional: This is a well developed, well nourished, 18.5-24.9 - Normal 78y.o. year old male who is alert, oriented, cooperative and in no apparent distress. Head:normocephalic and atraumatic. EENT:  PERRLA. No conjunctival injections. Septum was midline, mucosa was without erythema, exudates or cobblestoning. No thrush was noted. Neck: Supple without thyromegaly. No elevated JVP. Trachea was midline. Respiratory: Chest was symmetrical without dullness to percussion. Breath sounds bilaterally were clear to auscultation.  There were no wheezes, rhonchi or rales. There is no intercostal retraction or use of accessory muscles. No egophony noted. Cardiovascular: Regular without murmur, clicks, gallops or rubs. Abdomen: Slightly rounded and soft without organomegaly. No rebound, rigidity or guarding was appreciated. Lymphatic: No lymphadenopathy. Musculoskeletal: Normal curvature of the spine. No gross muscle weakness. Extremities:  No lower extremity edema, ulcerations, tenderness, varicosities or erythema. Muscle size, tone and strength are normal.  No involuntary movements are noted. Skin:  Warm and dry. Good color, turgor and pigmentation. No lesions or scars. No cyanosis or clubbing  Neurological/Psychiatric: Unable to assess as the patient is nonverbal and agitated at baseline.     INVESTIGATIONS     Laboratory Testing:     Recent Results (from the past 24 hour(s))   CBC WITH AUTO DIFFERENTIAL    Collection Time: 11/14/21 10:25 AM   Result Value Ref Range    WBC 7.0 3.5 - 11.3 k/uL    RBC 4.17 (L) 4.21 - 5.77 m/uL    Hemoglobin 11.9 (L) 13.0 - 17.0 g/dL    Hematocrit 38.7 (L) 40.7 - 50.3 %    MCV 92.8 82.6 - 102.9 fL    MCH 28.5 25.2 - 33.5 pg    MCHC 30.7 28.4 - 34.8 g/dL    RDW 14.6 (H) 11.8 - 14.4 %    Platelets 212 304 - 416 k/uL    MPV 10.9 8.1 - 13.5 fL    NRBC Automated 0.0 0.0 per 100 WBC    Differential Type NOT REPORTED     Seg Neutrophils 48 36 - 65 %    Lymphocytes 30 24 - 43 %    Monocytes 20 (H) 3 - 12 %    Eosinophils % 1 1 - 4 %    Basophils 1 0 - 2 %    Immature Granulocytes 0 0 %    Segs Absolute 3.29 1.50 - 8.10 k/uL    Absolute Lymph # 2.12 1.10 - 3.70 k/uL    Absolute Mono # 1.42 (H) 0.10 - 1.20 k/uL    Absolute Eos # 0.09 0.00 - 0.44 k/uL    Basophils Absolute 0.05 0.00 - 0.20 k/uL    Absolute Immature Granulocyte 0.03 0.00 - 0.30 k/uL    WBC Morphology NOT REPORTED     RBC Morphology ANISOCYTOSIS PRESENT     Platelet Estimate NOT REPORTED    Troponin    Collection Time: 11/14/21 10:25 AM   Result Value Ref Range    Troponin, High Sensitivity 13 0 - 22 ng/L    Troponin T NOT REPORTED <0.03 ng/mL    Troponin Interp NOT REPORTED    Brain Natriuretic Peptide    Collection Time: 11/14/21 10:25 AM   Result Value Ref Range    Pro- (H) <300 pg/mL    BNP Interpretation NOT REPORTED    Basic Metabolic Panel w/ Reflex to MG    Collection Time: 11/14/21 10:25 AM   Result Value Ref Range    Glucose 111 (H) 70 - 99 mg/dL    BUN 12 8 - 23 mg/dL    CREATININE 0.94 0.70 - 1.20 mg/dL    Bun/Cre Ratio NOT REPORTED 9 - 20    Calcium 8.9 8.6 - 10.4 mg/dL    Sodium 139 135 - 144 mmol/L    Potassium 5.4 (H) 3.7 - 5.3 mmol/L    Chloride 105 98 - 107 mmol/L    CO2 24 20 - 31 mmol/L    Anion Gap 10 9 - 17 mmol/L    GFR Non-African American >60 >60 mL/min    GFR African American >60 >60 mL/min    GFR Comment          GFR Staging NOT REPORTED    HEPATIC FUNCTION PANEL    Collection Time: 11/14/21 10:25 AM   Result Value Ref Range    Albumin 3.6 3.5 - 5.2 g/dL    Alkaline Phosphatase 142 (H) 40 - 129 U/L    ALT 13 5 - 41 U/L    AST 32 <40 U/L    Total Bilirubin 0.21 (L) 0.3 - 1.2 mg/dL    Bilirubin, Direct <0.08 <0.31 mg/dL    Bilirubin, Indirect Connot be calculated 0.00 - 1.00 mg/dL    Total Protein 7.6 6.4 - 8.3 g/dL    Globulin NOT REPORTED 1.5 - 3.8 g/dL    Albumin/Globulin Ratio 0.9 (L) 1.0 - 2.5   COVID-19, Rapid    Collection Time: 11/14/21 10:28 AM    Specimen: Nasopharyngeal Swab   Result Value Ref Range    Specimen Description . NASOPHARYNGEAL SWAB     SARS-CoV-2, Rapid DETECTED (A) Not Detected       Imaging:   XR CHEST PORTABLE    Result Date: 11/14/2021  No acute process. ASSESSMENT & PLAN       IMPRESSION  This is a 78 y.o. male who presented with cough and fatigue and found to have COVID-19 infection. Patient admitted to inpatient status to evaluate and manage the same.     COVID-19 infection  Vaccinated with Covid  Covid positive on 11/14/2021  Is out of window for remdesivir  Does not meet requirements of Decadron and Actemra. ID is on board, will follow recommendations. CRP 14.4. Will monitor inflammatory markers. Acute hypoxic respiratory failure  Secondary to COVID-19 infection  On 2 L NC nasal cannula  Will monitor and wean off as tolerated. Alzheimer's dementia  On memantine  On donepezil    HIV infection  On [address] outpatient with Dr. Quincy Bowen    Hyperlipidemia  On atorvastatin    Peripheral vascular disease  On aspirin    BPH  We will resume Flomax  We will monitor for signs of urinary retention. GERD   On Pepcid as needed    Moderate malnutrition  On Ensure liquid supplement    DVT ppx  On Lovenox    GI ppx  Not indicated    PT/OT/SW  Consulted    CODE STATUS  Currently full code, Will discuss with the patient and family in detail tomorrow. Discharge Planning   to assist in discharge planning. Simba Huntley MD  Internal Medicine Resident, PGY-1   [facility]; [place], [place]  11/14/2021, 3:37 PM    Attending Physician Statement  I have discussed the care of Sundra Fiscal with the resident team. I have examined the patient myself and taken ros and hpi , including pertinent history and exam findings,  with the resident. I have reviewed the key elements of all parts of the encounter with the resident. I agree with the assessment, plan and orders as documented by the resident. Active Problems:    COVID  Resolved Problems:    * No resolved hospital problems. *    24-year-old male in advance dementia admitted with COVID-19 pneumonia mild hypoxia 2 L transiently doing okay on room air during rounds. Inflammatory markers not much elevated consider discharge back to facility this afternoon if remains stable.   On treatment with Biktarvy for HIV    Electronically signed by Nate Cornejo MD

## 2021-11-14 NOTE — ED PROVIDER NOTES
[address]  ED  eMERGENCY dEPARTMENT eNCOUnter   Attending Attestation     Pt Name: Gloria Patrick  MRN: [id number]  [place] 1940  Date of evaluation: 11/14/21       Gloria Patrick is a 78 y.o. male who presents with Fatigue and Cough      History: Patient presents with fatigue and cough. Patient is a poor historian because he does not speak. Patient does have dementia. Concern for Covid. Exam: Heart rate and rhythm are regular. Patient is wheezy bilaterally. Abdomen is soft, nontender. Patient is awake and hears me because he responds nonverbally. Plan for labs, Covid test, will assess for hypoxia. If patient is hypoxic consider admission. EKG shows normal sinus rhythm with a rate of 71 beats minute. Normal axis. No ST elevation or depression. No Q waves. T waves are upright. No blocks arrhythmias. Nonspecific EKG. I performed a history and physical examination of the patient and discussed management with the resident. I reviewed the residents note and agree with the documented findings and plan of care. Any areas of disagreement are noted on the chart. I was personally present for the key portions of any procedures. I have documented in the chart those procedures where I was not present during the key portions. I have personally reviewed all images and agree with the resident's interpretation. I have reviewed the emergency nurses triage note. I agree with the chief complaint, past medical history, past surgical history, allergies, medications, social and family history as documented unless otherwise noted below. Documentation of the HPI, Physical Exam and Medical Decision Making performed by medical students or scribes is based on my personal performance of the HPI, PE and MDM.  For Phys Assistant/ Nurse Practitioner cases/documentation I have had a face to face evaluation of this patient and have completed at least one if not all key elements of the E/M (history, physical exam, and MDM). Additional findings are as noted. For APC cases I have personally evaluated and examined the patient in conjunction with the APC and agree with the treatment plan and disposition of the patient as recorded by the APC.     Maryann Alvarez MD  Attending Emergency  Physician       Mariel Duncan MD  11/14/21 1100 Parveen Reaves MD  11/14/21 0469

## 2021-11-15 LAB
% NK (CD56): 26 % (ref 6–29)
-: NORMAL
AB NK (CD56): 546 /UL (ref 90–600)
ABSOLUTE CD 3: 1365 /UL (ref 411–2061)
ABSOLUTE CD 4 HELPER: 399 /UL (ref 309–1571)
ABSOLUTE CD 8 (SUPP): 945 /UL (ref 282–999)
ABSOLUTE CD19 B CELL: 84 /UL (ref 71–567)
ANION GAP SERPL CALCULATED.3IONS-SCNC: 11 MMOL/L (ref 9–17)
BUN BLDV-MCNC: 11 MG/DL (ref 8–23)
BUN/CREAT BLD: ABNORMAL (ref 9–20)
C-REACTIVE PROTEIN: 68.8 MG/L (ref 0–5)
CALCIUM SERPL-MCNC: 8.3 MG/DL (ref 8.6–10.4)
CD19 B CELL: 4 % (ref 5–25)
CD3 % TOTAL T CELLS: 65 % (ref 57–94)
CD4 % HELPER T CELL: 19 % (ref 27–64)
CD4:CD8: 0.42 (ref 0.6–2.8)
CD8 % SUPPRESSOR T CELL: 45 % (ref 17–48)
CHLORIDE BLD-SCNC: 106 MMOL/L (ref 98–107)
CO2: 20 MMOL/L (ref 20–31)
CREAT SERPL-MCNC: 1.12 MG/DL (ref 0.7–1.2)
DIRECT EXAM: NORMAL
EKG ATRIAL RATE: 71 BPM
EKG P AXIS: 64 DEGREES
EKG P-R INTERVAL: 130 MS
EKG Q-T INTERVAL: 366 MS
EKG QRS DURATION: 74 MS
EKG QTC CALCULATION (BAZETT): 397 MS
EKG R AXIS: 68 DEGREES
EKG T AXIS: 76 DEGREES
EKG VENTRICULAR RATE: 71 BPM
GFR AFRICAN AMERICAN: >60 ML/MIN
GFR NON-AFRICAN AMERICAN: >60 ML/MIN
GFR SERPL CREATININE-BSD FRML MDRD: ABNORMAL ML/MIN/{1.73_M2}
GFR SERPL CREATININE-BSD FRML MDRD: ABNORMAL ML/MIN/{1.73_M2}
GLUCOSE BLD-MCNC: 115 MG/DL (ref 70–99)
HCT VFR BLD CALC: 35.8 % (ref 40.7–50.3)
HEMOGLOBIN: 11 G/DL (ref 13–17)
LYMPHOCYTES # BLD: 30 % (ref 24–44)
Lab: NORMAL
MCH RBC QN AUTO: 28.3 PG (ref 25.2–33.5)
MCHC RBC AUTO-ENTMCNC: 30.7 G/DL (ref 28.4–34.8)
MCV RBC AUTO: 92 FL (ref 82.6–102.9)
NRBC AUTOMATED: 0 PER 100 WBC
PDW BLD-RTO: 14.5 % (ref 11.8–14.4)
PLATELET # BLD: 150 K/UL (ref 138–453)
PMV BLD AUTO: 11.2 FL (ref 8.1–13.5)
POTASSIUM SERPL-SCNC: 4.4 MMOL/L (ref 3.7–5.3)
RBC # BLD: 3.89 M/UL (ref 4.21–5.77)
REASON FOR REJECTION: NORMAL
SODIUM BLD-SCNC: 137 MMOL/L (ref 135–144)
SPECIMEN DESCRIPTION: NORMAL
WBC # BLD: 7 K/UL (ref 3.5–11)
WBC # BLD: 7 K/UL (ref 3.5–11.3)
ZZ NTE CLEAN UP: ORDERED TEST: NORMAL
ZZ NTE WITH NAME CLEAN UP: SPECIMEN SOURCE: NORMAL

## 2021-11-15 PROCEDURE — 6370000000 HC RX 637 (ALT 250 FOR IP)

## 2021-11-15 PROCEDURE — 80048 BASIC METABOLIC PNL TOTAL CA: CPT

## 2021-11-15 PROCEDURE — APPSS30 APP SPLIT SHARED TIME 16-30 MINUTES: Performed by: NURSE PRACTITIONER

## 2021-11-15 PROCEDURE — XW033G6 INTRODUCTION OF REGN-COV2 MONOCLONAL ANTIBODY INTO PERIPHERAL VEIN, PERCUTANEOUS APPROACH, NEW TECHNOLOGY GROUP 6: ICD-10-PCS | Performed by: INTERNAL MEDICINE

## 2021-11-15 PROCEDURE — 99232 SBSQ HOSP IP/OBS MODERATE 35: CPT | Performed by: INTERNAL MEDICINE

## 2021-11-15 PROCEDURE — 1200000000 HC SEMI PRIVATE

## 2021-11-15 PROCEDURE — 2580000003 HC RX 258

## 2021-11-15 PROCEDURE — 36415 COLL VENOUS BLD VENIPUNCTURE: CPT

## 2021-11-15 PROCEDURE — 6360000002 HC RX W HCPCS

## 2021-11-15 PROCEDURE — 6370000000 HC RX 637 (ALT 250 FOR IP): Performed by: STUDENT IN AN ORGANIZED HEALTH CARE EDUCATION/TRAINING PROGRAM

## 2021-11-15 PROCEDURE — 6360000002 HC RX W HCPCS: Performed by: INTERNAL MEDICINE

## 2021-11-15 PROCEDURE — 85027 COMPLETE CBC AUTOMATED: CPT

## 2021-11-15 PROCEDURE — 2580000003 HC RX 258: Performed by: INTERNAL MEDICINE

## 2021-11-15 PROCEDURE — 2700000000 HC OXYGEN THERAPY PER DAY

## 2021-11-15 PROCEDURE — 93010 ELECTROCARDIOGRAM REPORT: CPT | Performed by: INTERNAL MEDICINE

## 2021-11-15 PROCEDURE — 87536 HIV-1 QUANT&REVRSE TRNSCRPJ: CPT

## 2021-11-15 PROCEDURE — 86140 C-REACTIVE PROTEIN: CPT

## 2021-11-15 PROCEDURE — 94761 N-INVAS EAR/PLS OXIMETRY MLT: CPT

## 2021-11-15 RX ORDER — ASPIRIN 81 MG/1
81 TABLET, CHEWABLE ORAL DAILY
Status: DISCONTINUED | OUTPATIENT
Start: 2021-11-15 | End: 2021-11-19 | Stop reason: HOSPADM

## 2021-11-15 RX ORDER — OLANZAPINE 5 MG/1
5 TABLET ORAL NIGHTLY
Status: DISPENSED | OUTPATIENT
Start: 2021-11-15 | End: 2021-11-15

## 2021-11-15 RX ADMIN — MEMANTINE HYDROCHLORIDE 10 MG: 5 TABLET, FILM COATED ORAL at 09:20

## 2021-11-15 RX ADMIN — DIVALPROEX SODIUM 250 MG: 125 CAPSULE, COATED PELLETS ORAL at 20:32

## 2021-11-15 RX ADMIN — SODIUM CHLORIDE, PRESERVATIVE FREE 10 ML: 5 INJECTION INTRAVENOUS at 09:23

## 2021-11-15 RX ADMIN — ACETAMINOPHEN 650 MG: 650 SUPPOSITORY RECTAL at 04:33

## 2021-11-15 RX ADMIN — DESMOPRESSIN ACETATE 40 MG: 0.2 TABLET ORAL at 20:33

## 2021-11-15 RX ADMIN — CASIRIVIMAB AND IMDEVIMAB: 600; 600 INJECTION, SOLUTION, CONCENTRATE INTRAVENOUS at 20:32

## 2021-11-15 RX ADMIN — MEMANTINE HYDROCHLORIDE 10 MG: 5 TABLET, FILM COATED ORAL at 20:33

## 2021-11-15 RX ADMIN — DONEPEZIL HYDROCHLORIDE 10 MG: 10 TABLET, FILM COATED ORAL at 20:33

## 2021-11-15 RX ADMIN — DIVALPROEX SODIUM 250 MG: 125 CAPSULE, COATED PELLETS ORAL at 09:20

## 2021-11-15 RX ADMIN — ASPIRIN 81 MG: 81 TABLET, CHEWABLE ORAL at 20:32

## 2021-11-15 RX ADMIN — ACETAMINOPHEN 650 MG: 325 TABLET ORAL at 13:38

## 2021-11-15 RX ADMIN — ENOXAPARIN SODIUM 40 MG: 100 INJECTION SUBCUTANEOUS at 09:20

## 2021-11-15 RX ADMIN — SODIUM CHLORIDE, PRESERVATIVE FREE 10 ML: 5 INJECTION INTRAVENOUS at 20:33

## 2021-11-15 RX ADMIN — ACETAMINOPHEN 650 MG: 650 SUPPOSITORY RECTAL at 18:30

## 2021-11-15 ASSESSMENT — PAIN SCALES - GENERAL
PAINLEVEL_OUTOF10: 0

## 2021-11-15 NOTE — PLAN OF CARE
Problem: Airway Clearance - Ineffective  Goal: Achieve or maintain patent airway  11/15/2021 0049 by Arlen Branch RN  Outcome: Ongoing  11/14/2021 1816 by Cheyenne Melchor RN  Outcome: Ongoing  11/14/2021 1816 by Cheyenne Melchor RN  Outcome: Ongoing     Problem: Gas Exchange - Impaired  Goal: Absence of hypoxia  11/15/2021 0049 by Arlen Branch RN  Outcome: Ongoing  11/14/2021 1816 by Cheyenne Melchor RN  Outcome: Ongoing  11/14/2021 1816 by Cheyenne Melchor RN  Outcome: Ongoing  Goal: Promote optimal lung function  11/15/2021 0049 by Arlen Branch RN  Outcome: Ongoing  11/14/2021 1816 by Cheyenne Melchor RN  Outcome: Ongoing  11/14/2021 1816 by Cheyenne Melchor RN  Outcome: Ongoing     Problem: Breathing Pattern - Ineffective  Goal: Ability to achieve and maintain a regular respiratory rate  11/15/2021 0049 by Arlen Branch RN  Outcome: Ongoing  11/14/2021 1816 by Cheyenne Melchor RN  Outcome: Ongoing  11/14/2021 1816 by Cheyenne Melchor RN  Outcome: Ongoing     Problem:  Body Temperature -  Risk of, Imbalanced  Goal: Ability to maintain a body temperature within defined limits  11/15/2021 0049 by Arlen Branch RN  Outcome: Ongoing  11/14/2021 1816 by Cheyenne Melchor RN  Outcome: Ongoing  11/14/2021 1816 by Cheyenne Melchor RN  Outcome: Ongoing  Goal: Will regain or maintain usual level of consciousness  11/15/2021 0049 by Arlen Branch RN  Outcome: Ongoing  11/14/2021 1816 by Cheyenne Melchor RN  Outcome: Ongoing  11/14/2021 1816 by Cheyenne Melchor RN  Outcome: Ongoing  Goal: Complications related to the disease process, condition or treatment will be avoided or minimized  11/15/2021 0049 by Arlen Branch RN  Outcome: Ongoing  11/14/2021 1816 by Cheyenne Melchor RN  Outcome: Ongoing  11/14/2021 1816 by Cheyenne Melchor RN  Outcome: Ongoing     Problem: Isolation Precautions - Risk of Spread of Infection  Goal: Prevent transmission of infection  11/15/2021 0049 by Rashard Taylor RN  Outcome: Ongoing  11/14/2021 1816 by Halina Herrera RN  Outcome: Ongoing  11/14/2021 1816 by Halina Herrera RN  Outcome: Ongoing     Problem: Nutrition Deficits  Goal: Optimize nutritional status  11/15/2021 0049 by Rashard Taylor RN  Outcome: Ongoing  11/14/2021 1816 by Halina Herrera RN  Outcome: Ongoing  11/14/2021 1816 by Halina Herrera RN  Outcome: Ongoing     Problem: Risk for Fluid Volume Deficit  Goal: Maintain normal heart rhythm  11/15/2021 0049 by Rashard Taylor RN  Outcome: Ongoing  11/14/2021 1816 by Halina Herrera RN  Outcome: Ongoing  11/14/2021 1816 by Halina Herrera RN  Outcome: Ongoing  Goal: Maintain absence of muscle cramping  11/15/2021 0049 by Rashard Taylor RN  Outcome: Ongoing  11/14/2021 1816 by Halina Herrera RN  Outcome: Ongoing  11/14/2021 1816 by Halina Herrera RN  Outcome: Ongoing  Goal: Maintain normal serum potassium, sodium, calcium, phosphorus, and pH  11/15/2021 0049 by Rashard Taylor RN  Outcome: Ongoing  11/14/2021 1816 by Halina Herrera RN  Outcome: Ongoing  11/14/2021 1816 by Halina Herrera RN  Outcome: Ongoing     Problem: Loneliness or Risk for Loneliness  Goal: Demonstrate positive use of time alone when socialization is not possible  11/15/2021 0049 by Rashard Taylor RN  Outcome: Ongoing  11/14/2021 1816 by Halina Herrera RN  Outcome: Ongoing  11/14/2021 1816 by Halina Herrera RN  Outcome: Ongoing     Problem: Fatigue  Goal: Verbalize increase energy and improved vitality  11/15/2021 0049 by Rashard Taylor RN  Outcome: Ongoing  11/14/2021 1816 by Halina Herrera RN  Outcome: Ongoing  11/14/2021 1816 by Halina Herrera RN  Outcome: Ongoing     Problem: Patient Education: Go to Patient Education Activity  Goal: Patient/Family Education  11/15/2021 0049 by Rashard Taylor RN  Outcome: Ongoing  11/14/2021 1816 by Halina Herrera RN  Outcome: Ongoing  11/14/2021 1816 by Aaron Simmonds, RN  Outcome: Ongoing     Problem: Skin Integrity:  Goal: Will show no infection signs and symptoms  Description: Will show no infection signs and symptoms  11/15/2021 0049 by Chaparrita Carrera RN  Outcome: Ongoing  11/14/2021 1816 by Aaron Simmonds, RN  Outcome: Ongoing  11/14/2021 1816 by Aaron Simmonds, RN  Outcome: Ongoing  Goal: Absence of new skin breakdown  Description: Absence of new skin breakdown  11/15/2021 0049 by Chaparrita Carrera RN  Outcome: Ongoing  11/14/2021 1816 by Aaron Simmonds, RN  Outcome: Ongoing  11/14/2021 1816 by Aaron Simmonds, RN  Outcome: Ongoing     Problem: Non-Violent Restraints  Goal: Removal from restraints as soon as assessed to be safe  Outcome: Ongoing  Goal: No harm/injury to patient while restraints in use  Outcome: Ongoing  Goal: Patient's dignity will be maintained  Outcome: Ongoing

## 2021-11-15 NOTE — CARE COORDINATION
Case Management Initial Discharge Plan  Crista Cheng with pt's wife over the phone to discuss discharge plans. She is in room 3023. Information verified: address, contacts, phone number, , insurance Yes  Insurance Provider: [facility]    Emergency Contact/Next of Kin name & number: Alonso Carter [phone] and ting Mercer [phone]    Who are involved in patient's support system? wife    PCP: Gracy Wiley MD  Date of last visit: 21      Discharge Planning    Living Arrangements:  Spouse/Significant Other     Home has 1 story  0 stairs to climb to get into front door    Patient able to perform ADL's:Assisted by wife and [facility] (outpatient & in home) home care  DME equipment: none  DME provider:     Is patient receiving oral anticoagulation therapy? No    If indicated:   Physician managing anticoagulation treatment:   Where does patient obtain lab work for ATC treatment? Potential Assistance Needed:       Patient agreeable to home care: Yes  Freedom of choice provided:  yes-current with [address].    Prior SNF/Rehab Placement and Facility: [facility] to SNF/Rehab: No  [place] of choice provided: n/a     Evaluation: no    Expected Discharge date:       Patient expects to be discharged to: If home: is the family and/or caregiver wiling & able to provide support at home? yes  Who will be providing this support? Wife but depends on how she is doing. She has covid herself in room 3026    Follow Up Appointment: Best Day/ Time:      Transportation provider: daughter Toddeangelo Francocheo arrangements needed for discharge: No    Readmission Risk              Risk of Unplanned Readmission:  20             Does patient have a readmission risk score greater than 14?: Yes  If yes, follow-up appointment must be made within 7 days of discharge.      Goals of Care: Oxygenation improving    Educated pt's wife on transitional options, provided freedom of choice and are agreeable with plan      Discharge Plan: Home with wife and [facility]. Depends on how wife is doing. She has covid herself and is in room 3025. Follow needs and progression    Called [facility] pt here. He gets 28 hours a week for aide services.       Electronically signed by Steven Love RN on 11/15/21 at 2:31 PM EST

## 2021-11-15 NOTE — PROGRESS NOTES
[facility] [address] [facility] Patient  Today's Date and Time: 11/15/2021, 2:09 PM    Impression :     · COVID 19 Confirmed Infection  · Covid tests:  · 11/14/21: Positive  · HIV  · Alzheimer's dementia with behavioral disturbances  · Hx of Syphilis  · Fully vaccinated with Moderna    Recommendations:   · Antibiotic treatment:  · Monitor off antibiotics  · Continue HIV treatments  · Covid Rx:  · Remdesivir-Out of window  · Decadron-Does not meet requirements  · Actemra-Does not meet requirements at this time  · Monoclonal antibody infusion ordered 11/15/21  · Monitor CRP    Medical Decision Making/Summary/Discussion:11/15/2021     · Patient admitted with suspected COVID 19 infection  · Covid test confirmed positive. Infection Control Recommendations   · Universal Precautions  · Airborne isolation  · Droplet Isolation    Antimicrobial Stewardship Recommendations     · Discontinuation of therapy  Coordination of Outpatient Care:   · Estimated Length of IV antimicrobials:TBD  · Patient will need Midline Catheter Insertion: TBD  · Patient will need PICC line Insertion: No  · Patient will need: Home IV , [place],  SNF,  LTAC:TBD  · Patient will need outpatient wound care:No    Chief complaint/reason for consultation:   · Concern for COVID infection      History of Present Illness:   Jose Ramon Castro is a 78y.o.-year-old male who was initially admitted on 11/14/2021. Patient seen at the request of Dr. Ward Jonas:    Patient presented through ER with complaints of dry cough and fatigue X 2 weeks per his wife. He has also been experiencing difficulty urinating. The patient is non-verbal and may be aggressive at times secondary to dementia. He follow with Dr. Stephanie Hunt for his HIV and takes Atripla and [place]. His Covid swab resulted as positive. No acute process noted on CXR.     Patient admitted because of concerns with COVID 19.    CURRENT EVALUATION : 11/15/2021    TMAX 102.0 F  VS stable    The patient is oxygenating well on room air but experiencing fevers. Patient exhibiting respiratory distress. No  Respiratory secretions:     Patient receiving supplemental oxygen. No  RR:16-->27  02 sat: 94-->100    % FIO2:   PEEP:      QTc:       NEWS Score: 0-4 Low risk group; 5-6: Medium risk group; 7 or above: High risk group  Parameters 3 2 1 0 1 2 3   Age    < 65   ? 65   RR ? 8  9-11 12-20  21-24 ? 25   O2 Sats ? 91 92-93 94-95 ? 96      Suppl O2  Yes  No      SBP ? 90  101-110 111-219   ? 220   HR ? 40  41-50 51-90  111-130 ? 131   Consciousness    Alert   Drowsiness, lethargy, or confusion   Temperature ? 35.0 C (95.0 F)  35.1-36.0 C 95.1-96.9 F 36.1-38.0 C 97.0-100.4 F 38.1-39.0 C 100.5-102.3 F ? 39.1 C ? 102.4 F      NEWS Score:   11/14/21: 4 Low risk    Overall Daily Picture:    Unchanged      Presence of secondary bacterial Infection:  No   Additional antibiotics: No    Labs, X rays reviewed: 11/15/2021    BUN:12  Cr:0.94    WBC:7.0  Hb:11.9  Plat: 158    Absolute Neutrophils:3.29  Absolute Lymphocytes:2.12  Neutrophil/Lymphocyte Ratio: 1.5 low risk    CRP:14.4  Ferritin:57  LDH:     Pro Calcitonin:      Cultures:  Urine:  ·   Blood:  ·   Sputum :  ·   Wound:       CXR:     CAT:      Discussed with patient, RN, CC, IM. I have personally reviewed the past medical history, past surgical history, medications, social history, and family history, and I have updated the database accordingly.   Past Medical History:     Past Medical History:   Diagnosis Date    Acute delirium 2/11/2019    Acute metabolic encephalopathy 1/69/5319    Alzheimer's dementia with behavioral disturbance ([facility] 2/11/2019    Atrophy, cortical     BPH (benign prostatic hyperplasia) 2/1/2013    Dementia ([facility]     Dementia without behavioral disturbance ([facility] 7/27/2012    GERD (gastroesophageal reflux disease) 4/28/2015    Hemorrhoids     HIV [facility] immunodeficiency virus infection) [facility]     Meralgia paraesthetica 2016    Mixed hyperlipidemia 2016    Occasional tremors     Osteoporosis     PVD (peripheral vascular disease) ([facility] 2018    Seizure-like activity ([facility] 2018    Syphilis in male     Wears glasses        Past Surgical  History:     Past Surgical History:   Procedure Laterality Date    COLONOSCOPY      HEMORRHOID SURGERY      HERNIA REPAIR      umbilical    ND X-RAY RETROGRADE PYELOGRAM Bilateral 3/6/2018    CYSTOSCOPY RETROGRADE PYELOGRAM performed by Che Collins MD at [facility] [address] N/A 2017    SIGMOIDOSCOPY POLYP SNARE performed by Ramses Hamilton MD at [facility]        Medications:      OLANZapine  5 mg Oral Nightly    aspirin  81 mg Oral Daily    sodium chloride flush  5-40 mL IntraVENous 2 times per day    enoxaparin  40 mg SubCUTAneous Daily    atorvastatin  40 mg Oral Nightly    tamsulosin  0.4 mg Oral Daily    memantine  10 mg Oral BID    donepezil  10 mg Oral Nightly    divalproex  250 mg Oral 2 times per day       Social History:     Social History     Socioeconomic History    Marital status:      Spouse name: Not on file    Number of children: 5    Years of education: 3    Highest education level: 3rd grade   Occupational History    Not on file   Tobacco Use    Smoking status: Former Smoker     Packs/day: 0.00     Years: 50.00     Pack years: 0.00     Types: Cigarettes     Start date:      Quit date: 2021     Years since quittin.8    Smokeless tobacco: Never Used    Tobacco comment:  cigerettes per day   Vaping Use    Vaping Use: Never used   Substance and Sexual Activity    Alcohol use: Yes     Alcohol/week: 4.0 standard drinks     Types: 4 Cans of beer per week    Drug use:  Yes    Sexual activity: Not on file   Other Topics Concern    Not on file   Social History Narrative    Not on file     Social Determinants of Health     Financial Resource Strain: Low Risk     Difficulty of Paying Living Expenses: Not very hard   Food Insecurity: No Food Insecurity    Worried About Running Out of Food in the Last Year: Never true    Ran Out of Food in the Last Year: Never true   Transportation Needs:     Lack of Transportation (Medical): Not on file    Lack of Transportation (Non-Medical): Not on file   Physical Activity:     Days of Exercise per Week: Not on file    Minutes of Exercise per Session: Not on file   Stress:     Feeling of Stress : Not on file   Social Connections:     Frequency of Communication with Friends and Family: Not on file    Frequency of Social Gatherings with Friends and Family: Not on file    Attends Religion Services: Not on file    Active Member of Clubs or Organizations: Not on file    Attends Club or Organization Meetings: Not on file    Marital Status: Not on file   Intimate Partner Violence:     Fear of Current or Ex-Partner: Not on file    Emotionally Abused: Not on file    Physically Abused: Not on file    Sexually Abused: Not on file   Housing Stability:     Unable to Pay for Housing in the Last Year: Not on file    Number of [place] in the Last Year: Not on file    Unstable Housing in the Last Year: Not on file       Family History:     Family History   Problem Relation Age of Onset    Heart Disease Mother     High Blood Pressure Mother     Cancer Father     Diabetes Sister     Heart Disease Sister     High Blood Pressure Sister     Heart Disease Brother     High Blood Pressure Brother         Allergies:   Patient has no known allergies. Review of Systems:     SHIRLEY-Dementia  Constitutional: No fevers or chills. No systemic complaints  Head: No headaches  Eyes: No double vision or blurry vision. No conjunctival inflammation. ENT: No sore throat or runny nose. . No hearing loss, tinnitus or vertigo. Cardiovascular: No chest pain or palpitations. No Shortness of breath. No BENJAMIN  Lung: No Shortness of breath or cough. No sputum production  Abdomen: No nausea, vomiting, diarrhea, or abdominal pain. Mukul Gutter No cramps. Genitourinary: No increased urinary frequency, or dysuria. No hematuria. No suprapubic or CVA pain  Musculoskeletal: No muscle aches or pains. No joint effusions, swelling or deformities  Hematologic: No bleeding or bruising. Neurologic: No headache, weakness, numbness, or tingling. Integument: No rash, no ulcers. Psychiatric: No depression. Endocrine: No polyuria, no polydipsia, no polyphagia. Physical Examination :     Patient Vitals for the past 8 hrs:   BP Temp Temp src Pulse Resp SpO2   11/15/21 1319 (!) 119/91 101.6 °F (38.7 °C) Axillary 86 27 98 %   11/15/21 0859 109/60 98.7 °F (37.1 °C) Axillary 65 18 100 %     General Appearance: Awake, alert, and in no apparent distress  Head:  Normocephalic, no trauma  Eyes: Pupils equal, round, reactive to light; sclera anicteric; conjunctivae pink. No embolic phenomena. ENT: Oropharynx clear, without erythema, exudate, or thrush. No tenderness of sinuses. Mouth/throat: mucosa pink and moist. No lesions. Dentition in good repair. Neck:Supple, without lymphadenopathy. Thyroid normal, No bruits. Pulmonary/Chest: Clear to auscultation, without wheezes, rales, or rhonchi. No dullness to percussion. Cardiovascular: Regular rate and rhythm without murmurs, rubs, or gallops. Abdomen: Soft, non tender. Bowel sounds normal. No organomegaly  All four Extremities: No cyanosis, clubbing, edema, or effusions. Neurologic: Baseline dementia  Skin: Warm and dry with good turgor. No signs of peripheral arterial or venous insufficiency. No ulcerations. No open wounds.     Medical Decision Making -Laboratory:   I have independently reviewed/ordered the following labs:    CBC with Differential:   Recent Labs     11/14/21  1025 11/15/21  0504   WBC 7.0  7.0 7.0   HGB 11.9* 11.0*   HCT 38.7* 35.8*    150   LYMPHOPCT 30  30 --    MONOPCT 20*  --      BMP:   Recent Labs     11/14/21  1025 11/15/21  0504    137   K 5.4* 4.4    106   CO2 24 20   BUN 12 11   CREATININE 0.94 1.12     Hepatic Function Panel:   Recent Labs     11/14/21  1025   PROT 7.6   LABALBU 3.6   BILIDIR <0.08   IBILI Connot be calculated   BILITOT 0.21*   ALKPHOS 142*   ALT 13   AST 32     No results for input(s): RPR in the last 72 hours. No results for input(s): HIV in the last 72 hours. No results for input(s): BC in the last 72 hours. Lab Results   Component Value Date    MUCUS NOT REPORTED 09/10/2019    RBC 3.89 11/15/2021    RBC 4.31 04/18/2012    TRICHOMONAS NOT REPORTED 09/10/2019    WBC 7.0 11/15/2021    YEAST NOT REPORTED 09/10/2019    TURBIDITY CLEAR 09/10/2019     Lab Results   Component Value Date    CREATININE 1.12 11/15/2021    GLUCOSE 115 11/15/2021    GLUCOSE 76 04/18/2012       Medical Decision Making-Imaging:       Medical Decision [place]-Xjtxossx-Fuayz:       Medical Decision Making-Other:     Note:  · Labs, medications, radiologic studies were reviewed with personal review of films  · Large amounts of data were reviewed  · Discussed with nursing Staff, Discharge planner  · Infection Control and Prevention measures reviewed  · All prior entries were reviewed  · Administer medications as ordered  · Prognosis: Guarded  · Discharge planning reviewed      Thank you for allowing us to participate in the care of this patient. Please call with questions. George Riley APRN - CNP     ATTESTATION:    I have discussed the case, including pertinent history and exam findings with the APRN. I have evaluated the  History, physical findings and pictures of the patient and the key elements of the encounter have been performed by me. I have reviewed the laboratory data, other diagnostic studies and discussed them with the APRN. I have updated the medical record where necessary.     I agree with the assessment, plan and orders as documented by the APRN.     Katelyn Enciso MD.        Pager: [phone] - Office: [phone]

## 2021-11-16 ENCOUNTER — APPOINTMENT (OUTPATIENT)
Dept: GENERAL RADIOLOGY | Age: 81
DRG: 177 | End: 2021-11-16
Payer: MEDICARE

## 2021-11-16 PROBLEM — R79.82 ELEVATED C-REACTIVE PROTEIN (CRP): Status: ACTIVE | Noted: 2021-11-16

## 2021-11-16 LAB
ANION GAP SERPL CALCULATED.3IONS-SCNC: 9 MMOL/L (ref 9–17)
BUN BLDV-MCNC: 12 MG/DL (ref 8–23)
BUN/CREAT BLD: ABNORMAL (ref 9–20)
C-REACTIVE PROTEIN: 102.4 MG/L (ref 0–5)
CALCIUM SERPL-MCNC: 8.2 MG/DL (ref 8.6–10.4)
CHLORIDE BLD-SCNC: 102 MMOL/L (ref 98–107)
CO2: 21 MMOL/L (ref 20–31)
CREAT SERPL-MCNC: 1.07 MG/DL (ref 0.7–1.2)
GFR AFRICAN AMERICAN: >60 ML/MIN
GFR NON-AFRICAN AMERICAN: >60 ML/MIN
GFR SERPL CREATININE-BSD FRML MDRD: ABNORMAL ML/MIN/{1.73_M2}
GFR SERPL CREATININE-BSD FRML MDRD: ABNORMAL ML/MIN/{1.73_M2}
GLUCOSE BLD-MCNC: 105 MG/DL (ref 70–99)
HCT VFR BLD CALC: 40 % (ref 40.7–50.3)
HEMOGLOBIN: 12 G/DL (ref 13–17)
MCH RBC QN AUTO: 28.1 PG (ref 25.2–33.5)
MCHC RBC AUTO-ENTMCNC: 30 G/DL (ref 28.4–34.8)
MCV RBC AUTO: 93.7 FL (ref 82.6–102.9)
NRBC AUTOMATED: 0 PER 100 WBC
PDW BLD-RTO: 14.6 % (ref 11.8–14.4)
PLATELET # BLD: ABNORMAL K/UL (ref 138–453)
PLATELET, FLUORESCENCE: 138 K/UL (ref 138–453)
PLATELET, IMMATURE FRACTION: 5.4 % (ref 1.1–10.3)
PMV BLD AUTO: ABNORMAL FL (ref 8.1–13.5)
POTASSIUM SERPL-SCNC: 4 MMOL/L (ref 3.7–5.3)
RBC # BLD: 4.27 M/UL (ref 4.21–5.77)
SODIUM BLD-SCNC: 132 MMOL/L (ref 135–144)
WBC # BLD: 10.1 K/UL (ref 3.5–11.3)

## 2021-11-16 PROCEDURE — 97530 THERAPEUTIC ACTIVITIES: CPT

## 2021-11-16 PROCEDURE — 97162 PT EVAL MOD COMPLEX 30 MIN: CPT

## 2021-11-16 PROCEDURE — APPSS30 APP SPLIT SHARED TIME 16-30 MINUTES: Performed by: NURSE PRACTITIONER

## 2021-11-16 PROCEDURE — 71045 X-RAY EXAM CHEST 1 VIEW: CPT

## 2021-11-16 PROCEDURE — 80048 BASIC METABOLIC PNL TOTAL CA: CPT

## 2021-11-16 PROCEDURE — 97167 OT EVAL HIGH COMPLEX 60 MIN: CPT

## 2021-11-16 PROCEDURE — 86140 C-REACTIVE PROTEIN: CPT

## 2021-11-16 PROCEDURE — 2580000003 HC RX 258

## 2021-11-16 PROCEDURE — 85055 RETICULATED PLATELET ASSAY: CPT

## 2021-11-16 PROCEDURE — 6360000002 HC RX W HCPCS

## 2021-11-16 PROCEDURE — 6370000000 HC RX 637 (ALT 250 FOR IP): Performed by: STUDENT IN AN ORGANIZED HEALTH CARE EDUCATION/TRAINING PROGRAM

## 2021-11-16 PROCEDURE — 94761 N-INVAS EAR/PLS OXIMETRY MLT: CPT

## 2021-11-16 PROCEDURE — 36415 COLL VENOUS BLD VENIPUNCTURE: CPT

## 2021-11-16 PROCEDURE — 99232 SBSQ HOSP IP/OBS MODERATE 35: CPT | Performed by: INTERNAL MEDICINE

## 2021-11-16 PROCEDURE — 87040 BLOOD CULTURE FOR BACTERIA: CPT

## 2021-11-16 PROCEDURE — 99233 SBSQ HOSP IP/OBS HIGH 50: CPT | Performed by: INTERNAL MEDICINE

## 2021-11-16 PROCEDURE — 1200000000 HC SEMI PRIVATE

## 2021-11-16 PROCEDURE — 6370000000 HC RX 637 (ALT 250 FOR IP)

## 2021-11-16 PROCEDURE — 85027 COMPLETE CBC AUTOMATED: CPT

## 2021-11-16 RX ORDER — IBUPROFEN 600 MG/1
600 TABLET ORAL EVERY 6 HOURS PRN
Status: DISCONTINUED | OUTPATIENT
Start: 2021-11-16 | End: 2021-11-19 | Stop reason: HOSPADM

## 2021-11-16 RX ORDER — ACETAMINOPHEN 325 MG/1
650 TABLET ORAL EVERY 4 HOURS PRN
Status: CANCELLED | OUTPATIENT
Start: 2021-11-16

## 2021-11-16 RX ADMIN — TAMSULOSIN HYDROCHLORIDE 0.4 MG: 0.4 CAPSULE ORAL at 09:24

## 2021-11-16 RX ADMIN — ASPIRIN 81 MG: 81 TABLET, CHEWABLE ORAL at 09:24

## 2021-11-16 RX ADMIN — DIVALPROEX SODIUM 250 MG: 125 CAPSULE, COATED PELLETS ORAL at 09:24

## 2021-11-16 RX ADMIN — SODIUM CHLORIDE, PRESERVATIVE FREE 10 ML: 5 INJECTION INTRAVENOUS at 19:50

## 2021-11-16 RX ADMIN — ACETAMINOPHEN 650 MG: 325 TABLET ORAL at 00:49

## 2021-11-16 RX ADMIN — ACETAMINOPHEN 650 MG: 325 TABLET ORAL at 09:23

## 2021-11-16 RX ADMIN — SODIUM CHLORIDE, PRESERVATIVE FREE 10 ML: 5 INJECTION INTRAVENOUS at 09:41

## 2021-11-16 RX ADMIN — ENOXAPARIN SODIUM 40 MG: 100 INJECTION SUBCUTANEOUS at 09:24

## 2021-11-16 RX ADMIN — MEMANTINE HYDROCHLORIDE 10 MG: 5 TABLET, FILM COATED ORAL at 09:24

## 2021-11-16 RX ADMIN — IBUPROFEN 600 MG: 600 TABLET, FILM COATED ORAL at 09:24

## 2021-11-16 ASSESSMENT — PAIN SCALES - PAIN ASSESSMENT IN ADVANCED DEMENTIA (PAINAD)
CONSOLABILITY: 1
NEGVOCALIZATION: 1
BREATHING: 0
TOTALSCORE: 2
BODYLANGUAGE: 0
FACIALEXPRESSION: 0

## 2021-11-16 ASSESSMENT — PAIN SCALES - GENERAL
PAINLEVEL_OUTOF10: 0

## 2021-11-16 ASSESSMENT — PAIN SCALES - WONG BAKER
WONGBAKER_NUMERICALRESPONSE: 0
WONGBAKER_NUMERICALRESPONSE: 0

## 2021-11-16 NOTE — PROGRESS NOTES
Physical Therapy    Facility/Department: [facility]  Initial Assessment    NAME: Jannie Thomas  : 1940  MRN: [id number]    Chief Complaint   Patient presents with    Fatigue    Cough       Date of Service: 2021    Discharge Recommendations:    Further therapy recommended at discharge. PT Equipment Recommendations  Equipment Needed: No    Assessment   Body structures, Functions, Activity limitations: Decreased functional mobility ; Decreased strength; Decreased cognition; Decreased balance  Assessment: Pt modAx2 to EOB, maxAx attmpting to stand, unable. Pt would benefit from continued acute PT to address deficits. Prognosis: Good  Decision Making: Medium Complexity  PT Education: Plan of Care; PT Role; General Safety  REQUIRES PT FOLLOW UP: Yes  Activity Tolerance  Activity Tolerance: Patient limited by cognitive status  Activity Tolerance: baseline dementia, nonverbal       Patient Diagnosis(es): The primary encounter diagnosis was COVID-19. A diagnosis of Hypoxia was also pertinent to this visit. has a past medical history of Acute delirium, Acute metabolic encephalopathy, Alzheimer's dementia with behavioral disturbance ([address] Atrophy, cortical, BPH (benign prostatic hyperplasia), Dementia ([address] Dementia without behavioral disturbance ([address] GERD (gastroesophageal reflux disease), Hemorrhoids, HIV (human immunodeficiency virus infection) ([address] Meralgia paraesthetica, Mixed hyperlipidemia, Occasional tremors, Osteoporosis, PVD (peripheral vascular disease) ([address] Seizure-like activity ([address] Syphilis in male, and Wears glasses. has a past surgical history that includes Total hip arthroplasty (Left); Tonsillectomy; Hemorrhoid surgery; sigmoidoscopy (N/A, 2017); Colonoscopy; hernia repair; and pr x-ray retrograde pyelogram (Bilateral, 3/6/2018).     Restrictions  Restrictions/Precautions  Restrictions/Precautions: General Precautions, Fall Risk, Up as Tolerated, Isolation (Droplet Plus)  Required Braces or Orthoses?: No  Position Activity Restriction  Other position/activity restrictions: up with assist. Hx dementia, non-verbal.  Vision/Hearing        Subjective  General  Chart Reviewed: Yes  Patient assessed for rehabilitation services?: Yes  Response To Previous Treatment: Not applicable  Family / Caregiver Present: No  Follows Commands: Within Functional Limits  General Comment  Comments: OT co-eval  Subjective  Subjective: RN and pt agreeable to PT. Pt alert in bed upon arrival.  Pain Screening  Patient Currently in Pain:  (no grimace throughout session, no verbal responses)  Vital Signs  Patient Currently in Pain:  (no grimace throughout session, no verbal responses)  Pre Treatment Pain Screening  Intervention List: Patient able to continue with treatment    Social/Functional History  Social/Functional History  Additional Comments: unable to obtain, ambulatory baseline per spouse. dementia, non verbal    Objective             Strength Other  Other: unable to follow directions for MMT, ROM appears WFL from functional mobility observed. Pt stiff/ resistant for movement. antigravity all extremities. Sensation  Overall Sensation Status:  (Pt unable to verbalize response)  Bed mobility  Supine to Sit: Moderate assistance; 2 Person assistance  Sit to Supine: Moderate assistance; 2 Person assistance  Scooting: Moderate assistance  Transfers  Sit to Stand: Maximum Assistance; 2 Person Assistance  Stand to sit: Maximum Assistance; 2 Person Assistance  Comment: attempts, unweighted bottom, pt appears to resist preventing standing full upright. Ambulation  Ambulation?: No  Stairs/Curb  Stairs?: No     Balance  Sitting - Static: Fair; +  Sitting - Dynamic: Fair  Standing - Static: Poor  Comments: maxA x2 to attemt standing with HHA, unable to obtain  Exercises  Comments: EOB ~8min, intermittant L lean Randy. brief changed, onur care.      Plan   Plan  Times per week: 3-4x/wk  Current Treatment Recommendations: Balance Training, Strengthening, Endurance Training, Transfer Training, Functional Mobility Training, Home Exercise Program, Safety Education & Training, Patient/Caregiver Education & Training, ROM  Safety Devices  Type of devices: Call light within reach, Nurse notified, Gait belt, Patient at risk for falls, Left in bed, All fall risk precautions in place  Restraints  Initially in place: No    AM-PAC Score     AM-PAC Inpatient Mobility without Stair Climbing Raw Score : 8 (11/16/21 1606)  AM-PAC Inpatient without Stair Climbing T-Scale Score : 30.65 (11/16/21 1606)  Mobility Inpatient CMS 0-100% Score: 80.91 (11/16/21 1606)  Mobility Inpatient without Stair CMS G-Code Modifier : CM (11/16/21 1606)       Goals  Short term goals  Time Frame for Short term goals: 10 visits  Short term goal 1: Pt will be Rad[place] bed mobility  Short term goal 2: Pt will be Randy transfers  Short term goal 3: Pt will be EOB 1min SBA  Short term goal 4: Pt will tolerate 30min PT       Therapy Time   Individual Concurrent Group Co-treatment   Time In 1131         Time Out 1154         Minutes 23         Timed Code Treatment Minutes: 8 Minutes       Mallory Levi PT

## 2021-11-16 NOTE — PLAN OF CARE
Problem: Airway Clearance - Ineffective  Goal: Achieve or maintain patent airway  Outcome: Ongoing     Problem: Gas Exchange - Impaired  Goal: Absence of hypoxia  Outcome: Ongoing  Goal: Promote optimal lung function  Outcome: Ongoing     Problem: Breathing Pattern - Ineffective  Goal: Ability to achieve and maintain a regular respiratory rate  Outcome: Ongoing     Problem:  Body Temperature -  Risk of, Imbalanced  Goal: Ability to maintain a body temperature within defined limits  Outcome: Ongoing  Goal: Will regain or maintain usual level of consciousness  Outcome: Ongoing  Goal: Complications related to the disease process, condition or treatment will be avoided or minimized  Outcome: Ongoing     Problem: Isolation Precautions - Risk of Spread of Infection  Goal: Prevent transmission of infection  Outcome: Ongoing     Problem: Nutrition Deficits  Goal: Optimize nutritional status  Outcome: Ongoing     Problem: Nutrition Deficits  Goal: Optimize nutritional status  Outcome: Ongoing     Problem: Risk for Fluid Volume Deficit  Goal: Maintain normal heart rhythm  Outcome: Ongoing  Goal: Maintain absence of muscle cramping  Outcome: Ongoing  Goal: Maintain normal serum potassium, sodium, calcium, phosphorus, and pH  Outcome: Ongoing     Problem: Loneliness or Risk for Loneliness  Goal: Demonstrate positive use of time alone when socialization is not possible  Outcome: Ongoing     Problem: Fatigue  Goal: Verbalize increase energy and improved vitality  Outcome: Ongoing     Problem: Patient Education: Go to Patient Education Activity  Goal: Patient/Family Education  Outcome: Ongoing     Problem: Skin Integrity:  Goal: Will show no infection signs and symptoms  Description: Will show no infection signs and symptoms  Outcome: Ongoing  Goal: Absence of new skin breakdown  Description: Absence of new skin breakdown  Outcome: Ongoing     Problem: Non-Violent Restraints  Goal: Removal from restraints as soon as assessed to be safe  Outcome: Ongoing  Goal: No harm/injury to patient while restraints in use  Outcome: Ongoing  Goal: Patient's dignity will be maintained  Outcome: Ongoing

## 2021-11-16 NOTE — PROGRESS NOTES
oz (57.3 kg)   SpO2 97%   BMI 20.40 kg/m²     Temp (24hrs), Av.6 °F (38.7 °C), Min:97.9 °F (36.6 °C), Max:102.8 °F (39.3 °C)    No intake/output data recorded. Physical Exam:  Constitutional: This is a well developed, well nourished, 18.5-24.9 - Normal 78y.o. year old male who is alert, oriented, cooperative and in no apparent distress. Head:normocephalic and atraumatic. EENT:  PERRLA. No conjunctival injections. Septum was midline, mucosa was without erythema, exudates or cobblestoning. No thrush was noted. Neck: Supple without thyromegaly. No elevated JVP. Trachea was midline. Respiratory: Chest was symmetrical without dullness to percussion. Breath sounds bilaterally were clear to auscultation. There were no wheezes, rhonchi or rales. There is no intercostal retraction or use of accessory muscles. No egophony noted. Cardiovascular: Regular without murmur, clicks, gallops or rubs. Abdomen: Slightly rounded and soft without organomegaly. No rebound, rigidity or guarding was appreciated. Lymphatic: No lymphadenopathy. Musculoskeletal: Normal curvature of the spine. No gross muscle weakness. Extremities:  No lower extremity edema, ulcerations, tenderness, varicosities or erythema. Muscle size, tone and strength are normal.  No involuntary movements are noted. Skin:  Warm and dry. Good color, turgor and pigmentation. No lesions or scars.   No cyanosis or clubbing  Neurological/Psychiatric: The patient's general behavior, level of consciousness, thought content and emotional status is normal.        Medications:  Scheduled Medications:    aspirin  81 mg Oral Daily    sodium chloride flush  5-40 mL IntraVENous 2 times per day    enoxaparin  40 mg SubCUTAneous Daily    atorvastatin  40 mg Oral Nightly    tamsulosin  0.4 mg Oral Daily    memantine  10 mg Oral BID    donepezil  10 mg Oral Nightly    divalproex  250 mg Oral 2 times per day     Continuous Infusions:    sodium chloride       PRN Medicationsibuprofen, 600 mg, Q6H PRN  sodium chloride flush, 5-40 mL, PRN  sodium chloride, 25 mL, PRN  ondansetron, 4 mg, Q8H PRN   Or  ondansetron, 4 mg, Q6H PRN  polyethylene glycol, 17 g, Daily PRN  acetaminophen, 650 mg, Q6H PRN   Or  acetaminophen, 650 mg, Q6H PRN        Diagnostic Labs:  CBC:   Recent Labs     11/14/21  1025 11/15/21  0504 11/16/21  0434   WBC 7.0  7.0 7.0 10.1   RBC 4.17* 3.89* 4.27   HGB 11.9* 11.0* 12.0*   HCT 38.7* 35.8* 40.0*   MCV 92.8 92.0 93.7   RDW 14.6* 14.5* 14.6*    150 See Reflexed IPF Result     BMP:   Recent Labs     11/14/21  1025 11/15/21  0504 11/16/21  0434    137 132*   K 5.4* 4.4 4.0    106 102   CO2 24 20 21   BUN 12 11 12   CREATININE 0.94 1.12 1.07     BNP: No results for input(s): BNP in the last 72 hours. PT/INR: No results for input(s): PROTIME, INR in the last 72 hours. APTT: No results for input(s): APTT in the last 72 hours. CARDIAC ENZYMES: No results for input(s): CKMB, CKMBINDEX, TROPONINI in the last 72 hours. Invalid input(s): CKTOTAL;3  FASTING LIPID PANEL:  Lab Results   Component Value Date    CHOL 185 02/10/2019    HDL 72 02/10/2019    TRIG 139 02/10/2019     LIVER PROFILE:   Recent Labs     11/14/21  1025   AST 32   ALT 13   BILIDIR <0.08   BILITOT 0.21*   ALKPHOS 142*      MICROBIOLOGY:   Lab Results   Component Value Date/Time    CULTURE ESCHERICHIA COLI >637263 CFU/ML (A) 09/10/2019 07:02 PM       Imaging:    XR CHEST PORTABLE    Result Date: 11/14/2021  No acute process. ASSESSMENT & PLAN     ASSESSMENT / PLAN:     COVID-19 infection  Vaccinated with Covid  Covid positive on 11/14/2021  Is out of window for remdesivir  Does not meet requirements of Decadron and Actemra. ID is on board, will follow recommendations. CRP 14.4-> 68.8-> 102.4   Will continue to monitor inflammatory markers.     Acute hypoxic respiratory failure  Secondary to COVID-19 infection  On room air.    Will monitor and wean off as tolerated.     Alzheimer's dementia  On memantine  On donepezil     HIV infection  On [address] outpatient with Dr. Kal Orozco     Hyperlipidemia  On atorvastatin     Peripheral vascular disease  On aspirin     BPH  We will resume Flomax  We will monitor for signs of urinary retention.     GERD   On Pepcid as needed     Moderate malnutrition  On Ensure liquid supplement     DVT ppx  On Lovenox     GI ppx  Not indicated     PT/OT/SW  Consulted     CODE STATUS  Currently full code, Will discuss with the patient and family in detail tomorrow.     Discharge Planning   to assist in discharge planning. Tha Chanel MD  Internal Medicine Resident, PGY-1  [address], [place]  11/16/2021, 2:58 PM    Attending Physician Statement  I have discussed the care of Peyton Ambriz with the resident team. I have examined the patient myself and taken ros and hpi , including pertinent history and exam findings,  with the resident. I have reviewed the key elements of all parts of the encounter with the resident. I agree with the assessment, plan and orders as documented by the resident. Principal Problem:    COVID  Active Problems:    HIV (human immunodeficiency virus infection) ([facility] [address]    Dementia without behavioral disturbance (HCC)    BPH (benign prostatic hyperplasia)    GERD (gastroesophageal reflux disease)    Mixed hyperlipidemia    Alzheimer's dementia with behavioral disturbance (HCC)    Hyponatremia    Moderate malnutrition (HCC)    Elevated C-reactive protein (CRP)  Resolved Problems:    * No resolved hospital problems. *    On room air but continues to have fever spikes.   UA blood cultures awaited we will continue to monitor          Electronically signed by Mason Copeland MD

## 2021-11-16 NOTE — PROGRESS NOTES
[facility] [address] Patient  Today's Date and Time: 11/16/2021, 9:09 AM    Impression :     · COVID 19 Confirmed Infection  · Covid tests:  · 11/14/21: Positive  · HIV  · Alzheimer's dementia with behavioral disturbances  · Hx of Syphilis  · Fully vaccinated with Moderna    Recommendations:   · Antibiotic treatment:  · Monitor off antibiotics  · Continue HIV treatments  · Covid Rx:  · Remdesivir-Out of window  · Decadron-Does not meet requirements  · Actemra-Does not meet requirements at this time  · Monoclonal antibody infusion ordered 11/15/21  · Monitor CRP    Medical Decision Making/Summary/Discussion:11/16/2021     · Patient admitted with suspected COVID 19 infection  · Covid test confirmed positive. Infection Control Recommendations   · Universal Precautions  · Airborne isolation  · Droplet Isolation    Antimicrobial Stewardship Recommendations     · Discontinuation of therapy  Coordination of Outpatient Care:   · Estimated Length of IV antimicrobials:TBD  · Patient will need Midline Catheter Insertion: TBD  · Patient will need PICC line Insertion: No  · Patient will need: Home IV , [place],  SNF,  LTAC:TBD  · Patient will need outpatient wound care:No    Chief complaint/reason for consultation:   · Concern for COVID infection      History of Present Illness:   Tanja Vaughan is a 78y.o.-year-old male who was initially admitted on 11/14/2021. Patient seen at the request of Dr. Hernandez March:    Patient presented through ER with complaints of dry cough and fatigue X 2 weeks per his wife. He has also been experiencing difficulty urinating. The patient is non-verbal and may be aggressive at times secondary to dementia. He follow with Dr. Italia Torres for his HIV and takes Atripla and [place]. His Covid swab resulted as positive. No acute process noted on CXR.     Patient admitted because of concerns with COVID 19.    CURRENT EVALUATION : 11/16/2021    TMAX 102.1F  VS stable    Fevers continue  Nasal cannula 2 L, but it is not in his nose most of the time    CRP increased to 102.4    Discussed his case with Dr. Carter Ormond, his ID physician who manages his HIV treatment. Dr. Carter Ormond recommended monoclonal antibody infusion and monitor off antibiotics. He believes the fever is related to Covid. Patient exhibiting respiratory distress. No  Respiratory secretions:     Patient receiving supplemental oxygen. 2 L Nc  RR:16-->27-->24  02 sat: 94-->100-->100    % FIO2:   PEEP:      QTc:       NEWS Score: 0-4 Low risk group; 5-6: Medium risk group; 7 or above: High risk group  Parameters 3 2 1 0 1 2 3   Age    < 65   ? 65   RR ? 8  9-11 12-20  21-24 ? 25   O2 Sats ? 91 92-93 94-95 ? 96      Suppl O2  Yes  No      SBP ? 90  101-110 111-219   ? 220   HR ? 40  41-50 51-90  111-130 ? 131   Consciousness    Alert   Drowsiness, lethargy, or confusion   Temperature ? 35.0 C (95.0 F)  35.1-36.0 C 95.1-96.9 F 36.1-38.0 C 97.0-100.4 F 38.1-39.0 C 100.5-102.3 F ? 39.1 C ? 102.4 F      NEWS Score:   11/14/21: 4 Low risk    Overall Daily Picture:    Unchanged      Presence of secondary bacterial Infection:  No   Additional antibiotics: No    Labs, X rays reviewed: 11/16/2021    BUN:12-->12  Cr:0.94-->1.07    WBC:7.0-->10.1  Hb:11.9-->12  Plat: 158-->138    Absolute Neutrophils:3.29  Absolute Lymphocytes:2.12  Neutrophil/Lymphocyte Ratio: 1.5 low risk    CRP:14.4-->68.8-->102.4  Ferritin:57  LDH:     Pro Calcitonin:      Cultures:  Urine:  ·   Blood:  ·   Sputum :  ·   Wound:       CXR:     CAT:      Discussed with patient, RN, CC, IM. I have personally reviewed the past medical history, past surgical history, medications, social history, and family history, and I have updated the database accordingly.   Past Medical History:     Past Medical History:   Diagnosis Date    Acute delirium 2/11/2019    Acute metabolic encephalopathy 0/76/8662    [facility]'s dementia with [facility] 2019    Atrophy, cortical     BPH (benign prostatic hyperplasia) 2013    [facility]     Dementia without behavioral disturbance ([facility] 2012    GERD (gastroesophageal reflux disease) 2015    Hemorrhoids     [facility] immunodeficiency virus infection) [facility]     Meralgia paraesthetica 2016    Mixed hyperlipidemia 2016    Occasional tremors     Osteoporosis     PVD (peripheral vascular disease) [facility] 2018    Seizure-like activity ([facility] 2018    Syphilis in male     Wears glasses        Past Surgical  History:     Past Surgical History:   Procedure Laterality Date    COLONOSCOPY      HEMORRHOID SURGERY      HERNIA REPAIR      umbilical    [facility] X-RAY RETROGRADE PYELOGRAM Bilateral 3/6/2018    CYSTOSCOPY RETROGRADE PYELOGRAM performed by Adilson Berger MD at [address] 2017    SIGMOIDOSCOPY POLYP SNARE performed by Quinn [facility]ellan [facility] at [facility]        Medications:      aspirin  81 mg Oral Daily    sodium chloride flush  5-40 mL IntraVENous 2 times per day    enoxaparin  40 mg SubCUTAneous Daily    atorvastatin  40 mg Oral Nightly    tamsulosin  0.4 mg Oral Daily    memantine  10 mg Oral BID    donepezil  10 mg Oral Nightly    divalproex  250 mg Oral 2 times per day       Social History:     Social History     Socioeconomic History    Marital status:      Spouse name: Not on file    Number of children: 5    Years of education: 3    Highest education level: 3rd grade   Occupational History    Not on file   Tobacco Use    Smoking status: Former Smoker     Packs/day: 0.00     Years: 50.00     Pack years: 0.00     Types: Cigarettes     Start date:      Quit date: 2021     Years since quittin.8    Smokeless tobacco: Never Used    Tobacco comment:  cigerettes per day   Vaping Use    Vaping Use: Never used Substance and Sexual Activity    Alcohol use: Yes     Alcohol/week: 4.0 standard drinks     Types: 4 Cans of beer per week    Drug use: Yes    Sexual activity: Not on file   Other Topics Concern    Not on file   Social History Narrative    Not on file     Social Determinants of Health     Financial Resource Strain: Low Risk     Difficulty of Paying Living Expenses: Not very hard   Food Insecurity: No Food Insecurity    Worried About Running Out of Food in the Last Year: Never true    Mitra of Food in the Last Year: Never true   Transportation Needs:     Lack of Transportation (Medical): Not on file    Lack of Transportation (Non-Medical): Not on file   Physical Activity:     Days of Exercise per Week: Not on file    Minutes of Exercise per Session: Not on file   Stress:     Feeling of Stress : Not on file   Social Connections:     Frequency of Communication with Friends and Family: Not on file    Frequency of Social Gatherings with Friends and Family: Not on file    Attends Evangelical Services: Not on file    Active Member of Clubs or Organizations: Not on file    Attends Club or Organization Meetings: Not on file    Marital Status: Not on file   Intimate Partner Violence:     Fear of Current or Ex-Partner: Not on file    Emotionally Abused: Not on file    Physically Abused: Not on file    Sexually Abused: Not on file   Housing Stability:     Unable to Pay for Housing in the Last Year: Not on file    Number of [place] in the Last Year: Not on file    Unstable Housing in the Last Year: Not on file       Family History:     Family History   Problem Relation Age of Onset    Heart Disease Mother     High Blood Pressure Mother     Cancer Father     Diabetes Sister     Heart Disease Sister     High Blood Pressure Sister     Heart Disease Brother     High Blood Pressure Brother         Allergies:   Patient has no known allergies.      Review of Systems: SHIRLEY-Dementia  Constitutional: No fevers or chills. No systemic complaints  Head: No headaches  Eyes: No double vision or blurry vision. No conjunctival inflammation. ENT: No sore throat or runny nose. . No hearing loss, tinnitus or vertigo. Cardiovascular: No chest pain or palpitations. No Shortness of breath. No BENJAMIN  Lung: No Shortness of breath or cough. No sputum production  Abdomen: No nausea, vomiting, diarrhea, or abdominal pain. [place] No cramps. Genitourinary: No increased urinary frequency, or dysuria. No hematuria. No suprapubic or CVA pain  Musculoskeletal: No muscle aches or pains. No joint effusions, swelling or deformities  Hematologic: No bleeding or bruising. Neurologic: No headache, weakness, numbness, or tingling. Integument: No rash, no ulcers. Psychiatric: No depression. Endocrine: No polyuria, no polydipsia, no polyphagia. Physical Examination :     Patient Vitals for the past 8 hrs:   BP Temp Temp src Pulse Resp SpO2   11/16/21 0520 (!) 116/96 102.1 °F (38.9 °C) Axillary 64 25 100 %     General Appearance: Awake, alert, and in no apparent distress  Head:  Normocephalic, no trauma  Eyes: Pupils equal, round, reactive to light; sclera anicteric; conjunctivae pink. No embolic phenomena. ENT: Oropharynx clear, without erythema, exudate, or thrush. No tenderness of sinuses. Mouth/throat: mucosa pink and moist. No lesions. Dentition in good repair. Neck:Supple, without lymphadenopathy. Thyroid normal, No bruits. Pulmonary/Chest: Clear to auscultation, without wheezes, rales, or rhonchi. No dullness to percussion. Cardiovascular: Regular rate and rhythm without murmurs, rubs, or gallops. Abdomen: Soft, non tender. Bowel sounds normal. No organomegaly  All four Extremities: No cyanosis, clubbing, edema, or effusions. Neurologic: Baseline dementia  Skin: Warm and dry with good turgor. No signs of peripheral arterial or venous insufficiency. No ulcerations. No open wounds.     Medical Decision Making -Laboratory:   I have independently reviewed/ordered the following labs:    CBC with Differential:   Recent Labs     11/14/21  1025 11/14/21  1025 11/15/21  0504 11/16/21  0434   WBC 7.0  7.0   < > 7.0 10.1   HGB 11.9*   < > 11.0* 12.0*   HCT 38.7*   < > 35.8* 40.0*      < > 150 See Reflexed IPF Result   LYMPHOPCT 30  30  --   --   --    MONOPCT 20*  --   --   --     < > = values in this interval not displayed. BMP:   Recent Labs     11/15/21  0504 11/16/21  0434    132*   K 4.4 4.0    102   CO2 20 21   BUN 11 12   CREATININE 1.12 1.07     Hepatic Function Panel:   Recent Labs     11/14/21  1025   PROT 7.6   LABALBU 3.6   BILIDIR <0.08   IBILI Connot be calculated   BILITOT 0.21*   ALKPHOS 142*   ALT 13   AST 32     No results for input(s): RPR in the last 72 hours. No results for input(s): HIV in the last 72 hours. No results for input(s): BC in the last 72 hours. Lab Results   Component Value Date    MUCUS NOT REPORTED 09/10/2019    RBC 4.27 11/16/2021    RBC 4.31 04/18/2012    TRICHOMONAS NOT REPORTED 09/10/2019    WBC 10.1 11/16/2021    YEAST NOT REPORTED 09/10/2019    TURBIDITY CLEAR 09/10/2019     Lab Results   Component Value Date    CREATININE 1.07 11/16/2021    GLUCOSE 105 11/16/2021    GLUCOSE 76 04/18/2012       Medical Decision Making-Imaging:       Medical Decision Mviaey-Ujnggmvh-Ezkiy:       Medical Decision Making-Other:     Note:  · Labs, medications, radiologic studies were reviewed with personal review of films  · Large amounts of data were reviewed  · Discussed with nursing Staff, Discharge planner  · Infection Control and Prevention measures reviewed  · All prior entries were reviewed  · Administer medications as ordered  · Prognosis: Guarded  · Discharge planning reviewed      Thank you for allowing us to participate in the care of this patient. Please call with questions.     ERVIN Mckenzie - CNP       ATTESTATION:    I have discussed the case, including pertinent history and exam findings with the APRN. I have evaluated the  History, physical findings and pictures of the patient and the key elements of the encounter have been performed by me. I have reviewed the laboratory data, other diagnostic studies and discussed them with the APRN. I have updated the medical record where necessary. I agree with the assessment, plan and orders as documented by the APRN.     Roc James MD.      Pager: [phone] - Office: [phone]

## 2021-11-16 NOTE — PLAN OF CARE
Problem: Airway Clearance - Ineffective  Goal: Achieve or maintain patent airway  11/16/2021 0540 by Isabela Griffith RN  Outcome: Ongoing  11/15/2021 1942 by Laura Gomez RN  Outcome: Ongoing     Problem: Gas Exchange - Impaired  Goal: Absence of hypoxia  11/16/2021 0540 by Isabela Griffith RN  Outcome: Ongoing  11/15/2021 1942 by Laura Gomez RN  Outcome: Ongoing  Goal: Promote optimal lung function  11/16/2021 0540 by Isabela Griffith RN  Outcome: Ongoing  11/15/2021 1942 by Laura Gomez RN  Outcome: Ongoing     Problem: Breathing Pattern - Ineffective  Goal: Ability to achieve and maintain a regular respiratory rate  11/16/2021 0540 by Isabela Griffith RN  Outcome: Ongoing  11/15/2021 1942 by Laura Gomez RN  Outcome: Ongoing     Problem:  Body Temperature -  Risk of, Imbalanced  Goal: Ability to maintain a body temperature within defined limits  11/16/2021 0540 by Isabela Griffith RN  Outcome: Ongoing  11/15/2021 1942 by Laura Gomez RN  Outcome: Ongoing  Goal: Will regain or maintain usual level of consciousness  11/16/2021 0540 by Isabela Griffith RN  Outcome: Ongoing  11/15/2021 1942 by Laura Gomez RN  Outcome: Ongoing  Goal: Complications related to the disease process, condition or treatment will be avoided or minimized  11/16/2021 0540 by Isabela Griffith RN  Outcome: Ongoing  11/15/2021 1942 by Laura Gomez RN  Outcome: Ongoing     Problem: Isolation Precautions - Risk of Spread of Infection  Goal: Prevent transmission of infection  11/16/2021 0540 by Isabela Griffith RN  Outcome: Ongoing  11/15/2021 1942 by Laura Gomez RN  Outcome: Ongoing     Problem: Nutrition Deficits  Goal: Optimize nutritional status  11/16/2021 0540 by Isabela Griffith RN  Outcome: Ongoing  11/15/2021 1942 by Laura Gomez RN  Outcome: Ongoing     Problem: Risk for Fluid Volume Deficit  Goal: Maintain normal heart rhythm  11/16/2021 0540 by Chad Hyde Jared Mc RN  Outcome: Ongoing  11/15/2021 1942 by Ramon Gil RN  Outcome: Ongoing  Goal: Maintain absence of muscle cramping  11/16/2021 0540 by Nora Lang RN  Outcome: Ongoing  11/15/2021 1942 by Ramon Gil RN  Outcome: Ongoing  Goal: Maintain normal serum potassium, sodium, calcium, phosphorus, and pH  11/16/2021 0540 by Nora Lang RN  Outcome: Ongoing  11/15/2021 1942 by Ramon Gil RN  Outcome: Ongoing     Problem: Loneliness or Risk for Loneliness  Goal: Demonstrate positive use of time alone when socialization is not possible  11/16/2021 0540 by Nora Lang RN  Outcome: Ongoing  11/15/2021 1942 by Ramon Gil RN  Outcome: Ongoing     Problem: Fatigue  Goal: Verbalize increase energy and improved vitality  11/16/2021 0540 by Nora Lang RN  Outcome: Ongoing  11/15/2021 1942 by Ramon Gil RN  Outcome: Ongoing     Problem: Patient Education: Go to Patient Education Activity  Goal: Patient/Family Education  11/16/2021 0540 by Nora Lang RN  Outcome: Ongoing  11/15/2021 1942 by Ramon Gil RN  Outcome: Ongoing     Problem: Skin Integrity:  Goal: Will show no infection signs and symptoms  Description: Will show no infection signs and symptoms  11/16/2021 0540 by Nora Lang RN  Outcome: Ongoing  11/15/2021 1942 by Ramon Gil RN  Outcome: Ongoing  Goal: Absence of new skin breakdown  Description: Absence of new skin breakdown  11/16/2021 0540 by Nora Lang RN  Outcome: Ongoing  11/15/2021 1942 by Ramon Gil RN  Outcome: Ongoing     Problem: Non-Violent Restraints  Goal: Removal from restraints as soon as assessed to be safe  11/16/2021 0540 by Nora Lang RN  Outcome: Ongoing  11/15/2021 1942 by Ramon Gil RN  Outcome: Ongoing  Goal: No harm/injury to patient while restraints in use  11/16/2021 0540 by Nora Lang RN  Outcome: Ongoing  11/15/2021 1942 by Ramon Gil RN  Outcome: Ongoing  Goal: Patient's dignity will be maintained  11/16/2021 0540 by Cris Sandoval RN  Outcome: Ongoing  11/15/2021 1942 by Parker Cornejo RN  Outcome: Ongoing

## 2021-11-16 NOTE — PLAN OF CARE
Patient is on 2 L nasal cannula  Persistent elevated temperature T-max 102.1  Increasing leukocytosis  Worsening CRP 68.8-102.4  2 sets of blood cultures sent alongside urine and respiratory cultures  We will follow-up with ID in regards to starting IV Decadron 6 mg given history of immunocompromised state  Prophylactic antibiotics as per ID  We will get a chest x-ray  Hyponatremia sodium of 132 monitor sodium  Patient did receive monoclonal antibody  Will trend CRP  We will monitor clinical status  On DVT prophylaxis with enoxaparin 40 mg    David Riley MD  Internal Medicine Resident, PGY- [address], [place]  11/16/2021, 6:24 AM

## 2021-11-16 NOTE — PROGRESS NOTES
Occupational Therapy   Occupational Therapy Initial Assessment  Date: 2021   Patient Name: Peyton Ambriz  MRN: [id number]     : 1940    Date of Service: 2021  Chief Complaint   Patient presents with    Fatigue    Cough   covid+    Discharge Recommendations: Pt unsafe to return to PLOF, not at baseline, hx of Alzheimer's disease, spouse also hospitalized currently. Patient would benefit from continued therapy after discharge     Assessment   Performance deficits / Impairments: Decreased functional mobility ; Decreased endurance; Decreased ADL status; Decreased strength; Decreased safe awareness; Decreased cognition; Decreased high-level IADLs; Decreased balance; Decreased coordination  Prognosis: Fair  Decision Making: High Complexity  OT Education: OT Role; Plan of Care  Patient Education: Poor return from pt, Alzheimers disease  REQUIRES OT FOLLOW UP: Yes  Activity Tolerance  Activity Tolerance: Patient limited by fatigue; Treatment limited secondary to decreased cognition; Treatment limited secondary to agitation  Safety Devices  Safety Devices in place: Yes  Type of devices: Call light within reach; Bed alarm in place; Left in bed; Nurse notified; Gait belt; Patient at risk for falls  Restraints  Initially in place: No           Patient Diagnosis(es): The primary encounter diagnosis was COVID-19. A diagnosis of Hypoxia was also pertinent to this visit. has a past medical history of Acute delirium, Acute metabolic encephalopathy, Alzheimer's dementia with behavioral disturbance ([address] Atrophy, cortical, BPH (benign prostatic hyperplasia), Dementia ([address] Dementia without behavioral disturbance ([address] GERD (gastroesophageal reflux disease), Hemorrhoids, HIV (human immunodeficiency virus infection) ([address] Meralgia paraesthetica, Mixed hyperlipidemia, Occasional tremors, Osteoporosis, PVD (peripheral vascular disease) ([address] Seizure-like activity ([address] Syphilis in male, and Wears glasses. has a past surgical history that includes Total hip arthroplasty (Left); Tonsillectomy; Hemorrhoid surgery; sigmoidoscopy (N/A, 11/22/2017); Colonoscopy; hernia repair; and pr x-ray retrograde pyelogram (Bilateral, 3/6/2018). Restrictions  Restrictions/Precautions  Restrictions/Precautions: General Precautions, Fall Risk, Up as Tolerated, Isolation (Droplet Plus)  Required Braces or Orthoses?: No  Position Activity Restriction  Other position/activity restrictions: up with assist. Hx dementia, non-verbal. covid+/droplet+    Subjective   General  Patient assessed for rehabilitation services?: Yes  Family / Caregiver Present: No  Diagnosis: covid+, fatigue, cough, hx. of HIV, Alzheimers  Patient Currently in Pain: No (No grimacing noted)  Pain Assessment  Pain Assessment: 0-10  Pain Level: 0  Vital Signs  Patient Currently in Pain: No (No grimacing noted)  Social/Functional History  Social/Functional History  Lives With: Spouse  Type of Home: Apartment  Home Layout: One level  Home Access: Level entry  Bathroom Shower/Tub: Tub/Shower unit  Bathroom Toilet: Handicap height  Bathroom Equipment: Grab bars around toilet, Grab bars in shower  Bathroom Accessibility: Accessible  Receives Help From: Home health (Aide x7 weekly, spouse also receives aide x3 weekly)  ADL Assistance: Needs assistance  Homemaking Assistance: Needs assistance  Homemaking Responsibilities: No (Aide/spouse perform all)  Ambulation Assistance: Independent (without device per spouse)  Transfer Assistance: Independent  Active : No  Patient's  Info: Wife drives  Occupation: Retired  Additional Comments:  All social history obtained from pt's spouse who is also hospitalized currently, pt is a poor historian d/t dementia     Objective   Vision:  (SHIRLEY d/t confusion and pt non-verbal)  Hearing:  (SHIRLEY d/t confusion and pt non-verbal)    Orientation  Overall Orientation Status: Impaired  Orientation Level: Disoriented X4 Balance  Sitting Balance: Moderate assistance (Pt sat for ~8 min total this date, poor trunk control at times, posterior/R lean)  Standing Balance: Maximum assistance (x2 just able to clear EOB)  Standing Balance  Time: 10 seconds only  Activity: Pt stood at beside x1  ADL  Feeding: Maximum assistance; Setup; Verbal cueing; Increased time to complete  Grooming: Setup; Verbal cueing; Increased time to complete; Maximum assistance  UE Bathing: Setup; Verbal cueing; Increased time to complete; Maximum assistance  LE Bathing: Dependent/Total  UE Dressing: Dependent/Total  LE Dressing: Dependent/Total  Toileting: Dependent/Total  Additional Comments: Pt sat on EOB this date, became agitated or fearful at one point when writer saved pt from falling to R while sitting, pt extremely confused, may be close to baseline with ADL performance however pt is ambulatory per spouse  Tone RUE  RUE Tone: Normotonic  Tone LUE  LUE Tone: Normotonic  Coordination  Movements Are Fluid And Coordinated: No  Coordination and Movement description: Decreased speed; Right UE; Left UE     Bed mobility  Supine to Sit: Moderate assistance; 2 Person assistance  Sit to Supine: Moderate assistance; 2 Person assistance  Scooting:  Moderate assistance  Comment: Pt rolled R/L in bed for brief change and bottom-care while supine in bed at end of session, pt supine in bed with HOB elevated upon arrival/exit with sitter observing from window and alarm on at exit  Transfers  Sit to stand: Maximum assistance; 2 Person assistance  Stand to sit: 2 Person assistance; Maximum assistance  Transfer Comments: Pt provided max Ax2 to stand and was able to just clear bed for 10 seconds only, brief noted to be soiled, pt fatigued immediately and was sat down     Cognition  Overall Cognitive Status: Exceptions  Following Commands: Inconsistently follows commands  Attention Span: Unable to maintain attention  Memory: Decreased recall of recent events; Decreased recall of precautions; Decreased short term memory; Decreased long term memory; Decreased recall of biographical Information  Safety Judgement: Decreased awareness of need for assistance; Decreased awareness of need for safety  Problem Solving: Decreased awareness of errors  Insights: Not aware of deficits  Initiation: Requires cues for all  Sequencing: Requires cues for all  Cognition Comment: Pt extremely confused, hx of Alzheimers disease, non-verbal at baseline, pt able to follow very simple physical cues only        Sensation  Overall Sensation Status:  (SHIRLEY d/t pt confused)    LUE AROM (degrees)  LUE AROM : Exceptions  LUE General AROM: SHIRLEY d/t pt confused and not complying with any formal assessment, likely general weakness however  RUE AROM (degrees)  RUE AROM : Exceptions  RUE General AROM: SHIRLEY d/t pt confused and not complying with any formal assessment, likely general weakness however  LUE Strength  Gross LUE Strength: Exceptions to [facility]  L Shoulder Flex: NT  L Elbow Flex: 4+/5 (SHIRLEY d/t pt confused and not complying with any formal assessment, likely general weakness however)  L Elbow Ext: NT  L Hand General: NT  RUE Strength  Gross RUE Strength: Exceptions to [facility]  R Shoulder Flex: NT  R Elbow Flex: 4+/5 (SHIRLEY d/t pt confused and not complying with any formal assessment, likely general weakness however)  R Elbow Ext: NT  R Hand General: NT         Plan   Plan  Times per week: 3-4x  AM-PAC Score        AM-[facility] Inpatient Daily Activity Raw Score: 8 (11/16/21 1653)  AM-PAC Inpatient ADL T-Scale Score : 22.86 (11/16/21 1653)  ADL Inpatient CMS 0-100% Score: 85.69 (11/16/21 1653)  ADL Inpatient CMS G-Code Modifier : CM (11/16/21 1653)    Goals  Short term goals  Time Frame for Short term goals: Pt will by discharge  Short term goal 1: demo simple ADL feeding/grooming tasks as appropriate with mod cues, min A, and [place] PRN  Short term goal 2: demo dynamic sitting on EOB during func activity for 10 min+ with CGA  Short term goal 3: demo standing during func activity for 3 min+ with LRD PRN, and mod A  Short term goal 4: demo min A for all bed mobility with moderate cueing and railing used PRN  Short term goal 5: notify OTR to update goals as mobility progresses, etc.       Therapy Time   Individual Concurrent Group Co-treatment   Time In 1114         Time Out 1130         Minutes 16         Timed Code Treatment Minutes: 8 Minutes       LUIS Hunt/LEDY

## 2021-11-16 NOTE — PROGRESS NOTES
ID NP, Merle Kemp, notified of pt. Continuous fever, after tylenol given this am for fever.  Will await orders

## 2021-11-17 LAB
ANION GAP SERPL CALCULATED.3IONS-SCNC: 8 MMOL/L (ref 9–17)
BUN BLDV-MCNC: 12 MG/DL (ref 8–23)
BUN/CREAT BLD: ABNORMAL (ref 9–20)
C-REACTIVE PROTEIN: 122.4 MG/L (ref 0–5)
CALCIUM SERPL-MCNC: 8.3 MG/DL (ref 8.6–10.4)
CHLORIDE BLD-SCNC: 104 MMOL/L (ref 98–107)
CO2: 24 MMOL/L (ref 20–31)
CREAT SERPL-MCNC: 0.97 MG/DL (ref 0.7–1.2)
GFR AFRICAN AMERICAN: >60 ML/MIN
GFR NON-AFRICAN AMERICAN: >60 ML/MIN
GFR SERPL CREATININE-BSD FRML MDRD: ABNORMAL ML/MIN/{1.73_M2}
GFR SERPL CREATININE-BSD FRML MDRD: ABNORMAL ML/MIN/{1.73_M2}
GLUCOSE BLD-MCNC: 96 MG/DL (ref 70–99)
HCT VFR BLD CALC: 40.1 % (ref 40.7–50.3)
HEMOGLOBIN: 12.2 G/DL (ref 13–17)
MCH RBC QN AUTO: 28.4 PG (ref 25.2–33.5)
MCHC RBC AUTO-ENTMCNC: 30.4 G/DL (ref 28.4–34.8)
MCV RBC AUTO: 93.5 FL (ref 82.6–102.9)
NRBC AUTOMATED: 0 PER 100 WBC
PDW BLD-RTO: 14.6 % (ref 11.8–14.4)
PLATELET # BLD: 173 K/UL (ref 138–453)
PMV BLD AUTO: 11 FL (ref 8.1–13.5)
POTASSIUM SERPL-SCNC: 4.5 MMOL/L (ref 3.7–5.3)
RBC # BLD: 4.29 M/UL (ref 4.21–5.77)
SODIUM BLD-SCNC: 136 MMOL/L (ref 135–144)
WBC # BLD: 6.4 K/UL (ref 3.5–11.3)

## 2021-11-17 PROCEDURE — 6370000000 HC RX 637 (ALT 250 FOR IP)

## 2021-11-17 PROCEDURE — 6360000002 HC RX W HCPCS

## 2021-11-17 PROCEDURE — 6370000000 HC RX 637 (ALT 250 FOR IP): Performed by: NURSE PRACTITIONER

## 2021-11-17 PROCEDURE — 85027 COMPLETE CBC AUTOMATED: CPT

## 2021-11-17 PROCEDURE — 2580000003 HC RX 258

## 2021-11-17 PROCEDURE — 6370000000 HC RX 637 (ALT 250 FOR IP): Performed by: STUDENT IN AN ORGANIZED HEALTH CARE EDUCATION/TRAINING PROGRAM

## 2021-11-17 PROCEDURE — APPSS30 APP SPLIT SHARED TIME 16-30 MINUTES: Performed by: NURSE PRACTITIONER

## 2021-11-17 PROCEDURE — 86140 C-REACTIVE PROTEIN: CPT

## 2021-11-17 PROCEDURE — 97110 THERAPEUTIC EXERCISES: CPT

## 2021-11-17 PROCEDURE — 36415 COLL VENOUS BLD VENIPUNCTURE: CPT

## 2021-11-17 PROCEDURE — 80048 BASIC METABOLIC PNL TOTAL CA: CPT

## 2021-11-17 PROCEDURE — 99232 SBSQ HOSP IP/OBS MODERATE 35: CPT | Performed by: INTERNAL MEDICINE

## 2021-11-17 PROCEDURE — 1200000000 HC SEMI PRIVATE

## 2021-11-17 RX ADMIN — ACETAMINOPHEN 650 MG: 325 TABLET ORAL at 09:08

## 2021-11-17 RX ADMIN — DIVALPROEX SODIUM 250 MG: 125 CAPSULE, COATED PELLETS ORAL at 20:55

## 2021-11-17 RX ADMIN — TAMSULOSIN HYDROCHLORIDE 0.4 MG: 0.4 CAPSULE ORAL at 08:57

## 2021-11-17 RX ADMIN — SODIUM CHLORIDE, PRESERVATIVE FREE 10 ML: 5 INJECTION INTRAVENOUS at 08:58

## 2021-11-17 RX ADMIN — DIVALPROEX SODIUM 250 MG: 125 CAPSULE, COATED PELLETS ORAL at 08:57

## 2021-11-17 RX ADMIN — ENOXAPARIN SODIUM 40 MG: 100 INJECTION SUBCUTANEOUS at 08:58

## 2021-11-17 RX ADMIN — BICTEGRAVIR SODIUM, EMTRICITABINE, AND TENOFOVIR ALAFENAMIDE FUMARATE 1 TABLET: 50; 200; 25 TABLET ORAL at 13:18

## 2021-11-17 RX ADMIN — ASPIRIN 81 MG: 81 TABLET, CHEWABLE ORAL at 08:57

## 2021-11-17 RX ADMIN — MEMANTINE HYDROCHLORIDE 10 MG: 5 TABLET, FILM COATED ORAL at 08:57

## 2021-11-17 RX ADMIN — SODIUM CHLORIDE, PRESERVATIVE FREE 10 ML: 5 INJECTION INTRAVENOUS at 20:55

## 2021-11-17 RX ADMIN — MEMANTINE HYDROCHLORIDE 10 MG: 5 TABLET, FILM COATED ORAL at 20:54

## 2021-11-17 RX ADMIN — DONEPEZIL HYDROCHLORIDE 10 MG: 10 TABLET, FILM COATED ORAL at 20:54

## 2021-11-17 RX ADMIN — DESMOPRESSIN ACETATE 40 MG: 0.2 TABLET ORAL at 20:54

## 2021-11-17 ASSESSMENT — PAIN SCALES - GENERAL
PAINLEVEL_OUTOF10: 0

## 2021-11-17 ASSESSMENT — PAIN SCALES - PAIN ASSESSMENT IN ADVANCED DEMENTIA (PAINAD)
TOTALSCORE: 1
TOTALSCORE: 0
CONSOLABILITY: 0
BODYLANGUAGE: 0
BREATHING: 0
NEGVOCALIZATION: 0
FACIALEXPRESSION: 0
NEGVOCALIZATION: 1
BODYLANGUAGE: 0
BREATHING: 0
FACIALEXPRESSION: 0
CONSOLABILITY: 0

## 2021-11-17 ASSESSMENT — PAIN SCALES - WONG BAKER
WONGBAKER_NUMERICALRESPONSE: 0

## 2021-11-17 NOTE — CARE COORDINATION
Notified by Charlene at [employer] that they are unable to resume HHA services until pt is out of isolation. Spoke to pt's wife to discuss transitional planning. She would like him to go to SNF in [facility]. Her first choice is [facility], second choice is [facility]. Referral to [facility]. Voicemail left for Jason Benedict requesting return call    [phone] spoke to pt's wife via telephone. She would like pt to return home with home care. His current home health aide is not able to resume services until he is out of isolation. She would like nursing, PT, and OT. She is agreeable with [facility]. Referral sent. [facility] notified. They are able to accept    [phone] pt's wife is agreeable to [facility]. Notes faxed.  Nasir notified

## 2021-11-17 NOTE — PROGRESS NOTES
Physical Therapy  Facility/Department: [facility]  Daily Treatment Note  NAME: Mckenna Mcgovern  : 1940  MRN: [id number]    Date of Service: 2021    Discharge Recommendations:  Patient would benefit from continued therapy after discharge   PT Equipment Recommendations  Equipment Needed: No    Assessment   Body structures, Functions, Activity limitations: Decreased functional mobility ; Decreased strength; Decreased cognition; Decreased balance  Assessment: Pt performed AAROM while supine in bed. Pt lethragic, inconsistently following commands and unable to maintain attention. Max verbal/tactile cues required for task initiation. Pt is limited by decreased cognition, balance, and strength and would benefit from continued PT following discharge to address functional deficits. Prognosis: Good  Decision Making: Medium Complexity  PT Education: Plan of Care; PT Role; General Safety  REQUIRES PT FOLLOW UP: Yes  Activity Tolerance  Activity Tolerance: Patient limited by cognitive status  Activity Tolerance: baseline dementia, nonverbal     Patient Diagnosis(es): The primary encounter diagnosis was COVID-19. A diagnosis of Hypoxia was also pertinent to this visit. has a past medical history of Acute delirium, Acute metabolic encephalopathy, Alzheimer's dementia with behavioral disturbance ([address] Atrophy, cortical, BPH (benign prostatic hyperplasia), Dementia ([address] Dementia without behavioral disturbance ([address] GERD (gastroesophageal reflux disease), Hemorrhoids, HIV (human immunodeficiency virus infection) ([address] Meralgia paraesthetica, Mixed hyperlipidemia, Occasional tremors, Osteoporosis, PVD (peripheral vascular disease) ([address] Seizure-like activity ([address] Syphilis in male, and Wears glasses. has a past surgical history that includes Total hip arthroplasty (Left); Tonsillectomy; Hemorrhoid surgery; sigmoidoscopy (N/A, 2017);  Colonoscopy; hernia repair; and pr x-ray retrograde pyelogram (Bilateral, 3/6/2018). Restrictions  Restrictions/Precautions  Restrictions/Precautions: General Precautions, Fall Risk, Up as Tolerated, Isolation  Required Braces or Orthoses?: No  Position Activity Restriction  Other position/activity restrictions: up with assist. Hx dementia, non-verbal. covid+/droplet+  Subjective   General  Chart Reviewed: Yes  Response To Previous Treatment: Patient with no complaints from previous session. Family / Caregiver Present: No  Subjective  Subjective: RN and pt agreeable to PT. Pt alert in bed upon arrival.  General Comment  Comments: Pt lethargic, demos difficulty maintaining alertness.           Orientation     Cognition   Cognition  Overall Cognitive Status: Exceptions  Following Commands: Inconsistently follows commands  Attention Span: Unable to maintain attention  Memory: Decreased recall of recent events; Decreased recall of precautions; Decreased short term memory; Decreased long term memory; Decreased recall of biographical Information  Safety Judgement: Decreased awareness of need for assistance; Decreased awareness of need for safety  Problem Solving: Decreased awareness of errors  Insights: Not aware of deficits  Initiation: Requires cues for all  Sequencing: Requires cues for all  Cognition Comment: Pt extremely confused, hx of Alzheimers disease, non-verbal at baseline, pt able to follow very simple physical cues only  Objective                  Exercises  Straight Leg Raise: x10 BLE AAROM  Heelslides: x10 BLE AAROM  Hip Abduction: x10 BLE AAROM  Ankle Pumps: x10 BLE AAROM                                                AM-PAC Score  AM-PAC Inpatient Mobility Raw Score : 10 (11/17/21 1512)  AM-PAC Inpatient T-Scale Score : 32.29 (11/17/21 1512)  Mobility Inpatient CMS 0-100% Score: 76.75 (11/17/21 1512)  Mobility Inpatient CMS G-Code Modifier : CL (11/17/21 1512)          Goals  Short term goals  Time Frame for Short term goals: 10 visits  Short term goal 1: Pt will be Randy bed mobility  Short term goal 2: Pt will be Randy transfers  Short term goal 3: Pt will be EOB 1min SBA  Short term goal 4: Pt will tolerate 30min PT    Plan    Plan  Times per week: 3-4x/wk  Current Treatment Recommendations: Balance Training, Strengthening, Endurance Training, Transfer Training, Functional Mobility Training, Home Exercise Program, Safety Education & Training, Patient/Caregiver Education & Training, ROM  Safety Devices  Type of devices: Call light within reach, Nurse notified, Gait belt, Patient at risk for falls, Left in bed, All fall risk precautions in place  Restraints  Initially in place: No     Therapy Time   Individual Concurrent Group Co-treatment   Time In 1138         Time Out 1150         Minutes 12         Timed Code Treatment Minutes: [address], PTA

## 2021-11-17 NOTE — PLAN OF CARE
Ongoing  Goal: Maintain absence of muscle cramping  11/17/2021 1813 by Wong Officer  Outcome: Ongoing  11/17/2021 0659 by Sheri Almonte RN  Outcome: Ongoing  Goal: Maintain normal serum potassium, sodium, calcium, phosphorus, and pH  11/17/2021 1813 by Wong Officer  Outcome: Ongoing  11/17/2021 0659 by Sheri Almonte RN  Outcome: Ongoing     Problem: Loneliness or Risk for Loneliness  Goal: Demonstrate positive use of time alone when socialization is not possible  11/17/2021 1813 by Wong Officer  Outcome: Ongoing  11/17/2021 0659 by Sheri Almonte RN  Outcome: Ongoing     Problem: Fatigue  Goal: Verbalize increase energy and improved vitality  11/17/2021 1813 by Wong Officer  Outcome: Ongoing  11/17/2021 0659 by Sheri Almonte RN  Outcome: Ongoing     Problem: Patient Education: Go to Patient Education Activity  Goal: Patient/Family Education  11/17/2021 1813 by Wong Officer  Outcome: Ongoing  11/17/2021 0659 by Sheri Almonte RN  Outcome: Ongoing     Problem: Skin Integrity:  Goal: Will show no infection signs and symptoms  Description: Will show no infection signs and symptoms  11/17/2021 1813 by Wong Officer  Outcome: Ongoing  11/17/2021 0659 by Sheri Almonte RN  Outcome: Ongoing  Goal: Absence of new skin breakdown  Description: Absence of new skin breakdown  11/17/2021 1813 by Wong Officer  Outcome: Ongoing  11/17/2021 0659 by Sheri Almonte RN  Outcome: Ongoing     Problem: Non-Violent Restraints  Goal: Removal from restraints as soon as assessed to be safe  11/17/2021 1813 by Wong Officer  Outcome: Ongoing  11/17/2021 0659 by Sheri Almonte RN  Outcome: Ongoing  Goal: No harm/injury to patient while restraints in use  11/17/2021 1813 by Wong Officer  Outcome: Ongoing  11/17/2021 0659 by Sheri Almonte RN  Outcome: Ongoing  Goal: Patient's dignity will be maintained  11/17/2021 1813 by Wong Officer  Outcome: Ongoing  11/17/2021 0659 by Sheri Almonte RN  Outcome: Ongoing     Problem: Non-Violent Restraints  Goal: Removal from restraints as soon as assessed to be safe  11/17/2021 1813 by Pipe Baxter  Outcome: Ongoing  11/17/2021 0659 by Gilbert Asif RN  Outcome: Ongoing     Problem: Non-Violent Restraints  Goal: No harm/injury to patient while restraints in use  11/17/2021 1813 by Pipe Baxter  Outcome: Ongoing  11/17/2021 0659 by Gilbert Asif RN  Outcome: Ongoing     Problem: Non-Violent Restraints  Goal: Patient's dignity will be maintained  11/17/2021 1813 by Pipe Baxter  Outcome: Ongoing  11/17/2021 0659 by Gilbert Asif RN  Outcome: Ongoing

## 2021-11-17 NOTE — PLAN OF CARE
Problem: Airway Clearance - Ineffective  Goal: Achieve or maintain patent airway  11/16/2021 1921 by Peter Mancia  Outcome: Ongoing  11/16/2021 0540 by Leon Tiwari RN  Outcome: Ongoing     Problem: Gas Exchange - Impaired  Goal: Absence of hypoxia  11/16/2021 1921 by Peter Mancia  Outcome: Ongoing  11/16/2021 0540 by Leon Tiwari RN  Outcome: Ongoing  Goal: Promote optimal lung function  11/16/2021 1921 by Peter Mancia  Outcome: Ongoing  11/16/2021 0540 by Leon Tiwari RN  Outcome: Ongoing     Problem: Breathing Pattern - Ineffective  Goal: Ability to achieve and maintain a regular respiratory rate  11/16/2021 1921 by Peter Mancia  Outcome: Ongoing  11/16/2021 0540 by Leon Tiwari RN  Outcome: Ongoing     Problem:  Body Temperature -  Risk of, Imbalanced  Goal: Ability to maintain a body temperature within defined limits  11/16/2021 1921 by Peter Mancia  Outcome: Ongoing  11/16/2021 0540 by Leon Tiwari RN  Outcome: Ongoing  Goal: Will regain or maintain usual level of consciousness  11/16/2021 1921 by Peter Mancia  Outcome: Ongoing  11/16/2021 0540 by Leon Tiwari RN  Outcome: Ongoing  Goal: Complications related to the disease process, condition or treatment will be avoided or minimized  11/16/2021 1921 by Peter Mancia  Outcome: Ongoing  11/16/2021 0540 by Leon Tiwari RN  Outcome: Ongoing     Problem: Isolation Precautions - Risk of Spread of Infection  Goal: Prevent transmission of infection  11/16/2021 1921 by Peter Mancia  Outcome: Ongoing  11/16/2021 0540 by Leon Tiwari RN  Outcome: Ongoing     Problem: Nutrition Deficits  Goal: Optimize nutritional status  11/16/2021 1921 by Peter Mancia  Outcome: Ongoing  11/16/2021 0540 by Leon Tiwari RN  Outcome: Ongoing     Problem: Risk for Fluid Volume Deficit  Goal: Maintain normal heart rhythm  11/16/2021 1921 by Peter Mancia  Outcome: Ongoing  11/16/2021 0540 by Mecca Hahn RN  Outcome: Ongoing  Goal: Maintain absence of muscle cramping  11/16/2021 1921 by Alana Jeffrey  Outcome: Ongoing  11/16/2021 0540 by Mecca Hahn RN  Outcome: Ongoing  Goal: Maintain normal serum potassium, sodium, calcium, phosphorus, and pH  11/16/2021 1921 by Alana Jeffrey  Outcome: Ongoing  11/16/2021 0540 by Mecca Hahn RN  Outcome: Ongoing     Problem: Loneliness or Risk for Loneliness  Goal: Demonstrate positive use of time alone when socialization is not possible  11/16/2021 1921 by Alana Jeffrey  Outcome: Ongoing  11/16/2021 0540 by Mecca aHhn RN  Outcome: Ongoing     Problem: Fatigue  Goal: Verbalize increase energy and improved vitality  11/16/2021 1921 by Alana Jeffrey  Outcome: Ongoing  11/16/2021 0540 by Mecca Hahn RN  Outcome: Ongoing     Problem: Patient Education: Go to Patient Education Activity  Goal: Patient/Family Education  11/16/2021 1921 by Alana Jeffrey  Outcome: Ongoing  11/16/2021 0540 by Mecca Hahn RN  Outcome: Ongoing     Problem: Skin Integrity:  Goal: Will show no infection signs and symptoms  Description: Will show no infection signs and symptoms  11/16/2021 1921 by Alana Jeffrey  Outcome: Ongoing  11/16/2021 0540 by Mecca Hahn RN  Outcome: Ongoing  Goal: Absence of new skin breakdown  Description: Absence of new skin breakdown  11/16/2021 1921 by Alana Jeffrey  Outcome: Ongoing  11/16/2021 0540 by Mecca Hahn RN  Outcome: Ongoing     Problem: Non-Violent Restraints  Goal: Removal from restraints as soon as assessed to be safe  11/16/2021 1921 by Alana Jeffrey  Outcome: Ongoing  11/16/2021 0540 by Mecca Hahn RN  Outcome: Ongoing  Goal: No harm/injury to patient while restraints in use  11/16/2021 1921 by Alana Jeffrey  Outcome: Ongoing  11/16/2021 0540 by Mecca Hahn RN  Outcome: Ongoing  Goal: Patient's dignity will be maintained  11/16/2021 1921 by Massimo Jeffries

## 2021-11-17 NOTE — PLAN OF CARE
Problem: Airway Clearance - Ineffective  Goal: Achieve or maintain patent airway  11/17/2021 0659 by Catherine Nageotte, RN  Outcome: Ongoing  11/16/2021 1921 by Gabriel Jimenez  Outcome: Ongoing     Problem: Gas Exchange - Impaired  Goal: Absence of hypoxia  11/17/2021 0659 by Catherine Nageotte, RN  Outcome: Ongoing  11/16/2021 1921 by Gabriel Jimenez  Outcome: Ongoing  Goal: Promote optimal lung function  11/17/2021 0659 by Catherine Nageotte, RN  Outcome: Ongoing  11/16/2021 1921 by Gabriel Jimenez  Outcome: Ongoing     Problem: Breathing Pattern - Ineffective  Goal: Ability to achieve and maintain a regular respiratory rate  11/17/2021 0659 by Catherine Nageotte, RN  Outcome: Ongoing  11/16/2021 1921 by Gabriel Jimenez  Outcome: Ongoing     Problem:  Body Temperature -  Risk of, Imbalanced  Goal: Ability to maintain a body temperature within defined limits  11/17/2021 0659 by Catherine Nageotte, RN  Outcome: Ongoing  11/16/2021 1921 by Gabriel Jimenez  Outcome: Ongoing  Goal: Will regain or maintain usual level of consciousness  11/17/2021 0659 by Catherine Nageotte, RN  Outcome: Ongoing  11/16/2021 1921 by Gabriel Jimenez  Outcome: Ongoing  Goal: Complications related to the disease process, condition or treatment will be avoided or minimized  11/17/2021 0659 by Catherine Nageotte, RN  Outcome: Ongoing  11/16/2021 1921 by Gabriel Jimenez  Outcome: Ongoing     Problem: Isolation Precautions - Risk of Spread of Infection  Goal: Prevent transmission of infection  11/17/2021 0659 by Catherine Nageotte, RN  Outcome: Ongoing  11/16/2021 1921 by Gabriel Jimenez  Outcome: Ongoing     Problem: Nutrition Deficits  Goal: Optimize nutritional status  11/17/2021 0659 by Catherine Nageotte, RN  Outcome: Ongoing  11/16/2021 1921 by Gabriel Jimenez  Outcome: Ongoing     Problem: Risk for Fluid Volume Deficit  Goal: Maintain normal heart rhythm  11/17/2021 0659 by Catherine Nageotte, RN  Outcome: Ongoing  11/16/2021 1921 by Gabriel Jimenez  Outcome: Ongoing  Goal: Maintain absence of muscle cramping  11/17/2021 0659 by Valarie Joyner RN  Outcome: Ongoing  11/16/2021 1921 by Rio Ennis  Outcome: Ongoing  Goal: Maintain normal serum potassium, sodium, calcium, phosphorus, and pH  11/17/2021 0659 by Valarie Joyner RN  Outcome: Ongoing  11/16/2021 1921 by Rio Ennis  Outcome: Ongoing     Problem: Loneliness or Risk for Loneliness  Goal: Demonstrate positive use of time alone when socialization is not possible  11/17/2021 0659 by Valarie Joyner RN  Outcome: Ongoing  11/16/2021 1921 by Rio Ennis  Outcome: Ongoing     Problem: Fatigue  Goal: Verbalize increase energy and improved vitality  11/17/2021 0659 by Valarie Joyner RN  Outcome: Ongoing  11/16/2021 1921 by Rio Ennis  Outcome: Ongoing     Problem: Patient Education: Go to Patient Education Activity  Goal: Patient/Family Education  11/17/2021 0659 by Valarie Joyner RN  Outcome: Ongoing  11/16/2021 1921 by Rio Ennis  Outcome: Ongoing     Problem: Skin Integrity:  Goal: Will show no infection signs and symptoms  Description: Will show no infection signs and symptoms  11/17/2021 0659 by Valarie Joyner RN  Outcome: Ongoing  11/16/2021 1921 by Rio Ennis  Outcome: Ongoing  Goal: Absence of new skin breakdown  Description: Absence of new skin breakdown  11/17/2021 0659 by Valarie Joyner RN  Outcome: Ongoing  11/16/2021 1921 by Rio Ennis  Outcome: Ongoing     Problem: Non-Violent Restraints  Goal: Removal from restraints as soon as assessed to be safe  11/17/2021 0659 by Valarie Joyner RN  Outcome: Ongoing  11/16/2021 1921 by Rio Ennis  Outcome: Ongoing  Goal: No harm/injury to patient while restraints in use  11/17/2021 0659 by Valarie Joyner RN  Outcome: Ongoing  11/16/2021 1921 by Rio Ennis  Outcome: Ongoing  Goal: Patient's dignity will be maintained  11/17/2021 0659 by Valarie Joyner RN  Outcome: Ongoing  11/16/2021 1921 by Karen Gomez Berenice  Outcome: Ongoing

## 2021-11-17 NOTE — PROGRESS NOTES
continue but improved today  The patient is oxygenating well on room air. CRP increased to 102.4-->122.4  CXR stable since admission    Discussed his case with Dr. Sandra aSmano, his ID physician who manages his HIV treatment. Dr. Sandra Samano recommended monoclonal antibody infusion and monitor off antibiotics. He believes the fever is related to Covid. Patient exhibiting respiratory distress. No  Respiratory secretions:     Patient receiving supplemental oxygen. 2 L N[place]--> Room air  RR:19  02 sat: 100    % FIO2:   PEEP:      QTc:       NEWS Score: 0-4 Low risk group; 5-6: Medium risk group; 7 or above: High risk group  Parameters 3 2 1 0 1 2 3   Age    < 65   = 65   RR = 8  9-11 12-20  21-24 = 25   O2 Sats = 91 92-93 94-95 = 96      Suppl O2  Yes  No      SBP = 90  101-110 111-219   = 220   HR = 40  41-50 51-90  111-130 = 131   Consciousness    Alert   Drowsiness, lethargy, or confusion   Temperature = 35.0 C (95.0 F)  35.1-36.0 C 95.1-96.9 F 36.1-38.0 C 97.0-100.4 F 38.1-39.0 C 100.5-102.3 F = 39.1 C = 102.4 F      NEWS Score:  11/14/21: 4 Low risk    Overall Daily Picture:   Unchanged      Presence of secondary bacterial Infection:  No   Additional antibiotics: No    Labs, X rays reviewed: 11/17/2021    BUN:12-->12-->12  Cr:0.94-->1.07-->0.97    WBC:7.0-->10.1-->6.4  Hb:11.9-->12-->12.2  Plat: 158-->138-->173    Absolute Neutrophils:3.29  Absolute Lymphocytes:2.12  Neutrophil/Lymphocyte Ratio: 1.5 low risk    CRP:14.4-->68.8-->102.4-->122.4  Ferritin:57  LDH:     Pro Calcitonin:      Cultures:  Urine:    Blood:  11/16/21: No growth thus far  Sputum :    Wound:      CXR:     11/16/21 11/14/21    CAT:      Discussed with patient, RN, CC, IM. I have personally reviewed the past medical history, past surgical history, medications, social history, and family history, and I have updated the database accordingly.   Past Medical History:     Past Medical History:   Diagnosis Date    Acute delirium 2019    Acute metabolic encephalopathy     Alzheimer's dementia with behavioral disturbance ([facility] 2019    Atrophy, cortical     BPH (benign prostatic hyperplasia) 2013    Dementia ([facility]     Dementia without behavioral disturbance ([facility] 2012    GERD (gastroesophageal reflux disease) 2015    Hemorrhoids     HIV (human immunodeficiency virus infection) ([facility]     Meralgia paraesthetica 2016    Mixed hyperlipidemia 2016    Occasional tremors     Osteoporosis     PVD (peripheral vascular disease) ([facility] 2018    Seizure-like activity ([facility] 2018    Syphilis in male     Wears glasses        Past Surgical  History:     Past Surgical History:   Procedure Laterality Date    COLONOSCOPY      HEMORRHOID SURGERY      HERNIA REPAIR      umbilical    TX X-RAY RETROGRADE PYELOGRAM Bilateral 3/6/2018    CYSTOSCOPY RETROGRADE PYELOGRAM performed by Damaso Bergeron MD at [address] 2017    SIGMOIDOSCOPY POLYP SNARE performed by Libby Hung MD at [facility]        Medications:      aspirin  81 mg Oral Daily    sodium chloride flush  5-40 mL IntraVENous 2 times per day    enoxaparin  40 mg SubCUTAneous Daily    atorvastatin  40 mg Oral Nightly    tamsulosin  0.4 mg Oral Daily    memantine  10 mg Oral BID    donepezil  10 mg Oral Nightly    divalproex  250 mg Oral 2 times per day       Social History:     Social History     Socioeconomic History    Marital status:      Spouse name: Not on file    Number of children: 5    Years of education: 3    Highest education level: 3rd grade   Occupational History    Not on file   Tobacco Use    Smoking status: Former Smoker     Packs/day: 0.00     Years: 50.00     Pack years: 0.00     Types: Cigarettes     Start date:      Quit date: 2021     Years since quittin.8    Smokeless tobacco: Never Used    Tobacco comment: cigerettes per day   Vaping Use    Vaping Use: Never used   Substance and Sexual Activity    Alcohol use: Yes     Alcohol/week: 4.0 standard drinks     Types: 4 Cans of beer per week    Drug use: Yes    Sexual activity: Not on file   Other Topics Concern    Not on file   Social History Narrative    Not on file     Social Determinants of Health     Financial Resource Strain: Low Risk     Difficulty of Paying Living Expenses: Not very hard   Food Insecurity: No Food Insecurity    Worried About Running Out of Food in the Last Year: Never true    Mirta of Food in the Last Year: Never true   Transportation Needs:     Lack of Transportation (Medical): Not on file    Lack of Transportation (Non-Medical):  Not on file   Physical Activity:     Days of Exercise per Week: Not on file    Minutes of Exercise per Session: Not on file   Stress:     Feeling of Stress : Not on file   Social Connections:     Frequency of Communication with Friends and Family: Not on file    Frequency of Social Gatherings with Friends and Family: Not on file    Attends Rastafari Services: Not on file    Active Member of Clubs or Organizations: Not on file    Attends Club or Organization Meetings: Not on file    Marital Status: Not on file   Intimate Partner Violence:     Fear of Current or Ex-Partner: Not on file    Emotionally Abused: Not on file    Physically Abused: Not on file    Sexually Abused: Not on file   Housing Stability:     Unable to Pay for Housing in the Last Year: Not on file    Number of [place] in the Last Year: Not on file    Unstable Housing in the Last Year: Not on file       Family History:     Family History   Problem Relation Age of Onset    Heart Disease Mother     High Blood Pressure Mother     Cancer Father     Diabetes Sister     Heart Disease Sister     High Blood Pressure Sister     Heart Disease Brother     High Blood Pressure Brother         Allergies:   Patient has no known allergies. Review of Systems:     SHIRLEY-Dementia  Constitutional: No fevers or chills. No systemic complaints  Head: No headaches  Eyes: No double vision or blurry vision. No conjunctival inflammation. ENT: No sore throat or runny nose. . No hearing loss, tinnitus or vertigo. Cardiovascular: No chest pain or palpitations. No Shortness of breath. No BENJAMIN  Lung: No Shortness of breath or cough. No sputum production  Abdomen: No nausea, vomiting, diarrhea, or abdominal pain. Bonne Major No cramps. Genitourinary: No increased urinary frequency, or dysuria. No hematuria. No suprapubic or CVA pain  Musculoskeletal: No muscle aches or pains. No joint effusions, swelling or deformities  Hematologic: No bleeding or bruising. Neurologic: No headache, weakness, numbness, or tingling. Integument: No rash, no ulcers. Psychiatric: No depression. Endocrine: No polyuria, no polydipsia, no polyphagia. Physical Examination :     Patient Vitals for the past 8 hrs:   BP Temp Temp src Pulse Resp SpO2   11/17/21 0800 114/69 100.7 °F (38.2 °C) Axillary 68 19 100 %   11/17/21 0400 (!) 101/56 97.4 °F (36.3 °C) Axillary 71 27 100 %     General Appearance: Awake, alert, and in no apparent distress  Head:  Normocephalic, no trauma  Eyes: Pupils equal, round, reactive to light; sclera anicteric; conjunctivae pink. No embolic phenomena. ENT: Oropharynx clear, without erythema, exudate, or thrush. No tenderness of sinuses. Mouth/throat: mucosa pink and moist. No lesions. Dentition in good repair. Neck:Supple, without lymphadenopathy. Thyroid normal, No bruits. Pulmonary/Chest: Clear to auscultation, without wheezes, rales, or rhonchi. No dullness to percussion. Cardiovascular: Regular rate and rhythm without murmurs, rubs, or gallops. Abdomen: Soft, non tender. Bowel sounds normal. No organomegaly  All four Extremities: No cyanosis, clubbing, edema, or effusions. Neurologic: Baseline dementia  Skin: Warm and dry with good turgor. No signs of peripheral arterial or venous insufficiency. No ulcerations. No open wounds. Medical Decision Making -Laboratory:   I have independently reviewed/ordered the following labs:    CBC with Differential:   Recent Labs     11/16/21 0434 11/17/21 0629   WBC 10.1 6.4   HGB 12.0* 12.2*   HCT 40.0* 40.1*   PLT See Reflexed IPF Result 173     BMP:   Recent Labs     11/16/21 0434 11/17/21 0629   * 136   K 4.0 4.5    104   CO2 21 24   BUN 12 12   CREATININE 1.07 0.97     Hepatic Function Panel:   No results for input(s): PROT, LABALBU, BILIDIR, IBILI, BILITOT, ALKPHOS, ALT, AST in the last 72 hours. No results for input(s): RPR in the last 72 hours. No results for input(s): HIV in the last 72 hours. No results for input(s): BC in the last 72 hours. Lab Results   Component Value Date    MUCUS NOT REPORTED 09/10/2019    RBC 4.29 11/17/2021    RBC 4.31 04/18/2012    TRICHOMONAS NOT REPORTED 09/10/2019    WBC 6.4 11/17/2021    YEAST NOT REPORTED 09/10/2019    TURBIDITY CLEAR 09/10/2019     Lab Results   Component Value Date    CREATININE 0.97 11/17/2021    GLUCOSE 96 11/17/2021    GLUCOSE 76 04/18/2012       Medical Decision Making-Imaging:     Narrative   EXAMINATION:   ONE XRAY VIEW OF THE CHEST       11/16/2021 7:35 am       COMPARISON:   11/14/2021       HISTORY:   ORDERING SYSTEM PROVIDED HISTORY: COVID-19 pneumonia with worsening   inflammatory markers, febrile, look for progression of pneumonia. TECHNOLOGIST PROVIDED HISTORY:   COVID-19 pneumonia with worsening inflammatory markers, febrile, look for   progression of pneumonia.       FINDINGS:   Heart size and pulmonary vessels are within normal limits.  Slightly ectatic   thoracic aorta.  Mild COPD changes.  No new focal infiltrates are seen.  No   significant pleural effusions.  Monitor leads overlie the chest.           Impression   Stable chest with no acute parenchymal abnormality demonstrated               Medical Decision [id number]:       Medical Decision Making-Other:     Note:  Labs, medications, radiologic studies were reviewed with personal review of films  Large amounts of data were reviewed  Discussed with nursing Staff, Discharge planner  Infection Control and Prevention measures reviewed  All prior entries were reviewed  Administer medications as ordered  Prognosis: Guarded  Discharge planning reviewed      Thank you for allowing us to participate in the care of this patient. Please call with questions. Linsey Robert, APRN - CNP     ATTESTATION:    I have discussed the case, including pertinent history and exam findings with the APRN. I have evaluated the  History, physical findings and pictures of the patient and the key elements of the encounter have been performed by me. I have reviewed the laboratory data, other diagnostic studies and discussed them with the APRN. I have updated the medical record where necessary. I agree with the assessment, plan and orders as documented by the APRN.     Marybeth Calvillo MD.          Pager: [phone] - Office: [phone]

## 2021-11-17 NOTE — DISCHARGE SUMMARY
[facility]     Department of Internal Medicine - Staff Internal Medicine Teaching Service    INPATIENT DISCHARGE SUMMARY    Patient Identification:  Romeo Duarte is a 78 y.o. male. :  1940  MRN: [id number]     Acct: [de-identified]   PCP: Courtney Watts MD  Admit Date:  2021  Discharge date and time: No discharge date for patient encounter. Attending Provider: Blue Ortiz MD                                     [address] [facility] Problem Lists:  Principal Problem:    COVID  Active Problems:    HIV (human immunodeficiency virus infection) ([address]    Dementia without behavioral disturbance (HCC)    BPH (benign prostatic hyperplasia)    GERD (gastroesophageal reflux disease)    Mixed hyperlipidemia    Alzheimer's dementia with behavioral disturbance (HCC)    Hyponatremia    Moderate malnutrition (HCC)    Elevated C-reactive protein (CRP)  Resolved Problems:    * No resolved hospital problems. *    HOSPITAL STAY     Brief Inpatient course:   Romeo Duarte is a 78 y.o. male who was admitted for the management of COVID, presented to the emergency department with dry cough and fatigue for the past 7-10 days that occurred after he had his flu shot administered. Subsequent testing was positive for COVID and patient was admitted for management. Infectious disease was consulted and monoclonal antibody was administered; decadron, remdesivir, and actemra were not administered. His was monitor and discharged home once medically clear and stable.       Procedures/ Significant Interventions:    N/A     Consults:     Consults:     Final Specialist Recommendations/Findings:   IP CONSULT TO INTERNAL MEDICINE  IP CONSULT TO INFECTIOUS DISEASES  IP CONSULT TO CASE MANAGEMENT      Any Hospital Acquired Infections: none    Discharge Functional Status:  stable    DISCHARGE PLAN     Disposition: home    Patient Instructions:   Current Discharge Medication List      START taking these medications    Details   bictegravir-emtricitab-tenofovir alafenamide (BIKTARVY) -25 MG TABS per tablet Take 1 tablet by mouth daily  Qty: 30 tablet, Refills: 1         CONTINUE these medications which have NOT CHANGED    Details   tamsulosin (FLOMAX) 0.4 MG capsule TAKE 1 CAPSULE BY MOUTH DAILY  Qty: 28 capsule, Refills: 3    Associated Diagnoses: Benign prostatic hyperplasia without lower urinary tract symptoms      divalproex (DEPAKOTE SPRINKLE) 125 MG capsule TAKE 2 CAPSULES BY MOUTH 2 TIMES A DAY  Qty: 112 capsule, Refills: 3      Multiple Vitamin (MULTIVITAMIN) tablet TAKE 1 TABLET DAILY  Qty: 28 tablet, Refills: 5      !! Nutritional Supplements (ENSURE HIGH PROTEIN) LIQD Take 1 Bottle by mouth 3 times daily  Qty: 1 each, Refills: 2      donepezil (ARICEPT) 10 MG tablet TAKE 1 TABLET IN THE EVENING  Qty: 28 tablet, Refills: 5      finasteride (PROSCAR) 5 MG tablet TAKE 1 TABLET BY MOUTH DAILY  Qty: 28 tablet, Refills: 5      memantine (NAMENDA) 10 MG tablet TAKE 1 TABLET TWICE DAILY  Qty: 56 tablet, Refills: 5      omeprazole (PRILOSEC) 20 MG delayed release capsule TAKE 1 CAPSULE DAILY  Qty: 28 capsule, Refills: 5      HM ASPIRIN EC LOW DOSE 81 MG EC tablet TAKE 1 TABLET DAILY  Qty: 28 tablet, Refills: 5    Associated Diagnoses: PVD (peripheral vascular disease) ([facility])      atorvastatin (LIPITOR) 40 MG tablet TAKE 1 TABLET DAILY  Qty: 28 tablet, Refills: 5    Associated Diagnoses: PVD (peripheral vascular disease) ([facility])      Calcium Carbonate-Vitamin D (OYSTER SHELL CALCIUM/D) 500-200 MG-UNIT TABS TAKE 2 TABLETS DAILY  Qty: 56 tablet, Refills: 5    Associated Diagnoses: Age-related osteoporosis without current pathological fracture      Incontinence Supply Disposable (BRIEFS OVERNIGHT MEDIUM) MISC 1 box by Does not apply route daily  Qty: 30 Bottle, Refills: 3    Associated Diagnoses: Alzheimer's dementia with behavioral disturbance, unspecified timing of dementia onset ([facility] [address]  Urinary incontinence due to cognitive impairment      !! Nutritional Supplements (ENSURE NUTRA SHAKE HI-CRICKET) LIQD Take 1 Bottle by mouth daily  Qty: 30 Can, Refills: 3    Associated Diagnoses: Moderate malnutrition (HCC)      vitamin E 400 UNIT capsule Take 400 Units by mouth daily  Refills: 5       !! - Potential duplicate medications found. Please discuss with provider. STOP taking these medications       ATRIPLA 600-200-300 MG per tablet Comments:   Reason for Stopping:             Activity: activity as tolerated    Diet: regular diet    Follow-up:    Rosenda Chapin MD  [address] [place] [address] [address] [phone]    Schedule an appointment as soon as possible for a visit in 4 week      [address]  [address] [address] [phone]    Schedule an appointment as soon as possible for a visit  As needed    [facility]  [address] [address]  [phone]    If symptoms worsen    Patient Instructions: Please take all medications as prescribed and follow up with your Primary Care Provider as soon as possible. Note that over 30 minutes was spent in preparing discharge papers, discussing discharge with patient, medication review, etc.    Daphne Dockery MD  Internal Medicine Resident, PGY-2  [address], [place]  11/17/2021, 1:56 PM

## 2021-11-17 NOTE — PROGRESS NOTES
[facility]  Internal Medicine Teaching Residency Program  Inpatient Daily Progress Note  ______________________________________________________________________________    Patient: Thea Rodriguez  YOB: 1940   NSB:[id number]    Acct: [de-identified]     [facility]  Admit date: 11/14/2021  Today's date: 11/17/21  Number of days in the hospital: 3    SUBJECTIVE   Admitting Diagnosis: COVID  CC: fatigue, cough    Pt seen & examined at bedside. Chart & results reviewed. He is hemodynamically stable, saturating well on room air. No fever spikes. tmax of 100.2 today. CRP worsening 14.4-> 68.8-> 102.4->122.4   WBC improving 7k->10k ->6.4  He received monoclonal antibodies. ID wants to monitor off the antibiotics. No growth in  blood/urine/respiratory cultures so far. Code status is changed to DNR CCA without intubation. ROS:  Unable to assess as the patient has dementia and is nonverbal at baseline with intermittent agitation secondary to dementia. BRIEF HISTORY     A 78 y.o. male with PMH significant of Alzheimer's dementia, and HIV presented with a chief complaint of dry cough and fatigue, ongoing for last 7-10 days since he took his flu shot. He is also vaccinated with moderna. He has generalized malaise. He also has reduced appetite. He is nonverbal and agitated/irritated at baseline. He is a poor historian and maximum history is obtained from wife, daughter and chart review. His wife is also having similar symptoms, and is admitted to the hospital today with COVID-19 infection.     On presentation in the ER, his oxygen saturations with in the lower 90s but dropping into 80s especially with movement/activity. He was placed on 2 L NC. EKG ruled out any acute ST-T wave abnormality.   Troponins 13, proBNP 397  Evening he was found to be Covid positive  CRP 14.4  D-dimer 0.49    Fibrinogen 376    OBJECTIVE     Vital Signs:  BP 99/66   Pulse 68   Temp 98.8 °F (37.1 °C) (Axillary)   Resp 17   Ht 5' 6\" (1.676 m)   Wt 126 lb 6.4 oz (57.3 kg)   SpO2 100%   BMI 20.40 kg/m²     Temp (24hrs), Av.3 °F (36.8 °C), Min:96.8 °F (36 °C), Max:100.7 °F (38.2 °C)    No intake/output data recorded. Physical Exam:  Constitutional: This is a well developed, well nourished, 18.5-24.9 - Normal 78y.o. year old male who is alert, oriented, cooperative and in no apparent distress. Head:normocephalic and atraumatic. EENT:  PERRLA. No conjunctival injections. Septum was midline, mucosa was without erythema, exudates or cobblestoning. No thrush was noted. Neck: Supple without thyromegaly. No elevated JVP. Trachea was midline. Respiratory: Chest was symmetrical without dullness to percussion. Breath sounds bilaterally were clear to auscultation. There were no wheezes, rhonchi or rales. There is no intercostal retraction or use of accessory muscles. No egophony noted. Cardiovascular: Regular without murmur, clicks, gallops or rubs. Abdomen: Slightly rounded and soft without organomegaly. No rebound, rigidity or guarding was appreciated. Lymphatic: No lymphadenopathy. Musculoskeletal: Normal curvature of the spine. No gross muscle weakness. Extremities:  No lower extremity edema, ulcerations, tenderness, varicosities or erythema. Muscle size, tone and strength are normal.  No involuntary movements are noted. Skin:  Warm and dry. Good color, turgor and pigmentation. No lesions or scars.   No cyanosis or clubbing  Neurological/Psychiatric: The patient's general behavior, level of consciousness, thought content and emotional status is normal.        Medications:  Scheduled Medications:    bictegravir-emtricitab-tenofovir alafenamide  1 tablet Oral Daily    aspirin  81 mg Oral Daily    sodium chloride flush  5-40 mL IntraVENous 2 times per day    enoxaparin  40 mg SubCUTAneous Daily    atorvastatin  40 mg Oral Nightly    tamsulosin  0.4 mg Oral Daily    memantine  10 mg Oral BID    donepezil  10 mg Oral Nightly    divalproex  250 mg Oral 2 times per day     Continuous Infusions:    sodium chloride       PRN Medicationsibuprofen, 600 mg, Q6H PRN  sodium chloride flush, 5-40 mL, PRN  sodium chloride, 25 mL, PRN  ondansetron, 4 mg, Q8H PRN   Or  ondansetron, 4 mg, Q6H PRN  polyethylene glycol, 17 g, Daily PRN  acetaminophen, 650 mg, Q6H PRN   Or  acetaminophen, 650 mg, Q6H PRN        Diagnostic Labs:  CBC:   Recent Labs     11/15/21  0504 11/16/21  0434 11/17/21  0629   WBC 7.0 10.1 6.4   RBC 3.89* 4.27 4.29   HGB 11.0* 12.0* 12.2*   HCT 35.8* 40.0* 40.1*   MCV 92.0 93.7 93.5   RDW 14.5* 14.6* 14.6*    See Reflexed IPF Result 173     BMP:   Recent Labs     11/15/21  0504 11/16/21 0434 11/17/21  0629    132* 136   K 4.4 4.0 4.5    102 104   CO2 20 21 24   BUN 11 12 12   CREATININE 1.12 1.07 0.97     BNP: No results for input(s): BNP in the last 72 hours. PT/INR: No results for input(s): PROTIME, INR in the last 72 hours. APTT: No results for input(s): APTT in the last 72 hours. CARDIAC ENZYMES: No results for input(s): CKMB, CKMBINDEX, TROPONINI in the last 72 hours. Invalid input(s): CKTOTAL;3  FASTING LIPID PANEL:  Lab Results   Component Value Date    CHOL 185 02/10/2019    HDL 72 02/10/2019    TRIG 139 02/10/2019     LIVER PROFILE:   No results for input(s): AST, ALT, ALB, BILIDIR, BILITOT, ALKPHOS in the last 72 hours. MICROBIOLOGY:   Lab Results   Component Value Date/Time    CULTURE NO GROWTH 1 DAY 11/16/2021 08:00 AM    CULTURE NO GROWTH 1 DAY 11/16/2021 08:00 AM       Imaging:    XR CHEST PORTABLE    Result Date: 11/14/2021  No acute process. ASSESSMENT & PLAN     ASSESSMENT / PLAN:     COVID-19 infection  Vaccinated with Covid  Covid positive on 11/14/2021  Is out of window for remdesivir  Does not meet requirements of Decadron and Actemra.   ID is on board, will follow recommendations. CRP 14.4-> 68.8-> 102.4->122.4   Will continue to monitor inflammatory markers.     Acute hypoxic respiratory failure  Secondary to COVID-19 infection  On room air. Will monitor and wean off as tolerated.     Alzheimer's dementia  On memantine  On donepezil     HIV infection  On [address] outpatient with Dr. Sima Funk     Hyperlipidemia  On atorvastatin     Peripheral vascular disease  On aspirin     BPH  We will resume Flomax  We will monitor for signs of urinary retention.     GERD   On Pepcid as needed     Moderate malnutrition  On Ensure liquid supplement     DVT ppx  On Lovenox     GI ppx  Not indicated     PT/OT/SW  Consulted     CODE STATUS  DNR CCA without intubation.      Discharge Planning   to assist in discharge planning. Jimi Morales MD  Internal Medicine Resident, PGY-1  [address], [place]  11/17/2021, 7:30 AM     Attending Physician Statement  I have discussed the care of [facility] [employer] with the resident team. I have examined the patient myself and taken ros and hpi , including pertinent history and exam findings,  with the resident. I have reviewed the key elements of all parts of the encounter with the resident. I agree with the assessment, plan and orders as documented by the resident. Principal Problem:    COVID  Active Problems:    HIV (human immunodeficiency virus infection) ([facility] [address]    Dementia without behavioral disturbance (HCC)    BPH (benign prostatic hyperplasia)    GERD (gastroesophageal reflux disease)    Mixed hyperlipidemia    Alzheimer's dementia with behavioral disturbance (HCC)    Hyponatremia    Moderate malnutrition (HCC)    Elevated C-reactive protein (CRP)  Resolved Problems:    * No resolved hospital problems.  *  COVID-19 infection, no obvious pneumonia on chest x-ray  HIV positive, prior history of syphilis  Still spiking fevers, CRP uptrending  Remains off oxygen  Appreciate ID recommendations    Electronically signed by Litzy Perkins MD

## 2021-11-18 LAB
ANION GAP SERPL CALCULATED.3IONS-SCNC: 12 MMOL/L (ref 9–17)
BUN BLDV-MCNC: 10 MG/DL (ref 8–23)
BUN/CREAT BLD: NORMAL (ref 9–20)
C-REACTIVE PROTEIN: 56.6 MG/L (ref 0–5)
CALCIUM SERPL-MCNC: 8.7 MG/DL (ref 8.6–10.4)
CHLORIDE BLD-SCNC: 103 MMOL/L (ref 98–107)
CO2: 24 MMOL/L (ref 20–31)
CREAT SERPL-MCNC: 0.8 MG/DL (ref 0.7–1.2)
DIRECT EXAM: NORMAL
DIRECT EXAM: NORMAL
GFR AFRICAN AMERICAN: >60 ML/MIN
GFR NON-AFRICAN AMERICAN: >60 ML/MIN
GFR SERPL CREATININE-BSD FRML MDRD: NORMAL ML/MIN/{1.73_M2}
GFR SERPL CREATININE-BSD FRML MDRD: NORMAL ML/MIN/{1.73_M2}
GLUCOSE BLD-MCNC: 89 MG/DL (ref 70–99)
HCT VFR BLD CALC: 39.8 % (ref 40.7–50.3)
HEMOGLOBIN: 12.5 G/DL (ref 13–17)
Lab: NORMAL
MCH RBC QN AUTO: 28.5 PG (ref 25.2–33.5)
MCHC RBC AUTO-ENTMCNC: 31.4 G/DL (ref 28.4–34.8)
MCV RBC AUTO: 90.7 FL (ref 82.6–102.9)
NRBC AUTOMATED: 0 PER 100 WBC
PDW BLD-RTO: 14.4 % (ref 11.8–14.4)
PLATELET # BLD: ABNORMAL K/UL (ref 138–453)
PLATELET, FLUORESCENCE: 141 K/UL (ref 138–453)
PLATELET, IMMATURE FRACTION: 5.5 % (ref 1.1–10.3)
PMV BLD AUTO: ABNORMAL FL (ref 8.1–13.5)
POTASSIUM SERPL-SCNC: 4.1 MMOL/L (ref 3.7–5.3)
RBC # BLD: 4.39 M/UL (ref 4.21–5.77)
SODIUM BLD-SCNC: 139 MMOL/L (ref 135–144)
SPECIMEN DESCRIPTION: NORMAL
WBC # BLD: 4.9 K/UL (ref 3.5–11.3)

## 2021-11-18 PROCEDURE — 85055 RETICULATED PLATELET ASSAY: CPT

## 2021-11-18 PROCEDURE — 2580000003 HC RX 258

## 2021-11-18 PROCEDURE — 6370000000 HC RX 637 (ALT 250 FOR IP): Performed by: NURSE PRACTITIONER

## 2021-11-18 PROCEDURE — 99232 SBSQ HOSP IP/OBS MODERATE 35: CPT | Performed by: INTERNAL MEDICINE

## 2021-11-18 PROCEDURE — 86140 C-REACTIVE PROTEIN: CPT

## 2021-11-18 PROCEDURE — 6370000000 HC RX 637 (ALT 250 FOR IP): Performed by: STUDENT IN AN ORGANIZED HEALTH CARE EDUCATION/TRAINING PROGRAM

## 2021-11-18 PROCEDURE — 6370000000 HC RX 637 (ALT 250 FOR IP)

## 2021-11-18 PROCEDURE — 85027 COMPLETE CBC AUTOMATED: CPT

## 2021-11-18 PROCEDURE — 80048 BASIC METABOLIC PNL TOTAL CA: CPT

## 2021-11-18 PROCEDURE — 6360000002 HC RX W HCPCS

## 2021-11-18 PROCEDURE — 1200000000 HC SEMI PRIVATE

## 2021-11-18 PROCEDURE — APPSS30 APP SPLIT SHARED TIME 16-30 MINUTES: Performed by: NURSE PRACTITIONER

## 2021-11-18 PROCEDURE — 36415 COLL VENOUS BLD VENIPUNCTURE: CPT

## 2021-11-18 RX ADMIN — SODIUM CHLORIDE, PRESERVATIVE FREE 10 ML: 5 INJECTION INTRAVENOUS at 21:29

## 2021-11-18 RX ADMIN — ASPIRIN 81 MG: 81 TABLET, CHEWABLE ORAL at 09:11

## 2021-11-18 RX ADMIN — DIVALPROEX SODIUM 250 MG: 125 CAPSULE, COATED PELLETS ORAL at 21:28

## 2021-11-18 RX ADMIN — DESMOPRESSIN ACETATE 40 MG: 0.2 TABLET ORAL at 21:28

## 2021-11-18 RX ADMIN — BICTEGRAVIR SODIUM, EMTRICITABINE, AND TENOFOVIR ALAFENAMIDE FUMARATE 1 TABLET: 50; 200; 25 TABLET ORAL at 09:11

## 2021-11-18 RX ADMIN — DONEPEZIL HYDROCHLORIDE 10 MG: 10 TABLET, FILM COATED ORAL at 21:28

## 2021-11-18 RX ADMIN — SODIUM CHLORIDE, PRESERVATIVE FREE 10 ML: 5 INJECTION INTRAVENOUS at 09:11

## 2021-11-18 RX ADMIN — TAMSULOSIN HYDROCHLORIDE 0.4 MG: 0.4 CAPSULE ORAL at 09:11

## 2021-11-18 RX ADMIN — ENOXAPARIN SODIUM 40 MG: 100 INJECTION SUBCUTANEOUS at 09:11

## 2021-11-18 RX ADMIN — DIVALPROEX SODIUM 250 MG: 125 CAPSULE, COATED PELLETS ORAL at 09:11

## 2021-11-18 RX ADMIN — MEMANTINE HYDROCHLORIDE 10 MG: 5 TABLET, FILM COATED ORAL at 21:28

## 2021-11-18 RX ADMIN — MEMANTINE HYDROCHLORIDE 10 MG: 5 TABLET, FILM COATED ORAL at 09:11

## 2021-11-18 ASSESSMENT — PAIN SCALES - WONG BAKER

## 2021-11-18 ASSESSMENT — PAIN SCALES - PAIN ASSESSMENT IN ADVANCED DEMENTIA (PAINAD)
BODYLANGUAGE: 0
BODYLANGUAGE: 0
NEGVOCALIZATION: 1
TOTALSCORE: 1
NEGVOCALIZATION: 1
CONSOLABILITY: 0
CONSOLABILITY: 0
BREATHING: 0
BODYLANGUAGE: 0
NEGVOCALIZATION: 1
NEGVOCALIZATION: 1
BODYLANGUAGE: 0
CONSOLABILITY: 0
BREATHING: 0
BREATHING: 0
CONSOLABILITY: 0
NEGVOCALIZATION: 1
NEGVOCALIZATION: 1
BODYLANGUAGE: 0
TOTALSCORE: 1
CONSOLABILITY: 0
BREATHING: 0
BREATHING: 0
FACIALEXPRESSION: 0
BREATHING: 0
BODYLANGUAGE: 0
BREATHING: 0
BODYLANGUAGE: 0
TOTALSCORE: 1
CONSOLABILITY: 0
BODYLANGUAGE: 0
CONSOLABILITY: 0
BREATHING: 0
CONSOLABILITY: 0
FACIALEXPRESSION: 0
CONSOLABILITY: 0
BREATHING: 0
BREATHING: 0
NEGVOCALIZATION: 1
BREATHING: 0
TOTALSCORE: 1
BODYLANGUAGE: 0
FACIALEXPRESSION: 0
CONSOLABILITY: 0
FACIALEXPRESSION: 0
BODYLANGUAGE: 0
BODYLANGUAGE: 0
TOTALSCORE: 1
BREATHING: 0
BREATHING: 0
TOTALSCORE: 1
BREATHING: 0
CONSOLABILITY: 0
CONSOLABILITY: 0
BODYLANGUAGE: 0
FACIALEXPRESSION: 0
TOTALSCORE: 1
FACIALEXPRESSION: 0
CONSOLABILITY: 0
FACIALEXPRESSION: 0
TOTALSCORE: 1
NEGVOCALIZATION: 1
FACIALEXPRESSION: 0
FACIALEXPRESSION: 0
TOTALSCORE: 1
FACIALEXPRESSION: 0
TOTALSCORE: 0
TOTALSCORE: 1
FACIALEXPRESSION: 0
TOTALSCORE: 1
FACIALEXPRESSION: 0
FACIALEXPRESSION: 0
TOTALSCORE: 1
NEGVOCALIZATION: 0
CONSOLABILITY: 0
CONSOLABILITY: 0
TOTALSCORE: 1
BREATHING: 0
NEGVOCALIZATION: 1
BREATHING: 0
FACIALEXPRESSION: 0
BREATHING: 0
TOTALSCORE: 1
FACIALEXPRESSION: 0
NEGVOCALIZATION: 1
BODYLANGUAGE: 0
TOTALSCORE: 1
CONSOLABILITY: 0
CONSOLABILITY: 0
TOTALSCORE: 1
CONSOLABILITY: 0
FACIALEXPRESSION: 0
NEGVOCALIZATION: 1
BODYLANGUAGE: 0
BODYLANGUAGE: 0
NEGVOCALIZATION: 1
BREATHING: 0
NEGVOCALIZATION: 1
FACIALEXPRESSION: 0
NEGVOCALIZATION: 1
NEGVOCALIZATION: 1
FACIALEXPRESSION: 0
NEGVOCALIZATION: 1
NEGVOCALIZATION: 1
BODYLANGUAGE: 0
TOTALSCORE: 1

## 2021-11-18 ASSESSMENT — PAIN SCALES - GENERAL
PAINLEVEL_OUTOF10: 0

## 2021-11-18 NOTE — PROGRESS NOTES
[facility]  Internal Medicine Teaching Residency Program  Inpatient Daily Progress Note  ______________________________________________________________________________    Patient: Ehsan Coelho  YOB: 1940   [facility]    Acct: [de-identified]     Room: [facility]  Admit date: 2021  Today's date: 21  Number of days in the hospital: 4    SUBJECTIVE   Admitting Diagnosis: COVID  CC: fatigue, cough    Pt seen & examined at bedside. Chart & results reviewed. He is hemodynamically stable, saturating well on room air. No fever spikes. tmax of 100.2 today. CRP improving 14.4-> 68.8-> 102.4->122.4 --> 56.6  WBC improving 7k->10k ->6.4 --> 4.9  He received monoclonal antibodies. ID wants to monitor off the antibiotics. No growth in  blood/urine/respiratory cultures so far. Code status is changed to DNR CCA without intubation. ROS:  Unable to assess as the patient has dementia and is nonverbal at baseline with intermittent agitation secondary to dementia. BRIEF HISTORY     A 78 y.o. male with PMH significant of Alzheimer's dementia, and HIV presented with a chief complaint of dry cough and fatigue, ongoing for last 7-10 days since he took his flu shot. He is also vaccinated with moderna. He has generalized malaise. He also has reduced appetite. He is nonverbal and agitated/irritated at baseline. He is a poor historian and maximum history is obtained from wife, daughter and chart review. His wife is also having similar symptoms, and is admitted to the hospital today with COVID-19 infection.     On presentation in the ER, his oxygen saturations with in the lower 90s but dropping into 80s especially with movement/activity. He was placed on 2 L NC. EKG ruled out any acute ST-T wave abnormality.   Troponins 13, proBNP 397  Evening he was found to be Covid positive  CRP 14.4  D-dimer 0.49    Fibrinogen 376    OBJECTIVE Vital Signs:  /76   Pulse 63   Temp 98.5 °F (36.9 °C) (Axillary)   Resp 22   Ht 5' 6\" (1.676 m)   Wt 126 lb 6.4 oz (57.3 kg)   SpO2 100%   BMI 20.40 kg/m²     Temp (24hrs), Av.4 °F (36.9 °C), Min:98 °F (36.7 °C), Max:98.6 °F (37 °C)    No intake/output data recorded. Physical Exam:  Constitutional: This is a well developed, well nourished, 18.5-24.9 - Normal 78y.o. year old male who is alert, oriented, cooperative and in no apparent distress. Head:normocephalic and atraumatic. EENT:  PERRLA. No conjunctival injections. Septum was midline, mucosa was without erythema, exudates or cobblestoning. No thrush was noted. Neck: Supple without thyromegaly. No elevated JVP. Trachea was midline. Respiratory: Chest was symmetrical without dullness to percussion. Breath sounds bilaterally were clear to auscultation. There were no wheezes, rhonchi or rales. There is no intercostal retraction or use of accessory muscles. No egophony noted. Cardiovascular: Regular without murmur, clicks, gallops or rubs. Abdomen: Slightly rounded and soft without organomegaly. No rebound, rigidity or guarding was appreciated. Lymphatic: No lymphadenopathy. Musculoskeletal: Normal curvature of the spine. No gross muscle weakness. Extremities:  No lower extremity edema, ulcerations, tenderness, varicosities or erythema. Muscle size, tone and strength are normal.  No involuntary movements are noted. Skin:  Warm and dry. Good color, turgor and pigmentation. No lesions or scars.   No cyanosis or clubbing  Neurological/Psychiatric: The patient's general behavior, level of consciousness, thought content and emotional status is normal.        Medications:  Scheduled Medications:    bictegravir-emtricitab-tenofovir alafenamide  1 tablet Oral Daily    aspirin  81 mg Oral Daily    sodium chloride flush  5-40 mL IntraVENous 2 times per day    enoxaparin  40 mg SubCUTAneous Daily    atorvastatin  40 mg Oral Nightly    tamsulosin  0.4 mg Oral Daily    memantine  10 mg Oral BID    donepezil  10 mg Oral Nightly    divalproex  250 mg Oral 2 times per day     Continuous Infusions:    sodium chloride       PRN Medicationsibuprofen, 600 mg, Q6H PRN  sodium chloride flush, 5-40 mL, PRN  sodium chloride, 25 mL, PRN  ondansetron, 4 mg, Q8H PRN   Or  ondansetron, 4 mg, Q6H PRN  polyethylene glycol, 17 g, Daily PRN  acetaminophen, 650 mg, Q6H PRN   Or  acetaminophen, 650 mg, Q6H PRN        Diagnostic Labs:  CBC:   Recent Labs     11/16/21 0434 11/17/21  0629 11/18/21  0552   WBC 10.1 6.4 4.9   RBC 4.27 4.29 4.39   HGB 12.0* 12.2* 12.5*   HCT 40.0* 40.1* 39.8*   MCV 93.7 93.5 90.7   RDW 14.6* 14.6* 14.4   PLT See Reflexed IPF Result 173 See Reflexed IPF Result     BMP:   Recent Labs     11/16/21 0434 11/17/21 0629 11/18/21  0552   * 136 139   K 4.0 4.5 4.1    104 103   CO2 21 24 24   BUN 12 12 10   CREATININE 1.07 0.97 0.80     BNP: No results for input(s): BNP in the last 72 hours. PT/INR: No results for input(s): PROTIME, INR in the last 72 hours. APTT: No results for input(s): APTT in the last 72 hours. CARDIAC ENZYMES: No results for input(s): CKMB, CKMBINDEX, TROPONINI in the last 72 hours. Invalid input(s): CKTOTAL;3  FASTING LIPID PANEL:  Lab Results   Component Value Date    CHOL 185 02/10/2019    HDL 72 02/10/2019    TRIG 139 02/10/2019     LIVER PROFILE:   No results for input(s): AST, ALT, ALB, BILIDIR, BILITOT, ALKPHOS in the last 72 hours. MICROBIOLOGY:   Lab Results   Component Value Date/Time    CULTURE NO GROWTH 2 DAYS 11/16/2021 08:00 AM    CULTURE NO GROWTH 2 DAYS 11/16/2021 08:00 AM       Imaging:    XR CHEST PORTABLE    Result Date: 11/14/2021  No acute process. ASSESSMENT & PLAN     ASSESSMENT / PLAN:     COVID-19 infection  Vaccinated with Covid  Covid positive on 11/14/2021  Is out of window for remdesivir  Does not meet requirements of Decadron and Actemra.   ID is on board, will follow recommendations. CRP 14.4-> 68.8-> 102.4->122.4   Will continue to monitor inflammatory markers.     Acute hypoxic respiratory failure  Secondary to COVID-19 infection  On room air. Will monitor and wean off as tolerated.     Alzheimer's dementia  On memantine  On donepezil     HIV infection  On [address] outpatient with Dr. Julian Teixeira     Hyperlipidemia  On atorvastatin     Peripheral vascular disease  On aspirin     BPH  We will resume Flomax  We will monitor for signs of urinary retention.     GERD   On Pepcid as needed     Moderate malnutrition  On Ensure liquid supplement     DVT ppx  On Lovenox     GI ppx  Not indicated     PT/OT/SW  Consulted     CODE STATUS  DNR CCA without intubation.      Discharge Planning   to assist in discharge planning. Awaiting placement    Roslyn Mg DPM  Internal Medicine Resident, PGY-1  [facility];  [place], [place]  11/18/2021, 7:30 AM

## 2021-11-18 NOTE — CARE COORDINATION
Voicemail left for Nasir at [facility] requesting return call to determine if they are able to accept    [phone] call to [facility]. No answer    [phone] voicemail left for [facility] requesting return call    [phone] received call from [facility]. They are able to accept. He thinks precert has been started. Spoke to pt's wife. She is agreeable to [facility]. Referral sent    [phone] notified by Rajinder Franklin at [facility] that they are able to accept today as long as someone can bring the Al[employer] supply. Call to Jayy Donohue, pt's wife. She will have her daughter take it to the facility. Transport request faxed to [facility]. Voicemail left for Nasir at [facility] to update him on change    [phone] HENS and AVS faxed to [facility]. Spoke to Vikki at [facility] Pt scheduled for the soonest available 3:30pm tomorrow. Call to [facility]. They have no availabilities tonight.  Rajinder Franklin notified

## 2021-11-18 NOTE — PLAN OF CARE
Problem: Airway Clearance - Ineffective  Goal: Achieve or maintain patent airway  11/18/2021 0640 by Debora Townsend RN  Outcome: Ongoing  11/17/2021 1813 by Praneeth Brady  Outcome: Ongoing     Problem: Gas Exchange - Impaired  Goal: Absence of hypoxia  11/18/2021 0640 by Debora Townsend RN  Outcome: Ongoing  11/17/2021 1813 by Praneeth Brady  Outcome: Ongoing  Goal: Promote optimal lung function  11/18/2021 0640 by Debora Townsend RN  Outcome: Ongoing  11/17/2021 1813 by Praneeth Brady  Outcome: Ongoing     Problem: Breathing Pattern - Ineffective  Goal: Ability to achieve and maintain a regular respiratory rate  11/18/2021 0640 by Debora Townsend RN  Outcome: Ongoing  11/17/2021 1813 by Praneeth Brady  Outcome: Ongoing     Problem:  Body Temperature -  Risk of, Imbalanced  Goal: Ability to maintain a body temperature within defined limits  11/18/2021 0640 by Debora Townsend RN  Outcome: Ongoing  11/17/2021 1813 by Praneeth Brady  Outcome: Ongoing  Goal: Will regain or maintain usual level of consciousness  11/18/2021 0640 by Debora Townsend RN  Outcome: Ongoing  11/17/2021 1813 by Praneeth Brady  Outcome: Ongoing  Goal: Complications related to the disease process, condition or treatment will be avoided or minimized  11/18/2021 0640 by Debora Townsend RN  Outcome: Ongoing  11/17/2021 1813 by Praneeth Brady  Outcome: Ongoing     Problem: Isolation Precautions - Risk of Spread of Infection  Goal: Prevent transmission of infection  11/18/2021 0640 by Debora Townsend RN  Outcome: Ongoing  11/17/2021 1813 by Praneeth Brady  Outcome: Ongoing     Problem: Nutrition Deficits  Goal: Optimize nutritional status  11/18/2021 0640 by Debora Townsend RN  Outcome: Ongoing  11/17/2021 1813 by Praneeth Brady  Outcome: Ongoing     Problem: Risk for Fluid Volume Deficit  Goal: Maintain normal heart rhythm  11/18/2021 0640 by Debora Townsend RN  Outcome: Ongoing  11/17/2021 1813 by Praneeth Brady  Outcome: Ongoing  Goal: Maintain absence of muscle cramping  11/18/2021 0640 by Laura Mustafa RN  Outcome: Ongoing  11/17/2021 1813 by Ynes Lira  Outcome: Ongoing  Goal: Maintain normal serum potassium, sodium, calcium, phosphorus, and pH  11/18/2021 0640 by Laura Mustafa RN  Outcome: Ongoing  11/17/2021 1813 by Ynes Lira  Outcome: Ongoing     Problem: Loneliness or Risk for Loneliness  Goal: Demonstrate positive use of time alone when socialization is not possible  11/18/2021 0640 by Laura Mustafa RN  Outcome: Ongoing  11/17/2021 1813 by Ynes Lira  Outcome: Ongoing     Problem: Fatigue  Goal: Verbalize increase energy and improved vitality  11/18/2021 0640 by Laura Mustafa RN  Outcome: Ongoing  11/17/2021 1813 by Ynes Lira  Outcome: Ongoing     Problem: Patient Education: Go to Patient Education Activity  Goal: Patient/Family Education  11/18/2021 0640 by Laura Mustafa RN  Outcome: Ongoing  11/17/2021 1813 by Ynes Lira  Outcome: Ongoing     Problem: Skin Integrity:  Goal: Will show no infection signs and symptoms  Description: Will show no infection signs and symptoms  11/18/2021 0640 by Laura Mustafa RN  Outcome: Ongoing  11/17/2021 1813 by Ynes Lira  Outcome: Ongoing  Goal: Absence of new skin breakdown  Description: Absence of new skin breakdown  11/18/2021 0640 by Laura Mustafa RN  Outcome: Ongoing  11/17/2021 1813 by Ynes Lira  Outcome: Ongoing     Problem: Non-Violent Restraints  Goal: Removal from restraints as soon as assessed to be safe  11/18/2021 0640 by Laura Mustafa RN  Outcome: Ongoing  11/17/2021 1813 by Ynes Lira  Outcome: Ongoing  Goal: No harm/injury to patient while restraints in use  11/18/2021 0640 by Laura Mustafa RN  Outcome: Ongoing  11/17/2021 1813 by Ynes Lira  Outcome: Ongoing  Goal: Patient's dignity will be maintained  11/18/2021 0640 by Laura Mustafa RN  Outcome: Ongoing  11/17/2021 1813 by Praveena Washington Berenice  Outcome: Ongoing

## 2021-11-18 NOTE — PROGRESS NOTES
Attending Physician Statement  I have discussed the care of Lissa Mejia with the resident team. I have examined the patient myself and taken ros and hpi , including pertinent history and exam findings,  with the resident. I have reviewed the key elements of all parts of the encounter with the resident. I agree with the assessment, plan and orders as documented by the resident. Principal Problem:    COVID  Active Problems:    HIV (human immunodeficiency virus infection) ([facility] [address]    Dementia without behavioral disturbance (HCC)    BPH (benign prostatic hyperplasia)    GERD (gastroesophageal reflux disease)    Mixed hyperlipidemia    Alzheimer's dementia with behavioral disturbance (HCC)    Hyponatremia    Moderate malnutrition (HCC)    Elevated C-reactive protein (CRP)  Resolved Problems:    * No resolved hospital problems. *    No further fever spikes, CRP downtrending. Patient ready for discharge. Awaiting placement    Full note to follow.       Electronically signed by Callie Palmer MD

## 2021-11-18 NOTE — PROGRESS NOTES
Physician Progress Note      PATIENT:               Juan Manuel Cuevas  CSN #:                  [id number]  :                       1940  ADMIT DATE:       2021 9:06 AM  DISCH DATE:  RESPONDING  PROVIDER #:        Eran CERVANTES          QUERY TEXT:    Pt admitted with COVID-19 and noted to have SIRS. If possible, please   document in progress notes and discharge summary if you are evaluating and/or   treating: The medical record reflects the following:  Risk Factors: COVID-19 infection  Clinical Indicators: spiked fever of 101.8 on 11/15 with Tmax 102.8, RR 20's,   COVID+, CXR without acute process, procal 0.21, acute resp failure documented,   CRP increasing 122.4>102.4>68.8>14.4, blood cultures pending  Treatment: IV fluids, \"Does not meet requirements of Decadron and Actemra, out   of window for remdesivir\", supplemental O2, continuous pulse ox, ID consult,   CXR, labs, cultures, droplet plus isolation    Call if any questions. Thank you, Reena Doe, [facility]  Options provided:  -- Sepsis present on admission due to COVID-19 infection  -- Sepsis not present on admission due to COVID-19 infection  -- Covid-19 infection without sepsis  -- Other - I will add my own diagnosis  -- Disagree - Not applicable / Not valid  -- Disagree - Clinically unable to determine / Unknown  -- Refer to Clinical Documentation Reviewer    PROVIDER RESPONSE TEXT:    This patient has sepsis which was not present on admission due to COVID-19   infection.     Query created by: Monica Bailon on 2021 12:05 PM      Electronically signed by:  Jeny Olivera 2021 7:47 AM

## 2021-11-18 NOTE — PROGRESS NOTES
[facility] [address] [facility] Patient  Today's Date and Time: 11/18/2021, 10:58 AM    Impression :     COVID 19 Confirmed Infection  Covid tests:  11/14/21: Positive  HIV  Alzheimer's dementia with behavioral disturbances  Hx of Syphilis  Fully vaccinated with Moderna    Recommendations:   Antibiotic treatment:  Monitor off antibiotics  Continue HIV treatments with Biktarvy  Covid Rx:  Remdesivir-Out of window  Decadron-Does not meet requirements  Actemra-Does not meet requirements at this time  Monoclonal antibody infusion ordered 11/15/21  Monitor CRP    Medical Decision Making/Summary/Discussion:11/18/2021     Patient admitted with suspected COVID 19 infection  Covid test confirmed positive. Infection Control Recommendations   [place] Precautions  Airborne isolation  Droplet Isolation    Antimicrobial Stewardship Recommendations     Discontinuation of therapy  Coordination of Outpatient Care:   Estimated Length of IV antimicrobials:TBD  Patient will need Midline Catheter Insertion: TBD  Patient will need PICC line Insertion: No  Patient will need: Home IV , [place],  SNF,  LTAC:TBD  Patient will need outpatient wound care:No    Chief complaint/reason for consultation:   Concern for COVID infection      History of Present Illness:   Mckenna Mcgovern is a 78y.o.-year-old male who was initially admitted on 11/14/2021. Patient seen at the request of Dr. Chan Velez:    Patient presented through ER with complaints of dry cough and fatigue X 2 weeks per his wife. He has also been experiencing difficulty urinating. The patient is non-verbal and may be aggressive at times secondary to dementia. He follow with Dr. George Maharaj for his HIV and takes Biktarvy. His Covid swab resulted as positive. No acute process noted on CXR.     Patient admitted because of concerns with COVID 19.    CURRENT EVALUATION : 11/18/2021    Afebrile  VS stable    No acute issues noted  The patient remains oxygenating well on room air. CRP increased to 102.4-->122.4  CXR stable since admission    Discussed his case with Dr. Quincy Bowen, his ID physician who manages his HIV treatment. Dr. Quincy Bowen recommended monoclonal antibody infusion and monitor off antibiotics. He believes the fever is related to Covid. The patient is stable for discharge from an ID perspective. Patient exhibiting respiratory distress. No  Respiratory secretions:     Patient receiving supplemental oxygen. 2 L [place]--> Room air  RR:19-->17  02 sat: 100-->100    % FIO2:   PEEP:      QTc:       NEWS Score: 0-4 Low risk group; 5-6: Medium risk group; 7 or above: High risk group  Parameters 3 2 1 0 1 2 3   Age    < 65   = 65   RR = 8  9-11 12-20  21-24 = 25   O2 Sats = 91 92-93 94-95 = 96      Suppl O2  Yes  No      SBP = 90  101-110 111-219   = 220   HR = 40  41-50 51-90  111-130 = 131   Consciousness    Alert   Drowsiness, lethargy, or confusion   Temperature = 35.0 C (95.0 F)  35.1-36.0 C 95.1-96.9 F 36.1-38.0 C 97.0-100.4 F 38.1-39.0 C 100.5-102.3 F = 39.1 C = 102.4 F      NEWS Score:  11/14/21: 4 Low risk    Overall Daily Picture:   Improved      Presence of secondary bacterial Infection:  No   Additional antibiotics: No    Labs, X rays reviewed: 11/18/2021    BUN:12-->12-->12-->10  Cr:0.94-->1.07-->0.97-->0.80    WBC:7.0-->10.1-->6.4-->4.9  Hb:11.9-->12-->12.2-->12.5  Plat: 158-->138-->173-->141    Absolute Neutrophils:3.29  Absolute Lymphocytes:2.12  Neutrophil/Lymphocyte Ratio: 1.5 low risk    CRP:14.4-->68.8-->102.4-->122.4-->56.6  Ferritin:57  LDH:     Pro Calcitonin:      Cultures:  Urine:    Blood:  11/16/21: No growth thus far  Sputum :    Wound:      CXR:     11/16/21 11/14/21    CAT:      Discussed with patient, RN, CC, IM.     I have personally reviewed the past medical history, past surgical history, medications, social history, and family history, and I have updated the database Pack years: 0.00     Types: Cigarettes     Start date: 12     Quit date: 2021     Years since quittin.8    Smokeless tobacco: Never Used    Tobacco comment:  cigerettes per day   Vaping Use    Vaping Use: Never used   Substance and Sexual Activity    Alcohol use: Yes     Alcohol/week: 4.0 standard drinks     Types: 4 Cans of beer per week    Drug use: Yes    Sexual activity: Not on file   Other Topics Concern    Not on file   Social History Narrative    Not on file     Social Determinants of Health     Financial Resource Strain: Low Risk     Difficulty of Paying Living Expenses: Not very hard   Food Insecurity: No Food Insecurity    Worried About Running Out of Food in the Last Year: Never true    Mirta of Food in the Last Year: Never true   Transportation Needs:     Lack of Transportation (Medical): Not on file    Lack of Transportation (Non-Medical):  Not on file   Physical Activity:     Days of Exercise per Week: Not on file    Minutes of Exercise per Session: Not on file   Stress:     Feeling of Stress : Not on file   Social Connections:     Frequency of Communication with Friends and Family: Not on file    Frequency of Social Gatherings with Friends and Family: Not on file    Attends Voodoo Services: Not on file    Active Member of [address] [place] [address] [employer] or Organizations: Not on file    Attends Club or Organization Meetings: Not on file    Marital Status: Not on file   Intimate Partner Violence:     Fear of Current or Ex-Partner: Not on file    Emotionally Abused: Not on file    Physically Abused: Not on file    Sexually Abused: Not on file   Housing Stability:     Unable to Pay for Housing in the Last Year: Not on file    Number of [place] in the Last Year: Not on file    Unstable Housing in the Last Year: Not on file       Family History:     Family History   Problem Relation Age of Onset    Heart Disease Mother     High Blood Pressure Mother     Cancer Father     Diabetes Sister     Heart Disease Sister     High Blood Pressure Sister     Heart Disease Brother     High Blood Pressure Brother         Allergies:   Patient has no known allergies. Review of Systems:     SHIRLEY-Dementia  Constitutional: No fevers or chills. No systemic complaints  Head: No headaches  Eyes: No double vision or blurry vision. No conjunctival inflammation. ENT: No sore throat or runny nose. . No hearing loss, tinnitus or vertigo. Cardiovascular: No chest pain or palpitations. No Shortness of breath. No BENJAMIN  Lung: No Shortness of breath or cough. No sputum production  Abdomen: No nausea, vomiting, diarrhea, or abdominal pain. Anthonette Dariana No cramps. Genitourinary: No increased urinary frequency, or dysuria. No hematuria. No suprapubic or CVA pain  Musculoskeletal: No muscle aches or pains. No joint effusions, swelling or deformities  Hematologic: No bleeding or bruising. Neurologic: No headache, weakness, numbness, or tingling. Integument: No rash, no ulcers. Psychiatric: No depression. Endocrine: No polyuria, no polydipsia, no polyphagia. Physical Examination :     Patient Vitals for the past 8 hrs:   BP Temp Temp src Pulse Resp SpO2   11/18/21 0800 132/63 98.6 °F (37 °C) Axillary 64 19 100 %   11/18/21 0400 115/79 98.5 °F (36.9 °C) Axillary 67 20 100 %     General Appearance: Awake, alert, and in no apparent distress  Head:  Normocephalic, no trauma  Eyes: Pupils equal, round, reactive to light; sclera anicteric; conjunctivae pink. No embolic phenomena. ENT: Oropharynx clear, without erythema, exudate, or thrush. No tenderness of sinuses. Mouth/throat: mucosa pink and moist. No lesions. Dentition in good repair. Neck:Supple, without lymphadenopathy. Thyroid normal, No bruits. Pulmonary/Chest: Clear to auscultation, without wheezes, rales, or rhonchi. No dullness to percussion. Cardiovascular: Regular rate and rhythm without murmurs, rubs, or gallops. Abdomen: Soft, non tender.  Bowel sounds normal. leads overlie the chest.           Impression   Stable chest with no acute parenchymal abnormality demonstrated               Medical Decision Ljbkec-Quavunge-Jnmoo:       Medical Decision Making-Other:     Note:  Labs, medications, radiologic studies were reviewed with personal review of films  Large amounts of data were reviewed  Discussed with nursing Staff, Discharge planner  Infection Control and Prevention measures reviewed  All prior entries were reviewed  Administer medications as ordered  Prognosis: Guarded  Discharge planning reviewed      Thank you for allowing us to participate in the care of this patient. Please call with questions. Roldan Huntley APRN - CNP     ATTESTATION:    I have discussed the case, including pertinent history and exam findings with the APRN. I have evaluated the  History, physical findings and pictures of the patient and the key elements of the encounter have been performed by me. I have reviewed the laboratory data, other diagnostic studies and discussed them with the APRN. I have updated the medical record where necessary. I agree with the assessment, plan and orders as documented by the APRN.     Mary Cano MD.          Pager: [phone] - Office: [phone]

## 2021-11-19 VITALS
OXYGEN SATURATION: 100 % | RESPIRATION RATE: 20 BRPM | SYSTOLIC BLOOD PRESSURE: 150 MMHG | HEIGHT: 66 IN | DIASTOLIC BLOOD PRESSURE: 73 MMHG | BODY MASS INDEX: 20.31 KG/M2 | WEIGHT: 126.4 LBS | HEART RATE: 60 BPM | TEMPERATURE: 97 F

## 2021-11-19 LAB
ANION GAP SERPL CALCULATED.3IONS-SCNC: 9 MMOL/L (ref 9–17)
BUN BLDV-MCNC: 10 MG/DL (ref 8–23)
BUN/CREAT BLD: ABNORMAL (ref 9–20)
CALCIUM SERPL-MCNC: 9.1 MG/DL (ref 8.6–10.4)
CHLORIDE BLD-SCNC: 101 MMOL/L (ref 98–107)
CO2: 24 MMOL/L (ref 20–31)
CREAT SERPL-MCNC: 0.73 MG/DL (ref 0.7–1.2)
GFR AFRICAN AMERICAN: >60 ML/MIN
GFR NON-AFRICAN AMERICAN: >60 ML/MIN
GFR SERPL CREATININE-BSD FRML MDRD: ABNORMAL ML/MIN/{1.73_M2}
GFR SERPL CREATININE-BSD FRML MDRD: ABNORMAL ML/MIN/{1.73_M2}
GLUCOSE BLD-MCNC: 99 MG/DL (ref 70–99)
HCT VFR BLD CALC: 43.5 % (ref 40.7–50.3)
HEMOGLOBIN: 13 G/DL (ref 13–17)
MCH RBC QN AUTO: 28.3 PG (ref 25.2–33.5)
MCHC RBC AUTO-ENTMCNC: 29.9 G/DL (ref 28.4–34.8)
MCV RBC AUTO: 94.6 FL (ref 82.6–102.9)
NRBC AUTOMATED: 0 PER 100 WBC
PDW BLD-RTO: 14.2 % (ref 11.8–14.4)
PLATELET # BLD: 233 K/UL (ref 138–453)
PMV BLD AUTO: 10.8 FL (ref 8.1–13.5)
POTASSIUM SERPL-SCNC: 4.5 MMOL/L (ref 3.7–5.3)
RBC # BLD: 4.6 M/UL (ref 4.21–5.77)
SODIUM BLD-SCNC: 134 MMOL/L (ref 135–144)
WBC # BLD: 6 K/UL (ref 3.5–11.3)

## 2021-11-19 PROCEDURE — 80048 BASIC METABOLIC PNL TOTAL CA: CPT

## 2021-11-19 PROCEDURE — 6370000000 HC RX 637 (ALT 250 FOR IP)

## 2021-11-19 PROCEDURE — 6370000000 HC RX 637 (ALT 250 FOR IP): Performed by: STUDENT IN AN ORGANIZED HEALTH CARE EDUCATION/TRAINING PROGRAM

## 2021-11-19 PROCEDURE — 36415 COLL VENOUS BLD VENIPUNCTURE: CPT

## 2021-11-19 PROCEDURE — 99232 SBSQ HOSP IP/OBS MODERATE 35: CPT | Performed by: INTERNAL MEDICINE

## 2021-11-19 PROCEDURE — 85027 COMPLETE CBC AUTOMATED: CPT

## 2021-11-19 PROCEDURE — 6370000000 HC RX 637 (ALT 250 FOR IP): Performed by: NURSE PRACTITIONER

## 2021-11-19 PROCEDURE — 6360000002 HC RX W HCPCS

## 2021-11-19 PROCEDURE — 2580000003 HC RX 258

## 2021-11-19 RX ADMIN — SODIUM CHLORIDE, PRESERVATIVE FREE 10 ML: 5 INJECTION INTRAVENOUS at 09:00

## 2021-11-19 RX ADMIN — ENOXAPARIN SODIUM 40 MG: 100 INJECTION SUBCUTANEOUS at 09:12

## 2021-11-19 RX ADMIN — DIVALPROEX SODIUM 250 MG: 125 CAPSULE, COATED PELLETS ORAL at 09:10

## 2021-11-19 RX ADMIN — BICTEGRAVIR SODIUM, EMTRICITABINE, AND TENOFOVIR ALAFENAMIDE FUMARATE 1 TABLET: 50; 200; 25 TABLET ORAL at 09:10

## 2021-11-19 RX ADMIN — ASPIRIN 81 MG: 81 TABLET, CHEWABLE ORAL at 09:11

## 2021-11-19 ASSESSMENT — PAIN SCALES - PAIN ASSESSMENT IN ADVANCED DEMENTIA (PAINAD)
BODYLANGUAGE: 0
FACIALEXPRESSION: 0
CONSOLABILITY: 0
BODYLANGUAGE: 0
NEGVOCALIZATION: 1
BODYLANGUAGE: 0
CONSOLABILITY: 0
FACIALEXPRESSION: 0
CONSOLABILITY: 0
CONSOLABILITY: 0
NEGVOCALIZATION: 1
NEGVOCALIZATION: 1
FACIALEXPRESSION: 0
NEGVOCALIZATION: 1
FACIALEXPRESSION: 0
TOTALSCORE: 1
BREATHING: 0
TOTALSCORE: 1
BODYLANGUAGE: 0
CONSOLABILITY: 0
FACIALEXPRESSION: 0
TOTALSCORE: 1
BODYLANGUAGE: 0
TOTALSCORE: 1
BREATHING: 0
BREATHING: 0
CONSOLABILITY: 0
BODYLANGUAGE: 0
NEGVOCALIZATION: 0
NEGVOCALIZATION: 1
BREATHING: 0
BREATHING: 0
FACIALEXPRESSION: 0
FACIALEXPRESSION: 0
TOTALSCORE: 1
CONSOLABILITY: 0
TOTALSCORE: 1
CONSOLABILITY: 0
BREATHING: 0
BODYLANGUAGE: 0
FACIALEXPRESSION: 0
BODYLANGUAGE: 0
BREATHING: 0
CONSOLABILITY: 0
FACIALEXPRESSION: 0
BODYLANGUAGE: 0
BREATHING: 0
FACIALEXPRESSION: 0
NEGVOCALIZATION: 1
NEGVOCALIZATION: 1
BODYLANGUAGE: 0
NEGVOCALIZATION: 1
FACIALEXPRESSION: 0
NEGVOCALIZATION: 1
BODYLANGUAGE: 0
TOTALSCORE: 1
TOTALSCORE: 1
NEGVOCALIZATION: 1
BREATHING: 0
TOTALSCORE: 1
BREATHING: 0
NEGVOCALIZATION: 1
FACIALEXPRESSION: 0
BODYLANGUAGE: 0
TOTALSCORE: 1
FACIALEXPRESSION: 0
NEGVOCALIZATION: 1
BODYLANGUAGE: 0
FACIALEXPRESSION: 0
BREATHING: 0
BREATHING: 0
NEGVOCALIZATION: 1
BODYLANGUAGE: 0
BODYLANGUAGE: 0
CONSOLABILITY: 0
BREATHING: 0
BODYLANGUAGE: 0
FACIALEXPRESSION: 0
BREATHING: 0
CONSOLABILITY: 0
TOTALSCORE: 0
TOTALSCORE: 1
CONSOLABILITY: 0
TOTALSCORE: 1
TOTALSCORE: 1
CONSOLABILITY: 0
NEGVOCALIZATION: 1
FACIALEXPRESSION: 0
BREATHING: 0
NEGVOCALIZATION: 1
BREATHING: 0
CONSOLABILITY: 0
CONSOLABILITY: 0
TOTALSCORE: 1
CONSOLABILITY: 0
TOTALSCORE: 1

## 2021-11-19 ASSESSMENT — PAIN SCALES - WONG BAKER

## 2021-11-19 ASSESSMENT — PAIN SCALES - GENERAL
PAINLEVEL_OUTOF10: 0

## 2021-11-19 NOTE — CARE COORDINATION
Pt's wife, Kourtney Agarwal, updated on transportation time. She is agreeable.  Her daughter is getting his Biktarvy supply and clothes and will deliver to [address] Case Management Department  Written by: Abigail Stein RN    Patient Name: Karen Steele  Attending Provider: Koir Burks MD  Admit Date: 2021  9:06 AM  MRN: [id number]  Account: [de-identified]                     : 1940  Discharge Date: 2021      Disposition: [facility]    Abigail Stein RN

## 2021-11-19 NOTE — PROGRESS NOTES
[facility]  Internal Medicine Teaching Residency Program  Inpatient Daily Progress Note  ______________________________________________________________________________    Patient: Meghana Jimenez  YOB: 1940   ISR:[id number]    Acct: [de-identified]     [facility]  Admit date: 11/14/2021  Today's date: 11/19/21  Number of days in the hospital: 5    SUBJECTIVE   Admitting Diagnosis: COVID  CC: fatigue, cough    Pt seen & examined at bedside. Chart & results reviewed. He is hemodynamically stable, saturating well on room air. No fever spikes overnight. Per ID, patient is ok to come out of isolation on 11/24/21  CRP improving 14.4-> 68.8-> 102.4->122.4 --> 56.6  WBC improving 7k->10k ->6.4 --> 4.9 --> 6.0  He received monoclonal antibodies. ID wants to monitor off the antibiotics. No growth in  blood/urine/respiratory cultures so far. Code status is changed to DNR CCA without intubation. ROS:  Unable to assess as the patient has dementia and is nonverbal at baseline with intermittent agitation secondary to dementia. BRIEF HISTORY     A 78 y.o. male with PMH significant of Alzheimer's dementia, and HIV presented with a chief complaint of dry cough and fatigue, ongoing for last 7-10 days since he took his flu shot. He is also vaccinated with moderna. He has generalized malaise. He also has reduced appetite. He is nonverbal and agitated/irritated at baseline. He is a poor historian and maximum history is obtained from wife, daughter and chart review. His wife is also having similar symptoms, and is admitted to the hospital today with COVID-19 infection.     On presentation in the ER, his oxygen saturations with in the lower 90s but dropping into 80s especially with movement/activity. He was placed on 2 L NC. EKG ruled out any acute ST-T wave abnormality.   Troponins 13, proBNP 397  Evening he was found to be Covid positive  CRP 14.4  D-dimer 0.49    Fibrinogen 376    OBJECTIVE     Vital Signs:  BP (!) 150/73   Pulse 60   Temp 97 °F (36.1 °C) (Axillary)   Resp 20   Ht 5' 6\" (1.676 m)   Wt 126 lb 6.4 oz (57.3 kg)   SpO2 100%   BMI 20.40 kg/m²     Temp (24hrs), Av °F (36.7 °C), Min:97 °F (36.1 °C), Max:98.4 °F (36.9 °C)    In: 400   Out: -     Physical Exam:  Constitutional: This is a well developed, well nourished, 18.5-24.9 - Normal 78y.o. year old male who is alert, oriented, cooperative and in no apparent distress. Head:normocephalic and atraumatic. EENT:  PERRLA. No conjunctival injections. Septum was midline, mucosa was without erythema, exudates or cobblestoning. No thrush was noted. Neck: Supple without thyromegaly. No elevated JVP. Trachea was midline. Respiratory: Chest was symmetrical without dullness to percussion. Breath sounds bilaterally were clear to auscultation. There were no wheezes, rhonchi or rales. There is no intercostal retraction or use of accessory muscles. No egophony noted. Cardiovascular: Regular without murmur, clicks, gallops or rubs. Abdomen: Slightly rounded and soft without organomegaly. No rebound, rigidity or guarding was appreciated. Lymphatic: No lymphadenopathy. Musculoskeletal: Normal curvature of the spine. No gross muscle weakness. Extremities:  No lower extremity edema, ulcerations, tenderness, varicosities or erythema. Muscle size, tone and strength are normal.  No involuntary movements are noted. Skin:  Warm and dry. Good color, turgor and pigmentation. No lesions or scars.   No cyanosis or clubbing  Neurological/Psychiatric: The patient's general behavior, level of consciousness, thought content and emotional status is normal.        Medications:  Scheduled Medications:    bictegravir-emtricitab-tenofovir alafenamide  1 tablet Oral Daily    aspirin  81 mg Oral Daily    sodium chloride flush  5-40 mL IntraVENous 2 times per day    enoxaparin  40 mg SubCUTAneous Daily    atorvastatin  40 mg Oral Nightly    tamsulosin  0.4 mg Oral Daily    memantine  10 mg Oral BID    donepezil  10 mg Oral Nightly    divalproex  250 mg Oral 2 times per day     Continuous Infusions:    sodium chloride       PRN Medicationsibuprofen, 600 mg, Q6H PRN  sodium chloride flush, 5-40 mL, PRN  sodium chloride, 25 mL, PRN  ondansetron, 4 mg, Q8H PRN   Or  ondansetron, 4 mg, Q6H PRN  polyethylene glycol, 17 g, Daily PRN  acetaminophen, 650 mg, Q6H PRN   Or  acetaminophen, 650 mg, Q6H PRN        Diagnostic Labs:  CBC:   Recent Labs     11/17/21  0629 11/18/21  0552 11/19/21  0657   WBC 6.4 4.9 6.0   RBC 4.29 4.39 4.60   HGB 12.2* 12.5* 13.0   HCT 40.1* 39.8* 43.5   MCV 93.5 90.7 94.6   RDW 14.6* 14.4 14.2    See Reflexed IPF Result 233     BMP:   Recent Labs     11/17/21  0629 11/18/21  0552 11/19/21  0657    139 134*   K 4.5 4.1 4.5    103 101   CO2 24 24 24   BUN 12 10 10   CREATININE 0.97 0.80 0.73     BNP: No results for input(s): BNP in the last 72 hours. PT/INR: No results for input(s): PROTIME, INR in the last 72 hours. APTT: No results for input(s): APTT in the last 72 hours. CARDIAC ENZYMES: No results for input(s): CKMB, CKMBINDEX, TROPONINI in the last 72 hours. Invalid input(s): CKTOTAL;3  FASTING LIPID PANEL:  Lab Results   Component Value Date    CHOL 185 02/10/2019    HDL 72 02/10/2019    TRIG 139 02/10/2019     LIVER PROFILE:   No results for input(s): AST, ALT, ALB, BILIDIR, BILITOT, ALKPHOS in the last 72 hours. MICROBIOLOGY:   Lab Results   Component Value Date/Time    CULTURE NO GROWTH 3 DAYS 11/16/2021 08:00 AM    CULTURE NO GROWTH 3 DAYS 11/16/2021 08:00 AM       Imaging:    XR CHEST PORTABLE    Result Date: 11/14/2021  No acute process.        ASSESSMENT & PLAN     ASSESSMENT / PLAN:     COVID-19 infection  Vaccinated with Covid  Covid positive on 11/14/2021  Is out of window for remdesivir  Does not meet requirements of Decadron and Actemra. ID is on board, stable for dc to SNF per ID. CRP 14.4-> 68.8-> 102.4->122.4 --> 56.6  Will continue to monitor inflammatory markers.     Acute hypoxic respiratory failure  Secondary to COVID-19 infection  On room air. Will monitor and wean off as tolerated.     Alzheimer's dementia  On memantine  On donepezil     HIV infection  On [address] outpatient with Dr. Geogre Maharaj     Hyperlipidemia  On atorvastatin     Peripheral vascular disease  On aspirin     BPH  We will resume Flomax  We will monitor for signs of urinary retention.     GERD   On Pepcid as needed     Moderate malnutrition  On Ensure liquid supplement     DVT ppx  On Lovenox     GI ppx  Not indicated     PT/OT/SW  Consulted     CODE STATUS  DNR CCA without intubation.      Discharge Planning   to assist in discharge planning. Awaiting transport    Mallory Kirk  Internal Medicine Resident, PGY-1  [facility]; [place], [place]  11/19/2021, 12:30 PM     Attending Physician Statement  I have discussed the care of [facility] [employer] with the resident team. I have examined the patient myself and taken ros and hpi , including pertinent history and exam findings,  with the resident. I have reviewed the key elements of all parts of the encounter with the resident. I agree with the assessment, plan and orders as documented by the resident. Principal Problem:    COVID  Active Problems:    HIV (human immunodeficiency virus infection) ([facility] [address]    Dementia without behavioral disturbance (HCC)    BPH (benign prostatic hyperplasia)    GERD (gastroesophageal reflux disease)    Mixed hyperlipidemia    Alzheimer's dementia with behavioral disturbance (HCC)    Hyponatremia    Moderate malnutrition (HCC)    Elevated C-reactive protein (CRP)  Resolved Problems:    * No resolved hospital problems.  *      Electronically signed by Janie Henao MD

## 2021-11-19 NOTE — PROGRESS NOTES
[facility] [address] Patient  Today's Date and Time: 11/19/2021, 8:56 AM    Impression :     COVID 19 Confirmed Infection  Covid tests:  11/14/21: Positive  HIV  Alzheimer's dementia with behavioral disturbances  Hx of Syphilis  Fully vaccinated with Moderna    Recommendations:   Antibiotic treatment:  Monitor off antibiotics  Continue HIV treatments with Biktarvy  Covid Rx:  Remdesivir-Out of window  Decadron-Does not meet requirements  Actemra-Does not meet requirements at this time  Monoclonal antibody infusion ordered 11/15/21  Monitor CRP    Medical Decision Making/Summary/Discussion:11/19/2021     Patient admitted with suspected COVID 19 infection  Covid test confirmed positive. The patient may come out of isolation on 11/24/21    Infection Control Recommendations   [place] Precautions  Airborne isolation  Droplet Isolation    Antimicrobial Stewardship Recommendations     Discontinuation of therapy  Coordination of Outpatient Care:   Estimated Length of IV antimicrobials:TBD  Patient will need Midline Catheter Insertion: TBD  Patient will need PICC line Insertion: No  Patient will need: Home IV , [place],  SNF,  LTAC:TBD  Patient will need outpatient wound care:No    Chief complaint/reason for consultation:   Concern for COVID infection      History of Present Illness:   Rashard Douglas is a 78y.o.-year-old male who was initially admitted on 11/14/2021. Patient seen at the request of Dr. Lavell Washington:    Patient presented through ER with complaints of dry cough and fatigue X 2 weeks per his wife. He has also been experiencing difficulty urinating. The patient is non-verbal and may be aggressive at times secondary to dementia. He follow with Dr. Mazin Bunn for his HIV and takes Biktarvy. His Covid swab resulted as positive. No acute process noted on CXR.     Patient admitted because of concerns with COVID 19.    CURRENT EVALUATION : 11/19/2021    Afebrile  VS stable    No acute issues noted  The patient remains oxygenating well on room air. CRP decreasing  CXR stable since admission    Discussed his case with Dr. Flora Pérez, his ID physician who manages his HIV treatment. Dr. Flora Pérez recommended monoclonal antibody infusion and monitor off antibiotics. He believes the fever is related to Covid. The patient is stable for discharge from an ID perspective. Awaiting placement at SNF    Patient exhibiting respiratory distress. No  Respiratory secretions:     Patient receiving supplemental oxygen. 2 L N[place]--> Room air  RR:19-->17  02 sat: 100-->100    % FIO2:   PEEP:      QTc:       NEWS Score: 0-4 Low risk group; 5-6: Medium risk group; 7 or above: High risk group  Parameters 3 2 1 0 1 2 3   Age    < 65   = 65   RR = 8  9-11 12-20  21-24 = 25   O2 Sats = 91 92-93 94-95 = 96      Suppl O2  Yes  No      SBP = 90  101-110 111-219   = 220   HR = 40  41-50 51-90  111-130 = 131   Consciousness    Alert   Drowsiness, lethargy, or confusion   Temperature = 35.0 C (95.0 F)  35.1-36.0 C 95.1-96.9 F 36.1-38.0 C 97.0-100.4 F 38.1-39.0 C 100.5-102.3 F = 39.1 C = 102.4 F      NEWS Score:  11/14/21: 4 Low risk    Overall Daily Picture:   Improved      Presence of secondary bacterial Infection:  No   Additional antibiotics: No    Labs, X rays reviewed: 11/19/2021    BUN:10  Cr:0.73    WBC:6.0  Hb:13  Plat: 233    Absolute Neutrophils:3.29  Absolute Lymphocytes:2.12  Neutrophil/Lymphocyte Ratio: 1.5 low risk    CRP:14.4-->68.8-->102.4-->122.4-->56.6  Ferritin:57  LDH:     Pro Calcitonin:      Cultures:  Urine:    Blood:  11/16/21: No growth thus far  Sputum :    Wound:      CXR:     11/16/21 11/14/21    CAT:      Discussed with patient, RN, CC, IM. I have personally reviewed the past medical history, past surgical history, medications, social history, and family history, and I have updated the database accordingly.   Past Medical History: Past Medical History:   Diagnosis Date    Acute delirium 2/11/2019    Acute metabolic encephalopathy 9/65/5489    Alzheimer's dementia with behavioral disturbance ([facility] 2/11/2019    Atrophy, cortical     BPH (benign prostatic hyperplasia) 2/1/2013    Dementia ([facility]     Dementia without behavioral disturbance ([facility] 7/27/2012    GERD (gastroesophageal reflux disease) 4/28/2015    Hemorrhoids     HIV (human immunodeficiency virus infection) ([facility]     Meralgia paraesthetica 2/4/2016    Mixed hyperlipidemia 5/5/2016    Occasional tremors     Osteoporosis     PVD (peripheral vascular disease) ([facility] 2/8/2018    Seizure-like activity ([facility] 2/28/2018    Syphilis in male     Wears glasses        Past Surgical  History:     Past Surgical History:   Procedure Laterality Date    COLONOSCOPY      HEMORRHOID SURGERY      HERNIA REPAIR      umbilical    [place] X-RAY RETROGRADE PYELOGRAM Bilateral 3/6/2018    CYSTOSCOPY RETROGRADE PYELOGRAM performed by Yessy Giron MD at [address] 11/22/2017    SIGMOIDOSCOPY POLYP SNARE performed by Yina Hoang MD at [facility]        Medications:      bictegravir-emtricitab-tenofovir alafenamide  1 tablet Oral Daily    aspirin  81 mg Oral Daily    sodium chloride flush  5-40 mL IntraVENous 2 times per day    enoxaparin  40 mg SubCUTAneous Daily    atorvastatin  40 mg Oral Nightly    tamsulosin  0.4 mg Oral Daily    memantine  10 mg Oral BID    donepezil  10 mg Oral Nightly    divalproex  250 mg Oral 2 times per day       Social History:     Social History     Socioeconomic History    Marital status:      Spouse name: Not on file    Number of children: 5    Years of education: 3    Highest education level: 3rd grade   Occupational History    Not on file   Tobacco Use    Smoking status: Former Smoker     Packs/day: 0.00     Years: 50.00     Pack years: 0.00     Types: Cigarettes Start date:      Quit date: 2021     Years since quittin.8    Smokeless tobacco: Never Used    Tobacco comment:  cigerettes per day   Vaping Use    Vaping Use: Never used   Substance and Sexual Activity    Alcohol use: Yes     Alcohol/week: 4.0 standard drinks     Types: 4 Cans of beer per week    Drug use: Yes    Sexual activity: Not on file   Other Topics Concern    Not on file   Social History Narrative    Not on file     Social Determinants of Health     Financial Resource Strain: Low Risk     Difficulty of Paying Living Expenses: Not very hard   Food Insecurity: No Food Insecurity    Worried About Running Out of Food in the Last Year: Never true    Mirta of Food in the Last Year: Never true   Transportation Needs:     Lack of Transportation (Medical): Not on file    Lack of Transportation (Non-Medical):  Not on file   Physical Activity:     Days of Exercise per Week: Not on file    Minutes of Exercise per Session: Not on file   Stress:     Feeling of Stress : Not on file   Social Connections:     Frequency of Communication with Friends and Family: Not on file    Frequency of Social Gatherings with Friends and Family: Not on file    Attends Mandaeism Services: Not on file    Active Member of [address] [place] [address] [employer] or Organizations: Not on file    Attends Club or Organization Meetings: Not on file    Marital Status: Not on file   Intimate Partner Violence:     Fear of Current or Ex-Partner: Not on file    Emotionally Abused: Not on file    Physically Abused: Not on file    Sexually Abused: Not on file   Housing Stability:     Unable to Pay for Housing in the Last Year: Not on file    Number of [place] in the Last Year: Not on file    Unstable Housing in the Last Year: Not on file       Family History:     Family History   Problem Relation Age of Onset    Heart Disease Mother     High Blood Pressure Mother     Cancer Father     Diabetes Sister     Heart Disease Sister    Shakira Wade High Blood Pressure Sister     Heart Disease Brother     High Blood Pressure Brother         Allergies:   Patient has no known allergies. Review of Systems:     SHIRLEY-Dementia  Constitutional: No fevers or chills. No systemic complaints  Head: No headaches  Eyes: No double vision or blurry vision. No conjunctival inflammation. ENT: No sore throat or runny nose. . No hearing loss, tinnitus or vertigo. Cardiovascular: No chest pain or palpitations. No Shortness of breath. No BENJAMIN  Lung: No Shortness of breath or cough. No sputum production  Abdomen: No nausea, vomiting, diarrhea, or abdominal pain. Migel Heath No cramps. Genitourinary: No increased urinary frequency, or dysuria. No hematuria. No suprapubic or CVA pain  Musculoskeletal: No muscle aches or pains. No joint effusions, swelling or deformities  Hematologic: No bleeding or bruising. Neurologic: No headache, weakness, numbness, or tingling. Integument: No rash, no ulcers. Psychiatric: No depression. Endocrine: No polyuria, no polydipsia, no polyphagia. Physical Examination :     Patient Vitals for the past 8 hrs:   BP Temp Temp src Pulse Resp SpO2   11/19/21 0407 (!) 150/71 98.1 °F (36.7 °C) Axillary 60 20 100 %   11/19/21 0400 -- -- -- 60 -- --     General Appearance: Awake, alert, and in no apparent distress  Head:  Normocephalic, no trauma  Eyes: Pupils equal, round, reactive to light; sclera anicteric; conjunctivae pink. No embolic phenomena. ENT: Oropharynx clear, without erythema, exudate, or thrush. No tenderness of sinuses. Mouth/throat: mucosa pink and moist. No lesions. Dentition in good repair. Neck:Supple, without lymphadenopathy. Thyroid normal, No bruits. Pulmonary/Chest: Clear to auscultation, without wheezes, rales, or rhonchi. No dullness to percussion. Cardiovascular: Regular rate and rhythm without murmurs, rubs, or gallops. Abdomen: Soft, non tender.  Bowel sounds normal. No organomegaly  All four Extremities: No cyanosis, clubbing, edema, or effusions. Neurologic: Baseline dementia  Skin: Warm and dry with good turgor. No signs of peripheral arterial or venous insufficiency. No ulcerations. No open wounds. Medical Decision Making -Laboratory:   I have independently reviewed/ordered the following labs:    CBC with Differential:   Recent Labs     11/18/21  0552 11/19/21  0657   WBC 4.9 6.0   HGB 12.5* 13.0   HCT 39.8* 43.5   PLT See Reflexed IPF Result 233     BMP:   Recent Labs     11/18/21  0552 11/19/21  0657    134*   K 4.1 4.5    101   CO2 24 24   BUN 10 10   CREATININE 0.80 0.73     Hepatic Function Panel:   No results for input(s): PROT, LABALBU, BILIDIR, IBILI, BILITOT, ALKPHOS, ALT, AST in the last 72 hours. No results for input(s): RPR in the last 72 hours. No results for input(s): HIV in the last 72 hours. No results for input(s): BC in the last 72 hours. Lab Results   Component Value Date    MUCUS NOT REPORTED 09/10/2019    RBC 4.60 11/19/2021    RBC 4.31 04/18/2012    TRICHOMONAS NOT REPORTED 09/10/2019    WBC 6.0 11/19/2021    YEAST NOT REPORTED 09/10/2019    TURBIDITY CLEAR 09/10/2019     Lab Results   Component Value Date    CREATININE 0.73 11/19/2021    GLUCOSE 99 11/19/2021    GLUCOSE 76 04/18/2012       Medical Decision Making-Imaging:     Narrative   EXAMINATION:   ONE XRAY VIEW OF THE CHEST       11/16/2021 7:35 am       COMPARISON:   11/14/2021       HISTORY:   ORDERING SYSTEM PROVIDED HISTORY: COVID-19 pneumonia with worsening   inflammatory markers, febrile, look for progression of pneumonia. TECHNOLOGIST PROVIDED HISTORY:   COVID-19 pneumonia with worsening inflammatory markers, febrile, look for   progression of pneumonia. FINDINGS:   Heart size and pulmonary vessels are within normal limits. Slightly ectatic   thoracic aorta. Mild COPD changes. No new focal infiltrates are seen. No   significant pleural effusions.   Monitor leads overlie the chest.           Impression   Stable chest with no acute parenchymal abnormality demonstrated               Medical Decision Wacmub-Olwvggjy-Tmqes:       Medical Decision Making-Other:     Note:  Labs, medications, radiologic studies were reviewed with personal review of films  Large amounts of data were reviewed  Discussed with nursing Staff, Discharge planner  Infection Control and Prevention measures reviewed  All prior entries were reviewed  Administer medications as ordered  Prognosis: Guarded  Discharge planning reviewed      Thank you for allowing us to participate in the care of this patient. Please call with questions. Bessie Trevino APRN - CNP     ATTESTATION:    I have discussed the case, including pertinent history and exam findings with the APRN. I have evaluated the  History, physical findings and pictures of the patient and the key elements of the encounter have been performed by me. I have reviewed the laboratory data, other diagnostic studies and discussed them with the APRN. I have updated the medical record where necessary. I agree with the assessment, plan and orders as documented by the APRN.     Victor M Kumar MD.        Pager: [phone] - Office: [phone]

## 2021-11-19 NOTE — PLAN OF CARE
Problem: Airway Clearance - Ineffective  Goal: Achieve or maintain patent airway  Outcome: Ongoing     Problem: Gas Exchange - Impaired  Goal: Absence of hypoxia  Outcome: Ongoing  Goal: Promote optimal lung function  Outcome: Ongoing     Problem: Breathing Pattern - Ineffective  Goal: Ability to achieve and maintain a regular respiratory rate  Outcome: Ongoing     Problem:  Body Temperature -  Risk of, Imbalanced  Goal: Ability to maintain a body temperature within defined limits  Outcome: Ongoing  Goal: Will regain or maintain usual level of consciousness  Outcome: Ongoing  Goal: Complications related to the disease process, condition or treatment will be avoided or minimized  Outcome: Ongoing     Problem: Isolation Precautions - Risk of Spread of Infection  Goal: Prevent transmission of infection  Outcome: Ongoing     Problem: Nutrition Deficits  Goal: Optimize nutritional status  Outcome: Ongoing     Problem: Risk for Fluid Volume Deficit  Goal: Maintain normal heart rhythm  Outcome: Ongoing  Goal: Maintain absence of muscle cramping  Outcome: Ongoing  Goal: Maintain normal serum potassium, sodium, calcium, phosphorus, and pH  Outcome: Ongoing     Problem: Loneliness or Risk for Loneliness  Goal: Demonstrate positive use of time alone when socialization is not possible  Outcome: Ongoing     Problem: Fatigue  Goal: Verbalize increase energy and improved vitality  Outcome: Ongoing     Problem: Patient Education: Go to Patient Education Activity  Goal: Patient/Family Education  Outcome: Ongoing     Problem: Skin Integrity:  Goal: Will show no infection signs and symptoms  Description: Will show no infection signs and symptoms  Outcome: Ongoing  Goal: Absence of new skin breakdown  Description: Absence of new skin breakdown  Outcome: Ongoing     Problem: Non-Violent Restraints  Goal: Removal from restraints as soon as assessed to be safe  Outcome: Ongoing  Goal: No harm/injury to patient while restraints in use  Outcome: Ongoing  Goal: Patient's dignity will be maintained  Outcome: Ongoing

## 2021-11-19 NOTE — PROGRESS NOTES
Patient D/c via stretcher with EMS. IV removed. Paperwork given to EMS. VS stable upon d/c no resp distress noted.

## 2021-11-19 NOTE — PROGRESS NOTES
Patient has a poor appetite, he ate <25% for breakfast and nothing for lunch. Nurse will continue to offer alternate.

## 2021-11-22 LAB
CULTURE: NORMAL
CULTURE: NORMAL
Lab: NORMAL
Lab: NORMAL
SPECIMEN DESCRIPTION: NORMAL
SPECIMEN DESCRIPTION: NORMAL

## 2021-11-23 NOTE — DISCHARGE SUMMARY
[address]     Department of Internal Medicine - Staff Internal Medicine Teaching Service    INPATIENT DISCHARGE SUMMARY      Patient Identification:  Soo Almeida is a 78 y.o. male. :  1940  MRN: [id number]     Acct: [de-identified]   PCP: Alyssa Madden MD  Admit Date:  2021  Discharge date and time: 2021  4:01 PM   Attending Provider: No att. providers found                                     [address] Problem Lists:  Principal Problem:    COVID  Active Problems:    HIV (human immunodeficiency virus infection) ([address]    Dementia without behavioral disturbance (HCC)    BPH (benign prostatic hyperplasia)    GERD (gastroesophageal reflux disease)    Mixed hyperlipidemia    Alzheimer's dementia with behavioral disturbance (HCC)    Hyponatremia    Moderate malnutrition (HCC)    Elevated C-reactive protein (CRP)  Resolved Problems:    * No resolved hospital problems. *      HOSPITAL STAY     Brief Inpatient course:   Soo Almeida is a 78 y.o. male who was admitted for the management of COVID, presented to the emergency department with complaints of dry cough and fatigue that started after he took his flu shot. He is vaccinated with Covid vaccine Moderna. On presentation in the ER, his oxygen saturations were in the low 90s and were further decreasing with movement that is why he was admitted and placed on 2 L of nasal cannula. ID was consulted and he was given monoclonal infusion antibodies. Clinical status as well as WBC count and inflammatory markers were serially monitored. He did not receive remdesivir, Decadron or Actemra. His home HIV medications were resumed. He had fever transiently, his WBCs and CRP worsened. There was no growth in blood/urine/respiratory cultures. CRP was then downtrending. After detailed discussion with the wife, his CODE STATUS was changed to DNR CCA without intubation.   As per the PT/OT assessment and discussion with wife, it would be best in the interest of patient to be discharged to SNF. Also, as per ID, patient will be out of Covid isolation on 11/24/2021. His hemodynamic status was closely monitored. All his medications were optimized. And he was discharged with instructions as given below.     Procedures/ Significant Interventions:    none    Consults:     Consults:     Final Specialist Recommendations/Findings:   IP CONSULT TO INTERNAL MEDICINE  IP CONSULT TO INFECTIOUS DISEASES  IP CONSULT TO CASE MANAGEMENT  IP CONSULT TO HOME CARE NEEDS      Any Hospital Acquired Infections: none    Discharge Functional Status:  stable    DISCHARGE PLAN     Disposition: SNF    Patient Instructions:   Discharge Medication List as of 11/19/2021 12:59 PM        START taking these medications    Details   bictegravir-emtricitab-tenofovir alafenamide (BIKTARVY) -25 MG TABS per tablet Take 1 tablet by mouth daily, Disp-30 tablet, R-1Normal           CONTINUE these medications which have NOT CHANGED    Details   divalproex (DEPAKOTE SPRINKLE) 125 MG capsule TAKE 2 CAPSULES BY MOUTH 2 TIMES A DAY, Disp-112 capsule, R-3Normal      Multiple Vitamin (MULTIVITAMIN) tablet TAKE 1 TABLET DAILY, Disp-28 tablet, R-5Normal      tamsulosin (FLOMAX) 0.4 MG capsule TAKE 1 CAPSULE BY MOUTH DAILY, Disp-28 capsule, R-3Normal      !! Nutritional Supplements (ENSURE HIGH PROTEIN) LIQD Take 1 Bottle by mouth 3 times daily, Disp-1 each, R-2Normal      donepezil (ARICEPT) 10 MG tablet TAKE 1 TABLET IN THE EVENING, Disp-28 tablet, R-5Normal      finasteride (PROSCAR) 5 MG tablet TAKE 1 TABLET BY MOUTH DAILY, Disp-28 tablet, R-5Normal      memantine (NAMENDA) 10 MG tablet TAKE 1 TABLET TWICE DAILY, Disp-56 tablet, R-5Normal      omeprazole (PRILOSEC) 20 MG delayed release capsule TAKE 1 CAPSULE DAILY, Disp-28 capsule, R-5Normal      HM ASPIRIN EC LOW DOSE 81 MG EC tablet TAKE 1 TABLET DAILY, Disp-28 tablet, R-5Normal      atorvastatin (LIPITOR) 40 MG tablet TAKE 1 TABLET DAILY, Disp-28 tablet, R-5Normal      Calcium Carbonate-Vitamin D (OYSTER SHELL CALCIUM/D) 500-200 MG-UNIT TABS TAKE 2 TABLETS DAILY, Disp-56 tablet, R-5Normal      Incontinence Supply Disposable (BRIEFS OVERNIGHT MEDIUM) MISC DAILY Starting Fri 5/21/2021, Disp-30 Bottle, R-3, Print      !! Nutritional Supplements (ENSURE NUTRA SHAKE HI-CRICKET) LIQD Take 1 Bottle by mouth daily, Disp-30 Can, R-3Print      vitamin E 400 UNIT capsule Take 400 Units by mouth daily, R-5Historical Med       !! - Potential duplicate medications found. Please discuss with provider. STOP taking these medications       ATRIPLA 600-200-300 MG per tablet Comments:   Reason for Stopping:               Activity: activity as tolerated    Diet: regular diet    Follow-up:    Veronica Hansen MD  [address]  [phone]    Schedule an appointment as soon as possible for a visit in 4 week      [place] [id number]  [phone]    Schedule an appointment as soon as possible for a visit  As needed    [facility]  [address] [place] [address]  [phone]    If symptoms worsen      Patient Instructions:   Please take all medications as prescribed. Please follow up with your Primary Care Provider and Infectious Disease as soon as possible. Follow up labs: None  Follow up imaging: None      Alyse Humphrey MD,  Internal Medicine Resident, PGY-1  [facility]; [place], [place]  11/23/2021, 3:51 PM    Attending Physician Statement  I have discussed the care of [address] [employer] and I have examined the patient myselft and taken ros and hpi , including pertinent history and exam findings,  with the resident. I have reviewed the key elements of all parts of the encounter with the resident. I agree with the assessment, plan and orders as documented by the resident.   I spent approx 35 mins in direct patient care as above and discussing discharge with patient, reviewing medications and counseling for discharge .     Electronically signed by Shireen Samson MD

## 2021-11-24 ENCOUNTER — HOSPITAL ENCOUNTER (OUTPATIENT)
Age: 81
Setting detail: SPECIMEN
Discharge: HOME | End: 2021-11-24
Payer: MEDICARE

## 2021-11-24 LAB
ESTIMATED AVERAGE GLUCOSE: 126 MG/DL
HBA1C MFR BLD: 6 % (ref 4–6)
HCT VFR BLD CALC: 42.3 % (ref 40.7–50.3)
HEMOGLOBIN: 12.9 G/DL (ref 13–17)
MCH RBC QN AUTO: 28 PG (ref 25.2–33.5)
MCHC RBC AUTO-ENTMCNC: 30.5 G/DL (ref 28.4–34.8)
MCV RBC AUTO: 92 FL (ref 82.6–102.9)
NRBC AUTOMATED: 0 PER 100 WBC
PDW BLD-RTO: 14 % (ref 11.8–14.4)
PLATELET # BLD: 241 K/UL (ref 138–453)
PMV BLD AUTO: 11.2 FL (ref 8.1–13.5)
RBC # BLD: 4.6 M/UL (ref 4.21–5.77)
WBC # BLD: 9.9 K/UL (ref 3.5–11.3)

## 2021-11-24 PROCEDURE — 36415 COLL VENOUS BLD VENIPUNCTURE: CPT

## 2021-11-24 PROCEDURE — 83036 HEMOGLOBIN GLYCOSYLATED A1C: CPT

## 2021-11-24 PROCEDURE — P9603 ONE-WAY ALLOW PRORATED MILES: HCPCS

## 2021-11-24 PROCEDURE — 85027 COMPLETE CBC AUTOMATED: CPT

## 2021-12-10 ENCOUNTER — HOSPITAL ENCOUNTER (OUTPATIENT)
Age: 81
Setting detail: SPECIMEN
Discharge: HOME | End: 2021-12-10
Payer: MEDICARE

## 2021-12-10 LAB
ANION GAP SERPL CALCULATED.3IONS-SCNC: 15 MMOL/L (ref 9–17)
BUN BLDV-MCNC: 11 MG/DL (ref 8–23)
BUN/CREAT BLD: ABNORMAL (ref 9–20)
CALCIUM SERPL-MCNC: 9.5 MG/DL (ref 8.6–10.4)
CHLORIDE BLD-SCNC: 103 MMOL/L (ref 98–107)
CO2: 21 MMOL/L (ref 20–31)
CREAT SERPL-MCNC: 1.01 MG/DL (ref 0.7–1.2)
GFR AFRICAN AMERICAN: >60 ML/MIN
GFR NON-AFRICAN AMERICAN: >60 ML/MIN
GFR SERPL CREATININE-BSD FRML MDRD: ABNORMAL ML/MIN/{1.73_M2}
GFR SERPL CREATININE-BSD FRML MDRD: ABNORMAL ML/MIN/{1.73_M2}
GLUCOSE BLD-MCNC: 154 MG/DL (ref 70–99)
HCT VFR BLD CALC: 39.6 % (ref 40.7–50.3)
HEMOGLOBIN: 12.1 G/DL (ref 13–17)
MCH RBC QN AUTO: 28 PG (ref 25.2–33.5)
MCHC RBC AUTO-ENTMCNC: 30.6 G/DL (ref 28.4–34.8)
MCV RBC AUTO: 91.7 FL (ref 82.6–102.9)
NRBC AUTOMATED: 0 PER 100 WBC
PDW BLD-RTO: 14.6 % (ref 11.8–14.4)
PLATELET # BLD: 153 K/UL (ref 138–453)
PMV BLD AUTO: 12.4 FL (ref 8.1–13.5)
POTASSIUM SERPL-SCNC: 4.8 MMOL/L (ref 3.7–5.3)
RBC # BLD: 4.32 M/UL (ref 4.21–5.77)
SODIUM BLD-SCNC: 139 MMOL/L (ref 135–144)
WBC # BLD: 7.9 K/UL (ref 3.5–11.3)

## 2021-12-10 PROCEDURE — 85027 COMPLETE CBC AUTOMATED: CPT

## 2021-12-10 PROCEDURE — P9603 ONE-WAY ALLOW PRORATED MILES: HCPCS

## 2021-12-10 PROCEDURE — 80048 BASIC METABOLIC PNL TOTAL CA: CPT

## 2021-12-10 PROCEDURE — 36415 COLL VENOUS BLD VENIPUNCTURE: CPT

## 2021-12-16 NOTE — DISCHARGE INSTR - COC
Therapy:  Current Nutrition Therapy:   - Oral Diet:  General    Routes of Feeding: Oral  Liquids: No Restrictions  Daily Fluid Restriction: no  Last Modified Barium Swallow with Video (Video Swallowing Test): not done    Treatments at the Time of Hospital Discharge:   Respiratory Treatments: none  Oxygen Therapy:  is not on home oxygen therapy. Ventilator:    - No ventilator support    Rehab Therapies: Physical Therapy and Occupational Therapy  Weight Bearing Status/Restrictions: No weight bearing restirctions  Other Medical Equipment (for information only, NOT a DME order):  wheelchair, walker, bedside commode, and hospital bed  Other Treatments: nurse to assess and evaluate needs    Patient's personal belongings (please select all that are sent with patient):  {[facility] DME Belongings:[id number]}    RN SIGNATURE:  Electronically signed by Hernandez Webster RN on 11/18/21 at 4:13 PM EST    CASE MANAGEMENT/SOCIAL WORK SECTION    Inpatient Status Date: ***    Readmission Risk Assessment Score:  Readmission Risk              Risk of Unplanned Readmission:  12           Discharging to Facility/ Agency   Name: [address] [place] [address]  Address:   [address] [place] [address]         Phone: [phone]          / signature: Electronically signed by Rell Cloud RN on 11/18/21 at 4:07 PM EST    PHYSICIAN SECTION    Prognosis: Good    Condition at Discharge: Stable    Rehab Potential (if transferring to Rehab): Good    Recommended Labs or Other Treatments After Discharge: N/A    Physician Certification: I certify the above information and transfer of Eleanor French  is necessary for the continuing treatment of the diagnosis listed and that he requires [facility] for greater 30 days.      Update Admission H&P: No change in H&P    PHYSICIAN SIGNATURE:  Electronically signed by Mayelin Guerrero MD on 11/17/21 at 5:31 PM EST Spine appears normal, range of motion is not limited, no muscle or joint tenderness

## 2022-01-24 ENCOUNTER — TELEPHONE (OUTPATIENT)
Dept: INTERNAL MEDICINE | Age: 82
End: 2022-01-24

## 2022-01-24 NOTE — TELEPHONE ENCOUNTER
PC from . [address] from [employer] stating that patient was just discharged from an inpatient facility and is asking can you follow home care orders to resume his [address].

## 2022-02-10 DIAGNOSIS — G30.9 ALZHEIMER'S DEMENTIA WITH BEHAVIORAL DISTURBANCE, UNSPECIFIED TIMING OF DEMENTIA ONSET: ICD-10-CM

## 2022-02-10 DIAGNOSIS — F02.81 ALZHEIMER'S DEMENTIA WITH BEHAVIORAL DISTURBANCE, UNSPECIFIED TIMING OF DEMENTIA ONSET: ICD-10-CM

## 2022-02-10 DIAGNOSIS — R39.81 URINARY INCONTINENCE DUE TO COGNITIVE IMPAIRMENT: ICD-10-CM

## 2022-02-10 NOTE — TELEPHONE ENCOUNTER
----- Message from Alden Carreon sent at 2/9/2022  4:38 PM EST -----  Subject: Refill Request    QUESTIONS  Name of Medication? Incontinence Supply Disposable (BRIEFS OVERNIGHT   MEDIUM) MISC  Patient-reported dosage and instructions? as needed  How many days do you have left? 0  Preferred [facility]? [facility] phone number (if available)? [phone]  Additional Information for Provider? the office of aging is calling   because this order needs to have an amount on the order, with the max of   150.  ---------------------------------------------------------------------------  --------------  CALL BACK INFO  What is the best way for the office to contact you? OK to leave message on   voicemail  Preferred Call Back Phone Number?  [phone]

## 2022-02-10 NOTE — TELEPHONE ENCOUNTER
Request for Incontinence supplies also this order needs to have an amount on the order, with the max of   150.     Next Visit Date:  Future Appointments   Date Time Provider Jocelyn Coty   3/4/2022  2:00 PM Rima Arambula MD [facility] IM [facility]   3/9/2022  9:00 AM Yue Polo MD INFT DISEASE [address] Maintenance   Topic Date Due    Meningococcal (ACWY) vaccine (1 - Risk start 2-23 months series) Never done    Depression Screen  Never done    Lipid screen  02/10/2020    COVID-19 Vaccine (3 - Moderna risk 4-dose series) 06/17/2021    Hepatitis A vaccine (1 of 2 - Risk 2-dose series) 11/05/2022 (Originally [birth year]1939)    Hepatitis B vaccine (1 of 3 - Risk 3-dose series) 11/05/2022 (Originally 10/3/1957)    Measles,Mumps,Rubella (MMR) vaccine (1 of 2 - Risk 2-dose series) 11/05/2022 (Originally 10/3/1956)    Flu vaccine (1) 11/05/2022 (Originally 9/1/2021)    Shingles Vaccine (1 of 2) 11/05/2022 (Originally 10/3/1988)    DTaP/Tdap/Td vaccine (2 - Td or Tdap) 02/21/2027    Pneumococcal 65+ yrs at Risk Vaccine  Completed    Hib vaccine  Aged Out       Hemoglobin A1C (%)   Date Value   11/24/2021 6.0   02/10/2019 5.4   09/15/2016 5.8             ( goal A1C is < 7)   No results found for: LABMICR  LDL Cholesterol (mg/dL)   Date Value   02/10/2019 85       (goal LDL is <100)   AST (U/L)   Date Value   11/14/2021 32     ALT (U/L)   Date Value   11/14/2021 13     BUN (mg/dL)   Date Value   12/10/2021 11     BP Readings from Last 3 Encounters:   11/19/21 (!) 150/73   11/05/21 104/62   09/08/21 97/65          (goal 120/80)    All Future Testing planned in [facility]  Lab Frequency Next Occurrence   CBC Once 20/28/2878   Comp Metabolic w Bili Profile Once 03/10/2022   Hepatic Function Panel Once 03/10/2021   HIV RNA, Quantitative, PCR Once 03/10/2022   Lipid Panel Once 01/06/2022   Lymphocyte Subset Once 03/10/2022   CBC Once 02/38/4030   Comp Metabolic w Bili Profile Once 03/08/2022   HIV RNA, Quantitative, PCR Once 03/08/2022   Lymphocyte Subset Once 03/08/2022         Patient Active Problem List:     HIV (human immunodeficiency virus infection) ([facility]     Dementia without behavioral disturbance ([facility]     Osteoporosis right hip fracture dec 08     Hemorrhoids     BPH (benign prostatic hyperplasia)     GERD (gastroesophageal reflux disease)     Mixed hyperlipidemia     Occasional tremors     PVD (peripheral vascular disease) ([facility]     Seizure-like activity (HCC)     Alzheimer's dementia with behavioral disturbance (HCC)     Atrophy, cortical     Hyponatremia     Elevated serum creatinine     Moderate malnutrition (HCC)     COVID     Elevated C-reactive protein (CRP)

## 2022-02-13 RX ORDER — BROMPHENIRAMIN/PSEUDOEPHEDRINE 1-15MG/5ML
1 LIQUID (ML) ORAL DAILY
Qty: 1 EACH | Refills: 2 | Status: SHIPPED | OUTPATIENT
Start: 2022-02-13

## 2022-03-04 ENCOUNTER — OFFICE VISIT (OUTPATIENT)
Dept: INTERNAL MEDICINE | Age: 82
End: 2022-03-04
Payer: MEDICARE

## 2022-03-04 VITALS
BODY MASS INDEX: 20.4 KG/M2 | HEIGHT: 66 IN | DIASTOLIC BLOOD PRESSURE: 68 MMHG | TEMPERATURE: 97.7 F | SYSTOLIC BLOOD PRESSURE: 105 MMHG | HEART RATE: 62 BPM

## 2022-03-04 DIAGNOSIS — E44.0 MODERATE MALNUTRITION (HCC): ICD-10-CM

## 2022-03-04 DIAGNOSIS — E78.2 MIXED HYPERLIPIDEMIA: ICD-10-CM

## 2022-03-04 DIAGNOSIS — G30.9 ALZHEIMER'S DEMENTIA WITH BEHAVIORAL DISTURBANCE, UNSPECIFIED TIMING OF DEMENTIA ONSET: ICD-10-CM

## 2022-03-04 DIAGNOSIS — R56.9 SEIZURE-LIKE ACTIVITY (HCC): ICD-10-CM

## 2022-03-04 DIAGNOSIS — I73.9 PVD (PERIPHERAL VASCULAR DISEASE) (HCC): ICD-10-CM

## 2022-03-04 DIAGNOSIS — B20 HUMAN IMMUNODEFICIENCY VIRUS (HIV) DISEASE (HCC): Primary | ICD-10-CM

## 2022-03-04 DIAGNOSIS — N40.0 BENIGN PROSTATIC HYPERPLASIA WITHOUT LOWER URINARY TRACT SYMPTOMS: ICD-10-CM

## 2022-03-04 DIAGNOSIS — F02.81 ALZHEIMER'S DEMENTIA WITH BEHAVIORAL DISTURBANCE, UNSPECIFIED TIMING OF DEMENTIA ONSET: ICD-10-CM

## 2022-03-04 PROCEDURE — 99213 OFFICE O/P EST LOW 20 MIN: CPT | Performed by: STUDENT IN AN ORGANIZED HEALTH CARE EDUCATION/TRAINING PROGRAM

## 2022-03-04 ASSESSMENT — PATIENT HEALTH QUESTIONNAIRE - PHQ9
SUM OF ALL RESPONSES TO PHQ QUESTIONS 1-9: 0
SUM OF ALL RESPONSES TO PHQ QUESTIONS 1-9: 0
1. LITTLE INTEREST OR PLEASURE IN DOING THINGS: 0
SUM OF ALL RESPONSES TO PHQ QUESTIONS 1-9: 0
SUM OF ALL RESPONSES TO PHQ QUESTIONS 1-9: 0
SUM OF ALL RESPONSES TO PHQ9 QUESTIONS 1 & 2: 0
2. FEELING DOWN, DEPRESSED OR HOPELESS: 0

## 2022-03-04 NOTE — PROGRESS NOTES
[facility] IM [id number] [facility] [address]  Dept: [phone]  Dept Fax: [phone]    Office Progress/Follow Up Note  Date of patient's visit: 3/4/2022  Patient's Name:  Dimas Carrera YOB: 1940            Patient Care Team:  Paz Serrano MD as PCP - General (Internal Medicine)  Tex Dowling MD as PCP - [facility]  Nancy Garzon MD as Consulting Physician (Infectious Diseases)  Jd Huntley DPM (Podiatry)    REASON FOR VISIT: Routine outpatient follow up    HISTORY OF PRESENT ILLNESS:      Chief Complaint   Patient presents with    Follow-up     4 month     Health Maintenance    Medication Refill     Waiting on [facility] to send ( Med list ) , changes in meds        History was obtained from the patient. Dimas Carrera is a 78 y.o. is here for a routine follow-up. Patient last seen by me on 11/15/2021. Patient with no acute complaints today. Chronic past medical history includes Alzheimer's dementia for which patient takes donepezil and memantine. Patient also with HIV with most recent CD4 count 399 in 11/21. Patient scheduled to see Dr. Darius Appiah in the coming weeks. Patient also takes aspirin for PVD and atorvastatin for mixed hyperlipidemia. Patient also with BPH, no acute complaints, on finasteride and Flomax. Of note, patient did have admission for Covid pneumonia on 11/19/2021, has since been for recovery and does not require home oxygen.       Patient Active Problem List   Diagnosis    HIV (human immunodeficiency virus infection) ([address]    Dementia without behavioral disturbance ([address]    Osteoporosis right hip fracture dec 08    Hemorrhoids    BPH (benign prostatic hyperplasia)    GERD (gastroesophageal reflux disease)    Mixed hyperlipidemia    Occasional tremors    PVD (peripheral vascular disease) ([address]    Seizure-like activity ([address]    Alzheimer's dementia with behavioral disturbance (HCC)    Atrophy, cortical    Hyponatremia    Elevated serum creatinine    Moderate malnutrition (HCC)    COVID    Elevated C-reactive protein (CRP)         Health Maintenance Due   Topic Date Due    Depression Screen  Never done    Lipid screen  02/10/2020         No Known Allergies      MEDICATIONS:      Current Outpatient Medications   Medication Sig Dispense Refill    Incontinence Supply Disposable (BRIEFS OVERNIGHT MEDIUM) MISC 1 box by Does not apply route daily 1 each 2    bictegravir-emtricitab-tenofovir alafenamide (BIKTARVY) -25 MG TABS per tablet Take 1 tablet by mouth daily 30 tablet 1    divalproex (DEPAKOTE SPRINKLE) 125 MG capsule TAKE 2 CAPSULES BY MOUTH 2 TIMES A  capsule 3    Multiple Vitamin (MULTIVITAMIN) tablet TAKE 1 TABLET DAILY 28 tablet 5    tamsulosin (FLOMAX) 0.4 MG capsule TAKE 1 CAPSULE BY MOUTH DAILY 28 capsule 3    Nutritional Supplements (ENSURE HIGH PROTEIN) LIQD Take 1 Bottle by mouth 3 times daily 1 each 2    donepezil (ARICEPT) 10 MG tablet TAKE 1 TABLET IN THE EVENING 28 tablet 5    finasteride (PROSCAR) 5 MG tablet TAKE 1 TABLET BY MOUTH DAILY 28 tablet 5    memantine (NAMENDA) 10 MG tablet TAKE 1 TABLET TWICE DAILY 56 tablet 5    omeprazole (PRILOSEC) 20 MG delayed release capsule TAKE 1 CAPSULE DAILY 28 capsule 5    HM ASPIRIN EC LOW DOSE 81 MG EC tablet TAKE 1 TABLET DAILY 28 tablet 5    atorvastatin (LIPITOR) 40 MG tablet TAKE 1 TABLET DAILY 28 tablet 5    Calcium Carbonate-Vitamin D (OYSTER SHELL CALCIUM/D) 500-200 MG-UNIT TABS TAKE 2 TABLETS DAILY 56 tablet 5    Nutritional Supplements (ENSURE NUTRA SHAKE HI-CRICKET) LIQD Take 1 Bottle by mouth daily 30 Can 3    vitamin E 400 UNIT capsule Take 400 Units by mouth daily  5     No current facility-administered medications for this visit. SOCIAL HISTORY    Reviewed and no change from previous record. Mandeep  reports that he quit smoking about 14 months ago.  His smoking use included cigarettes. He started smoking about 71 years ago. He smoked 0.00 packs per day for 50.00 years. He has never used smokeless tobacco.    FAMILY HISTORY:    Reviewed and No change from previous visit    REVIEW OF SYSTEMS:    CONSTITUTIONAL: Denies: fever, chills  PSYCH: Denies: anxiety, depression  ALLERGIES: Denies: urticaria  EYES: Denies: blurry vision, decreased vision, photophobia  ENT: Denies: sore throat, nasal congestion  CARDIOVASCULAR: Denies: chest pain, dyspnea on exertion  RESPIRATORY: Denies: cough, hemoptysis, shortness of breath  GI: Denies: Denies: abdominal pain, flank pain  : Denies: Denies: dysuria, frequency/urgency  NEURO: Denies: dizzy/vertigo, headache  MUSCULOSKELETAL: Denies: back pain, joint pain  SKIN: Denies: rash, itching    PHYSICAL EXAM:      Vitals:    03/04/22 1407   BP: 105/68   Site: Right Upper Arm   Position: Sitting   Cuff Size: Medium Adult   Pulse: 62   Temp: 97.7 °F (36.5 °C)   TempSrc: Temporal   Height: 5' 6\" (1.676 m)     BP Readings from Last 3 Encounters:   03/04/22 105/68   11/19/21 (!) 150/73   11/05/21 104/62      General appearance - uncooperative, lethargic  Mental status - drowsy  Eyes - pupils equal and reactive, extraocular eye movements intact  Chest - clear to auscultation, no wheezes, rales or rhonchi, symmetric air entry  Heart - normal rate, regular rhythm, normal S1, S2, no murmurs, rubs, clicks or gallops  Neurological - lethargic. no focal findings or movement disorder noted  Musculoskeletal - no joint tenderness, deformity or swelling  Extremities - peripheral pulses normal, no pedal edema, no clubbing or cyanosis.  Gait assessed - slow but balance is appropriate     LABORATORY FINDINGS:    CBC:  Lab Results   Component Value Date    WBC 7.9 12/10/2021    HGB 12.1 12/10/2021     12/10/2021     04/18/2012       BMP:    Lab Results   Component Value Date     12/10/2021    K 4.8 12/10/2021     12/10/2021    CO2 21 12/10/2021    BUN 11 12/10/2021    CREATININE 1.01 12/10/2021    GLUCOSE 154 12/10/2021    GLUCOSE 76 04/18/2012       HEMOGLOBIN A1C:   Lab Results   Component Value Date    LABA1C 6.0 11/24/2021       FASTING LIPID PANEL:  Lab Results   Component Value Date    CHOL 185 02/10/2019    HDL 72 02/10/2019    TRIG 139 02/10/2019       ASSESSMENT AND PLAN:    There are no diagnoses linked to this encounter. 1. Human immunodeficiency virus (HIV) disease ([facility]  Follows with Dr. Wilson Getting  Most recent CD4 count: 399 on 11/14    2. Alzheimer's dementia with behavioral disturbance, unspecified timing of dementia onset ([facility]  memantine  donepezil    3. Moderate malnutrition (HCC)  ensure    4. PVD (peripheral vascular disease) ([facility]  ASA    5. Benign prostatic hyperplasia without lower urinary tract symptoms  Flomax, finasteride    6. Mixed hyperlipidemia  atorvastatin      FOLLOW UP AND INSTRUCTIONS:   · No follow-ups on file. · Lemon received counseling on the following healthy behaviors: nutrition and medication adherence    · Discussed use, benefit, and side effects of prescribed medications. Barriers to medication compliance addressed. All patient questions answered. Pt voiced understanding. · Patient given educational materials - see patient instructions    Anil Velazquez MD  Internal Medicine Resident, PGY-3  [facility];  [place], [place]  3/4/2022, 2:37 PM

## 2022-03-04 NOTE — PATIENT INSTRUCTIONS
Patient to return to clinic 4 months (office will call and schedule appt). AVS reviewed and given to pt. It is very important for your care that you keep your appointment. If for some reason you are unable to keep your appointment it is equally important that you call our office at [phone] to cancel your appointment and reschedule. Failure to do so may result in your termination from our practice.   MB

## 2022-03-04 NOTE — PROGRESS NOTES
Attending Physician Statement  I have discussed the care of [address] including pertinent history and exam findings,  with the resident. I have reviewed the key elements of all parts of the encounter with the resident. I agree with the assessment, plan and orders as documented by the resident.   (GE Modifier)    MD OLEGARIO Rice  Attending Physician, [address] [place] [address], Internal Medicine Residency Program  [address] [place] [address]  3/4/2022, 3:13 PM

## 2022-03-07 DIAGNOSIS — N40.0 BENIGN PROSTATIC HYPERPLASIA WITHOUT LOWER URINARY TRACT SYMPTOMS: ICD-10-CM

## 2022-03-07 DIAGNOSIS — I73.9 PVD (PERIPHERAL VASCULAR DISEASE) (HCC): ICD-10-CM

## 2022-03-07 DIAGNOSIS — M81.0 AGE-RELATED OSTEOPOROSIS WITHOUT CURRENT PATHOLOGICAL FRACTURE: ICD-10-CM

## 2022-03-07 RX ORDER — MEMANTINE HYDROCHLORIDE 10 MG/1
TABLET ORAL
Qty: 56 TABLET | Refills: 5 | Status: SHIPPED | OUTPATIENT
Start: 2022-03-07 | End: 2022-08-15

## 2022-03-07 RX ORDER — DIVALPROEX SODIUM 125 MG/1
CAPSULE, COATED PELLETS ORAL
Qty: 112 CAPSULE | Refills: 3 | Status: SHIPPED | OUTPATIENT
Start: 2022-03-07 | End: 2022-06-22

## 2022-03-07 RX ORDER — FINASTERIDE 5 MG/1
TABLET, FILM COATED ORAL
Qty: 28 TABLET | Refills: 5 | Status: SHIPPED | OUTPATIENT
Start: 2022-03-07 | End: 2022-08-15

## 2022-03-07 RX ORDER — TAMSULOSIN HYDROCHLORIDE 0.4 MG/1
CAPSULE ORAL
Qty: 28 CAPSULE | Refills: 3 | Status: SHIPPED | OUTPATIENT
Start: 2022-03-07 | End: 2022-06-22

## 2022-03-07 RX ORDER — DONEPEZIL HYDROCHLORIDE 10 MG/1
TABLET, FILM COATED ORAL
Qty: 28 TABLET | Refills: 5 | Status: SHIPPED | OUTPATIENT
Start: 2022-03-07 | End: 2022-08-15

## 2022-03-07 RX ORDER — MULTIVITAMIN WITH FOLIC ACID 400 MCG
TABLET ORAL
Qty: 28 TABLET | Refills: 5 | Status: SHIPPED | OUTPATIENT
Start: 2022-03-07 | End: 2022-08-15

## 2022-03-07 RX ORDER — ATORVASTATIN CALCIUM 40 MG/1
TABLET, FILM COATED ORAL
Qty: 28 TABLET | Refills: 5 | Status: SHIPPED | OUTPATIENT
Start: 2022-03-07 | End: 2022-08-15

## 2022-03-07 RX ORDER — ASPIRIN 81 MG/1
TABLET ORAL
Qty: 28 TABLET | Refills: 5 | Status: SHIPPED | OUTPATIENT
Start: 2022-03-07 | End: 2022-07-25

## 2022-03-07 RX ORDER — OMEPRAZOLE 20 MG/1
CAPSULE, DELAYED RELEASE ORAL
Qty: 28 CAPSULE | Refills: 5 | Status: SHIPPED | OUTPATIENT
Start: 2022-03-07 | End: 2022-08-15

## 2022-03-07 NOTE — TELEPHONE ENCOUNTER
Request for Multiple meds please refill if apporpriate.       Next Visit Date:  Future Appointments   Date Time Provider Jocelyn Ansari   3/9/2022  9:00 AM Russell Mccallum MD INFT DISEASE [address] Maintenance   Topic Date Due    Meningococcal (ACWY) vaccine (1 - Risk start 2-23 months series) Never done    Lipid screen  02/10/2020    Hepatitis A vaccine (1 of 2 - Risk 2-dose series) 11/05/2022 (Originally 10/3/1939)    Hepatitis B vaccine (1 of 3 - Risk 3-dose series) 11/05/2022 (Originally 10/3/1957)    Measles,Mumps,Rubella (MMR) vaccine (1 of 2 - Risk 2-dose series) 11/05/2022 (Originally 10/3/1956)    Flu vaccine (1) 11/05/2022 (Originally 9/1/2021)    Shingles Vaccine (1 of 2) 11/05/2022 (Originally 10/3/1988)    COVID-19 Vaccine (4 - Booster for Moderna series) 07/11/2022    Depression Screen  03/04/2023    DTaP/Tdap/Td vaccine (2 - Td or Tdap) 02/21/2027    Pneumococcal 65+ yrs at Risk Vaccine  Completed    Hib vaccine  Aged Out       Hemoglobin A1C (%)   Date Value   11/24/2021 6.0   02/10/2019 5.4   09/15/2016 5.8             ( goal A1C is < 7)   No results found for: LABMICR  LDL Cholesterol (mg/dL)   Date Value   02/10/2019 85       (goal LDL is <100)   AST (U/L)   Date Value   11/14/2021 32     ALT (U/L)   Date Value   11/14/2021 13     BUN (mg/dL)   Date Value   12/10/2021 11     BP Readings from Last 3 Encounters:   03/04/22 105/68   11/19/21 (!) 150/73   11/05/21 104/62          (goal 120/80)    All Future Testing planned in [facility]  Lab Frequency Next Occurrence   CBC Once 27/66/8295   Comp Metabolic w Bili Profile Once 03/10/2022   Hepatic Function Panel Once 03/10/2021   HIV RNA, Quantitative, PCR Once 03/10/2022   Lipid Panel Once 01/06/2022   Lymphocyte Subset Once 03/10/2022   CBC Once 92/06/7570   Comp Metabolic w Bili Profile Once 03/08/2022   HIV RNA, Quantitative, PCR Once 03/08/2022   Lymphocyte Subset Once 03/08/2022         Patient Active Problem List: HIV (human immunodeficiency virus infection) ([facility]     Dementia without behavioral disturbance (HCC)     Osteoporosis right hip fracture dec 08     Hemorrhoids     BPH (benign prostatic hyperplasia)     GERD (gastroesophageal reflux disease)     Mixed hyperlipidemia     Occasional tremors     PVD (peripheral vascular disease) (HCC)     Seizure-like activity (HCC)     Alzheimer's dementia with behavioral disturbance (HCC)     Atrophy, cortical     Hyponatremia     Elevated serum creatinine     Moderate malnutrition (HCC)     COVID     Elevated C-reactive protein (CRP)

## 2022-03-09 ENCOUNTER — OFFICE VISIT (OUTPATIENT)
Dept: INFECTIOUS DISEASES | Age: 82
End: 2022-03-09
Payer: COMMERCIAL

## 2022-03-09 VITALS
HEIGHT: 66 IN | DIASTOLIC BLOOD PRESSURE: 65 MMHG | HEART RATE: 64 BPM | BODY MASS INDEX: 19.96 KG/M2 | SYSTOLIC BLOOD PRESSURE: 94 MMHG | TEMPERATURE: 95.7 F | OXYGEN SATURATION: 98 % | WEIGHT: 124.2 LBS

## 2022-03-09 DIAGNOSIS — F01.50 VASCULAR DEMENTIA WITHOUT BEHAVIORAL DISTURBANCE (HCC): ICD-10-CM

## 2022-03-09 DIAGNOSIS — Z21 ASYMPTOMATIC HIV INFECTION (HCC): Primary | ICD-10-CM

## 2022-03-09 PROCEDURE — 99214 OFFICE O/P EST MOD 30 MIN: CPT | Performed by: INTERNAL MEDICINE

## 2022-03-09 NOTE — PROGRESS NOTES
[facility]  Outpatient follow-up HIV care  Today's Date and Time: 3/9/2022, 9:13 AM    Impression:     1. Asymptomatic HIV infection ([facility] [address]    2. Vascular dementia without behavioral disturbance ([facility] [address]            · HIV-CD4 count/viral load -lab testing done in November 2021 showed a viral load that was undetected and a CD4 count that had decreased to 399 from 526 at last check in June 2020 and a CD4 percentage of 19% and a CD4 CD8 ratio that was low at 0.42  · We will plan on repeating lab tests in the next 6  to 12 months if the patient allows this to happen on an outpatient basis  · He will otherwise continue on his antiretroviral therapy  · OI prophylaxis-not warranted at this time  · Health maintenance:   · Vaccines up-to-date  · Annual STD check declined  · Counseling:  · Continues with good medication adherence/compliance  · No issues with drug use  · The wife reports that they are not sexually active at this time.       I have ordered the followingmedications/ labs:  Orders Placed This Encounter   Procedures    CBC     Standing Status:   Future     Standing Expiration Date:   3/9/2023    Comprehensive Metabolic Panel with Bilirubin     Standing Status:   Future     Standing Expiration Date:   3/9/2023    HIV RNA, Quantitative, PCR     Standing Status:   Future     Standing Expiration Date:   3/9/2023    Lipid Panel     Standing Status:   Future     Standing Expiration Date:   3/9/2023     Order Specific Question:   Is Patient Fasting?/# of Hours     Answer:   6 hours fasting    Lymphocyte Subset     Standing Status:   Future     Standing Expiration Date:   3/9/2023      Orders Placed This Encounter   Medications    bictegravir-emtricitab-tenofovir alafenamide (BIKTARVY) -25 MG TABS per tablet     Sig: Take 1 tablet by mouth daily     Dispense:  30 tablet     Refill:  5       Chiefcomplaint:   Follow-up for HIV infection    History of Present Illness:   Karely Lewis is a 78 y.o.-year-old male who is seen 3/9/2022 in follow-up for HIV infection. Lexis Reyes has a history of HIV infection, BPH, next hyperlipidemia, Alzheimer's type dementia, gastroesophageal reflux disease among other medical problems. He was hospitalized in November 2021 with COVID-19 infection though he is vaccinated. He never required to be intubated did require supplemental oxygen and did require discharge to an acute rehabilitation after the hospital stay. The patient was able to get his HIV labs done during that hospital stay as otherwise it has been difficult to get these done as an outpatient due to the Alzheimer's dementia. New patient is here with his wife who reports that he has continued to take his antiretroviral therapy. He had been on Atripla but the insurance was no longer covering this and the patient was switched to [place] which he has continued to take and tolerate very well without any adverse issues. No nausea vomiting or diarrhea. No abdominal pain and his behavior has been okay with no major aggressive outbursts. No depression or anxiety. I have personally reviewed the past medical history, past surgical history, medications, social history, and family history, and I haveupdated the database accordingly.   Past Medical History:     Past Medical History:   Diagnosis Date    Acute delirium 2/11/2019    Acute metabolic encephalopathy 7/04/4387    Alzheimer's dementia with behavioral disturbance ([address] 2/11/2019    Atrophy, cortical     BPH (benign prostatic hyperplasia) 2/1/2013    Dementia ([address]     Dementia without behavioral disturbance ([address] 7/27/2012    GERD (gastroesophageal reflux disease) 4/28/2015    Hemorrhoids     HIV (human immunodeficiency virus infection) ([address]     Meralgia paraesthetica 2/4/2016    Mixed hyperlipidemia 5/5/2016    Occasional tremors     Osteoporosis     PVD (peripheral vascular disease) ([address] 2/8/2018    Seizure-like activity ([address] 2/28/2018  Syphilis in male     Wears glasses      Medications:     Current Outpatient Medications   Medication Sig Dispense Refill    bictegravir-emtricitab-tenofovir alafenamide (BIKTARVY) -25 MG TABS per tablet Take 1 tablet by mouth daily 30 tablet 5    atorvastatin (LIPITOR) 40 MG tablet TAKE 1 TABLET DAILY 28 tablet 5    aspirin (HM ASPIRIN EC LOW DOSE) 81 MG EC tablet TAKE 1 TABLET DAILY 28 tablet 5    omeprazole (PRILOSEC) 20 MG delayed release capsule TAKE 1 CAPSULE DAILY 28 capsule 5    memantine (NAMENDA) 10 MG tablet TAKE 1 TABLET TWICE DAILY 56 tablet 5    finasteride (PROSCAR) 5 MG tablet TAKE 1 TABLET BY MOUTH DAILY 28 tablet 5    donepezil (ARICEPT) 10 MG tablet TAKE 1 TABLET IN THE EVENING 28 tablet 5    tamsulosin (FLOMAX) 0.4 MG capsule TAKE 1 CAPSULE BY MOUTH DAILY 28 capsule 3    Multiple Vitamin (MULTIVITAMIN) tablet TAKE 1 TABLET DAILY 28 tablet 5    divalproex (DEPAKOTE SPRINKLE) 125 MG capsule TAKE 2 CAPSULES BY MOUTH 2 TIMES A  capsule 3    sertraline (ZOLOFT) 50 MG tablet Take 50 mg by mouth daily      Incontinence Supply Disposable (BRIEFS OVERNIGHT MEDIUM) MISC 1 box by Does not apply route daily 1 each 2    Nutritional Supplements (ENSURE HIGH PROTEIN) LIQD Take 1 Bottle by mouth 3 times daily 1 each 2    Nutritional Supplements (ENSURE NUTRA SHAKE HI-CRICKET) LIQD Take 1 Bottle by mouth daily 30 Can 3    vitamin E 400 UNIT capsule Take 400 Units by mouth daily  5     No current facility-administered medications for this visit. Allergies:   Patient has no known allergies. Review of Systems:   Review of Systems   Unable to perform ROS: Dementia        Physical Examination :   BP 94/65   Pulse 64   Temp 95.7 °F (35.4 °C) (Temporal)   Ht 5' 6\" (1.676 m)   Wt 124 lb 3.2 oz (56.3 kg)   SpO2 98%   BMI 20.05 kg/m²     Physical Exam  Constitutional:       Appearance: He is well-developed. HENT:      Head: Normocephalic and atraumatic.    Cardiovascular: Rate and Rhythm: Normal rate. Heart sounds: Normal heart sounds. No friction rub. No gallop. Pulmonary:      Effort: Pulmonary effort is normal.      Breath sounds: Normal breath sounds. No wheezing. Abdominal:      General: Bowel sounds are normal.      Palpations: Abdomen is soft. There is no mass. Tenderness: There is no abdominal tenderness. Musculoskeletal:         General: Normal range of motion. Cervical back: Normal range of motion and neck supple. Lymphadenopathy:      Cervical: No cervical adenopathy. Skin:     General: Skin is warm and dry. Neurological:      Mental Status: He is alert and oriented to person, place, and time.          Medical Decision Making:   I have independently reviewed the following labs:    CBC:   WBC   Date Value Ref Range Status   12/10/2021 7.9 3.5 - 11.3 k/uL Final   11/24/2021 9.9 3.5 - 11.3 k/uL Final   11/19/2021 6.0 3.5 - 11.3 k/uL Final     RBC   Date Value Ref Range Status   12/10/2021 4.32 4.21 - 5.77 m/uL Final   11/24/2021 4.60 4.21 - 5.77 m/uL Final   11/19/2021 4.60 4.21 - 5.77 m/uL Final   04/18/2012 4.31 (L) 4.5 - 5.9 m/uL Final   03/16/2012 4.43 (L) 4.5 - 5.9 m/uL Final   10/12/2011 3.99 (L) 4.5 - 5.9 m/uL Final     Hemoglobin   Date Value Ref Range Status   12/10/2021 12.1 (L) 13.0 - 17.0 g/dL Final   11/24/2021 12.9 (L) 13.0 - 17.0 g/dL Final   11/19/2021 13.0 13.0 - 17.0 g/dL Final     Hematocrit   Date Value Ref Range Status   12/10/2021 39.6 (L) 40.7 - 50.3 % Final   11/24/2021 42.3 40.7 - 50.3 % Final   11/19/2021 43.5 40.7 - 50.3 % Final     MCV   Date Value Ref Range Status   12/10/2021 91.7 82.6 - 102.9 fL Final   11/24/2021 92.0 82.6 - 102.9 fL Final   11/19/2021 94.6 82.6 - 102.9 fL Final     MCH   Date Value Ref Range Status   12/10/2021 28.0 25.2 - 33.5 pg Final   11/24/2021 28.0 25.2 - 33.5 pg Final   11/19/2021 28.3 25.2 - 33.5 pg Final     MCHC   Date Value Ref Range Status   12/10/2021 30.6 28.4 - 34.8 g/dL Final 11/24/2021 30.5 28.4 - 34.8 g/dL Final   11/19/2021 29.9 28.4 - 34.8 g/dL Final     RDW   Date Value Ref Range Status   12/10/2021 14.6 (H) 11.8 - 14.4 % Final   11/24/2021 14.0 11.8 - 14.4 % Final   11/19/2021 14.2 11.8 - 14.4 % Final     Platelet Count   Date Value Ref Range Status   04/18/2012 224 140 - 450 k/uL Final   03/16/2012 234 140 - 450 k/uL Final   10/12/2011 219 140 - 450 k/uL Final     Platelets   Date Value Ref Range Status   12/10/2021 153 138 - 453 k/uL Final   11/24/2021 241 138 - 453 k/uL Final   11/19/2021 233 138 - 453 k/uL Final     MPV   Date Value Ref Range Status   12/10/2021 12.4 8.1 - 13.5 fL Final   11/24/2021 11.2 8.1 - 13.5 fL Final   11/19/2021 10.8 8.1 - 13.5 fL Final       CMP:    Lab Results   Component Value Date     12/10/2021    K 4.8 12/10/2021     12/10/2021    CO2 21 12/10/2021    BUN 11 12/10/2021    CREATININE 1.01 12/10/2021    GLUCOSE 154 (H) 12/10/2021    CALCIUM 9.5 12/10/2021    PROT 7.6 11/14/2021    LABALBU 3.6 11/14/2021    BILITOT 0.21 (L) 11/14/2021    ALKPHOS 142 (H) 11/14/2021    AST 32 11/14/2021    ALT 13 11/14/2021    LABGLOM >60 12/10/2021    GFRAA >60 12/10/2021    GLOB NOT REPORTED 11/14/2021       HIV VIRAL LOAD:   Direct Exam   Date Value Ref Range Status   11/15/2021   Final    HIV-1 RNA NOT DETECTED Results reported to the appropriate Health Department   11/15/2021   Final          This assay has a plasma HIV-1 RNA quantification range of 20-2,000,000 cp/ml (1.3-6.30 log cp/ml). The test is intended for detecting and quantifying HIV-1 RNA viral loads in this range. The normal range for this assay is \"not detected\". A not detected result indicates that the assay was unable to detect HIV-1 RNA in the plasma specimen tested. An interpretation of \"not detected\" does not exclude the presence of inhibitors or HIV-1 RNA concentration below the level of detection of this assay.         <20 copies/mL indicates that HIV-1 RNA is detected, but the level present is less than the lower quantification limit of this assay. The clinical implications of a viral load below 20 copies/mL remain unclear. Possible causes of such result include very low plasma HIV-1 viral load present (e.g., in the range of 1-19 copies/mL) very early HIV-1 infection (i.e.,<3 weeks from time ofinfection), or absence of HIV-1 infection (i.e., false-positive). Patients should have confirmed HIV 1 infection prior to RNA quantification. This test utilizes RT-PCR and the FDA approved Roche Amplicor / Taqman 48 system.          LYMPHOCYTE SUBSET:  Absolute CD 3   Date Value Ref Range Status   11/14/2021 1365 411 - 2061 /uL Final   06/05/2020 1602 411 - 2061 /uL Final   09/11/2019 2411 (H) 411 - 2061 /uL Final     Absolute CD 4 [place]   Date Value Ref Range Status   11/14/2021 399 309 - 1571 /uL Final   06/05/2020 526 309 - 1571 /uL Final   09/11/2019 870 309 - 1571 /uL Final     CD4 % [place] T Cell   Date Value Ref Range Status   11/14/2021 19 (L) 27 - 64 % Final   06/05/2020 23 (L) 27 - 64 % Final   09/11/2019 26 (L) 27 - 64 % Final     CD4/CD8 Ratio   Date Value Ref Range Status   11/14/2021 0.42 (L) 0.6 - 2.8 Final   06/05/2020 0.50 (L) 0.6 - 2.8 Final   09/11/2019 0.59 (L) 0.6 - 2.8 Final       LIPID PANEL:  Lab Results   Component Value Date    CHOL 185 02/10/2019    CHOL 241 (H) 06/04/2018     Lab Results   Component Value Date    TRIG 139 02/10/2019    TRIG 176 (H) 06/04/2018     Lab Results   Component Value Date    HDL 72 02/10/2019    HDL 78 06/04/2018     Lab Results   Component Value Date    LDLCHOLESTEROL 85 02/10/2019    LDLCHOLESTEROL 128 06/04/2018     Lab Results   Component Value Date    VLDL NOT REPORTED 02/10/2019    VLDL NOT REPORTED 06/04/2018     Lab Results   Component Value Date    CHOLHDLRATIO 2.6 02/10/2019    CHOLHDLRATIO 3.1 06/04/2018       HEP B SURFACE ANTIBODY:  Hep B S Ab   Date Value Ref Range Status   05/10/2017 <3.50 <10 mIU/mL Final     Comment:           REFERENCE RANGE:  <10.0      NON-REACTIVE/NOT IMMUNE  >=10.0     REACTIVE/IMMUNE  [address], [address] [place] [address] [phone]       GC DNA URINE:  No results found for: Saira Mccray AB:  T. pallidum, IgG   Date Value Ref Range Status   06/04/2018 REACTIVE (A) NR Final     Comment:           Detection of T. pallidum antibodies may indicate recent, remote or previously   treated infections. A positive serological test for syphilis is not diagnostic   of infection, as false positives occur and become more likely in low prevalence   populations. Confirmatory test will be performed (VDRL, Quantitative). Results reported to the appropriate [address] [place] [address] [phone]     01/14/2018 REACTIVE (A) NR Final     Comment:           Detection of T. pallidum antibodies may indicate recent, remote or previously   treated infections. A positive serological test for syphilis is not diagnostic   of infection, as false positives occur and become more likely in low prevalence   populations. Confirmatory test will be performed (VDRL, Quantitative). Results reported to the appropriate [address] [place] [address] [phone]           Thank you for allowing us toparticipate in the care of this patient. Please call with questions. Reji Palm MD  [employer] messaging: [phone]    This note is created with the assistance of a speech recognition program.  While intending to generate adocument that actually reflects the content of the visit, the document can still have some errors including those of syntax and sound a like substitutions which may escape proof reading. It such instances, actual meaningcan be extrapolated by contextual diversion.

## 2022-03-14 DIAGNOSIS — E44.0 MODERATE MALNUTRITION (HCC): ICD-10-CM

## 2022-03-14 NOTE — TELEPHONE ENCOUNTER
Pt wife is calling the office requesting 3 cans of ensure instead of 1 can daily, pt on wait list til 7/31/22, last seen 3/4/22

## 2022-03-15 DIAGNOSIS — E44.0 MODERATE MALNUTRITION (HCC): ICD-10-CM

## 2022-03-15 RX ORDER — GASTROSTOMY TUBE 18 FR
1 KIT MISCELLANEOUS DAILY
Qty: 1 EACH | Refills: 2 | Status: SHIPPED | OUTPATIENT
Start: 2022-03-15 | End: 2022-03-16 | Stop reason: SDUPTHER

## 2022-03-15 NOTE — TELEPHONE ENCOUNTER
Pt wife is calling the office stating that we need to reprint another Rx for ensure supplement to  [facility], pt states it went to the wrong place need to go to [id number] [address], please wait til you come to the office to print Rx thanks

## 2022-03-16 RX ORDER — GASTROSTOMY TUBE 18 FR
1 KIT MISCELLANEOUS DAILY
Qty: 1 EACH | Refills: 2 | Status: SHIPPED | OUTPATIENT
Start: 2022-03-16 | End: 2022-03-16 | Stop reason: SDUPTHER

## 2022-03-16 RX ORDER — GASTROSTOMY TUBE 18 FR
1 KIT MISCELLANEOUS DAILY
Qty: 1 EACH | Refills: 2 | Status: SHIPPED | OUTPATIENT
Start: 2022-03-16 | End: 2022-05-31 | Stop reason: SDUPTHER

## 2022-03-16 NOTE — TELEPHONE ENCOUNTER
Wife called stating that [address] [place] [address] does not have Ensure. Needs to go to [facility].  Please sign DME order.   Order needs to be faxed to [facility] on [address].

## 2022-03-23 NOTE — TELEPHONE ENCOUNTER
Wife calling back stating that the Ensure script was sent to [address] [place] [address] and she wants it to go to [facility] on [address].  DME order faxed to [facility] and confirmation received.

## 2022-04-18 ENCOUNTER — TELEPHONE (OUTPATIENT)
Dept: INTERNAL MEDICINE | Age: 82
End: 2022-04-18

## 2022-04-18 DIAGNOSIS — F02.81 ALZHEIMER'S DEMENTIA WITH BEHAVIORAL DISTURBANCE, UNSPECIFIED TIMING OF DEMENTIA ONSET: Primary | ICD-10-CM

## 2022-04-18 DIAGNOSIS — G30.9 ALZHEIMER'S DEMENTIA WITH BEHAVIORAL DISTURBANCE, UNSPECIFIED TIMING OF DEMENTIA ONSET: Primary | ICD-10-CM

## 2022-04-18 NOTE — TELEPHONE ENCOUNTER
[facility] is calling the office for pt chuns/pads, [facility] want the chuns/pads to go to [facility].  thanks

## 2022-04-21 NOTE — TELEPHONE ENCOUNTER
PC again from patients wife stating that they are requesting for an order for chucks to be ordered and faxed to [facility] can you please sign pended order thank you

## 2022-05-31 DIAGNOSIS — E44.0 MODERATE MALNUTRITION (HCC): ICD-10-CM

## 2022-05-31 NOTE — TELEPHONE ENCOUNTER
Pharmacy calling wanted to make sure we got refill request. Printed script can be faxed to [id number] at [phone]

## 2022-05-31 NOTE — TELEPHONE ENCOUNTER
Refill request for Ensure. Please fax to [facility]. Next appt: Patient is currently on wait list for provider. Please sign script while in clinic .      Health Maintenance   Topic Date Due    Annual Wellness Visit (AWV)  Never done    Meningococcal (ACWY) vaccine (1 - Risk start 2-23 months series) Never done    Lipids  02/10/2020    COVID-19 Vaccine (4 - Booster for Moderna series) 05/11/2022    Hepatitis A vaccine (1 of 2 - Risk 2-dose series) 11/05/2022 (Originally 10/3/1941)    Hepatitis B vaccine (1 of 3 - Risk 3-dose series) 11/05/2022 (Originally 10/3/1959)    Measles,Mumps,Rubella (MMR) vaccine (1 of 2 - Risk 2-dose series) 11/05/2022 (Originally 10/3/1958)    Flu vaccine (Season Ended) 11/05/2022 (Originally 9/1/2022)    Shingles vaccine (1 of 2) 11/05/2022 (Originally 10/3/1988)    Depression Screen  03/04/2023    DTaP/Tdap/Td vaccine (2 - Td or Tdap) 02/21/2027    Pneumococcal 65+ years Vaccine  Completed    Hib vaccine  Aged Out       Hemoglobin A1C (%)   Date Value   11/24/2021 6.0   02/10/2019 5.4   09/15/2016 5.8             ( goal A1C is < 7)   No results found for: LABMICR  LDL Cholesterol (mg/dL)   Date Value   02/10/2019 85       (goal LDL is <100)   AST (U/L)   Date Value   11/14/2021 32     ALT (U/L)   Date Value   11/14/2021 13     BUN (mg/dL)   Date Value   12/10/2021 11     BP Readings from Last 3 Encounters:   03/09/22 94/65   03/04/22 105/68   11/19/21 (!) 150/73          (goal 120/80)          Patient Active Problem List:     HIV (human immunodeficiency virus infection) ([facility]     Dementia without behavioral disturbance ([facility]     Osteoporosis right hip fracture dec 08     Hemorrhoids     BPH (benign prostatic hyperplasia)     GERD (gastroesophageal reflux disease)     Mixed hyperlipidemia     Occasional tremors     PVD (peripheral vascular disease) ([address]     Seizure-like activity (HCC)     Alzheimer's dementia with behavioral disturbance (HCC)     Atrophy, cortical Hyponatremia     Elevated serum creatinine     Moderate malnutrition (HCC)     COVID     Elevated C-reactive protein (CRP)

## 2022-06-02 RX ORDER — GASTROSTOMY TUBE 18 FR
1 KIT MISCELLANEOUS DAILY
Qty: 1 EACH | Refills: 2 | Status: SHIPPED | OUTPATIENT
Start: 2022-06-02 | End: 2022-08-04

## 2022-06-21 DIAGNOSIS — N40.0 BENIGN PROSTATIC HYPERPLASIA WITHOUT LOWER URINARY TRACT SYMPTOMS: ICD-10-CM

## 2022-06-22 RX ORDER — TAMSULOSIN HYDROCHLORIDE 0.4 MG/1
CAPSULE ORAL
Qty: 28 CAPSULE | Refills: 3 | Status: SHIPPED | OUTPATIENT
Start: 2022-06-22 | End: 2022-10-12

## 2022-06-22 RX ORDER — DIVALPROEX SODIUM 125 MG/1
CAPSULE, COATED PELLETS ORAL
Qty: 112 CAPSULE | Refills: 3 | Status: SHIPPED | OUTPATIENT
Start: 2022-06-22 | End: 2022-10-12

## 2022-06-22 NOTE — TELEPHONE ENCOUNTER
Request for Flomax and Depakote.     Next Visit Date:  Future Appointments   Date Time Provider Jocelyn Coty   7/21/2022  2:30 PM Darci Fitch MD [facility]   9/14/2022 10:30 AM [facility]   Topic Date Due    Annual Wellness Visit (AWV)  Never done    Meningococcal (ACWY) vaccine (1 - Risk start 2-23 months series) Never done    Lipids  02/10/2020    COVID-19 Vaccine (4 - Booster for Moderna series) 05/11/2022    Hepatitis A vaccine (1 of 2 - Risk 2-dose series) 11/05/2022 (Originally 10/3/1941)    Hepatitis B vaccine (1 of 3 - Risk 3-dose series) 11/05/2022 (Originally 10/3/1959)    Measles,Mumps,Rubella (MMR) vaccine (1 of 2 - Risk 2-dose series) 11/05/2022 (Originally 10/3/1958)    Flu vaccine (Season Ended) 11/05/2022 (Originally 9/1/2022)    Shingles vaccine (1 of 2) 11/05/2022 (Originally 10/3/1988)    Depression Screen  03/04/2023    DTaP/Tdap/Td vaccine (2 - Td or Tdap) 02/21/2027    Pneumococcal 65+ years Vaccine  Completed    Hib vaccine  Aged Out       Hemoglobin A1C (%)   Date Value   11/24/2021 6.0   02/10/2019 5.4   09/15/2016 5.8             ( goal A1C is < 7)   No results found for: LABMICR  LDL Cholesterol (mg/dL)   Date Value   02/10/2019 85       (goal LDL is <100)   AST (U/L)   Date Value   11/14/2021 32     ALT (U/L)   Date Value   11/14/2021 13     BUN (mg/dL)   Date Value   12/10/2021 11     BP Readings from Last 3 Encounters:   03/09/22 94/65   03/04/22 105/68   11/19/21 (!) 150/73          (goal 120/80)    All Future Testing planned in [facility]  Lab Frequency Next Occurrence   Comp Metabolic w Bili Profile Once 03/08/2022   Lymphocyte Subset Once 03/08/2022   CBC Once 09/09/2022   Comprehensive Metabolic Panel with Bilirubin Once 09/09/2022   HIV RNA, Quantitative, PCR Once 09/09/2022   Lipid Panel Once 09/09/2022   Lymphocyte Subset Once 09/09/2022         Patient Active Problem List:     HIV (human immunodeficiency virus infection) ([facility] [address] [facility]     Dementia without behavioral disturbance (HCC)     Osteoporosis right hip fracture dec 08     Hemorrhoids     BPH (benign prostatic hyperplasia)     GERD (gastroesophageal reflux disease)     Mixed hyperlipidemia     Occasional tremors     PVD (peripheral vascular disease) (HCC)     Seizure-like activity (HCC)     Alzheimer's dementia with behavioral disturbance (HCC)     Atrophy, cortical     Hyponatremia     Elevated serum creatinine     Moderate malnutrition (HCC)     COVID     Elevated C-reactive protein (CRP)

## 2022-06-23 RX ORDER — DIVALPROEX SODIUM 125 MG/1
CAPSULE, COATED PELLETS ORAL
Qty: 112 CAPSULE | Refills: 3 | OUTPATIENT
Start: 2022-06-23

## 2022-07-19 DIAGNOSIS — I73.9 PVD (PERIPHERAL VASCULAR DISEASE) (HCC): ICD-10-CM

## 2022-07-19 NOTE — TELEPHONE ENCOUNTER
Request for Aspirin.       Next Visit Date:  Future Appointments   Date Time Provider Jocelyn Terani   7/21/2022  2:30 PM Joey Augustin MD [facility]   9/14/2022 10:30 AM Stephany Alberto MD [facility] [address] Maintenance   Topic Date Due    Annual Wellness Visit (AWV)  Never done    Meningococcal (ACWY) vaccine (1 - Risk start 2-23 months series) Never done    Lipids  02/10/2020    COVID-19 Vaccine (4 - Booster for Moderna series) 05/11/2022    Hepatitis A vaccine (1 of 2 - Risk 2-dose series) 11/05/2022 (Originally 10/3/1941)    Hepatitis B vaccine (1 of 3 - Risk 3-dose series) 11/05/2022 (Originally 10/3/1959)    Rolin Leeann (MMR) vaccine (1 of 2 - Risk 2-dose series) 11/05/2022 (Originally 10/3/1958)    Shingles vaccine (1 of 2) 11/05/2022 (Originally 10/3/1959)    Flu vaccine (1) 09/01/2022    Depression Screen  03/04/2023    DTaP/Tdap/Td vaccine (2 - Td or Tdap) 02/21/2027    Pneumococcal 65+ years Vaccine  Completed    Hib vaccine  Aged Out       Hemoglobin A1C (%)   Date Value   11/24/2021 6.0   02/10/2019 5.4   09/15/2016 5.8             ( goal A1C is < 7)   No results found for: LABMICR  LDL Cholesterol (mg/dL)   Date Value   02/10/2019 85       (goal LDL is <100)   AST (U/L)   Date Value   11/14/2021 32     ALT (U/L)   Date Value   11/14/2021 13     BUN (mg/dL)   Date Value   12/10/2021 11     BP Readings from Last 3 Encounters:   03/09/22 94/65   03/04/22 105/68   11/19/21 (!) 150/73          (goal 120/80)    All Future Testing planned in [facility]  Lab Frequency Next Occurrence   Comp Metabolic w Bili Profile Once 03/08/2022   Lymphocyte Subset Once 03/08/2022   CBC Once 09/09/2022   Comprehensive Metabolic Panel with Bilirubin Once 09/09/2022   HIV RNA, Quantitative, PCR Once 09/09/2022   Lipid Panel Once 09/09/2022   Lymphocyte Subset Once 09/09/2022         Patient Active Problem List:     HIV (human immunodeficiency virus infection) ([facility] [address]     Dementia without behavioral disturbance (HCC)     Osteoporosis right hip fracture dec 08     Hemorrhoids     BPH (benign prostatic hyperplasia)     GERD (gastroesophageal reflux disease)     Mixed hyperlipidemia     Occasional tremors     PVD (peripheral vascular disease) (HCC)     Seizure-like activity (HCC)     Alzheimer's dementia with behavioral disturbance (HCC)     Atrophy, cortical     Hyponatremia     Elevated serum creatinine     Moderate malnutrition (HCC)     COVID     Elevated C-reactive protein (CRP)

## 2022-07-25 RX ORDER — ASPIRIN 81 MG/1
TABLET ORAL
Qty: 28 TABLET | Refills: 5 | Status: SHIPPED | OUTPATIENT
Start: 2022-07-25

## 2022-08-04 ENCOUNTER — OFFICE VISIT (OUTPATIENT)
Dept: INTERNAL MEDICINE | Age: 82
End: 2022-08-04
Payer: MEDICARE

## 2022-08-04 VITALS
DIASTOLIC BLOOD PRESSURE: 72 MMHG | HEART RATE: 72 BPM | WEIGHT: 120 LBS | SYSTOLIC BLOOD PRESSURE: 109 MMHG | BODY MASS INDEX: 19.37 KG/M2

## 2022-08-04 DIAGNOSIS — R79.9 ABNORMAL FINDING OF BLOOD CHEMISTRY, UNSPECIFIED: ICD-10-CM

## 2022-08-04 DIAGNOSIS — R68.89 ILL FEELING: ICD-10-CM

## 2022-08-04 DIAGNOSIS — R05.8 PRODUCTIVE COUGH: ICD-10-CM

## 2022-08-04 DIAGNOSIS — E44.0 MODERATE MALNUTRITION (HCC): ICD-10-CM

## 2022-08-04 DIAGNOSIS — R39.81 URINARY INCONTINENCE DUE TO COGNITIVE IMPAIRMENT: Primary | ICD-10-CM

## 2022-08-04 PROCEDURE — 99211 OFF/OP EST MAY X REQ PHY/QHP: CPT | Performed by: INTERNAL MEDICINE

## 2022-08-04 PROCEDURE — 99214 OFFICE O/P EST MOD 30 MIN: CPT | Performed by: STUDENT IN AN ORGANIZED HEALTH CARE EDUCATION/TRAINING PROGRAM

## 2022-08-04 PROCEDURE — 1123F ACP DISCUSS/DSCN MKR DOCD: CPT | Performed by: STUDENT IN AN ORGANIZED HEALTH CARE EDUCATION/TRAINING PROGRAM

## 2022-08-04 RX ORDER — FEEDER CONTAINER WITH PUMP SET
1 EACH MISCELLANEOUS 3 TIMES DAILY
Qty: 1 EACH | Refills: 2 | Status: SHIPPED | OUTPATIENT
Start: 2022-08-04

## 2022-08-04 RX ORDER — ETOH/EUC OIL/MENTH/PEP/WINTERG
1 SPRAY, NON-AEROSOL (ML) MUCOUS MEMBRANE DAILY
Qty: 1 EACH | Refills: 2 | Status: SHIPPED | OUTPATIENT
Start: 2022-08-04

## 2022-08-04 SDOH — ECONOMIC STABILITY: FOOD INSECURITY: WITHIN THE PAST 12 MONTHS, YOU WORRIED THAT YOUR FOOD WOULD RUN OUT BEFORE YOU GOT MONEY TO BUY MORE.: NEVER TRUE

## 2022-08-04 SDOH — ECONOMIC STABILITY: FOOD INSECURITY: WITHIN THE PAST 12 MONTHS, THE FOOD YOU BOUGHT JUST DIDN'T LAST AND YOU DIDN'T HAVE MONEY TO GET MORE.: NEVER TRUE

## 2022-08-04 ASSESSMENT — SOCIAL DETERMINANTS OF HEALTH (SDOH): HOW HARD IS IT FOR YOU TO PAY FOR THE VERY BASICS LIKE FOOD, HOUSING, MEDICAL CARE, AND HEATING?: NOT VERY HARD

## 2022-08-04 NOTE — PATIENT INSTRUCTIONS
Patients ensure script  WAS  FAXED  [facility] WERE FAXED TO [address] [place] [address]

## 2022-08-04 NOTE — PROGRESS NOTES
[facility]  [facility] [address] [address]  Dept: [phone]  Dept Fax: [phone]    Office Progress/Follow Up Note  Date of patient's visit: 8/4/2022  Patient's Name:  Damaso Carranza YOB: 1940            Patient Care Team:  Tianna Kelley MD as PCP - General (Internal Medicine)  Clementina Muñoz MD as PCP - [address] [place] [address] Provider  Elif Chappell MD as Consulting Physician (Infectious Diseases)  Domingo Taylor DPM (Podiatry)  ________________________________________________________________________      Reason for Visit: Routine outpatient follow up visit  ________________________________________________________________________  Chief Complaint:  Established New Doctor    ________________________________________________________________________  History of Presenting Illness:  History was obtained from: spouse, electronic medical record. Damaso Carranza is a 78 y.o. is here for a follow up visit. Has history of HIV, his last CD4 count was 399 on 11/14/2021. Today patient wife is complaining that patient is having cough which is productive although patient does not bring up phlegm and rather swallows it. Explained and discussed in detail regarding getting the lab work done along with chest x-ray since his last lab work was in November last year. Infectious disease doctor has pended the lab work orders since March this year. Patient has his upcoming visit with infectious disease next month as per wife. Wife also requesting for bedside commode as patient has history of incontinence and it is difficult for her to manage that especially at night since she also has knee joint issues. There were questions and concerns regarding aspirin and Lipitor, benefit of these medications were discussed. Family verbalized understanding. Patient has past medical history of:  Alzheimer's dementia on donepezil and memantine.   History of HIV, MEDIUM) MISC 1 box by Does not apply route daily 1 each 2    vitamin E 400 UNIT capsule Take 400 Units by mouth daily  5     No current facility-administered medications for this visit. Social History     Tobacco Use    Smoking status: Former     Packs/day: 0.00     Years: 50.00     Pack years: 0.00     Types: Cigarettes     Start date: 12     Quit date: 2021     Years since quittin.5    Smokeless tobacco: Never    Tobacco comments:      cigerettes per day   Vaping Use    Vaping Use: Never used   Substance Use Topics    Alcohol use:  Yes     Alcohol/week: 4.0 standard drinks     Types: 4 Cans of beer per week    Drug use: Yes       Family History   Problem Relation Age of Onset    Heart Disease Mother     High Blood Pressure Mother     Cancer Father     Diabetes Sister     Heart Disease Sister     High Blood Pressure Sister     Heart Disease Brother     High Blood Pressure Brother       ________________________________________________________________________  Review of Systems:  CONSTITUTIONAL: Denies: fever, chills  PSYCH: Denies: anxiety, depression  ALLERGIES: Denies: urticaria  EYES: Denies: blurry vision, decreased vision, photophobia  ENT: Denies: sore throat, nasal congestion  CARDIOVASCULAR: Denies: chest pain, dyspnea on exertion  RESPIRATORY: Denies: cough, hemoptysis, shortness of breath  GI: Denies: Denies: abdominal pain, flank pain  : Denies: Denies: dysuria, frequency/urgency  NEURO: Denies: dizzy/vertigo, headache  MUSCULOSKELETAL: Denies: back pain, joint pain  SKIN: Denies: rash, itching  ________________________________________________________________________  Physical Exam:  Vitals:    22 1451 22 1454   BP: (!) 136/112 109/72   Site: Right Lower Arm Right Upper Arm   Position: Sitting Sitting   Cuff Size: Medium Adult Medium Adult   Pulse: 83 72   Weight: 120 lb (54.4 kg)      BP Readings from Last 3 Encounters:   22 109/72   22 94/65   22 105/68 Physical Exam  General appearance - chronically ill appearing and uncooperative  Mental status - uncooperative  Eyes - left eye normal, right eye normal  Ears - right ear normal, left ear normal  Mouth - mucous membranes moist, pharynx normal without lesions  Neck - supple, no significant adenopathy  Chest - clear to auscultation, no wheezes, rales or rhonchi, symmetric air entry  Heart - normal rate, regular rhythm, normal S1, S2, no murmurs, rubs, clicks or gallops  Abdomen - soft, nontender, nondistended, no masses or organomegaly  Neurological - not examined  Musculoskeletal - no joint tenderness, deformity or swelling  Extremities - no pedal edema note.   Skin - normal coloration and turgor, no rashes, no suspicious skin lesions noted    ________________________________________________________________________  Diagnostic findings:  CBC:  Lab Results   Component Value Date/Time    WBC 7.9 12/10/2021 10:32 AM    HGB 12.1 12/10/2021 10:32 AM     12/10/2021 10:32 AM     04/18/2012 10:00 AM       BMP:    Lab Results   Component Value Date/Time     12/10/2021 10:32 AM    K 4.8 12/10/2021 10:32 AM     12/10/2021 10:32 AM    CO2 21 12/10/2021 10:32 AM    BUN 11 12/10/2021 10:32 AM    CREATININE 1.01 12/10/2021 10:32 AM    GLUCOSE 154 12/10/2021 10:32 AM    GLUCOSE 76 04/18/2012 10:00 AM       HEMOGLOBIN A1C:   Lab Results   Component Value Date/Time    LABA1C 6.0 11/24/2021 04:53 AM       FASTING LIPID PANEL:  Lab Results   Component Value Date    CHOL 185 02/10/2019    HDL 72 02/10/2019    TRIG 139 02/10/2019     ________________________________________________________________________  Health Maintenance:  Health Maintenance Due   Topic Date Due    Annual Wellness Visit (AWV)  Never done    Meningococcal (ACWY) vaccine (1 - Risk start 2-23 months series) Never done    Lipids  02/10/2020    COVID-19 Vaccine (4 - Booster for Lester Bran series) 05/11/2022 ________________________________________________________________________  Assessment and Plan:  Taco Gallardo was seen today for established new doctor. Diagnoses and all orders for this visit:    Urinary incontinence due to cognitive impairment  -     DME Order for Bedside Commode as OP    Mandeep Iverson requires a bedside commode due to being confined to one level of the home, and is physically incapable of utilizing regular toilet facilities. Current body weight is Weight: 120 lb (54.4 kg). -     Incontinence Supply Disposable (UNDERPADS EXTRA LARGE) MISC; 1 Bag by Does not apply route daily    Moderate malnutrition (HCC)  -     Nutritional Supplements (ENSURE HIGH PROTEIN) LIQD; Take 1 Bottle by mouth 3 times daily  -     TSH With Reflex Ft4; Future  -     Hemoglobin A1C; Future, last HbA1c was 6 on 11/2021. Productive cough  -     XR CHEST STANDARD (2 VW); Future  -Family and patient counseled regarding medication adherence and getting the lab work done in time although patient is reluctant and adamant does not want the lab work done. Wife was counseled regarding the importance of blood work for monitoring disease progression. Also advised to follow-up with infectious disease Dr. Beau zuniga  -Likely secondary to chronic illness (HIV). Patient advised to continue recommended medication and get the lab work done so that we can monitor disease progression. Abnormal finding of blood chemistry, unspecified   -     Hemoglobin A1C; Future    ________________________________________________________________________  Follow up and instructions:  No follow-ups on file. Mandeep received counseling on the following healthy behaviors: nutrition, exercise, and medication adherence    Discussed use, benefit, and side effects of prescribed medications. Barriers to medication compliance addressed. All patient questions answered. Pt voiced understanding.      Patient given educational materials - see patient instructions    Ceci Pisano MD  Internal Medicine Resident, PGY-3  [address]         8/4/2022, 3:54 PM      This note is created with the assistance of a speech-recognition program. While intending to generate a document that actually reflects the content of the visit, the document can still have some mistakes which may not have been identified and corrected by editing.

## 2022-08-06 ENCOUNTER — APPOINTMENT (OUTPATIENT)
Dept: GENERAL RADIOLOGY | Age: 82
End: 2022-08-06
Payer: MEDICARE

## 2022-08-06 ENCOUNTER — HOSPITAL ENCOUNTER (EMERGENCY)
Age: 82
Discharge: HOME | End: 2022-08-06
Attending: EMERGENCY MEDICINE
Payer: MEDICARE

## 2022-08-06 VITALS
BODY MASS INDEX: 19.37 KG/M2 | SYSTOLIC BLOOD PRESSURE: 106 MMHG | OXYGEN SATURATION: 97 % | RESPIRATION RATE: 18 BRPM | TEMPERATURE: 97.9 F | WEIGHT: 120 LBS | DIASTOLIC BLOOD PRESSURE: 92 MMHG | HEART RATE: 70 BPM

## 2022-08-06 DIAGNOSIS — J44.1 COPD EXACERBATION (HCC): Primary | ICD-10-CM

## 2022-08-06 DIAGNOSIS — R05.9 COUGH: ICD-10-CM

## 2022-08-06 DIAGNOSIS — E87.5 HYPERKALEMIA: ICD-10-CM

## 2022-08-06 LAB
ABSOLUTE EOS #: 0.13 K/UL (ref 0–0.44)
ABSOLUTE IMMATURE GRANULOCYTE: <0.03 K/UL (ref 0–0.3)
ABSOLUTE LYMPH #: 2.55 K/UL (ref 1.1–3.7)
ABSOLUTE MONO #: 0.77 K/UL (ref 0.1–1.2)
ALBUMIN SERPL-MCNC: 3.8 G/DL (ref 3.5–5.2)
ALBUMIN/GLOBULIN RATIO: 1 (ref 1–2.5)
ALP BLD-CCNC: 137 U/L (ref 40–129)
ALT SERPL-CCNC: 11 U/L (ref 5–41)
ANION GAP SERPL CALCULATED.3IONS-SCNC: 6 MMOL/L (ref 9–17)
AST SERPL-CCNC: 30 U/L
BASOPHILS # BLD: 1 % (ref 0–2)
BASOPHILS ABSOLUTE: 0.05 K/UL (ref 0–0.2)
BILIRUB SERPL-MCNC: 0.23 MG/DL (ref 0.3–1.2)
BUN BLDV-MCNC: 7 MG/DL (ref 8–23)
CALCIUM SERPL-MCNC: 9.3 MG/DL (ref 8.6–10.4)
CHLORIDE BLD-SCNC: 102 MMOL/L (ref 98–107)
CHOLESTEROL/HDL RATIO: 2.4
CHOLESTEROL: 150 MG/DL
CO2: 28 MMOL/L (ref 20–31)
CREAT SERPL-MCNC: 1.04 MG/DL (ref 0.7–1.2)
EOSINOPHILS RELATIVE PERCENT: 2 % (ref 1–4)
GFR AFRICAN AMERICAN: >60 ML/MIN
GFR NON-AFRICAN AMERICAN: >60 ML/MIN
GFR SERPL CREATININE-BSD FRML MDRD: ABNORMAL ML/MIN/{1.73_M2}
GLUCOSE BLD-MCNC: 158 MG/DL (ref 70–99)
HCT VFR BLD CALC: 42.6 % (ref 40.7–50.3)
HDLC SERPL-MCNC: 62 MG/DL
HEMOGLOBIN: 13.6 G/DL (ref 13–17)
IMMATURE GRANULOCYTES: 0 %
LDL CHOLESTEROL: 64 MG/DL (ref 0–130)
LYMPHOCYTES # BLD: 34 % (ref 24–43)
MCH RBC QN AUTO: 29.1 PG (ref 25.2–33.5)
MCHC RBC AUTO-ENTMCNC: 31.9 G/DL (ref 28.4–34.8)
MCV RBC AUTO: 91.2 FL (ref 82.6–102.9)
MONOCYTES # BLD: 10 % (ref 3–12)
NRBC AUTOMATED: 0 PER 100 WBC
PDW BLD-RTO: 13.8 % (ref 11.8–14.4)
PLATELET # BLD: NORMAL K/UL (ref 138–453)
PLATELET, FLUORESCENCE: NORMAL K/UL (ref 138–453)
POTASSIUM SERPL-SCNC: 5.6 MMOL/L (ref 3.7–5.3)
POTASSIUM SERPL-SCNC: 6.4 MMOL/L (ref 3.7–5.3)
RBC # BLD: 4.67 M/UL (ref 4.21–5.77)
SEG NEUTROPHILS: 54 % (ref 36–65)
SEGMENTED NEUTROPHILS ABSOLUTE COUNT: 4.07 K/UL (ref 1.5–8.1)
SODIUM BLD-SCNC: 136 MMOL/L (ref 135–144)
TOTAL PROTEIN: 7.8 G/DL (ref 6.4–8.3)
TRIGL SERPL-MCNC: 118 MG/DL
TSH SERPL DL<=0.05 MIU/L-ACNC: 1.3 UIU/ML (ref 0.3–5)
WBC # BLD: 7.6 K/UL (ref 3.5–11.3)

## 2022-08-06 PROCEDURE — 85055 RETICULATED PLATELET ASSAY: CPT

## 2022-08-06 PROCEDURE — 86357 NK CELLS TOTAL COUNT: CPT

## 2022-08-06 PROCEDURE — 93005 ELECTROCARDIOGRAM TRACING: CPT | Performed by: HEALTH CARE PROVIDER

## 2022-08-06 PROCEDURE — 86355 B CELLS TOTAL COUNT: CPT

## 2022-08-06 PROCEDURE — 83036 HEMOGLOBIN GLYCOSYLATED A1C: CPT

## 2022-08-06 PROCEDURE — 99285 EMERGENCY DEPT VISIT HI MDM: CPT

## 2022-08-06 PROCEDURE — 87536 HIV-1 QUANT&REVRSE TRNSCRPJ: CPT

## 2022-08-06 PROCEDURE — 86360 T CELL ABSOLUTE COUNT/RATIO: CPT

## 2022-08-06 PROCEDURE — 84132 ASSAY OF SERUM POTASSIUM: CPT

## 2022-08-06 PROCEDURE — 84443 ASSAY THYROID STIM HORMONE: CPT

## 2022-08-06 PROCEDURE — 80053 COMPREHEN METABOLIC PANEL: CPT

## 2022-08-06 PROCEDURE — 71045 X-RAY EXAM CHEST 1 VIEW: CPT

## 2022-08-06 PROCEDURE — 6370000000 HC RX 637 (ALT 250 FOR IP): Performed by: HEALTH CARE PROVIDER

## 2022-08-06 PROCEDURE — 80061 LIPID PANEL: CPT

## 2022-08-06 PROCEDURE — 85025 COMPLETE CBC W/AUTO DIFF WBC: CPT

## 2022-08-06 PROCEDURE — 86359 T CELLS TOTAL COUNT: CPT

## 2022-08-06 RX ORDER — AZITHROMYCIN 250 MG/1
250 TABLET, FILM COATED ORAL DAILY
Qty: 5 TABLET | Refills: 0 | Status: SHIPPED | OUTPATIENT
Start: 2022-08-06 | End: 2022-08-11

## 2022-08-06 RX ORDER — AZITHROMYCIN 250 MG/1
500 TABLET, FILM COATED ORAL ONCE
Status: COMPLETED | OUTPATIENT
Start: 2022-08-06 | End: 2022-08-06

## 2022-08-06 RX ADMIN — AZITHROMYCIN 500 MG: 250 TABLET, FILM COATED ORAL at 17:56

## 2022-08-06 NOTE — DISCHARGE INSTRUCTIONS
Thank you for visiting [facility] Emergency Department. You are seen today for concerns of a cough. We obtained an x-ray which not show any signs of pneumonia at this time. We obtained a laboratory work-up that was requested previously by your primary care clinic as well as your disease doctor. We did not find any signs of pneumonia at this time. We are sending out with a prescription for azithromycin for potential COPD exacerbation and the cough you been having. While you were here, your potassium level was found to be slightly elevated. There were no acute concerns at this time, please get a repeat lab test called a BMP to recheck your kidney and potassium levels and 5 to 7 days. You need to call Shawna Cronin MD to make an appointment as directed for follow up. Should you have any questions regarding your care or further treatment, please call [facility] Emergency Department at [phone]. Take any medications as prescribed, if given any, otherwise for pain Use ibuprofen or Tylenol (unless prescribed medications that have Tylenol in it). You can take over the counter Ibuprofen (advil) tablets (4 tablets every 8 hours or 3 tablets every 6 hours or 2 tablets every 4 hours)    PLEASE RETURN TO THE ED IMMEDIATELY for worsening symptoms, or if you develop any concerning symptoms such as: high fever not relieved by tylenol and/or motrin, chills, shortness of breath, chest pain, persistent nausea and/or vomiting, numbness, weakness or tingling in the arms or legs or change in color of the extremities, changes in mental status, persistent headache, blurry vision, inability to urinate, unable to follow up with your physician, or other any other  Care or concern.

## 2022-08-06 NOTE — ED NOTES
The following labs were labeled with patient stickers & tubed to lab;    []Lavender   []On Ice  []Blue  [x]Green/ Yellow  []Green/ Black []On Ice  []Pink  []Red  []Yellow    []COVID-19 Swab []Rapid    []Urine Sample  []Pelvic Cultures    []Blood Cultures       Portia Turcios RN  08/06/22 [id number]

## 2022-08-06 NOTE — ED PROVIDER NOTES
[facility] ED  Emergency Department Encounter  Emergency Medicine Resident     Pt Name: Tanja Vaughan  MRN: [id number]  [place] 1940  Date of evaluation: 8/6/22  PCP:  Andrew Barcenas MD    [address] [place] [address]       Chief Complaint   Patient presents with    Cough     X 2 weeks       HISTORY OFPRESENT ILLNESS  (Location/Symptom, Timing/Onset, Context/Setting, Quality, Duration, Modifying Kit Dux.)      Tanja Vaughan is a 78 y.o. male with extensive past medical history including HIV (last CD4 count was 399 on 11/14/2021), Alzheimer's disease on donepezil and memantine, PVD on aspirin, hyperlipidemia on Lipitor, COVID-19 pneumonia 11/10/2021 who presents with concerns for cough. Patient was just seen in the internal medicine clinic 2 days ago on 8/4/2022 and was complaining that the patient was having a cough which was productive at that time. Does not bring up any phlegm that was following it. Patient's next follow-up with infectious diseases next month. Presents today for evaluation. Denies any recent fevers, chills, chest pain, abdominal pain, nausea vomiting, diarrhea constipation. Patient unable to answer questions, significant other at bedside. PAST MEDICAL / SURGICAL / SOCIAL / FAMILY HISTORY      has a past medical history of Acute delirium, Acute metabolic encephalopathy, Alzheimer's dementia with behavioral disturbance ([address] Atrophy, cortical, BPH (benign prostatic hyperplasia), Dementia ([address] Dementia without behavioral disturbance ([address] GERD (gastroesophageal reflux disease), Hemorrhoids, HIV (human immunodeficiency virus infection) ([address] Meralgia paraesthetica, Mixed hyperlipidemia, Occasional tremors, Osteoporosis, PVD (peripheral vascular disease) ([address] Seizure-like activity ([address] Syphilis in male, and Wears glasses. has a past surgical history that includes Total hip arthroplasty (Left); Tonsillectomy;  Hemorrhoid surgery; sigmoidoscopy (N/A, 2017); Colonoscopy; hernia repair; and pr x-ray retrograde pyelogram (Bilateral, 3/6/2018). Social History     Socioeconomic History    Marital status:      Spouse name: Not on file    Number of children: 5    Years of education: 3    Highest education level: 3rd grade   Occupational History    Not on file   Tobacco Use    Smoking status: Former     Packs/day: 0.00     Years: 50.00     Pack years: 0.00     Types: Cigarettes     Start date: 12     Quit date: 2021     Years since quittin.5    Smokeless tobacco: Never    Tobacco comments:      cigerettes per day   Vaping Use    Vaping Use: Never used   Substance and Sexual Activity    Alcohol use: Yes     Alcohol/week: 4.0 standard drinks     Types: 4 Cans of beer per week    Drug use: Yes    Sexual activity: Not on file   Other Topics Concern    Not on file   Social History Narrative    Not on file     Social Determinants of Health     Financial Resource Strain: Low Risk     Difficulty of Paying Living Expenses: Not very hard   Food Insecurity: No Food Insecurity    Worried About Running Out of Food in the Last Year: Never true    Ran Out of Food in the Last Year: Never true   Transportation Needs: Not on file   Physical Activity: Not on file   Stress: Not on file   Social Connections: Not on file   Intimate Partner Violence: Not on file   Housing Stability: Not on file       Family History   Problem Relation Age of Onset    Heart Disease Mother     High Blood Pressure Mother     Cancer Father     Diabetes Sister     Heart Disease Sister     High Blood Pressure Sister     Heart Disease Brother     High Blood Pressure Brother        Allergies:  Patient has no known allergies. Home Medications:  Prior to Admission medications    Medication Sig Start Date End Date Taking? Authorizing Provider   azithromycin (ZITHROMAX) 250 MG tablet Take 1 tablet by mouth in the morning for 5 days.  22 Yes Ana Salmeron,    Nutritional Supplements (ENSURE HIGH PROTEIN) LIQD Take 1 Bottle by mouth 3 times daily 8/4/22   Charline Manzano MD   Incontinence Supply Disposable (UNDERPADS EXTRA LARGE) MISC 1 Bag by Does not apply route daily 8/4/22   Charline Manzano MD   aspirin (ASPIRIN LOW DOSE) 81 MG EC tablet TAKE 1 TABLET DAILY 7/25/22   Marcella Hyde MD   divalproex (DEPAKOTE SPRINKLE) 125 MG capsule TAKE 2 CAPSULES BY MOUTH 2 TIMES A DAY 6/22/22   Aaron Morgan MD   tamsulosin (FLOMAX) 0.4 MG capsule TAKE 1 CAPSULE BY MOUTH DAILY 6/22/22   Aaron Morgan MD   atorvastatin (LIPITOR) 40 MG tablet TAKE 1 TABLET DAILY 3/7/22   Aaron Morgan MD   omeprazole (PRILOSEC) 20 MG delayed release capsule TAKE 1 CAPSULE DAILY 3/7/22   Aaron Morgan MD   memantine (NAMENDA) 10 MG tablet TAKE 1 TABLET TWICE DAILY 3/7/22   Aaron Morgan MD   finasteride (PROSCAR) 5 MG tablet TAKE 1 TABLET BY MOUTH DAILY 3/7/22   Aaron Morgan MD   donepezil (ARICEPT) 10 MG tablet TAKE 1 TABLET IN THE EVENING 3/7/22   Aarno Morgan MD   Multiple Vitamin (MULTIVITAMIN) tablet TAKE 1 TABLET DAILY 3/7/22   Aaron Morgan MD   sertraline (ZOLOFT) 50 MG tablet Take 50 mg by mouth daily    Historical Provider, MD   Incontinence Supply Disposable (BRIEFS OVERNIGHT MEDIUM) MISC 1 box by Does not apply route daily 2/13/22   Aaron Morgan MD   ATRIPLA 600-200-300 MG per tablet TAKE 1 TABLET BY MOUTH DAILY AT BEDTIME 7/28/21 9/8/21  Chuy Gaffney MD   vitamin E 400 UNIT capsule Take 400 Units by mouth daily 9/23/19   Historical Provider, MD       REVIEW OF SYSTEMS    (2-9 systems for level 4, 10 or more for level 5)      Review of Systems   Unable to perform ROS: Dementia     PHYSICAL EXAM   (up to 7 for level 4, 8 or more for level 5)     INITIAL VITALS:    weight is 120 lb (54.4 kg). His oral temperature is 97.9 °F (36.6 °C). His blood pressure is 106/92 (abnormal) and his pulse is 70. His respiration is 18 and oxygen saturation is 97%.      Physical Exam  Vitals and nursing note reviewed. Constitutional:       General: He is not in acute distress. Appearance: Normal appearance. He is well-developed. He is not ill-appearing or diaphoretic. HENT:      Head: Normocephalic and atraumatic. Right Ear: External ear normal.      Left Ear: External ear normal.      Nose: Nose normal.      Mouth/Throat:      Mouth: Mucous membranes are moist.   Eyes:      Extraocular Movements: Extraocular movements intact. Conjunctiva/sclera: Conjunctivae normal.   Neck:      Trachea: No tracheal deviation. Cardiovascular:      Rate and Rhythm: Normal rate and regular rhythm. Pulmonary:      Effort: Pulmonary effort is normal. No respiratory distress. Breath sounds: No wheezing, rhonchi or rales. Abdominal:      General: Abdomen is flat. There is no distension. Musculoskeletal:         General: No swelling, deformity or signs of injury. Normal range of motion. Cervical back: Normal range of motion and neck supple. Skin:     General: Skin is warm and dry. Coloration: Skin is not jaundiced. Findings: No bruising or lesion. Neurological:      General: No focal deficit present. Mental Status: He is alert and oriented to person, place, and time. Mental status is at baseline. Motor: No abnormal muscle tone.        DIFFERENTIAL  DIAGNOSIS     PLAN (LABS / IMAGING / EKG):  Orders Placed This Encounter   Procedures    XR CHEST PORTABLE    LYMPHOCYTE SUBSET    CBC with Auto Differential    Comprehensive Metabolic Panel    HIV RNA, QUANTITATIVE, PCR    LIPID PANEL    TSH with Reflex    Hemoglobin A1C    Immature Platelet Fraction    Potassium    Basic Metabolic Panel    EKG 12 Lead       MEDICATIONS ORDERED:  Orders Placed This Encounter   Medications    azithromycin (ZITHROMAX) tablet 500 mg     Order Specific Question:   Antimicrobial Indications     Answer:   COPD Exacerbation     Order Specific Question:   COPD exacerbation duration of therapy     Answer:   5 days azithromycin (ZITHROMAX) 250 MG tablet     Sig: Take 1 tablet by mouth in the morning for 5 days. Dispense:  5 tablet     Refill:  0       DDX: PNA, COPD exacerbation, viral syndrome    Initial MDM/Plan: 78 y.o. male who presents with concerns for cough for the last several weeks. At time of initial examination patient is in no acute distress, vital signs are stable. He is saturating 97% on room air. He has no respiratory distress, no retractions or tachypnea noted. Unable to obtain full ROS from patient given his history of dementia, however significant other is denying any other issues or concerns at this time. Some history of HIV, follows with infectious disease. Was recently in the internal medicine clinic and they ordered outpatient labs as well as a two-view chest x-ray. We will obtain labs and chest x-ray at this time. We will get the labs requested by ID/internal medicine clinic so patient does not have to return for any further laboratory work-up. Potential discharge versus admission pending on results.     DIAGNOSTIC RESULTS / EMERGENCY DEPARTMENT COURSE / MDM     LABS:  Labs Reviewed   COMPREHENSIVE METABOLIC PANEL - Abnormal; Notable for the following components:       Result Value    Glucose 158 (*)     BUN 7 (*)     Potassium 6.4 (*)     Anion Gap 6 (*)     Alkaline Phosphatase 137 (*)     Total Bilirubin 0.23 (*)     All other components within normal limits   POTASSIUM - Abnormal; Notable for the following components:    Potassium 5.6 (*)     All other components within normal limits   CBC WITH AUTO DIFFERENTIAL   LIPID PANEL   TSH WITH REFLEX   IMMATURE PLATELET FRACTION   LYMPHOCYTE SUBSET   HIV RNA, QUANTITATIVE, PCR   HEMOGLOBIN A1C         RADIOLOGY:  XR CHEST PORTABLE    Result Date: 8/6/2022  EXAMINATION: ONE XRAY VIEW OF THE CHEST 8/6/2022 3:17 pm COMPARISON: 11/16/2021 HISTORY: ORDERING SYSTEM PROVIDED HISTORY: cough x2 weeks TECHNOLOGIST PROVIDED HISTORY: cough x2 weeks FINDINGS: Mediastinum: The cardiomediastinal silhouette is normal. Lungs: The lungs are clear. Hyperinflated lungs suggestive of COPD. Pleura: No pleural effusion. No pneumothorax. Bones and soft tissues: No acute osseous abnormality. No acute osseous abnormality. Hyperinflated lungs suggestive of COPD. EKG  EKG Interpretation    Interpreted by me    Rhythm: normal sinus   Rate: normal  Axis: normal  Ectopy: none  Conduction: normal  ST Segments: no acute change  T Waves: no acute change  Q Waves: none    Clinical Impression: no acute changes and normal EKG    All EKG's are interpreted by the Emergency Department Physician who either signs or Co-signs this chart in the absence of a cardiologist.    EMERGENCY DEPARTMENT COURSE:  ED Course as of 08/06/22 2046   Sat Aug 06, 2022   1616 Chest x-ray negative for any sign of infection. [JS]   1298 Updated on results. Repeat potassium level was 5.6. Do not feel that this necessarily is patient's this time. Feel that patient has a high risk of becoming hypoglycemic and needing further care if addressed. Also not for the patient as early as being met at this time for repeat laboratory work. His kidney function is otherwise normal.  He is on no medication at this time which caused an elevated potassium level. On discussion, significant other does state that he does eat a lot of bananas, discussed potentially cutting back on this at this time. Discussed he will need a repeat potassium check done in the next 5 to 7 days. Follow up plans and ER return precautions discussed. Patient verbalized understanding and had no further questions at time of discharge. [JS]      ED Course User Index  [JS] Shaila Reich DO       PROCEDURES:  None    CONSULTS:  None    CRITICAL CARE:  Please see attending note    FINAL IMPRESSION      1. COPD exacerbation ([address]    2. Cough    3.  Hyperkalemia         DISPOSITION / PLAN     DISPOSITION Decision To Discharge 08/06/2022 05:28:00 PM    PATIENTREFERRED TO:  Lukas Zabala MD  [address] [phone]    Schedule an appointment as soon as possible for a visit   To discuss this ER visit and any further follow up needed. [facility] ED  [address] [place] [address]  [phone]  Go to   As needed, If symptoms worsen    DISCHARGE MEDICATIONS:  New Prescriptions    AZITHROMYCIN (ZITHROMAX) 250 MG TABLET    Take 1 tablet by mouth in the morning for 5 days.        Yamilex Russell DO  EmergencyMedicine Resident    (Please note that portions of this note were completed with a voice recognition program.  Efforts were made to edit the dictations but occasionally words are mis-transcribed.)      Lynsey Dumas DO  Resident  08/06/22 5798

## 2022-08-06 NOTE — ED NOTES
Patient to ED with his wife who reports the patient has had a cough for two weeks. Was seen at pcp and had cxr and blood work ordered, not yet completed. Patient has advanced dementia, he is not cooperative during triage or vitals. Patient unwilling to put gown on at this time. Patient has been eating and drinking normally per his wife/caretaker. She states that aside from th cough and wheezing sounds with breathing, the patient is acting normally. Dr Rosemarie Nicole at bedside.      Romayne Guppy, RN  08/06/22 8345

## 2022-08-06 NOTE — ED PROVIDER NOTES
[address] [facility]     Emergency Department     Faculty Attestation        I performed a history and physical examination of the patient and discussed management with the resident. I reviewed the residents note and agree with the documented findings and plan of care. Any areas of disagreement are noted on the chart. I was personally present for the key portions of any procedures. I have documented in the chart those procedures where I was not present during the key portions. I have reviewed the emergency nurses triage note. I agree with the chief complaint, past medical history, past surgical history, allergies, medications, social and family history as documented unless otherwise noted below. For mid-level providers such as nurse practitioners as well as physicians assistants:    I have personally seen and evaluated the patient. I find the patient's history and physical exam are consistent with NP/PA documentation. I agree with the care provided, treatment rendered, disposition, & follow-up plan. Additional findings are as noted. Vital Signs: BP (!) 106/92   Pulse 70   Temp 97.9 °F (36.6 °C) (Oral)   Resp 18   Wt 120 lb (54.4 kg)   SpO2 97%   BMI 19.37 kg/m²   PCP:  Brittny Santos MD    Pertinent Comments:     Patient has a history of HIV and is compliant with medications he had a chronic cough for the past 2 to 3 months he had COVID a year ago. No fevers or chills or vomiting. On exam the patient is afebrile nontoxic lungs clear he is in no respiratory distress.       Critical Care  None          Maria Ines Acosta MD    Attending Emergency Medicine Physician            Debra Patton MD  08/06/22 0669

## 2022-08-06 NOTE — ED NOTES
Report to [facility], RN. Patient was asleep on bed with even and unlabored respirations before blood draw. NAD at this time.      Edmar Negron RN  08/06/22 8900

## 2022-08-07 LAB
ESTIMATED AVERAGE GLUCOSE: 126 MG/DL
HBA1C MFR BLD: 6 % (ref 4–6)
REASON FOR REJECTION: NORMAL
ZZ NTE CLEAN UP: ORDERED TEST: NORMAL
ZZ NTE WITH NAME CLEAN UP: SPECIMEN SOURCE: NORMAL

## 2022-08-08 LAB
% NK (CD56): 16 % (ref 6–29)
AB NK (CD56): 413 /UL (ref 90–600)
ABSOLUTE CD 3: 1705 /UL (ref 411–2061)
ABSOLUTE CD 4 HELPER: 465 /UL (ref 309–1571)
ABSOLUTE CD 8 (SUPP): 1240 /UL (ref 282–999)
ABSOLUTE CD19 B CELL: 129 /UL (ref 71–567)
CD19 B CELL: 5 % (ref 5–25)
CD3 % TOTAL T CELLS: 66 % (ref 57–94)
CD4 % HELPER T CELL: 18 % (ref 27–64)
CD4:CD8: 0.38 (ref 0.6–2.8)
CD8 % SUPPRESSOR T CELL: 48 % (ref 17–48)
EKG ATRIAL RATE: 80 BPM
EKG P AXIS: 77 DEGREES
EKG P-R INTERVAL: 124 MS
EKG Q-T INTERVAL: 376 MS
EKG QRS DURATION: 74 MS
EKG QTC CALCULATION (BAZETT): 433 MS
EKG R AXIS: 69 DEGREES
EKG T AXIS: 86 DEGREES
EKG VENTRICULAR RATE: 80 BPM
LYMPHOCYTES # BLD: 34 % (ref 24–44)
WBC # BLD: 7.6 K/UL (ref 3.5–11)

## 2022-08-08 PROCEDURE — 93010 ELECTROCARDIOGRAM REPORT: CPT | Performed by: INTERNAL MEDICINE

## 2022-08-15 DIAGNOSIS — I73.9 PVD (PERIPHERAL VASCULAR DISEASE) (HCC): ICD-10-CM

## 2022-08-15 RX ORDER — MULTIVITAMIN WITH FOLIC ACID 400 MCG
TABLET ORAL
Qty: 28 TABLET | Refills: 5 | Status: SHIPPED | OUTPATIENT
Start: 2022-08-15

## 2022-08-15 RX ORDER — DONEPEZIL HYDROCHLORIDE 10 MG/1
TABLET, FILM COATED ORAL
Qty: 28 TABLET | Refills: 5 | Status: SHIPPED | OUTPATIENT
Start: 2022-08-15

## 2022-08-15 RX ORDER — MEMANTINE HYDROCHLORIDE 10 MG/1
TABLET ORAL
Qty: 56 TABLET | Refills: 5 | Status: SHIPPED | OUTPATIENT
Start: 2022-08-15

## 2022-08-15 RX ORDER — ATORVASTATIN CALCIUM 40 MG/1
TABLET, FILM COATED ORAL
Qty: 28 TABLET | Refills: 5 | Status: SHIPPED | OUTPATIENT
Start: 2022-08-15

## 2022-08-15 RX ORDER — OMEPRAZOLE 20 MG/1
CAPSULE, DELAYED RELEASE ORAL
Qty: 28 CAPSULE | Refills: 5 | Status: SHIPPED | OUTPATIENT
Start: 2022-08-15

## 2022-08-15 RX ORDER — BICTEGRAVIR SODIUM, EMTRICITABINE, AND TENOFOVIR ALAFENAMIDE FUMARATE 50; 200; 25 MG/1; MG/1; MG/1
TABLET ORAL
COMMUNITY
Start: 2022-08-08 | End: 2022-09-14 | Stop reason: SDUPTHER

## 2022-08-15 RX ORDER — FINASTERIDE 5 MG/1
TABLET, FILM COATED ORAL
Qty: 28 TABLET | Refills: 5 | Status: SHIPPED | OUTPATIENT
Start: 2022-08-15

## 2022-08-15 NOTE — TELEPHONE ENCOUNTER
Pt last seen 08/07/22  Multiple meds pended     Health Maintenance   Topic Date Due    Annual Wellness Visit (AWV)  Never done    Meningococcal (ACWY) vaccine (1 - Risk start 2-23 months series) Never done    COVID-19 Vaccine (4 - Booster for Moderna series) 05/06/2022    Hepatitis A vaccine (1 of 2 - Risk 2-dose series) 11/05/2022 (Originally 10/3/1941)    Hepatitis B vaccine (1 of 3 - Risk 3-dose series) 11/05/2022 (Originally 10/3/1959)    Estella Labella (MMR) vaccine (1 of 2 - Risk 2-dose series) 11/05/2022 (Originally 10/3/1958)    Shingles vaccine (1 of 2) 11/05/2022 (Originally 10/3/1959)    Flu vaccine (1) 09/01/2022    Depression Screen  03/04/2023    Lipids  08/06/2023    DTaP/Tdap/Td vaccine (2 - Td or Tdap) 02/21/2027    Pneumococcal 65+ years Vaccine  Completed    Hib vaccine  Aged Out             (applicable per patient's age: Cancer Screenings, Depression Screening, Fall Risk Screening, Immunizations)    Hemoglobin A1C (%)   Date Value   08/06/2022 6.0   11/24/2021 6.0   02/10/2019 5.4     LDL Cholesterol (mg/dL)   Date Value   08/06/2022 64     AST (U/L)   Date Value   08/06/2022 30     ALT (U/L)   Date Value   08/06/2022 11     BUN (mg/dL)   Date Value   08/06/2022 7 (L)      (goal A1C is < 7)   (goal LDL is <100) need 30-50% reduction from baseline     BP Readings from Last 3 Encounters:   08/06/22 (!) 106/92   08/04/22 109/72   03/09/22 94/65    (goal /80)      All Future Testing planned in [facility]:  Lab Frequency Next Occurrence   Comp Metabolic w Bili Profile Once 03/08/2022   Lymphocyte Subset Once 03/08/2022   CBC Once 09/09/2022   Comprehensive Metabolic Panel with Bilirubin Once 09/09/2022   HIV RNA, Quantitative, PCR Once 09/09/2022   Lipid Panel Once 09/09/2022   Lymphocyte Subset Once 09/09/2022   TSH With Reflex Ft4 Once 08/04/2022   Hemoglobin A1C Once 08/04/2022   XR CHEST STANDARD (2 VW) Once 12/66/5494   Basic Metabolic Panel Once 98/52/2447       Next Visit Date:  Future Appointments   Date Time Provider Jocelyn Ansari   9/14/2022 10:30 AM [facility]            Patient Active Problem List:     HIV (human immunodeficiency virus infection) [facility] [address]     Dementia without behavioral disturbance ([facility] [address]     Osteoporosis right hip fracture dec 08     Hemorrhoids     BPH (benign prostatic hyperplasia)     GERD (gastroesophageal reflux disease)     Mixed hyperlipidemia     Occasional tremors     PVD (peripheral vascular disease) ([facility] [address]     Seizure-like activity (HCC)     Alzheimer's dementia with behavioral disturbance (HCC)     Atrophy, cortical     Hyponatremia     Elevated serum creatinine     Moderate malnutrition (HCC)     COVID     Elevated C-reactive protein (CRP)

## 2022-09-14 ENCOUNTER — OFFICE VISIT (OUTPATIENT)
Dept: INFECTIOUS DISEASES | Age: 82
End: 2022-09-14
Payer: MEDICARE

## 2022-09-14 VITALS
HEIGHT: 66 IN | TEMPERATURE: 96.9 F | SYSTOLIC BLOOD PRESSURE: 120 MMHG | HEART RATE: 66 BPM | BODY MASS INDEX: 19.29 KG/M2 | DIASTOLIC BLOOD PRESSURE: 81 MMHG | WEIGHT: 120 LBS

## 2022-09-14 DIAGNOSIS — F01.50 VASCULAR DEMENTIA WITHOUT BEHAVIORAL DISTURBANCE (HCC): ICD-10-CM

## 2022-09-14 DIAGNOSIS — Z21 ASYMPTOMATIC HIV INFECTION (HCC): Primary | ICD-10-CM

## 2022-09-14 PROCEDURE — 1123F ACP DISCUSS/DSCN MKR DOCD: CPT | Performed by: INTERNAL MEDICINE

## 2022-09-14 PROCEDURE — 99214 OFFICE O/P EST MOD 30 MIN: CPT | Performed by: INTERNAL MEDICINE

## 2022-09-14 RX ORDER — BICTEGRAVIR SODIUM, EMTRICITABINE, AND TENOFOVIR ALAFENAMIDE FUMARATE 50; 200; 25 MG/1; MG/1; MG/1
1 TABLET ORAL DAILY
Qty: 30 TABLET | Refills: 5 | Status: SHIPPED | OUTPATIENT
Start: 2022-09-14 | End: 2022-10-14

## 2022-09-14 NOTE — PROGRESS NOTES
[facility]  Outpatient follow-up HIV care  Today's Date and Time: 9/14/2022, 11:34 AM    Impression:     1. Asymptomatic HIV infection ([facility] [address]    2. Vascular dementia without behavioral disturbance ([facility] [address]            HIV-CD4 count/viral load -patient did have some lab testing done in the emergency department 8/6/2022 and the CD4 count was 465 with 18% helper cells and a CD4/CD8 ratio low at 0.38 and a viral load was not done  The patient will continue Biktarvy at this point in time  OI prophylaxis-no need for prophylaxis  Health maintenance:   Vaccines up-to-date  Annual STD check-not required  Counseling: The wife reports continued medication adherence/compliance  No issues with drug use  The patient is no longer sexually active. I have ordered the followingmedications/ labs:  Orders Placed This Encounter   Procedures    CBC     Standing Status:   Future     Standing Expiration Date:   9/14/2023    Comprehensive Metabolic Panel with Bilirubin     Standing Status:   Future     Standing Expiration Date:   9/14/2023    HIV RNA, Quantitative, PCR     Standing Status:   Future     Standing Expiration Date:   9/14/2023    Lymphocyte Subset     Standing Status:   Future     Standing Expiration Date:   9/14/2023      Orders Placed This Encounter   Medications    BIKTARVY -25 MG TABS per tablet     Sig: Take 1 tablet by mouth daily     Dispense:  30 tablet     Refill:  5         Chiefcomplaint:   Follow-up for HIV infection    History of Present Illness:   Karen Steele is a 78y.o.-year-old male who is seen 9/14/2022 in follow-up for HIV infection. Mayola Meigs has a history of HIV infection, BPH, hyperlipidemia, Alzheimer's type dementia, gastroesophageal reflux disease, COVID-19 virus infection November 2021, among other medical problems. He is here with his wife who brings him in for his visits and not much is obtained from the patient himself as the dementia does continue to get worse.     The patient was seen in the emergency room earlier last month and some labs were obtained but otherwise he is not really compliant or amenable to getting outpatient lab draws done. The wife does report that he continues to except his Biktarvy and she has been giving this to him on a daily basis. I have personally reviewed the past medical history, past surgical history, medications, social history, and family history, and I haveupdated the database accordingly.   Past Medical History:     Past Medical History:   Diagnosis Date    Acute delirium 8/51/7679    Acute metabolic encephalopathy 7/36/9675    Alzheimer's dementia with behavioral disturbance ([facility] 2/11/2019    Atrophy, cortical     BPH (benign prostatic hyperplasia) 2/1/2013    Dementia ([facility] [facility]     Dementia without behavioral disturbance ([facility] 7/27/2012    GERD (gastroesophageal reflux disease) 4/28/2015    Hemorrhoids     HIV (human immunodeficiency virus infection) ([facility] [facility]     Meralgia paraesthetica 2/4/2016    Mixed hyperlipidemia 5/5/2016    Occasional tremors     Osteoporosis     PVD (peripheral vascular disease) ([facility] 2/8/2018    Seizure-like activity ([facility] 2/28/2018    Syphilis in male     Wears glasses      Medications:     Current Outpatient Medications   Medication Sig Dispense Refill    BIKTARVY -25 MG TABS per tablet Take 1 tablet by mouth daily 30 tablet 5    atorvastatin (LIPITOR) 40 MG tablet TAKE 1 TABLET DAILY 28 tablet 5    donepezil (ARICEPT) 10 MG tablet TAKE 1 TABLET IN THE EVENING 28 tablet 5    finasteride (PROSCAR) 5 MG tablet TAKE 1 TABLET BY MOUTH DAILY 28 tablet 5    memantine (NAMENDA) 10 MG tablet TAKE 1 TABLET TWICE DAILY 56 tablet 5    Multiple Vitamin (HIGH POTENCY MULTIVITAMIN) TABS TAKE 1 TABLET BY MOUTH DAILY 28 tablet 5    omeprazole (PRILOSEC) 20 MG delayed release capsule TAKE 1 CAPSULE DAILY 28 capsule 5    Nutritional Supplements (ENSURE HIGH PROTEIN) LIQD Take 1 Bottle by mouth 3 times daily 1 each 2    Incontinence Supply Disposable (UNDERPADS EXTRA LARGE) MISC 1 Bag by Does not apply route daily 1 each 2    aspirin (ASPIRIN LOW DOSE) 81 MG EC tablet TAKE 1 TABLET DAILY 28 tablet 5    divalproex (DEPAKOTE SPRINKLE) 125 MG capsule TAKE 2 CAPSULES BY MOUTH 2 TIMES A  capsule 3    tamsulosin (FLOMAX) 0.4 MG capsule TAKE 1 CAPSULE BY MOUTH DAILY 28 capsule 3    sertraline (ZOLOFT) 50 MG tablet Take 50 mg by mouth daily      Incontinence Supply Disposable (BRIEFS OVERNIGHT MEDIUM) MISC 1 box by Does not apply route daily 1 each 2    vitamin E 400 UNIT capsule Take 400 Units by mouth daily  5     No current facility-administered medications for this visit. Allergies:   Patient has no known allergies. Review of Systems:   Review of Systems   Unable to perform ROS: Dementia      Physical Examination :   /81 (Site: Left Upper Arm)   Pulse 66   Temp 96.9 °F (36.1 °C)   Ht 5' 6\" (1.676 m)   Wt 120 lb (54.4 kg)   BMI 19.37 kg/m²     Physical Exam  Constitutional:       Appearance: He is well-developed. HENT:      Head: Normocephalic and atraumatic. Cardiovascular:      Rate and Rhythm: Normal rate. Heart sounds: Normal heart sounds. No friction rub. No gallop. Pulmonary:      Effort: Pulmonary effort is normal.      Breath sounds: Normal breath sounds. No wheezing. Abdominal:      General: Bowel sounds are normal.      Palpations: Abdomen is soft. There is no mass. Tenderness: There is no abdominal tenderness. Musculoskeletal:         General: Normal range of motion. Cervical back: Normal range of motion and neck supple. Lymphadenopathy:      Cervical: No cervical adenopathy. Skin:     General: Skin is warm and dry. Neurological:      Mental Status: He is alert. He is disoriented.        Medical Decision Making:   I have independently reviewed the following labs:    CBC:   WBC   Date Value Ref Range Status   08/06/2022 7.6 3.5 - 11.0 k/uL Final   08/06/2022 7.6 3.5 - 11.3 k/uL Final   12/10/2021 7.9 3.5 - 11.3 k/uL Final     RBC   Date Value Ref Range Status   08/06/2022 4.67 4.21 - 5.77 m/uL Final   12/10/2021 4.32 4.21 - 5.77 m/uL Final   11/24/2021 4.60 4.21 - 5.77 m/uL Final   04/18/2012 4.31 (L) 4.5 - 5.9 m/uL Final   03/16/2012 4.43 (L) 4.5 - 5.9 m/uL Final   10/12/2011 3.99 (L) 4.5 - 5.9 m/uL Final     Hemoglobin   Date Value Ref Range Status   08/06/2022 13.6 13.0 - 17.0 g/dL Final   12/10/2021 12.1 (L) 13.0 - 17.0 g/dL Final   11/24/2021 12.9 (L) 13.0 - 17.0 g/dL Final     Hematocrit   Date Value Ref Range Status   08/06/2022 42.6 40.7 - 50.3 % Final   12/10/2021 39.6 (L) 40.7 - 50.3 % Final   11/24/2021 42.3 40.7 - 50.3 % Final     MCV   Date Value Ref Range Status   08/06/2022 91.2 82.6 - 102.9 fL Final   12/10/2021 91.7 82.6 - 102.9 fL Final   11/24/2021 92.0 82.6 - 102.9 fL Final     MCH   Date Value Ref Range Status   08/06/2022 29.1 25.2 - 33.5 pg Final   12/10/2021 28.0 25.2 - 33.5 pg Final   11/24/2021 28.0 25.2 - 33.5 pg Final     MCHC   Date Value Ref Range Status   08/06/2022 31.9 28.4 - 34.8 g/dL Final   12/10/2021 30.6 28.4 - 34.8 g/dL Final   11/24/2021 30.5 28.4 - 34.8 g/dL Final     RDW   Date Value Ref Range Status   08/06/2022 13.8 11.8 - 14.4 % Final   12/10/2021 14.6 (H) 11.8 - 14.4 % Final   11/24/2021 14.0 11.8 - 14.4 % Final     Platelet Count   Date Value Ref Range Status   04/18/2012 224 140 - 450 k/uL Final   03/16/2012 234 140 - 450 k/uL Final   10/12/2011 219 140 - 450 k/uL Final     Platelets   Date Value Ref Range Status   08/06/2022 See Reflexed IPF Result 138 - 453 k/uL Final   12/10/2021 153 138 - 453 k/uL Final   11/24/2021 241 138 - 453 k/uL Final     MPV   Date Value Ref Range Status   12/10/2021 12.4 8.1 - 13.5 fL Final   11/24/2021 11.2 8.1 - 13.5 fL Final   11/19/2021 10.8 8.1 - 13.5 fL Final       CMP:    Lab Results   Component Value Date     08/06/2022    K 5.6 (H) 08/06/2022     08/06/2022    CO2 28 08/06/2022    BUN 7 (L) 08/06/2022    CREATININE 1.04 08/06/2022    GLUCOSE 158 (H) 08/06/2022    CALCIUM 9.3 08/06/2022    PROT 7.8 08/06/2022    LABALBU 3.8 08/06/2022    BILITOT 0.23 (L) 08/06/2022    ALKPHOS 137 (H) 08/06/2022    AST 30 08/06/2022    ALT 11 08/06/2022    LABGLOM >60 08/06/2022    GFRAA >60 08/06/2022    GLOB NOT REPORTED 11/14/2021       HIV VIRAL LOAD:   Direct Exam   Date Value Ref Range Status   11/15/2021   Final    HIV-1 RNA NOT DETECTED Results reported to the appropriate Health Department   11/15/2021   Final          This assay has a plasma HIV-1 RNA quantification range of 20-2,000,000 cp/ml (1.3-6.30 log cp/ml). The test is intended for detecting and quantifying HIV-1 RNA viral loads in this range. The normal range for this assay is \"not detected\". A not detected result indicates that the assay was unable to detect HIV-1 RNA in the plasma specimen tested. An interpretation of \"not detected\" does not exclude the presence of inhibitors or HIV-1 RNA concentration below the level of detection of this assay. <20 copies/mL indicates that HIV-1 RNA is detected, but the level present is less than the lower quantification limit of this assay. The clinical implications of a viral load below 20 copies/mL remain unclear. Possible causes of such result include very low plasma HIV-1 viral load present (e.g., in the range of 1-19 copies/mL) very early HIV-1 infection (i.e.,<3 weeks from time ofinfection), or absence of HIV-1 infection (i.e., false-positive). Patients should have confirmed HIV 1 infection prior to RNA quantification. This test utilizes RT-PCR and the FDA approved Roche Amplicor / Taqman 48 system.          LYMPHOCYTE SUBSET:  Absolute CD 3   Date Value Ref Range Status   08/06/2022 1705 411 - 2061 /uL Final   11/14/2021 1365 411 - 2061 /uL Final   06/05/2020 1602 411 - 2061 /uL Final     Absolute CD 4 [place]   Date Value Ref Range Status   08/06/2022 465 309 - 1571 / Final 11/14/2021 399 309 - 1571 /uL Final   06/05/2020 526 309 - 1571 /uL Final     CD4 % [place] T Cell   Date Value Ref Range Status   08/06/2022 18 (L) 27 - 64 % Final   11/14/2021 19 (L) 27 - 64 % Final   06/05/2020 23 (L) 27 - 64 % Final     CD4/CD8 Ratio   Date Value Ref Range Status   08/06/2022 0.38 (L) 0.6 - 2.8 Final   11/14/2021 0.42 (L) 0.6 - 2.8 Final   06/05/2020 0.50 (L) 0.6 - 2.8 Final       LIPID PANEL:  Lab Results   Component Value Date    CHOL 150 08/06/2022    CHOL 185 02/10/2019     Lab Results   Component Value Date    TRIG 118 08/06/2022    TRIG 139 02/10/2019     Lab Results   Component Value Date    HDL 62 08/06/2022    HDL 72 02/10/2019     Lab Results   Component Value Date    LDLCHOLESTEROL 64 08/06/2022    LDLCHOLESTEROL 85 02/10/2019     Lab Results   Component Value Date    VLDL NOT REPORTED 02/10/2019    VLDL NOT REPORTED 06/04/2018     Lab Results   Component Value Date    CHOLHDLRATIO 2.4 08/06/2022    CHOLHDLRATIO 2.6 02/10/2019       HEP B SURFACE ANTIBODY:  Hep B S Ab   Date Value Ref Range Status   05/10/2017 <3.50 <10 mIU/mL Final     Comment:           REFERENCE RANGE:  <10.0      NON-REACTIVE/NOT IMMUNE  >=10.0     REACTIVE/IMMUNE  [address] [phone]       GC DNA URINE:  No results found for: Roselie Reveal PALLIDUM AB:  T. pallidum, IgG   Date Value Ref Range Status   06/04/2018 REACTIVE (A) NR Final     Comment:           Detection of T. pallidum antibodies may indicate recent, remote or previously   treated infections. A positive serological test for syphilis is not diagnostic   of infection, as false positives occur and become more likely in low prevalence   populations. Confirmatory test will be performed (VDRL, Quantitative).         Results reported to the appropriate [address] [place] [address] [phone]     01/14/2018 REACTIVE (A) NR Final     Comment: Detection of T. pallidum antibodies may indicate recent, remote or previously   treated infections. A positive serological test for syphilis is not diagnostic   of infection, as false positives occur and become more likely in low prevalence   populations. Confirmatory test will be performed (VDRL, Quantitative). Results reported to the appropriate [address] [place] [address], [address] [place] [address] [phone]           Thank you for allowing us toparticipate in the care of this patient. Please call with questions. Wayne Templeton MD  Perfect [employer] messaging: [phone]    This note is created with the assistance of a speech recognition program.  While intending to generate adocument that actually reflects the content of the visit, the document can still have some errors including those of syntax and sound a like substitutions which may escape proof reading. It such instances, actual meaningcan be extrapolated by contextual diversion.

## 2022-09-19 ENCOUNTER — TELEPHONE (OUTPATIENT)
Dept: INTERNAL MEDICINE | Age: 82
End: 2022-09-19

## 2022-10-10 DIAGNOSIS — N40.0 BENIGN PROSTATIC HYPERPLASIA WITHOUT LOWER URINARY TRACT SYMPTOMS: ICD-10-CM

## 2022-10-12 ENCOUNTER — TELEPHONE (OUTPATIENT)
Dept: INTERNAL MEDICINE | Age: 82
End: 2022-10-12

## 2022-10-12 DIAGNOSIS — R39.81 URINARY INCONTINENCE DUE TO COGNITIVE IMPAIRMENT: Primary | ICD-10-CM

## 2022-10-12 RX ORDER — TAMSULOSIN HYDROCHLORIDE 0.4 MG/1
CAPSULE ORAL
Qty: 28 CAPSULE | Refills: 3 | Status: SHIPPED | OUTPATIENT
Start: 2022-10-12

## 2022-10-12 RX ORDER — DIVALPROEX SODIUM 125 MG/1
CAPSULE, COATED PELLETS ORAL
Qty: 112 CAPSULE | Refills: 3 | Status: SHIPPED | OUTPATIENT
Start: 2022-10-12

## 2022-10-12 NOTE — TELEPHONE ENCOUNTER
E-scribe request for Deakote and Flomax . Please review and e-scribe if applicable.        Next Visit Date:  Future Appointments   Date Time Provider Jocelyn Ansari   12/1/2022  2:00 PM Tara Kline MD [facility] IM [id number]   3/16/2023  9:45 AM Rosa Isidro MD INFT DISEASE [facility] Maintenance   Topic Date Due    Meningococcal (ACWY) vaccine (1 - Risk start 2-23 months series) Never done    Annual Wellness Visit (AWV)  Never done    COVID-19 Vaccine (4 - Booster for Moderna series) 05/06/2022    Flu vaccine (1) 08/01/2022    Hepatitis A vaccine (1 of 2 - Risk 2-dose series) 11/05/2022 (Originally 10/3/1941)    Hepatitis B vaccine (1 of 3 - Risk 3-dose series) 11/05/2022 (Originally 10/3/1959)    Paddy Nicole (MMR) vaccine (1 of 2 - Risk 2-dose series) 11/05/2022 (Originally [birth year]3/1958)    Shingles vaccine (1 of 2) 11/05/2022 (Originally 10/3/1959)    Depression Screen  03/04/2023    Lipids  08/06/2023    DTaP/Tdap/Td vaccine (2 - Td or Tdap) 02/21/2027    Pneumococcal 65+ years Vaccine  Completed    Hib vaccine  Aged Out               (applicable per patient's age: Cancer Screenings, Depression Screening, Fall Risk Screening, Immunizations)    Hemoglobin A1C (%)   Date Value   08/06/2022 6.0   11/24/2021 6.0   02/10/2019 5.4     LDL Cholesterol (mg/dL)   Date Value   08/06/2022 64     AST (U/L)   Date Value   08/06/2022 30     ALT (U/L)   Date Value   08/06/2022 11     BUN (mg/dL)   Date Value   08/06/2022 7 (L)      (goal A1C is < 7)   (goal LDL is <100) need 30-50% reduction from baseline     BP Readings from Last 3 Encounters:   09/14/22 120/81   08/06/22 (!) 106/92   08/04/22 109/72    (goal /80)      All Future Testing planned in [facility]:  Lab Frequency Next Occurrence   CBC Once 09/09/2022   Comprehensive Metabolic Panel with Bilirubin Once 09/09/2022   HIV RNA, Quantitative, PCR Once 09/09/2022   Lipid Panel Once 09/09/2022   Lymphocyte Subset Once 09/09/2022   TSH With Reflex Ft4 Once 08/04/2022   Hemoglobin A1C Once 08/04/2022   XR CHEST STANDARD (2 VW) Once 37/99/4946   Basic Metabolic Panel Once 09/55/9571   CBC Once 03/14/2023   Comprehensive Metabolic Panel with Bilirubin Once 03/14/2023   HIV RNA, Quantitative, PCR Once 03/14/2023   Lymphocyte Subset Once 03/14/2023            Patient Active Problem List:     HIV (human immunodeficiency virus infection) ([facility] [address]     Dementia without behavioral disturbance ([address]     Osteoporosis right hip fracture dec 08     Hemorrhoids     BPH (benign prostatic hyperplasia)     GERD (gastroesophageal reflux disease)     Mixed hyperlipidemia     Occasional tremors     PVD (peripheral vascular disease) ([address]     Seizure-like activity ([address]     Alzheimer's dementia with behavioral disturbance ([address]     Atrophy, cortical     Hyponatremia     Elevated serum creatinine     Moderate malnutrition (HCC)     COVID     Elevated C-reactive protein (CRP)

## 2022-10-13 ENCOUNTER — TELEPHONE (OUTPATIENT)
Dept: INTERNAL MEDICINE | Age: 82
End: 2022-10-13

## 2022-10-13 NOTE — TELEPHONE ENCOUNTER
----- Message from Juan Jack sent at 10/12/2022  1:15 PM EDT -----  Subject: Message to Provider    QUESTIONS  Information for Provider? Pt's wife called stating they received a letter   from their insurance saying that his insurance will cover a one year   supply of liquid Ensure from 9/28/22 to 9/28/23. Wife states they still   have not received any Ensure and is calling to inquire about what is the   next step they need to do to get this for her . Please advise  ---------------------------------------------------------------------------  --------------  Basim White INFO  [phone]; OK to leave message on voicemail  ---------------------------------------------------------------------------  --------------  SCRIPT ANSWERS  Relationship to Patient?  Self

## 2022-10-13 NOTE — TELEPHONE ENCOUNTER
Pt's wife states they received notification that pt was approved for Ensure, wife wanting to know how to get it. Per chart review CMN for Ensure was sent to [facility].    PC to [facility], no answer at nutritional services extension. Message left identifying self and nature of call, requested call back, number provided. Addendum 1626: Spoke with Daphne Husbands, let her know writer is following up on the Ensure order. Writer will reach out to [facility] again.

## 2022-10-13 NOTE — TELEPHONE ENCOUNTER
Order and last notes printed, PC to Daphne Husbands, pt's spouse to find out what [employer] company to sent it to, no answer. Left HIPAA compliant message identifying self and nature of call, requested call back to writer, phone number given. Will also touch base on Ensure when Daphne Rubio calls back. Addendum 1626: Spoke with [facility] pharmacy preferred for incontinence supplies. Writer faxed order and last notes to [facility], will scan confirmation in once received. Also touched base with Daphne Radfords re: Ensure, see separate phone encounter for further details.

## 2022-10-14 NOTE — TELEPHONE ENCOUNTER
Second PC to [facility] re: Ensure for pt, spoke with TREMAINE. She states that the enteral nutrition was taken over by a different company and unfortunately they will have to get a new prior authorization for the Ensure which will take a few days. Julee states she was planning to reach out to pt/wife today to discuss using [employer] as well or instead of Ensure per pt preference.

## 2022-10-18 NOTE — TELEPHONE ENCOUNTER
Received PC from pt's wife re: Ensure. Writer let her know what was going on with r/t delay in supplements. Writer will reach out in a couple days to see if she has heard anything from [facility], otherwise writer will call [facility] at that time for follow up.

## 2022-10-20 NOTE — TELEPHONE ENCOUNTER
PC to pt's spouse, Светлана Joiner, to see if she had heard from [facility] re: Gloria Shahid stated she had not. PC to [facility] to check on status, no answer. Left HIPAA compliant message identifying self, pt, and nature of call, requested call back to writer, phone number given. Addendum [phone]: Writer received VM from Ernesto Lemus at [facility], stated in message that pt was approved for supplement, but she is showing there is a back order. Julee stated that she was going to reach out to pt's spouse as well to let her know of delay.

## 2022-12-01 ENCOUNTER — OFFICE VISIT (OUTPATIENT)
Dept: INTERNAL MEDICINE | Age: 82
End: 2022-12-01
Payer: MEDICARE

## 2022-12-01 VITALS
HEART RATE: 69 BPM | BODY MASS INDEX: 21.69 KG/M2 | TEMPERATURE: 97.2 F | DIASTOLIC BLOOD PRESSURE: 64 MMHG | WEIGHT: 135 LBS | HEIGHT: 66 IN | SYSTOLIC BLOOD PRESSURE: 138 MMHG

## 2022-12-01 DIAGNOSIS — E44.0 MODERATE MALNUTRITION (HCC): ICD-10-CM

## 2022-12-01 DIAGNOSIS — E87.5 HYPERKALEMIA: ICD-10-CM

## 2022-12-01 DIAGNOSIS — Z21 ASYMPTOMATIC HIV INFECTION, WITH NO HISTORY OF HIV-RELATED ILLNESS (HCC): ICD-10-CM

## 2022-12-01 DIAGNOSIS — F03.90 DEMENTIA WITHOUT BEHAVIORAL DISTURBANCE (HCC): ICD-10-CM

## 2022-12-01 DIAGNOSIS — R32 URINARY INCONTINENCE, UNSPECIFIED TYPE: Primary | ICD-10-CM

## 2022-12-01 PROCEDURE — 1123F ACP DISCUSS/DSCN MKR DOCD: CPT | Performed by: STUDENT IN AN ORGANIZED HEALTH CARE EDUCATION/TRAINING PROGRAM

## 2022-12-01 PROCEDURE — 99214 OFFICE O/P EST MOD 30 MIN: CPT | Performed by: STUDENT IN AN ORGANIZED HEALTH CARE EDUCATION/TRAINING PROGRAM

## 2022-12-01 PROCEDURE — 99211 OFF/OP EST MAY X REQ PHY/QHP: CPT | Performed by: STUDENT IN AN ORGANIZED HEALTH CARE EDUCATION/TRAINING PROGRAM

## 2022-12-01 RX ORDER — BICTEGRAVIR SODIUM, EMTRICITABINE, AND TENOFOVIR ALAFENAMIDE FUMARATE 50; 200; 25 MG/1; MG/1; MG/1
50-200 TABLET ORAL DAILY
COMMUNITY
Start: 2022-11-23

## 2022-12-01 NOTE — PROGRESS NOTES
Attending Physician Statement  I have discussed the care of [address], including pertinent history and exam findings with the resident. I have reviewed the key elements of all parts of the encounter with the resident. I have seen and examined the patient with the resident and the key elements of all parts of the encounter have been performed by me. I agree with the assessment, and status of the problem list as documented. The plan and orders should include No orders of the defined types were placed in this encounter. and this was also documented by the resident. The medication list was reviewed with the resident and is up to date. The return visit should be in 3 months . Diagnosis Orders   1. Urinary incontinence, unspecified type        2. Hyperkalemia        3. Dementia without behavioral disturbance ([address]        4. Moderate malnutrition ([address]        5.  Asymptomatic HIV infection, with no history of HIV-related illness ([address]             Scout Castle MD   Attending Physician, [facility]   Faculty, Internal Medicine Residency Program  [address] [place] [address]

## 2022-12-01 NOTE — PROGRESS NOTES
[facility] IM [address] [address]  Dept: [phone]  Dept Fax: [phone]    Office Progress/Follow Up Note  Date of patient's visit: 12/1/2022  Patient's Name:  Silvestre Camacho YOB: 1940            Patient Care Team:  Kristina Castro MD as PCP - General (Internal Medicine)  Joseph Arias MD as PCP - [facility] Provider  Kenrick Magana MD as Consulting Physician (Infectious Diseases)  Yancey Landau, DPM (Podiatry)  ________________________________________________________________________      Reason for Visit: Routine outpatient follow up visit  ________________________________________________________________________  Chief Complaint:  Dementia (Pt did not take medication today), Equipment Request (Cloth incontinent brief large), and Health Maintenance (Pt refused flu, shingrix, )    ________________________________________________________________________  History of Presenting Illness:  History was obtained from: spouse, electronic medical record. Silvestre Camacho is a 78 y.o. is here for a follow up visit. Patient was last seen by me as new to provider on 8/4/2022 for urinary incontinence due to cognitive impairment, moderate malnutrition. Patient has history of HIV and follows up with infectious disease, his last office visit was on 9/14/2022. Patient is on [address] and no other prophylaxis as per ID. He is up-to-date for his vaccination. Patient was seen in ED on 8/6/2022 for worsening cough. His lab work showed potassium of 6.4 at that time  HbA1c of 6, TSH within normal limits, lipid profile within normal limits, elevated alk phos 137. CBC was unremarkable, CD4 helper cell 465 with low CD4 percentage helper T cells 18. Today patient is here along with wife. Patient is refusing all lab work and is uncooperative. On my assessment he is drowsy and falling asleep.   Wife stated that she woke him up early because he had to come for this appointment and that is why he is drowsy. Is requesting for disposable briefs and reusable chux.       Patient Active Problem List   Diagnosis    HIV (human immunodeficiency virus infection) ([facility] [address]    Dementia without behavioral disturbance [facility]    Osteoporosis right hip fracture dec 08    Hemorrhoids    BPH (benign prostatic hyperplasia)    GERD (gastroesophageal reflux disease)    Mixed hyperlipidemia    Occasional tremors    PVD (peripheral vascular disease) ([facility] [address]    Seizure-like activity (HCC)    Alzheimer's dementia with behavioral disturbance (HCC)    Atrophy, cortical    Hyponatremia    Elevated serum creatinine    Moderate malnutrition (HCC)    COVID    Elevated C-reactive protein (CRP)       No Known Allergies      Current Outpatient Medications   Medication Sig Dispense Refill    divalproex (DEPAKOTE SPRINKLE) 125 MG capsule TAKE 2 CAPSULES BY MOUTH 2 TIMES A  capsule 3    tamsulosin (FLOMAX) 0.4 MG capsule TAKE 1 CAPSULE BY MOUTH DAILY 28 capsule 3    atorvastatin (LIPITOR) 40 MG tablet TAKE 1 TABLET DAILY 28 tablet 5    donepezil (ARICEPT) 10 MG tablet TAKE 1 TABLET IN THE EVENING 28 tablet 5    finasteride (PROSCAR) 5 MG tablet TAKE 1 TABLET BY MOUTH DAILY 28 tablet 5    memantine (NAMENDA) 10 MG tablet TAKE 1 TABLET TWICE DAILY 56 tablet 5    Multiple Vitamin (HIGH POTENCY MULTIVITAMIN) TABS TAKE 1 TABLET BY MOUTH DAILY 28 tablet 5    omeprazole (PRILOSEC) 20 MG delayed release capsule TAKE 1 CAPSULE DAILY 28 capsule 5    Nutritional Supplements (ENSURE HIGH PROTEIN) LIQD Take 1 Bottle by mouth 3 times daily 1 each 2    aspirin (ASPIRIN LOW DOSE) 81 MG EC tablet TAKE 1 TABLET DAILY 28 tablet 5    sertraline (ZOLOFT) 50 MG tablet Take 50 mg by mouth daily      Incontinence Supply Disposable (BRIEFS OVERNIGHT MEDIUM) MISC 1 box by Does not apply route daily 1 each 2    vitamin E 400 UNIT capsule Take 400 Units by mouth daily  5    BIKTARVY -25 MG TABS per tablet Take  mg by mouth daily      Incontinence Supply Disposable (UNDERPADS EXTRA LARGE) MISC 1 Bag by Does not apply route daily 1 each 2     No current facility-administered medications for this visit. Social History     Tobacco Use    Smoking status: Former     Packs/day: 0.00     Years: 50.00     Pack years: 0.00     Types: Cigarettes     Start date:      Quit date: 2021     Years since quittin.9    Smokeless tobacco: Never    Tobacco comments:      cigerettes per day   Vaping Use    Vaping Use: Never used   Substance Use Topics    Alcohol use:  Yes     Alcohol/week: 4.0 standard drinks     Types: 4 Cans of beer per week    Drug use: Yes       Family History   Problem Relation Age of Onset    Heart Disease Mother     High Blood Pressure Mother     Cancer Father     Diabetes Sister     Heart Disease Sister     High Blood Pressure Sister     Heart Disease Brother     High Blood Pressure Brother       ________________________________________________________________________  Review of Systems:  CONSTITUTIONAL: Denies: fever, chills  PSYCH: Denies: anxiety, depression  ALLERGIES: Denies: urticaria  EYES: Denies: blurry vision, decreased vision, photophobia  ENT: Denies: sore throat, nasal congestion  CARDIOVASCULAR: Denies: chest pain, dyspnea on exertion  RESPIRATORY: Denies: cough, hemoptysis, shortness of breath  GI: Denies: Denies: abdominal pain, flank pain  : Denies: Denies: dysuria, frequency/urgency  NEURO: Denies: dizzy/vertigo, headache  MUSCULOSKELETAL: Denies: back pain, joint pain  SKIN: Denies: rash, itching  ________________________________________________________________________  Physical Exam:  Vitals:    22 1354   BP: 138/64   Site: Right Lower Arm   Position: Sitting   Cuff Size: Medium Adult   Pulse: 69   Temp: 97.2 °F (36.2 °C)   Weight: 135 lb (61.2 kg)   Height: 5' 6\" (1.676 m)     BP Readings from Last 3 Encounters:   22 138/64   22 120/81 08/06/22 (!) 106/92        Physical Exam  General appearance - ill-appearing, uncooperative, and drowsy  Mental status - confused, drowsy, uncooperative  Eyes - left eye normal, right eye normal  Ears - right ear normal, left ear normal  Lymphatics - not examined  Chest - clear to auscultation, no wheezes, rales or rhonchi, symmetric air entry  Heart - normal rate, regular rhythm, normal S1, S2, no murmurs, rubs, clicks or gallops  Abdomen - soft, nontender, nondistended, no masses or organomegaly  Neurological -patient is drowsy and uncooperative  Musculoskeletal - no joint tenderness, deformity or swelling  Extremities - peripheral pulses normal, no pedal edema, no clubbing or cyanosis  Skin - normal coloration and turgor, no rashes, no suspicious skin lesions noted    ________________________________________________________________________  Diagnostic findings:  CBC:  Lab Results   Component Value Date/Time    WBC 7.6 08/06/2022 03:25 PM    WBC 7.6 08/06/2022 03:25 PM    HGB 13.6 08/06/2022 03:25 PM    PLT See Reflexed IPF Result 08/06/2022 03:25 PM     04/18/2012 10:00 AM       BMP:    Lab Results   Component Value Date/Time     08/06/2022 03:25 PM    K 5.6 08/06/2022 04:20 PM     08/06/2022 03:25 PM    CO2 28 08/06/2022 03:25 PM    BUN 7 08/06/2022 03:25 PM    CREATININE 1.04 08/06/2022 03:25 PM    GLUCOSE 158 08/06/2022 03:25 PM    GLUCOSE 76 04/18/2012 10:00 AM       HEMOGLOBIN A1C:   Lab Results   Component Value Date/Time    LABA1C 6.0 08/06/2022 03:25 PM       FASTING LIPID PANEL:  Lab Results   Component Value Date    CHOL 150 08/06/2022    HDL 62 08/06/2022    TRIG 118 08/06/2022     ________________________________________________________________________  Health Maintenance:  Health Maintenance Due   Topic Date Due    Meningococcal (ACWY) vaccine (1 - Risk start 2-23 months series) Never done    Hepatitis A vaccine (1 of 2 - Risk 2-dose series) Never done    [employer] with infectious disease sooner if possible and get brain imaging and lab work done.  -Attending discussed with the patient's wife and daughter regarding his worsening dementia and cognitive impairment. As per attending they have a plan, currently patient's wife is helping him out at home and they have an aide who comes every day for 4 hours and helps with giving bath and changing clothes for the patient. Once patient's wife is not able to take care of patient, daughter reported that they will be sending patient to the facility. 4. Moderate malnutrition ([address]  -Patient is on protein supplements, will discuss with attending if he needs to be on mirtazapine. 5. Asymptomatic HIV infection, with no history of HIV-related illness ([facility]  -Patient follows up with infectious disease, is on [address] as per ID. His last CD4 count was 465 in August.  Not on any prophylactic medication. Per chart patient did not get any vaccination so far. Will discuss with attending given his worsening mentation patient probably needs to go to ED.    ________________________________________________________________________  Follow up and instructions:  No follow-ups on file. Mandeep received counseling on the following healthy behaviors: nutrition, exercise, and medication adherence    Discussed use, benefit, and side effects of prescribed medications. Barriers to medication compliance addressed. All patient questions answered. Pt voiced understanding. Patient given educational materials - see patient instructions    Simi Christensen MD  Internal Medicine Resident, PGY-3  [address] [facility]         12/1/2022, 2:43 PM      This note is created with the assistance of a speech-recognition program. While intending to generate a document that actually reflects the content of the visit, the document can still have some mistakes which may not have been identified and corrected by editing.

## 2023-01-05 DIAGNOSIS — I73.9 PVD (PERIPHERAL VASCULAR DISEASE) (HCC): ICD-10-CM

## 2023-01-06 NOTE — TELEPHONE ENCOUNTER
Request for aspirin.   Next luca on 3/2/23    Next Visit Date:  Future Appointments   Date Time Provider Jocelyn Ansari   3/2/2023  1:00 PM Gabriela Jamison MD [facility] IM [facility]   3/16/2023  9:45 AM Vince Hernandez MD [facility]FT [facility] [address] Maintenance   Topic Date Due    Meningococcal (ACWY) vaccine (1 - Risk start 2-23 months series) Never done    Hepatitis A vaccine (1 of 2 - Risk 2-dose series) Never done    Measles,Mumps,Rubella (MMR) vaccine (1 of 2 - Risk 2-dose series) Never done    Hepatitis B vaccine (1 of 3 - Risk 3-dose series) Never done    Shingles vaccine (1 of 2) Never done    Annual Wellness Visit (AWV)  Never done    COVID-19 Vaccine (4 - Booster for Moderna series) 04/08/2022    Flu vaccine (1) 08/01/2022    Depression Screen  03/04/2023    Lipids  08/06/2023    DTaP/Tdap/Td vaccine (2 - Td or Tdap) 02/21/2027    Pneumococcal 65+ years Vaccine  Completed    Hib vaccine  Aged Out       Hemoglobin A1C (%)   Date Value   08/06/2022 6.0   11/24/2021 6.0   02/10/2019 5.4             ( goal A1C is < 7)   No results found for: LABMICR  LDL Cholesterol (mg/dL)   Date Value   08/06/2022 64       (goal LDL is <100)   AST (U/L)   Date Value   08/06/2022 30     ALT (U/L)   Date Value   08/06/2022 11     BUN (mg/dL)   Date Value   08/06/2022 7 (L)     BP Readings from Last 3 Encounters:   12/01/22 138/64   09/14/22 120/81   08/06/22 (!) 106/92          (goal 120/80)    All Future Testing planned in [facility]  Lab Frequency Next Occurrence   CBC Once 09/09/2022   Comprehensive Metabolic Panel with Bilirubin Once 09/09/2022   HIV RNA, Quantitative, PCR Once 09/09/2022   Lipid Panel Once 09/09/2022   Lymphocyte Subset Once 09/09/2022   TSH With Reflex Ft4 Once 08/04/2022   Hemoglobin A1C Once 08/04/2022   XR CHEST STANDARD (2 VW) Once 36/67/7581   Basic Metabolic Panel Once 99/67/1613   CBC Once 03/14/2023   Comprehensive Metabolic Panel with Bilirubin Once 03/14/2023   HIV RNA, Quantitative, PCR Once 03/14/2023   Lymphocyte Subset Once 03/14/2023         Patient Active Problem List:     HIV (human immunodeficiency virus infection) ([facility]     Dementia without behavioral disturbance ([facility]     Osteoporosis right hip fracture dec 08     Hemorrhoids     BPH (benign prostatic hyperplasia)     GERD (gastroesophageal reflux disease)     Mixed hyperlipidemia     Occasional tremors     PVD (peripheral vascular disease) ([facility]     Seizure-like activity (HCC)     Alzheimer's dementia with behavioral disturbance (HCC)     Atrophy, cortical     Hyponatremia     Elevated serum creatinine     Moderate malnutrition (HCC)     COVID     Elevated C-reactive protein (CRP)

## 2023-01-09 RX ORDER — ASPIRIN 81 MG/1
TABLET ORAL
Qty: 28 TABLET | Refills: 5 | Status: SHIPPED | OUTPATIENT
Start: 2023-01-09

## 2023-01-31 DIAGNOSIS — I73.9 PVD (PERIPHERAL VASCULAR DISEASE) (HCC): ICD-10-CM

## 2023-01-31 DIAGNOSIS — N40.0 BENIGN PROSTATIC HYPERPLASIA WITHOUT LOWER URINARY TRACT SYMPTOMS: ICD-10-CM

## 2023-01-31 RX ORDER — FINASTERIDE 5 MG/1
TABLET, FILM COATED ORAL
Qty: 30 TABLET | Refills: 6 | Status: SHIPPED | OUTPATIENT
Start: 2023-01-31

## 2023-01-31 RX ORDER — MEMANTINE HYDROCHLORIDE 10 MG/1
TABLET ORAL
Qty: 60 TABLET | Refills: 6 | Status: SHIPPED | OUTPATIENT
Start: 2023-01-31

## 2023-01-31 RX ORDER — OMEPRAZOLE 20 MG/1
CAPSULE, DELAYED RELEASE ORAL
Qty: 30 CAPSULE | Refills: 6 | Status: SHIPPED | OUTPATIENT
Start: 2023-01-31

## 2023-01-31 RX ORDER — ATORVASTATIN CALCIUM 40 MG/1
TABLET, FILM COATED ORAL
Qty: 30 TABLET | Refills: 6 | Status: SHIPPED | OUTPATIENT
Start: 2023-01-31

## 2023-01-31 RX ORDER — DONEPEZIL HYDROCHLORIDE 10 MG/1
TABLET, FILM COATED ORAL
Qty: 30 TABLET | Refills: 6 | Status: SHIPPED | OUTPATIENT
Start: 2023-01-31

## 2023-01-31 RX ORDER — TAMSULOSIN HYDROCHLORIDE 0.4 MG/1
CAPSULE ORAL
Qty: 30 CAPSULE | Refills: 4 | Status: SHIPPED | OUTPATIENT
Start: 2023-01-31

## 2023-01-31 RX ORDER — DIVALPROEX SODIUM 125 MG/1
CAPSULE, COATED PELLETS ORAL
Qty: 120 CAPSULE | Refills: 4 | Status: SHIPPED | OUTPATIENT
Start: 2023-01-31

## 2023-01-31 RX ORDER — MULTIVITAMIN WITH FOLIC ACID 400 MCG
TABLET ORAL
Qty: 30 TABLET | Refills: 6 | Status: SHIPPED | OUTPATIENT
Start: 2023-01-31

## 2023-01-31 NOTE — TELEPHONE ENCOUNTER
Request for multiple medications.   Next luca on 3/2/23    Next Visit Date:  Future Appointments   Date Time Provider Jocelyn Ansari   3/2/2023  1:00 PM Vidya Adamson MD [facility] IM [facility]   3/16/2023  9:45 AM Vidal Rubio MD INFT DISEASE [address] Maintenance   Topic Date Due    Meningococcal (ACWY) vaccine (1 - Risk start 2-23 months series) Never done    Hepatitis A vaccine (1 of 2 - Risk 2-dose series) Never done    Measles,Mumps,Rubella (MMR) vaccine (1 of 2 - Risk 2-dose series) Never done    Hepatitis B vaccine (1 of 3 - Risk 3-dose series) Never done    Shingles vaccine (1 of 2) Never done    Annual Wellness Visit (AWV)  Never done    COVID-19 Vaccine (4 - Booster for Moderna series) 04/08/2022    Flu vaccine (1) 08/01/2022    Depression Screen  03/04/2023    Lipids  08/06/2023    DTaP/Tdap/Td vaccine (2 - Td or Tdap) 02/21/2027    Pneumococcal 65+ years Vaccine  Completed    Hib vaccine  Aged Out       Hemoglobin A1C (%)   Date Value   08/06/2022 6.0   11/24/2021 6.0   02/10/2019 5.4             ( goal A1C is < 7)   No results found for: LABMICR  LDL Cholesterol (mg/dL)   Date Value   08/06/2022 64       (goal LDL is <100)   AST (U/L)   Date Value   08/06/2022 30     ALT (U/L)   Date Value   08/06/2022 11     BUN (mg/dL)   Date Value   08/06/2022 7 (L)     BP Readings from Last 3 Encounters:   12/01/22 138/64   09/14/22 120/81   08/06/22 (!) 106/92          (goal 120/80)    All Future Testing planned in [facility]  Lab Frequency Next Occurrence   CBC Once 09/09/2022   Comprehensive Metabolic Panel with Bilirubin Once 09/09/2022   HIV RNA, Quantitative, PCR Once 09/09/2022   Lipid Panel Once 09/09/2022   Lymphocyte Subset Once 09/09/2022   TSH With Reflex Ft4 Once 08/04/2022   Hemoglobin A1C Once 08/04/2022   XR CHEST STANDARD (2 VW) Once 10/89/1638   Basic Metabolic Panel Once 39/93/7721   CBC Once 03/14/2023   Comprehensive Metabolic Panel with Bilirubin Once 03/14/2023   HIV RNA, Quantitative, PCR Once 03/14/2023   Lymphocyte Subset Once 03/14/2023         Patient Active Problem List:     HIV (human immunodeficiency virus infection) ([facility]     D[facility] without [facility] disturbance [facility]     Osteoporosis right hip fracture dec 08     Hemorrhoids     BPH (benign prostatic hyperplasia)     GERD (gastroesophageal reflux disease)     Mixed hyperlipidemia     Occasional tremors     PVD (peripheral vascular disease) ([facility]     Seizure-like activity (HCC)     Alzheimer's dementia with behavioral disturbance (HCC)     Atrophy, cortical     Hyponatremia     Elevated serum creatinine     Moderate malnutrition (HCC)     COVID     Elevated C-reactive protein (CRP)

## 2023-03-02 ENCOUNTER — TELEPHONE (OUTPATIENT)
Dept: INTERNAL MEDICINE | Age: 83
End: 2023-03-02

## 2023-03-02 NOTE — TELEPHONE ENCOUNTER
Fyi: Writer let pt wife know that pt need lab done before next appt, pt wife states she will try, pt wife states that it is hard for them to get labs draws because pt do not cooperate.

## 2023-03-07 ENCOUNTER — OFFICE VISIT (OUTPATIENT)
Dept: INTERNAL MEDICINE | Age: 83
End: 2023-03-07

## 2023-03-07 VITALS — BODY MASS INDEX: 21.69 KG/M2 | HEIGHT: 66 IN | TEMPERATURE: 97 F | WEIGHT: 135 LBS

## 2023-03-07 DIAGNOSIS — F02.818 ALZHEIMER'S DEMENTIA WITH BEHAVIORAL DISTURBANCE (HCC): Primary | ICD-10-CM

## 2023-03-07 DIAGNOSIS — G30.9 ALZHEIMER'S DEMENTIA WITH BEHAVIORAL DISTURBANCE (HCC): Primary | ICD-10-CM

## 2023-03-07 DIAGNOSIS — I73.9 PVD (PERIPHERAL VASCULAR DISEASE) (HCC): ICD-10-CM

## 2023-03-07 DIAGNOSIS — Z21 ASYMPTOMATIC HIV INFECTION, WITH NO HISTORY OF HIV-RELATED ILLNESS (HCC): ICD-10-CM

## 2023-03-07 DIAGNOSIS — R56.9 SEIZURE-LIKE ACTIVITY (HCC): ICD-10-CM

## 2023-03-07 DIAGNOSIS — E44.0 MODERATE MALNUTRITION (HCC): ICD-10-CM

## 2023-03-07 PROBLEM — J44.1 COPD EXACERBATION (HCC): Status: ACTIVE | Noted: 2023-03-07

## 2023-03-07 SDOH — ECONOMIC STABILITY: FOOD INSECURITY: WITHIN THE PAST 12 MONTHS, THE FOOD YOU BOUGHT JUST DIDN'T LAST AND YOU DIDN'T HAVE MONEY TO GET MORE.: NEVER TRUE

## 2023-03-07 SDOH — ECONOMIC STABILITY: INCOME INSECURITY: HOW HARD IS IT FOR YOU TO PAY FOR THE VERY BASICS LIKE FOOD, HOUSING, MEDICAL CARE, AND HEATING?: NOT HARD AT ALL

## 2023-03-07 SDOH — ECONOMIC STABILITY: FOOD INSECURITY: WITHIN THE PAST 12 MONTHS, YOU WORRIED THAT YOUR FOOD WOULD RUN OUT BEFORE YOU GOT MONEY TO BUY MORE.: NEVER TRUE

## 2023-03-07 SDOH — ECONOMIC STABILITY: HOUSING INSECURITY
IN THE LAST 12 MONTHS, WAS THERE A TIME WHEN YOU DID NOT HAVE A STEADY PLACE TO SLEEP OR SLEPT IN A SHELTER (INCLUDING NOW)?: NO

## 2023-03-07 ASSESSMENT — PATIENT HEALTH QUESTIONNAIRE - PHQ9
SUM OF ALL RESPONSES TO PHQ QUESTIONS 1-9: 0
1. LITTLE INTEREST OR PLEASURE IN DOING THINGS: 0
SUM OF ALL RESPONSES TO PHQ9 QUESTIONS 1 & 2: 0
2. FEELING DOWN, DEPRESSED OR HOPELESS: 0
SUM OF ALL RESPONSES TO PHQ QUESTIONS 1-9: 0

## 2023-03-07 NOTE — PROGRESS NOTES
[facility] [address] [address]  Dept: [phone]  Dept Fax: [phone]    Office Progress/Follow Up Note  Date of patient's visit: 3/7/2023  Patient's Name:  Rashard Douglas YOB: 1940            Patient Care Team:  Simón Park MD as PCP - General (Internal Medicine)  Ehsan Winslow MD as PCP - Empaneled Provider  Greg Johnson MD as Consulting Physician (Infectious Diseases)  Shaneka Del Real DPM (Podiatry)  ________________________________________________________________________      Reason for Visit: Routine outpatient follow up visit  ________________________________________________________________________  Chief Complaint:  Memory Loss (X years) and Health Maintenance (Pt refused flu)    ________________________________________________________________________  History of Presenting Illness:  History was obtained from: spouse, electronic medical record. Rashard Douglas is a 78 y.o. is here for a follow-up visit. Patient has a history of HIV, COPD and dementia. Follows up with infectious disease and has upcoming appointment on 3/16/2023. Wife reported that patient's dementia is worsening, although he is still able to eat by himself, dress up by himself and use restroom by himself. Although he does need help with bathing and personal hygiene. Wife stated that there is home aide who comes every day and states this for 4 hours and helps with taking care of the patient. His lab work is still pending since September last year, wife reported that patient does not want to be poked and refuses lab work every time. Denies any shortness of breath, chest pain. Not requesting any refills today. Patient refused vitals in the office and is refusing all the vaccines.     Patient Active Problem List   Diagnosis    HIV (human immunodeficiency virus infection) ([facility]    Dementia without behavioral disturbance ([facility] Osteoporosis right hip fracture dec 08    Hemorrhoids    BPH (benign prostatic hyperplasia)    GERD (gastroesophageal reflux disease)    Mixed hyperlipidemia    Occasional tremors    PVD (peripheral vascular disease) (HCC)    Seizure-like activity (HCC)    Alzheimer's dementia with behavioral disturbance (HCC)    Atrophy, cortical    Hyponatremia    Elevated serum creatinine    Moderate malnutrition (HCC)    COVID    Elevated C-reactive protein (CRP)       No Known Allergies      Current Outpatient Medications   Medication Sig Dispense Refill    atorvastatin (LIPITOR) 40 MG tablet TAKE 1 TABLET BY MOUTH DAILY 30 tablet 6    divalproex (DEPAKOTE SPRINKLE) 125 MG DR capsule TAKE 2 CAPSULES BY MOUTH 2 TIMES A  capsule 4    donepezil (ARICEPT) 10 MG tablet TAKE 1 TABLET IN THE EVENING 30 tablet 6    finasteride (PROSCAR) 5 MG tablet TAKE 1 TABLET BY MOUTH DAILY 30 tablet 6    memantine (NAMENDA) 10 MG tablet TAKE 1 TABLET TWICE DAILY 60 tablet 6    Multiple Vitamin (HIGH POTENCY MULTIVITAMIN) TABS TAKE 1 TABLET BY MOUTH DAILY 30 tablet 6    omeprazole (PRILOSEC) 20 MG delayed release capsule TAKE 1 CAPSULE DAILY 30 capsule 6    tamsulosin (FLOMAX) 0.4 MG capsule TAKE 1 CAPSULE BY MOUTH DAILY 30 capsule 4    aspirin 81 MG EC tablet TAKE 1 TABLET BY MOUTH DAILY 28 tablet 5    BIKTARVY -25 MG TABS per tablet Take  mg by mouth daily      Nutritional Supplements (ENSURE HIGH PROTEIN) LIQD Take 1 Bottle by mouth 3 times daily 1 each 2    sertraline (ZOLOFT) 50 MG tablet Take 50 mg by mouth daily      vitamin E 400 UNIT capsule Take 400 Units by mouth daily  5     No current facility-administered medications for this visit.        Social History     Tobacco Use    Smoking status: Former     Packs/day: 0.00     Years: 50.00     Pack years: 0.00     Types: Cigarettes     Start date:      Quit date: 2021     Years since quittin.1    Smokeless tobacco: Never    Tobacco comments:      cigerettes per day   Vaping Use    Vaping Use: Never used   Substance Use Topics    Alcohol use:  Yes     Alcohol/week: 4.0 standard drinks     Types: 4 Cans of beer per week    Drug use: Yes       Family History   Problem Relation Age of Onset    Heart Disease Mother     High Blood Pressure Mother     Cancer Father     Diabetes Sister     Heart Disease Sister     High Blood Pressure Sister     Heart Disease Brother     High Blood Pressure Brother       ________________________________________________________________________  Review of Systems:  CONSTITUTIONAL: Denies: fever, chills  PSYCH: Denies: anxiety, depression  ALLERGIES: Denies: urticaria  EYES: Denies: blurry vision, decreased vision, photophobia  ENT: Denies: sore throat, nasal congestion  CARDIOVASCULAR: Denies: chest pain, dyspnea on exertion  RESPIRATORY: Denies: cough, hemoptysis, shortness of breath  GI: Denies: Denies: abdominal pain, flank pain  : Denies: Denies: dysuria, frequency/urgency  NEURO: Denies: dizzy/vertigo, headache  MUSCULOSKELETAL: Denies: back pain, joint pain  SKIN: Denies: rash, itching  ________________________________________________________________________  Physical Exam:  Vitals:    03/07/23 1554   Temp: 97 °F (36.1 °C)   Weight: 135 lb (61.2 kg)   Height: 5' 6\" (1.676 m)     BP Readings from Last 3 Encounters:   12/01/22 138/64   09/14/22 120/81   08/06/22 (!) 106/92        Physical Exam  General appearance - ill-appearing  Mental status - confused, uncooperative  Eyes - left eye normal, redness rt eye  Ears - bilateral TM's and external ear canals normal, left ear normal  Mouth - mucous membranes moist, pharynx normal without lesions  Chest - clear to auscultation, no wheezes, rales or rhonchi, symmetric air entry  Heart - normal rate, regular rhythm, normal S1, S2, no murmurs, rubs, clicks or gallops  Musculoskeletal - no joint tenderness, deformity or swelling  Extremities - no pedal edema noted, intact peripheral pulses  Skin - dry, warm, no lesions     ________________________________________________________________________  Diagnostic findings:  CBC:  Lab Results   Component Value Date/Time    WBC 7.6 08/06/2022 03:25 PM    WBC 7.6 08/06/2022 03:25 PM    HGB 13.6 08/06/2022 03:25 PM    PLT See Reflexed IPF Result 08/06/2022 03:25 PM     04/18/2012 10:00 AM       BMP:    Lab Results   Component Value Date/Time     08/06/2022 03:25 PM    K 5.6 08/06/2022 04:20 PM     08/06/2022 03:25 PM    CO2 28 08/06/2022 03:25 PM    BUN 7 08/06/2022 03:25 PM    CREATININE 1.04 08/06/2022 03:25 PM    GLUCOSE 158 08/06/2022 03:25 PM    GLUCOSE 76 04/18/2012 10:00 AM       HEMOGLOBIN A1C:   Lab Results   Component Value Date/Time    LABA1C 6.0 08/06/2022 03:25 PM       FASTING LIPID PANEL:  Lab Results   Component Value Date    CHOL 150 08/06/2022    HDL 62 08/06/2022    TRIG 118 08/06/2022     ________________________________________________________________________  Health Maintenance:  Health Maintenance Due   Topic Date Due    Meningococcal (ACWY) vaccine (1 - Risk start 2-23 months series) Never done    Hepatitis A vaccine (1 of 2 - Risk 2-dose series) Never done    Measles,Mumps,Rubella (MMR) vaccine (1 of 2 - Risk 2-dose series) Never done    Hepatitis B vaccine (1 of 3 - Risk 3-dose series) Never done    Shingles vaccine (1 of 2) Never done    Annual Wellness Visit (AWV)  Never done    COVID-19 Vaccine (4 - Booster for Moderna series) 04/08/2022    Flu vaccine (1) 08/01/2022    Depression Screen  03/04/2023       ________________________________________________________________________  Assessment and Plan:  There are no diagnoses linked to this encounter.   1. Dementia without behavioral disturbance ([facility]  -Not controlled, wife reported patient has worsening dementia.  -Patient needs help with daily activity including personal hygiene, has home health aide.  -Attending discussed in detail regarding nursing home placement and social worker referral.  Wife stated that currently she is able to take care of him and will let us know when situation arises when she feels overwhelmed.    2. COPD exacerbation (HCC)  -Not in acute exacerbation.    3. Moderate malnutrition (HCC)  -Continue multivitamins and Ensure high-protein supplements    4. Alzheimer's dementia with behavioral disturbance (HCC)  -Worsening dementia, continue donepezil and memantine.    5. Asymptomatic HIV infection, with no history of HIV-related illness (HCC)  -Follows up with ID, has upcoming appointment on 3/16.  -Continue Biktarvy as recommended by ID.  -Patient advised to get lab work done before next      ________________________________________________________________________  Follow up and instructions:  No follow-ups on file.    Mandeep received counseling on the following healthy behaviors: nutrition, exercise, medication adherence, and SNF /  help.    Discussed use, benefit, and side effects of prescribed medications.  Barriers to medication compliance addressed.  All patient questions answered.  Pt voiced understanding.     Patient given educational materials - see patient instructions    David Carroll MD  Internal Medicine Resident, PGY-3  [facility], [place]         3/7/2023, 4:31 PM      This note is created with the assistance of a speech-recognition program. While intending to generate a document that actually reflects the content of the visit, the document can still have some mistakes which may not have been identified and corrected by editing.

## 2023-03-07 NOTE — PROGRESS NOTES
Attending Physician Statement  I have discussed the care of [address] [facility], including pertinent history and exam findings with the resident. I have reviewed the key elements of all parts of the encounter with the resident. I have seen and examined the patient with the resident and the key elements of all parts of the encounter have been performed by me. I agree with the assessment, and status of the problem list as documented and this was also documented by the resident. The medication list was reviewed with the resident and is up to date. The return visit should be in 3 months . Diagnosis Orders   1. Alzheimer's dementia with behavioral disturbance ([address]        2. Moderate malnutrition ([address]        3. Asymptomatic HIV infection, with no history of HIV-related illness ([address]        4. PVD (peripheral vascular disease) ([address]        5.  Seizure-like activity ([address]             Filiberto Zaman MD   Attending Physician, [address] [place] [address], Internal Medicine Residency Program  [address] [place] [address]

## 2023-03-08 ENCOUNTER — HOSPITAL ENCOUNTER (OUTPATIENT)
Age: 83
Setting detail: SPECIMEN
Discharge: HOME | End: 2023-03-08

## 2023-03-08 DIAGNOSIS — Z21 ASYMPTOMATIC HIV INFECTION (HCC): ICD-10-CM

## 2023-03-08 DIAGNOSIS — R79.9 ABNORMAL FINDING OF BLOOD CHEMISTRY, UNSPECIFIED: ICD-10-CM

## 2023-03-08 DIAGNOSIS — F01.50 VASCULAR DEMENTIA WITHOUT BEHAVIORAL DISTURBANCE (HCC): ICD-10-CM

## 2023-03-08 DIAGNOSIS — E44.0 MODERATE MALNUTRITION (HCC): ICD-10-CM

## 2023-03-08 LAB
ALBUMIN SERPL-MCNC: 3.8 G/DL (ref 3.5–5.2)
ALBUMIN/GLOBULIN RATIO: 0.9 (ref 1–2.5)
ALP SERPL-CCNC: 155 U/L (ref 40–129)
ALT SERPL-CCNC: 14 U/L (ref 5–41)
ANION GAP SERPL CALCULATED.3IONS-SCNC: 9 MMOL/L (ref 9–17)
AST SERPL-CCNC: 22 U/L
BILIRUB DIRECT SERPL-MCNC: 0.1 MG/DL
BILIRUB INDIRECT SERPL-MCNC: 0.2 MG/DL (ref 0–1)
BILIRUB SERPL-MCNC: 0.3 MG/DL (ref 0.3–1.2)
BUN SERPL-MCNC: 13 MG/DL (ref 8–23)
CALCIUM SERPL-MCNC: 9.4 MG/DL (ref 8.6–10.4)
CHLORIDE SERPL-SCNC: 104 MMOL/L (ref 98–107)
CO2 SERPL-SCNC: 27 MMOL/L (ref 20–31)
CREAT SERPL-MCNC: 1.01 MG/DL (ref 0.7–1.2)
EST. AVERAGE GLUCOSE BLD GHB EST-MCNC: 169 MG/DL
GFR SERPL CREATININE-BSD FRML MDRD: >60 ML/MIN/1.73M2
GLUCOSE SERPL-MCNC: 167 MG/DL (ref 70–99)
HBA1C MFR BLD: 7.5 % (ref 4–6)
HCT VFR BLD AUTO: 42.5 % (ref 40.7–50.3)
HGB BLD-MCNC: 13.2 G/DL (ref 13–17)
MCH RBC QN AUTO: 28.1 PG (ref 25.2–33.5)
MCHC RBC AUTO-ENTMCNC: 31.1 G/DL (ref 28.4–34.8)
MCV RBC AUTO: 90.6 FL (ref 82.6–102.9)
NRBC AUTOMATED: 0 PER 100 WBC
PDW BLD-RTO: 14.8 % (ref 11.8–14.4)
PLATELET # BLD AUTO: 155 K/UL (ref 138–453)
PMV BLD AUTO: 12 FL (ref 8.1–13.5)
POTASSIUM SERPL-SCNC: 5.1 MMOL/L (ref 3.7–5.3)
PROT SERPL-MCNC: 8.2 G/DL (ref 6.4–8.3)
RBC # BLD: 4.69 M/UL (ref 4.21–5.77)
SODIUM SERPL-SCNC: 140 MMOL/L (ref 135–144)
TSH SERPL-ACNC: 1.61 UIU/ML (ref 0.3–5)
WBC # BLD AUTO: 7 K/UL (ref 3.5–11.3)

## 2023-03-09 LAB
% NK (CD56): 19 % (ref 6–29)
AB NK (CD56): 577 /UL (ref 90–600)
ABSOLUTE CD 3: 2125 /UL (ref 411–2061)
ABSOLUTE CD 4 HELPER: 577 /UL (ref 309–1571)
ABSOLUTE CD 8 (SUPP): 1518 /UL (ref 282–999)
ABSOLUTE CD19 B CELL: 152 /UL (ref 71–567)
CD19 B CELL: 5 % (ref 5–25)
CD3 % TOTAL T CELLS: 70 % (ref 57–94)
CD4 % HELPER T CELL: 19 % (ref 27–64)
CD4:CD8: 0.38 (ref 0.6–2.8)
CD8 % SUPPRESSOR T CELL: 50 % (ref 17–48)
LYMPHOCYTES # BLD: 44 % (ref 24–44)
WBC # BLD: 6.9 K/UL (ref 3.5–11)

## 2023-03-13 DIAGNOSIS — R39.81 URINARY INCONTINENCE DUE TO COGNITIVE IMPAIRMENT: ICD-10-CM

## 2023-03-13 DIAGNOSIS — R32 URINARY INCONTINENCE, UNSPECIFIED TYPE: ICD-10-CM

## 2023-03-14 RX ORDER — BROMPHENIRAMIN/PSEUDOEPHEDRINE 1-15MG/5ML
1 LIQUID (ML) ORAL DAILY
Qty: 1 EACH | Refills: 2 | Status: SHIPPED | OUTPATIENT
Start: 2023-03-14

## 2023-03-16 ENCOUNTER — OFFICE VISIT (OUTPATIENT)
Dept: INFECTIOUS DISEASES | Age: 83
End: 2023-03-16
Payer: MEDICARE

## 2023-03-16 VITALS
TEMPERATURE: 97.2 F | SYSTOLIC BLOOD PRESSURE: 131 MMHG | HEIGHT: 66 IN | BODY MASS INDEX: 21.69 KG/M2 | DIASTOLIC BLOOD PRESSURE: 82 MMHG | WEIGHT: 135 LBS | HEART RATE: 75 BPM

## 2023-03-16 DIAGNOSIS — Z21 ASYMPTOMATIC HIV INFECTION (HCC): Primary | ICD-10-CM

## 2023-03-16 DIAGNOSIS — F01.50 VASCULAR DEMENTIA WITHOUT BEHAVIORAL DISTURBANCE (HCC): ICD-10-CM

## 2023-03-16 PROCEDURE — 99213 OFFICE O/P EST LOW 20 MIN: CPT | Performed by: INTERNAL MEDICINE

## 2023-03-16 PROCEDURE — 1123F ACP DISCUSS/DSCN MKR DOCD: CPT | Performed by: INTERNAL MEDICINE

## 2023-03-16 RX ORDER — BICTEGRAVIR SODIUM, EMTRICITABINE, AND TENOFOVIR ALAFENAMIDE FUMARATE 50; 200; 25 MG/1; MG/1; MG/1
1 TABLET ORAL DAILY
Qty: 30 TABLET | Refills: 5 | Status: SHIPPED | OUTPATIENT
Start: 2023-03-16 | End: 2023-08-01 | Stop reason: SDUPTHER

## 2023-03-17 NOTE — PROGRESS NOTES
Falguni Prather, do you know if this was faxed?
Order signed in clinic.
Sign order in clinic. Once sign will fax to [facility].
English

## 2023-04-24 ENCOUNTER — OFFICE VISIT (OUTPATIENT)
Dept: PODIATRY | Age: 83
End: 2023-04-24
Payer: MEDICARE

## 2023-04-24 VITALS
SYSTOLIC BLOOD PRESSURE: 112 MMHG | HEIGHT: 66 IN | BODY MASS INDEX: 21.69 KG/M2 | DIASTOLIC BLOOD PRESSURE: 80 MMHG | HEART RATE: 48 BPM | WEIGHT: 135 LBS

## 2023-04-24 DIAGNOSIS — E11.69 ONYCHOMYCOSIS OF MULTIPLE TOENAILS WITH TYPE 2 DIABETES MELLITUS (HCC): ICD-10-CM

## 2023-04-24 DIAGNOSIS — F03.90 DEMENTIA WITHOUT BEHAVIORAL DISTURBANCE (HCC): ICD-10-CM

## 2023-04-24 DIAGNOSIS — I73.9 PVD (PERIPHERAL VASCULAR DISEASE) (HCC): Primary | ICD-10-CM

## 2023-04-24 DIAGNOSIS — B35.1 ONYCHOMYCOSIS OF MULTIPLE TOENAILS WITH TYPE 2 DIABETES MELLITUS (HCC): ICD-10-CM

## 2023-04-24 PROCEDURE — 11721 DEBRIDE NAIL 6 OR MORE: CPT

## 2023-04-24 PROCEDURE — 3051F HG A1C>EQUAL 7.0%<8.0%: CPT

## 2023-04-24 PROCEDURE — 1123F ACP DISCUSS/DSCN MKR DOCD: CPT

## 2023-04-24 PROCEDURE — 99203 OFFICE O/P NEW LOW 30 MIN: CPT

## 2023-06-08 ENCOUNTER — OFFICE VISIT (OUTPATIENT)
Dept: INTERNAL MEDICINE | Age: 83
End: 2023-06-08
Payer: MEDICARE

## 2023-06-08 VITALS
HEART RATE: 104 BPM | TEMPERATURE: 97.7 F | SYSTOLIC BLOOD PRESSURE: 128 MMHG | DIASTOLIC BLOOD PRESSURE: 80 MMHG | BODY MASS INDEX: 20.89 KG/M2 | HEIGHT: 66 IN | WEIGHT: 130 LBS | OXYGEN SATURATION: 98 %

## 2023-06-08 DIAGNOSIS — G30.9 ALZHEIMER'S DEMENTIA WITH BEHAVIORAL DISTURBANCE (HCC): Primary | ICD-10-CM

## 2023-06-08 DIAGNOSIS — E11.9 TYPE 2 DIABETES MELLITUS WITHOUT COMPLICATION, WITHOUT LONG-TERM CURRENT USE OF INSULIN (HCC): ICD-10-CM

## 2023-06-08 DIAGNOSIS — F02.818 ALZHEIMER'S DEMENTIA WITH BEHAVIORAL DISTURBANCE (HCC): Primary | ICD-10-CM

## 2023-06-08 DIAGNOSIS — E44.0 MODERATE MALNUTRITION (HCC): ICD-10-CM

## 2023-06-08 PROCEDURE — 99213 OFFICE O/P EST LOW 20 MIN: CPT | Performed by: STUDENT IN AN ORGANIZED HEALTH CARE EDUCATION/TRAINING PROGRAM

## 2023-06-08 PROCEDURE — 1123F ACP DISCUSS/DSCN MKR DOCD: CPT | Performed by: STUDENT IN AN ORGANIZED HEALTH CARE EDUCATION/TRAINING PROGRAM

## 2023-06-08 PROCEDURE — 3051F HG A1C>EQUAL 7.0%<8.0%: CPT | Performed by: STUDENT IN AN ORGANIZED HEALTH CARE EDUCATION/TRAINING PROGRAM

## 2023-06-08 RX ORDER — MULTIVITAMIN WITH FOLIC ACID 400 MCG
1 TABLET ORAL DAILY
Qty: 30 TABLET | Refills: 6 | Status: SHIPPED | OUTPATIENT
Start: 2023-06-08

## 2023-06-08 RX ORDER — FEEDER CONTAINER WITH PUMP SET
1 EACH MISCELLANEOUS 3 TIMES DAILY
Qty: 90 EACH | Refills: 11 | Status: SHIPPED | OUTPATIENT
Start: 2023-06-08

## 2023-06-08 NOTE — PROGRESS NOTES
Attending Physician Statement  I have discussed the care of Nel Herbert, including pertinent history and exam findings,  with the resident. I have reviewed the key elements of all parts of the encounter with the resident. I agree with the assessment, plan and orders as documented by the resident.   (GE Modifier)
Mandeep Iverson requires a bedside commode due to being confined to one level of the home, and is physically incapable of utilizing regular toilet facilities. Current body weight is Weight - Scale: 130 lb (59 kg). He has significant medical history with HIV, Dementia and COPD. His wife helps him out with his daily needs and also have aide 7 days a wk.
Osteoporosis right hip fracture dec 08    Hemorrhoids    BPH (benign prostatic hyperplasia)    GERD (gastroesophageal reflux disease)    Mixed hyperlipidemia    Occasional tremors    PVD (peripheral vascular disease) (HCC)    Seizure-like activity (HCC)    Alzheimer's dementia with behavioral disturbance (HCC)    Atrophy, cortical    Hyponatremia    Elevated serum creatinine    Moderate malnutrition (HCC)    COVID    Elevated C-reactive protein (CRP)    COPD exacerbation (HCC)       No Known Allergies      Current Outpatient Medications   Medication Sig Dispense Refill    Multiple Vitamin (HIGH POTENCY MULTIVITAMIN) TABS Take 1 tablet by mouth daily 30 tablet 6    Nutritional Supplements (ENSURE HIGH PROTEIN) LIQD Take 1 Bottle by mouth 3 times daily 90 each 11    BIKTARVY -25 MG TABS per tablet Take 1 tablet by mouth daily 30 tablet 5    Incontinence Supply Disposable (BRIEFS OVERNIGHT MEDIUM) MISC 1 box by Does not apply route daily 1 each 2    atorvastatin (LIPITOR) 40 MG tablet TAKE 1 TABLET BY MOUTH DAILY 30 tablet 6    divalproex (DEPAKOTE SPRINKLE) 125 MG DR capsule TAKE 2 CAPSULES BY MOUTH 2 TIMES A  capsule 4    donepezil (ARICEPT) 10 MG tablet TAKE 1 TABLET IN THE EVENING 30 tablet 6    finasteride (PROSCAR) 5 MG tablet TAKE 1 TABLET BY MOUTH DAILY 30 tablet 6    memantine (NAMENDA) 10 MG tablet TAKE 1 TABLET TWICE DAILY 60 tablet 6    omeprazole (PRILOSEC) 20 MG delayed release capsule TAKE 1 CAPSULE DAILY 30 capsule 6    tamsulosin (FLOMAX) 0.4 MG capsule TAKE 1 CAPSULE BY MOUTH DAILY 30 capsule 4    aspirin 81 MG EC tablet TAKE 1 TABLET BY MOUTH DAILY 28 tablet 5    sertraline (ZOLOFT) 50 MG tablet Take 1 tablet by mouth daily      vitamin E 400 UNIT capsule Take 1 capsule by mouth daily  5     No current facility-administered medications for this visit.        Social History     Tobacco Use    Smoking status: Former     Packs/day: 0.00     Years: 50.00     Pack years: 0.00     Types:

## 2023-06-18 ENCOUNTER — APPOINTMENT (OUTPATIENT)
Dept: GENERAL RADIOLOGY | Age: 83
End: 2023-06-18
Payer: MEDICARE

## 2023-06-18 ENCOUNTER — HOSPITAL ENCOUNTER (OUTPATIENT)
Age: 83
Setting detail: OBSERVATION
Discharge: HOME | End: 2023-06-19
Attending: EMERGENCY MEDICINE
Payer: MEDICARE

## 2023-06-18 DIAGNOSIS — N30.00 ACUTE CYSTITIS WITHOUT HEMATURIA: Primary | ICD-10-CM

## 2023-06-18 PROBLEM — R41.82 AMS (ALTERED MENTAL STATUS): Status: ACTIVE | Noted: 2023-06-18

## 2023-06-18 PROBLEM — N39.0 UTI (URINARY TRACT INFECTION): Status: ACTIVE | Noted: 2023-06-18

## 2023-06-18 PROBLEM — R41.82 AMS (ALTERED MENTAL STATUS): Chronic | Status: ACTIVE | Noted: 2023-06-18

## 2023-06-18 LAB
ALBUMIN SERPL-MCNC: 3.6 G/DL (ref 3.5–5.2)
ALBUMIN/GLOB SERPL: 0.8 {RATIO} (ref 1–2.5)
ALP SERPL-CCNC: 167 U/L (ref 40–129)
ALT SERPL-CCNC: 15 U/L (ref 5–41)
ANION GAP SERPL CALCULATED.3IONS-SCNC: 9 MMOL/L (ref 9–17)
AST SERPL-CCNC: 28 U/L
BASOPHILS # BLD: 0.06 K/UL (ref 0–0.2)
BASOPHILS NFR BLD: 1 % (ref 0–2)
BILIRUB SERPL-MCNC: 0.2 MG/DL (ref 0.3–1.2)
BILIRUB UR QL STRIP: NEGATIVE
BUN SERPL-MCNC: 12 MG/DL (ref 8–23)
CALCIUM SERPL-MCNC: 9.2 MG/DL (ref 8.6–10.4)
CASTS #/AREA URNS LPF: ABNORMAL /LPF (ref 0–8)
CHLORIDE SERPL-SCNC: 100 MMOL/L (ref 98–107)
CLARITY UR: ABNORMAL
CO2 SERPL-SCNC: 26 MMOL/L (ref 20–31)
COLOR UR: YELLOW
CREAT SERPL-MCNC: 1.01 MG/DL (ref 0.7–1.2)
CRP SERPL HS-MCNC: 13 MG/L (ref 0–5)
EOSINOPHIL # BLD: 0.17 K/UL (ref 0–0.44)
EOSINOPHILS RELATIVE PERCENT: 1 % (ref 1–4)
EPI CELLS #/AREA URNS HPF: ABNORMAL /HPF (ref 0–5)
ERYTHROCYTE [DISTWIDTH] IN BLOOD BY AUTOMATED COUNT: 14.6 % (ref 11.8–14.4)
GFR SERPL CREATININE-BSD FRML MDRD: >60 ML/MIN/1.73M2
GLUCOSE SERPL-MCNC: 166 MG/DL (ref 70–99)
GLUCOSE UR STRIP-MCNC: NEGATIVE MG/DL
HCT VFR BLD AUTO: 40.5 % (ref 40.7–50.3)
HGB BLD-MCNC: 12.6 G/DL (ref 13–17)
HGB UR QL STRIP.AUTO: NEGATIVE
IMM GRANULOCYTES # BLD AUTO: 0.06 K/UL (ref 0–0.3)
IMM GRANULOCYTES NFR BLD: 1 %
KETONES UR STRIP-MCNC: NEGATIVE MG/DL
LACTIC ACID, SEPSIS WHOLE BLOOD: 3.2 MMOL/L (ref 0.5–1.9)
LACTIC ACID, SEPSIS WHOLE BLOOD: 4.8 MMOL/L (ref 0.5–1.9)
LEUKOCYTE ESTERASE UR QL STRIP: ABNORMAL
LYMPHOCYTES # BLD: 29 % (ref 24–43)
LYMPHOCYTES NFR BLD: 3.43 K/UL (ref 1.1–3.7)
MCH RBC QN AUTO: 28 PG (ref 25.2–33.5)
MCHC RBC AUTO-ENTMCNC: 31.1 G/DL (ref 28.4–34.8)
MCV RBC AUTO: 90 FL (ref 82.6–102.9)
MONOCYTES NFR BLD: 1.16 K/UL (ref 0.1–1.2)
MONOCYTES NFR BLD: 10 % (ref 3–12)
NEUTROPHILS NFR BLD: 58 % (ref 36–65)
NEUTS SEG NFR BLD: 6.85 K/UL (ref 1.5–8.1)
NITRITE UR QL STRIP: NEGATIVE
NRBC AUTOMATED: 0 PER 100 WBC
PH UR STRIP: 7.5 [PH] (ref 5–8)
PLATELET # BLD AUTO: 142 K/UL (ref 138–453)
PMV BLD AUTO: 11.1 FL (ref 8.1–13.5)
POTASSIUM SERPL-SCNC: 5.1 MMOL/L (ref 3.7–5.3)
PROT SERPL-MCNC: 8 G/DL (ref 6.4–8.3)
PROT UR STRIP-MCNC: NEGATIVE MG/DL
RBC # BLD AUTO: 4.5 M/UL (ref 4.21–5.77)
RBC # BLD: ABNORMAL 10*6/UL
RBC #/AREA URNS HPF: ABNORMAL /HPF (ref 0–4)
SODIUM SERPL-SCNC: 135 MMOL/L (ref 135–144)
SP GR UR STRIP: 1.01 (ref 1–1.03)
UROBILINOGEN UR STRIP-ACNC: NORMAL
WBC #/AREA URNS HPF: ABNORMAL /HPF (ref 0–5)
WBC OTHER # BLD: 11.7 K/UL (ref 3.5–11.3)

## 2023-06-18 PROCEDURE — 99285 EMERGENCY DEPT VISIT HI MDM: CPT

## 2023-06-18 PROCEDURE — G0378 HOSPITAL OBSERVATION PER HR: HCPCS

## 2023-06-18 PROCEDURE — 99222 1ST HOSP IP/OBS MODERATE 55: CPT | Performed by: NURSE PRACTITIONER

## 2023-06-18 PROCEDURE — 96361 HYDRATE IV INFUSION ADD-ON: CPT

## 2023-06-18 PROCEDURE — 83605 ASSAY OF LACTIC ACID: CPT

## 2023-06-18 PROCEDURE — 96372 THER/PROPH/DIAG INJ SC/IM: CPT

## 2023-06-18 PROCEDURE — 6360000002 HC RX W HCPCS: Performed by: STUDENT IN AN ORGANIZED HEALTH CARE EDUCATION/TRAINING PROGRAM

## 2023-06-18 PROCEDURE — 87040 BLOOD CULTURE FOR BACTERIA: CPT

## 2023-06-18 PROCEDURE — 6360000002 HC RX W HCPCS: Performed by: NURSE PRACTITIONER

## 2023-06-18 PROCEDURE — 85027 COMPLETE CBC AUTOMATED: CPT

## 2023-06-18 PROCEDURE — 81001 URINALYSIS AUTO W/SCOPE: CPT

## 2023-06-18 PROCEDURE — 2580000003 HC RX 258: Performed by: NURSE PRACTITIONER

## 2023-06-18 PROCEDURE — 87077 CULTURE AEROBIC IDENTIFY: CPT

## 2023-06-18 PROCEDURE — 87086 URINE CULTURE/COLONY COUNT: CPT

## 2023-06-18 PROCEDURE — 86140 C-REACTIVE PROTEIN: CPT

## 2023-06-18 PROCEDURE — 96374 THER/PROPH/DIAG INJ IV PUSH: CPT

## 2023-06-18 PROCEDURE — 71045 X-RAY EXAM CHEST 1 VIEW: CPT

## 2023-06-18 PROCEDURE — 80053 COMPREHEN METABOLIC PANEL: CPT

## 2023-06-18 PROCEDURE — 73502 X-RAY EXAM HIP UNI 2-3 VIEWS: CPT

## 2023-06-18 PROCEDURE — 6370000000 HC RX 637 (ALT 250 FOR IP): Performed by: NURSE PRACTITIONER

## 2023-06-18 PROCEDURE — 2580000003 HC RX 258: Performed by: STUDENT IN AN ORGANIZED HEALTH CARE EDUCATION/TRAINING PROGRAM

## 2023-06-18 PROCEDURE — 96365 THER/PROPH/DIAG IV INF INIT: CPT

## 2023-06-18 RX ORDER — SODIUM CHLORIDE 9 MG/ML
INJECTION, SOLUTION INTRAVENOUS PRN
Status: DISCONTINUED | OUTPATIENT
Start: 2023-06-18 | End: 2023-06-19 | Stop reason: HOSPADM

## 2023-06-18 RX ORDER — MAGNESIUM SULFATE 1 G/100ML
1000 INJECTION INTRAVENOUS PRN
Status: DISCONTINUED | OUTPATIENT
Start: 2023-06-18 | End: 2023-06-19 | Stop reason: HOSPADM

## 2023-06-18 RX ORDER — ENOXAPARIN SODIUM 100 MG/ML
40 INJECTION SUBCUTANEOUS DAILY
Status: DISCONTINUED | OUTPATIENT
Start: 2023-06-18 | End: 2023-06-19 | Stop reason: HOSPADM

## 2023-06-18 RX ORDER — 0.9 % SODIUM CHLORIDE 0.9 %
30 INTRAVENOUS SOLUTION INTRAVENOUS ONCE
Status: COMPLETED | OUTPATIENT
Start: 2023-06-18 | End: 2023-06-18

## 2023-06-18 RX ORDER — ATORVASTATIN CALCIUM 40 MG/1
40 TABLET, FILM COATED ORAL DAILY
Status: DISCONTINUED | OUTPATIENT
Start: 2023-06-18 | End: 2023-06-19 | Stop reason: HOSPADM

## 2023-06-18 RX ORDER — ACETAMINOPHEN 325 MG/1
650 TABLET ORAL EVERY 6 HOURS PRN
Status: DISCONTINUED | OUTPATIENT
Start: 2023-06-18 | End: 2023-06-19 | Stop reason: HOSPADM

## 2023-06-18 RX ORDER — FINASTERIDE 5 MG/1
5 TABLET, FILM COATED ORAL DAILY
Status: DISCONTINUED | OUTPATIENT
Start: 2023-06-18 | End: 2023-06-19 | Stop reason: HOSPADM

## 2023-06-18 RX ORDER — VITAMIN E 268 MG
400 CAPSULE ORAL DAILY
Status: DISCONTINUED | OUTPATIENT
Start: 2023-06-18 | End: 2023-06-19 | Stop reason: HOSPADM

## 2023-06-18 RX ORDER — ONDANSETRON 2 MG/ML
4 INJECTION INTRAMUSCULAR; INTRAVENOUS EVERY 6 HOURS PRN
Status: DISCONTINUED | OUTPATIENT
Start: 2023-06-18 | End: 2023-06-19 | Stop reason: HOSPADM

## 2023-06-18 RX ORDER — ASPIRIN 81 MG/1
81 TABLET ORAL ONCE
Status: COMPLETED | OUTPATIENT
Start: 2023-06-18 | End: 2023-06-18

## 2023-06-18 RX ORDER — DIVALPROEX SODIUM 125 MG/1
125 CAPSULE, COATED PELLETS ORAL EVERY 12 HOURS SCHEDULED
Status: DISCONTINUED | OUTPATIENT
Start: 2023-06-18 | End: 2023-06-19 | Stop reason: HOSPADM

## 2023-06-18 RX ORDER — MEMANTINE HYDROCHLORIDE 5 MG/1
10 TABLET ORAL 2 TIMES DAILY
Status: DISCONTINUED | OUTPATIENT
Start: 2023-06-18 | End: 2023-06-19 | Stop reason: HOSPADM

## 2023-06-18 RX ORDER — SODIUM CHLORIDE 0.9 % (FLUSH) 0.9 %
5-40 SYRINGE (ML) INJECTION EVERY 12 HOURS SCHEDULED
Status: DISCONTINUED | OUTPATIENT
Start: 2023-06-18 | End: 2023-06-19 | Stop reason: HOSPADM

## 2023-06-18 RX ORDER — PANTOPRAZOLE SODIUM 40 MG/1
40 TABLET, DELAYED RELEASE ORAL
Status: DISCONTINUED | OUTPATIENT
Start: 2023-06-19 | End: 2023-06-19 | Stop reason: HOSPADM

## 2023-06-18 RX ORDER — ONDANSETRON 4 MG/1
4 TABLET, ORALLY DISINTEGRATING ORAL EVERY 8 HOURS PRN
Status: DISCONTINUED | OUTPATIENT
Start: 2023-06-18 | End: 2023-06-19 | Stop reason: HOSPADM

## 2023-06-18 RX ORDER — POTASSIUM CHLORIDE 20 MEQ/1
40 TABLET, EXTENDED RELEASE ORAL PRN
Status: DISCONTINUED | OUTPATIENT
Start: 2023-06-18 | End: 2023-06-19 | Stop reason: HOSPADM

## 2023-06-18 RX ORDER — POLYETHYLENE GLYCOL 3350 17 G/17G
17 POWDER, FOR SOLUTION ORAL DAILY PRN
Status: DISCONTINUED | OUTPATIENT
Start: 2023-06-18 | End: 2023-06-19 | Stop reason: HOSPADM

## 2023-06-18 RX ORDER — TAMSULOSIN HYDROCHLORIDE 0.4 MG/1
0.4 CAPSULE ORAL DAILY
Status: DISCONTINUED | OUTPATIENT
Start: 2023-06-18 | End: 2023-06-19 | Stop reason: HOSPADM

## 2023-06-18 RX ORDER — ACETAMINOPHEN 650 MG/1
650 SUPPOSITORY RECTAL EVERY 6 HOURS PRN
Status: DISCONTINUED | OUTPATIENT
Start: 2023-06-18 | End: 2023-06-19 | Stop reason: HOSPADM

## 2023-06-18 RX ORDER — SODIUM CHLORIDE 0.9 % (FLUSH) 0.9 %
10 SYRINGE (ML) INJECTION PRN
Status: DISCONTINUED | OUTPATIENT
Start: 2023-06-18 | End: 2023-06-19 | Stop reason: HOSPADM

## 2023-06-18 RX ORDER — SODIUM CHLORIDE 9 MG/ML
INJECTION, SOLUTION INTRAVENOUS CONTINUOUS
Status: DISCONTINUED | OUTPATIENT
Start: 2023-06-18 | End: 2023-06-19 | Stop reason: HOSPADM

## 2023-06-18 RX ORDER — POTASSIUM CHLORIDE 7.45 MG/ML
10 INJECTION INTRAVENOUS PRN
Status: DISCONTINUED | OUTPATIENT
Start: 2023-06-18 | End: 2023-06-19 | Stop reason: HOSPADM

## 2023-06-18 RX ORDER — DONEPEZIL HYDROCHLORIDE 10 MG/1
10 TABLET, FILM COATED ORAL NIGHTLY
Status: DISCONTINUED | OUTPATIENT
Start: 2023-06-18 | End: 2023-06-19 | Stop reason: HOSPADM

## 2023-06-18 RX ADMIN — SODIUM CHLORIDE: 9 INJECTION, SOLUTION INTRAVENOUS at 14:48

## 2023-06-18 RX ADMIN — MEMANTINE HYDROCHLORIDE 10 MG: 5 TABLET, FILM COATED ORAL at 20:38

## 2023-06-18 RX ADMIN — SODIUM CHLORIDE, PRESERVATIVE FREE 10 ML: 5 INJECTION INTRAVENOUS at 20:38

## 2023-06-18 RX ADMIN — CEFTRIAXONE SODIUM 1000 MG: 1 INJECTION, POWDER, FOR SOLUTION INTRAMUSCULAR; INTRAVENOUS at 10:30

## 2023-06-18 RX ADMIN — Medication 81 MG: at 15:39

## 2023-06-18 RX ADMIN — FINASTERIDE 5 MG: 5 TABLET, FILM COATED ORAL at 15:39

## 2023-06-18 RX ADMIN — DONEPEZIL HYDROCHLORIDE 10 MG: 10 TABLET, FILM COATED ORAL at 20:38

## 2023-06-18 RX ADMIN — Medication 400 UNITS: at 15:39

## 2023-06-18 RX ADMIN — DIVALPROEX SODIUM 125 MG: 125 CAPSULE, COATED PELLETS ORAL at 20:38

## 2023-06-18 RX ADMIN — BICTEGRAVIR SODIUM, EMTRICITABINE, AND TENOFOVIR ALAFENAMIDE FUMARATE 1 TABLET: 50; 200; 25 TABLET ORAL at 15:38

## 2023-06-18 RX ADMIN — DESMOPRESSIN ACETATE 40 MG: 0.2 TABLET ORAL at 15:39

## 2023-06-18 RX ADMIN — TAMSULOSIN HYDROCHLORIDE 0.4 MG: 0.4 CAPSULE ORAL at 15:39

## 2023-06-18 RX ADMIN — ENOXAPARIN SODIUM 40 MG: 40 INJECTION SUBCUTANEOUS at 15:40

## 2023-06-18 RX ADMIN — SODIUM CHLORIDE 1770 ML: 9 INJECTION, SOLUTION INTRAVENOUS at 10:30

## 2023-06-18 ASSESSMENT — PAIN SCALES - WONG BAKER: WONGBAKER_NUMERICALRESPONSE: 2

## 2023-06-19 VITALS
OXYGEN SATURATION: 96 % | SYSTOLIC BLOOD PRESSURE: 167 MMHG | RESPIRATION RATE: 17 BRPM | WEIGHT: 130 LBS | HEART RATE: 71 BPM | TEMPERATURE: 97.8 F | BODY MASS INDEX: 20.89 KG/M2 | HEIGHT: 66 IN | DIASTOLIC BLOOD PRESSURE: 77 MMHG

## 2023-06-19 LAB
INR PPP: 1.1
MICROORGANISM SPEC CULT: ABNORMAL
PROTHROMBIN TIME: 14.2 SEC (ref 11.7–14.9)
SPECIMEN DESCRIPTION: ABNORMAL

## 2023-06-19 PROCEDURE — 2580000003 HC RX 258: Performed by: NURSE PRACTITIONER

## 2023-06-19 PROCEDURE — 6370000000 HC RX 637 (ALT 250 FOR IP): Performed by: NURSE PRACTITIONER

## 2023-06-19 PROCEDURE — 36415 COLL VENOUS BLD VENIPUNCTURE: CPT

## 2023-06-19 PROCEDURE — 96361 HYDRATE IV INFUSION ADD-ON: CPT

## 2023-06-19 PROCEDURE — 2580000003 HC RX 258: Performed by: INTERNAL MEDICINE

## 2023-06-19 PROCEDURE — 85610 PROTHROMBIN TIME: CPT

## 2023-06-19 PROCEDURE — 6360000002 HC RX W HCPCS: Performed by: NURSE PRACTITIONER

## 2023-06-19 PROCEDURE — G0378 HOSPITAL OBSERVATION PER HR: HCPCS

## 2023-06-19 PROCEDURE — 96376 TX/PRO/DX INJ SAME DRUG ADON: CPT

## 2023-06-19 PROCEDURE — 96372 THER/PROPH/DIAG INJ SC/IM: CPT

## 2023-06-19 PROCEDURE — 99232 SBSQ HOSP IP/OBS MODERATE 35: CPT | Performed by: INTERNAL MEDICINE

## 2023-06-19 RX ORDER — CEFDINIR 300 MG/1
300 CAPSULE ORAL EVERY 12 HOURS SCHEDULED
Qty: 6 CAPSULE | Refills: 0 | Status: SHIPPED | OUTPATIENT
Start: 2023-06-20 | End: 2023-06-23

## 2023-06-19 RX ORDER — CEFDINIR 300 MG/1
300 CAPSULE ORAL EVERY 12 HOURS SCHEDULED
Status: DISCONTINUED | OUTPATIENT
Start: 2023-06-20 | End: 2023-06-19 | Stop reason: HOSPADM

## 2023-06-19 RX ADMIN — FINASTERIDE 5 MG: 5 TABLET, FILM COATED ORAL at 08:57

## 2023-06-19 RX ADMIN — TAMSULOSIN HYDROCHLORIDE 0.4 MG: 0.4 CAPSULE ORAL at 08:58

## 2023-06-19 RX ADMIN — MEMANTINE HYDROCHLORIDE 10 MG: 5 TABLET, FILM COATED ORAL at 08:57

## 2023-06-19 RX ADMIN — SODIUM CHLORIDE: 9 INJECTION, SOLUTION INTRAVENOUS at 02:47

## 2023-06-19 RX ADMIN — ENOXAPARIN SODIUM 40 MG: 40 INJECTION SUBCUTANEOUS at 08:58

## 2023-06-19 RX ADMIN — CEFTRIAXONE SODIUM 1000 MG: 10 INJECTION, POWDER, FOR SOLUTION INTRAVENOUS at 09:00

## 2023-06-19 RX ADMIN — Medication 400 UNITS: at 08:58

## 2023-06-19 RX ADMIN — DESMOPRESSIN ACETATE 40 MG: 0.2 TABLET ORAL at 08:58

## 2023-06-19 RX ADMIN — DIVALPROEX SODIUM 125 MG: 125 CAPSULE, COATED PELLETS ORAL at 08:57

## 2023-06-19 RX ADMIN — PANTOPRAZOLE SODIUM 40 MG: 40 TABLET, DELAYED RELEASE ORAL at 08:58

## 2023-06-19 RX ADMIN — SODIUM CHLORIDE: 9 INJECTION, SOLUTION INTRAVENOUS at 17:20

## 2023-06-19 RX ADMIN — SODIUM CHLORIDE, PRESERVATIVE FREE 10 ML: 5 INJECTION INTRAVENOUS at 08:58

## 2023-06-19 RX ADMIN — BICTEGRAVIR SODIUM, EMTRICITABINE, AND TENOFOVIR ALAFENAMIDE FUMARATE 1 TABLET: 50; 200; 25 TABLET ORAL at 08:57

## 2023-06-19 RX ADMIN — SERTRALINE 50 MG: 50 TABLET, FILM COATED ORAL at 08:58

## 2023-06-19 ASSESSMENT — PAIN SCALES - WONG BAKER
WONGBAKER_NUMERICALRESPONSE: 0

## 2023-06-19 NOTE — PROGRESS NOTES
[unfilled]    [facility]    Progress Note    6/19/2023    8:09 AM    Name:   Cb Garcia  MRN:     [id number]     Acct:      [de-identified]   Room:   [address] [place] [address] Day:  0  Admit Date:  6/18/2023  8:21 AM    PCP:   Mark Anthony Wakefield MD  Code Status:  Full Code    Subjective:     C/C:   Chief Complaint   Patient presents with    Hip Pain     R hip, slipped off of couch, did not hit head, +ASA   Right hip pain 2/2 fall, found to have complicated UTI       Interval History Status: improved. -No documented overnight events   -remained stale hemodynamicly overnight  -mentation appears to be at baseline    -refusing tele leads despite multiple attempts to place tele leads. Will dc in order to preserve resources and respect patient  Brief History:     77 yo male with etoh use, dementia, chronic urinary incontinence, abulatory dysfunction , DM2, seizure, BPH  who fell from sitting postion as per wife while getting a haircut. no head trauma or anticoag use but was reporting right hip pain. XR of hip was unrevealing but concern for UTI after work up which may have predisposed him. Currently improving with baseline progressive dementia. Review of Systems:     Constitutional:  negative for chills, fevers, sweats  Respiratory:  + dry cough, No signs of resp distress, breathing on room air. Cardiovascular:  negative for chest pain, chest pressure/discomfort, lower extremity edema, palpitations  Gastrointestinal:  negative for abdominal pain, constipation, diarrhea, nausea, vomiting  Neurological:  negative for dizziness, headache    Medications:      Allergies:  No Known Allergies    Current Meds:   Scheduled Meds:    atorvastatin  40 mg Oral Daily    bictegravir-emtricitab-tenofovir alafenamide  1 tablet Oral Daily    divalproex  125 mg Oral 2 times per day    donepezil  10 mg Oral Nightly    finasteride  5 mg Oral Daily    memantine  10 mg Oral

## 2023-06-19 NOTE — PROGRESS NOTES
CLINICAL PHARMACY NOTE: MEDS TO BEDS    Total # of Prescriptions Filled: 1   The following medications were delivered to the patient:  Cefdinir 300mg caps    Additional Documentation:  Delivered to nurse on 6/19 at 5:20P.  No copay

## 2023-06-19 NOTE — CONSULTS
disturbance ([facility] [F03.90] 07/27/2012       Data        Code Status: DNR-CCA     ADVANCED CARE PLANNING:  Patient has capacity for medical decisions: no  Health Care Power of : no  Living Will: no     Personal, Social, and Family History  Marital Status:   Living situation:with family:  spouse  Nayla/Spiritual History:   not asked  Psychological Distress: none noted  Does patient understand diagnosis/treatment? no  Does family/caregiver understand diagnosis/treatment?  yes    Assessment        Palliative Performance Scale:    ___100% Full ambulation; normal activity and work; no evidence of disease; able to do own self care; normal intake; fully conscious  ___90% Full ambulation; normal activity and work; some evidence of disease; able to do own self care; normal intake; fully conscious  ___80% Full ambulation; normal activity with effort; some evidence of disease; able to do own self care; normal or reduced intake; fully conscious  ___70% Ambulation reduced; unable to perform normal job/work; significant disease; able to do own self care; normal or reduced intake; fully conscious  ___60%  Ambulation reduced; cannot do hobbies/housework; significant disease; occasional assist; intake normal or reduced; fully conscious/some confusion  ___50%  Mainly sit/lie; can't do any work; extensive disease; considerable assist; intake normal or reduced; fully conscious/some confusion  ___40%  Mainly in bed; extensive disease; mainly assist; intake normal or reduced; fully conscious/ some confusion   __x_30%  Bed bound; extensive disease; total care; intake reduced; fully conscious/some confusion  ___20%  Bed bound; extensive disease; total care; intake minimal; drowsy/coma  ___10%  Bed bound; extensive disease; total care; mouth care only; drowsy/coma  ___0       Death       Risk Assessments:    Luz Elena Risk Score: @LUZ ELENA@    Readmission Risk Score: 9.1        1 Year Mortality Risk Score: @1YEARMORTALITYRISK@

## 2023-06-19 NOTE — DISCHARGE SUMMARY
Discharge Summary    Date: 6/19/2023  Patient Name: Bogdan Srinivasan    YOB: 1940     Age: 78 y.o. Admit Date: 6/18/2023  Discharge Date: 6/19/2023  Discharge Condition: Stable    Admission Diagnosis  UTI (urinary tract infection) [N39.0]; Acute cystitis without hematuria [N30.00]; AMS (altered mental status) [R41.82]      Discharge Diagnosis  Principal Problem:    Acute cystitis without hematuria  Active Problems:    HIV (human immunodeficiency virus infection) ([address]    Dementia without behavioral disturbance (HCC)    GERD (gastroesophageal reflux disease)    Mixed hyperlipidemia    Moderate malnutrition (HCC)    UTI (urinary tract infection)  Resolved Problems:    * No resolved hospital problems. [facility] Stay  Narrative of Hospital Course:  77 yo with dementia, HIV, BPH who presented after a fall at home, no head trauma or anticoagulation. Work up concerning for UTI which was managed. His mentation is baseline and he is currently stable to go home with wife that has home health 7 days a week. Abx narrowed to cefdinir for a few more days. Code status confirmed with [facility] signed and charted. Consultants:  IP CONSULT TO HOSPITALIST  IP CONSULT TO PALLIATIVE CARE    Surgeries/procedures Performed:      Treatments:    Antibiotics    Ceftriaxone    Discharge Plan/Disposition:  Home    Hospital/Incidental Findings Requiring Follow Up:    Patient Instructions:    Diet:    Activity:Activity as Tolerated  For number of days (if applicable): Other Instructions:    Provider Follow-Up:   No follow-ups on file.      Significant Diagnostic Studies:    Recent Labs:  Admission on 06/18/2023  WBC                                           Date: 06/18/2023  Value: 11.7 (H)    Ref range: 3.5 - 11.3 k/uL    Status: Final  RBC                                           Date: 06/18/2023  Value: 4.50        Ref range: 4.21 - 5.77 m/uL   Status: Final  Hemoglobin

## 2023-06-19 NOTE — ACP (ADVANCE CARE PLANNING)
..Advance Care Planning     Advance Care Planning Activator (Inpatient)  Conversation Note      Date of ACP Conversation: 6/19/2023     Conversation Conducted with:  Healthcare Decision Maker: Next of Kin by law (only applies in absence of above) (name) mray Cheema Activator: Romayne Craze, RN         Health Care Decision Maker: Mary Jimenez    Current Designated Health Care Decision Maker:     Primary Decision Maker: Kirill Ferrer Spouse - [phone]       Care Preferences    Ventilation: \"If you were in your present state of health and suddenly became very ill and were unable to breathe on your own, what would your preference be about the use of a ventilator (breathing machine) if it were available to you? \"      Would the patient desire the use of ventilator (breathing machine)?: no    \"If your health worsens and it becomes clear that your chance of recovery is unlikely, what would your preference be about the use of a ventilator (breathing machine) if it were available to you? \"     Would the patient desire the use of ventilator (breathing machine)?: No      Resuscitation  \"CPR works best to restart the heart when there is a sudden event, like a heart attack, in someone who is otherwise healthy. Unfortunately, CPR does not typically restart the heart for people who have serious health conditions or who are very sick. \"    \"In the event your heart stopped as a result of an underlying serious health condition, would you want attempts to be made to restart your heart (answer \"yes\" for attempt to resuscitate) or would you prefer a natural death (answer \"no\" for do not attempt to resuscitate)? \" no       [] Yes   [] No   Educated Patient / Yunier Arch regarding differences between Advance Directives and portable DNR orders.     Length of ACP Conversation in minutes:      Conversation Outcomes:  ACP discussion completed    Follow-up plan:    [] Schedule follow-up conversation to continue planning  []

## 2023-06-19 NOTE — CARE COORDINATION
DISCHARGE PLANNING EVALUATION: OP/OBSERVATION        6/19/23, 5:53 PM EDT    Harpal Roberts         Location: 1303/2792-47   Reason for hospitalization: UTI (urinary tract infection) [N39.0]  Acute cystitis without hematuria [N30.00]  AMS (altered mental status) [R41.82]     CM Services requested for transitional needs. PCP: Meggan Dodge MD    Transportation/Food Security/Housekeeping Addressed:  No issues identified. Equipment needs: none    Transition plan:  Home with wife and current services with [facility] home care ( home health aides via [address]). Pt's wife asks for transportation home for her patient. Transport set for 9:00 pm per [facility]. Patient's wife and RN updated on p/u time.

## 2023-06-20 ENCOUNTER — CARE COORDINATION (OUTPATIENT)
Dept: CASE MANAGEMENT | Age: 83
End: 2023-06-20

## 2023-06-20 DIAGNOSIS — E11.9 TYPE 2 DIABETES MELLITUS WITHOUT COMPLICATION, WITHOUT LONG-TERM CURRENT USE OF INSULIN (HCC): ICD-10-CM

## 2023-06-20 DIAGNOSIS — N30.00 ACUTE CYSTITIS WITHOUT HEMATURIA: Primary | ICD-10-CM

## 2023-06-20 DIAGNOSIS — I73.9 PVD (PERIPHERAL VASCULAR DISEASE) (HCC): ICD-10-CM

## 2023-06-20 DIAGNOSIS — N40.0 BENIGN PROSTATIC HYPERPLASIA WITHOUT LOWER URINARY TRACT SYMPTOMS: ICD-10-CM

## 2023-06-20 PROCEDURE — 1111F DSCHRG MED/CURRENT MED MERGE: CPT | Performed by: STUDENT IN AN ORGANIZED HEALTH CARE EDUCATION/TRAINING PROGRAM

## 2023-06-20 NOTE — CARE COORDINATION
Bonifacio Reza MD [facility] Coordinator provided contact information. Plan for follow-up call in 3-5 days based on severity of symptoms and risk factors. Plan for next call: symptom management-fever,chills,any more falls?  follow-up appointment-PCP appointment scheduled?       [id number] [facility] Nurse  [phone]

## 2023-06-20 NOTE — PROGRESS NOTES
Transport arrived to pickup patient at 2054. RN unaware patient was leaving and was told 0900 not 2100. RN called  and house supervisor.  stated that patient is set to leave at 2100 and patient wife is aware. RN called wife to confirm, wife confirmed discharge. RN notified NP via perfect serve to confirm discharge. Discharge order and discharge note already in. Patient left via EMS to go home and was discharged with all belongings and paperwork.

## 2023-06-21 NOTE — TELEPHONE ENCOUNTER
Last visit: 6/8/23  Last Med refill:   Does patient have enough medication for 72 hours: No:     Next Visit Date:  Future Appointments   Date Time Provider Jocelyn Ansari   7/13/2023  3:00 PM Loren Leslie MD [facility] Emma Oneal   7/24/2023 12:45 PM Kristen Umanzor DPM [facility]   9/20/2023  9:30 AM Jessi Ferrara MD [facility] Maintenance   Topic Date Due    Hepatitis A vaccine (1 of 2 - Risk 2-dose series) Never done    Meningococcal (ACWY) vaccine (1 - Risk 2-dose series) Never done    Measles,Mumps,Rubella (MMR) vaccine (1 of 2 - Risk 2-dose series) Never done    Hepatitis B vaccine (1 of 3 - Risk 3-dose series) Never done    Shingles vaccine (1 of 2) Never done    Annual Wellness Visit (AWV)  Never done    COVID-19 Vaccine (4 - Booster for Moderna series) 04/08/2022    Flu vaccine (Season Ended) 08/01/2023    Lipids  08/06/2023    Depression Screen  03/07/2024    DTaP/Tdap/Td vaccine (2 - Td or Tdap) 02/21/2027    Pneumococcal 65+ years Vaccine  Completed    Hib vaccine  Aged Out       Hemoglobin A1C (%)   Date Value   03/08/2023 7.5 (H)   08/06/2022 6.0   11/24/2021 6.0             ( goal A1C is < 7)   No results found for: LABMICR  LDL Cholesterol (mg/dL)   Date Value   08/06/2022 64   02/10/2019 85       (goal LDL is <100)   AST (U/L)   Date Value   06/18/2023 28     ALT (U/L)   Date Value   06/18/2023 15     BUN (mg/dL)   Date Value   06/18/2023 12     BP Readings from Last 3 Encounters:   06/19/23 (!) 167/77   06/08/23 128/80   04/24/23 112/80          (goal 120/80)    All Future Testing planned in [facility]  Lab Frequency Next Occurrence   Basic Metabolic Panel Once 81/03/7943   CBC Once 09/16/2023   Comprehensive Metabolic Panel with Bilirubin Once 09/16/2023   HIV RNA, Quantitative, PCR Once 09/16/2023   Lymphocyte Subset Once 09/16/2023               Patient Active Problem List:     HIV (human immunodeficiency virus infection) ([facility] [address]     Dementia without

## 2023-06-22 RX ORDER — ASPIRIN 81 MG/1
TABLET ORAL
Qty: 30 TABLET | Refills: 5 | Status: SHIPPED | OUTPATIENT
Start: 2023-06-22

## 2023-06-22 RX ORDER — TAMSULOSIN HYDROCHLORIDE 0.4 MG/1
CAPSULE ORAL
Qty: 30 CAPSULE | Refills: 5 | Status: SHIPPED | OUTPATIENT
Start: 2023-06-22

## 2023-06-22 RX ORDER — DIVALPROEX SODIUM 125 MG/1
CAPSULE, COATED PELLETS ORAL
Qty: 120 CAPSULE | Refills: 5 | Status: SHIPPED | OUTPATIENT
Start: 2023-06-22

## 2023-06-23 ENCOUNTER — CARE COORDINATION (OUTPATIENT)
Dept: CASE MANAGEMENT | Age: 83
End: 2023-06-23

## 2023-06-23 LAB
MICROORGANISM SPEC CULT: NORMAL
MICROORGANISM SPEC CULT: NORMAL
SERVICE CMNT-IMP: NORMAL
SERVICE CMNT-IMP: NORMAL
SPECIMEN DESCRIPTION: NORMAL
SPECIMEN DESCRIPTION: NORMAL

## 2023-06-23 NOTE — CARE COORDINATION
[facility] Care Transitions Follow Up Call    Patient Current Location:  Home: [place] [address]    Care Transition Nurse contacted the family by telephone to follow up after admission. Verified name and  with family as identifiers. Patient: Gene Adams  Patient : 1940   MRN: <[id number]>  Reason for Admission: acute cystitis without hematuria  Discharge Date: 23 RARS: Readmission Risk Score: 9.1      Needs to be reviewed by the provider   Additional needs identified to be addressed with provider: No  none             Method of communication with provider: none. Wife Kodak Ambrosio states he is doing well, has aid 7/days/week for 4 hours who helps them with daily ADLs and light housekeeping and meals. Pt is urinating well, no issues with BMs and no fever. He is getting stronger, able to use walker with sit down bench. Addressed changes since last contact:  none  Discussed follow-up appointments. If no appointment was previously scheduled, appointment scheduling offered: No.   Is follow up appointment scheduled within 7 days of discharge? Yes. Follow Up  Future Appointments   Date Time Provider Jocelyn Ansari   2023  3:00 PM Dmitriy Gutierrez MD [facility] Ugo Marrero   2023 12:45 PM Nahun Ricci DPM [facility] Podiatry [facility]   2023  9:30 AM Farzana Mckeon MD INFT DISEASE [address] [facility] Transition Nurse reviewed discharge instructions and red flags with family and discussed any barriers to care and/or understanding of plan of care after discharge. Discussed appropriate site of care based on symptoms and resources available to patient including: PCP. The family agrees to contact the PCP office for questions related to their healthcare.          Patients top risk factors for readmission: falls and medical condition-acute cystitis without hematuria  Interventions to address risk factors: Obtained and reviewed discharge summary and/or continuity of care documents

## 2023-06-23 NOTE — CARE COORDINATION
[facility] Care Transitions Follow Up Call    Patient: Princess Fernandez  Patient : 1940   MRN: <[id number]>  Reason for Admission:  acute cystitis without hematuria  Discharge Date: 23 RARS: Readmission Risk Score: 9.1          1st attempt to reach for Care Transition follow up call unsuccessful. HIPAA compliant message left requesting call back.        Follow Up  Future Appointments   Date Time Provider Jocelyn Prajapatiisti   2023  3:00 PM Alfonso Mason MD [facility]   2023 12:45 PM Leopold Bullocks, DPM [facility] Podiatry [facility]   2023  9:30 AM Margo Schrader MD INFT DISEASE Silver Claude, RN

## 2023-06-28 ENCOUNTER — CARE COORDINATION (OUTPATIENT)
Dept: CASE MANAGEMENT | Age: 83
End: 2023-06-28

## 2023-07-05 ENCOUNTER — CARE COORDINATION (OUTPATIENT)
Dept: CASE MANAGEMENT | Age: 83
End: 2023-07-05

## 2023-07-05 NOTE — CARE COORDINATION
[facility] Care Transitions Follow Up Call    Patient Current Location:  Home: [address] [place] [address]    Care Transition Nurse contacted the patient by telephone to follow up after admission on 23. Verified name and  with patient as identifiers. Patient: Ori Rosales  Patient : 1940   MRN: [id number]  Reason for Admission: Acute cystitis without hematuria  Discharge Date: 23 RARS: Readmission Risk Score: 9.1      Needs to be reviewed by the provider   Additional needs identified to be addressed with provider: No  none             Method of communication with provider: none. Call placed to Caren, Mandeep's wife, due to Mandeep's dementia. She stated that Prosper Sotomayor was doing \"good\". She denied any problems with Lemon's urine, and no fever/chills. She said that Prosper Sotomayor, now is ambulating without an assistive devise. She said that he still \"hops\" but is doing better. She is aware of PCP appointment next week. She had no further questions/concerns. Addressed changes since last contact:  none  Discussed follow-up appointments. If no appointment was previously scheduled, appointment scheduling offered: Yes. Is follow up appointment scheduled within 7 days of discharge? No.    Follow Up  Future Appointments   Date Time Provider [address]   2023  3:00 PM Rodney Avilez MD [facility]   2023 12:45 PM Barbara Lopez DPM [facility] Podiatry [facility]   2023  9:30 AM Vero Cheng MD INFT DISEASE [facility]     Non-[facility] follow up appointment(s):     Care Transition Nurse reviewed medical action plan with family and discussed any barriers to care and/or understanding of plan of care after discharge. Discussed appropriate site of care based on symptoms and resources available to patient including: PCP  Specialist  Home health  When to call 911. The family agrees to contact the PCP office for questions related to their healthcare.      Patients top risk factors for

## 2023-07-13 ENCOUNTER — OFFICE VISIT (OUTPATIENT)
Dept: INTERNAL MEDICINE | Age: 83
End: 2023-07-13
Payer: MEDICARE

## 2023-07-13 VITALS — SYSTOLIC BLOOD PRESSURE: 111 MMHG | DIASTOLIC BLOOD PRESSURE: 81 MMHG | HEART RATE: 88 BPM | TEMPERATURE: 97.9 F

## 2023-07-13 DIAGNOSIS — R39.81 URINARY INCONTINENCE DUE TO COGNITIVE IMPAIRMENT: ICD-10-CM

## 2023-07-13 DIAGNOSIS — G30.9 ALZHEIMER'S DEMENTIA WITH BEHAVIORAL DISTURBANCE (HCC): Primary | ICD-10-CM

## 2023-07-13 DIAGNOSIS — F02.818 ALZHEIMER'S DEMENTIA WITH BEHAVIORAL DISTURBANCE (HCC): Primary | ICD-10-CM

## 2023-07-13 PROCEDURE — 1123F ACP DISCUSS/DSCN MKR DOCD: CPT

## 2023-07-13 PROCEDURE — 99214 OFFICE O/P EST MOD 30 MIN: CPT

## 2023-07-13 RX ORDER — UNDERPADS 23" X 36"
1 EACH MISCELLANEOUS DAILY PRN
Qty: 100 EACH | Refills: 2 | Status: SHIPPED | OUTPATIENT
Start: 2023-07-13 | End: 2023-07-14

## 2023-07-14 ENCOUNTER — TELEPHONE (OUTPATIENT)
Dept: PALLATIVE CARE | Age: 83
End: 2023-07-14

## 2023-07-14 ENCOUNTER — CARE COORDINATION (OUTPATIENT)
Dept: CASE MANAGEMENT | Age: 83
End: 2023-07-14

## 2023-07-14 NOTE — TELEPHONE ENCOUNTER
I call the patient's wife Eder Rebolledo, and there is no answer and I leave a VM. I update Court Rosario about our Palliative care clinic and the services we can offer for her . I am awaiting a call back. Tiffany Higgins [address] RN,[facility]: [phone]  [facility]: [phone]  [facility]: [phone]

## 2023-07-14 NOTE — CARE COORDINATION
[facility] Care Transitions Follow Up Call    Patient Current Location: [address] [place] [address] Transition Nurse contacted the family by telephone to follow up after admission on 23. Verified name and  with family as identifiers. Patient: Tyler Blum  Patient : 1940   MRN: [id number]  Reason for Admission: Acute cystitis without hematuria  Discharge Date: 23 RARS: Readmission Risk Score: 9.1      Needs to be reviewed by the provider   Additional needs identified to be addressed with provider: No  none             Method of communication with provider: none. Attempted to contact Lemon, but wife, Aleida Coulter answered the phone due to pt's dementia. She said that Kareen Mantilla was doing fine. She denied any hematuria, odor or dark urine. She said that he was eating and drinking fine. She said that she took Lemon to the PCP appointment. Asked about the referral to palliative care and she denied it; saying that they just put in an order for incontinence supplies. She had no questions/concerns. Addressed changes since last contact:   follow up on PCP appointment  Discussed follow-up appointments. If no appointment was previously scheduled, appointment scheduling offered: Yes. Is follow up appointment scheduled within 7 days of discharge? No.    Follow Up  Future Appointments   Date Time Provider [address]   2023 12:45 PM Sharan Fraser DPM [facility] Podiatry [facility]   2023  9:30 AM Garnette Shone, MD INFT DISEASE [address] Transition Nurse reviewed medical action plan with family and discussed any barriers to care and/or understanding of plan of care after discharge. Discussed appropriate site of care based on symptoms and resources available to patient including: PCP  Specialist  Home health  When to call 911. The family agrees to contact the PCP office for questions related to their healthcare.      Patients top risk factors for readmission: functional cognitive ability,

## 2023-07-19 PROBLEM — N39.0 UTI (URINARY TRACT INFECTION): Status: RESOLVED | Noted: 2023-06-18 | Resolved: 2023-07-19

## 2023-07-20 ENCOUNTER — CARE COORDINATION (OUTPATIENT)
Dept: CASE MANAGEMENT | Age: 83
End: 2023-07-20

## 2023-07-20 NOTE — CARE COORDINATION
[facility] Care Transitions Follow Up Call    Patient Current Location: [address] [place] [address] Transition Nurse contacted the family by telephone to follow up after admission on 23. Verified name and  with family as identifiers. Patient: Frederick Martini  Patient : 1940   MRN: [id number]  Reason for Admission: Acute cystitis without hematuria  Discharge Date: 23 RARS: Readmission Risk Score: 9.1      Needs to be reviewed by the provider   Additional needs identified to be addressed with provider: No  none             Method of communication with provider: none. Called placed to wife, Josr Roberts. She stated that Maggi Kinsey was doing \"good\". She said that he is walking, eating and did not seem to be in pain. She denied any fever/chills and his urine was free from any blood, or odor. She had no concerns, needs or questions. Informed of of final outreach. Addressed changes since last contact:  none  Discussed follow-up appointments. If no appointment was previously scheduled, appointment scheduling offered: Yes. Is follow up appointment scheduled within 7 days of discharge? No.    Follow Up  Future Appointments   Date Time Provider [address]   2023 12:45 PM Delia Lujan DPM [facility] Podiatry [facility]   2023  9:30 AM Carm[facility] Atwood [facility]     Non-[facility] follow up appointment(s):     Care Transition Nurse reviewed medical action plan with family and discussed any barriers to care and/or understanding of plan of care after discharge. Discussed appropriate site of care based on symptoms and resources available to patient including: PCP  Specialist  Home health  When to call 911. The family agrees to contact the PCP office for questions related to their healthcare.      Patients top risk factors for readmission: functional cognitive ability, functional physical ability, and medical condition-UTI/dementia  Interventions to address risk factors: Obtained and reviewed discharge

## 2023-07-24 ENCOUNTER — OFFICE VISIT (OUTPATIENT)
Dept: PODIATRY | Age: 83
End: 2023-07-24
Payer: MEDICARE

## 2023-07-24 ENCOUNTER — TELEPHONE (OUTPATIENT)
Dept: PALLATIVE CARE | Age: 83
End: 2023-07-24

## 2023-07-24 VITALS — BODY MASS INDEX: 20.89 KG/M2 | HEIGHT: 66 IN | WEIGHT: 130 LBS

## 2023-07-24 DIAGNOSIS — E11.69 ONYCHOMYCOSIS OF MULTIPLE TOENAILS WITH TYPE 2 DIABETES MELLITUS (HCC): Primary | ICD-10-CM

## 2023-07-24 DIAGNOSIS — B35.1 PAIN DUE TO ONYCHOMYCOSIS OF NAIL: ICD-10-CM

## 2023-07-24 DIAGNOSIS — F03.90 DEMENTIA WITHOUT BEHAVIORAL DISTURBANCE (HCC): ICD-10-CM

## 2023-07-24 DIAGNOSIS — M79.609 PAIN DUE TO ONYCHOMYCOSIS OF NAIL: ICD-10-CM

## 2023-07-24 DIAGNOSIS — I73.9 PVD (PERIPHERAL VASCULAR DISEASE) (HCC): ICD-10-CM

## 2023-07-24 DIAGNOSIS — B35.1 ONYCHOMYCOSIS OF MULTIPLE TOENAILS WITH TYPE 2 DIABETES MELLITUS (HCC): Primary | ICD-10-CM

## 2023-07-24 PROCEDURE — 11721 DEBRIDE NAIL 6 OR MORE: CPT

## 2023-07-24 PROCEDURE — 1123F ACP DISCUSS/DSCN MKR DOCD: CPT

## 2023-07-24 PROCEDURE — 3051F HG A1C>EQUAL 7.0%<8.0%: CPT

## 2023-07-24 PROCEDURE — 99213 OFFICE O/P EST LOW 20 MIN: CPT

## 2023-07-24 PROCEDURE — 99213 OFFICE O/P EST LOW 20 MIN: CPT | Performed by: PODIATRIST

## 2023-07-24 NOTE — PROGRESS NOTES
Patient instructed to remove shoes and socks and instructed to sit in exam chair. Current PCP is Shanae Carr MD and date of last visit was 6/8/23. Do you have a follow up visit scheduled?   No  If yes, the date is unknown
MD   BIKTARVY -25 MG TABS per tablet Take 1 tablet by mouth daily 3/16/23  Yes Jason Culver MD   Incontinence Supply Disposable (BRIEFS OVERNIGHT MEDIUM) MISC 1 box by Does not apply route daily 3/14/23  Yes Jeff Kaufman MD   atorvastatin (LIPITOR) 40 MG tablet TAKE 1 TABLET BY MOUTH DAILY 1/31/23  Yes Jeff Kaufman MD   donepezil (ARICEPT) 10 MG tablet TAKE 1 TABLET IN THE EVENING 1/31/23  Yes Jeff Kaufman MD   finasteride (PROSCAR) 5 MG tablet TAKE 1 TABLET BY MOUTH DAILY 1/31/23  Yes Jeff Kaufman MD   memantine (NAMENDA) 10 MG tablet TAKE 1 TABLET TWICE DAILY 1/31/23  Yes Jeff Kaufman MD   omeprazole (PRILOSEC) 20 MG delayed release capsule TAKE 1 CAPSULE DAILY 1/31/23  Yes Jeff Kaufman MD   sertraline (ZOLOFT) 50 MG tablet Take 1 tablet by mouth daily   Yes Historical Provider, MD   vitamin E 400 UNIT capsule Take 1 capsule by mouth daily 9/23/19  Yes Historical Provider, MD       Objective     Vitals:       Lab Results   Component Value Date    LABA1C 7.5 (H) 03/08/2023       Physical Exam:  General:  Alert and oriented x3. In no acute distress. Lower Extremity Physical Exam:    Vascular: DP and PT pulses are palpable, Bilateral. CFT <3 seconds to all digits, Bilateral.  No edema, Bilateral.  Hair growth is absent to the level of the digits, Bilateral.     Neuro: Saph/sural/SP/DP/plantar sensation intact to light touch. Protective sensation defferred    Musculoskeletal: EHL/FHL/GS/TA gross motor intact. TTP to distal digits b/l. Gross deformity is absent, Bilateral.     Dermatologic: No open lesions, Bilateral.  Interdigital maceration absent, Bilateral.  Nails 1-10 are thickened with subungual debris noted, in addition to being elongated and dystrophic. Sayra Newsome is a 78 y.o. male with     Diagnosis Orders   1. Onychomycosis of multiple toenails with type 2 diabetes mellitus ([address])        2. Pain due to onychomycosis of nail        3.  PVD

## 2023-07-24 NOTE — TELEPHONE ENCOUNTER
I call the patient's wife Jose Antonio Beaulieu and I explain our clinic,and the benefit for her  and as well  who ordered the referral. Jose Antonio Christofer states\" we have aides everyday and I am not interested in bringing him to the clinic. \"   I will close the referral.     I update Jose Antonio Beaulieu that if she changes her mind, she can call the palliative care office back. Jose C Number [phone] RN,PN   [facility]: [phone]  [facility]: [phone]  [facility]: [phone]

## 2023-08-01 DIAGNOSIS — Z21 ASYMPTOMATIC HIV INFECTION (HCC): ICD-10-CM

## 2023-08-01 RX ORDER — BICTEGRAVIR SODIUM, EMTRICITABINE, AND TENOFOVIR ALAFENAMIDE FUMARATE 50; 200; 25 MG/1; MG/1; MG/1
1 TABLET ORAL DAILY
Qty: 30 TABLET | Refills: 5 | Status: SHIPPED | OUTPATIENT
Start: 2023-08-01

## 2023-08-01 NOTE — TELEPHONE ENCOUNTER
Last visit: 3/16/2023  Next visit: 9/20/23    Patient's wife called stating patient needs a refill for Biktarvy. They want it sent to [facility] on [address]. Please see pended script.

## 2023-08-16 DIAGNOSIS — I73.9 PVD (PERIPHERAL VASCULAR DISEASE) (HCC): ICD-10-CM

## 2023-08-16 DIAGNOSIS — E44.0 MODERATE MALNUTRITION (HCC): ICD-10-CM

## 2023-08-16 NOTE — TELEPHONE ENCOUNTER
Pharmacy requesting refills for Atorvastatin 40mg tablets, Donepezil 10mg tablets, Finasteride 5mg tablets, Memantine 10mg tablets, Multivitamin tablets and Omeprazole 20mg capsules. Please review and e-scribe to pharmacy listed in chart if appropriate. Thank you.       Next Visit Date: due around 1/13/24  Last Visit Date: 7/13/23    Future Appointments   Date Time Provider [address]   9/20/2023  9:30 AM Eliza Kingsley MD INFT DISEASE [facility]   10/30/2023 12:45 PM Venita Ornelas  [facility]   Topic Date Due    Hepatitis A vaccine (1 of 2 - Risk 2-dose series) Never done    Meningococcal (ACWY) vaccine (1 - Risk 2-dose series) Never done    Measles,Mumps,Rubella (MMR) vaccine (1 of 2 - Risk 2-dose series) Never done    Hepatitis B vaccine (1 of 3 - Risk 3-dose series) Never done    Shingles vaccine (1 of 2) Never done    Annual Wellness Visit (AWV)  Never done    COVID-19 Vaccine (4 - Booster for Moderna series) 04/08/2022    Flu vaccine (1) 08/01/2023    Lipids  08/06/2023    Depression Screen  03/07/2024    DTaP/Tdap/Td vaccine (2 - Td or Tdap) 02/21/2027    Pneumococcal 65+ years Vaccine  Completed    Hib vaccine  Aged Out       Hemoglobin A1C (%)   Date Value   03/08/2023 7.5 (H)   08/06/2022 6.0   11/24/2021 6.0             ( goal A1C is < 7)   No components found for: LABMICR  LDL Cholesterol (mg/dL)   Date Value   08/06/2022 64       (goal LDL is <100)   AST (U/L)   Date Value   06/18/2023 28     ALT (U/L)   Date Value   06/18/2023 15     BUN (mg/dL)   Date Value   06/18/2023 12     BP Readings from Last 3 Encounters:   07/13/23 111/81   06/19/23 (!) 167/77   06/08/23 128/80          (goal 120/80)    All Future Testing planned in [facility]  Lab Frequency Next Occurrence   CBC Once 09/16/2023   Comprehensive Metabolic Panel with Bilirubin Once 09/16/2023   HIV RNA, Quantitative, PCR Once 09/16/2023   Lymphocyte Subset Once 09/16/2023         Patient

## 2023-08-17 RX ORDER — ATORVASTATIN CALCIUM 40 MG/1
TABLET, FILM COATED ORAL
Qty: 30 TABLET | Refills: 6 | Status: SHIPPED | OUTPATIENT
Start: 2023-08-17

## 2023-08-17 RX ORDER — FINASTERIDE 5 MG/1
5 TABLET, FILM COATED ORAL DAILY
Qty: 30 TABLET | Refills: 6 | Status: SHIPPED | OUTPATIENT
Start: 2023-08-17

## 2023-08-17 RX ORDER — OMEPRAZOLE 20 MG/1
20 CAPSULE, DELAYED RELEASE ORAL DAILY
Qty: 30 CAPSULE | Refills: 6 | Status: SHIPPED | OUTPATIENT
Start: 2023-08-17

## 2023-08-17 RX ORDER — DONEPEZIL HYDROCHLORIDE 10 MG/1
TABLET, FILM COATED ORAL
Qty: 30 TABLET | Refills: 6 | Status: SHIPPED | OUTPATIENT
Start: 2023-08-17

## 2023-08-17 RX ORDER — MULTIVITAMIN WITH FOLIC ACID 400 MCG
1 TABLET ORAL DAILY
Qty: 30 TABLET | Refills: 6 | Status: SHIPPED | OUTPATIENT
Start: 2023-08-17

## 2023-08-17 RX ORDER — MEMANTINE HYDROCHLORIDE 10 MG/1
10 TABLET ORAL 2 TIMES DAILY
Qty: 60 TABLET | Refills: 6 | Status: SHIPPED | OUTPATIENT
Start: 2023-08-17

## 2023-09-18 ENCOUNTER — HOSPITAL ENCOUNTER (OUTPATIENT)
Age: 83
Setting detail: SPECIMEN
Discharge: HOME | End: 2023-09-18

## 2023-09-18 DIAGNOSIS — Z21 ASYMPTOMATIC HIV INFECTION (HCC): ICD-10-CM

## 2023-09-18 LAB
ALBUMIN SERPL-MCNC: 4.1 G/DL (ref 3.5–5.2)
ALBUMIN/GLOB SERPL: 0.9 {RATIO} (ref 1–2.5)
ALP SERPL-CCNC: 156 U/L (ref 40–129)
ALT SERPL-CCNC: 14 U/L (ref 5–41)
ANION GAP SERPL CALCULATED.3IONS-SCNC: 13 MMOL/L (ref 9–17)
AST SERPL-CCNC: 27 U/L
BILIRUB DIRECT SERPL-MCNC: 0.1 MG/DL
BILIRUB INDIRECT SERPL-MCNC: 0.2 MG/DL (ref 0–1)
BILIRUB SERPL-MCNC: 0.3 MG/DL (ref 0.3–1.2)
BUN SERPL-MCNC: 13 MG/DL (ref 8–23)
CALCIUM SERPL-MCNC: 10 MG/DL (ref 8.6–10.4)
CHLORIDE SERPL-SCNC: 101 MMOL/L (ref 98–107)
CO2 SERPL-SCNC: 25 MMOL/L (ref 20–31)
CREAT SERPL-MCNC: 1.1 MG/DL (ref 0.7–1.2)
ERYTHROCYTE [DISTWIDTH] IN BLOOD BY AUTOMATED COUNT: 14.8 % (ref 11.8–14.4)
GFR SERPL CREATININE-BSD FRML MDRD: >60 ML/MIN/1.73M2
GLUCOSE SERPL-MCNC: 154 MG/DL (ref 70–99)
HCT VFR BLD AUTO: 46.7 % (ref 40.7–50.3)
HGB BLD-MCNC: 13.9 G/DL (ref 13–17)
MCH RBC QN AUTO: 28 PG (ref 25.2–33.5)
MCHC RBC AUTO-ENTMCNC: 29.8 G/DL (ref 28.4–34.8)
MCV RBC AUTO: 94.2 FL (ref 82.6–102.9)
NRBC BLD-RTO: 0 PER 100 WBC
PLATELET # BLD AUTO: 169 K/UL (ref 138–453)
PMV BLD AUTO: 12.2 FL (ref 8.1–13.5)
POTASSIUM SERPL-SCNC: 5.2 MMOL/L (ref 3.7–5.3)
PROT SERPL-MCNC: 8.8 G/DL (ref 6.4–8.3)
RBC # BLD AUTO: 4.96 M/UL (ref 4.21–5.77)
SODIUM SERPL-SCNC: 139 MMOL/L (ref 135–144)
WBC OTHER # BLD: 9.4 K/UL (ref 3.5–11.3)

## 2023-09-19 LAB
% NK (CD56): 20 % (ref 6–29)
AB NK (CD56): 1015 /UL (ref 90–600)
ABSOLUTE CD 3: 3502 /UL (ref 411–2061)
ABSOLUTE CD 8 (SUPP): 2538 /UL (ref 282–999)
ABSOLUTE CD19 B CELL: 254 /UL (ref 71–567)
CD19 B CELL: 5 % (ref 5–25)
CD3 % TOTAL T CELLS: 69 % (ref 57–94)
CD3+CD4+ CELLS # BLD: 914 /UL (ref 309–1571)
CD3+CD4+ CELLS NFR BLD: 18 % (ref 27–64)
CD4:CD8: 0.36 (ref 0.6–2.8)
CD8 % SUPPRESSOR T CELL: 50 % (ref 17–48)
LYMPHOCYTES # BLD: 54 % (ref 24–44)
WBC # BLD: 9.4 K/UL (ref 3.5–11)

## 2023-09-20 ENCOUNTER — OFFICE VISIT (OUTPATIENT)
Dept: INFECTIOUS DISEASES | Age: 83
End: 2023-09-20
Payer: MEDICARE

## 2023-09-20 VITALS
HEART RATE: 64 BPM | HEIGHT: 66 IN | DIASTOLIC BLOOD PRESSURE: 72 MMHG | WEIGHT: 130 LBS | SYSTOLIC BLOOD PRESSURE: 116 MMHG | BODY MASS INDEX: 20.89 KG/M2 | RESPIRATION RATE: 20 BRPM

## 2023-09-20 DIAGNOSIS — F01.50 VASCULAR DEMENTIA WITHOUT BEHAVIORAL DISTURBANCE (HCC): ICD-10-CM

## 2023-09-20 DIAGNOSIS — Z21 ASYMPTOMATIC HIV INFECTION (HCC): Primary | ICD-10-CM

## 2023-09-20 PROBLEM — E11.9 TYPE 2 DIABETES MELLITUS (HCC): Status: ACTIVE | Noted: 2023-09-20

## 2023-09-20 LAB
HIV1 RNA # SERPL NAA+PROBE: NOT DETECTED {COPIES}/ML
SPECIMEN SOURCE: NORMAL

## 2023-09-20 PROCEDURE — 99213 OFFICE O/P EST LOW 20 MIN: CPT | Performed by: INTERNAL MEDICINE

## 2023-09-20 PROCEDURE — 1123F ACP DISCUSS/DSCN MKR DOCD: CPT | Performed by: INTERNAL MEDICINE

## 2023-09-20 RX ORDER — BICTEGRAVIR SODIUM, EMTRICITABINE, AND TENOFOVIR ALAFENAMIDE FUMARATE 50; 200; 25 MG/1; MG/1; MG/1
1 TABLET ORAL DAILY
Qty: 30 TABLET | Refills: 5 | Status: SHIPPED | OUTPATIENT
Start: 2023-09-20

## 2023-09-20 NOTE — PROGRESS NOTES
appropriate [address], [address] [phone]     01/14/2018 REACTIVE (A) NR Final     Comment:           Detection of T. pallidum antibodies may indicate recent, remote or previously   treated infections. A positive serological test for syphilis is not diagnostic   of infection, as false positives occur and become more likely in low prevalence   populations. Confirmatory test will be performed (VDRL, Quantitative). Results reported to the appropriate [address], [address] [phone]           Thank you for allowing us toparticipate in the care of this patient. Please call with questions. Wilver Cuadra MD  [employer] messaging: [phone]    This note is created with the assistance of a speech recognition program.  While intending to generate adocument that actually reflects the content of the visit, the document can still have some errors including those of syntax and sound a like substitutions which may escape proof reading. It such instances, actual meaningcan be extrapolated by contextual diversion.

## 2023-10-04 NOTE — PROGRESS NOTES
[facility]  Internal Medicine Teaching Residency Program  Inpatient Daily Progress Note  ______________________________________________________________________________    Patient: Tru Bang  YOB: 1940   EOU:[id number]    Acct: [de-identified]     Room: [address] [place] [address]  Admit date: 11/14/2021  Today's date: 11/15/21  Number of days in the hospital: 1    SUBJECTIVE   Admitting Diagnosis: <principal problem not specified>  CC: fatigue, cough  Pt examined at bedside. Chart & results reviewed. Pt continues to be nonverbal, agitated. Per RN patient refuses to take medications or eat/drink. On 2L nasal cannula, will plan to wean off and monitor  CRP 14.4  Procalcitonin 0.21  D-dimer 0.49    Will continue to monitor labs and O2  Does not qualify for Decadron and Actemra    ROS:  Unable to assess as the patient has dementia and is nonverbal at baseline with intermittent agitation secondary to dementia. BRIEF HISTORY     A 78 y.o. male with PMH significant of Alzheimer's dementia, and HIV presented with a chief complaint of dry cough and fatigue, ongoing for last 7-10 days since he took his flu shot. He is also vaccinated with moderna. He has generalized malaise. He also has reduced appetite. He is nonverbal and agitated/irritated at baseline. He is a poor historian and maximum history is obtained from wife, daughter and chart review. His wife is also having similar symptoms, and is admitted to the hospital today with COVID-19 infection.     On presentation in the ER, his oxygen saturations with in the lower 90s but dropping into 80s especially with movement/activity. He was placed on 2 L NC. EKG ruled out any acute ST-T wave abnormality.   Troponins 13, proBNP 397  Evening he was found to be Covid positive  CRP 14.4  D-dimer 0.49    Fibrinogen 376    OBJECTIVE     Vital Signs:  BP (!) 119/91   Pulse 86   Temp 101.6 °F (38.7 °C) (Axillary) OTOLARYNGOLOGY (ENT) PROGRESS NOTE    PATIENT: CHRISTIAN SAMANIEGO  MRN: [id number]  : 19  [id number]  DATE OF SERVICE:  10-04-23  	  Subjective/ Interval:   AFVSS. No acute events overnight. Patient seen and examined at bedside. No desaturation events overnight, tolerating PO.    ALLERGIES:  No Known Allergies      MEDICATIONS:  Antiinfectives:   amoxicillin  Oral Liquid - Peds 575 milliGRAM(s) Oral every 12 hours    IV fluids:    Hematologic/Anticoagulation:    Pain medications/Neuro:  acetaminophen   Oral Liquid - Peds. 320 milliGRAM(s) Oral every 6 hours PRN  ibuprofen  Oral Liquid - Peds. 250 milliGRAM(s) Oral every 6 hours PRN    Endocrine Medications:     All other standing medications:   ofloxacin 0.3% Ophthalmic Solution for OTIC Use - Peds 5 Drop(s) Both Ears two times a day    All other PRN medications:    Vital Signs Last 24 Hrs  T(C): 37.3 (04 Oct 2023 05:46), Max: 37.3 (04 Oct 2023 05:46)  T(F): 99.1 (04 Oct 2023 05:46), Max: 99.1 (04 Oct 2023 05:46)  HR: 102 (04 Oct 2023 05:46) (86 - 105)  BP: 109/67 (04 Oct 2023 05:46) (90/77 - 129/71)  BP(mean): 90 (03 Oct 2023 13:00) (70 - 90)  RR: 22 (04 Oct 2023 05:46) (13 - 26)  SpO2: 97% (04 Oct 2023 05:46) (96% - 100%)    Parameters below as of 04 Oct 2023 05:46  Patient On (Oxygen Delivery Method): room air          10-03 @ 07:01  -  10-04 @ 07:00  --------------------------------------------------------  IN:    Oral Fluid: 540 mL  Total IN: 540 mL    OUT:    Voided (mL): 750 mL  Total OUT: 750 mL    Total NET: -210 mL    General: well-developed, NAD  Eyes: EOMI; PERRL; no drainage or redness  Ears: external ears normal  Nose: nares patent  Mouth: No oral lesions; no gross abnormalities. no oral bleeding  Neck: trachea midline  Respiratory: unlabored respirations  Cardiovascular: regular rate  Gastrointestinal: Soft, nondistended  Extremities: No edema, warm and well perfused  Skin: No lesions; no rash       LABS             Coagulation Studies-       Endocrine Panel-                MICROBIOLOGY:             Resp 27   Ht 5' 6\" (1.676 m)   Wt 126 lb 6.4 oz (57.3 kg)   SpO2 98%   BMI 20.40 kg/m²     Temp (24hrs), Av.7 °F (37.6 °C), Min:98.1 °F (36.7 °C), Max:101.8 °F (38.8 °C)    No intake/output data recorded. Physical Exam:  Constitutional: This is a well developed, well nourished, 18.5-24.9 - Normal 78y.o. year old male who is alert, oriented, cooperative and in no apparent distress. Head:normocephalic and atraumatic. EENT:  PERRLA. No conjunctival injections. Septum was midline, mucosa was without erythema, exudates or cobblestoning. No thrush was noted. Neck: Supple without thyromegaly. No elevated JVP. Trachea was midline. Respiratory: Chest was symmetrical without dullness to percussion. Breath sounds bilaterally were clear to auscultation. There were no wheezes, rhonchi or rales. There is no intercostal retraction or use of accessory muscles. No egophony noted. Cardiovascular: Regular without murmur, clicks, gallops or rubs. Abdomen: Slightly rounded and soft without organomegaly. No rebound, rigidity or guarding was appreciated. Lymphatic: No lymphadenopathy. Musculoskeletal: Normal curvature of the spine. No gross muscle weakness. Extremities:  No lower extremity edema, ulcerations, tenderness, varicosities or erythema. Muscle size, tone and strength are normal.  No involuntary movements are noted. Skin:  Warm and dry. Good color, turgor and pigmentation. No lesions or scars.   No cyanosis or clubbing  Neurological/Psychiatric: The patient's general behavior, level of consciousness, thought content and emotional status is normal.        Medications:  Scheduled Medications:    OLANZapine  5 mg Oral Nightly    aspirin  81 mg Oral Daily    sodium chloride flush  5-40 mL IntraVENous 2 times per day    enoxaparin  40 mg SubCUTAneous Daily    atorvastatin  40 mg Oral Nightly    tamsulosin  0.4 mg Oral Daily    memantine  10 mg Oral BID    donepezil  10 mg Oral Nightly  divalproex  250 mg Oral 2 times per day     Continuous Infusions:    sodium chloride       PRN Medicationssodium chloride flush, 5-40 mL, PRN  sodium chloride, 25 mL, PRN  ondansetron, 4 mg, Q8H PRN   Or  ondansetron, 4 mg, Q6H PRN  polyethylene glycol, 17 g, Daily PRN  acetaminophen, 650 mg, Q6H PRN   Or  acetaminophen, 650 mg, Q6H PRN        Diagnostic Labs:  CBC:   Recent Labs     11/14/21  1025 11/15/21  0504   WBC 7.0  7.0 7.0   RBC 4.17* 3.89*   HGB 11.9* 11.0*   HCT 38.7* 35.8*   MCV 92.8 92.0   RDW 14.6* 14.5*    150     BMP:   Recent Labs     11/14/21  1025 11/15/21  0504    137   K 5.4* 4.4    106   CO2 24 20   BUN 12 11   CREATININE 0.94 1.12     BNP: No results for input(s): BNP in the last 72 hours. PT/INR: No results for input(s): PROTIME, INR in the last 72 hours. APTT: No results for input(s): APTT in the last 72 hours. CARDIAC ENZYMES: No results for input(s): CKMB, CKMBINDEX, TROPONINI in the last 72 hours. Invalid input(s): CKTOTAL;3  FASTING LIPID PANEL:  Lab Results   Component Value Date    CHOL 185 02/10/2019    HDL 72 02/10/2019    TRIG 139 02/10/2019     LIVER PROFILE:   Recent Labs     11/14/21  1025   AST 32   ALT 13   BILIDIR <0.08   BILITOT 0.21*   ALKPHOS 142*      MICROBIOLOGY:   Lab Results   Component Value Date/Time    CULTURE ESCHERICHIA COLI >526717 CFU/ML (A) 09/10/2019 07:02 PM       Imaging:    XR CHEST PORTABLE    Result Date: 11/14/2021  No acute process. ASSESSMENT & PLAN     ASSESSMENT / PLAN:     Active Problems:    COVID    COVID-19 infection  Vaccinated with Covid  Covid positive on 11/14/2021  Is out of window for remdesivir  Does not meet requirements of Decadron and Actemra. ID is on board, will follow recommendations. CRP 14.4.   Will continue to monitor inflammatory markers.     Acute hypoxic respiratory failure  Secondary to COVID-19 infection  On 2 L NC nasal cannula  Will monitor and wean off as tolerated.     Alzheimer's dementia  On memantine  On donepezil     HIV infection  On [address] outpatient with Dr. Jacinta Pathak     Hyperlipidemia  On atorvastatin     Peripheral vascular disease  On aspirin     BPH  We will resume Flomax  We will monitor for signs of urinary retention.     GERD   On Pepcid as needed     Moderate malnutrition  On Ensure liquid supplement     DVT ppx  On Lovenox     GI ppx  Not indicated     PT/OT/SW  Consulted     CODE STATUS  Currently full code, Will discuss with the patient and family in detail tomorrow.     Discharge Planning   to assist in discharge planning. Garrett Urban DPM  Internal Medicine Resident, PGY-1  [address], [place]  11/15/2021, 1:32 PM

## 2023-10-12 ENCOUNTER — TELEPHONE (OUTPATIENT)
Dept: INTERNAL MEDICINE | Age: 83
End: 2023-10-12

## 2023-10-12 NOTE — TELEPHONE ENCOUNTER
Pt's wife calling to find out if it is safe for pt to receive Covid booster. Please review medical information and advise, thank you.

## 2023-10-12 NOTE — TELEPHONE ENCOUNTER
----- Message from Sergio Moise sent at 10/10/2023  9:26 AM EDT -----  Subject: Message to Provider    QUESTIONS  Information for Provider? Pt would like to be advised if he can take the   COVID booster shot/ Please call pt to advise.  ---------------------------------------------------------------------------  --------------  Katarina ANDRADE  [phone]; OK to leave message on voicemail  ---------------------------------------------------------------------------  --------------  SCRIPT ANSWERS  Relationship to Patient? Spouse/Partner  Representative Name? Mars Stinson  Is the representative on the Communication Release of Information (MALENA)   form in Epic?  Yes

## 2023-10-12 NOTE — TELEPHONE ENCOUNTER
----- Message from Adilson Cruz sent at 10/10/2023  9:26 AM EDT -----  Subject: Message to Provider    QUESTIONS  Information for Provider? Pt would like to be advised if he can take the   COVID booster shot/ Please call pt to advise.  ---------------------------------------------------------------------------  --------------  Carolyn Galloway INFO  [phone]; OK to leave message on voicemail  ---------------------------------------------------------------------------  --------------  SCRIPT ANSWERS  Relationship to Patient? Spouse/Partner  Representative Name? Theodroa Victor  Is the representative on the Communication Release of Information (MALENA)   form in Epic?  Yes

## 2023-10-16 NOTE — TELEPHONE ENCOUNTER
PC to pt's spouse to advise getting Covid booster as long as there have been no cold symptoms in the last week, writer also scheduled pt for 3 month f/u appt with PCP - pt will get flu vaccine at that visit.

## 2023-11-02 ENCOUNTER — OFFICE VISIT (OUTPATIENT)
Dept: INTERNAL MEDICINE | Age: 83
End: 2023-11-02
Payer: MEDICARE

## 2023-11-02 VITALS — DIASTOLIC BLOOD PRESSURE: 76 MMHG | SYSTOLIC BLOOD PRESSURE: 126 MMHG | TEMPERATURE: 97.5 F | HEART RATE: 70 BPM

## 2023-11-02 DIAGNOSIS — E44.0 MODERATE MALNUTRITION (HCC): ICD-10-CM

## 2023-11-02 DIAGNOSIS — Z23 NEEDS FLU SHOT: ICD-10-CM

## 2023-11-02 DIAGNOSIS — Z21 ASYMPTOMATIC HIV INFECTION, WITH NO HISTORY OF HIV-RELATED ILLNESS (HCC): ICD-10-CM

## 2023-11-02 DIAGNOSIS — Z00.00 INITIAL MEDICARE ANNUAL WELLNESS VISIT: Primary | ICD-10-CM

## 2023-11-02 DIAGNOSIS — B20 HUMAN IMMUNODEFICIENCY VIRUS (HIV) DISEASE (HCC): ICD-10-CM

## 2023-11-02 DIAGNOSIS — F02.818 ALZHEIMER'S DEMENTIA WITH BEHAVIORAL DISTURBANCE (HCC): ICD-10-CM

## 2023-11-02 DIAGNOSIS — G30.9 ALZHEIMER'S DEMENTIA WITH BEHAVIORAL DISTURBANCE (HCC): ICD-10-CM

## 2023-11-02 DIAGNOSIS — E11.9 TYPE 2 DIABETES MELLITUS WITHOUT COMPLICATION, WITHOUT LONG-TERM CURRENT USE OF INSULIN (HCC): ICD-10-CM

## 2023-11-02 DIAGNOSIS — F03.90 DEMENTIA WITHOUT BEHAVIORAL DISTURBANCE (HCC): ICD-10-CM

## 2023-11-02 PROCEDURE — G0008 ADMIN INFLUENZA VIRUS VAC: HCPCS

## 2023-11-02 RX ORDER — FEEDER CONTAINER WITH PUMP SET
1 EACH MISCELLANEOUS 3 TIMES DAILY
Qty: 90 EACH | Refills: 6 | Status: CANCELLED | OUTPATIENT
Start: 2023-11-02

## 2023-11-02 RX ORDER — FEEDER CONTAINER WITH PUMP SET
1 EACH MISCELLANEOUS 3 TIMES DAILY
Qty: 90 EACH | Refills: 11 | Status: SHIPPED | OUTPATIENT
Start: 2023-11-02

## 2023-11-02 ASSESSMENT — PATIENT HEALTH QUESTIONNAIRE - PHQ9
SUM OF ALL RESPONSES TO PHQ QUESTIONS 1-9: 0
2. FEELING DOWN, DEPRESSED OR HOPELESS: 0
SUM OF ALL RESPONSES TO PHQ QUESTIONS 1-9: 0
DEPRESSION UNABLE TO ASSESS: FUNCTIONAL CAPACITY MOTIVATION LIMITS ACCURACY
SUM OF ALL RESPONSES TO PHQ QUESTIONS 1-9: 0
SUM OF ALL RESPONSES TO PHQ9 QUESTIONS 1 & 2: 0
SUM OF ALL RESPONSES TO PHQ QUESTIONS 1-9: 0
1. LITTLE INTEREST OR PLEASURE IN DOING THINGS: 0
SUM OF ALL RESPONSES TO PHQ QUESTIONS 1-9: 0
SUM OF ALL RESPONSES TO PHQ QUESTIONS 1-9: 0
2. FEELING DOWN, DEPRESSED OR HOPELESS: 0

## 2023-11-02 ASSESSMENT — LIFESTYLE VARIABLES: HOW OFTEN DO YOU HAVE A DRINK CONTAINING ALCOHOL: NEVER

## 2023-11-02 NOTE — PATIENT INSTRUCTIONS
and/or referrals for you. A list of these orders (if applicable) as well as your Preventive Care list are included within your After Visit Summary for your review. Other Preventive Recommendations:    A preventive eye exam performed by an eye specialist is recommended every 1-2 years to screen for glaucoma; cataracts, macular degeneration, and other eye disorders. A preventive dental visit is recommended every 6 months. Try to get at least 150 minutes of exercise per week or 10,000 steps per day on a pedometer . Order or download the FREE \"Exercise & Physical Activity: Your Everyday Guide\" from [employer]. Call 4-296.907.4161 or search [employer] online. You need 0602-8846 mg of calcium and 7763-4944 IU of vitamin D per day. It is possible to meet your calcium requirement with diet alone, but a vitamin D supplement is usually necessary to meet this goal.  When exposed to the sun, use a sunscreen that protects against both UVA and UVB radiation with an SPF of 30 or greater. Reapply every 2 to 3 hours or after sweating, drying off with a towel, or swimming. Always wear a seat belt when traveling in a car. Always wear a helmet when riding a bicycle or motorcycle.

## 2023-11-02 NOTE — PROGRESS NOTES
Attending Physician Statement  I have discussed the care of [address] [place] [address] including pertinent history and exam findings,  with the resident. I have reviewed the key elements of all parts of the encounter with the resident. I agree with the assessment, plan and orders as documented by the resident.   (GE Modifier)    MD OLEGARIO Diaz  Attending Physician, [address] [place] [address] Internal Medicine Residency Program  [address] [facility]  11/2/2023, 4:00 PM

## 2023-11-02 NOTE — PROGRESS NOTES
Medicare Annual Wellness Visit    Steve Roberts is here for Flu Vaccine and Medicare AWV    Assessment & Plan   Moderate malnutrition ([address])  The following orders have not been finalized:  -     Nutritional Supplements (ENSURE HIGH PROTEIN) LIQD    Recommendations for Preventive Services Due: see orders and patient instructions/AVS.  Recommended screening schedule for the next 5-10 years is provided to the patient in written form: see Patient Instructions/AVS.     No follow-ups on file. Subjective   The following acute and/or chronic problems were also addressed today:      Patient's complete Health Risk Assessment and screening values have been reviewed and are found in Flowsheets. The following problems were reviewed today and where indicated follow up appointments were made and/or referrals ordered. Positive Risk Factor Screenings with Interventions:    Fall Risk:  Do you feel unsteady or are you worried about falling? : (!) yes  2 or more falls in past year?: no  Fall with injury in past year?: no     Interventions:    Patient declines any further evaluation or treatment    Cognitive: Words recalled: 0 Words Recalled           Total Score Interpretation: Abnormal Mini-Cog      Interventions:  Patient declines any further evaluation or treatment    Depression:  Depression Unable to Assess: Functional capacity motivation limits accuracy  PHQ-2 Score: 0  PHQ-9 Total Score: 0    Interpretation:   1-4 = minimal  5-9 = mild  10-14 = moderate  15-19 = moderately severe  20-27 = severe           Self-assessment of health:   In general, how would you say your health is?: (!) Poor    Interventions:  Patient declines any further evaluation or treatment     Social and Emotional Support:  Do you get the social and emotional support that you need?: (!) No    Interventions:  Patient declined any further interventions or treatment    Weight and Activity:  Physical Activity: Inactive (11/2/2023)    Exercise Vital Sign

## 2023-11-06 ENCOUNTER — TELEPHONE (OUTPATIENT)
Dept: INTERNAL MEDICINE | Age: 83
End: 2023-11-06

## 2023-11-06 DIAGNOSIS — R39.81 URINARY INCONTINENCE DUE TO COGNITIVE IMPAIRMENT: ICD-10-CM

## 2023-11-06 DIAGNOSIS — F02.818 ALZHEIMER'S DEMENTIA WITH BEHAVIORAL DISTURBANCE (HCC): Primary | ICD-10-CM

## 2023-11-06 DIAGNOSIS — G30.9 ALZHEIMER'S DEMENTIA WITH BEHAVIORAL DISTURBANCE (HCC): Primary | ICD-10-CM

## 2023-11-09 ENCOUNTER — TELEPHONE (OUTPATIENT)
Dept: INTERNAL MEDICINE | Age: 83
End: 2023-11-09

## 2023-11-09 NOTE — TELEPHONE ENCOUNTER
Iman from [facility] called and stated that the aid is stating that the patient has been weaker than usual and even after assisting from a sitting to standing or standing to seated position the patient is spent just from that even with assist. No falls just weakness. Just wanted to let us know.

## 2023-11-13 ENCOUNTER — TELEPHONE (OUTPATIENT)
Dept: INTERNAL MEDICINE | Age: 83
End: 2023-11-13

## 2023-11-13 DIAGNOSIS — E44.0 MODERATE MALNUTRITION (HCC): ICD-10-CM

## 2023-11-13 DIAGNOSIS — G30.9 ALZHEIMER'S DEMENTIA WITH BEHAVIORAL DISTURBANCE (HCC): ICD-10-CM

## 2023-11-13 DIAGNOSIS — F02.818 ALZHEIMER'S DEMENTIA WITH BEHAVIORAL DISTURBANCE (HCC): ICD-10-CM

## 2023-11-13 DIAGNOSIS — F03.90 DEMENTIA WITHOUT BEHAVIORAL DISTURBANCE (HCC): Primary | ICD-10-CM

## 2023-11-16 RX ORDER — FEEDER CONTAINER WITH PUMP SET
1 EACH MISCELLANEOUS 2 TIMES DAILY
Qty: 60 EACH | Refills: 11 | Status: SHIPPED | OUTPATIENT
Start: 2023-11-16

## 2023-11-22 ENCOUNTER — HOSPITAL ENCOUNTER (EMERGENCY)
Age: 83
Discharge: HOME | End: 2023-11-22
Attending: EMERGENCY MEDICINE
Payer: MEDICARE

## 2023-11-22 ENCOUNTER — APPOINTMENT (OUTPATIENT)
Dept: GENERAL RADIOLOGY | Age: 83
End: 2023-11-22
Payer: MEDICARE

## 2023-11-22 VITALS
TEMPERATURE: 98.8 F | DIASTOLIC BLOOD PRESSURE: 73 MMHG | RESPIRATION RATE: 17 BRPM | HEART RATE: 91 BPM | OXYGEN SATURATION: 94 % | SYSTOLIC BLOOD PRESSURE: 144 MMHG

## 2023-11-22 DIAGNOSIS — U07.1 COVID: Primary | ICD-10-CM

## 2023-11-22 LAB
ALBUMIN SERPL-MCNC: 3.7 G/DL (ref 3.5–5.2)
ALBUMIN/GLOB SERPL: 0.8 {RATIO} (ref 1–2.5)
ALP SERPL-CCNC: 160 U/L (ref 40–129)
ALT SERPL-CCNC: 9 U/L (ref 5–41)
ANION GAP SERPL CALCULATED.3IONS-SCNC: 11 MMOL/L (ref 9–17)
AST SERPL-CCNC: 23 U/L
BASOPHILS # BLD: 0.03 K/UL (ref 0–0.2)
BASOPHILS NFR BLD: 1 % (ref 0–2)
BILIRUB SERPL-MCNC: 0.4 MG/DL (ref 0.3–1.2)
BUN SERPL-MCNC: 12 MG/DL (ref 8–23)
CALCIUM SERPL-MCNC: 9.4 MG/DL (ref 8.6–10.4)
CHLORIDE SERPL-SCNC: 102 MMOL/L (ref 98–107)
CO2 SERPL-SCNC: 24 MMOL/L (ref 20–31)
CREAT SERPL-MCNC: 1 MG/DL (ref 0.7–1.2)
EOSINOPHIL # BLD: 0.06 K/UL (ref 0–0.44)
EOSINOPHILS RELATIVE PERCENT: 1 % (ref 1–4)
ERYTHROCYTE [DISTWIDTH] IN BLOOD BY AUTOMATED COUNT: 14.2 % (ref 11.8–14.4)
FLUAV AG SPEC QL: NEGATIVE
FLUBV AG SPEC QL: NEGATIVE
GFR SERPL CREATININE-BSD FRML MDRD: >60 ML/MIN/1.73M2
GLUCOSE SERPL-MCNC: 123 MG/DL (ref 70–99)
HCT VFR BLD AUTO: 44 % (ref 40.7–50.3)
HGB BLD-MCNC: 13.2 G/DL (ref 13–17)
IMM GRANULOCYTES # BLD AUTO: 0.03 K/UL (ref 0–0.3)
IMM GRANULOCYTES NFR BLD: 1 %
LYMPHOCYTES NFR BLD: 0.94 K/UL (ref 1.1–3.7)
LYMPHOCYTES RELATIVE PERCENT: 15 % (ref 24–43)
MCH RBC QN AUTO: 28.3 PG (ref 25.2–33.5)
MCHC RBC AUTO-ENTMCNC: 30 G/DL (ref 28.4–34.8)
MCV RBC AUTO: 94.4 FL (ref 82.6–102.9)
MONOCYTES NFR BLD: 0.94 K/UL (ref 0.1–1.2)
MONOCYTES NFR BLD: 15 % (ref 3–12)
NEUTROPHILS NFR BLD: 69 % (ref 36–65)
NEUTS SEG NFR BLD: 4.35 K/UL (ref 1.5–8.1)
NRBC BLD-RTO: 0 PER 100 WBC
PLATELET # BLD AUTO: ABNORMAL K/UL (ref 138–453)
PLATELET, FLUORESCENCE: 147 K/UL (ref 138–453)
PLATELETS.RETICULATED NFR BLD AUTO: 10 % (ref 1.1–10.3)
POTASSIUM SERPL-SCNC: 4.8 MMOL/L (ref 3.7–5.3)
PROT SERPL-MCNC: 8.3 G/DL (ref 6.4–8.3)
RBC # BLD AUTO: 4.66 M/UL (ref 4.21–5.77)
SARS-COV-2 RDRP RESP QL NAA+PROBE: DETECTED
SODIUM SERPL-SCNC: 137 MMOL/L (ref 135–144)
SPECIMEN DESCRIPTION: ABNORMAL
WBC OTHER # BLD: 6.4 K/UL (ref 3.5–11.3)

## 2023-11-22 PROCEDURE — 80053 COMPREHEN METABOLIC PANEL: CPT

## 2023-11-22 PROCEDURE — 85055 RETICULATED PLATELET ASSAY: CPT

## 2023-11-22 PROCEDURE — 87635 SARS-COV-2 COVID-19 AMP PRB: CPT

## 2023-11-22 PROCEDURE — 71045 X-RAY EXAM CHEST 1 VIEW: CPT

## 2023-11-22 PROCEDURE — 85025 COMPLETE CBC W/AUTO DIFF WBC: CPT

## 2023-11-22 PROCEDURE — 87804 INFLUENZA ASSAY W/OPTIC: CPT

## 2023-11-22 PROCEDURE — 99284 EMERGENCY DEPT VISIT MOD MDM: CPT

## 2023-11-22 RX ORDER — ACETAMINOPHEN 325 MG/1
650 TABLET ORAL ONCE
Status: DISCONTINUED | OUTPATIENT
Start: 2023-11-22 | End: 2023-11-22 | Stop reason: HOSPADM

## 2023-11-22 NOTE — DISCHARGE INSTRUCTIONS
You are seen emergency department for cough congestion. You are found to have COVID-19. Remainder of laboratory studies unremarkable. Please remember to remain well-hydrated. Please follow-up with your primary care provider in the next 1 to 2 weeks for reevaluation. Please return to the emergency department for any new or worsening symptoms including fevers, chest pain, shortness of breath, or any symptoms deemed concerning.

## 2023-11-22 NOTE — ED TRIAGE NOTES
Pt to ED for a cough that wife states started Sunday. Pt's wife denies being exposed to anyone who is sick other than each other. Pt does have dementia and wife states he is at his baseline right now.

## 2023-11-22 NOTE — ED PROVIDER NOTES
[address] [place] [address] ED  Emergency Department Encounter  Emergency Medicine Resident     Pt Simona Sevilla  MRN: [id number]  [address] [facility] [place] 1940  Date of evaluation: 11/22/23  PCP:  Madai Jaeger MD  Note Started: 6:48 PM EST      CHIEF COMPLAINT       Chief Complaint   Patient presents with    Cough     Since saturday       HISTORY OF PRESENT ILLNESS  (Location/Symptom, Timing/Onset, Context/Setting, Quality, Duration, Modifying Factors, Severity.)      Shanika Singh is a 78 y.o. male who presents with cough. Patient has a history of advanced dementia. History provided by wife who is also her caretaker. Wife states patient has had a dry cough for the past several days. Denies any fevers, apparent shortness of breath, nausea, vomiting. Wife states that she has had similar symptoms during this time. She states patient has otherwise been acting at his baseline. No other complaints at this time. PAST MEDICAL / SURGICAL / SOCIAL / FAMILY HISTORY      has a past medical history of Acute delirium, Acute metabolic encephalopathy, Alzheimer's dementia with behavioral disturbance ([address]), Atrophy, cortical, BPH (benign prostatic hyperplasia), Dementia ([address]), Dementia without behavioral disturbance ([address]), GERD (gastroesophageal reflux disease), Hemorrhoids, HIV (human immunodeficiency virus infection) ([address]), Meralgia paraesthetica, Mixed hyperlipidemia, Occasional tremors, Osteoporosis, PVD (peripheral vascular disease) ([address]), Seizure-like activity ([address]), Syphilis in male, and Wears glasses. has a past surgical history that includes Total hip arthroplasty (Left); Tonsillectomy; Hemorrhoid surgery; sigmoidoscopy (N/A, 11/22/2017); Colonoscopy; hernia repair; and chg urography retrograde with/wo kub (Bilateral, 3/6/2018).       Social History     Socioeconomic History    Marital status:      Spouse name: Not on file    Number of children: 5    Years of education: 3    Highest education DO  Emergency Medicine Resident    (Please note that portions of thisnote were completed with a voice recognition program.  Efforts were made to edit the dictations but occasionally words are mis-transcribed.)        Cesar Fernández DO  Resident  11/26/23 [id number]

## 2023-11-26 ASSESSMENT — ENCOUNTER SYMPTOMS: COUGH: 1

## 2023-12-11 DIAGNOSIS — I73.9 PVD (PERIPHERAL VASCULAR DISEASE) (HCC): ICD-10-CM

## 2023-12-11 DIAGNOSIS — N40.0 BENIGN PROSTATIC HYPERPLASIA WITHOUT LOWER URINARY TRACT SYMPTOMS: ICD-10-CM

## 2023-12-11 NOTE — TELEPHONE ENCOUNTER
.. Request for Aspirin, Divalproex and Tamsulosin. Please review and e-scribe to pharmacy listed in chart if appropriate. Thank you.       Last Visit Date: 11/2/2023  Next Visit Date: Visit date not found    Future Appointments   Date Time Provider [address]   9/25/2024 10:30 AM Keila Nayak MD [facility] [address] Maintenance   Topic Date Due    Meningococcal (ACWY) vaccine (1 - Risk 2-dose series) Never done    Measles,Mumps,Rubella (MMR) vaccine (1 of 2 - Risk 2-dose series) Never done    Hepatitis A vaccine (1 of 2 - Risk 2-dose series) Never done    Shingles vaccine (1 of 2) Never done    Hepatitis B vaccine (1 of 3 - Risk 3-dose series) Never done    Respiratory Syncytial Virus (RSV) age 61 yrs+ (1 - 1-dose 60+ series) Never done    Lipids  08/06/2023    COVID-19 Vaccine (4 - 2023-24 season) 09/01/2023    Depression Screen  11/02/2024    Annual Wellness Visit (AWV)  11/02/2024    DTaP/Tdap/Td vaccine (2 - Td or Tdap) 02/21/2027    Flu vaccine  Completed    Pneumococcal 65+ years Vaccine  Completed    Hib vaccine  Aged Out       Hemoglobin A1C (%)   Date Value   03/08/2023 7.5 (H)   08/06/2022 6.0   11/24/2021 6.0             ( goal A1C is < 7)   No components found for: \"LABMICR\"  LDL Cholesterol (mg/dL)   Date Value   08/06/2022 64       (goal LDL is <100)   AST (U/L)   Date Value   11/22/2023 23     ALT (U/L)   Date Value   11/22/2023 9     BUN (mg/dL)   Date Value   11/22/2023 12     BP Readings from Last 3 Encounters:   11/22/23 (!) 144/73   11/02/23 126/76   09/20/23 116/72          (goal 120/80)    All Future Testing planned in [facility]  Lab Frequency Next Occurrence   CBC Once 08/05/2024   Comprehensive Metabolic Panel with Bilirubin Once 08/05/2024   HIV RNA, Quantitative, PCR Once 08/05/2024   Lymphocyte Subset Once 08/05/2024         Patient Active Problem List:     HIV (human immunodeficiency virus infection) ([facility])     Dementia without behavioral disturbance ([facility])

## 2023-12-12 RX ORDER — DIVALPROEX SODIUM 125 MG/1
CAPSULE, COATED PELLETS ORAL
Qty: 112 CAPSULE | Refills: 5 | Status: SHIPPED | OUTPATIENT
Start: 2023-12-12

## 2023-12-12 RX ORDER — ASPIRIN 81 MG/1
TABLET ORAL
Qty: 28 TABLET | Refills: 5 | Status: SHIPPED | OUTPATIENT
Start: 2023-12-12

## 2023-12-12 RX ORDER — TAMSULOSIN HYDROCHLORIDE 0.4 MG/1
CAPSULE ORAL
Qty: 28 CAPSULE | Refills: 5 | Status: SHIPPED | OUTPATIENT
Start: 2023-12-12

## 2023-12-27 DIAGNOSIS — R39.81 URINARY INCONTINENCE DUE TO COGNITIVE IMPAIRMENT: ICD-10-CM

## 2023-12-27 DIAGNOSIS — R32 URINARY INCONTINENCE, UNSPECIFIED TYPE: ICD-10-CM

## 2023-12-27 NOTE — TELEPHONE ENCOUNTER
----- Message from Rebecca Nickerson sent at 12/22/2023 10:56 AM EST -----  Subject: Refill Request    QUESTIONS  Name of Medication? Incontinence Supply Disposable (BRIEFS OVERNIGHT   MEDIUM) MISC  Patient-reported dosage and instructions? 3 a day   How many days do you have left? 3  Preferred Pharmacy? [address] phone number (if available)? [phone]  ---------------------------------------------------------------------------  --------------  CALL BACK INFO  What is the best way for the office to contact you? OK to leave message on   voicemail  Preferred Call Back Phone Number? [phone]  ---------------------------------------------------------------------------  --------------  SCRIPT ANSWERS  Relationship to Patient? Spouse/Partner  Representative Name? Sherita Ko   Is the representative on the Communication Release of Information (MALENA)   form in Epic?  Yes

## 2023-12-29 DIAGNOSIS — E11.9 TYPE 2 DIABETES MELLITUS WITHOUT COMPLICATION, WITHOUT LONG-TERM CURRENT USE OF INSULIN (HCC): ICD-10-CM

## 2023-12-29 RX ORDER — BROMPHENIRAMIN/PSEUDOEPHEDRINE 1-15MG/5ML
1 LIQUID (ML) ORAL DAILY
Qty: 1 EACH | Refills: 2 | Status: SHIPPED | OUTPATIENT
Start: 2023-12-29

## 2023-12-29 NOTE — TELEPHONE ENCOUNTER
Request for   Requested Prescriptions     Pending Prescriptions Disp Refills    metFORMIN (GLUCOPHAGE) 500 MG tablet 30 tablet 5     Sig: Take 1 tablet by mouth daily (with breakfast)    . Please review and e-scribe to pharmacy listed in chart if appropriate. Thank you.       Last Visit Date: 11/2/2023  Next Visit Date: Visit date not found    Future Appointments   Date Time Provider [address]   9/25/2024 10:30 AM Nathalia Glover MD [facility] [address] Maintenance   Topic Date Due    Meningococcal (ACWY) vaccine (1 - Risk 2-dose series) Never done    Measles,Mumps,Rubella (MMR) vaccine (1 of 2 - Risk 2-dose series) Never done    Hepatitis A vaccine (1 of 2 - Risk 2-dose series) Never done    Shingles vaccine (1 of 2) Never done    Hepatitis B vaccine (1 of 3 - Risk 3-dose series) Never done    Respiratory Syncytial Virus (RSV) Pregnant or age 61 yrs+ (1 - 1-dose 60+ series) Never done    Lipids  08/06/2023    COVID-19 Vaccine (4 - 2023-24 season) 09/01/2023    Depression Screen  11/02/2024    Annual Wellness Visit (AWV)  11/02/2024    DTaP/Tdap/Td vaccine (2 - Td or Tdap) 02/21/2027    Flu vaccine  Completed    Pneumococcal 65+ years Vaccine  Completed    Hib vaccine  Aged Out    Polio vaccine  Aged Out       Hemoglobin A1C (%)   Date Value   03/08/2023 7.5 (H)   08/06/2022 6.0   11/24/2021 6.0             ( goal A1C is < 7)   No components found for: \"LABMICR\"  LDL Cholesterol (mg/dL)   Date Value   08/06/2022 64       (goal LDL is <100)   AST (U/L)   Date Value   11/22/2023 23     ALT (U/L)   Date Value   11/22/2023 9     BUN (mg/dL)   Date Value   11/22/2023 12     BP Readings from Last 3 Encounters:   11/22/23 (!) 144/73   11/02/23 126/76   09/20/23 116/72          (goal 120/80)    All Future Testing planned in [facility]  Lab Frequency Next Occurrence   CBC Once 08/05/2024   Comprehensive Metabolic Panel with Bilirubin Once 08/05/2024   HIV RNA, Quantitative, PCR Once 08/05/2024

## 2024-01-17 DIAGNOSIS — E44.0 MODERATE MALNUTRITION (HCC): ICD-10-CM

## 2024-01-17 DIAGNOSIS — F03.90 DEMENTIA WITHOUT BEHAVIORAL DISTURBANCE (HCC): ICD-10-CM

## 2024-01-17 DIAGNOSIS — F02.818 ALZHEIMER'S DEMENTIA WITH BEHAVIORAL DISTURBANCE (HCC): ICD-10-CM

## 2024-01-17 DIAGNOSIS — G30.9 ALZHEIMER'S DEMENTIA WITH BEHAVIORAL DISTURBANCE (HCC): ICD-10-CM

## 2024-01-17 NOTE — TELEPHONE ENCOUNTER
..Request for   Requested Prescriptions     Pending Prescriptions Disp Refills    Nutritional Supplements (ENSURE HIGH PROTEIN) LIQD 60 each 11     Sig: Take 1 each by mouth in the morning and at bedtime    .      Please review and e-scribe to pharmacy listed in chart if appropriate. Thank you.      Last Visit Date: 11/2/2023  Next Visit Date: Visit date not found    Future Appointments   Date Time Provider Department Center   9/25/2024 10:30 AM Sumanth Marie MD INFT DISEASE [facility]       Health Maintenance   Topic Date Due    Meningococcal (ACWY) vaccine (1 - Risk 2-dose series) Never done    Measles,Mumps,Rubella (MMR) vaccine (1 of 2 - Risk 2-dose series) Never done    Hepatitis A vaccine (1 of 2 - Risk 2-dose series) Never done    Shingles vaccine (1 of 2) Never done    Hepatitis B vaccine (1 of 3 - Risk 3-dose series) Never done    Respiratory Syncytial Virus (RSV) Pregnant or age 60 yrs+ (1 - 1-dose 60+ series) Never done    Lipids  08/06/2023    COVID-19 Vaccine (4 - 2023-24 season) 09/01/2023    Annual Wellness Visit (Medicare Advantage)  01/01/2024    Depression Screen  11/02/2024    DTaP/Tdap/Td vaccine (2 - Td or Tdap) 02/21/2027    Flu vaccine  Completed    Pneumococcal 65+ years Vaccine  Completed    Hib vaccine  Aged Out    Polio vaccine  Aged Out       Hemoglobin A1C (%)   Date Value   03/08/2023 7.5 (H)   08/06/2022 6.0   11/24/2021 6.0             ( goal A1C is < 7)   No components found for: \"LABMICR\"  LDL Cholesterol (mg/dL)   Date Value   08/06/2022 64       (goal LDL is <100)   AST (U/L)   Date Value   11/22/2023 23     ALT (U/L)   Date Value   11/22/2023 9     BUN (mg/dL)   Date Value   11/22/2023 12     BP Readings from Last 3 Encounters:   11/22/23 (!) 144/73   11/02/23 126/76   09/20/23 116/72          (goal 120/80)    All Future Testing planned in [facility]  Lab Frequency Next Occurrence   CBC Once 08/05/2024   Comprehensive Metabolic Panel with Bilirubin Once 08/05/2024   HIV

## 2024-01-18 RX ORDER — FEEDER CONTAINER WITH PUMP SET
1 EACH MISCELLANEOUS 2 TIMES DAILY
Qty: 60 EACH | Refills: 11 | Status: SHIPPED | OUTPATIENT
Start: 2024-01-18 | End: 2024-01-18

## 2024-02-26 DIAGNOSIS — I73.9 PVD (PERIPHERAL VASCULAR DISEASE) (HCC): ICD-10-CM

## 2024-02-27 RX ORDER — OMEPRAZOLE 20 MG/1
20 CAPSULE, DELAYED RELEASE ORAL DAILY
Qty: 28 CAPSULE | Refills: 6 | Status: SHIPPED | OUTPATIENT
Start: 2024-02-27

## 2024-02-27 RX ORDER — FINASTERIDE 5 MG/1
5 TABLET, FILM COATED ORAL DAILY
Qty: 28 TABLET | Refills: 6 | Status: SHIPPED | OUTPATIENT
Start: 2024-02-27

## 2024-02-27 RX ORDER — DONEPEZIL HYDROCHLORIDE 10 MG/1
TABLET, FILM COATED ORAL
Qty: 28 TABLET | Refills: 6 | Status: SHIPPED | OUTPATIENT
Start: 2024-02-27

## 2024-02-27 RX ORDER — MEMANTINE HYDROCHLORIDE 10 MG/1
10 TABLET ORAL 2 TIMES DAILY
Qty: 56 TABLET | Refills: 6 | Status: SHIPPED | OUTPATIENT
Start: 2024-02-27

## 2024-02-27 RX ORDER — ATORVASTATIN CALCIUM 40 MG/1
TABLET, FILM COATED ORAL
Qty: 28 TABLET | Refills: 6 | Status: SHIPPED | OUTPATIENT
Start: 2024-02-27

## 2024-02-27 RX ORDER — ASPIRIN 81 MG
1 TABLET, DELAYED RELEASE (ENTERIC COATED) ORAL DAILY
Qty: 28 TABLET | Refills: 6 | Status: SHIPPED | OUTPATIENT
Start: 2024-02-27

## 2024-04-02 DIAGNOSIS — F03.90 DEMENTIA WITHOUT BEHAVIORAL DISTURBANCE (HCC): Primary | ICD-10-CM

## 2024-04-02 RX ORDER — BROMPHENIRAMIN/PSEUDOEPHEDRINE 1-15MG/5ML
LIQUID (ML) ORAL
COMMUNITY
End: 2024-04-02 | Stop reason: SDUPTHER

## 2024-04-02 NOTE — TELEPHONE ENCOUNTER
Name of Medication? Incontinence Supply Disposable (BRIEFS OVERNIGHT   LARGE) MISC  Patient-reported dosage and instructions? 3 a day   How many days do you have left? 3  Preferred Pharmacy? [facility]  Pharmacy phone number (if available)? [phone]    Caridad called stating he needs Briefs and that the Mediums were too small and would like Larges ordered this time.    Thanks

## 2024-04-10 RX ORDER — BROMPHENIRAMIN/PSEUDOEPHEDRINE 1-15MG/5ML
1 LIQUID (ML) ORAL 2 TIMES DAILY
Qty: 50 EACH | Refills: 2 | Status: SHIPPED | OUTPATIENT
Start: 2024-04-10

## 2024-04-11 DIAGNOSIS — Z21 ASYMPTOMATIC HIV INFECTION (HCC): ICD-10-CM

## 2024-04-11 RX ORDER — BICTEGRAVIR SODIUM, EMTRICITABINE, AND TENOFOVIR ALAFENAMIDE FUMARATE 50; 200; 25 MG/1; MG/1; MG/1
1 TABLET ORAL DAILY
Qty: 30 TABLET | Refills: 6 | Status: SHIPPED | OUTPATIENT
Start: 2024-04-11

## 2024-04-11 NOTE — TELEPHONE ENCOUNTER
LAST VISIT: 9/20/23  NEXT VISIT: 9/25/24    Per last dictation patient is on this medication. Please sign for refill if ok. Thank you.

## 2024-04-24 ENCOUNTER — TELEPHONE (OUTPATIENT)
Dept: INTERNAL MEDICINE | Age: 84
End: 2024-04-24

## 2024-04-24 DIAGNOSIS — R39.81 URINARY INCONTINENCE DUE TO COGNITIVE IMPAIRMENT: Primary | ICD-10-CM

## 2024-04-24 NOTE — TELEPHONE ENCOUNTER
Caridad (Wife) called requesting Pull Up Briefs for patient to be sent to [facility].    If you do the DME order I can take care of the rest for you.    Thanks.

## 2024-05-09 ENCOUNTER — OFFICE VISIT (OUTPATIENT)
Dept: INTERNAL MEDICINE | Age: 84
End: 2024-05-09
Payer: MEDICARE

## 2024-05-09 VITALS
TEMPERATURE: 97 F | HEIGHT: 66 IN | DIASTOLIC BLOOD PRESSURE: 74 MMHG | SYSTOLIC BLOOD PRESSURE: 122 MMHG | BODY MASS INDEX: 20.99 KG/M2

## 2024-05-09 DIAGNOSIS — R39.81 URINARY INCONTINENCE DUE TO COGNITIVE IMPAIRMENT: ICD-10-CM

## 2024-05-09 DIAGNOSIS — E11.9 TYPE 2 DIABETES MELLITUS WITHOUT COMPLICATION, WITHOUT LONG-TERM CURRENT USE OF INSULIN (HCC): ICD-10-CM

## 2024-05-09 DIAGNOSIS — I73.9 PVD (PERIPHERAL VASCULAR DISEASE) (HCC): ICD-10-CM

## 2024-05-09 DIAGNOSIS — F02.818 ALZHEIMER'S DEMENTIA WITH BEHAVIORAL DISTURBANCE (HCC): Primary | ICD-10-CM

## 2024-05-09 DIAGNOSIS — J44.1 CHRONIC OBSTRUCTIVE PULMONARY DISEASE WITH (ACUTE) EXACERBATION (HCC): ICD-10-CM

## 2024-05-09 DIAGNOSIS — E44.0 MODERATE MALNUTRITION (HCC): ICD-10-CM

## 2024-05-09 DIAGNOSIS — B20 HUMAN IMMUNODEFICIENCY VIRUS (HIV) DISEASE (HCC): ICD-10-CM

## 2024-05-09 DIAGNOSIS — G30.9 ALZHEIMER'S DEMENTIA WITH BEHAVIORAL DISTURBANCE (HCC): Primary | ICD-10-CM

## 2024-05-09 DIAGNOSIS — R56.9 SEIZURE-LIKE ACTIVITY (HCC): ICD-10-CM

## 2024-05-09 PROBLEM — E11.69 ONYCHOMYCOSIS OF MULTIPLE TOENAILS WITH TYPE 2 DIABETES MELLITUS (HCC): Status: ACTIVE | Noted: 2024-05-09

## 2024-05-09 PROBLEM — B35.1 ONYCHOMYCOSIS OF MULTIPLE TOENAILS WITH TYPE 2 DIABETES MELLITUS (HCC): Status: ACTIVE | Noted: 2024-05-09

## 2024-05-09 PROBLEM — E11.69 ONYCHOMYCOSIS OF MULTIPLE TOENAILS WITH TYPE 2 DIABETES MELLITUS (HCC): Status: RESOLVED | Noted: 2024-05-09 | Resolved: 2024-05-09

## 2024-05-09 PROBLEM — B35.1 ONYCHOMYCOSIS OF MULTIPLE TOENAILS WITH TYPE 2 DIABETES MELLITUS (HCC): Status: RESOLVED | Noted: 2024-05-09 | Resolved: 2024-05-09

## 2024-05-09 PROCEDURE — 99213 OFFICE O/P EST LOW 20 MIN: CPT

## 2024-05-09 PROCEDURE — 1123F ACP DISCUSS/DSCN MKR DOCD: CPT

## 2024-05-09 SDOH — ECONOMIC STABILITY: FOOD INSECURITY: WITHIN THE PAST 12 MONTHS, YOU WORRIED THAT YOUR FOOD WOULD RUN OUT BEFORE YOU GOT MONEY TO BUY MORE.: NEVER TRUE

## 2024-05-09 SDOH — ECONOMIC STABILITY: INCOME INSECURITY: HOW HARD IS IT FOR YOU TO PAY FOR THE VERY BASICS LIKE FOOD, HOUSING, MEDICAL CARE, AND HEATING?: NOT VERY HARD

## 2024-05-09 SDOH — ECONOMIC STABILITY: FOOD INSECURITY: WITHIN THE PAST 12 MONTHS, THE FOOD YOU BOUGHT JUST DIDN'T LAST AND YOU DIDN'T HAVE MONEY TO GET MORE.: NEVER TRUE

## 2024-05-09 ASSESSMENT — PATIENT HEALTH QUESTIONNAIRE - PHQ9
SUM OF ALL RESPONSES TO PHQ9 QUESTIONS 1 & 2: 0
2. FEELING DOWN, DEPRESSED OR HOPELESS: NOT AT ALL
SUM OF ALL RESPONSES TO PHQ QUESTIONS 1-9: 0
1. LITTLE INTEREST OR PLEASURE IN DOING THINGS: NOT AT ALL

## 2024-05-09 NOTE — PROGRESS NOTES
[facility]  [facility]  [address] [place] [address]  Dept: [phone]  Dept Fax: [phone]    Office Progress/Follow Up Note  Date ofpatient's visit: 5/9/2024  Patient's Name:  Mandeep Iverson YOB: 1940            Patient Care Team:  Pepe Terrazas MD as PCP - General (Internal Medicine)  Minna Oconnor MD as PCP - Empaneled Provider  Sumanth Marie MD as Consulting Physician (Infectious Diseases)  Luis Yeager DPM (Podiatry)  ================================================================    REASON FOR VISIT/CHIEF COMPLAINT:  Check-Up (No concerns)    HISTORY OF PRESENTING ILLNESS:    05/09/2024 07/13/2023  significant past medical history of HIV, dementia and is on multiple medications.  Patient is new to me but patient regularly follows up with the clinic.  He also follows up with infectious disease and podiatry.  For the patient HIV patient is on Biktarvy and is last CD4 count was 577.  Patient is a accompanied to the office by his wife, according to the wife, patient is not able to take care of himself.  Patient has a home health aide that works with the patient 7 days a week and the wife is responsible for the patient's feeding and taking care of the patient.  Apparently patient is not able to perform any activities of daily living.  Patient himself is not able to give any history, he is severely demented, he is not oriented, does not follow any commands.  He is however not agitated.  Wife denies any significant health maintenance checkups or any vaccinations.  Recent hospitalization reviewed.  Patient was made DNR [facility].  Recently patient's HbA1c was in the 7 range and was to be started on metformin, but as per patient's wife request it was not started and held.  Otherwise, patient is at his baseline, no significant abnormalities.  He denies any need for the patient, is however requesting for bed chucks for his urinary

## 2024-05-21 DIAGNOSIS — N40.0 BENIGN PROSTATIC HYPERPLASIA WITHOUT LOWER URINARY TRACT SYMPTOMS: ICD-10-CM

## 2024-05-21 DIAGNOSIS — I73.9 PVD (PERIPHERAL VASCULAR DISEASE) (HCC): ICD-10-CM

## 2024-05-21 NOTE — TELEPHONE ENCOUNTER
Mandeep Iverson is calling to request a refill on the following medication(s):    Medication Request:  Multiple Medications      Last Visit Date (If Applicable):  5/9/2024    Next Visit Date:    Visit date not found

## 2024-05-26 RX ORDER — TAMSULOSIN HYDROCHLORIDE 0.4 MG/1
CAPSULE ORAL
Qty: 28 CAPSULE | Refills: 5 | Status: SHIPPED | OUTPATIENT
Start: 2024-05-26

## 2024-05-26 RX ORDER — DIVALPROEX SODIUM 125 MG/1
CAPSULE, COATED PELLETS ORAL
Qty: 112 CAPSULE | Refills: 5 | Status: SHIPPED | OUTPATIENT
Start: 2024-05-26

## 2024-05-26 RX ORDER — ASPIRIN 81 MG/1
TABLET ORAL
Qty: 28 TABLET | Refills: 5 | OUTPATIENT
Start: 2024-05-26

## 2024-05-28 DIAGNOSIS — I73.9 PVD (PERIPHERAL VASCULAR DISEASE) (HCC): ICD-10-CM

## 2024-05-28 RX ORDER — ASPIRIN 81 MG/1
TABLET ORAL
Qty: 28 TABLET | Refills: 5 | OUTPATIENT
Start: 2024-05-28

## 2024-05-31 NOTE — PROGRESS NOTES
Patient instructed to remove shoes and socks and instructed to sit in exam chair.  Current PCP is Pepe Terrazas MD and date of last visit was 05/09/2024.   Do you have a follow up visit scheduled?  Yes  If yes, the date is 09/25/2024.      Diabetic visit information    Blood pressure (Control is BP <140/90)  BP Readings from Last 3 Encounters:   05/09/24 122/74   11/22/23 (!) 144/73   11/02/23 126/76       BP taken with correct size cuff? - Yes   Repeated if > 140/90 Yes      Tobacco use:  Patient  reports that he quit smoking about 3 years ago. His smoking use included cigarettes. He started smoking about 73 years ago. He has never used smokeless tobacco.  If Smoker - Cessation materials given?- Yes       Diabetic Health Maintenance Items due  Diabetes Management   Topic Date Due    Lipids  08/06/2023    A1C test (Diabetic or Prediabetic)  05/09/2024       Diabetic retinal exam done in last year? - Yes   If No: remind patient that it is due and they should schedule an exam    Medications  Is patient taking any medications for diabetes? -   Yes  Have blood sugars been controlled?   Fasting blood sugars under 120   -   Yes   Random home sugars or today's POCT glucose is under 180 -   Yes   []  If No to the above then patient should schedule appt with PCP.     Diabetic Plan    A1C Plan  Lab Results   Component Value Date    LABA1C 7.5 (H) 03/08/2023    LABA1C 6.0 08/06/2022    LABA1C 6.0 11/24/2021      []  If A1C over 8 and last result >3 months ago - Order A1C and refer to PCP   []  If last A1C over 6 months ago - Order A1C and refer to PCP for follow up   []  If elevated blood sugars > 180 - refer to PCP for follow up    []  Blood sugar controlled - A1C under 8 and last check was < 6 months      Cholesterol Plan   No components found for: \"LDLCALC\", \"LDLCHOLESTEROL\"   []  If LDL > 100 and last result >3 months ago - order Fasting lipids and refer to PCP for follow up   []  If LDL < 100 and over 1 year ago -

## 2024-06-13 DIAGNOSIS — E11.9 TYPE 2 DIABETES MELLITUS WITHOUT COMPLICATION, WITHOUT LONG-TERM CURRENT USE OF INSULIN (HCC): ICD-10-CM

## 2024-06-17 ENCOUNTER — HOSPITAL ENCOUNTER (OUTPATIENT)
Age: 84
Setting detail: SPECIMEN
Discharge: HOME | End: 2024-06-17

## 2024-06-17 ENCOUNTER — OFFICE VISIT (OUTPATIENT)
Dept: PODIATRY | Age: 84
End: 2024-06-17
Payer: MEDICARE

## 2024-06-17 VITALS — HEIGHT: 66 IN | WEIGHT: 130 LBS | BODY MASS INDEX: 20.89 KG/M2

## 2024-06-17 DIAGNOSIS — I73.9 PVD (PERIPHERAL VASCULAR DISEASE) (HCC): ICD-10-CM

## 2024-06-17 DIAGNOSIS — E11.9 TYPE 2 DIABETES MELLITUS WITHOUT COMPLICATION, WITHOUT LONG-TERM CURRENT USE OF INSULIN (HCC): ICD-10-CM

## 2024-06-17 DIAGNOSIS — M79.609 PAIN DUE TO ONYCHOMYCOSIS OF NAIL: ICD-10-CM

## 2024-06-17 DIAGNOSIS — B35.1 PAIN DUE TO ONYCHOMYCOSIS OF NAIL: ICD-10-CM

## 2024-06-17 DIAGNOSIS — B35.1 ONYCHOMYCOSIS OF MULTIPLE TOENAILS WITH TYPE 2 DIABETES MELLITUS (HCC): Primary | ICD-10-CM

## 2024-06-17 DIAGNOSIS — E11.69 ONYCHOMYCOSIS OF MULTIPLE TOENAILS WITH TYPE 2 DIABETES MELLITUS (HCC): Primary | ICD-10-CM

## 2024-06-17 DIAGNOSIS — F03.90 DEMENTIA WITHOUT BEHAVIORAL DISTURBANCE (HCC): ICD-10-CM

## 2024-06-17 LAB
ERYTHROCYTE [DISTWIDTH] IN BLOOD BY AUTOMATED COUNT: 15 % (ref 11.8–14.4)
EST. AVERAGE GLUCOSE BLD GHB EST-MCNC: 137 MG/DL
HBA1C MFR BLD: 6.4 % (ref 4–6)
HCT VFR BLD AUTO: 45.8 % (ref 40.7–50.3)
HGB BLD-MCNC: 13.7 G/DL (ref 13–17)
MCH RBC QN AUTO: 27.2 PG (ref 25.2–33.5)
MCHC RBC AUTO-ENTMCNC: 29.9 G/DL (ref 28.4–34.8)
MCV RBC AUTO: 91.1 FL (ref 82.6–102.9)
NRBC BLD-RTO: 0 PER 100 WBC
PLATELET # BLD AUTO: 149 K/UL (ref 138–453)
PMV BLD AUTO: 12.7 FL (ref 8.1–13.5)
RBC # BLD AUTO: 5.03 M/UL (ref 4.21–5.77)
WBC OTHER # BLD: 7.9 K/UL (ref 3.5–11.3)

## 2024-06-17 PROCEDURE — 11721 DEBRIDE NAIL 6 OR MORE: CPT

## 2024-06-17 PROCEDURE — 99999 PR OFFICE/OUTPT VISIT,PROCEDURE ONLY: CPT

## 2024-06-17 PROCEDURE — 99213 OFFICE O/P EST LOW 20 MIN: CPT | Performed by: PODIATRIST

## 2024-06-17 NOTE — PROGRESS NOTES
[facility]  [address]   [address] [place] [address]  Tel: [phone]   Fax: [phone]    Subjective     CC: Diabetic foot exam and painful and elongated toe nails    Interval history:  Patient returns to clinic for diabetic foot examination and requests trimming of painful elongated toenails. Patient does not respond to questions, however his wife states that he has been complaining of pain to his feet related to his toenails. No other complaints at this time.    HPI:  Mandeep Iverson is a 83 y.o. year old male who presents to clinic today for diabetic foot examination and also complaining of painful and elongated toenails.  The patient has dementia and HIV. The patient states their digits are painful when the toenails are elongated, causing rubbing in shoe gear. The patient denies tingling and numbness in the lower extremities. Patient not able to relay any rest pain or claudication symptoms.The patient denies any history of acute trauma. The patient denies any other pedal complaints. Accompanied w wife and granddaughter who request nail debridement.    Primary care physician is Pepe Terrazas MD.    ROS:    Constitutional: Denies nausea, vomiting, fever, chills.  Neurologic: No numbness, tingling, and burning in the feet.    Vascular: Denies symptoms of lower extremity claudication.    Skin: Denies open wounds.  Otherwise negative except as noted in the HPI.     PMH:  Past Medical History:   Diagnosis Date    Acute delirium 2/11/2019    Acute metabolic encephalopathy 2/11/2019    Alzheimer's dementia with behavioral disturbance (HCC) 2/11/2019    Atrophy, cortical     BPH (benign prostatic hyperplasia) 2/1/2013    Dementia ([facility])     Dementia without behavioral disturbance (HCC) 7/27/2012    GERD (gastroesophageal reflux disease) 4/28/2015    Hemorrhoids     HIV (human immunodeficiency virus infection) ([facility])     Meralgia paraesthetica 2/4/2016    Mixed hyperlipidemia 5/5/2016

## 2024-06-18 LAB
% NK (CD56): 28 % (ref 6–29)
AB NK (CD56): 1128 /UL (ref 90–600)
ABSOLUTE CD 3: 2619 /UL (ref 411–2061)
ABSOLUTE CD 8 (SUPP): 2015 /UL (ref 282–999)
ABSOLUTE CD19 B CELL: 161 /UL (ref 71–567)
ALBUMIN SERPL-MCNC: 4.3 G/DL (ref 3.5–5.2)
ALBUMIN/GLOB SERPL: 1 {RATIO} (ref 1–2.5)
ALP SERPL-CCNC: 169 U/L (ref 40–129)
ALT SERPL-CCNC: 12 U/L (ref 10–50)
ANION GAP SERPL CALCULATED.3IONS-SCNC: 16 MMOL/L (ref 9–16)
AST SERPL-CCNC: 33 U/L (ref 10–50)
BILIRUB DIRECT SERPL-MCNC: <0.2 MG/DL (ref 0–0.3)
BILIRUB INDIRECT SERPL-MCNC: ABNORMAL MG/DL (ref 0–1)
BILIRUB SERPL-MCNC: 0.3 MG/DL (ref 0–1.2)
BUN SERPL-MCNC: 10 MG/DL (ref 8–23)
CALCIUM SERPL-MCNC: 9.8 MG/DL (ref 8.6–10.4)
CD19 B CELL: 4 % (ref 5–25)
CD3 % TOTAL T CELLS: 65 % (ref 57–94)
CD3+CD4+ CELLS # BLD: 604 /UL (ref 309–1571)
CD3+CD4+ CELLS NFR BLD: 15 % (ref 27–64)
CD4:CD8: 0.3 (ref 0.6–2.8)
CD8 % SUPPRESSOR T CELL: 50 % (ref 17–48)
CHLORIDE SERPL-SCNC: 100 MMOL/L (ref 98–107)
CO2 SERPL-SCNC: 22 MMOL/L (ref 20–31)
CREAT SERPL-MCNC: 1.2 MG/DL (ref 0.7–1.2)
GFR, ESTIMATED: 62 ML/MIN/1.73M2
GLUCOSE SERPL-MCNC: 177 MG/DL (ref 74–99)
LYMPHOCYTES # BLD: 51 % (ref 24–44)
POTASSIUM SERPL-SCNC: 5.2 MMOL/L (ref 3.7–5.3)
PROT SERPL-MCNC: 8.7 G/DL (ref 6.6–8.7)
SODIUM SERPL-SCNC: 138 MMOL/L (ref 136–145)
WBC # BLD: 7.9 K/UL (ref 3.5–11)

## 2024-06-19 LAB
HIV1 RNA # SERPL NAA+PROBE: NOT DETECTED {COPIES}/ML
SPECIMEN SOURCE: NORMAL

## 2024-09-05 DIAGNOSIS — I73.9 PVD (PERIPHERAL VASCULAR DISEASE) (HCC): ICD-10-CM

## 2024-09-11 RX ORDER — ASPIRIN 81 MG
1 TABLET, DELAYED RELEASE (ENTERIC COATED) ORAL DAILY
Qty: 28 TABLET | Refills: 6 | Status: SHIPPED | OUTPATIENT
Start: 2024-09-11

## 2024-09-11 RX ORDER — ATORVASTATIN CALCIUM 40 MG/1
TABLET, FILM COATED ORAL
Qty: 28 TABLET | Refills: 6 | Status: SHIPPED | OUTPATIENT
Start: 2024-09-11

## 2024-09-11 RX ORDER — DONEPEZIL HYDROCHLORIDE 10 MG/1
TABLET, FILM COATED ORAL
Qty: 28 TABLET | Refills: 6 | Status: SHIPPED | OUTPATIENT
Start: 2024-09-11

## 2024-09-11 RX ORDER — FINASTERIDE 5 MG/1
5 TABLET, FILM COATED ORAL DAILY
Qty: 28 TABLET | Refills: 6 | Status: SHIPPED | OUTPATIENT
Start: 2024-09-11

## 2024-09-11 RX ORDER — MEMANTINE HYDROCHLORIDE 10 MG/1
10 TABLET ORAL 2 TIMES DAILY
Qty: 56 TABLET | Refills: 6 | Status: SHIPPED | OUTPATIENT
Start: 2024-09-11

## 2024-09-25 ENCOUNTER — OFFICE VISIT (OUTPATIENT)
Dept: INFECTIOUS DISEASES | Age: 84
End: 2024-09-25

## 2024-09-25 VITALS
WEIGHT: 140 LBS | RESPIRATION RATE: 22 BRPM | OXYGEN SATURATION: 82 % | BODY MASS INDEX: 22.5 KG/M2 | SYSTOLIC BLOOD PRESSURE: 126 MMHG | DIASTOLIC BLOOD PRESSURE: 75 MMHG | HEART RATE: 78 BPM | HEIGHT: 66 IN

## 2024-09-25 DIAGNOSIS — Z21 ASYMPTOMATIC HIV INFECTION (HCC): Primary | ICD-10-CM

## 2024-09-25 DIAGNOSIS — J40 BRONCHITIS: ICD-10-CM

## 2024-09-25 DIAGNOSIS — F01.50 VASCULAR DEMENTIA WITHOUT BEHAVIORAL DISTURBANCE (HCC): ICD-10-CM

## 2024-09-25 RX ORDER — CEFDINIR 300 MG/1
300 CAPSULE ORAL 2 TIMES DAILY
Qty: 14 CAPSULE | Refills: 0 | Status: SHIPPED | OUTPATIENT
Start: 2024-09-25 | End: 2024-10-02

## 2024-09-25 RX ORDER — DOXYCYCLINE HYCLATE 100 MG
100 TABLET ORAL 2 TIMES DAILY
Qty: 14 TABLET | Refills: 0 | Status: SHIPPED | OUTPATIENT
Start: 2024-09-25 | End: 2024-10-02

## 2024-09-25 RX ORDER — BICTEGRAVIR SODIUM, EMTRICITABINE, AND TENOFOVIR ALAFENAMIDE FUMARATE 50; 200; 25 MG/1; MG/1; MG/1
1 TABLET ORAL DAILY
Qty: 30 TABLET | Refills: 6 | Status: SHIPPED | OUTPATIENT
Start: 2024-09-25

## 2024-09-25 ASSESSMENT — ENCOUNTER SYMPTOMS
COUGH: 1
WHEEZING: 1

## 2024-10-04 ENCOUNTER — APPOINTMENT (OUTPATIENT)
Dept: CT IMAGING | Age: 84
End: 2024-10-04
Payer: MEDICARE

## 2024-10-04 ENCOUNTER — OFFICE VISIT (OUTPATIENT)
Dept: INTERNAL MEDICINE | Age: 84
End: 2024-10-04
Payer: MEDICARE

## 2024-10-04 ENCOUNTER — HOSPITAL ENCOUNTER (EMERGENCY)
Age: 84
Discharge: HOME | End: 2024-10-04
Attending: EMERGENCY MEDICINE
Payer: MEDICARE

## 2024-10-04 VITALS
WEIGHT: 135.2 LBS | TEMPERATURE: 97.1 F | DIASTOLIC BLOOD PRESSURE: 87 MMHG | HEART RATE: 82 BPM | HEIGHT: 66 IN | BODY MASS INDEX: 21.73 KG/M2 | SYSTOLIC BLOOD PRESSURE: 134 MMHG

## 2024-10-04 VITALS
RESPIRATION RATE: 19 BRPM | OXYGEN SATURATION: 99 % | HEART RATE: 80 BPM | TEMPERATURE: 98.3 F | SYSTOLIC BLOOD PRESSURE: 155 MMHG | DIASTOLIC BLOOD PRESSURE: 105 MMHG

## 2024-10-04 DIAGNOSIS — S01.81XA FACIAL LACERATION, INITIAL ENCOUNTER: ICD-10-CM

## 2024-10-04 DIAGNOSIS — S09.90XA INJURY OF HEAD, INITIAL ENCOUNTER: Primary | ICD-10-CM

## 2024-10-04 DIAGNOSIS — R32 URINARY INCONTINENCE, UNSPECIFIED TYPE: ICD-10-CM

## 2024-10-04 DIAGNOSIS — G30.9 ALZHEIMER'S DEMENTIA WITH BEHAVIORAL DISTURBANCE (HCC): ICD-10-CM

## 2024-10-04 DIAGNOSIS — F03.90 DEMENTIA WITHOUT BEHAVIORAL DISTURBANCE (HCC): ICD-10-CM

## 2024-10-04 DIAGNOSIS — E44.0 MODERATE MALNUTRITION (HCC): ICD-10-CM

## 2024-10-04 DIAGNOSIS — E11.9 TYPE 2 DIABETES MELLITUS WITHOUT COMPLICATION, WITHOUT LONG-TERM CURRENT USE OF INSULIN (HCC): Primary | ICD-10-CM

## 2024-10-04 DIAGNOSIS — F02.818 ALZHEIMER'S DEMENTIA WITH BEHAVIORAL DISTURBANCE (HCC): ICD-10-CM

## 2024-10-04 PROCEDURE — 99211 OFF/OP EST MAY X REQ PHY/QHP: CPT | Performed by: HOSPITALIST

## 2024-10-04 PROCEDURE — 12011 RPR F/E/E/N/L/M 2.5 CM/<: CPT

## 2024-10-04 PROCEDURE — 72125 CT NECK SPINE W/O DYE: CPT

## 2024-10-04 PROCEDURE — 93005 ELECTROCARDIOGRAM TRACING: CPT | Performed by: EMERGENCY MEDICINE

## 2024-10-04 PROCEDURE — 99284 EMERGENCY DEPT VISIT MOD MDM: CPT

## 2024-10-04 PROCEDURE — 70450 CT HEAD/BRAIN W/O DYE: CPT

## 2024-10-04 ASSESSMENT — PATIENT HEALTH QUESTIONNAIRE - PHQ9
SUM OF ALL RESPONSES TO PHQ QUESTIONS 1-9: 0
SUM OF ALL RESPONSES TO PHQ9 QUESTIONS 1 & 2: 0
1. LITTLE INTEREST OR PLEASURE IN DOING THINGS: NOT AT ALL
SUM OF ALL RESPONSES TO PHQ QUESTIONS 1-9: 0
2. FEELING DOWN, DEPRESSED OR HOPELESS: NOT AT ALL
SUM OF ALL RESPONSES TO PHQ QUESTIONS 1-9: 0
SUM OF ALL RESPONSES TO PHQ QUESTIONS 1-9: 0

## 2024-10-04 NOTE — DISCHARGE INSTRUCTIONS
You have been seen in the emergency department today due to concern for a fall.  Your CT scan of your head and neck were negative for any acute fractures or bleed.  You did have a small laceration overlying your right eye.  This has been approximated using Dermabond.  Please monitor the area closely for any swelling, pain, redness or drainage.  This may indicate infection and will require reassessment immediately in the emergency department.  If you note that there is any other unusual behavior from baseline, fevers, chills, vomiting, excessive lethargy and somnolence, return to the ER immediately.

## 2024-10-04 NOTE — ED PROVIDER NOTES
[facility]     Emergency Department     Faculty Attestation    I performed a history and physical examination of the patient and discussed management with the resident. I reviewed the resident’s note and agree with the documented findings and plan of care. Any areas of disagreement are noted on the chart. I was personally present for the key portions of any procedures. I have documented in the chart those procedures where I was not present during the key portions. I have reviewed the emergency nurses triage note. I agree with the chief complaint, past medical history, past surgical history, allergies, medications, social and family history as documented unless otherwise noted below.   For Physician Assistant/ Nurse Practitioner cases/documentation I have personally evaluated this patient and have completed at least one if not all key elements of the E/M (history, physical exam, and MDM). Additional findings are as noted.      Primary Care Physician:  Pepe Terrazas MD    CHIEF COMPLAINT       Chief Complaint   Patient presents with    Fall       RECENT VITALS:    ,   ,  ,      LABS:  Labs Reviewed - No data to display  EKG shows normal sinus rhythm rate of 76 bpm OK interval is 116 ms QRS duration 72 ms QT corrected 416 ms axis is 62 there is no acute ST or T wave changes seen compared to prior EKG    PERTINENT ATTENDING PHYSICIAN COMMENTS:    Patient with a fall able to give any history not on any anticoagulation history of severe dementia does have a hematoma right frontal orbital region according to paramedics the family said he is at baseline after their doctor's office they sent him in for further evaluation    Critical Care          Luis Enrique Chandler MD,  MD, FAAEM  Attending Emergency  Physician              Luis Enrique Chandler MD  10/04/24 4401

## 2024-10-04 NOTE — ED NOTES
Pt to ed by EMS. Patient was at doctors office, had witnessed fall with no LOC. Patient has hx of dementia, at baseline per family report to ems. Wife en route to ed. Patient non verbal upon arrival to ed. Patient has noted right hematoma to forehead.

## 2024-10-04 NOTE — ED PROVIDER NOTES
[facility] ED  Emergency Department Encounter  Emergency Medicine Resident     Pt Name:Mandeep Iverson  MRN: [id number]  Birthdate 1940  Date of evaluation: 10/4/24  PCP:  Pepe Terrazas MD  Note Started: 3:10 PM EDT      CHIEF COMPLAINT       Chief Complaint   Patient presents with    Fall       HISTORY OF PRESENT ILLNESS  (Location/Symptom, Timing/Onset, Context/Setting, Quality, Duration, Modifying Factors, Severity.)      Mandeep Iverson is a 84 y.o. male past medical history of severe dementia, HIV, presenting for assessment after a fall.  Fall was witnessed by his wife.  Patient typically ambulates on his own occasionally uses a walker.  Today while his wife was trying to help him rotate his walker, he lost balance and fell onto his right side.  He did hit his head.  No LOC.  No blood thinners.  Due to the patient's severe dementia, he is unable to voice specific points of pain.  He does localize to pain on examination however    PAST MEDICAL / SURGICAL / SOCIAL / FAMILY HISTORY      has a past medical history of Acute delirium, Acute metabolic encephalopathy, Alzheimer's dementia with behavioral disturbance (HCC), Atrophy, cortical, BPH (benign prostatic hyperplasia), Dementia (HCC), Dementia without behavioral disturbance (HCC), GERD (gastroesophageal reflux disease), Hemorrhoids, HIV (human immunodeficiency virus infection) (HCC), Meralgia paraesthetica, Mixed hyperlipidemia, Occasional tremors, Osteoporosis, PVD (peripheral vascular disease) (HCC), Seizure-like activity (HCC), Syphilis in male, and Wears glasses.       has a past surgical history that includes Total hip arthroplasty (Left); Tonsillectomy; Hemorrhoid surgery; sigmoidoscopy (N/A, 11/22/2017); Colonoscopy; hernia repair; and chg urography retrograde with/wo kub (Bilateral, 3/6/2018).      Social History     Socioeconomic History    Marital status:      Spouse name: Not on file    Number of children: 5

## 2024-10-04 NOTE — ED PROVIDER NOTES
[facility]     Emergency Department     Faculty Attestation    I performed a history and physical examination of the patient and discussed management with the resident. I reviewed the resident’s note and agree with the documented findings and plan of care. Any areas of disagreement are noted on the chart. I was personally present for the key portions of any procedures. I have documented in the chart those procedures where I was not present during the key portions. I have reviewed the emergency nurses triage note. I agree with the chief complaint, past medical history, past surgical history, allergies, medications, social and family history as documented unless otherwise noted below.   For Physician Assistant/ Nurse Practitioner cases/documentation I have personally evaluated this patient and have completed at least one if not all key elements of the E/M (history, physical exam, and MDM). Additional findings are as noted.      Primary Care Physician:  Pepe Terrazas MD    CHIEF COMPLAINT       Chief Complaint   Patient presents with    Fall       RECENT VITALS:    ,   ,  ,      LABS:  Labs Reviewed - No data to display      PERTINENT ATTENDING PHYSICIAN COMMENTS:    Patient with a fall able to give any history not on any anticoagulation history of severe dementia does have a hematoma right frontal orbital region according to paramedics the family said he is at baseline after their doctor's office they sent him in for further evaluation    Critical Care          Luis Enrique Chandler MD,  MD, FAAEM  Attending Emergency  Physician              Luis Enrique Chandler MD  10/04/24 2971

## 2024-10-04 NOTE — PROGRESS NOTES
Attending Physician Statement  I have discussed the care of Mandeep Iverson, including pertinent history and exam findings with the resident. I have reviewed the key elements of all parts of the encounter with the resident. I have seen and examined the patient with the resident and the key elements of all parts of the encounter have been performed by me.   I agree with the assessment, and status of the problem list as documented. The plan and orders should include No orders of the defined types were placed in this encounter.   and this was also documented by the resident. The medication list was reviewed with the resident and is up to date. The return visit should be in 6 months .    Tim Granados MD  Attending Physician,  Department of Internal Medicine  [facility]  [facility]      10/4/2024, 2:01 PM    
Breath sounds: No wheezing.   Musculoskeletal:      Comments: In Wheelchair     Neurological:      Mental Status: Mental status is at baseline.         DIAGNOSTIC FINDINGS:  CBC:  Lab Results   Component Value Date/Time    WBC 7.9 06/17/2024 04:12 PM    WBC 7.9 06/17/2024 04:12 PM    HGB 13.7 06/17/2024 04:12 PM     06/17/2024 04:12 PM     04/18/2012 10:00 AM       BMP:    Lab Results   Component Value Date/Time     06/17/2024 04:12 PM    K 5.2 06/17/2024 04:12 PM     06/17/2024 04:12 PM    CO2 22 06/17/2024 04:12 PM    BUN 10 06/17/2024 04:12 PM    CREATININE 1.2 06/17/2024 04:12 PM    GLUCOSE 177 06/17/2024 04:12 PM    GLUCOSE 76 04/18/2012 10:00 AM       HEMOGLOBIN A1C:   Lab Results   Component Value Date/Time    LABA1C 6.4 06/17/2024 04:12 PM       FASTING LIPID PANEL:  Lab Results   Component Value Date    CHOL 150 08/06/2022    HDL 62 08/06/2022    TRIG 118 08/06/2022       ASSESSMENT AND PLAN:  Mandeep was seen today for diabetes.    Diagnoses and all orders for this visit:    Type 2 diabetes mellitus without complication, without long-term current use of insulin (HCC)  Alzheimer's dementia with behavioral disturbance (HCC)  Dementia without behavioral disturbance (HCC)  Urinary incontinence, unspecified type  Moderate malnutrition (HCC)  --Continue symptomatic and supportive management.  Patient family does not want any blood work during this time.   -Patient was taken off aspirin during last visit.  We did check HbA1c during last visit which was around greater than 6.  For now we will continue metformin for him.  Continue multivitamin supplementation.  -Patient recently saw infectious disease for follow-up of his HIV and was continued on Biktarvy.  There was concern for bronchitis/pneumonia and he was started on oral antibiotics, currently seems to be improved with antibiotic therapy.  Family wants to hold off on x-ray for now.  -Continue donezepil and memantine for his severe

## 2024-10-05 LAB
EKG ATRIAL RATE: 76 BPM
EKG P AXIS: 47 DEGREES
EKG P-R INTERVAL: 116 MS
EKG Q-T INTERVAL: 370 MS
EKG QRS DURATION: 72 MS
EKG QTC CALCULATION (BAZETT): 416 MS
EKG R AXIS: 62 DEGREES
EKG T AXIS: 32 DEGREES
EKG VENTRICULAR RATE: 76 BPM

## 2024-10-07 PROCEDURE — 93010 ELECTROCARDIOGRAM REPORT: CPT | Performed by: INTERNAL MEDICINE

## 2024-10-29 DIAGNOSIS — N40.0 BENIGN PROSTATIC HYPERPLASIA WITHOUT LOWER URINARY TRACT SYMPTOMS: ICD-10-CM

## 2024-10-30 NOTE — TELEPHONE ENCOUNTER
Mandeep Iverson is calling to request a refill on the following medication(s):    Medication Request:  Multiple prescriptions      Last Visit Date (If Applicable):  10/4/2024    Next Visit Date:    Visit date not found

## 2024-11-08 RX ORDER — DIVALPROEX SODIUM 125 MG/1
CAPSULE, COATED PELLETS ORAL
Qty: 112 CAPSULE | Refills: 5 | Status: SHIPPED | OUTPATIENT
Start: 2024-11-08

## 2024-11-08 RX ORDER — TAMSULOSIN HYDROCHLORIDE 0.4 MG/1
CAPSULE ORAL
Qty: 28 CAPSULE | Refills: 5 | Status: SHIPPED | OUTPATIENT
Start: 2024-11-08

## 2024-11-25 DIAGNOSIS — E11.9 TYPE 2 DIABETES MELLITUS WITHOUT COMPLICATION, WITHOUT LONG-TERM CURRENT USE OF INSULIN (HCC): ICD-10-CM

## 2024-11-26 NOTE — TELEPHONE ENCOUNTER
Mandeep Iverson is calling to request a refill on the following medication(s):    Medication Request:  Metformin      Last Visit Date (If Applicable):  10/4/2024    Next Visit Date:    2/14/2025

## 2024-12-25 ENCOUNTER — APPOINTMENT (OUTPATIENT)
Dept: GENERAL RADIOLOGY | Age: 84
DRG: 193 | End: 2024-12-25
Payer: MEDICARE

## 2024-12-25 ENCOUNTER — APPOINTMENT (OUTPATIENT)
Dept: CT IMAGING | Age: 84
DRG: 193 | End: 2024-12-25
Payer: MEDICARE

## 2024-12-25 ENCOUNTER — HOSPITAL ENCOUNTER (INPATIENT)
Age: 84
LOS: 5 days | Discharge: HOME HEALTH | DRG: 193 | End: 2024-12-30
Attending: EMERGENCY MEDICINE | Admitting: STUDENT IN AN ORGANIZED HEALTH CARE EDUCATION/TRAINING PROGRAM
Payer: MEDICARE

## 2024-12-25 DIAGNOSIS — R41.82 ALTERED MENTAL STATUS, UNSPECIFIED ALTERED MENTAL STATUS TYPE: Primary | ICD-10-CM

## 2024-12-25 DIAGNOSIS — J18.9 PNEUMONIA OF RIGHT LOWER LOBE DUE TO INFECTIOUS ORGANISM: ICD-10-CM

## 2024-12-25 LAB
ALBUMIN SERPL-MCNC: 3.7 G/DL (ref 3.5–5.2)
ALBUMIN/GLOB SERPL: 0.9 {RATIO} (ref 1–2.5)
ALP SERPL-CCNC: 148 U/L (ref 40–129)
ALT SERPL-CCNC: 8 U/L (ref 10–50)
AMMONIA PLAS-SCNC: 28 UMOL/L (ref 16–60)
AMPHET UR QL SCN: NEGATIVE
ANION GAP SERPL CALCULATED.3IONS-SCNC: 9 MMOL/L (ref 9–16)
APAP SERPL-MCNC: <5 UG/ML (ref 10–30)
AST SERPL-CCNC: 29 U/L (ref 10–50)
B PARAP IS1001 DNA NPH QL NAA+NON-PROBE: NOT DETECTED
B PERT DNA SPEC QL NAA+PROBE: NOT DETECTED
BACTERIA URNS QL MICRO: ABNORMAL
BARBITURATES UR QL SCN: NEGATIVE
BASOPHILS # BLD: 0 K/UL (ref 0–0.2)
BASOPHILS NFR BLD: 0 % (ref 0–2)
BENZODIAZ UR QL: NEGATIVE
BILIRUB DIRECT SERPL-MCNC: 0.2 MG/DL (ref 0–0.2)
BILIRUB INDIRECT SERPL-MCNC: 0.1 MG/DL (ref 0–1)
BILIRUB SERPL-MCNC: 0.3 MG/DL (ref 0–1.2)
BILIRUB UR QL STRIP: NEGATIVE
BODY TEMPERATURE: 37
BUN SERPL-MCNC: 16 MG/DL (ref 8–23)
C PNEUM DNA NPH QL NAA+NON-PROBE: NOT DETECTED
CALCIUM SERPL-MCNC: 9.5 MG/DL (ref 8.6–10.4)
CANNABINOIDS UR QL SCN: NEGATIVE
CASTS #/AREA URNS LPF: ABNORMAL /LPF (ref 0–8)
CHLORIDE SERPL-SCNC: 101 MMOL/L (ref 98–107)
CK SERPL-CCNC: 120 U/L (ref 39–308)
CLARITY UR: ABNORMAL
CO2 SERPL-SCNC: 28 MMOL/L (ref 20–31)
COCAINE UR QL SCN: NEGATIVE
COHGB MFR BLD: 1.1 % (ref 0–5)
COLOR UR: YELLOW
CREAT SERPL-MCNC: 0.9 MG/DL (ref 0.7–1.2)
EOSINOPHIL # BLD: 0 K/UL (ref 0–0.44)
EOSINOPHILS RELATIVE PERCENT: 0 % (ref 1–4)
EPI CELLS #/AREA URNS HPF: ABNORMAL /HPF (ref 0–5)
ERYTHROCYTE [DISTWIDTH] IN BLOOD BY AUTOMATED COUNT: 14.6 % (ref 11.8–14.4)
ETHANOL PERCENT: <0.01 %
ETHANOLAMINE SERPL-MCNC: <10 MG/DL (ref 0–0.08)
FENTANYL UR QL: NEGATIVE
FIO2 ON VENT: ABNORMAL %
FLUAV RNA NPH QL NAA+NON-PROBE: NOT DETECTED
FLUBV RNA NPH QL NAA+NON-PROBE: NOT DETECTED
GFR, ESTIMATED: 83 ML/MIN/1.73M2
GLOBULIN SER CALC-MCNC: 4.2 G/DL
GLUCOSE BLD-MCNC: 141 MG/DL (ref 75–110)
GLUCOSE BLD-MCNC: 178 MG/DL (ref 75–110)
GLUCOSE SERPL-MCNC: 173 MG/DL (ref 74–99)
GLUCOSE UR STRIP-MCNC: NEGATIVE MG/DL
HADV DNA NPH QL NAA+NON-PROBE: NOT DETECTED
HCO3 VENOUS: 25.1 MMOL/L (ref 24–30)
HCOV 229E RNA NPH QL NAA+NON-PROBE: NOT DETECTED
HCOV HKU1 RNA NPH QL NAA+NON-PROBE: NOT DETECTED
HCOV NL63 RNA NPH QL NAA+NON-PROBE: NOT DETECTED
HCOV OC43 RNA NPH QL NAA+NON-PROBE: NOT DETECTED
HCT VFR BLD AUTO: 38.1 % (ref 40.7–50.3)
HGB BLD-MCNC: 12 G/DL (ref 13–17)
HGB UR QL STRIP.AUTO: NEGATIVE
HMPV RNA NPH QL NAA+NON-PROBE: NOT DETECTED
HPIV1 RNA NPH QL NAA+NON-PROBE: NOT DETECTED
HPIV2 RNA NPH QL NAA+NON-PROBE: NOT DETECTED
HPIV3 RNA NPH QL NAA+NON-PROBE: NOT DETECTED
HPIV4 RNA NPH QL NAA+NON-PROBE: NOT DETECTED
IMM GRANULOCYTES # BLD AUTO: 0.13 K/UL (ref 0–0.3)
IMM GRANULOCYTES NFR BLD: 1 %
INR PPP: 1.1
KETONES UR STRIP-MCNC: ABNORMAL MG/DL
LACTIC ACID, WHOLE BLOOD: 3.3 MMOL/L (ref 0.7–2.1)
LEUKOCYTE ESTERASE UR QL STRIP: ABNORMAL
LIPASE SERPL-CCNC: 23 U/L (ref 13–60)
LYMPHOCYTES NFR BLD: 2.77 K/UL (ref 1.1–3.7)
LYMPHOCYTES RELATIVE PERCENT: 21 % (ref 24–43)
M PNEUMO DNA NPH QL NAA+NON-PROBE: NOT DETECTED
MAGNESIUM SERPL-MCNC: 1.9 MG/DL (ref 1.6–2.4)
MCH RBC QN AUTO: 27.6 PG (ref 25.2–33.5)
MCHC RBC AUTO-ENTMCNC: 31.5 G/DL (ref 28.4–34.8)
MCV RBC AUTO: 87.6 FL (ref 82.6–102.9)
METHADONE UR QL: NEGATIVE
MONOCYTES NFR BLD: 1.58 K/UL (ref 0.1–1.2)
MONOCYTES NFR BLD: 12 % (ref 3–12)
MORPHOLOGY: ABNORMAL
MYOGLOBIN SERPL-MCNC: 292 NG/ML (ref 28–72)
NEGATIVE BASE EXCESS, VEN: 2.1 MMOL/L (ref 0–2)
NEUTROPHILS NFR BLD: 66 % (ref 36–65)
NEUTS SEG NFR BLD: 8.72 K/UL (ref 1.5–8.1)
NITRITE UR QL STRIP: POSITIVE
NRBC BLD-RTO: 0 PER 100 WBC
O2 SAT, VEN: 43 % (ref 60–85)
OPIATES UR QL SCN: NEGATIVE
OXYCODONE UR QL SCN: NEGATIVE
PARTIAL THROMBOPLASTIN TIME: 30.5 SEC (ref 23–36.5)
PCO2 VENOUS: 54.6 MM HG (ref 39–55)
PCP UR QL SCN: NEGATIVE
PH UR STRIP: 6 [PH] (ref 5–8)
PH VENOUS: 7.28 (ref 7.32–7.42)
PHOSPHATE SERPL-MCNC: 2.9 MG/DL (ref 2.5–4.5)
PLATELET # BLD AUTO: 166 K/UL (ref 138–453)
PMV BLD AUTO: 10.9 FL (ref 8.1–13.5)
PO2 VENOUS: 27.3 MM HG (ref 30–50)
POTASSIUM SERPL-SCNC: 5 MMOL/L (ref 3.7–5.3)
PROCALCITONIN SERPL-MCNC: 0.11 NG/ML (ref 0–0.09)
PROT SERPL-MCNC: 7.9 G/DL (ref 6.6–8.7)
PROT UR STRIP-MCNC: ABNORMAL MG/DL
PROTHROMBIN TIME: 14.2 SEC (ref 11.7–14.9)
RBC # BLD AUTO: 4.35 M/UL (ref 4.21–5.77)
RBC #/AREA URNS HPF: ABNORMAL /HPF (ref 0–4)
RSV RNA NPH QL NAA+NON-PROBE: DETECTED
RV+EV RNA NPH QL NAA+NON-PROBE: NOT DETECTED
SALICYLATES SERPL-MCNC: <0.5 MG/DL (ref 0–10)
SARS-COV-2 RNA NPH QL NAA+NON-PROBE: NOT DETECTED
SODIUM SERPL-SCNC: 138 MMOL/L (ref 136–145)
SP GR UR STRIP: 1.03 (ref 1–1.03)
SPECIMEN DESCRIPTION: ABNORMAL
TEST INFORMATION: NORMAL
TROPONIN I SERPL HS-MCNC: 12 NG/L (ref 0–22)
TROPONIN I SERPL HS-MCNC: 20 NG/L (ref 0–22)
TSH SERPL DL<=0.05 MIU/L-ACNC: 0.88 UIU/ML (ref 0.27–4.2)
UROBILINOGEN UR STRIP-ACNC: NORMAL EU/DL (ref 0–1)
VALPROATE SERPL-MCNC: 17 UG/ML (ref 50–125)
WBC #/AREA URNS HPF: ABNORMAL /HPF (ref 0–5)
WBC OTHER # BLD: 13.2 K/UL (ref 3.5–11.3)

## 2024-12-25 PROCEDURE — 87205 SMEAR GRAM STAIN: CPT

## 2024-12-25 PROCEDURE — 99223 1ST HOSP IP/OBS HIGH 75: CPT | Performed by: PHYSICIAN ASSISTANT

## 2024-12-25 PROCEDURE — 82140 ASSAY OF AMMONIA: CPT

## 2024-12-25 PROCEDURE — 93005 ELECTROCARDIOGRAM TRACING: CPT | Performed by: EMERGENCY MEDICINE

## 2024-12-25 PROCEDURE — 87641 MR-STAPH DNA AMP PROBE: CPT

## 2024-12-25 PROCEDURE — 83874 ASSAY OF MYOGLOBIN: CPT

## 2024-12-25 PROCEDURE — 6360000002 HC RX W HCPCS: Performed by: PHYSICIAN ASSISTANT

## 2024-12-25 PROCEDURE — 84484 ASSAY OF TROPONIN QUANT: CPT

## 2024-12-25 PROCEDURE — 86361 T CELL ABSOLUTE COUNT: CPT

## 2024-12-25 PROCEDURE — 36415 COLL VENOUS BLD VENIPUNCTURE: CPT

## 2024-12-25 PROCEDURE — 80076 HEPATIC FUNCTION PANEL: CPT

## 2024-12-25 PROCEDURE — 85610 PROTHROMBIN TIME: CPT

## 2024-12-25 PROCEDURE — 83735 ASSAY OF MAGNESIUM: CPT

## 2024-12-25 PROCEDURE — 6370000000 HC RX 637 (ALT 250 FOR IP): Performed by: EMERGENCY MEDICINE

## 2024-12-25 PROCEDURE — 94640 AIRWAY INHALATION TREATMENT: CPT

## 2024-12-25 PROCEDURE — 82947 ASSAY GLUCOSE BLOOD QUANT: CPT

## 2024-12-25 PROCEDURE — 6360000002 HC RX W HCPCS: Performed by: EMERGENCY MEDICINE

## 2024-12-25 PROCEDURE — 80048 BASIC METABOLIC PNL TOTAL CA: CPT

## 2024-12-25 PROCEDURE — 2700000000 HC OXYGEN THERAPY PER DAY

## 2024-12-25 PROCEDURE — 2580000003 HC RX 258: Performed by: PHYSICIAN ASSISTANT

## 2024-12-25 PROCEDURE — 94760 N-INVAS EAR/PLS OXIMETRY 1: CPT

## 2024-12-25 PROCEDURE — 87154 CUL TYP ID BLD PTHGN 6+ TRGT: CPT

## 2024-12-25 PROCEDURE — 96375 TX/PRO/DX INJ NEW DRUG ADDON: CPT

## 2024-12-25 PROCEDURE — 81001 URINALYSIS AUTO W/SCOPE: CPT

## 2024-12-25 PROCEDURE — 84145 PROCALCITONIN (PCT): CPT

## 2024-12-25 PROCEDURE — 1200000000 HC SEMI PRIVATE

## 2024-12-25 PROCEDURE — 80164 ASSAY DIPROPYLACETIC ACD TOT: CPT

## 2024-12-25 PROCEDURE — 6370000000 HC RX 637 (ALT 250 FOR IP): Performed by: PHYSICIAN ASSISTANT

## 2024-12-25 PROCEDURE — 71045 X-RAY EXAM CHEST 1 VIEW: CPT

## 2024-12-25 PROCEDURE — 82805 BLOOD GASES W/O2 SATURATION: CPT

## 2024-12-25 PROCEDURE — 0202U NFCT DS 22 TRGT SARS-COV-2: CPT

## 2024-12-25 PROCEDURE — 83605 ASSAY OF LACTIC ACID: CPT

## 2024-12-25 PROCEDURE — 70450 CT HEAD/BRAIN W/O DYE: CPT

## 2024-12-25 PROCEDURE — 83690 ASSAY OF LIPASE: CPT

## 2024-12-25 PROCEDURE — 99285 EMERGENCY DEPT VISIT HI MDM: CPT

## 2024-12-25 PROCEDURE — 80143 DRUG ASSAY ACETAMINOPHEN: CPT

## 2024-12-25 PROCEDURE — 2580000003 HC RX 258: Performed by: EMERGENCY MEDICINE

## 2024-12-25 PROCEDURE — 80307 DRUG TEST PRSMV CHEM ANLYZR: CPT

## 2024-12-25 PROCEDURE — 87040 BLOOD CULTURE FOR BACTERIA: CPT

## 2024-12-25 PROCEDURE — 94761 N-INVAS EAR/PLS OXIMETRY MLT: CPT

## 2024-12-25 PROCEDURE — 84100 ASSAY OF PHOSPHORUS: CPT

## 2024-12-25 PROCEDURE — 96365 THER/PROPH/DIAG IV INF INIT: CPT

## 2024-12-25 PROCEDURE — 84443 ASSAY THYROID STIM HORMONE: CPT

## 2024-12-25 PROCEDURE — G0480 DRUG TEST DEF 1-7 CLASSES: HCPCS

## 2024-12-25 PROCEDURE — 85025 COMPLETE CBC W/AUTO DIFF WBC: CPT

## 2024-12-25 PROCEDURE — 80179 DRUG ASSAY SALICYLATE: CPT

## 2024-12-25 PROCEDURE — 87086 URINE CULTURE/COLONY COUNT: CPT

## 2024-12-25 PROCEDURE — 82550 ASSAY OF CK (CPK): CPT

## 2024-12-25 PROCEDURE — 85730 THROMBOPLASTIN TIME PARTIAL: CPT

## 2024-12-25 RX ORDER — ACETAMINOPHEN 325 MG/1
650 TABLET ORAL EVERY 6 HOURS PRN
Status: DISCONTINUED | OUTPATIENT
Start: 2024-12-25 | End: 2024-12-30 | Stop reason: HOSPADM

## 2024-12-25 RX ORDER — IPRATROPIUM BROMIDE AND ALBUTEROL SULFATE 2.5; .5 MG/3ML; MG/3ML
1 SOLUTION RESPIRATORY (INHALATION) EVERY 4 HOURS PRN
Status: DISCONTINUED | OUTPATIENT
Start: 2024-12-25 | End: 2024-12-30 | Stop reason: HOSPADM

## 2024-12-25 RX ORDER — POTASSIUM CHLORIDE 7.45 MG/ML
10 INJECTION INTRAVENOUS PRN
Status: DISCONTINUED | OUTPATIENT
Start: 2024-12-25 | End: 2024-12-30 | Stop reason: HOSPADM

## 2024-12-25 RX ORDER — SODIUM CHLORIDE 9 MG/ML
INJECTION, SOLUTION INTRAVENOUS PRN
Status: DISCONTINUED | OUTPATIENT
Start: 2024-12-25 | End: 2024-12-30 | Stop reason: HOSPADM

## 2024-12-25 RX ORDER — DONEPEZIL HYDROCHLORIDE 10 MG/1
10 TABLET, FILM COATED ORAL NIGHTLY
Status: DISCONTINUED | OUTPATIENT
Start: 2024-12-25 | End: 2024-12-30 | Stop reason: HOSPADM

## 2024-12-25 RX ORDER — ONDANSETRON 4 MG/1
4 TABLET, ORALLY DISINTEGRATING ORAL EVERY 8 HOURS PRN
Status: DISCONTINUED | OUTPATIENT
Start: 2024-12-25 | End: 2024-12-30 | Stop reason: HOSPADM

## 2024-12-25 RX ORDER — ACETAMINOPHEN 650 MG/1
650 SUPPOSITORY RECTAL ONCE
Status: COMPLETED | OUTPATIENT
Start: 2024-12-25 | End: 2024-12-25

## 2024-12-25 RX ORDER — DEXTROSE MONOHYDRATE 100 MG/ML
INJECTION, SOLUTION INTRAVENOUS CONTINUOUS PRN
Status: DISCONTINUED | OUTPATIENT
Start: 2024-12-25 | End: 2024-12-30 | Stop reason: HOSPADM

## 2024-12-25 RX ORDER — ENOXAPARIN SODIUM 100 MG/ML
40 INJECTION SUBCUTANEOUS DAILY
Status: DISCONTINUED | OUTPATIENT
Start: 2024-12-25 | End: 2024-12-30 | Stop reason: HOSPADM

## 2024-12-25 RX ORDER — ATORVASTATIN CALCIUM 40 MG/1
40 TABLET, FILM COATED ORAL DAILY
Status: DISCONTINUED | OUTPATIENT
Start: 2024-12-26 | End: 2024-12-30 | Stop reason: HOSPADM

## 2024-12-25 RX ORDER — FINASTERIDE 5 MG/1
5 TABLET, FILM COATED ORAL DAILY
Status: DISCONTINUED | OUTPATIENT
Start: 2024-12-26 | End: 2024-12-30 | Stop reason: HOSPADM

## 2024-12-25 RX ORDER — MAGNESIUM SULFATE IN WATER 40 MG/ML
2000 INJECTION, SOLUTION INTRAVENOUS PRN
Status: DISCONTINUED | OUTPATIENT
Start: 2024-12-25 | End: 2024-12-30 | Stop reason: HOSPADM

## 2024-12-25 RX ORDER — DIVALPROEX SODIUM 125 MG/1
125 CAPSULE, COATED PELLETS ORAL EVERY 12 HOURS SCHEDULED
Status: DISCONTINUED | OUTPATIENT
Start: 2024-12-25 | End: 2024-12-30 | Stop reason: HOSPADM

## 2024-12-25 RX ORDER — MEMANTINE HYDROCHLORIDE 5 MG/1
10 TABLET ORAL 2 TIMES DAILY
Status: DISCONTINUED | OUTPATIENT
Start: 2024-12-25 | End: 2024-12-30 | Stop reason: HOSPADM

## 2024-12-25 RX ORDER — ONDANSETRON 2 MG/ML
4 INJECTION INTRAMUSCULAR; INTRAVENOUS EVERY 6 HOURS PRN
Status: DISCONTINUED | OUTPATIENT
Start: 2024-12-25 | End: 2024-12-30 | Stop reason: HOSPADM

## 2024-12-25 RX ORDER — GLUCAGON 1 MG/ML
1 KIT INJECTION PRN
Status: DISCONTINUED | OUTPATIENT
Start: 2024-12-25 | End: 2024-12-30 | Stop reason: HOSPADM

## 2024-12-25 RX ORDER — POLYETHYLENE GLYCOL 3350 17 G/17G
17 POWDER, FOR SOLUTION ORAL DAILY PRN
Status: DISCONTINUED | OUTPATIENT
Start: 2024-12-25 | End: 2024-12-30 | Stop reason: HOSPADM

## 2024-12-25 RX ORDER — INSULIN LISPRO 100 [IU]/ML
0-4 INJECTION, SOLUTION INTRAVENOUS; SUBCUTANEOUS
Status: DISCONTINUED | OUTPATIENT
Start: 2024-12-25 | End: 2024-12-30 | Stop reason: HOSPADM

## 2024-12-25 RX ORDER — ACETAMINOPHEN 650 MG/1
650 SUPPOSITORY RECTAL EVERY 6 HOURS PRN
Status: DISCONTINUED | OUTPATIENT
Start: 2024-12-25 | End: 2024-12-30 | Stop reason: HOSPADM

## 2024-12-25 RX ORDER — 0.9 % SODIUM CHLORIDE 0.9 %
1000 INTRAVENOUS SOLUTION INTRAVENOUS ONCE
Status: COMPLETED | OUTPATIENT
Start: 2024-12-25 | End: 2024-12-25

## 2024-12-25 RX ORDER — GUAIFENESIN 600 MG/1
600 TABLET, EXTENDED RELEASE ORAL 2 TIMES DAILY
Status: DISCONTINUED | OUTPATIENT
Start: 2024-12-25 | End: 2024-12-30 | Stop reason: HOSPADM

## 2024-12-25 RX ORDER — SODIUM CHLORIDE 0.9 % (FLUSH) 0.9 %
5-40 SYRINGE (ML) INJECTION PRN
Status: DISCONTINUED | OUTPATIENT
Start: 2024-12-25 | End: 2024-12-30 | Stop reason: HOSPADM

## 2024-12-25 RX ORDER — BENZONATATE 100 MG/1
100 CAPSULE ORAL 3 TIMES DAILY PRN
Status: DISCONTINUED | OUTPATIENT
Start: 2024-12-25 | End: 2024-12-30 | Stop reason: HOSPADM

## 2024-12-25 RX ORDER — POTASSIUM CHLORIDE 1500 MG/1
40 TABLET, EXTENDED RELEASE ORAL PRN
Status: DISCONTINUED | OUTPATIENT
Start: 2024-12-25 | End: 2024-12-30 | Stop reason: HOSPADM

## 2024-12-25 RX ORDER — GUAIFENESIN 600 MG/1
600 TABLET, EXTENDED RELEASE ORAL 2 TIMES DAILY PRN
Status: DISCONTINUED | OUTPATIENT
Start: 2024-12-25 | End: 2024-12-25

## 2024-12-25 RX ORDER — TAMSULOSIN HYDROCHLORIDE 0.4 MG/1
0.4 CAPSULE ORAL DAILY
Status: DISCONTINUED | OUTPATIENT
Start: 2024-12-26 | End: 2024-12-30 | Stop reason: HOSPADM

## 2024-12-25 RX ORDER — SODIUM CHLORIDE 0.9 % (FLUSH) 0.9 %
5-40 SYRINGE (ML) INJECTION EVERY 12 HOURS SCHEDULED
Status: DISCONTINUED | OUTPATIENT
Start: 2024-12-25 | End: 2024-12-30 | Stop reason: HOSPADM

## 2024-12-25 RX ORDER — SODIUM CHLORIDE 9 MG/ML
INJECTION, SOLUTION INTRAVENOUS CONTINUOUS
Status: DISCONTINUED | OUTPATIENT
Start: 2024-12-25 | End: 2024-12-25

## 2024-12-25 RX ORDER — PANTOPRAZOLE SODIUM 40 MG/1
40 TABLET, DELAYED RELEASE ORAL DAILY
Status: DISCONTINUED | OUTPATIENT
Start: 2024-12-26 | End: 2024-12-30 | Stop reason: HOSPADM

## 2024-12-25 RX ORDER — SODIUM CHLORIDE 9 MG/ML
INJECTION, SOLUTION INTRAVENOUS CONTINUOUS
Status: ACTIVE | OUTPATIENT
Start: 2024-12-25 | End: 2024-12-26

## 2024-12-25 RX ORDER — ALBUTEROL SULFATE 5 MG/ML
2.5 SOLUTION RESPIRATORY (INHALATION) EVERY 4 HOURS PRN
Status: DISCONTINUED | OUTPATIENT
Start: 2024-12-25 | End: 2024-12-25

## 2024-12-25 RX ADMIN — ACETAMINOPHEN 650 MG: 650 SUPPOSITORY RECTAL at 11:29

## 2024-12-25 RX ADMIN — VANCOMYCIN HYDROCHLORIDE 1000 MG: 1 INJECTION, POWDER, LYOPHILIZED, FOR SOLUTION INTRAVENOUS at 12:19

## 2024-12-25 RX ADMIN — ALBUTEROL SULFATE 2.5 MG: 5 SOLUTION RESPIRATORY (INHALATION) at 10:59

## 2024-12-25 RX ADMIN — SODIUM CHLORIDE 1000 ML: 9 INJECTION, SOLUTION INTRAVENOUS at 11:00

## 2024-12-25 RX ADMIN — IPRATROPIUM BROMIDE 0.5 MG: 0.5 SOLUTION RESPIRATORY (INHALATION) at 10:59

## 2024-12-25 RX ADMIN — SODIUM CHLORIDE: 9 INJECTION, SOLUTION INTRAVENOUS at 14:04

## 2024-12-25 RX ADMIN — ENOXAPARIN SODIUM 40 MG: 100 INJECTION SUBCUTANEOUS at 14:03

## 2024-12-25 RX ADMIN — CEFEPIME 1000 MG: 1 INJECTION, POWDER, FOR SOLUTION INTRAMUSCULAR; INTRAVENOUS at 14:43

## 2024-12-25 RX ADMIN — IPRATROPIUM BROMIDE AND ALBUTEROL SULFATE 1 DOSE: .5; 3 SOLUTION RESPIRATORY (INHALATION) at 13:42

## 2024-12-25 RX ADMIN — CEFEPIME 1000 MG: 1 INJECTION, POWDER, FOR SOLUTION INTRAMUSCULAR; INTRAVENOUS at 11:24

## 2024-12-25 ASSESSMENT — LIFESTYLE VARIABLES
HOW MANY STANDARD DRINKS CONTAINING ALCOHOL DO YOU HAVE ON A TYPICAL DAY: PATIENT UNABLE TO ANSWER
HOW OFTEN DO YOU HAVE A DRINK CONTAINING ALCOHOL: PATIENT UNABLE TO ANSWER

## 2024-12-25 NOTE — ED PROVIDER NOTES
[facility] EMERGENCY DEPARTMENT  EMERGENCY DEPARTMENT ENCOUNTER      Pt Name: Mandeep Iverson  MRN: [id number]  Birthdate 1940  Date of evaluation: 12/25/24  PCP:  Pepe Terrazas MD    CHIEF COMPLAINT:   Chief Complaint   Patient presents with    Altered Mental Status     HISTORY OF PRESENT ILLNESS   Mandeep Iverson is a 84 y.o. malewhopresents with multiple medical problems including baseline dementia as well as HIV on Biktarvy and acute metabolic encephalopathy in the past.   Unknown last well but apparently has been more confused and possibly short of breath with coughing for the last few days.   Patient was brought in by EMS for evaluation.   Limited history at this time awaiting family arrival.   Patient does seem to respond yes and no appropriately however denies chest pain or abdominal pain.    Moving all extremities equally and face symmetric.    Acuity of condition and altered mental status caveat invoked        REVIEW OF SYSTEMS       Review of Systems   Unable to perform ROS: Dementia       10 essential systems negative except as stated above and in the HPI.    PAST MEDICAL HISTORY   PMH:  has a past medical history of Acute delirium, Acute metabolic encephalopathy, Alzheimer's dementia with behavioral disturbance (HCC), Atrophy, cortical, BPH (benign prostatic hyperplasia), Dementia (HCC), Dementia without behavioral disturbance (HCC), GERD (gastroesophageal reflux disease), Hemorrhoids, HIV (human immunodeficiency virus infection) (HCC), Meralgia paraesthetica, Mixed hyperlipidemia, Occasional tremors, Osteoporosis, PVD (peripheral vascular disease) (HCC), Seizure-like activity (HCC), Syphilis in male, and Wears glasses.  Surgical History:  has a past surgical history that includes Total hip arthroplasty (Left); Tonsillectomy; Hemorrhoid surgery; sigmoidoscopy (N/A, 11/22/2017); Colonoscopy; hernia repair; and chg urography retrograde with/wo kub (Bilateral, 3/6/2018).  Social History:

## 2024-12-25 NOTE — ED NOTES
Pt presents to ED via EMS with reports of altered mental status.  Per EMS pt family reporting pt has hx of dementia, alzheimer and is just not acting himself.  Upon arrival pt is wheezing and has productive cough.  Per EMS pt is non verbal at baseline.  Respirations even and unlabored. Call light in reach, all needs met at this time.

## 2024-12-25 NOTE — ED NOTES
Pt's wife in room stating that pt was diagnosed with an upper respiratory infection a week ago. Wife stating pt's baseline mentation is minimal speaking, but he's weaker than normal stating the pt normally walks around the house.

## 2024-12-25 NOTE — ED NOTES
Labeled blood specimens sent to lab via tube system.    [x] Green/yellow  [x] Lavender   [] on ice   [x] Blue   [x] Green/black [] on ice  [x] Yellow  [] on ice  [] Red  [] Pink  [x] Blood Cultures  [x] x1 [] X2    [] Ped Green  [] on ice  [] Ped Lavender  [] on ice    [] Ped Yellow  [] on ice  [] Ped Red

## 2024-12-25 NOTE — ED NOTES
ED to inpatient nurses report      Chief Complaint:  Chief Complaint   Patient presents with    Altered Mental Status     Present to ED from: home via EMS    MOA:     LOC: alert to only name  Mobility: Requires assistance * 2  Oxygen Baseline: 99%    Current needs required: RA   Pending ED orders: none  Present condition: stable    Why did the patient come to the ED?         Pt presents to ED via EMS with reports of altered mental status.  Per EMS pt family reporting pt has hx of dementia, alzheimer and is just not acting himself.  Upon arrival pt is wheezing and has productive cough.  Per EMS pt is non verbal at baseline.  Respirations even and unlabored. Call light in reach, all needs met at this time.        What is the plan? admit  Any procedures or intervention occur? Labs, blood cultures, antibiotics, CT scan, Chest x-ray  Any safety concerns??    Mental Status:  Level of Consciousness: Responds to voice (1)    Psych Assessment:   Psychosocial  Psychosocial (WDL): Within Defined Limits  Vital signs   Vitals:    12/25/24 1039 12/25/24 1057 12/25/24 1200   BP: (!) 156/85  133/65   Pulse: 92 92 90   Resp: 29 28 28   Temp: 100.3 °F (37.9 °C)     TempSrc: Axillary     SpO2: 98% 99%         Vitals:  Patient Vitals for the past 24 hrs:   BP Temp Temp src Pulse Resp SpO2   12/25/24 1200 133/65 -- -- 90 28 --   12/25/24 1057 -- -- -- 92 28 99 %   12/25/24 1039 (!) 156/85 100.3 °F (37.9 °C) Axillary 92 29 98 %      Visit Vitals  /65   Pulse 90   Temp 100.3 °F (37.9 °C) (Axillary)   Resp 28   SpO2 99%        LDAs:   Peripheral IV 12/25/24 Left Antecubital (Active)   Site Assessment Clean, dry & intact 12/25/24 1050   Line Status Brisk blood return;Specimen collected;Normal saline locked;Flushed 12/25/24 1050   Phlebitis Assessment No symptoms 12/25/24 1050   Infiltration Assessment 0 12/25/24 1050   Dressing Status New dressing applied;Clean, dry & intact 12/25/24 1050   Dressing Type Transparent 12/25/24 1050

## 2024-12-25 NOTE — H&P
[facility]  Office: [phone]  Alfie Billings DO, Ayaz Guillory DO, Reza Parikh DO, Erik Mars DO, Wood Andujar MD, Jennifer Atkins MD, Aquilino Kaufman MD, Keily Malik MD,  Gen Rosales MD, Ana Greene MD, Macho Ricci MD,  Tracy Heard DO, Tamra Toledo MD, Alexis Saez MD, Massimo Billings DO, Caryn Enriquez MD,  Jr Bryson DO, Minna Oconnor MD, Yeny Soliz MD, Nirali Zamarripa MD, Griselda Stacy MD,  Tim Granados MD, Son Dee MD, Alfonso Lynch MD, Delmy Morales MD, David Carroll MD, Zaira Mejias MD, Jalen Mata DO, Garland Vazquez DO, Ger Bryan MD,  Kee Durbin MD, Shirley Waterhouse, CNP,  Yvonne Alegre, CNP, Prosper Euceda, CNP,  Lissett Leonard, CRISTÓBAL, Stefani Alejandra, CNP, Zakia Valdes, CNP, Grace Cobb CNP, Agnes Kwok, CNP, Madai Small, CNP, Kristina Bynum, PA-C, Jonelle España PA-C, Julienne Robertson, CNP, Claudia Campos, CNS, Ailyn Herbert, CNP, Emilie Olivo, CNP, Tracy Schwab, CNP         [facility]   IN-PATIENT SERVICE   [facility]    HISTORY AND PHYSICAL EXAMINATION            Date:   12/25/2024  Patient name:  Mandeep Iverson  Date of admission:  12/25/2024 10:36 AM  MRN:   [id number]  Account:  [id number]  YOB: 1940  PCP:    Pepe Terrazas MD  Room:   23/23  Code Status:    Prior    Chief Complaint:     Chief Complaint   Patient presents with    Altered Mental Status       History Obtained From:     patient, electronic medical record    History of Present Illness:     Mandeep Iverson is a 84 y.o. Non- / non  male who presents with Altered Mental Status and is admitted to the hospital for the management of AMS (altered mental status).  Patient has a history significant for Alzheimer's dementia, HIV on Biktarvy, hyperlipidemia, type 2 diabetes.    Patient was brought in by EMS for evaluation as his family states he has been altered from his baseline over the past 2

## 2024-12-26 ENCOUNTER — APPOINTMENT (OUTPATIENT)
Dept: GENERAL RADIOLOGY | Age: 84
DRG: 193 | End: 2024-12-26
Payer: MEDICARE

## 2024-12-26 PROBLEM — G93.41 METABOLIC ENCEPHALOPATHY: Status: ACTIVE | Noted: 2023-06-18

## 2024-12-26 PROBLEM — J12.1 RSV (RESPIRATORY SYNCYTIAL VIRUS PNEUMONIA): Status: ACTIVE | Noted: 2024-12-26

## 2024-12-26 LAB
ANION GAP SERPL CALCULATED.3IONS-SCNC: 10 MMOL/L (ref 9–16)
BASOPHILS # BLD: 0.05 K/UL (ref 0–0.2)
BASOPHILS NFR BLD: 0 % (ref 0–2)
BUN SERPL-MCNC: 13 MG/DL (ref 8–23)
CALCIUM SERPL-MCNC: 8.8 MG/DL (ref 8.6–10.4)
CD3+CD4+ CELLS # BLD: 554 /UL (ref 309–1571)
CD3+CD4+ CELLS NFR BLD: 20 % (ref 27–64)
CHLORIDE SERPL-SCNC: 108 MMOL/L (ref 98–107)
CO2 SERPL-SCNC: 24 MMOL/L (ref 20–31)
CREAT SERPL-MCNC: 0.8 MG/DL (ref 0.7–1.2)
EKG ATRIAL RATE: 87 BPM
EKG P AXIS: 47 DEGREES
EKG P-R INTERVAL: 118 MS
EKG Q-T INTERVAL: 350 MS
EKG QRS DURATION: 70 MS
EKG QTC CALCULATION (BAZETT): 421 MS
EKG R AXIS: 59 DEGREES
EKG T AXIS: 5 DEGREES
EKG VENTRICULAR RATE: 87 BPM
EOSINOPHIL # BLD: 0.06 K/UL (ref 0–0.44)
EOSINOPHILS RELATIVE PERCENT: 1 % (ref 1–4)
ERYTHROCYTE [DISTWIDTH] IN BLOOD BY AUTOMATED COUNT: 14.9 % (ref 11.8–14.4)
GFR, ESTIMATED: 86 ML/MIN/1.73M2
GLUCOSE BLD-MCNC: 122 MG/DL (ref 75–110)
GLUCOSE BLD-MCNC: 142 MG/DL (ref 75–110)
GLUCOSE BLD-MCNC: 206 MG/DL (ref 75–110)
GLUCOSE SERPL-MCNC: 159 MG/DL (ref 74–99)
HCT VFR BLD AUTO: 34.7 % (ref 40.7–50.3)
HGB BLD-MCNC: 10.4 G/DL (ref 13–17)
IMM GRANULOCYTES # BLD AUTO: 0.07 K/UL (ref 0–0.3)
IMM GRANULOCYTES NFR BLD: 1 %
LACTIC ACID, SEPSIS WHOLE BLOOD: 2.1 MMOL/L (ref 0.5–1.9)
LACTIC ACID, SEPSIS WHOLE BLOOD: 3.1 MMOL/L (ref 0.5–1.9)
LACTIC ACID, WHOLE BLOOD: 2.1 MMOL/L (ref 0.7–2.1)
LYMPHOCYTES # BLD: 21 % (ref 24–44)
LYMPHOCYTES NFR BLD: 2.54 K/UL (ref 1.1–3.7)
LYMPHOCYTES RELATIVE PERCENT: 22 % (ref 24–43)
MCH RBC QN AUTO: 27.8 PG (ref 25.2–33.5)
MCHC RBC AUTO-ENTMCNC: 30 G/DL (ref 28.4–34.8)
MCV RBC AUTO: 92.8 FL (ref 82.6–102.9)
MICROORGANISM SPEC CULT: NORMAL
MONOCYTES NFR BLD: 1.24 K/UL (ref 0.1–1.2)
MONOCYTES NFR BLD: 11 % (ref 3–12)
MRSA, DNA, NASAL: NEGATIVE
NEUTROPHILS NFR BLD: 66 % (ref 36–65)
NEUTS SEG NFR BLD: 7.8 K/UL (ref 1.5–8.1)
NRBC BLD-RTO: 0 PER 100 WBC
PLATELET # BLD AUTO: 149 K/UL (ref 138–453)
PMV BLD AUTO: 11.2 FL (ref 8.1–13.5)
POTASSIUM SERPL-SCNC: 4.8 MMOL/L (ref 3.7–5.3)
RBC # BLD AUTO: 3.74 M/UL (ref 4.21–5.77)
RBC # BLD: ABNORMAL 10*6/UL
SODIUM SERPL-SCNC: 142 MMOL/L (ref 136–145)
SPECIMEN DESCRIPTION: NORMAL
SPECIMEN DESCRIPTION: NORMAL
WBC # BLD: 13.2 K/UL (ref 3.5–11)
WBC OTHER # BLD: 11.8 K/UL (ref 3.5–11.3)

## 2024-12-26 PROCEDURE — 6370000000 HC RX 637 (ALT 250 FOR IP): Performed by: PHYSICIAN ASSISTANT

## 2024-12-26 PROCEDURE — 93010 ELECTROCARDIOGRAM REPORT: CPT | Performed by: INTERNAL MEDICINE

## 2024-12-26 PROCEDURE — 2060000000 HC ICU INTERMEDIATE R&B

## 2024-12-26 PROCEDURE — 36415 COLL VENOUS BLD VENIPUNCTURE: CPT

## 2024-12-26 PROCEDURE — 80048 BASIC METABOLIC PNL TOTAL CA: CPT

## 2024-12-26 PROCEDURE — 2500000003 HC RX 250 WO HCPCS: Performed by: PHYSICIAN ASSISTANT

## 2024-12-26 PROCEDURE — 99232 SBSQ HOSP IP/OBS MODERATE 35: CPT | Performed by: INTERNAL MEDICINE

## 2024-12-26 PROCEDURE — 85025 COMPLETE CBC W/AUTO DIFF WBC: CPT

## 2024-12-26 PROCEDURE — 6360000002 HC RX W HCPCS: Performed by: PHYSICIAN ASSISTANT

## 2024-12-26 PROCEDURE — 83605 ASSAY OF LACTIC ACID: CPT

## 2024-12-26 PROCEDURE — 6360000002 HC RX W HCPCS: Performed by: INTERNAL MEDICINE

## 2024-12-26 PROCEDURE — 2580000003 HC RX 258: Performed by: INTERNAL MEDICINE

## 2024-12-26 PROCEDURE — 71045 X-RAY EXAM CHEST 1 VIEW: CPT

## 2024-12-26 PROCEDURE — 2500000003 HC RX 250 WO HCPCS: Performed by: INTERNAL MEDICINE

## 2024-12-26 PROCEDURE — 2580000003 HC RX 258: Performed by: NURSE PRACTITIONER

## 2024-12-26 PROCEDURE — 2580000003 HC RX 258: Performed by: PHYSICIAN ASSISTANT

## 2024-12-26 PROCEDURE — 82947 ASSAY GLUCOSE BLOOD QUANT: CPT

## 2024-12-26 PROCEDURE — 94640 AIRWAY INHALATION TREATMENT: CPT

## 2024-12-26 RX ORDER — SODIUM CHLORIDE 9 MG/ML
INJECTION, SOLUTION INTRAVENOUS CONTINUOUS
Status: ACTIVE | OUTPATIENT
Start: 2024-12-26 | End: 2024-12-26

## 2024-12-26 RX ADMIN — CEFEPIME 2000 MG: 2 INJECTION, POWDER, FOR SOLUTION INTRAVENOUS at 02:11

## 2024-12-26 RX ADMIN — ENOXAPARIN SODIUM 40 MG: 100 INJECTION SUBCUTANEOUS at 10:07

## 2024-12-26 RX ADMIN — SODIUM CHLORIDE: 9 INJECTION, SOLUTION INTRAVENOUS at 06:24

## 2024-12-26 RX ADMIN — SODIUM CHLORIDE 125 MG: 9 INJECTION, SOLUTION INTRAVENOUS at 20:20

## 2024-12-26 RX ADMIN — DONEPEZIL HYDROCHLORIDE 10 MG: 10 TABLET, FILM COATED ORAL at 20:55

## 2024-12-26 RX ADMIN — FINASTERIDE 5 MG: 5 TABLET, FILM COATED ORAL at 10:53

## 2024-12-26 RX ADMIN — SODIUM CHLORIDE 1250 MG: 9 INJECTION, SOLUTION INTRAVENOUS at 13:08

## 2024-12-26 RX ADMIN — GUAIFENESIN 600 MG: 600 TABLET, MULTILAYER, EXTENDED RELEASE ORAL at 10:54

## 2024-12-26 RX ADMIN — GUAIFENESIN 600 MG: 600 TABLET, MULTILAYER, EXTENDED RELEASE ORAL at 20:17

## 2024-12-26 RX ADMIN — INSULIN LISPRO 1 UNITS: 100 INJECTION, SOLUTION INTRAVENOUS; SUBCUTANEOUS at 20:26

## 2024-12-26 RX ADMIN — IPRATROPIUM BROMIDE AND ALBUTEROL SULFATE 1 DOSE: .5; 3 SOLUTION RESPIRATORY (INHALATION) at 17:12

## 2024-12-26 RX ADMIN — SODIUM CHLORIDE 125 MG: 9 INJECTION, SOLUTION INTRAVENOUS at 09:09

## 2024-12-26 RX ADMIN — MEMANTINE 10 MG: 5 TABLET ORAL at 10:57

## 2024-12-26 RX ADMIN — IPRATROPIUM BROMIDE AND ALBUTEROL SULFATE 1 DOSE: .5; 3 SOLUTION RESPIRATORY (INHALATION) at 03:07

## 2024-12-26 RX ADMIN — WATER 1000 MG: 1 INJECTION INTRAMUSCULAR; INTRAVENOUS; SUBCUTANEOUS at 18:15

## 2024-12-26 RX ADMIN — MEMANTINE 10 MG: 5 TABLET ORAL at 20:17

## 2024-12-26 RX ADMIN — SODIUM CHLORIDE, PRESERVATIVE FREE 10 ML: 5 INJECTION INTRAVENOUS at 20:17

## 2024-12-26 RX ADMIN — CEFEPIME 2000 MG: 2 INJECTION, POWDER, FOR SOLUTION INTRAVENOUS at 11:08

## 2024-12-26 RX ADMIN — SODIUM CHLORIDE, PRESERVATIVE FREE 10 ML: 5 INJECTION INTRAVENOUS at 09:10

## 2024-12-26 RX ADMIN — ATORVASTATIN CALCIUM 40 MG: 40 TABLET, FILM COATED ORAL at 10:54

## 2024-12-26 RX ADMIN — BICTEGRAVIR SODIUM, EMTRICITABINE, AND TENOFOVIR ALAFENAMIDE FUMARATE 1 TABLET: 50; 200; 25 TABLET ORAL at 10:54

## 2024-12-26 ASSESSMENT — PAIN SCALES - GENERAL
PAINLEVEL_OUTOF10: 0

## 2024-12-27 PROBLEM — J18.9 PNEUMONIA OF RIGHT LOWER LOBE DUE TO INFECTIOUS ORGANISM: Status: ACTIVE | Noted: 2024-12-27

## 2024-12-27 PROBLEM — R41.82 ALTERED MENTAL STATUS: Status: ACTIVE | Noted: 2024-12-27

## 2024-12-27 LAB
AMMONIA PLAS-SCNC: 24 UMOL/L (ref 16–60)
ANION GAP SERPL CALCULATED.3IONS-SCNC: 8 MMOL/L (ref 9–16)
BASOPHILS # BLD: 0.05 K/UL (ref 0–0.2)
BASOPHILS NFR BLD: 1 % (ref 0–2)
BUN SERPL-MCNC: 9 MG/DL (ref 8–23)
CALCIUM SERPL-MCNC: 8.7 MG/DL (ref 8.6–10.4)
CHLORIDE SERPL-SCNC: 106 MMOL/L (ref 98–107)
CO2 SERPL-SCNC: 23 MMOL/L (ref 20–31)
CREAT SERPL-MCNC: 0.7 MG/DL (ref 0.7–1.2)
EOSINOPHIL # BLD: 0.08 K/UL (ref 0–0.44)
EOSINOPHILS RELATIVE PERCENT: 1 % (ref 1–4)
ERYTHROCYTE [DISTWIDTH] IN BLOOD BY AUTOMATED COUNT: 14.6 % (ref 11.8–14.4)
GFR, ESTIMATED: 90 ML/MIN/1.73M2
GLUCOSE BLD-MCNC: 112 MG/DL (ref 75–110)
GLUCOSE BLD-MCNC: 113 MG/DL (ref 75–110)
GLUCOSE BLD-MCNC: 125 MG/DL (ref 75–110)
GLUCOSE SERPL-MCNC: 128 MG/DL (ref 74–99)
HCT VFR BLD AUTO: 32.3 % (ref 40.7–50.3)
HGB BLD-MCNC: 9.8 G/DL (ref 13–17)
IMM GRANULOCYTES # BLD AUTO: 0.07 K/UL (ref 0–0.3)
IMM GRANULOCYTES NFR BLD: 1 %
LYMPHOCYTES NFR BLD: 2.21 K/UL (ref 1.1–3.7)
LYMPHOCYTES RELATIVE PERCENT: 21 % (ref 24–43)
MCH RBC QN AUTO: 27.5 PG (ref 25.2–33.5)
MCHC RBC AUTO-ENTMCNC: 30.3 G/DL (ref 28.4–34.8)
MCV RBC AUTO: 90.7 FL (ref 82.6–102.9)
MICROORGANISM SPEC CULT: ABNORMAL
MONOCYTES NFR BLD: 1.21 K/UL (ref 0.1–1.2)
MONOCYTES NFR BLD: 12 % (ref 3–12)
NEUTROPHILS NFR BLD: 64 % (ref 36–65)
NEUTS SEG NFR BLD: 6.69 K/UL (ref 1.5–8.1)
NRBC BLD-RTO: 0 PER 100 WBC
PLATELET # BLD AUTO: 164 K/UL (ref 138–453)
PMV BLD AUTO: 11.5 FL (ref 8.1–13.5)
POTASSIUM SERPL-SCNC: 4.1 MMOL/L (ref 3.7–5.3)
RBC # BLD AUTO: 3.56 M/UL (ref 4.21–5.77)
RBC # BLD: ABNORMAL 10*6/UL
SERVICE CMNT-IMP: ABNORMAL
SODIUM SERPL-SCNC: 137 MMOL/L (ref 136–145)
SPECIMEN DESCRIPTION: ABNORMAL
WBC OTHER # BLD: 10.3 K/UL (ref 3.5–11.3)

## 2024-12-27 PROCEDURE — 94640 AIRWAY INHALATION TREATMENT: CPT

## 2024-12-27 PROCEDURE — 95819 EEG AWAKE AND ASLEEP: CPT

## 2024-12-27 PROCEDURE — 95819 EEG AWAKE AND ASLEEP: CPT | Performed by: PSYCHIATRY & NEUROLOGY

## 2024-12-27 PROCEDURE — 80048 BASIC METABOLIC PNL TOTAL CA: CPT

## 2024-12-27 PROCEDURE — 92610 EVALUATE SWALLOWING FUNCTION: CPT

## 2024-12-27 PROCEDURE — 2500000003 HC RX 250 WO HCPCS: Performed by: PHYSICIAN ASSISTANT

## 2024-12-27 PROCEDURE — 6360000002 HC RX W HCPCS: Performed by: PHYSICIAN ASSISTANT

## 2024-12-27 PROCEDURE — 82140 ASSAY OF AMMONIA: CPT

## 2024-12-27 PROCEDURE — 2580000003 HC RX 258: Performed by: INTERNAL MEDICINE

## 2024-12-27 PROCEDURE — 6360000002 HC RX W HCPCS: Performed by: INTERNAL MEDICINE

## 2024-12-27 PROCEDURE — 99222 1ST HOSP IP/OBS MODERATE 55: CPT | Performed by: PSYCHIATRY & NEUROLOGY

## 2024-12-27 PROCEDURE — 2060000000 HC ICU INTERMEDIATE R&B

## 2024-12-27 PROCEDURE — 99232 SBSQ HOSP IP/OBS MODERATE 35: CPT | Performed by: STUDENT IN AN ORGANIZED HEALTH CARE EDUCATION/TRAINING PROGRAM

## 2024-12-27 PROCEDURE — 85025 COMPLETE CBC W/AUTO DIFF WBC: CPT

## 2024-12-27 PROCEDURE — 2500000003 HC RX 250 WO HCPCS: Performed by: INTERNAL MEDICINE

## 2024-12-27 PROCEDURE — 6370000000 HC RX 637 (ALT 250 FOR IP): Performed by: PHYSICIAN ASSISTANT

## 2024-12-27 PROCEDURE — 2580000003 HC RX 258: Performed by: STUDENT IN AN ORGANIZED HEALTH CARE EDUCATION/TRAINING PROGRAM

## 2024-12-27 PROCEDURE — 36415 COLL VENOUS BLD VENIPUNCTURE: CPT

## 2024-12-27 PROCEDURE — 82947 ASSAY GLUCOSE BLOOD QUANT: CPT

## 2024-12-27 RX ORDER — SODIUM CHLORIDE 9 MG/ML
INJECTION, SOLUTION INTRAVENOUS CONTINUOUS
Status: DISCONTINUED | OUTPATIENT
Start: 2024-12-27 | End: 2024-12-30 | Stop reason: HOSPADM

## 2024-12-27 RX ADMIN — SODIUM CHLORIDE 125 MG: 9 INJECTION, SOLUTION INTRAVENOUS at 08:31

## 2024-12-27 RX ADMIN — IPRATROPIUM BROMIDE AND ALBUTEROL SULFATE 1 DOSE: .5; 3 SOLUTION RESPIRATORY (INHALATION) at 19:54

## 2024-12-27 RX ADMIN — TAMSULOSIN HYDROCHLORIDE 0.4 MG: 0.4 CAPSULE ORAL at 08:27

## 2024-12-27 RX ADMIN — SODIUM CHLORIDE 125 MG: 9 INJECTION, SOLUTION INTRAVENOUS at 20:22

## 2024-12-27 RX ADMIN — MEMANTINE 10 MG: 5 TABLET ORAL at 08:27

## 2024-12-27 RX ADMIN — SODIUM CHLORIDE, PRESERVATIVE FREE 10 ML: 5 INJECTION INTRAVENOUS at 08:32

## 2024-12-27 RX ADMIN — SODIUM CHLORIDE: 9 INJECTION, SOLUTION INTRAVENOUS at 15:49

## 2024-12-27 RX ADMIN — ENOXAPARIN SODIUM 40 MG: 100 INJECTION SUBCUTANEOUS at 08:27

## 2024-12-27 RX ADMIN — BICTEGRAVIR SODIUM, EMTRICITABINE, AND TENOFOVIR ALAFENAMIDE FUMARATE 1 TABLET: 50; 200; 25 TABLET ORAL at 08:32

## 2024-12-27 RX ADMIN — GUAIFENESIN 600 MG: 600 TABLET, MULTILAYER, EXTENDED RELEASE ORAL at 08:27

## 2024-12-27 RX ADMIN — FINASTERIDE 5 MG: 5 TABLET, FILM COATED ORAL at 08:32

## 2024-12-27 RX ADMIN — WATER 1000 MG: 1 INJECTION INTRAMUSCULAR; INTRAVENOUS; SUBCUTANEOUS at 18:00

## 2024-12-27 RX ADMIN — PANTOPRAZOLE SODIUM 40 MG: 40 TABLET, DELAYED RELEASE ORAL at 08:27

## 2024-12-27 RX ADMIN — ATORVASTATIN CALCIUM 40 MG: 40 TABLET, FILM COATED ORAL at 08:26

## 2024-12-27 ASSESSMENT — PAIN SCALES - GENERAL
PAINLEVEL_OUTOF10: 0

## 2024-12-28 LAB
ANION GAP SERPL CALCULATED.3IONS-SCNC: 8 MMOL/L (ref 9–16)
BASOPHILS # BLD: 0.06 K/UL (ref 0–0.2)
BASOPHILS NFR BLD: 1 % (ref 0–2)
BUN SERPL-MCNC: 7 MG/DL (ref 8–23)
CALCIUM SERPL-MCNC: 8.7 MG/DL (ref 8.6–10.4)
CHLORIDE SERPL-SCNC: 104 MMOL/L (ref 98–107)
CO2 SERPL-SCNC: 22 MMOL/L (ref 20–31)
CREAT SERPL-MCNC: 0.7 MG/DL (ref 0.7–1.2)
EOSINOPHIL # BLD: 0.15 K/UL (ref 0–0.44)
EOSINOPHILS RELATIVE PERCENT: 2 % (ref 1–4)
ERYTHROCYTE [DISTWIDTH] IN BLOOD BY AUTOMATED COUNT: 14.5 % (ref 11.8–14.4)
GFR, ESTIMATED: 90 ML/MIN/1.73M2
GLUCOSE BLD-MCNC: 105 MG/DL (ref 75–110)
GLUCOSE BLD-MCNC: 78 MG/DL (ref 75–110)
GLUCOSE BLD-MCNC: 83 MG/DL (ref 75–110)
GLUCOSE SERPL-MCNC: 91 MG/DL (ref 74–99)
HCT VFR BLD AUTO: 37.1 % (ref 40.7–50.3)
HGB BLD-MCNC: 10.9 G/DL (ref 13–17)
IMM GRANULOCYTES # BLD AUTO: 0.06 K/UL (ref 0–0.3)
IMM GRANULOCYTES NFR BLD: 1 %
LYMPHOCYTES NFR BLD: 2.27 K/UL (ref 1.1–3.7)
LYMPHOCYTES RELATIVE PERCENT: 26 % (ref 24–43)
MCH RBC QN AUTO: 27.8 PG (ref 25.2–33.5)
MCHC RBC AUTO-ENTMCNC: 29.4 G/DL (ref 28.4–34.8)
MCV RBC AUTO: 94.6 FL (ref 82.6–102.9)
MONOCYTES NFR BLD: 1.09 K/UL (ref 0.1–1.2)
MONOCYTES NFR BLD: 12 % (ref 3–12)
NEUTROPHILS NFR BLD: 58 % (ref 36–65)
NEUTS SEG NFR BLD: 5.15 K/UL (ref 1.5–8.1)
NRBC BLD-RTO: 0 PER 100 WBC
PLATELET # BLD AUTO: 148 K/UL (ref 138–453)
PMV BLD AUTO: 12 FL (ref 8.1–13.5)
POTASSIUM SERPL-SCNC: 4.7 MMOL/L (ref 3.7–5.3)
RBC # BLD AUTO: 3.92 M/UL (ref 4.21–5.77)
RBC # BLD: ABNORMAL 10*6/UL
SODIUM SERPL-SCNC: 134 MMOL/L (ref 136–145)
WBC OTHER # BLD: 8.8 K/UL (ref 3.5–11.3)

## 2024-12-28 PROCEDURE — 80048 BASIC METABOLIC PNL TOTAL CA: CPT

## 2024-12-28 PROCEDURE — 2580000003 HC RX 258: Performed by: INTERNAL MEDICINE

## 2024-12-28 PROCEDURE — 36415 COLL VENOUS BLD VENIPUNCTURE: CPT

## 2024-12-28 PROCEDURE — 6360000002 HC RX W HCPCS: Performed by: PHYSICIAN ASSISTANT

## 2024-12-28 PROCEDURE — 2500000003 HC RX 250 WO HCPCS: Performed by: INTERNAL MEDICINE

## 2024-12-28 PROCEDURE — 99232 SBSQ HOSP IP/OBS MODERATE 35: CPT | Performed by: PSYCHIATRY & NEUROLOGY

## 2024-12-28 PROCEDURE — 99232 SBSQ HOSP IP/OBS MODERATE 35: CPT | Performed by: STUDENT IN AN ORGANIZED HEALTH CARE EDUCATION/TRAINING PROGRAM

## 2024-12-28 PROCEDURE — 6360000002 HC RX W HCPCS: Performed by: INTERNAL MEDICINE

## 2024-12-28 PROCEDURE — 2060000000 HC ICU INTERMEDIATE R&B

## 2024-12-28 PROCEDURE — 82947 ASSAY GLUCOSE BLOOD QUANT: CPT

## 2024-12-28 PROCEDURE — 85025 COMPLETE CBC W/AUTO DIFF WBC: CPT

## 2024-12-28 PROCEDURE — 2500000003 HC RX 250 WO HCPCS: Performed by: PHYSICIAN ASSISTANT

## 2024-12-28 RX ADMIN — SODIUM CHLORIDE 125 MG: 9 INJECTION, SOLUTION INTRAVENOUS at 10:08

## 2024-12-28 RX ADMIN — SODIUM CHLORIDE, PRESERVATIVE FREE 10 ML: 5 INJECTION INTRAVENOUS at 23:08

## 2024-12-28 RX ADMIN — WATER 1000 MG: 1 INJECTION INTRAMUSCULAR; INTRAVENOUS; SUBCUTANEOUS at 16:52

## 2024-12-28 RX ADMIN — GLUCAGON 1 MG: 1 INJECTION, POWDER, LYOPHILIZED, FOR SOLUTION INTRAMUSCULAR; INTRAVENOUS at 23:08

## 2024-12-28 RX ADMIN — SODIUM CHLORIDE 125 MG: 9 INJECTION, SOLUTION INTRAVENOUS at 23:07

## 2024-12-28 RX ADMIN — ENOXAPARIN SODIUM 40 MG: 100 INJECTION SUBCUTANEOUS at 10:07

## 2024-12-28 ASSESSMENT — PAIN SCALES - GENERAL: PAINLEVEL_OUTOF10: 0

## 2024-12-28 NOTE — CARE COORDINATION
Case Management Assessment  Initial Evaluation    Date/Time of Evaluation: 12/28/2024 12:19 PM  Assessment Completed by: CAR HERNANDEZ RN    If patient is discharged prior to next notation, then this note serves as note for discharge by case management.    Patient Name: Mandeep Iverson                   YOB: 1940  Diagnosis: Altered mental status, unspecified altered mental status type [R41.82]  Pneumonia of right lower lobe due to infectious organism [J18.9]  AMS (altered mental status) [R41.82]                   Date / Time: 12/25/2024 10:36 AM    Patient Admission Status: Inpatient   Readmission Risk (Low < 19, Mod (19-27), High > 27): Readmission Risk Score: 14.1    Current PCP: Pepe Terrazas MD  PCP verified by CM? (P) Yes (Pepe Terrazas MD)    Chart Reviewed: Yes      History Provided by: (P) Spouse  Patient Orientation: Other (see comment) (AMS change)    Patient Cognition: (P) Dementia / Early Alzheimer's    Hospitalization in the last 30 days (Readmission):  No    If yes, Readmission Assessment in CM Navigator will be completed.    Advance Directives:      Code Status: Full Code   Patient's Primary Decision Maker is: (P) Legal Next of Kin    Primary Decision Maker: Caridad Iverson - Spouse - [phone]    Discharge Planning:    Patient lives with: (P) Spouse/Significant Other Type of Home: (P) House  Primary Care Giver: (P) Spouse  Patient Support Systems include: (P) Spouse/Significant Other, Children   Current Financial resources: (P) Medicare, Medicaid  Current community resources: (P) ECF/Home Care  Current services prior to admission: (P) None            Current DME:              Type of Home Care services:  (P) Aide Services, Nursing Services    ADLS  Prior functional level: (P) Other (see comment) (TOTAL CARE)  Current functional level: (P) Other (see comment) (TOTAL CARE)    PT AM-PAC:   /24  OT AM-PAC:   /24    Family can provide assistance at DC: (P) Yes  Would you like

## 2024-12-29 ENCOUNTER — APPOINTMENT (OUTPATIENT)
Dept: GENERAL RADIOLOGY | Age: 84
DRG: 193 | End: 2024-12-29
Payer: MEDICARE

## 2024-12-29 LAB
GLUCOSE BLD-MCNC: 124 MG/DL (ref 75–110)
GLUCOSE BLD-MCNC: 183 MG/DL (ref 75–110)
GLUCOSE BLD-MCNC: 66 MG/DL (ref 75–110)
GLUCOSE BLD-MCNC: 82 MG/DL (ref 75–110)
GLUCOSE BLD-MCNC: 82 MG/DL (ref 75–110)
GLUCOSE BLD-MCNC: 99 MG/DL (ref 75–110)

## 2024-12-29 PROCEDURE — 99232 SBSQ HOSP IP/OBS MODERATE 35: CPT | Performed by: PSYCHIATRY & NEUROLOGY

## 2024-12-29 PROCEDURE — 2500000003 HC RX 250 WO HCPCS: Performed by: INTERNAL MEDICINE

## 2024-12-29 PROCEDURE — 2580000003 HC RX 258: Performed by: STUDENT IN AN ORGANIZED HEALTH CARE EDUCATION/TRAINING PROGRAM

## 2024-12-29 PROCEDURE — 71045 X-RAY EXAM CHEST 1 VIEW: CPT

## 2024-12-29 PROCEDURE — 2580000003 HC RX 258: Performed by: PHYSICIAN ASSISTANT

## 2024-12-29 PROCEDURE — 2580000003 HC RX 258: Performed by: INTERNAL MEDICINE

## 2024-12-29 PROCEDURE — 2500000003 HC RX 250 WO HCPCS: Performed by: PHYSICIAN ASSISTANT

## 2024-12-29 PROCEDURE — 2060000000 HC ICU INTERMEDIATE R&B

## 2024-12-29 PROCEDURE — 6360000002 HC RX W HCPCS: Performed by: INTERNAL MEDICINE

## 2024-12-29 PROCEDURE — 6360000002 HC RX W HCPCS: Performed by: PHYSICIAN ASSISTANT

## 2024-12-29 PROCEDURE — 82947 ASSAY GLUCOSE BLOOD QUANT: CPT

## 2024-12-29 PROCEDURE — 99232 SBSQ HOSP IP/OBS MODERATE 35: CPT | Performed by: STUDENT IN AN ORGANIZED HEALTH CARE EDUCATION/TRAINING PROGRAM

## 2024-12-29 RX ADMIN — SODIUM CHLORIDE: 9 INJECTION, SOLUTION INTRAVENOUS at 23:42

## 2024-12-29 RX ADMIN — SODIUM CHLORIDE, PRESERVATIVE FREE 10 ML: 5 INJECTION INTRAVENOUS at 20:41

## 2024-12-29 RX ADMIN — DEXTROSE MONOHYDRATE 125 ML: 100 INJECTION, SOLUTION INTRAVENOUS at 07:42

## 2024-12-29 RX ADMIN — WATER 1000 MG: 1 INJECTION INTRAMUSCULAR; INTRAVENOUS; SUBCUTANEOUS at 20:41

## 2024-12-29 RX ADMIN — SODIUM CHLORIDE 125 MG: 9 INJECTION, SOLUTION INTRAVENOUS at 09:19

## 2024-12-29 RX ADMIN — SODIUM CHLORIDE 125 MG: 9 INJECTION, SOLUTION INTRAVENOUS at 20:44

## 2024-12-29 RX ADMIN — ENOXAPARIN SODIUM 40 MG: 100 INJECTION SUBCUTANEOUS at 09:20

## 2024-12-29 RX ADMIN — SODIUM CHLORIDE, PRESERVATIVE FREE 10 ML: 5 INJECTION INTRAVENOUS at 09:20

## 2024-12-29 ASSESSMENT — PAIN SCALES - GENERAL: PAINLEVEL_OUTOF10: 0

## 2024-12-30 ENCOUNTER — APPOINTMENT (OUTPATIENT)
Dept: GENERAL RADIOLOGY | Age: 84
DRG: 193 | End: 2024-12-30
Payer: MEDICARE

## 2024-12-30 VITALS
RESPIRATION RATE: 26 BRPM | HEIGHT: 66 IN | OXYGEN SATURATION: 100 % | DIASTOLIC BLOOD PRESSURE: 78 MMHG | SYSTOLIC BLOOD PRESSURE: 154 MMHG | WEIGHT: 132.06 LBS | BODY MASS INDEX: 21.22 KG/M2 | TEMPERATURE: 97.7 F | HEART RATE: 60 BPM

## 2024-12-30 PROBLEM — F03.C0 SEVERE DEMENTIA (HCC): Status: ACTIVE | Noted: 2024-12-30

## 2024-12-30 PROBLEM — Z51.5 ENCOUNTER FOR PALLIATIVE CARE: Status: ACTIVE | Noted: 2024-12-30

## 2024-12-30 PROBLEM — Z71.89 ACP (ADVANCE CARE PLANNING): Status: ACTIVE | Noted: 2024-12-30

## 2024-12-30 LAB
GLUCOSE BLD-MCNC: 108 MG/DL (ref 75–110)
GLUCOSE BLD-MCNC: 96 MG/DL (ref 75–110)
GLUCOSE BLD-MCNC: 99 MG/DL (ref 75–110)
MICROORGANISM SPEC CULT: NORMAL
SERVICE CMNT-IMP: NORMAL
SPECIMEN DESCRIPTION: NORMAL

## 2024-12-30 PROCEDURE — 92611 MOTION FLUOROSCOPY/SWALLOW: CPT

## 2024-12-30 PROCEDURE — 2580000003 HC RX 258: Performed by: INTERNAL MEDICINE

## 2024-12-30 PROCEDURE — 99232 SBSQ HOSP IP/OBS MODERATE 35: CPT | Performed by: STUDENT IN AN ORGANIZED HEALTH CARE EDUCATION/TRAINING PROGRAM

## 2024-12-30 PROCEDURE — 99222 1ST HOSP IP/OBS MODERATE 55: CPT | Performed by: INTERNAL MEDICINE

## 2024-12-30 PROCEDURE — 94761 N-INVAS EAR/PLS OXIMETRY MLT: CPT

## 2024-12-30 PROCEDURE — 92526 ORAL FUNCTION THERAPY: CPT

## 2024-12-30 PROCEDURE — 82947 ASSAY GLUCOSE BLOOD QUANT: CPT

## 2024-12-30 PROCEDURE — 6360000002 HC RX W HCPCS: Performed by: PHYSICIAN ASSISTANT

## 2024-12-30 PROCEDURE — 74230 X-RAY XM SWLNG FUNCJ C+: CPT

## 2024-12-30 PROCEDURE — 2500000003 HC RX 250 WO HCPCS: Performed by: INTERNAL MEDICINE

## 2024-12-30 PROCEDURE — 99232 SBSQ HOSP IP/OBS MODERATE 35: CPT | Performed by: PSYCHIATRY & NEUROLOGY

## 2024-12-30 RX ADMIN — ENOXAPARIN SODIUM 40 MG: 100 INJECTION SUBCUTANEOUS at 09:40

## 2024-12-30 RX ADMIN — SODIUM CHLORIDE 125 MG: 9 INJECTION, SOLUTION INTRAVENOUS at 09:16

## 2024-12-30 NOTE — CONSULTS
[facility] Neurology   IN-PATIENT SERVICE   [facility]    Neurology Consult Note            Date:   12/26/2024  Patient name:  Mandeep Iverson  Date of admission:  12/25/2024 10:36 AM  MRN:   [id number]  Account:  [id number]  YOB: 1940  PCP:    Pepe Terrazas MD  Room:   [address] [place] [address]  Code Status:    Full Code    Chief Complaint:     Chief Complaint   Patient presents with    Altered Mental Status       History Obtained From:     wife    History of Present Illness:     84-year-old male with past medical history significant for Alzheimer's dementia with very poor baseline, HIV currently being admitted to medicine unit with acute encephalopathy likely secondary to UTI and RSV pneumonia.  He presented with a history of not acting like himself for about a week.  At baseline, he has dementia and he needs help for activities of daily living but he used to recognize family members which he has not been doing for about a week and also was having's shortness of breath and cough which prompted them to visit hospital.    Neurology is consulted with concerns of seizures as patient had few episodes of staring without shaking episodes.  He is currently on Depakote of 250 Mg twice daily likely for his mood versus seizure.  His wife denied any past history significant for seizures.    On my evaluation, patient is drowsy and sleepy and only responds to painful stimuli and does not follow commands.      Past Medical History:     Past Medical History:   Diagnosis Date    Acute delirium 2/11/2019    Acute metabolic encephalopathy 2/11/2019    Alzheimer's dementia with behavioral disturbance (HCC) 2/11/2019    Atrophy, cortical     BPH (benign prostatic hyperplasia) 2/1/2013    Dementia (HCC)     Dementia without behavioral disturbance (HCC) 7/27/2012    GERD (gastroesophageal reflux disease) 4/28/2015    Hemorrhoids     HIV (human immunodeficiency virus infection) (HCC)     Meralgia paraesthetica 
Readings from Last 3 Encounters:   12/25/24 59.9 kg (132 lb 0.9 oz)   10/04/24 61.3 kg (135 lb 3.2 oz)   09/25/24 63.5 kg (140 lb)        Code Status: DNR-CCA     ADVANCE CARE PLANNING:  Patient has capacity for medical decisions: no  Health Care Power of : no, wife would be HCPOA  Living Will: not asked    Personal, Social, and Family History  Marital Status:   Living situation: with family:  spouse  Does patient understand diagnosis/treatment? no  Does caregiver understand diagnosis/treatment? yes    Assessment        REVIEW OF SYSTEMS  Unable to assess due to his significant dementia    PHYSICAL ASSESSMENT:  General Appearance: Elderly  gentleman resting supine in bed, awake, not very alert, nonverbal  Mental status: unable to assess  Head: normocephalic, atraumatic  Eye: no icterus, redness, pupils equal and reactive, extraocular eye movements intact, conjunctiva clear  Ear: normal external ear, no discharge, hearing intact  Nose: no drainage noted  Mouth: mucous membranes moist  Neck: supple  Lungs: Bilateral equal air entry, clear to ausculation, no wheezing, rales or rhonchi, normal effort  Cardiovascular: normal rate, regular rhythm, no murmur, gallop, rub  Abdomen: Soft, nontender, nondistended, normal bowel sounds  Neurologic: Awake and alert, spontaneously moving all four extremities, does not follow commands  Skin: No gross lesions, rashes, bruising or bleeding on exposed skin area  Extremities: peripheral pulses palpable, no pedal edema or calf pain with palpation  Psych: confused affect    Palliative Performance Scale:  ___100% Full ambulation; normal activity/No disease; full self-care; normal intake; LOC full  ___90% full ambulation; normal activity/some disease; full self-care; normal intake; LOC full  ___80% ambulation full; normal activity with effort/some disease; full self-care; normal/reduced intake; LOC full  ___70% ambulation reduced; cannot do normal work/some

## 2024-12-30 NOTE — PROGRESS NOTES
[facility] Neurology Specialist  [address]  [place] [address]  PH:  [phone] or [phone]  FAX:  [phone]            Brief history: Mandeep Iverson is a 84 y.o. old male admitted on 12/25/2024 with encephalopathy        Subjective: No new neurological events overnight. Patient continues to be obtunded. Non verbal and not eating      Objective: BP (!) 160/65   Pulse 71   Temp 98.8 °F (37.1 °C) (Axillary)   Resp 28   Ht 1.676 m (5' 6\")   Wt 59.9 kg (132 lb 0.9 oz)   SpO2 100%   BMI 21.31 kg/m²       Medications:    valproate (DEPACON) 125 mg in sodium chloride 0.9 % 100 mL IVPB  125 mg IntraVENous Q12H    cefTRIAXone (ROCEPHIN) IV  1,000 mg IntraVENous Q24H    sodium chloride flush  5-40 mL IntraVENous 2 times per day    enoxaparin  40 mg SubCUTAneous Daily    bictegravir-emtricitab-tenofovir alafenamide  1 tablet Oral Daily    atorvastatin  40 mg Oral Daily    [Held by provider] divalproex  125 mg Oral 2 times per day    donepezil  10 mg Oral Nightly    finasteride  5 mg Oral Daily    memantine  10 mg Oral BID    tamsulosin  0.4 mg Oral Daily    pantoprazole  40 mg Oral Daily    insulin lispro  0-4 Units SubCUTAneous 4x Daily AC & HS    guaiFENesin  600 mg Oral BID        Neurological Examination:  Domain Findings   Mental Status Unable to assess fully; nonverbal, obtunded.   Cranial Nerves Limited examination due to patient's condition; no overt asymmetries.   Motor Function Grossly symmetric movement when stimulated; no focal deficits noted.   Sensory Function Unable to assess comprehensively; withdrawal to noxious stimuli noted.   Cerebellar Not testable in current clinical state.   Reflex Function 2+ and symmetric throughout; Babinski reflex absent bilaterally.   Gait Unable to evaluate.     Neurological Workup:  Date Study Findings   12/25/2024 CT Head (WO Contrast) Age-related atrophy and microvascular ischemic disease; no acute intracranial process.   12/26/2024 EEG 
  Physician Progress Note      PATIENT:               NEVIN INTERIANO  [facility] #:                  [id number]  :                       1940  ADMIT DATE:       2024 10:36 AM  DISCH DATE:  RESPONDING  PROVIDER #:        Zaira Mejias MD          QUERY TEXT:    Pt admitted with AMS. Found to have Pneumonia with UTI. If possible, please   document in the progress notes and discharge summary if you are evaluating and   /or treating any of the following:  The medical record reflects the following:  Risk Factors: Dementia, Metabolic encephalopathy. HIV, COPD  Clinical Indicators: To ED with acute encephalopathy with a hx Alzheimer's   dementia. RSV Pneumonia and UTI documented. Lactic acid 3.3-3.1, wbc 13-11.8,    Myoglobin 292, Procal .11  Treatment: Cefepime/Vanco  Options provided:  -- Sepsis, present on admission  -- Pneumonia and UTI without Sepsis  -- Sepsis was ruled out  -- Other - I will add my own diagnosis  -- Disagree - Not applicable / Not valid  -- Disagree - Clinically unable to determine / Unknown  -- Refer to Clinical Documentation Reviewer    PROVIDER RESPONSE TEXT:    This patient has Pneumonia and UTI without Sepsis.    Query created by: Betzaida Avendaño on 2024 11:43 AM      Electronically signed by:  Zaira Mejias MD 2024 2:09 PM          
  [facility] Neurology Specialist  [address]  [place] [address]  PH:  [phone] or [phone]  FAX:  [phone]            Brief history: Mandeep Iverson is a 84 y.o. old male admitted on 12/25/2024 with encephalopathy        Subjective: The patient is laying down on the bed comfortably.  He is nonverbal.  Not following commands.     Objective: BP (!) 110/58   Pulse 67   Temp 98.4 °F (36.9 °C) (Axillary)   Resp 28   Ht 1.676 m (5' 6\")   Wt 59.9 kg (132 lb 0.9 oz)   SpO2 100%   BMI 21.31 kg/m²       Medications:    valproate (DEPACON) 125 mg in sodium chloride 0.9 % 100 mL IVPB  125 mg IntraVENous Q12H    cefTRIAXone (ROCEPHIN) IV  1,000 mg IntraVENous Q24H    sodium chloride flush  5-40 mL IntraVENous 2 times per day    enoxaparin  40 mg SubCUTAneous Daily    bictegravir-emtricitab-tenofovir alafenamide  1 tablet Oral Daily    atorvastatin  40 mg Oral Daily    [Held by provider] divalproex  125 mg Oral 2 times per day    donepezil  10 mg Oral Nightly    finasteride  5 mg Oral Daily    memantine  10 mg Oral BID    tamsulosin  0.4 mg Oral Daily    pantoprazole  40 mg Oral Daily    insulin lispro  0-4 Units SubCUTAneous 4x Daily AC & HS    guaiFENesin  600 mg Oral BID        Neurological Examination:  Domain Findings   Mental Status Unable to assess fully; nonverbal, obtunded.   Cranial Nerves Limited examination due to patient's condition; no overt asymmetries.   Motor Function Grossly symmetric movement when stimulated; no focal deficits noted.   Sensory Function Unable to assess comprehensively; withdrawal to noxious stimuli noted.   Cerebellar Not testable in current clinical state.   Reflex Function 2+ and symmetric throughout; Babinski reflex absent bilaterally.   Gait Unable to evaluate.     Neurological Workup:  Date Study Findings   12/25/2024 CT Head (WO Contrast) Age-related atrophy and microvascular ischemic disease; no acute intracranial process.   12/26/2024 EEG 
At approximately 1035, report called to 4B RN, all questions answered, called pt's wife Caridad, informed her that pt would be transferred to higher level of care, she stated she would be up to see pt around 2 pm, pt transferred to 417 per hospital bed. Electronically signed by FELIX PUENTES RN on 12/26/2024 at 11:59 AM    
EEG routine completed.  
Expiratory and Inspiratory wheezes noted. Writer called RT for PRN treatment.  
Facility/Department: [address] STEPDOWN   CLINICAL BEDSIDE SWALLOW EVALUATION    NAME: Mandeep Iverson  : 1940  MRN: [id number]    ADMISSION DATE: 2024  ADMITTING DIAGNOSIS: has HIV (human immunodeficiency virus infection) ([facility]); Dementia without behavioral disturbance ([facility]); Osteoporosis right hip fracture dec 08; Hemorrhoids; BPH (benign prostatic hyperplasia); GERD (gastroesophageal reflux disease); Mixed hyperlipidemia; Acute cystitis without hematuria; Occasional tremors; PVD (peripheral vascular disease) ([facility]); Seizure-like activity ([facility]); Alzheimer's dementia with behavioral disturbance ([facility]); Atrophy, cortical; Hyponatremia; Elevated serum creatinine; Moderate malnutrition ([facility]); COVID; Elevated C-reactive protein (CRP); COPD exacerbation ([facility]); Metabolic encephalopathy; Type 2 diabetes mellitus; Onychomycosis of multiple toenails with type 2 diabetes mellitus ([facility]); RSV (respiratory syncytial virus pneumonia); Altered mental status; and Pneumonia of right lower lobe due to infectious organism on their problem list.    Recent Chest Xray/CT of Chest:   24:  Chronic pulmonary change with right basilar infiltrates.    Date of Eval: 2024  Evaluating Therapist: ENRIS Friedman    Current Diet level:  Current Diet : Easy to chew  Current Liquid Diet : Thin    Primary Complaint   Mandeep Iverson is a 84 y.o. Non- / non  male who presents with Altered Mental Status and is admitted to the hospital for the management of AMS (altered mental status).  Patient has a history significant for Alzheimer's dementia, HIV on Biktarvy, hyperlipidemia, type 2 diabetes.     Patient was brought in by EMS for evaluation as his family states he has been altered from his baseline over the past 2 days.  Patient has a history of dementia but family feels this is different from his baseline.  He has new onset weakness and difficulty ambulating as well.  According to family, he has been dealing with an 
[facility]   Pharmacy Pharmacokinetic Monitoring Service - Vancomycin    Consulting Provider:  Jonelle España PA   Indication: PNA   Target Concentration: Goal AUC/MARY ALICE 400-600 mg*hr/L  Day of Therapy: 2  Additional Antimicrobials: cefepime    Pertinent Laboratory Values:   Wt Readings from Last 1 Encounters:   12/25/24 59.9 kg (132 lb 0.9 oz)     Temp Readings from Last 1 Encounters:   12/26/24 99 °F (37.2 °C) (Axillary)     Estimated Creatinine Clearance: 56 mL/min (based on SCr of 0.8 mg/dL).  Recent Labs     12/25/24  1051 12/26/24  0430   CREATININE 0.9 0.8   BUN 16 13   WBC 13.2* 11.8*     Procalcitonin: 0.11 on 12-25     Pertinent Cultures:  Culture Date Source Results   12-25 Blood  MRSE    MRSA Nasal Swab: was ordered by provider, awaiting results.    Recent vancomycin administrations                     vancomycin (VANCOCIN) 1,000 mg in sodium chloride 0.9 % 250 mL IVPB ([id number]) (mg) 1,000 mg New Bag 12/25/24 1219                      Plan:  Current dosing regimen is sub-therapeutic based on Bayesian software estimation with predicted AUC of 370 and trough of 9.9   Empirically Increase dose to 1250 mg IV Q24H for estimated AUC of 460 and trough of 12.4   Repeat vancomycin concentration ordered for TBD @ TBD   Pharmacy will continue to monitor patient and adjust therapy as indicated    Thank you for the consult,  Domingo Joy RPH  12/26/2024 7:28 AM   
[facility]   Pharmacy Pharmacokinetic Monitoring Service - Vancomycin     Mandeep Iverson is a 84 y.o. male starting on vancomycin therapy for pneumonia. Pharmacy consulted by Jonelle España PA  for monitoring and adjustment.    Target Concentration: Goal AUC/MARY ALICE 400-600 mg*hr/L    Additional Antimicrobials: cefepime    Pertinent Laboratory Values:   Wt Readings from Last 1 Encounters:   12/25/24 59.9 kg (132 lb 0.9 oz)     Temp Readings from Last 1 Encounters:   12/25/24 97.7 °F (36.5 °C) (Axillary)     Estimated Creatinine Clearance: 50 mL/min (based on SCr of 0.9 mg/dL).  Recent Labs     12/25/24  1051   CREATININE 0.9   BUN 16   WBC 13.2*     Procalcitonin:     Pertinent Cultures:  Culture Date Source Results   12/25 12/25 Resp  Blood + RSV  pending   MRSA Nasal Swab: not ordered. Order placed by pharmacy.    Plan:  Dosing recommendations based on Bayesian software  Start vancomycin 1000mg IV q24h  Anticipated AUC of 409 and trough concentration of 11.5 at steady state  Renal labs as indicated   Vancomycin concentration will be ordered when clinically indicated.  Pharmacy will continue to monitor patient and adjust therapy as indicated    Thank you for the consult,    Kirti De La O.D., BCPS  12/25/2024  2:42 PM      
RN attempted to feed patient. Patient tightened lips, did not follow commands, and did not open mouth when attempting to give food on a spoon.   
Speech Language Pathology  [facility]    Dysphagia Treatment Note    Date: 12/30/2024  Patient’s Name: Mandeep Iverson  MRN: [id number]  Diagnosis: dysphagia  Patient Active Problem List   Diagnosis Code    HIV (human immunodeficiency virus infection) ([facility]) Z21    Dementia without behavioral disturbance ([facility]) F03.90    Osteoporosis right hip fracture dec 08 M81.0    Hemorrhoids K64.9    BPH (benign prostatic hyperplasia) N40.0    GERD (gastroesophageal reflux disease) K21.9    Mixed hyperlipidemia E78.2    Acute cystitis without hematuria N30.00    Occasional tremors R25.1    PVD (peripheral vascular disease) ([facility]) I73.9    Seizure-like activity ([facility]) R56.9    Alzheimer's dementia with behavioral disturbance ([facility]) G30.9, F02.818    Atrophy, cortical [id number]    Hyponatremia E87.1    Elevated serum creatinine R79.89    Moderate malnutrition ([facility]) E44.0    COVID U07.1    Elevated C-reactive protein (CRP) R79.82    COPD exacerbation ([facility]) J44.1    Metabolic encephalopathy G93.41    Type 2 diabetes mellitus E11.9    Onychomycosis of multiple toenails with type 2 diabetes mellitus ([facility]) E11.69, B35.1    RSV (respiratory syncytial virus pneumonia) J12.1    Altered mental status R41.82    Pneumonia of right lower lobe due to infectious organism J18.9       Pain: 0/10    Dysphagia Treatment  Treatment time:833-843    Subjective: [x] Alert [x] Cooperative     [] Confused     [] Agitated    [] Lethargic    Objective/Assessment:    Pt. Seen for follow up to determine patients appropriateness for MBSS completion.  Pt was awake and alert and able to sit upright at 90 degrees.  Pt did not verbalize and did not follow commands.  Pt did accept PO trials.  No overt s/s of aspiration noted with trials.  ST continues to recommend MBSS completion for diet recommendations      Plan:  [x] Continue ST services    [] Discharge from ST:        Discharge recommendations: []  Further therapy recommended at discharge.The 
Spouse and son at bedside, spoke with wife Caridad that physician had ordered PT/OT prior to discharge, wife informed writer that pt did not need PT/OT agustin, stated \"I am taking pt home now, he does not need physical or occupational therapy. He does not ambulate at home, sits on his rollating walker, or in the chair or in bed. We are taking him home today.\" No further needs, discharge instructions reviewed with spouse at bedside.   
[facility]  Office: [phone]  Alfie Billings DO, Ayaz Guillory DO, Reza Parikh DO, Erik Mars DO, Wood Andujar MD, Jennifer Atkins MD, Aquilino Kaufman MD, Keily Malik MD,  Gen Rosales MD, Ana Greene MD, Macho Ricci MD,  Tracy Heard DO, Tamra Toledo MD, Alexis Saez MD, Massimo Billings DO, Caryn Enriquez MD,  Jr Bryson DO, Minna Oconnor MD, Yeny Soliz MD, Nirali Zamarripa MD, Griselda Stacy MD,  Tim Granados MD, Son Dee MD, Alfonso Lynch MD, Delmy Morales MD, David Carroll MD, Zaira Mejias MD, Prosper Otto DO, Kee Durbin MD, Shirley Waterhouse, CNP,  Yvonne Alegre, CNP, Prosper Euceda, CNP,  Lissett Leonard, CRISTÓBAL, Stefani Alejandra, CNP, Zakia Valdes, CNP, Agnes Kwok, CNP, Madai Small, CNP, Kristina Bynum PALaiC, MARIA ALEJANDRA TubbsC, Gabbi Lazaro, CNP, Heidy Buckner, CNP,  Farida Haas, CNP, Jocelyn Harden, CNP, Julienne Robertson, CNP,  Emilie Olivo, CNP, Natividad Yates, CNP         [facility]   IN-PATIENT SERVICE   [facility]    Progress Note    12/27/2024    8:31 AM    Name:   Mandeep Iverson  MRN:     [id number]     Acct:      [id number]   Room:   041/0417-02   Day:  2  Admit Date:  12/25/2024 10:36 AM    PCP:   Pepe Terrazas MD  Code Status:  Full Code    Subjective:     C/C:   Chief Complaint   Patient presents with    Altered Mental Status     Interval History Status: not changed.     Patient seen and examined bedside earlier this morning.  No acute events overnight.  Patient has dementia and remains confused.  Neurology evaluated this morning.  Remains drowsy and does not follow commands.    Brief History:     84-year-old male with a history of dementia presents to the hospital for worsening confusion diagnosed with RSV pneumonia     Review of Systems:     Unable to perform    Medications:     Allergies:  No Known Allergies    Current Meds:   Scheduled Meds:    valproate (DEPACON) 125 mg in 
[facility]  Office: [phone]  Alfie Billings DO, Ayaz Guillory DO, Reza Parikh DO, Erik Mars DO, Wood Andujar MD, Jennifer Atkins MD, Aquilino Kaufman MD, Keily Malik MD,  Gen Rosales MD, Ana Greene MD, Macho Ricci MD,  Tracy Heard DO, Tamra Toledo MD, Alexis Saez MD, Massimo Billings DO, Caryn Enriquez MD,  Jr Bryson DO, Minna Oconnor MD, Yeny Soliz MD, Nirali Zamarripa MD, Griselda Stacy MD,  Tim Graandos MD, Son Dee MD, Alfonso Lynch MD, Delmy Morales MD, David Carroll MD, Zaira Mejias MD, Prosper Otto DO, Kee Durbin MD, Shirley Waterhouse, CNP,  Yvonne Alegre, CNP, Prosper Euceda, CNP,  Lissett Leonard, CRISTÓBAL, Stefani Alejandra, CNP, Zakia Valdes, CNP, Agnes Kwok, CNP, Madai Small, CNP, Kristina Bynum PALaiC, MARIA ALEJANDRA TubbsC, Gabbi Lazaro, CNP, Heidy Buckner, CNP,  Farida Haas, CNP, Jocelyn Harden, CNP, [place]ienne Robertson, CNP,  Emilie Olivo, CNP, Natividad Yates, CNP         [facility]   IN-PATIENT SERVICE   [facility]    Progress Note    12/28/2024    10:26 AM    Name:   Mandeep Iverson  MRN:     [id number]     Acct:      [id number]   Room:   0417/0417-02   Day:  3  Admit Date:  12/25/2024 10:36 AM    PCP:   Pepe Terrazas MD  Code Status:  Full Code    Subjective:     C/C:   Chief Complaint   Patient presents with    Altered Mental Status     Interval History Status: not changed.     Patient seen and examined at bedside this morning.  No acute events overnight.  Patient lethargic and confused.    Brief History:     84-year-old male with a history of dementia presents to the hospital for worsening confusion diagnosed with RSV pneumonia     Review of Systems:     Unable to perform    Medications:     Allergies:  No Known Allergies    Current Meds:   Scheduled Meds:    valproate (DEPACON) 125 mg in sodium chloride 0.9 % 100 mL IVPB  125 mg IntraVENous Q12H    cefTRIAXone (ROCEPHIN) IV  1,000 
Writer spoke with spouse Caridad, updated on pt and pt will be going for barium swallow test today. Spouse informed writer if pt does not pass barium swallow test today, they are agreeable to pt having PEG tube placement.  
bictegravir-emtricitab-tenofovir alafenamide  1 tablet Oral Daily    atorvastatin  40 mg Oral Daily    [Held by provider] divalproex  125 mg Oral 2 times per day    donepezil  10 mg Oral Nightly    finasteride  5 mg Oral Daily    memantine  10 mg Oral BID    tamsulosin  0.4 mg Oral Daily    pantoprazole  40 mg Oral Daily    insulin lispro  0-4 Units SubCUTAneous 4x Daily AC & HS    guaiFENesin  600 mg Oral BID     Continuous Infusions:    sodium chloride      dextrose       PRN Meds: sodium chloride flush, sodium chloride, potassium chloride **OR** potassium alternative oral replacement **OR** potassium chloride, magnesium sulfate, ondansetron **OR** ondansetron, polyethylene glycol, acetaminophen **OR** acetaminophen, glucose, dextrose bolus **OR** dextrose bolus, glucagon (rDNA), dextrose, benzonatate, ipratropium 0.5 mg-albuterol 2.5 mg    Data:     Vitals:  /74   Pulse 69   Temp 97.3 °F (36.3 °C) (Axillary)   Resp 20   Ht 1.676 m (5' 6\")   Wt 59.9 kg (132 lb 0.9 oz)   SpO2 100%   BMI 21.31 kg/m²   Temp (24hrs), Av.3 °F (36.8 °C), Min:97.3 °F (36.3 °C), Max:99 °F (37.2 °C)    Recent Labs     24  1634 24  1947 24  0809 24  1611   POCGLU 178* 141* 142* 122*       I/O (24Hr):    Intake/Output Summary (Last 24 hours) at 2024 1701  Last data filed at 2024 1111  Gross per 24 hour   Intake 147.45 ml   Output 380 ml   Net -232.55 ml       Labs:  Hematology:  Recent Labs     24  1051 24  1441 24  0430   WBC 13.2* 13.2* 11.8*   RBC 4.35  --  3.74*   HGB 12.0*  --  10.4*   HCT 38.1*  --  34.7*   MCV 87.6  --  92.8   MCH 27.6  --  27.8   MCHC 31.5  --  30.0   RDW 14.6*  --  14.9*     --  149   MPV 10.9  --  11.2   INR 1.1  --   --    [id number]  --  20*  --      Chemistry:  Recent Labs     24  1051 24  1221 24  1441 24  0430 24  0833     --   --  142  --    K 5.0  --   --  4.8  --      --   --  108*  --  
3 years ago. His smoking use included cigarettes. He started smoking about 74 years ago. He has never used smokeless tobacco. He reports that he does not currently use alcohol after a past usage of about 4.0 standard drinks of alcohol per week. He reports that he does not currently use drugs.     Family History:   Family History   Problem Relation Age of Onset    Heart Disease Mother     High Blood Pressure Mother     Cancer Father     Diabetes Sister     Heart Disease Sister     High Blood Pressure Sister     Heart Disease Brother     High Blood Pressure Brother        Vitals:  BP (!) 154/78   Pulse 60   Temp 97.7 °F (36.5 °C) (Axillary)   Resp 26   Ht 1.676 m (5' 6\")   Wt 59.9 kg (132 lb 0.9 oz)   SpO2 100%   BMI 21.31 kg/m²   Temp (24hrs), Av.2 °F (36.8 °C), Min:97.7 °F (36.5 °C), Max:98.9 °F (37.2 °C)    Recent Labs     24  1101 24  1634 24  0739   POCGLU 82 82 124* 99       I/O (24Hr):    Intake/Output Summary (Last 24 hours) at 2024 1128  Last data filed at 2024 0942  Gross per 24 hour   Intake --   Output 1725 ml   Net -1725 ml       Labs:  Hematology:  Recent Labs     24  0940   WBC 8.8   RBC 3.92*   HGB 10.9*   HCT 37.1*   MCV 94.6   MCH 27.8   MCHC 29.4   RDW 14.5*      MPV 12.0     Chemistry:  Recent Labs     24  0940   *   K 4.7      CO2 22   GLUCOSE 91   BUN 7*   CREATININE 0.7   ANIONGAP 8*   LABGLOM 90   CALCIUM 8.7     Recent Labs     24  1221 24  1544 24  0726 24  0801 24  1101 24  1634 24  0739   AMMONIA 24  --   --   --   --   --   --   --    POCGLU  --    < > 66* 183* 82 82 124* 99    < > = values in this interval not displayed.     ABG:  Lab Results   Component Value Date/Time    FIO2 INFORMATION NOT PROVIDED 2024 12:21 PM     Lab Results   Component Value Date/Time    SPECIAL R FOREARM 10 ML 2024 11:00 AM     Lab Results   Component Value 
of alcohol per week. He reports that he does not currently use drugs.     Family History:   Family History   Problem Relation Age of Onset    Heart Disease Mother     High Blood Pressure Mother     Cancer Father     Diabetes Sister     Heart Disease Sister     High Blood Pressure Sister     Heart Disease Brother     High Blood Pressure Brother        Vitals:  /71   Pulse 75   Temp 98.3 °F (36.8 °C) (Axillary)   Resp 27   Ht 1.676 m (5' 6\")   Wt 59.9 kg (132 lb 0.9 oz)   SpO2 100%   BMI 21.31 kg/m²   Temp (24hrs), Av.3 °F (36.8 °C), Min:98 °F (36.7 °C), Max:98.8 °F (37.1 °C)    Recent Labs     24  0724  0801   POCGLU 78 99 66* 183*       I/O (24Hr):    Intake/Output Summary (Last 24 hours) at 2024 1128  Last data filed at 2024 0418  Gross per 24 hour   Intake --   Output 1200 ml   Net -1200 ml       Labs:  Hematology:  Recent Labs     24  02424  0940   WBC 10.3 8.8   RBC 3.56* 3.92*   HGB 9.8* 10.9*   HCT 32.3* 37.1*   MCV 90.7 94.6   MCH 27.5 27.8   MCHC 30.3 29.4   RDW 14.6* 14.5*    148   MPV 11.5 12.0     Chemistry:  Recent Labs     24  02424  0940    134*   K 4.1 4.7    104   CO2 23 22   GLUCOSE 128* 91   BUN 9 7*   CREATININE 0.7 0.7   ANIONGAP 8* 8*   LABGLOM 90 90   CALCIUM 8.7 8.7     Recent Labs     24  1221 24  1544 24  1142 24  1632 24  0726 24  0801   AMMONIA 24  --   --   --   --   --   --   --    POCGLU  --    < > 105 83 78 99 66* 183*    < > = values in this interval not displayed.     ABG:  Lab Results   Component Value Date/Time    FIO2 INFORMATION NOT PROVIDED 2024 12:21 PM     Lab Results   Component Value Date/Time    SPECIAL R FOREARM 10 ML 2024 11:00 AM     Lab Results   Component Value Date/Time    CULTURE NO SIGNIFICANT GROWTH 2024 04:35 PM       Radiology:  XR CHEST PORTABLE    Result Date: 
the patient, patient's family and the medical staff.   Consultations:   IP CONSULT TO HOSPITALIST  IP CONSULT TO NEUROLOGY  IP CONSULT TO PALLIATIVE CARE    Patient is admitted as inpatient status because of co-morbidities listed above, severity of signs and symptoms as outlined, requirement for current medical therapies and most importantly because of direct risk to patient if care not provided in a hospital setting.    Sophia Molina MD  Neurology Resident PGY-3  12/30/2024  12:02 PM    Copy sent to Pepe Mendoza MD

## 2024-12-30 NOTE — ACP (ADVANCE CARE PLANNING)
Advance Care Planning      Palliative Medicine Provider (MD/NP)  Advance Care Planning (ACP) Conversation      Date of Conversation: 12/30/24  The patient and/or authorized decision maker consented to a voluntary Advance Care Planning conversation.   Individuals present for the conversation:   Patient and Spouse via phone    Legal Healthcare Agent(s):    Primary Decision Maker: Caridad Iverson - Spouse - [phone]    ACP documents available in EMR prior to discussion:  Prior DNR-CCA    Primary Palliative Diagnosis(es):  Severe Dementia    Conversation Summary:  Patient is DNR-CCA  His wife notes that he would not want to be placed on life support  Wife is decision maker via next of kin law, unless there are documents to state otherwise    Resuscitation Status:    Code Status: DNR-CCA    Outcomes / Completed Documentation:  An explanation of advance directives and their importance was provided and the following forms completed:    -Portable DNR    If new document completed, original was provided to patient and/or family member.    Copy was placed for scanning into the [facility] EMR.      I spent 10 minutes providing separately identifiable ACP services with the patient and/or surrogate decision maker in a voluntary, in-person conversation discussing the patient's wishes and goals as detailed in the above note.       JOSE MAJOR, DO

## 2024-12-30 NOTE — DISCHARGE INSTR - COC
Continuity of Care Form    Patient Name: Mandeep Iverson   :  1940  MRN:  [id number]    Admit date:  2024  Discharge date:  2024    Code Status Order: DNR-CCA   Advance Directives:   Advance Care Flowsheet Documentation             Admitting Physician:  Zaira Mejias MD  PCP: Pepe Terrazas MD    Discharging Nurse: Federica PLEITEZ  Discharging Hospital Unit/Room#: 0417/0417-02  Discharging Unit Phone Number: [phone]    Emergency Contact:   Extended Emergency Contact Information  Primary Emergency Contact: Caridad Iverson  Address: [address] [place] [address]  Home Phone: [phone]  Relation: Spouse  Secondary Emergency Contact: krysta howe  Home Phone: [phone]  Relation: Child    Past Surgical History:  Past Surgical History:   Procedure Laterality Date    CHG UROGRAPHY RETROGRADE WITH/WO KUB Bilateral 3/6/2018    CYSTOSCOPY RETROGRADE PYELOGRAM performed by Sanket El MD at [facility] OR    COLONOSCOPY      HEMORRHOID SURGERY      HERNIA REPAIR      umbilical    SIGMOIDOSCOPY N/A 2017    SIGMOIDOSCOPY POLYP SNARE performed by Rachel Ponce MD at [facility]    TONSILLECTOMY      TOTAL HIP ARTHROPLASTY Left        Immunization History:   Immunization History   Administered Date(s) Administered    COVID-19, MODERNA BLUE border, Primary or Immunocompromised, (age 12y+), IM, 100 mcg/0.5mL 2021, 2021, 2022    Influenza 2013    Influenza Vaccine, unspecified formulation 10/29/2014    Influenza Virus Vaccine 2013, 2016, 10/09/2017, 2018    Influenza, FLUAD, (age 65 y+), IM, Quadv, 0.5mL 2023    Influenza, Quadv, 6 mo and older, IM (Fluzone, Flulaval) 10/09/2017    PPD Test 2019    Pneumococcal, PCV-13, PREVNAR 13, (age 6w+), IM, 0.5mL 2016    Pneumococcal, PPSV23, PNEUMOVAX 23, (age 2y+), SC/IM, 0.5mL 2013    TDaP, ADACEL (age 10y-64y), BOOSTRIX (age 10y+), IM, 0.5mL 2017       Active Problems:  Patient

## 2024-12-30 NOTE — CARE COORDINATION
IMM explained and copy given to Caridad the patients wife. Okay with discharging with less than 4 hours notice.      Discharge Report    [facility]  Clinical Case Management Department  Written by: PAYTON ARRIAGA    Patient Name: Mandeep Iverson  Attending Provider: Zaira Mejias MD  Admit Date: 2024 10:36 AM  MRN: [id number]  Account: [id number]                     : 1940  Discharge Date: 2024      Disposition: home    PAYTON ARRIAGA

## 2024-12-30 NOTE — PLAN OF CARE
Problem: Chronic Conditions and Co-morbidities  Goal: Patient's chronic conditions and co-morbidity symptoms are monitored and maintained or improved  Outcome: Progressing     
  Problem: Safety - Adult  Goal: Free from fall injury  12/26/2024 2310 by Digna Miranda RN  Outcome: Progressing  Flowsheets (Taken 12/26/2024 2310)  Free From Fall Injury: Instruct family/caregiver on patient safety  12/26/2024 1325 by Dagoberto Garcias RN  Outcome: Progressing     Problem: Chronic Conditions and Co-morbidities  Goal: Patient's chronic conditions and co-morbidity symptoms are monitored and maintained or improved  12/26/2024 2310 by Digna Miranda RN  Outcome: Progressing  Flowsheets (Taken 12/26/2024 2310)  Care Plan - Patient's Chronic Conditions and Co-Morbidity Symptoms are Monitored and Maintained or Improved:   Monitor and assess patient's chronic conditions and comorbid symptoms for stability, deterioration, or improvement   Collaborate with multidisciplinary team to address chronic and comorbid conditions and prevent exacerbation or deterioration   Update acute care plan with appropriate goals if chronic or comorbid symptoms are exacerbated and prevent overall improvement and discharge  12/26/2024 1325 by Dagoberto Garcias RN  Outcome: Progressing     Problem: Discharge Planning  Goal: Discharge to home or other facility with appropriate resources  12/26/2024 1325 by Dagoberto Garcias RN  Outcome: Progressing     Problem: Pain  Goal: Verbalizes/displays adequate comfort level or baseline comfort level  12/26/2024 2310 by Digna Miranda RN  Outcome: Progressing  Flowsheets (Taken 12/26/2024 2310)  Verbalizes/displays adequate comfort level or baseline comfort level:   Assess pain using appropriate pain scale   Implement non-pharmacological measures as appropriate and evaluate response   Consider cultural and social influences on pain and pain management  12/26/2024 1325 by Dagoberto Garcias RN  Outcome: Progressing     Problem: Skin/Tissue Integrity  Goal: Absence of new skin breakdown  Description: 1.  Monitor for areas of redness and/or skin breakdown  2.  Assess vascular access 
  Problem: Safety - Adult  Goal: Free from fall injury  12/27/2024 2110 by Digna Miranda RN  Outcome: Progressing  Flowsheets (Taken 12/26/2024 2310)  Free From Fall Injury: Instruct family/caregiver on patient safety  12/27/2024 1413 by Dagoberto Garcias RN  Outcome: Progressing     Problem: Chronic Conditions and Co-morbidities  Goal: Patient's chronic conditions and co-morbidity symptoms are monitored and maintained or improved  12/27/2024 2110 by Digna Miranda RN  Outcome: Progressing  Flowsheets (Taken 12/26/2024 2310)  Care Plan - Patient's Chronic Conditions and Co-Morbidity Symptoms are Monitored and Maintained or Improved:   Monitor and assess patient's chronic conditions and comorbid symptoms for stability, deterioration, or improvement   Collaborate with multidisciplinary team to address chronic and comorbid conditions and prevent exacerbation or deterioration   Update acute care plan with appropriate goals if chronic or comorbid symptoms are exacerbated and prevent overall improvement and discharge  12/27/2024 1413 by Dagoberto Garcias RN  Outcome: Progressing     Problem: Discharge Planning  Goal: Discharge to home or other facility with appropriate resources  12/27/2024 2110 by Digna Miranda RN  Outcome: Progressing  Flowsheets (Taken 12/27/2024 2110)  Discharge to home or other facility with appropriate resources:   Identify barriers to discharge with patient and caregiver   Identify discharge learning needs (meds, wound care, etc)   Refer to discharge planning if patient needs post-hospital services based on physician order or complex needs related to functional status, cognitive ability or social support system  12/27/2024 1413 by Dagoberto Garcias RN  Outcome: Progressing     Problem: Pain  Goal: Verbalizes/displays adequate comfort level or baseline comfort level  12/27/2024 2110 by Digna Miranda RN  Outcome: Progressing  Flowsheets (Taken 12/27/2024 2110)  Verbalizes/displays 
  Problem: Safety - Adult  Goal: Free from fall injury  12/29/2024 1138 by Jalen Morales  Outcome: Progressing  12/29/2024 0112 by Arlene Zhang, RN  Outcome: Progressing  Flowsheets (Taken 12/28/2024 2000)  Free From Fall Injury: Instruct family/caregiver on patient safety     Problem: Chronic Conditions and Co-morbidities  Goal: Patient's chronic conditions and co-morbidity symptoms are monitored and maintained or improved  12/29/2024 1138 by Jalen Morales  Outcome: Progressing  12/29/2024 0112 by Arlene Zhang, RN  Outcome: Progressing  Flowsheets (Taken 12/28/2024 2000)  Care Plan - Patient's Chronic Conditions and Co-Morbidity Symptoms are Monitored and Maintained or Improved:   Monitor and assess patient's chronic conditions and comorbid symptoms for stability, deterioration, or improvement   Collaborate with multidisciplinary team to address chronic and comorbid conditions and prevent exacerbation or deterioration   Update acute care plan with appropriate goals if chronic or comorbid symptoms are exacerbated and prevent overall improvement and discharge     Problem: Discharge Planning  Goal: Discharge to home or other facility with appropriate resources  12/29/2024 1138 by Jalen Morales  Outcome: Progressing  12/29/2024 0112 by Arlene Zhang, RN  Outcome: Progressing  Flowsheets (Taken 12/28/2024 2000)  Discharge to home or other facility with appropriate resources:   Identify barriers to discharge with patient and caregiver   Arrange for needed discharge resources and transportation as appropriate   Identify discharge learning needs (meds, wound care, etc)   Arrange for interpreters to assist at discharge as needed   Refer to discharge planning if patient needs post-hospital services based on physician order or complex needs related to functional status, cognitive ability or social support system     Problem: Pain  Goal: Verbalizes/displays adequate comfort level or baseline comfort 
  Problem: Safety - Adult  Goal: Free from fall injury  Outcome: Progressing     Problem: Chronic Conditions and Co-morbidities  Goal: Patient's chronic conditions and co-morbidity symptoms are monitored and maintained or improved  12/26/2024 1325 by Dagoberto Garcias RN  Outcome: Progressing  12/26/2024 0635 by Frieda Delgadillo, RN  Outcome: Progressing     Problem: Discharge Planning  Goal: Discharge to home or other facility with appropriate resources  Outcome: Progressing     Problem: Pain  Goal: Verbalizes/displays adequate comfort level or baseline comfort level  Outcome: Progressing     Problem: Skin/Tissue Integrity  Goal: Absence of new skin breakdown  Description: 1.  Monitor for areas of redness and/or skin breakdown  2.  Assess vascular access sites hourly  3.  Every 4-6 hours minimum:  Change oxygen saturation probe site  4.  Every 4-6 hours:  If on nasal continuous positive airway pressure, respiratory therapy assess nares and determine need for appliance change or resting period.  Outcome: Progressing     Problem: ABCDS Injury Assessment  Goal: Absence of physical injury  Outcome: Progressing     
  Problem: Safety - Adult  Goal: Free from fall injury  Outcome: Progressing     Problem: Chronic Conditions and Co-morbidities  Goal: Patient's chronic conditions and co-morbidity symptoms are monitored and maintained or improved  Outcome: Progressing     Problem: Discharge Planning  Goal: Discharge to home or other facility with appropriate resources  Outcome: Progressing     Problem: Pain  Goal: Verbalizes/displays adequate comfort level or baseline comfort level  Outcome: Progressing     Problem: Skin/Tissue Integrity  Goal: Absence of new skin breakdown  Description: 1.  Monitor for areas of redness and/or skin breakdown  2.  Assess vascular access sites hourly  3.  Every 4-6 hours minimum:  Change oxygen saturation probe site  4.  Every 4-6 hours:  If on nasal continuous positive airway pressure, respiratory therapy assess nares and determine need for appliance change or resting period.  Outcome: Progressing     Problem: ABCDS Injury Assessment  Goal: Absence of physical injury  Outcome: Progressing     
  Problem: Safety - Adult  Goal: Free from fall injury  Outcome: Progressing     Problem: Chronic Conditions and Co-morbidities  Goal: Patient's chronic conditions and co-morbidity symptoms are monitored and maintained or improved  Outcome: Progressing     Problem: Discharge Planning  Goal: Discharge to home or other facility with appropriate resources  Outcome: Progressing     Problem: Pain  Goal: Verbalizes/displays adequate comfort level or baseline comfort level  Outcome: Progressing     Problem: Skin/Tissue Integrity  Goal: Absence of new skin breakdown  Description: 1.  Monitor for areas of redness and/or skin breakdown  2.  Assess vascular access sites hourly  3.  Every 4-6 hours minimum:  Change oxygen saturation probe site  4.  Every 4-6 hours:  If on nasal continuous positive airway pressure, respiratory therapy assess nares and determine need for appliance change or resting period.  Outcome: Progressing     Problem: ABCDS Injury Assessment  Goal: Absence of physical injury  Outcome: Progressing     
12/28/2024 2000)  Skin Integrity Remains Intact:   Monitor for areas of redness and/or skin breakdown   Assess vascular access sites hourly   Every 4-6 hours minimum: Change oxygen saturation probe site   Every 4-6 hours: If on nasal continuous positive airway pressure, respiratory therapy assesses nares and determine need for appliance change or resting period  Goal: Incisions, wounds, or drain sites healing without S/S of infection  Outcome: Progressing  Goal: Oral mucous membranes remain intact  Outcome: Progressing     Problem: Musculoskeletal - Adult  Goal: Return mobility to safest level of function  Outcome: Progressing  Goal: Maintain proper alignment of affected body part  Outcome: Progressing  Goal: Return ADL status to a safe level of function  Outcome: Progressing     Problem: Infection - Adult  Goal: Absence of infection at discharge  Outcome: Progressing  Goal: Absence of infection during hospitalization  Outcome: Progressing  Goal: Absence of fever/infection during anticipated neutropenic period  Outcome: Progressing     
insertion sites i.e., indwelling lines, tubes and drains   Monitor endotracheal (as able) and nasal secretions for changes in amount and color   [place] appropriate cooling/warming therapies per order   Administer medications as ordered   Instruct and encourage patient and family to use good hand hygiene technique   Identify and instruct in appropriate isolation precautions for identified infection/condition  Goal: Absence of infection during hospitalization  Outcome: Progressing  Flowsheets (Taken 12/29/2024 2000)  Absence of infection during hospitalization:   Assess and monitor for signs and symptoms of infection   Monitor lab/diagnostic results   Monitor all insertion sites i.e., indwelling lines, tubes and drains   Monitor endotracheal (as able) and nasal secretions for changes in amount and color   [place] appropriate cooling/warming therapies per order   Administer medications as ordered   Instruct and encourage patient and family to use good hand hygiene technique   Identify and instruct in appropriate isolation precautions for identified infection/condition  Goal: Absence of fever/infection during anticipated neutropenic period  Outcome: Progressing  Flowsheets (Taken 12/29/2024 2000)  Absence of fever/infection during anticipated neutropenic period:   Administer growth factors as ordered   Monitor white blood cell count   Implement neutropenic guidelines

## 2024-12-30 NOTE — DISCHARGE SUMMARY
case of any worsening condition please visit Emergency Room.      Discharge Medications:      Medication List        CONTINUE taking these medications      atorvastatin 40 MG tablet  Commonly known as: LIPITOR  TAKE 1 TABLET BY MOUTH DAILY     Biktarvy -25 MG Tabs per tablet  Generic drug: bictegravir-emtricitab-tenofovir alafenamide  Take 1 tablet by mouth daily     * Briefs Overnight Medium Misc  1 box  by Does not apply route daily     * Brief Overnight Large Misc  1 each by Does not apply route 2 times daily at 0800 and 1400 1 Box     divalproex 125 MG DR capsule  Commonly known as: DEPAKOTE SPRINKLE  TAKE 2 CAPSULES BY MOUTH TWICE A DAY     donepezil 10 MG tablet  Commonly known as: ARICEPT  TAKE 1 TABLET IN THE EVENING     Ensure High Protein Liqd  Take 1 Bottle by mouth 3 times daily     finasteride 5 MG tablet  Commonly known as: PROSCAR  TAKE 1 TABLET BY MOUTH DAILY     High Potency Multivitamin Tabs  Take 1 tablet by mouth daily     memantine 10 MG tablet  Commonly known as: NAMENDA  TAKE 1 TABLET BY MOUTH 2 TIMES DAILY     metFORMIN 500 MG tablet  Commonly known as: GLUCOPHAGE  TAKE 1 TABLET BY MOUTH DAILY (WITH BREAKFAST)     multivitamin-minerals Tabs tablet  TAKE 1 TABLET BY MOUTH DAILY     omeprazole 20 MG delayed release capsule  Commonly known as: PRILOSEC  TAKE 1 CAPSULE BY MOUTH DAILY     tamsulosin 0.4 MG capsule  Commonly known as: FLOMAX  TAKE 1 CAPSULE BY MOUTH DAILY           * This list has 2 medication(s) that are the same as other medications prescribed for you. Read the directions carefully, and ask your doctor or other care provider to review them with you.                  No discharge procedures on file.    Time Spent on discharge is  39 mins in patient examination, evaluation, counseling as well as medication reconciliation, prescriptions for required medications, discharge plan and follow up.    Electronically signed by   Zaira Mejias MD  12/30/2024  3:10 PM      Thank you

## 2024-12-30 NOTE — PROCEDURES
EEG REPORT       Patient: Mandeep Iverson Age: 84 y.o.  MRN: [id number]      Referring Provider: No ref. provider found    History: This routine 30 minute scalp EEG was recorded with video- monitoring for a 84 y.o.. male  who presented with encephalopathy. This EEG was performed to evaluate for focal and epileptiform abnormalities.     Mandeep Iverson   Current Facility-Administered Medications   Medication Dose Route Frequency Provider Last Rate Last Admin    valproate (DEPACON) 125 mg in sodium chloride 0.9 % 100 mL IVPB  125 mg IntraVENous Q12H Tracy Heard I, DO   Stopped at 12/27/24 0931    cefTRIAXone (ROCEPHIN) 1,000 mg in sterile water 10 mL IV syringe  1,000 mg IntraVENous Q24H Tracy Heard I, DO   1,000 mg at 12/26/24 1815    sodium chloride flush 0.9 % injection 5-40 mL  5-40 mL IntraVENous 2 times per day Jonelle España PA   10 mL at 12/27/24 0832    sodium chloride flush 0.9 % injection 5-40 mL  5-40 mL IntraVENous PRN Jonelle España PA        0.9 % sodium chloride infusion   IntraVENous PRN Jonelle España PA        potassium chloride (KLOR-CON M) extended release tablet 40 mEq  40 mEq Oral PRN Jonelle España PA        Or    potassium bicarb-citric acid (EFFER-K) effervescent tablet 40 mEq  40 mEq Oral PRN Jonelle España PA        Or    potassium chloride 10 mEq/100 mL IVPB (Peripheral Line)  10 mEq IntraVENous PRN Jonelle España PA        magnesium sulfate 2000 mg in 50 mL IVPB premix  2,000 mg IntraVENous PRN Jonelle España PA        enoxaparin (LOVENOX) injection 40 mg  40 mg SubCUTAneous Daily Jonelle España PA   40 mg at 12/27/24 0827    ondansetron (ZOFRAN-ODT) disintegrating tablet 4 mg  4 mg Oral Q8H PRN Jonelle España PA        Or    ondansetron (ZOFRAN) injection 4 mg  4 mg IntraVENous Q6H PRN Jonelle España PA        polyethylene glycol (GLYCOLAX) packet 17 g  17 g Oral Daily PRN Jonelle España PA        acetaminophen (TYLENOL) tablet 650 mg  650 mg Oral Q6H PRN Jonelle España PA        Or    
consistency: Pureed  Liquid consistency: Thin  Liquid administration via: Spoon;Straw    Medication administration: Meds in puree    Safe Swallow Protocol:  Supervision: 1:1  Compensatory Swallowing Strategies : Alternate solids and liquids;Small bites/sips;Upright as possible for all oral intake;Eat/Feed slowly    Recommendations/Treatment  Requires SLP Intervention: Yes      3-5X/week  Further therapy recommended at discharge.   D/C Recommendations: Ongoing speech therapy is recommended at next level of care;Ongoing speech therapy is recommended during this hospitalization    Recommended Exercises:    Therapeutic Interventions: Diet tolerance monitoring         Education: Images and recommendations were reviewed with pt following this exam.   Patient Education: yes  Patient Education Response: No evidence of learning      Goals:    Long Term:       To Maximize safety with intake, optimize nutrition/hydration and minimize risk for aspiration.      Short Term:    Dysphagia Goals: The patient will tolerate recommended diet without observed clinical signs of aspiration      Oral Preparation / Oral Phase: Impaired     + oral holding, decreased A-P transit with spillover to pyriform with all consistencies tested      Pharyngeal Phase: Impaired     Puree: + spillover to pyriform no penetration no aspiration mild vallec stasis  Thin tsp: + spill to pyriform no penetration no aspiration no stasis  Thin straw: + flash penetration no aspiration no stasis  Nectar tsp:  + spill to pyriform no penetration no aspiration min stasis    Esophageal Phase  Esophageal Screen: WFL        Pain      Pain Level:  (corina)      Therapy Time:   Individual Concurrent Group Co-treatment   Time In  1050         Time Out  1103         Minutes  13                   NERIS Camarillo, 12/30/2024, 1:03 PM    PROCEDURE NOTE  Date: 12/30/2024   Name: Mandeep Iverson  YOB: 1940    Procedures

## 2024-12-31 ENCOUNTER — ENROLLMENT (OUTPATIENT)
Dept: CARE COORDINATION | Age: 84
End: 2024-12-31

## 2024-12-31 ENCOUNTER — CARE COORDINATION (OUTPATIENT)
Dept: CARE COORDINATION | Age: 84
End: 2024-12-31

## 2024-12-31 DIAGNOSIS — R41.82 ALTERED MENTAL STATUS, UNSPECIFIED ALTERED MENTAL STATUS TYPE: Primary | ICD-10-CM

## 2024-12-31 PROCEDURE — 1111F DSCHRG MED/CURRENT MED MERGE: CPT

## 2024-12-31 NOTE — CARE COORDINATION
12/31/2024    0840 Received a call from Yulia with [facility] home care. Requested order for home care be faxed to  [phone]. States she is unsure if they can provide PT/OT and will need to confirm. States they previously provided a nurse and aide service.

## 2024-12-31 NOTE — CARE COORDINATION
causing you to develop an antibiotic  resistant infection.  MANAGING SIDE EFFECTS  Like all medications, antibiotics have side effects, some of which can be serious.  Allergies: If you experience face swelling, or difficulty breathing, seek medical attention immediately.  Diarrhea: Some antibiotics, while treating one infection, may kill some good germs along with the bad. This  allows bacteria like Clostridium difficile (C. difficile) to take over and cause diarrhea. Should you experience  diarrhea or painful stomach cramps, you need to let your physician know immediately as this is serious and  may require treatment.  FUTURE RESISTANT INFECTION  Another serious side effects the risk of getting an antibiotic-resistant infection later. This type of infection is  often more difficult to treat and, in some cases, can lead to serious disability or even death.  Review of patient management of conditions/medications:   Home Health: [facility]  DME: DME went to [facility] for Bedside Commode (P)  (F) [phone]     DME: went to [facility] [place] [facility] for incontinence (P)   (F) [phone]  Referrals: no RPM  Communication with providers: sent request to pcp office for HFU appt    Care Summary Note: Writer spoke to family reviewed discharge instructions and medications, 1111F order completed, patient family reported patient still having some yellow loose stools, still has confusion, is eating and drinking, no c/o fever, chills, n/v, sob or chest pain no rpm, has vns/[facility] with aides services 6 days a week, sent request to pcp office for HFU appt, explained role of CTN provided contact information, will follow//JU    Care Transition Nurse reviewed discharge instructions, medical action plan, and red flags with patient and spouse/partner. The spouse/partner was given an opportunity to ask questions; all questions answered at this time.. The spouse/partner verbalized understanding.   Were discharge instructions

## 2025-01-04 ENCOUNTER — APPOINTMENT (OUTPATIENT)
Dept: VASCULAR LAB | Age: 85
DRG: 189 | End: 2025-01-04
Payer: MEDICARE

## 2025-01-04 ENCOUNTER — APPOINTMENT (OUTPATIENT)
Dept: CT IMAGING | Age: 85
DRG: 189 | End: 2025-01-04
Payer: MEDICARE

## 2025-01-04 ENCOUNTER — APPOINTMENT (OUTPATIENT)
Dept: GENERAL RADIOLOGY | Age: 85
DRG: 189 | End: 2025-01-04
Payer: MEDICARE

## 2025-01-04 ENCOUNTER — HOSPITAL ENCOUNTER (INPATIENT)
Age: 85
LOS: 3 days | Discharge: HOME HEALTH | DRG: 189 | End: 2025-01-07
Attending: EMERGENCY MEDICINE | Admitting: INTERNAL MEDICINE
Payer: MEDICARE

## 2025-01-04 DIAGNOSIS — R09.02 HYPOXIA: Primary | ICD-10-CM

## 2025-01-04 DIAGNOSIS — R06.02 SHORTNESS OF BREATH: ICD-10-CM

## 2025-01-04 DIAGNOSIS — R79.89 ELEVATED D-DIMER: ICD-10-CM

## 2025-01-04 PROBLEM — R13.12 OROPHARYNGEAL DYSPHAGIA: Status: ACTIVE | Noted: 2025-01-04

## 2025-01-04 PROBLEM — J47.0 BRONCHIECTASIS WITH ACUTE LOWER RESPIRATORY INFECTION (HCC): Status: ACTIVE | Noted: 2025-01-04

## 2025-01-04 PROBLEM — J96.01 ACUTE HYPOXIC RESPIRATORY FAILURE: Status: ACTIVE | Noted: 2025-01-04

## 2025-01-04 PROBLEM — J32.9 SINUSITIS: Status: ACTIVE | Noted: 2025-01-04

## 2025-01-04 PROBLEM — J96.22 ACUTE ON CHRONIC RESPIRATORY FAILURE WITH HYPERCAPNIA: Status: ACTIVE | Noted: 2025-01-04

## 2025-01-04 PROBLEM — S22.000A COMPRESSION FRACTURE OF THORACIC VERTEBRA, INITIAL ENCOUNTER (HCC): Status: ACTIVE | Noted: 2025-01-04

## 2025-01-04 PROBLEM — R91.8 PULMONARY NODULES: Status: ACTIVE | Noted: 2025-01-04

## 2025-01-04 LAB
ALBUMIN SERPL-MCNC: 3.8 G/DL (ref 3.5–5.2)
ALBUMIN/GLOB SERPL: 0.8 {RATIO} (ref 1–2.5)
ALP SERPL-CCNC: 131 U/L (ref 40–129)
ALT SERPL-CCNC: 8 U/L (ref 10–50)
AMMONIA PLAS-SCNC: 16 UMOL/L (ref 16–60)
ANION GAP SERPL CALCULATED.3IONS-SCNC: 12 MMOL/L (ref 9–16)
AST SERPL-CCNC: 37 U/L (ref 10–50)
B PARAP IS1001 DNA NPH QL NAA+NON-PROBE: NOT DETECTED
B PERT DNA SPEC QL NAA+PROBE: NOT DETECTED
BACTERIA URNS QL MICRO: ABNORMAL
BASOPHILS # BLD: 0.05 K/UL (ref 0–0.2)
BASOPHILS NFR BLD: 1 % (ref 0–2)
BILIRUB SERPL-MCNC: 0.2 MG/DL (ref 0–1.2)
BILIRUB UR QL STRIP: NEGATIVE
BODY TEMPERATURE: 37
BUN BLD-MCNC: 14 MG/DL (ref 8–26)
BUN SERPL-MCNC: 13 MG/DL (ref 8–23)
C PNEUM DNA NPH QL NAA+NON-PROBE: NOT DETECTED
CA-I BLD-SCNC: 1.31 MMOL/L (ref 1.15–1.33)
CALCIUM SERPL-MCNC: 9.7 MG/DL (ref 8.6–10.4)
CASTS #/AREA URNS LPF: ABNORMAL /LPF (ref 0–8)
CHLORIDE BLD-SCNC: 109 MMOL/L (ref 98–107)
CHLORIDE SERPL-SCNC: 103 MMOL/L (ref 98–107)
CLARITY UR: CLEAR
CO2 BLD CALC-SCNC: 32 MMOL/L (ref 22–30)
CO2 SERPL-SCNC: 26 MMOL/L (ref 20–31)
COHGB MFR BLD: 1.8 % (ref 0–5)
COLOR UR: YELLOW
CREAT SERPL-MCNC: 0.9 MG/DL (ref 0.7–1.2)
CRP SERPL HS-MCNC: 16.7 MG/L (ref 0–5)
D DIMER PPP FEU-MCNC: 1.44 UG/ML FEU (ref 0–0.57)
EGFR, POC: 90 ML/MIN/1.73M2
EOSINOPHIL # BLD: 0.22 K/UL (ref 0–0.44)
EOSINOPHILS RELATIVE PERCENT: 2 % (ref 1–4)
EPI CELLS #/AREA URNS HPF: ABNORMAL /HPF (ref 0–5)
ERYTHROCYTE [DISTWIDTH] IN BLOOD BY AUTOMATED COUNT: 15.1 % (ref 11.8–14.4)
FIO2 ON VENT: ABNORMAL %
FLUAV RNA NPH QL NAA+NON-PROBE: NOT DETECTED
FLUBV RNA NPH QL NAA+NON-PROBE: NOT DETECTED
GFR, ESTIMATED: 83 ML/MIN/1.73M2
GLUCOSE BLD-MCNC: 112 MG/DL (ref 74–100)
GLUCOSE BLD-MCNC: 143 MG/DL (ref 75–110)
GLUCOSE BLD-MCNC: 150 MG/DL (ref 75–110)
GLUCOSE SERPL-MCNC: 115 MG/DL (ref 74–99)
GLUCOSE UR STRIP-MCNC: NEGATIVE MG/DL
HADV DNA NPH QL NAA+NON-PROBE: NOT DETECTED
HCO3 VENOUS: 29.4 MMOL/L (ref 24–30)
HCO3 VENOUS: 32.5 MMOL/L (ref 22–29)
HCOV 229E RNA NPH QL NAA+NON-PROBE: NOT DETECTED
HCOV HKU1 RNA NPH QL NAA+NON-PROBE: NOT DETECTED
HCOV NL63 RNA NPH QL NAA+NON-PROBE: NOT DETECTED
HCOV OC43 RNA NPH QL NAA+NON-PROBE: NOT DETECTED
HCT VFR BLD AUTO: 42.6 % (ref 40.7–50.3)
HCT VFR BLD AUTO: 47 % (ref 41–53)
HGB BLD-MCNC: 13.2 G/DL (ref 13–17)
HGB UR QL STRIP.AUTO: NEGATIVE
HMPV RNA NPH QL NAA+NON-PROBE: NOT DETECTED
HPIV1 RNA NPH QL NAA+NON-PROBE: NOT DETECTED
HPIV2 RNA NPH QL NAA+NON-PROBE: NOT DETECTED
HPIV3 RNA NPH QL NAA+NON-PROBE: NOT DETECTED
HPIV4 RNA NPH QL NAA+NON-PROBE: NOT DETECTED
IMM GRANULOCYTES # BLD AUTO: 0.04 K/UL (ref 0–0.3)
IMM GRANULOCYTES NFR BLD: 0 %
KETONES UR STRIP-MCNC: ABNORMAL MG/DL
LACTIC ACID, SEPSIS WHOLE BLOOD: 3.1 MMOL/L (ref 0.5–1.9)
LACTIC ACID, SEPSIS WHOLE BLOOD: 3.3 MMOL/L (ref 0.5–1.9)
LACTIC ACID, WHOLE BLOOD: 2.7 MMOL/L (ref 0.7–2.1)
LEUKOCYTE ESTERASE UR QL STRIP: NEGATIVE
LYMPHOCYTES NFR BLD: 3.86 K/UL (ref 1.1–3.7)
LYMPHOCYTES RELATIVE PERCENT: 42 % (ref 24–43)
M PNEUMO DNA NPH QL NAA+NON-PROBE: NOT DETECTED
MCH RBC QN AUTO: 27.7 PG (ref 25.2–33.5)
MCHC RBC AUTO-ENTMCNC: 31 G/DL (ref 28.4–34.8)
MCV RBC AUTO: 89.3 FL (ref 82.6–102.9)
MONOCYTES NFR BLD: 0.52 K/UL (ref 0.1–1.2)
MONOCYTES NFR BLD: 6 % (ref 3–12)
NEUTROPHILS NFR BLD: 49 % (ref 36–65)
NEUTS SEG NFR BLD: 4.54 K/UL (ref 1.5–8.1)
NITRITE UR QL STRIP: NEGATIVE
NRBC BLD-RTO: 0 PER 100 WBC
O2 SAT, VEN: 16.7 % (ref 60–85)
O2 SAT, VEN: 49.9 % (ref 60–85)
PCO2 VENOUS: 59.3 MM HG (ref 41–51)
PCO2 VENOUS: 61.4 MM HG (ref 39–55)
PH UR STRIP: 8.5 [PH] (ref 5–8)
PH VENOUS: 7.3 (ref 7.32–7.42)
PH VENOUS: 7.35 (ref 7.32–7.43)
PLATELET # BLD AUTO: 205 K/UL (ref 138–453)
PMV BLD AUTO: 11.8 FL (ref 8.1–13.5)
PO2 VENOUS: 15 MM HG (ref 30–50)
PO2 VENOUS: 30.2 MM HG (ref 30–50)
POC ANION GAP: 8 MMOL/L (ref 7–16)
POC CREATININE: 0.7 MG/DL (ref 0.51–1.19)
POC HEMOGLOBIN (CALC): 16 G/DL (ref 13.5–17.5)
POC LACTIC ACID: 2.9 MMOL/L (ref 0.56–1.39)
POSITIVE BASE EXCESS, VEN: 1.5 MMOL/L (ref 0–2)
POSITIVE BASE EXCESS, VEN: 4.7 MMOL/L (ref 0–3)
POTASSIUM BLD-SCNC: 4.2 MMOL/L (ref 3.5–4.5)
POTASSIUM SERPL-SCNC: 4.8 MMOL/L (ref 3.7–5.3)
PROCALCITONIN SERPL-MCNC: 0.18 NG/ML (ref 0–0.09)
PROT SERPL-MCNC: 8.6 G/DL (ref 6.6–8.7)
PROT UR STRIP-MCNC: NEGATIVE MG/DL
RBC # BLD AUTO: 4.77 M/UL (ref 4.21–5.77)
RBC # BLD: ABNORMAL 10*6/UL
RBC #/AREA URNS HPF: ABNORMAL /HPF (ref 0–4)
RSV RNA NPH QL NAA+NON-PROBE: NOT DETECTED
RV+EV RNA NPH QL NAA+NON-PROBE: NOT DETECTED
SARS-COV-2 RNA NPH QL NAA+NON-PROBE: NOT DETECTED
SODIUM BLD-SCNC: 148 MMOL/L (ref 138–146)
SODIUM SERPL-SCNC: 141 MMOL/L (ref 136–145)
SP GR UR STRIP: 1.02 (ref 1–1.03)
SPECIMEN DESCRIPTION: NORMAL
T4 FREE SERPL-MCNC: 1.2 NG/DL (ref 0.9–1.7)
TROPONIN I SERPL HS-MCNC: 13 NG/L (ref 0–22)
TROPONIN I SERPL HS-MCNC: 14 NG/L (ref 0–22)
TSH SERPL DL<=0.05 MIU/L-ACNC: 0.46 UIU/ML (ref 0.27–4.2)
UROBILINOGEN UR STRIP-ACNC: NORMAL EU/DL (ref 0–1)
VALPROATE SERPL-MCNC: 30 UG/ML (ref 50–125)
WBC #/AREA URNS HPF: ABNORMAL /HPF (ref 0–5)
WBC OTHER # BLD: 9.2 K/UL (ref 3.5–11.3)

## 2025-01-04 PROCEDURE — 93005 ELECTROCARDIOGRAM TRACING: CPT | Performed by: EMERGENCY MEDICINE

## 2025-01-04 PROCEDURE — 85014 HEMATOCRIT: CPT

## 2025-01-04 PROCEDURE — 99285 EMERGENCY DEPT VISIT HI MDM: CPT

## 2025-01-04 PROCEDURE — 82805 BLOOD GASES W/O2 SATURATION: CPT

## 2025-01-04 PROCEDURE — 99223 1ST HOSP IP/OBS HIGH 75: CPT | Performed by: INTERNAL MEDICINE

## 2025-01-04 PROCEDURE — 80051 ELECTROLYTE PANEL: CPT

## 2025-01-04 PROCEDURE — 71260 CT THORAX DX C+: CPT

## 2025-01-04 PROCEDURE — 86140 C-REACTIVE PROTEIN: CPT

## 2025-01-04 PROCEDURE — 6360000002 HC RX W HCPCS

## 2025-01-04 PROCEDURE — 82330 ASSAY OF CALCIUM: CPT

## 2025-01-04 PROCEDURE — 86738 MYCOPLASMA ANTIBODY: CPT

## 2025-01-04 PROCEDURE — 6360000002 HC RX W HCPCS: Performed by: EMERGENCY MEDICINE

## 2025-01-04 PROCEDURE — 5A09457 ASSISTANCE WITH RESPIRATORY VENTILATION, 24-96 CONSECUTIVE HOURS, CONTINUOUS POSITIVE AIRWAY PRESSURE: ICD-10-PCS | Performed by: EMERGENCY MEDICINE

## 2025-01-04 PROCEDURE — 82947 ASSAY GLUCOSE BLOOD QUANT: CPT

## 2025-01-04 PROCEDURE — 80053 COMPREHEN METABOLIC PANEL: CPT

## 2025-01-04 PROCEDURE — 84443 ASSAY THYROID STIM HORMONE: CPT

## 2025-01-04 PROCEDURE — 71045 X-RAY EXAM CHEST 1 VIEW: CPT

## 2025-01-04 PROCEDURE — 6360000004 HC RX CONTRAST MEDICATION: Performed by: EMERGENCY MEDICINE

## 2025-01-04 PROCEDURE — 2580000003 HC RX 258

## 2025-01-04 PROCEDURE — 80164 ASSAY DIPROPYLACETIC ACD TOT: CPT

## 2025-01-04 PROCEDURE — 96367 TX/PROPH/DG ADDL SEQ IV INF: CPT

## 2025-01-04 PROCEDURE — 84439 ASSAY OF FREE THYROXINE: CPT

## 2025-01-04 PROCEDURE — 93970 EXTREMITY STUDY: CPT

## 2025-01-04 PROCEDURE — 2500000003 HC RX 250 WO HCPCS

## 2025-01-04 PROCEDURE — APPSS45 APP SPLIT SHARED TIME 31-45 MINUTES: Performed by: REGISTERED NURSE

## 2025-01-04 PROCEDURE — 2500000003 HC RX 250 WO HCPCS: Performed by: EMERGENCY MEDICINE

## 2025-01-04 PROCEDURE — 82803 BLOOD GASES ANY COMBINATION: CPT

## 2025-01-04 PROCEDURE — 96366 THER/PROPH/DIAG IV INF ADDON: CPT

## 2025-01-04 PROCEDURE — 82140 ASSAY OF AMMONIA: CPT

## 2025-01-04 PROCEDURE — 87086 URINE CULTURE/COLONY COUNT: CPT

## 2025-01-04 PROCEDURE — 82565 ASSAY OF CREATININE: CPT

## 2025-01-04 PROCEDURE — 6370000000 HC RX 637 (ALT 250 FOR IP)

## 2025-01-04 PROCEDURE — 84520 ASSAY OF UREA NITROGEN: CPT

## 2025-01-04 PROCEDURE — 84484 ASSAY OF TROPONIN QUANT: CPT

## 2025-01-04 PROCEDURE — 36415 COLL VENOUS BLD VENIPUNCTURE: CPT

## 2025-01-04 PROCEDURE — 70450 CT HEAD/BRAIN W/O DYE: CPT

## 2025-01-04 PROCEDURE — 94660 CPAP INITIATION&MGMT: CPT

## 2025-01-04 PROCEDURE — 87040 BLOOD CULTURE FOR BACTERIA: CPT

## 2025-01-04 PROCEDURE — 2060000000 HC ICU INTERMEDIATE R&B

## 2025-01-04 PROCEDURE — 96375 TX/PRO/DX INJ NEW DRUG ADDON: CPT

## 2025-01-04 PROCEDURE — 2700000000 HC OXYGEN THERAPY PER DAY

## 2025-01-04 PROCEDURE — 99222 1ST HOSP IP/OBS MODERATE 55: CPT | Performed by: INTERNAL MEDICINE

## 2025-01-04 PROCEDURE — 6370000000 HC RX 637 (ALT 250 FOR IP): Performed by: EMERGENCY MEDICINE

## 2025-01-04 PROCEDURE — 94761 N-INVAS EAR/PLS OXIMETRY MLT: CPT

## 2025-01-04 PROCEDURE — 0202U NFCT DS 22 TRGT SARS-COV-2: CPT

## 2025-01-04 PROCEDURE — 84145 PROCALCITONIN (PCT): CPT

## 2025-01-04 PROCEDURE — 85025 COMPLETE CBC W/AUTO DIFF WBC: CPT

## 2025-01-04 PROCEDURE — 83605 ASSAY OF LACTIC ACID: CPT

## 2025-01-04 PROCEDURE — 72125 CT NECK SPINE W/O DYE: CPT

## 2025-01-04 PROCEDURE — 85379 FIBRIN DEGRADATION QUANT: CPT

## 2025-01-04 PROCEDURE — 2580000003 HC RX 258: Performed by: EMERGENCY MEDICINE

## 2025-01-04 PROCEDURE — 96365 THER/PROPH/DIAG IV INF INIT: CPT

## 2025-01-04 PROCEDURE — 81001 URINALYSIS AUTO W/SCOPE: CPT

## 2025-01-04 PROCEDURE — 94640 AIRWAY INHALATION TREATMENT: CPT

## 2025-01-04 RX ORDER — SODIUM CHLORIDE, SODIUM LACTATE, POTASSIUM CHLORIDE, CALCIUM CHLORIDE 600; 310; 30; 20 MG/100ML; MG/100ML; MG/100ML; MG/100ML
INJECTION, SOLUTION INTRAVENOUS CONTINUOUS
Status: DISCONTINUED | OUTPATIENT
Start: 2025-01-04 | End: 2025-01-06

## 2025-01-04 RX ORDER — POTASSIUM CHLORIDE 1500 MG/1
40 TABLET, EXTENDED RELEASE ORAL PRN
Status: DISCONTINUED | OUTPATIENT
Start: 2025-01-04 | End: 2025-01-07 | Stop reason: HOSPADM

## 2025-01-04 RX ORDER — IPRATROPIUM BROMIDE AND ALBUTEROL SULFATE 2.5; .5 MG/3ML; MG/3ML
1 SOLUTION RESPIRATORY (INHALATION)
Status: DISCONTINUED | OUTPATIENT
Start: 2025-01-04 | End: 2025-01-05

## 2025-01-04 RX ORDER — INSULIN LISPRO 100 [IU]/ML
0-4 INJECTION, SOLUTION INTRAVENOUS; SUBCUTANEOUS
Status: DISCONTINUED | OUTPATIENT
Start: 2025-01-04 | End: 2025-01-07 | Stop reason: HOSPADM

## 2025-01-04 RX ORDER — SODIUM CHLORIDE 0.9 % (FLUSH) 0.9 %
5-40 SYRINGE (ML) INJECTION EVERY 12 HOURS SCHEDULED
Status: DISCONTINUED | OUTPATIENT
Start: 2025-01-04 | End: 2025-01-07 | Stop reason: HOSPADM

## 2025-01-04 RX ORDER — MEMANTINE HYDROCHLORIDE 5 MG/1
10 TABLET ORAL 2 TIMES DAILY
Status: DISCONTINUED | OUTPATIENT
Start: 2025-01-04 | End: 2025-01-07 | Stop reason: HOSPADM

## 2025-01-04 RX ORDER — MULTIVITAMIN WITH IRON
1 TABLET ORAL DAILY
Status: DISCONTINUED | OUTPATIENT
Start: 2025-01-04 | End: 2025-01-07 | Stop reason: HOSPADM

## 2025-01-04 RX ORDER — ONDANSETRON 2 MG/ML
4 INJECTION INTRAMUSCULAR; INTRAVENOUS EVERY 6 HOURS PRN
Status: DISCONTINUED | OUTPATIENT
Start: 2025-01-04 | End: 2025-01-07 | Stop reason: HOSPADM

## 2025-01-04 RX ORDER — DONEPEZIL HYDROCHLORIDE 10 MG/1
10 TABLET, FILM COATED ORAL NIGHTLY
Status: DISCONTINUED | OUTPATIENT
Start: 2025-01-04 | End: 2025-01-07 | Stop reason: HOSPADM

## 2025-01-04 RX ORDER — GUAIFENESIN 600 MG/1
600 TABLET, EXTENDED RELEASE ORAL 2 TIMES DAILY
Status: DISCONTINUED | OUTPATIENT
Start: 2025-01-04 | End: 2025-01-07 | Stop reason: HOSPADM

## 2025-01-04 RX ORDER — ALBUTEROL SULFATE 5 MG/ML
2.5 SOLUTION RESPIRATORY (INHALATION) EVERY 6 HOURS PRN
Status: DISCONTINUED | OUTPATIENT
Start: 2025-01-04 | End: 2025-01-07 | Stop reason: HOSPADM

## 2025-01-04 RX ORDER — IPRATROPIUM BROMIDE AND ALBUTEROL SULFATE 2.5; .5 MG/3ML; MG/3ML
1 SOLUTION RESPIRATORY (INHALATION)
Status: DISCONTINUED | OUTPATIENT
Start: 2025-01-04 | End: 2025-01-07 | Stop reason: HOSPADM

## 2025-01-04 RX ORDER — DIVALPROEX SODIUM 125 MG/1
125 CAPSULE, COATED PELLETS ORAL EVERY 12 HOURS SCHEDULED
Status: DISCONTINUED | OUTPATIENT
Start: 2025-01-04 | End: 2025-01-07 | Stop reason: HOSPADM

## 2025-01-04 RX ORDER — MAGNESIUM SULFATE IN WATER 40 MG/ML
2000 INJECTION, SOLUTION INTRAVENOUS PRN
Status: DISCONTINUED | OUTPATIENT
Start: 2025-01-04 | End: 2025-01-07 | Stop reason: HOSPADM

## 2025-01-04 RX ORDER — ACETAMINOPHEN 650 MG/1
650 SUPPOSITORY RECTAL EVERY 6 HOURS PRN
Status: DISCONTINUED | OUTPATIENT
Start: 2025-01-04 | End: 2025-01-07 | Stop reason: HOSPADM

## 2025-01-04 RX ORDER — ALBUTEROL SULFATE 0.83 MG/ML
2.5 SOLUTION RESPIRATORY (INHALATION)
Status: DISCONTINUED | OUTPATIENT
Start: 2025-01-04 | End: 2025-01-05

## 2025-01-04 RX ORDER — ACETAMINOPHEN 325 MG/1
650 TABLET ORAL EVERY 6 HOURS PRN
Status: DISCONTINUED | OUTPATIENT
Start: 2025-01-04 | End: 2025-01-07 | Stop reason: HOSPADM

## 2025-01-04 RX ORDER — IOPAMIDOL 755 MG/ML
75 INJECTION, SOLUTION INTRAVASCULAR
Status: COMPLETED | OUTPATIENT
Start: 2025-01-04 | End: 2025-01-04

## 2025-01-04 RX ORDER — ONDANSETRON 4 MG/1
4 TABLET, ORALLY DISINTEGRATING ORAL EVERY 8 HOURS PRN
Status: DISCONTINUED | OUTPATIENT
Start: 2025-01-04 | End: 2025-01-07 | Stop reason: HOSPADM

## 2025-01-04 RX ORDER — SODIUM CHLORIDE 9 MG/ML
INJECTION, SOLUTION INTRAVENOUS PRN
Status: DISCONTINUED | OUTPATIENT
Start: 2025-01-04 | End: 2025-01-07 | Stop reason: HOSPADM

## 2025-01-04 RX ORDER — ATORVASTATIN CALCIUM 40 MG/1
40 TABLET, FILM COATED ORAL DAILY
Status: DISCONTINUED | OUTPATIENT
Start: 2025-01-04 | End: 2025-01-07 | Stop reason: HOSPADM

## 2025-01-04 RX ORDER — ENOXAPARIN SODIUM 100 MG/ML
40 INJECTION SUBCUTANEOUS DAILY
Status: DISCONTINUED | OUTPATIENT
Start: 2025-01-04 | End: 2025-01-07 | Stop reason: HOSPADM

## 2025-01-04 RX ORDER — POTASSIUM CHLORIDE 7.45 MG/ML
10 INJECTION INTRAVENOUS PRN
Status: DISCONTINUED | OUTPATIENT
Start: 2025-01-04 | End: 2025-01-07 | Stop reason: HOSPADM

## 2025-01-04 RX ORDER — POLYETHYLENE GLYCOL 3350 17 G/17G
17 POWDER, FOR SOLUTION ORAL DAILY PRN
Status: DISCONTINUED | OUTPATIENT
Start: 2025-01-04 | End: 2025-01-07 | Stop reason: HOSPADM

## 2025-01-04 RX ORDER — SODIUM CHLORIDE 0.9 % (FLUSH) 0.9 %
5-40 SYRINGE (ML) INJECTION PRN
Status: DISCONTINUED | OUTPATIENT
Start: 2025-01-04 | End: 2025-01-07 | Stop reason: HOSPADM

## 2025-01-04 RX ORDER — BUDESONIDE AND FORMOTEROL FUMARATE DIHYDRATE 160; 4.5 UG/1; UG/1
2 AEROSOL RESPIRATORY (INHALATION)
Status: DISCONTINUED | OUTPATIENT
Start: 2025-01-04 | End: 2025-01-07 | Stop reason: HOSPADM

## 2025-01-04 RX ORDER — TAMSULOSIN HYDROCHLORIDE 0.4 MG/1
0.4 CAPSULE ORAL DAILY
Status: DISCONTINUED | OUTPATIENT
Start: 2025-01-04 | End: 2025-01-07 | Stop reason: HOSPADM

## 2025-01-04 RX ADMIN — IOPAMIDOL 75 ML: 755 INJECTION, SOLUTION INTRAVENOUS at 10:42

## 2025-01-04 RX ADMIN — ENOXAPARIN SODIUM 40 MG: 100 INJECTION SUBCUTANEOUS at 16:27

## 2025-01-04 RX ADMIN — METHYLPREDNISOLONE SODIUM SUCCINATE 125 MG: 125 INJECTION INTRAMUSCULAR; INTRAVENOUS at 06:50

## 2025-01-04 RX ADMIN — SODIUM CHLORIDE, POTASSIUM CHLORIDE, SODIUM LACTATE AND CALCIUM CHLORIDE: 600; 310; 30; 20 INJECTION, SOLUTION INTRAVENOUS at 12:22

## 2025-01-04 RX ADMIN — IPRATROPIUM BROMIDE AND ALBUTEROL SULFATE 1 DOSE: .5; 3 SOLUTION RESPIRATORY (INHALATION) at 19:47

## 2025-01-04 RX ADMIN — SODIUM CHLORIDE, POTASSIUM CHLORIDE, SODIUM LACTATE AND CALCIUM CHLORIDE: 600; 310; 30; 20 INJECTION, SOLUTION INTRAVENOUS at 23:25

## 2025-01-04 RX ADMIN — SODIUM CHLORIDE 500 MG: 9 INJECTION, SOLUTION INTRAVENOUS at 10:24

## 2025-01-04 RX ADMIN — WATER 40 MG: 1 INJECTION INTRAMUSCULAR; INTRAVENOUS; SUBCUTANEOUS at 16:27

## 2025-01-04 RX ADMIN — BUDESONIDE AND FORMOTEROL FUMARATE DIHYDRATE 2 PUFF: 160; 4.5 AEROSOL RESPIRATORY (INHALATION) at 19:47

## 2025-01-04 RX ADMIN — IPRATROPIUM BROMIDE AND ALBUTEROL SULFATE 1 DOSE: .5; 3 SOLUTION RESPIRATORY (INHALATION) at 06:42

## 2025-01-04 RX ADMIN — SODIUM CHLORIDE 1500 MG: 0.9 INJECTION, SOLUTION INTRAVENOUS at 08:08

## 2025-01-04 RX ADMIN — PIPERACILLIN AND TAZOBACTAM 3375 MG: 3; .375 INJECTION, POWDER, LYOPHILIZED, FOR SOLUTION INTRAVENOUS at 12:32

## 2025-01-04 RX ADMIN — PIPERACILLIN AND TAZOBACTAM 3375 MG: 3; .375 INJECTION, POWDER, LYOPHILIZED, FOR SOLUTION INTRAVENOUS at 07:25

## 2025-01-04 ASSESSMENT — PAIN SCALES - GENERAL: PAINLEVEL_OUTOF10: 0

## 2025-01-04 NOTE — ED NOTES
Medicine resident at bedside   
Pt presents to the Ed via EMS with c/o respiratory distress.  Pt received Combivent from EMS and was on C-PAP, switched to bi-pap when roomed SPO2 100%.  Pt has hx of dementia and RSV on 12/30/24.  EMS reports absent breath sounds initially, diminished breath sounds auscultated.    Pt is alert, non-verbal, afebrile.  
Report given to MAIK Sepulveda  
The following labs were labeled with appropriate pt sticker and tubed to lab:     [x] Blue     [x] Lavender   [x] on ice  [x] Green/yellow  [x] Green/black [x] on ice  [] Grey  [] on ice  [] Yellow  [] Red  [] Pink  [] Type/ Screen  [] ABG  [] VBG    [] COVID-19 swab    [] Rapid  [] PCR  [] Flu swab  [] Peds Viral Panel     [] Urine Sample  [] Fecal Sample  [] Pelvic Cultures  [x] Blood Cultures  [] X 2  [] STREP Cultures  [] Wound Cultures   
Wife at bedside, states that she was assisting patient out of bed to restroom when she lost control of him, causing him to fall. Wife called EMS for lift assist, but following an assessment, first responders felt it would be best if patient was evaluated in ED due to them saying he wasn't moving much air. Pt was discharge from hospital on 1/2/25 for having RSV and pneumonia. Pt does have dementia and is unable to verbally communicate. Wife states that patient can walk when he wants to.   
within normal limits   LACTIC ACID,POINT OF CARE - Abnormal; Notable for the following components:    POC Lactic Acid 2.9 (*)     All other components within normal limits   POCT GLUCOSE - Abnormal; Notable for the following components:    POC Glucose 112 (*)     All other components within normal limits   CULTURE, BLOOD 1   RESPIRATORY PANEL, MOLECULAR, WITH COVID-19   CULTURE, BLOOD 2   CULTURE, RESPIRATORY (WITH GRAM STAIN)   TROPONIN   TROPONIN   AMMONIA   HGB/HCT   CALCIUM, IONIC (POC)   PREVIOUS SPECIMEN   URINALYSIS WITH REFLEX TO CULTURE   CREATININE W/GFR POINT OF CARE   UREA N (POC)       Electronically signed by Massimo Chambers RN on 1/4/2025 at 11:33 AM

## 2025-01-04 NOTE — ED PROVIDER NOTES
Faculty Sign-Out Attestation  Handoff taken on the following patient from prior Attending Physician: Jordi  Note Started: 7:27 AM EST    I was available and discussed any additional care issues that arose and coordinated the management plans with the resident(s) caring for the patient during my duty period. Any areas of disagreement with resident’s documentation of care or procedures are noted on the chart. I was personally present for the key portions of any/all procedures during my duty period. I have documented in the chart those procedures where I was not present during the key portions.    84-year-old male presented to the emergency department with respiratory distress, currently on BiPAP.  Recent hospital admission for RSV pneumonia.  Pending labs and further evaluation.    Cleve Stoddard DO  Attending Physician        Cleve Stoddard DO  01/04/25 0775

## 2025-01-04 NOTE — ED PROVIDER NOTES
[facility] EMERGENCY DEPARTMENT  Emergency Department Encounter  Emergency Medicine Resident     Pt Name:Mandeep Iverson  MRN: [id number]  Birthdate 1940  Date of evaluation: 1/4/25  PCP:  Pepe Terrazas MD  Note Started: 6:30 AM EST      CHIEF COMPLAINT       Chief Complaint   Patient presents with    Respiratory Distress       HISTORY OF PRESENT ILLNESS  (Location/Symptom, Timing/Onset, Context/Setting, Quality, Duration, Modifying Factors, Severity.)      Mandeep Iverson is a 84 y.o. male who presents with shortness of breath.  Patient's wife called EMS as he slid out of bed and she was having difficulty getting him back into the bed.  When EMS arrived patient was in respiratory distress with retractions and diminished breath sounds bilaterally.  He reportedly was recently admitted for RSV.  Patient does have a history of HIV and is on Biktarvy.  EMS had an oxygen saturation of 84% on room air on arrival.  He was given Combivent and placed on CPAP and brought to the emergency department.  Patient has a history of dementia and is nonverbal.  On arrival patient is cool to the touch.  He was placed on CPAP with improvement to 100%.    PAST MEDICAL / SURGICAL / SOCIAL / FAMILY HISTORY      has a past medical history of Acute delirium, Acute metabolic encephalopathy, Alzheimer's dementia with behavioral disturbance (HCC), Atrophy, cortical, BPH (benign prostatic hyperplasia), Dementia (HCC), Dementia without behavioral disturbance (HCC), GERD (gastroesophageal reflux disease), Hemorrhoids, HIV (human immunodeficiency virus infection) (HCC), Meralgia paraesthetica, Mixed hyperlipidemia, Occasional tremors, Osteoporosis, PVD (peripheral vascular disease) (HCC), Seizure-like activity (HCC), Syphilis in male, and Wears glasses.       has a past surgical history that includes Total hip arthroplasty (Left); Tonsillectomy; Hemorrhoid surgery; sigmoidoscopy (N/A, 11/22/2017); Colonoscopy; hernia repair; and

## 2025-01-04 NOTE — H&P
Eosinophils Absolute 0.22 0.00 - 0.44 k/uL    Basophils Absolute 0.05 0.00 - 0.20 k/uL    Immature Granulocytes Absolute 0.04 0.00 - 0.30 k/uL   Troponin    Collection Time: 01/04/25  6:53 AM   Result Value Ref Range    Troponin, High Sensitivity 14 0 - 22 ng/L   Lactate, Sepsis    Collection Time: 01/04/25  6:53 AM   Result Value Ref Range    Lactic Acid, Sepsis, Whole Blood 3.3 (H) 0.5 - 1.9 mmol/L   Valproic Acid Level, Total    Collection Time: 01/04/25  6:53 AM   Result Value Ref Range    Valproic Acid Lvl 30 (L) 50 - 125 ug/mL   Ammonia    Collection Time: 01/04/25  6:53 AM   Result Value Ref Range    Ammonia 16 16 - 60 umol/L   D-Dimer, Quantitative    Collection Time: 01/04/25  6:53 AM   Result Value Ref Range    D-Dimer, Quant 1.44 (H) 0.00 - 0.57 ug/mL FEU   Blood Culture 1    Collection Time: 01/04/25  7:16 AM    Specimen: Blood   Result Value Ref Range    Specimen Description .BLOOD     Special Requests          Culture NO GROWTH 1 HOUR    Respiratory Panel, Molecular, with COVID-19 (Restricted: peds pts or suitable admitted adults)    Collection Time: 01/04/25  7:52 AM    Specimen: Nasopharyngeal Swab   Result Value Ref Range    Specimen Description .NASOPHARYNGEAL SWAB     Adenovirus PCR Not Detected Not Detected    Coronavirus 229E PCR Not Detected Not Detected    Coronavirus HKU1 PCR Not Detected Not Detected    Coronavirus NL63 PCR Not Detected Not Detected    Coronavirus OC43 PCR Not Detected Not Detected    SARS-CoV-2, PCR Not Detected Not Detected    Human Metapneumovirus PCR Not Detected Not Detected    Rhino/Enterovirus PCR Not Detected Not Detected    Influenza A by PCR Not Detected Not Detected    Influenza B by PCR Not Detected Not Detected    Parainfluenza 1 PCR Not Detected Not Detected    Parainfluenza 2 PCR Not Detected Not Detected    Parainfluenza 3 PCR Not Detected Not Detected    Parainfluenza 4 PCR Not Detected Not Detected    Resp Syncytial Virus PCR Not Detected Not Detected

## 2025-01-04 NOTE — ED PROVIDER NOTES
[facility]     Emergency Department     Faculty Attestation    I performed a history and physical examination of the patient and discussed management with the resident. I have reviewed and agree with the resident’s findings including all diagnostic interpretations, and treatment plans as written. Any areas of disagreement are noted on the chart. I was personally present for the key portions of any procedures. I have documented in the chart those procedures where I was not present during the key portions. I have reviewed the emergency nurses triage note. I agree with the chief complaint, past medical history, past surgical history, allergies, medications, social and family history as documented unless otherwise noted below. Documentation of the HPI, Physical Exam and Medical Decision Making performed by [email or web address] is based on my personal performance of the HPI, PE and MDM. For Physician Assistant/ Nurse Practitioner cases/documentation I have personally evaluated this patient and have completed at least one if not all key elements of the E/M (history, physical exam, and MDM). Additional findings are as noted.    Note Started: 6:43 AM EST     83 yo M ems called for lift assist, pt was sob, sat 80%,   % bipap, non verbal KATELIN, cool to touch,   ----X-ray/ct / lab pending...... day shift signout  EKG Interpretation    Interpreted by me      CRITICAL CARE: There was a high probability of clinically significant/life threatening deterioration in this patient's condition which required my urgent intervention.  Total critical care time was 5 minutes.  This excludes any time for separately reportable procedures.       Mekhi Lemos DO  01/04/25 0645       Mekhi Bacon DO  01/04/25 0758

## 2025-01-05 ENCOUNTER — APPOINTMENT (OUTPATIENT)
Dept: GENERAL RADIOLOGY | Age: 85
DRG: 189 | End: 2025-01-05
Payer: MEDICARE

## 2025-01-05 LAB
ANION GAP SERPL CALCULATED.3IONS-SCNC: 12 MMOL/L (ref 9–16)
ANION GAP SERPL CALCULATED.3IONS-SCNC: 8 MMOL/L (ref 9–16)
BASOPHILS # BLD: 0 K/UL (ref 0–0.2)
BASOPHILS # BLD: <0.03 K/UL (ref 0–0.2)
BASOPHILS NFR BLD: 0 % (ref 0–2)
BASOPHILS NFR BLD: 0 % (ref 0–2)
BODY TEMPERATURE: 37
BODY TEMPERATURE: 37
BUN SERPL-MCNC: 10 MG/DL (ref 8–23)
BUN SERPL-MCNC: 10 MG/DL (ref 8–23)
CALCIUM SERPL-MCNC: 9.1 MG/DL (ref 8.6–10.4)
CALCIUM SERPL-MCNC: 9.3 MG/DL (ref 8.6–10.4)
CHLORIDE SERPL-SCNC: 100 MMOL/L (ref 98–107)
CHLORIDE SERPL-SCNC: 103 MMOL/L (ref 98–107)
CO2 SERPL-SCNC: 25 MMOL/L (ref 20–31)
CO2 SERPL-SCNC: 26 MMOL/L (ref 20–31)
COHGB MFR BLD: 0.3 % (ref 0–5)
COHGB MFR BLD: 0.4 % (ref 0–5)
CREAT SERPL-MCNC: 0.7 MG/DL (ref 0.7–1.2)
CREAT SERPL-MCNC: 0.8 MG/DL (ref 0.7–1.2)
EOSINOPHIL # BLD: 0 K/UL (ref 0–0.44)
EOSINOPHIL # BLD: <0.03 K/UL (ref 0–0.44)
EOSINOPHILS RELATIVE PERCENT: 0 % (ref 1–4)
EOSINOPHILS RELATIVE PERCENT: 0 % (ref 1–4)
ERYTHROCYTE [DISTWIDTH] IN BLOOD BY AUTOMATED COUNT: 15.1 % (ref 11.8–14.4)
ERYTHROCYTE [DISTWIDTH] IN BLOOD BY AUTOMATED COUNT: 15.2 % (ref 11.8–14.4)
FIO2 ON VENT: ABNORMAL %
FIO2 ON VENT: ABNORMAL %
GFR, ESTIMATED: 86 ML/MIN/1.73M2
GFR, ESTIMATED: 90 ML/MIN/1.73M2
GLUCOSE BLD-MCNC: 146 MG/DL (ref 75–110)
GLUCOSE BLD-MCNC: 158 MG/DL (ref 75–110)
GLUCOSE BLD-MCNC: 161 MG/DL (ref 75–110)
GLUCOSE BLD-MCNC: 225 MG/DL (ref 75–110)
GLUCOSE SERPL-MCNC: 133 MG/DL (ref 74–99)
GLUCOSE SERPL-MCNC: 141 MG/DL (ref 74–99)
HCO3 VENOUS: 24.8 MMOL/L (ref 24–30)
HCO3 VENOUS: 29.9 MMOL/L (ref 24–30)
HCT VFR BLD AUTO: 40.8 % (ref 40.7–50.3)
HCT VFR BLD AUTO: 41.4 % (ref 40.7–50.3)
HGB BLD-MCNC: 12.2 G/DL (ref 13–17)
HGB BLD-MCNC: 12.7 G/DL (ref 13–17)
IMM GRANULOCYTES # BLD AUTO: 0.05 K/UL (ref 0–0.3)
IMM GRANULOCYTES # BLD AUTO: 0.2 K/UL (ref 0–0.3)
IMM GRANULOCYTES NFR BLD: 1 %
IMM GRANULOCYTES NFR BLD: 2 %
LACTIC ACID, WHOLE BLOOD: 1.8 MMOL/L (ref 0.7–2.1)
LACTIC ACID, WHOLE BLOOD: 2.4 MMOL/L (ref 0.7–2.1)
LYMPHOCYTES NFR BLD: 1.51 K/UL (ref 1.1–3.7)
LYMPHOCYTES NFR BLD: 1.76 K/UL (ref 1.1–3.7)
LYMPHOCYTES RELATIVE PERCENT: 18 % (ref 24–43)
LYMPHOCYTES RELATIVE PERCENT: 19 % (ref 24–43)
MCH RBC QN AUTO: 27.5 PG (ref 25.2–33.5)
MCH RBC QN AUTO: 27.7 PG (ref 25.2–33.5)
MCHC RBC AUTO-ENTMCNC: 29.5 G/DL (ref 28.4–34.8)
MCHC RBC AUTO-ENTMCNC: 31.1 G/DL (ref 28.4–34.8)
MCV RBC AUTO: 88.9 FL (ref 82.6–102.9)
MCV RBC AUTO: 93.5 FL (ref 82.6–102.9)
MICROORGANISM SPEC CULT: NORMAL
MONOCYTES NFR BLD: 0.15 K/UL (ref 0.1–1.2)
MONOCYTES NFR BLD: 0.39 K/UL (ref 0.1–1.2)
MONOCYTES NFR BLD: 2 % (ref 3–12)
MONOCYTES NFR BLD: 4 % (ref 3–12)
MORPHOLOGY: ABNORMAL
NEGATIVE BASE EXCESS, VEN: 1.2 MMOL/L (ref 0–2)
NEUTROPHILS NFR BLD: 76 % (ref 36–65)
NEUTROPHILS NFR BLD: 78 % (ref 36–65)
NEUTS SEG NFR BLD: 6.28 K/UL (ref 1.5–8.1)
NEUTS SEG NFR BLD: 7.45 K/UL (ref 1.5–8.1)
NRBC BLD-RTO: 0 PER 100 WBC
NRBC BLD-RTO: 0 PER 100 WBC
O2 SAT, VEN: 19 % (ref 60–85)
O2 SAT, VEN: 53.6 % (ref 60–85)
PCO2 VENOUS: 46.9 MM HG (ref 39–55)
PCO2 VENOUS: 53.1 MM HG (ref 39–55)
PH VENOUS: 7.34 (ref 7.32–7.42)
PH VENOUS: 7.37 (ref 7.32–7.42)
PLATELET # BLD AUTO: 143 K/UL (ref 138–453)
PLATELET # BLD AUTO: 196 K/UL (ref 138–453)
PMV BLD AUTO: 10.6 FL (ref 8.1–13.5)
PMV BLD AUTO: 10.6 FL (ref 8.1–13.5)
PO2 VENOUS: 15.3 MM HG (ref 30–50)
PO2 VENOUS: 30.2 MM HG (ref 30–50)
POSITIVE BASE EXCESS, VEN: 3.5 MMOL/L (ref 0–2)
POTASSIUM SERPL-SCNC: 4.8 MMOL/L (ref 3.7–5.3)
POTASSIUM SERPL-SCNC: 5.2 MMOL/L (ref 3.7–5.3)
RBC # BLD AUTO: 4.43 M/UL (ref 4.21–5.77)
RBC # BLD AUTO: 4.59 M/UL (ref 4.21–5.77)
RBC # BLD: ABNORMAL 10*6/UL
SERVICE CMNT-IMP: NORMAL
SODIUM SERPL-SCNC: 137 MMOL/L (ref 136–145)
SODIUM SERPL-SCNC: 137 MMOL/L (ref 136–145)
SPECIMEN DESCRIPTION: NORMAL
VALPROATE SERPL-MCNC: 17 UG/ML (ref 50–125)
WBC OTHER # BLD: 8 K/UL (ref 3.5–11.3)
WBC OTHER # BLD: 9.8 K/UL (ref 3.5–11.3)

## 2025-01-05 PROCEDURE — 2580000003 HC RX 258

## 2025-01-05 PROCEDURE — 6370000000 HC RX 637 (ALT 250 FOR IP)

## 2025-01-05 PROCEDURE — 94761 N-INVAS EAR/PLS OXIMETRY MLT: CPT

## 2025-01-05 PROCEDURE — 94640 AIRWAY INHALATION TREATMENT: CPT

## 2025-01-05 PROCEDURE — 99232 SBSQ HOSP IP/OBS MODERATE 35: CPT | Performed by: INTERNAL MEDICINE

## 2025-01-05 PROCEDURE — 85025 COMPLETE CBC W/AUTO DIFF WBC: CPT

## 2025-01-05 PROCEDURE — 82805 BLOOD GASES W/O2 SATURATION: CPT

## 2025-01-05 PROCEDURE — 2500000003 HC RX 250 WO HCPCS

## 2025-01-05 PROCEDURE — 93970 EXTREMITY STUDY: CPT | Performed by: SURGERY

## 2025-01-05 PROCEDURE — 74230 X-RAY XM SWLNG FUNCJ C+: CPT

## 2025-01-05 PROCEDURE — 83605 ASSAY OF LACTIC ACID: CPT

## 2025-01-05 PROCEDURE — 80164 ASSAY DIPROPYLACETIC ACD TOT: CPT

## 2025-01-05 PROCEDURE — 36415 COLL VENOUS BLD VENIPUNCTURE: CPT

## 2025-01-05 PROCEDURE — 80048 BASIC METABOLIC PNL TOTAL CA: CPT

## 2025-01-05 PROCEDURE — 92611 MOTION FLUOROSCOPY/SWALLOW: CPT

## 2025-01-05 PROCEDURE — 6360000002 HC RX W HCPCS

## 2025-01-05 PROCEDURE — 92610 EVALUATE SWALLOWING FUNCTION: CPT

## 2025-01-05 PROCEDURE — 99233 SBSQ HOSP IP/OBS HIGH 50: CPT | Performed by: INTERNAL MEDICINE

## 2025-01-05 PROCEDURE — 82947 ASSAY GLUCOSE BLOOD QUANT: CPT

## 2025-01-05 PROCEDURE — 2700000000 HC OXYGEN THERAPY PER DAY

## 2025-01-05 PROCEDURE — 94660 CPAP INITIATION&MGMT: CPT

## 2025-01-05 PROCEDURE — 2060000000 HC ICU INTERMEDIATE R&B

## 2025-01-05 RX ADMIN — GUAIFENESIN 600 MG: 600 TABLET, MULTILAYER, EXTENDED RELEASE ORAL at 20:27

## 2025-01-05 RX ADMIN — WATER 40 MG: 1 INJECTION INTRAMUSCULAR; INTRAVENOUS; SUBCUTANEOUS at 01:19

## 2025-01-05 RX ADMIN — IPRATROPIUM BROMIDE AND ALBUTEROL SULFATE 1 DOSE: .5; 3 SOLUTION RESPIRATORY (INHALATION) at 15:07

## 2025-01-05 RX ADMIN — MEMANTINE 10 MG: 5 TABLET ORAL at 20:27

## 2025-01-05 RX ADMIN — DIVALPROEX SODIUM 125 MG: 125 CAPSULE, COATED PELLETS ORAL at 20:36

## 2025-01-05 RX ADMIN — SODIUM CHLORIDE, POTASSIUM CHLORIDE, SODIUM LACTATE AND CALCIUM CHLORIDE: 600; 310; 30; 20 INJECTION, SOLUTION INTRAVENOUS at 10:16

## 2025-01-05 RX ADMIN — BUDESONIDE AND FORMOTEROL FUMARATE DIHYDRATE 2 PUFF: 160; 4.5 AEROSOL RESPIRATORY (INHALATION) at 19:32

## 2025-01-05 RX ADMIN — WATER 40 MG: 1 INJECTION INTRAMUSCULAR; INTRAVENOUS; SUBCUTANEOUS at 13:35

## 2025-01-05 RX ADMIN — CEFEPIME 2000 MG: 2 INJECTION, POWDER, FOR SOLUTION INTRAVENOUS at 15:44

## 2025-01-05 RX ADMIN — BUDESONIDE AND FORMOTEROL FUMARATE DIHYDRATE 2 PUFF: 160; 4.5 AEROSOL RESPIRATORY (INHALATION) at 07:31

## 2025-01-05 RX ADMIN — IPRATROPIUM BROMIDE AND ALBUTEROL SULFATE 1 DOSE: .5; 3 SOLUTION RESPIRATORY (INHALATION) at 19:32

## 2025-01-05 RX ADMIN — IPRATROPIUM BROMIDE AND ALBUTEROL SULFATE 1 DOSE: .5; 3 SOLUTION RESPIRATORY (INHALATION) at 07:30

## 2025-01-05 RX ADMIN — SODIUM CHLORIDE, PRESERVATIVE FREE 10 ML: 5 INJECTION INTRAVENOUS at 20:28

## 2025-01-05 RX ADMIN — DONEPEZIL HYDROCHLORIDE 10 MG: 10 TABLET, FILM COATED ORAL at 20:28

## 2025-01-05 RX ADMIN — IPRATROPIUM BROMIDE AND ALBUTEROL SULFATE 1 DOSE: .5; 3 SOLUTION RESPIRATORY (INHALATION) at 11:55

## 2025-01-05 RX ADMIN — ENOXAPARIN SODIUM 40 MG: 100 INJECTION SUBCUTANEOUS at 08:18

## 2025-01-05 ASSESSMENT — PAIN SCALES - GENERAL
PAINLEVEL_OUTOF10: 1
PAINLEVEL_OUTOF10: 1

## 2025-01-05 NOTE — CARE COORDINATION
01/05/25 1528   Readmission Assessment   Number of Days since last admission? 1-7 days   Previous Disposition Home with Family   Who is being Interviewed Caregiver   What was the patient's/caregiver's perception as to why they think they needed to return back to the hospital? Other (Comment)  (fall)   Did you visit your Primary Care Physician after you left the hospital, before you returned this time? No   Why weren't you able to visit your PCP? Other (Comment);Did not have an appointment  (called 911 after fall)   Did you see a specialist, such as Cardiac, Pulmonary, Orthopedic Physician, etc. after you left the hospital? No   Who advised the patient to return to the hospital? Skilled Unit   Does the patient report anything that got in the way of taking their medications? No   In our efforts to provide the best possible care to you and others like you, can you think of anything that we could have done to help you after you left the hospital the first time, so that you might not have needed to return so soon? Other (Comment)  (no)

## 2025-01-05 NOTE — CARE COORDINATION
Case Management Assessment  Initial Evaluation    Date/Time of Evaluation: 1/5/2025 3:26 PM  Assessment Completed by: Lia Harrison RN    If patient is discharged prior to next notation, then this note serves as note for discharge by case management.    Patient Name: Mandeep Iverson                   YOB: 1940  Diagnosis: Shortness of breath [R06.02]  Hypoxia [R09.02]  Elevated d-dimer [R79.89]  Acute hypoxic respiratory failure [J96.01]                   Date / Time: 1/4/2025  6:23 AM    Patient Admission Status: Inpatient   Readmission Risk (Low < 19, Mod (19-27), High > 27): Readmission Risk Score: 16.6    Current PCP: Pepe Terrazas MD  PCP verified by CM? Yes    Chart Reviewed: Yes      History Provided by: Spouse  Patient Orientation: Other (see comment) (nonverbal)    Patient Cognition: Dementia / Early Alzheimer's    Hospitalization in the last 30 days (Readmission):  Yes    If yes, Readmission Assessment in  Navigator will be completed.    Advance Directives:      Code Status: DNR-CCA   Patient's Primary Decision Maker is: Legal Next of Kin    Primary Decision Maker: Caridad Iverson - Spouse - [phone]    Discharge Planning:    Patient lives with: Spouse/Significant Other Type of Home: Apartment  Primary Care Giver: Spouse  Patient Support Systems include: Spouse/Significant Other   Current Financial resources: Medicare, Medicaid  Current community resources:    Current services prior to admission: Durable Medical Equipment, Home Care            Current DME: Walker            Type of Home Care services:  Aide Services, Nursing Services    ADLS  Prior functional level: (P) Assistance with the following:, Bathing, Dressing, Toileting, Cooking, Housework, Shopping, Mobility, Feeding  Current functional level: (P) Assistance with the following:, Bathing, Dressing, Toileting, Cooking, Housework, Shopping, Mobility, Feeding    PT AM-PAC:   /24  OT AM-PAC:   /24    Family can provide

## 2025-01-05 NOTE — PLAN OF CARE
Problem: Chronic Conditions and Co-morbidities  Goal: Patient's chronic conditions and co-morbidity symptoms are monitored and maintained or improved  Outcome: Progressing     Problem: Discharge Planning  Goal: Discharge to home or other facility with appropriate resources  Outcome: Progressing  Flowsheets (Taken 1/4/2025 1500 by Mame Abreu, RN)  Discharge to home or other facility with appropriate resources:   Identify barriers to discharge with patient and caregiver   Arrange for needed discharge resources and transportation as appropriate   Identify discharge learning needs (meds, wound care, etc)   Refer to discharge planning if patient needs post-hospital services based on physician order or complex needs related to functional status, cognitive ability or social support system     Problem: Pain  Goal: Verbalizes/displays adequate comfort level or baseline comfort level  Outcome: Progressing     Problem: Safety - Adult  Goal: Free from fall injury  Outcome: Progressing     Problem: Skin/Tissue Integrity  Goal: Absence of new skin breakdown  Description: 1.  Monitor for areas of redness and/or skin breakdown  2.  Assess vascular access sites hourly  3.  Every 4-6 hours minimum:  Change oxygen saturation probe site  4.  Every 4-6 hours:  If on nasal continuous positive airway pressure, respiratory therapy assess nares and determine need for appliance change or resting period.  Outcome: Progressing

## 2025-01-05 NOTE — PROCEDURES
INSTRUMENTAL SWALLOW REPORT  MODIFIED BARIUM SWALLOW    NAME: Mandeep Iverson   : 1940  MRN: [id number]       Date of Eval: 2025        Radiologist: Dr. Ruiz    Past Medical History:  has a past medical history of Acute delirium, Acute metabolic encephalopathy, Alzheimer's dementia with behavioral disturbance (HCC), Atrophy, cortical, BPH (benign prostatic hyperplasia), Dementia (HCC), Dementia without behavioral disturbance (HCC), GERD (gastroesophageal reflux disease), Hemorrhoids, HIV (human immunodeficiency virus infection) (HCC), Meralgia paraesthetica, Mixed hyperlipidemia, Occasional tremors, Osteoporosis, PVD (peripheral vascular disease) (HCC), Seizure-like activity (HCC), Syphilis in male, and Wears glasses.  Past Surgical History:  has a past surgical history that includes Total hip arthroplasty (Left); Tonsillectomy; Hemorrhoid surgery; sigmoidoscopy (N/A, 2017); Colonoscopy; hernia repair; and chg urography retrograde with/wo kub (Bilateral, 3/6/2018).    Type of Study: Initial MBS    Patient Complaints/Reason for Referral:  Mandeep Iverson was referred for a MBS to assess the efficiency of his swallow function, assess for aspiration, and to make recommendations regarding safe dietary consistencies, effective compensatory strategies, and safe eating environment.    Behavior/Cognition/Vision/Hearing:  Behavior/Cognition: Alert;Cooperative  Vision: Within Functional Limits  Hearing: Within functional limits    Impressions:  Pt. With no penetration, no aspiration x 2 with puree.  Min vallecula residual noted.  No penetration, no aspiration with thin and nectar thick liquid.  Decreased A-P transit, + oral holding, + swallow delay, + premature spill noted with all consistencies tested.  Pt. Inconsistently able to use straw.  Required cues to swallow bolus.  ST recommends Dysphagia Pureed (Dysphagia I) (IDDS1 Level 4) with thin liquid diet.  Question if pt. Will be able to meet

## 2025-01-06 ENCOUNTER — APPOINTMENT (OUTPATIENT)
Age: 85
DRG: 189 | End: 2025-01-06
Payer: MEDICARE

## 2025-01-06 PROBLEM — Z71.89 GOALS OF CARE, COUNSELING/DISCUSSION: Status: ACTIVE | Noted: 2025-01-06

## 2025-01-06 LAB
ANION GAP SERPL CALCULATED.3IONS-SCNC: 10 MMOL/L (ref 9–16)
BASOPHILS # BLD: 0.03 K/UL (ref 0–0.2)
BASOPHILS NFR BLD: 0 % (ref 0–2)
BODY TEMPERATURE: 37
BUN SERPL-MCNC: 12 MG/DL (ref 8–23)
CALCIUM SERPL-MCNC: 8.9 MG/DL (ref 8.6–10.4)
CHLORIDE SERPL-SCNC: 101 MMOL/L (ref 98–107)
CO2 SERPL-SCNC: 26 MMOL/L (ref 20–31)
COHGB MFR BLD: 0.3 % (ref 0–5)
CREAT SERPL-MCNC: 0.8 MG/DL (ref 0.7–1.2)
EKG ATRIAL RATE: 65 BPM
EKG P AXIS: 55 DEGREES
EKG P-R INTERVAL: 118 MS
EKG Q-T INTERVAL: 418 MS
EKG QRS DURATION: 68 MS
EKG QTC CALCULATION (BAZETT): 434 MS
EKG R AXIS: 69 DEGREES
EKG T AXIS: -2 DEGREES
EKG VENTRICULAR RATE: 65 BPM
EOSINOPHIL # BLD: <0.03 K/UL (ref 0–0.44)
EOSINOPHILS RELATIVE PERCENT: 0 % (ref 1–4)
ERYTHROCYTE [DISTWIDTH] IN BLOOD BY AUTOMATED COUNT: 15.1 % (ref 11.8–14.4)
FIO2 ON VENT: ABNORMAL %
GFR, ESTIMATED: 86 ML/MIN/1.73M2
GLUCOSE BLD-MCNC: 146 MG/DL (ref 75–110)
GLUCOSE BLD-MCNC: 154 MG/DL (ref 75–110)
GLUCOSE BLD-MCNC: 173 MG/DL (ref 75–110)
GLUCOSE BLD-MCNC: 222 MG/DL (ref 75–110)
GLUCOSE SERPL-MCNC: 159 MG/DL (ref 74–99)
HCO3 VENOUS: 26.9 MMOL/L (ref 24–30)
HCT VFR BLD AUTO: 37 % (ref 40.7–50.3)
HGB BLD-MCNC: 11.5 G/DL (ref 13–17)
IMM GRANULOCYTES # BLD AUTO: 0.11 K/UL (ref 0–0.3)
IMM GRANULOCYTES NFR BLD: 1 %
INTERVENTION: NORMAL
LYMPHOCYTES NFR BLD: 1.33 K/UL (ref 1.1–3.7)
LYMPHOCYTES RELATIVE PERCENT: 10 % (ref 24–43)
M PNEUMO IGM SER QL IA: 0.49
MCH RBC QN AUTO: 27.7 PG (ref 25.2–33.5)
MCHC RBC AUTO-ENTMCNC: 31.1 G/DL (ref 28.4–34.8)
MCV RBC AUTO: 89.2 FL (ref 82.6–102.9)
MONOCYTES NFR BLD: 0.39 K/UL (ref 0.1–1.2)
MONOCYTES NFR BLD: 3 % (ref 3–12)
NEUTROPHILS NFR BLD: 86 % (ref 36–65)
NEUTS SEG NFR BLD: 11.82 K/UL (ref 1.5–8.1)
NRBC BLD-RTO: 0 PER 100 WBC
O2 SAT, VEN: 41.5 % (ref 60–85)
PCO2 VENOUS: 50.2 MM HG (ref 39–55)
PH VENOUS: 7.35 (ref 7.32–7.42)
PLATELET # BLD AUTO: 171 K/UL (ref 138–453)
PMV BLD AUTO: 12.1 FL (ref 8.1–13.5)
PO2 VENOUS: 25 MM HG (ref 30–50)
POSITIVE BASE EXCESS, VEN: 0.6 MMOL/L (ref 0–2)
POTASSIUM SERPL-SCNC: 5.3 MMOL/L (ref 3.7–5.3)
RBC # BLD AUTO: 4.15 M/UL (ref 4.21–5.77)
RBC # BLD: ABNORMAL 10*6/UL
S PNEUM AG SPEC QL: NEGATIVE
SODIUM SERPL-SCNC: 137 MMOL/L (ref 136–145)
SPECIMEN SOURCE: NORMAL
WBC OTHER # BLD: 13.7 K/UL (ref 3.5–11.3)

## 2025-01-06 PROCEDURE — 2580000003 HC RX 258

## 2025-01-06 PROCEDURE — 93010 ELECTROCARDIOGRAM REPORT: CPT | Performed by: INTERNAL MEDICINE

## 2025-01-06 PROCEDURE — 6370000000 HC RX 637 (ALT 250 FOR IP)

## 2025-01-06 PROCEDURE — 99222 1ST HOSP IP/OBS MODERATE 55: CPT

## 2025-01-06 PROCEDURE — 6360000002 HC RX W HCPCS

## 2025-01-06 PROCEDURE — 99232 SBSQ HOSP IP/OBS MODERATE 35: CPT | Performed by: INTERNAL MEDICINE

## 2025-01-06 PROCEDURE — 36415 COLL VENOUS BLD VENIPUNCTURE: CPT

## 2025-01-06 PROCEDURE — 94640 AIRWAY INHALATION TREATMENT: CPT

## 2025-01-06 PROCEDURE — 82805 BLOOD GASES W/O2 SATURATION: CPT

## 2025-01-06 PROCEDURE — 2500000003 HC RX 250 WO HCPCS

## 2025-01-06 PROCEDURE — 80048 BASIC METABOLIC PNL TOTAL CA: CPT

## 2025-01-06 PROCEDURE — 85025 COMPLETE CBC W/AUTO DIFF WBC: CPT

## 2025-01-06 PROCEDURE — 87449 NOS EACH ORGANISM AG IA: CPT

## 2025-01-06 PROCEDURE — 82947 ASSAY GLUCOSE BLOOD QUANT: CPT

## 2025-01-06 PROCEDURE — 2060000000 HC ICU INTERMEDIATE R&B

## 2025-01-06 PROCEDURE — 87899 AGENT NOS ASSAY W/OPTIC: CPT

## 2025-01-06 RX ORDER — PREDNISONE 20 MG/1
40 TABLET ORAL 2 TIMES DAILY
Qty: 10 TABLET | Refills: 0 | Status: SHIPPED | OUTPATIENT
Start: 2025-01-06 | End: 2025-01-09

## 2025-01-06 RX ORDER — BUDESONIDE AND FORMOTEROL FUMARATE DIHYDRATE 160; 4.5 UG/1; UG/1
2 AEROSOL RESPIRATORY (INHALATION)
Qty: 10.2 G | Refills: 3 | Status: SHIPPED | OUTPATIENT
Start: 2025-01-06

## 2025-01-06 RX ORDER — GUAIFENESIN 600 MG/1
600 TABLET, EXTENDED RELEASE ORAL 2 TIMES DAILY
Qty: 10 TABLET | Refills: 0 | Status: SHIPPED | OUTPATIENT
Start: 2025-01-06 | End: 2025-01-11

## 2025-01-06 RX ORDER — IPRATROPIUM BROMIDE AND ALBUTEROL SULFATE 2.5; .5 MG/3ML; MG/3ML
3 SOLUTION RESPIRATORY (INHALATION) EVERY 6 HOURS PRN
Qty: 360 ML | Refills: 0 | Status: SHIPPED | OUTPATIENT
Start: 2025-01-06 | End: 2025-02-05

## 2025-01-06 RX ADMIN — GUAIFENESIN 600 MG: 600 TABLET, MULTILAYER, EXTENDED RELEASE ORAL at 22:21

## 2025-01-06 RX ADMIN — BICTEGRAVIR SODIUM, EMTRICITABINE, AND TENOFOVIR ALAFENAMIDE FUMARATE 1 TABLET: 50; 200; 25 TABLET ORAL at 08:04

## 2025-01-06 RX ADMIN — IPRATROPIUM BROMIDE AND ALBUTEROL SULFATE 1 DOSE: .5; 3 SOLUTION RESPIRATORY (INHALATION) at 15:27

## 2025-01-06 RX ADMIN — MEMANTINE 10 MG: 5 TABLET ORAL at 22:21

## 2025-01-06 RX ADMIN — SODIUM CHLORIDE, PRESERVATIVE FREE 10 ML: 5 INJECTION INTRAVENOUS at 22:21

## 2025-01-06 RX ADMIN — ATORVASTATIN CALCIUM 40 MG: 40 TABLET, FILM COATED ORAL at 08:03

## 2025-01-06 RX ADMIN — TAMSULOSIN HYDROCHLORIDE 0.4 MG: 0.4 CAPSULE ORAL at 08:04

## 2025-01-06 RX ADMIN — BUDESONIDE AND FORMOTEROL FUMARATE DIHYDRATE 2 PUFF: 160; 4.5 AEROSOL RESPIRATORY (INHALATION) at 21:26

## 2025-01-06 RX ADMIN — BUDESONIDE AND FORMOTEROL FUMARATE DIHYDRATE 2 PUFF: 160; 4.5 AEROSOL RESPIRATORY (INHALATION) at 11:45

## 2025-01-06 RX ADMIN — GUAIFENESIN 600 MG: 600 TABLET, MULTILAYER, EXTENDED RELEASE ORAL at 08:03

## 2025-01-06 RX ADMIN — IPRATROPIUM BROMIDE AND ALBUTEROL SULFATE 1 DOSE: .5; 3 SOLUTION RESPIRATORY (INHALATION) at 21:26

## 2025-01-06 RX ADMIN — CEFEPIME 2000 MG: 2 INJECTION, POWDER, FOR SOLUTION INTRAVENOUS at 02:09

## 2025-01-06 RX ADMIN — WATER 40 MG: 1 INJECTION INTRAMUSCULAR; INTRAVENOUS; SUBCUTANEOUS at 02:05

## 2025-01-06 RX ADMIN — INSULIN LISPRO 1 UNITS: 100 INJECTION, SOLUTION INTRAVENOUS; SUBCUTANEOUS at 22:24

## 2025-01-06 RX ADMIN — DIVALPROEX SODIUM 125 MG: 125 CAPSULE, COATED PELLETS ORAL at 08:04

## 2025-01-06 RX ADMIN — THERA TABS 1 TABLET: TAB at 08:04

## 2025-01-06 RX ADMIN — MEMANTINE 10 MG: 5 TABLET ORAL at 08:03

## 2025-01-06 RX ADMIN — CEFEPIME 2000 MG: 2 INJECTION, POWDER, FOR SOLUTION INTRAVENOUS at 14:00

## 2025-01-06 RX ADMIN — DONEPEZIL HYDROCHLORIDE 10 MG: 10 TABLET, FILM COATED ORAL at 22:21

## 2025-01-06 RX ADMIN — IPRATROPIUM BROMIDE AND ALBUTEROL SULFATE 1 DOSE: .5; 3 SOLUTION RESPIRATORY (INHALATION) at 11:45

## 2025-01-06 RX ADMIN — ENOXAPARIN SODIUM 40 MG: 100 INJECTION SUBCUTANEOUS at 08:03

## 2025-01-06 RX ADMIN — SODIUM CHLORIDE, PRESERVATIVE FREE 10 ML: 5 INJECTION INTRAVENOUS at 08:12

## 2025-01-06 RX ADMIN — WATER 40 MG: 1 INJECTION INTRAMUSCULAR; INTRAVENOUS; SUBCUTANEOUS at 13:53

## 2025-01-06 RX ADMIN — DIVALPROEX SODIUM 125 MG: 125 CAPSULE, COATED PELLETS ORAL at 22:21

## 2025-01-06 ASSESSMENT — PAIN SCALES - GENERAL
PAINLEVEL_OUTOF10: 0

## 2025-01-06 NOTE — CONSULTS
EXAM:       BP (!) 117/98   Pulse 56   Temp 98 °F (36.7 °C) (Axillary)   Resp 15   Wt 61.3 kg (135 lb 2.3 oz)   SpO2 100%   BMI 21.81 kg/m²       CONSTITUTIONAL: On CPAP in no distress   BACK: No facial grimace and no step-offs or deformities   NEUROLOGIC:  EYE OPENING     Spontaneous - 4 []       To voice - 3 [x]       To pain - 2 []       None - 1 []    VERBAL RESPONSE     Appropriate, oriented - 5 []       Dazed or confused - 4 []       Syllables, expletives - 3 []       Grunts - 2 []       None - 1 [x]    MOTOR RESPONSE     Spontaneous, command - 6 [x]       Localizes pain - 5 []       Withdraws pain - 4 []       Abnormal flexion - 3 []       Abnormal extension - 2 []       None - 1 []            Total GCS: 10    Mental Status:  opens eyes to voice, does not follow commands, does not attempt to nod head to simple questions or verbalize               Motor Exam:    Tone:  normal    Motor exam moves bilat UE spontaneously, withdraws to pain bilat LE          LABS AND IMAGING:     CBC with Differential:    Lab Results   Component Value Date/Time    WBC 9.2 01/04/2025 06:53 AM    RBC 4.77 01/04/2025 06:53 AM    RBC 4.31 04/18/2012 10:00 AM    HGB 13.2 01/04/2025 06:53 AM    HCT 42.6 01/04/2025 06:53 AM     01/04/2025 06:53 AM     04/18/2012 10:00 AM    MCV 89.3 01/04/2025 06:53 AM    MCH 27.7 01/04/2025 06:53 AM    MCHC 31.0 01/04/2025 06:53 AM    RDW 15.1 01/04/2025 06:53 AM    LYMPHOPCT 42 01/04/2025 06:53 AM    MONOPCT 6 01/04/2025 06:53 AM    EOSPCT 2 01/04/2025 06:53 AM    BASOPCT 1 01/04/2025 06:53 AM    MONOSABS 0.52 01/04/2025 06:53 AM    LYMPHSABS 3.86 01/04/2025 06:53 AM    EOSABS 0.22 01/04/2025 06:53 AM    BASOSABS 0.05 01/04/2025 06:53 AM    DIFFTYPE NOT REPORTED 11/14/2021 10:25 AM     BMP:    Lab Results   Component Value Date/Time     01/04/2025 06:53 AM    K 4.8 01/04/2025 06:53 AM     01/04/2025 06:53 AM    CO2 26 01/04/2025 06:53 AM    BUN 13 01/04/2025 06:53 AM    
yes      Assessment        REVIEW OF SYSTEMS  Unable to assess d/t mentation     PHYSICAL ASSESSMENT:  Constitutional: Alert, minimally interactive  Head: Normocephalic and atraumatic.   Eyes: EOM are normal. Pupils are equal, round   Neck: Normal range of motion. Neck supple. No tracheal deviation present.   Cardiovascular: Normal rate and regular rhythm, S1, S2, no murmur   Pulmonary/Chest: Effort normal and breath sounds normal. No rales or wheezes.  Abdomen: Soft. No tenderness, not distended, no ascites, no organomegaly   Musculoskeletal: Normal range of motion. No edema lower ext.   Neurological: CN II-XII grossly intact, no focal neurological deficits   Skin: Normal turgor, no bleeding, no bruising     Palliative Performance Scale:  ___60%  Ambulation reduced; Significant disease; Can't do hobbies/housework; intake normal or reduced; occasional assist; LOC full/confusion  ___50%  Mainly sit/lie; Extensive disease; Can't do any work; Considerable assist; intake normal or reduced; LOC full/confusion  ___40%  Mainly in bed; Extensive disease; Mainly assist; intake normal or reduced; LOC full/confusion   ___30%  Bed Bound; Extensive disease; Total care; intake reduced; LOCfull/confusion  _x__20%  Bed Bound; Extensive disease; Total care; intake minimal; Drowsy/coma  ___10%  Bed Bound; Extensive disease; Total care; Mouth care only; Drowsy/coma  ___0       Death        Thank you for allowing Palliative Care to participate in the care of Mr. Iverson .    This note has been dictated by dragon, typing errors may be a possibility.    The total time I spent in seeing the patient, discussing goals of care, advanced directives, code status and other major issues was more than 65 minutes      Electronically signed by   ERVIN Wilder CNP  Palliative Care Team  on 1/6/2025 at 11:10 AM    Palliative Care can be reached via perfect serve.    
us involved in the care of your patient. Please call us if you have any questions or concerns.    Beck Valentino MD  Internal Medicine Resident, PGY-3  [facility]; [place], [place]  1/4/2025,3:25 PM       This note is created with the assistance of a speech recognition program.  While intent was to generate a document that actually reflects the content of the visit, the document can still have some errors including those of syntax and sound-alike substitutions which may escape proof reading.  It such instances, actual meaning can be extrapolated by contextual diversion.        Attending Physician Statement  I have discussed the care of Mandeep Iverson, including pertinent history and exam findings with the resident. I have reviewed the key elements of all parts of the encounter with the resident. I have seen and examined the patient with the resident.  I agree with the assessment and plan and status of the problem list as documented.    Patient has history of advanced Alzheimer dementia, HIV related cognitive impairment, HIV on Biktarvy, baseline not very functional and per chart noncommunicative, concern for seizure during last admission on Depakote, diabetes mellitus, recent RSV infection.  Patient was recently admitted to the hospital and had extensive workup done including neurology workup, EEG, received antibiotic, dysphagia supposed to be on modified diet and was admitted with altered mental status at that time and was discharged.  Presented this time seems similar presentation with altered mental status but has been short of breath requiring BiPAP with acute hypercapnia with blood gases pCO2 59 and pH of 7.34 with bicarb of 32.5 consistent with acute on chronic respiratory acidosis.    Patient had a CT scan of the chest done which was negative for pulm embolism but showed areas of lung nodule on review of the images there is small nodules present in right upper lobe left lower lobe and left 
amplification.                                                    Results should be used as an adjunct to nosocomial control efforts to identify patients   needing enhanced precautions.    The test is not intended to identify patients with staphylococcal infections.  Results   should not be used to guide or monitor treatment for MRSA infections.         Culture, Respiratory [[id number]]     Order Status: No result Specimen: Sputum Expectorated     Respiratory Panel, Molecular, with COVID-19 (Restricted: peds pts or suitable admitted adults) [[id number]]  (Abnormal) Collected: 12/25/24 1126    Order Status: Completed Specimen: Nasopharyngeal Swab Updated: 12/25/24 1309     Specimen Description .NASOPHARYNGEAL SWAB     Adenovirus PCR Not Detected     Coronavirus 229E PCR Not Detected     Coronavirus HKU1 PCR Not Detected     Coronavirus NL63 PCR Not Detected     Coronavirus OC43 PCR Not Detected     SARS-CoV-2, PCR Not Detected     Human Metapneumovirus PCR Not Detected     Rhino/Enterovirus PCR Not Detected     Influenza A by PCR Not Detected     Influenza B by PCR Not Detected     Parainfluenza 1 PCR Not Detected     Parainfluenza 2 PCR Not Detected     Parainfluenza 3 PCR Not Detected     Parainfluenza 4 PCR Not Detected     Resp Syncytial Virus PCR DETECTED     Bordetella parapertussis by PCR Not Detected     B Pertussis by PCR Not Detected     Chlamydia pneumoniae By PCR Not Detected     Mycoplasma pneumo by PCR Not Detected     Comment: Performed by multiplexed nucleic acid assay.       Culture, Blood 1 [[id number]] Collected: 12/25/24 1100    Order Status: Completed Specimen: Blood Updated: 12/30/24 1158     Specimen Description .BLOOD     Special Requests R FOREARM 10 ML     Culture NO GROWTH 5 DAYS    Culture, Blood 1 [[id number]]  (Abnormal) Collected: 12/25/24 1044    Order Status: Completed Specimen: Blood Updated: 12/28/24 1904     Specimen Description .BLOOD     Special Requests L AC 2 ML     Culture

## 2025-01-06 NOTE — PLAN OF CARE

## 2025-01-06 NOTE — DISCHARGE INSTRUCTIONS
You presented to the hospital after you were found to have a low pulse ox.  You were placed on a BiPAP machine.  Your imaging studies did show pulmonary nodules.  More consistent with infection for which she was started on antibiotics.  You were evaluated by pulmonology who recommends outpatient follow-up for repeat CT studies in 3 months.  You were also noted to have a T11 compression fracture.  You were evaluated by neurosurgery who recommended outpatient follow-up.    If you begin to experience any symptoms such as chest pain shortness of breath nausea vomiting dizziness drowsiness abdominal pain loss of consciousness or any other symptoms you find concerning please come to the ED for follow-up evaluation.    If you have been given medication please take them as prescribed. Do not take more medication than recommended at any given time.     Please follow-up with your primary care provider within 5 to 7 days for continued care.     Please feel free return to the hospital if your symptoms worsen or any new concerning symptoms develop.  Follow-up with your primary care physician as needed for all other the concerns.

## 2025-01-06 NOTE — PLAN OF CARE
Problem: Chronic Conditions and Co-morbidities  Goal: Patient's chronic conditions and co-morbidity symptoms are monitored and maintained or improved  Outcome: Progressing     Problem: Discharge Planning  Goal: Discharge to home or other facility with appropriate resources  Outcome: Progressing  Flowsheets (Taken 1/5/2025 0800 by Mame Abreu, RN)  Discharge to home or other facility with appropriate resources:   Arrange for needed discharge resources and transportation as appropriate   Identify barriers to discharge with patient and caregiver   Identify discharge learning needs (meds, wound care, etc)   Refer to discharge planning if patient needs post-hospital services based on physician order or complex needs related to functional status, cognitive ability or social support system     Problem: Pain  Goal: Verbalizes/displays adequate comfort level or baseline comfort level  Outcome: Progressing     Problem: Safety - Adult  Goal: Free from fall injury  Outcome: Progressing     Problem: Skin/Tissue Integrity  Goal: Absence of new skin breakdown  Description: 1.  Monitor for areas of redness and/or skin breakdown  2.  Assess vascular access sites hourly  3.  Every 4-6 hours minimum:  Change oxygen saturation probe site  4.  Every 4-6 hours:  If on nasal continuous positive airway pressure, respiratory therapy assess nares and determine need for appliance change or resting period.  Outcome: Progressing     Problem: Respiratory - Adult  Goal: Achieves optimal ventilation and oxygenation  1/5/2025 2120 by Gene Philip, RN  Outcome: Progressing     Problem: ABCDS Injury Assessment  Goal: Absence of physical injury  Outcome: Progressing

## 2025-01-06 NOTE — PLAN OF CARE
Problem: Respiratory - Adult  Goal: Achieves optimal ventilation and oxygenation  1/5/2025 1938 by Eli Haney, SENIA  Outcome: Progressing   BRONCHOSPASM/BRONCHOCONSTRICTION     [x]         IMPROVE AERATION/BREATH SOUNDS  [x]   ADMINISTER BRONCHODILATOR THERAPY AS APPROPRIATE  [x]   ASSESS BREATH SOUNDS  []   IMPLEMENT AEROSOL/MDI PROTOCOL  [x]   PATIENT EDUCATION AS NEEDED  NON INVASIVE VENTILATION  PROVIDE OPTIMAL VENTILATION/ACCEPTABLE SP02  IMPLEMENT NON INVASIVE VENTILATION PROTOCOL  ASSESSMENT SKIN INTEGRITY  PATIENT EDUCATION AS NEEDED  BIPAP AS NEEDED

## 2025-01-06 NOTE — PLAN OF CARE
Mandeep Iverson was evaluated today and a DME order was entered for a nebulizer compressor in order to administer Albuterol due to the diagnosis of bronchiectasis.  The need for this equipment and treatment was discussed with the patient and he understands and is in agreement.      Uyen Reich MD  Internal Medicine Resident, PGY-1  [place], [place]  1/6/2025,3:30 PM

## 2025-01-07 VITALS
HEIGHT: 66 IN | HEART RATE: 60 BPM | OXYGEN SATURATION: 100 % | BODY MASS INDEX: 20.3 KG/M2 | SYSTOLIC BLOOD PRESSURE: 150 MMHG | RESPIRATION RATE: 17 BRPM | DIASTOLIC BLOOD PRESSURE: 58 MMHG | TEMPERATURE: 97.3 F | WEIGHT: 126.32 LBS

## 2025-01-07 LAB
ANION GAP SERPL CALCULATED.3IONS-SCNC: 12 MMOL/L (ref 9–16)
BASOPHILS # BLD: <0.03 K/UL (ref 0–0.2)
BASOPHILS NFR BLD: 0 % (ref 0–2)
BUN SERPL-MCNC: 9 MG/DL (ref 8–23)
CALCIUM SERPL-MCNC: 9.4 MG/DL (ref 8.6–10.4)
CHLORIDE SERPL-SCNC: 99 MMOL/L (ref 98–107)
CO2 SERPL-SCNC: 24 MMOL/L (ref 20–31)
CREAT SERPL-MCNC: 0.7 MG/DL (ref 0.7–1.2)
EOSINOPHIL # BLD: <0.03 K/UL (ref 0–0.44)
EOSINOPHILS RELATIVE PERCENT: 0 % (ref 1–4)
ERYTHROCYTE [DISTWIDTH] IN BLOOD BY AUTOMATED COUNT: 15.3 % (ref 11.8–14.4)
GFR, ESTIMATED: 90 ML/MIN/1.73M2
GLUCOSE BLD-MCNC: 141 MG/DL (ref 75–110)
GLUCOSE BLD-MCNC: 161 MG/DL (ref 75–110)
GLUCOSE SERPL-MCNC: 166 MG/DL (ref 74–99)
HCT VFR BLD AUTO: 43.3 % (ref 40.7–50.3)
HGB BLD-MCNC: 12.9 G/DL (ref 13–17)
IMM GRANULOCYTES # BLD AUTO: 0.08 K/UL (ref 0–0.3)
IMM GRANULOCYTES NFR BLD: 1 %
L PNEUMO1 AG UR QL IA.RAPID: NEGATIVE
LYMPHOCYTES NFR BLD: 1.17 K/UL (ref 1.1–3.7)
LYMPHOCYTES RELATIVE PERCENT: 12 % (ref 24–43)
MCH RBC QN AUTO: 27.8 PG (ref 25.2–33.5)
MCHC RBC AUTO-ENTMCNC: 29.8 G/DL (ref 28.4–34.8)
MCV RBC AUTO: 93.3 FL (ref 82.6–102.9)
MONOCYTES NFR BLD: 0.36 K/UL (ref 0.1–1.2)
MONOCYTES NFR BLD: 4 % (ref 3–12)
NEUTROPHILS NFR BLD: 83 % (ref 36–65)
NEUTS SEG NFR BLD: 8.15 K/UL (ref 1.5–8.1)
NRBC BLD-RTO: 0 PER 100 WBC
PLATELET # BLD AUTO: 192 K/UL (ref 138–453)
PMV BLD AUTO: 11.9 FL (ref 8.1–13.5)
POTASSIUM SERPL-SCNC: 5 MMOL/L (ref 3.7–5.3)
RBC # BLD AUTO: 4.64 M/UL (ref 4.21–5.77)
RBC # BLD: ABNORMAL 10*6/UL
SODIUM SERPL-SCNC: 135 MMOL/L (ref 136–145)
WBC OTHER # BLD: 9.8 K/UL (ref 3.5–11.3)

## 2025-01-07 PROCEDURE — 80048 BASIC METABOLIC PNL TOTAL CA: CPT

## 2025-01-07 PROCEDURE — 94761 N-INVAS EAR/PLS OXIMETRY MLT: CPT

## 2025-01-07 PROCEDURE — 6370000000 HC RX 637 (ALT 250 FOR IP)

## 2025-01-07 PROCEDURE — 99232 SBSQ HOSP IP/OBS MODERATE 35: CPT | Performed by: INTERNAL MEDICINE

## 2025-01-07 PROCEDURE — 85025 COMPLETE CBC W/AUTO DIFF WBC: CPT

## 2025-01-07 PROCEDURE — 2580000003 HC RX 258

## 2025-01-07 PROCEDURE — 6360000002 HC RX W HCPCS

## 2025-01-07 PROCEDURE — 36415 COLL VENOUS BLD VENIPUNCTURE: CPT

## 2025-01-07 PROCEDURE — 2500000003 HC RX 250 WO HCPCS

## 2025-01-07 PROCEDURE — 99239 HOSP IP/OBS DSCHRG MGMT >30: CPT | Performed by: INTERNAL MEDICINE

## 2025-01-07 PROCEDURE — 82947 ASSAY GLUCOSE BLOOD QUANT: CPT

## 2025-01-07 PROCEDURE — 94640 AIRWAY INHALATION TREATMENT: CPT

## 2025-01-07 RX ORDER — LEVOFLOXACIN 500 MG/1
500 TABLET, FILM COATED ORAL DAILY
Status: DISCONTINUED | OUTPATIENT
Start: 2025-01-07 | End: 2025-01-07 | Stop reason: HOSPADM

## 2025-01-07 RX ORDER — LEVOFLOXACIN 500 MG/1
500 TABLET, FILM COATED ORAL DAILY
Qty: 5 TABLET | Refills: 0 | Status: SHIPPED | OUTPATIENT
Start: 2025-01-07 | End: 2025-01-12

## 2025-01-07 RX ADMIN — ENOXAPARIN SODIUM 40 MG: 100 INJECTION SUBCUTANEOUS at 10:19

## 2025-01-07 RX ADMIN — IPRATROPIUM BROMIDE AND ALBUTEROL SULFATE 1 DOSE: .5; 3 SOLUTION RESPIRATORY (INHALATION) at 12:12

## 2025-01-07 RX ADMIN — BICTEGRAVIR SODIUM, EMTRICITABINE, AND TENOFOVIR ALAFENAMIDE FUMARATE 1 TABLET: 50; 200; 25 TABLET ORAL at 10:22

## 2025-01-07 RX ADMIN — MEMANTINE 10 MG: 5 TABLET ORAL at 10:17

## 2025-01-07 RX ADMIN — SODIUM CHLORIDE, PRESERVATIVE FREE 10 ML: 5 INJECTION INTRAVENOUS at 10:19

## 2025-01-07 RX ADMIN — ATORVASTATIN CALCIUM 40 MG: 40 TABLET, FILM COATED ORAL at 10:17

## 2025-01-07 RX ADMIN — DIVALPROEX SODIUM 125 MG: 125 CAPSULE, COATED PELLETS ORAL at 10:22

## 2025-01-07 RX ADMIN — THERA TABS 1 TABLET: TAB at 10:17

## 2025-01-07 RX ADMIN — CEFEPIME 2000 MG: 2 INJECTION, POWDER, FOR SOLUTION INTRAVENOUS at 02:32

## 2025-01-07 RX ADMIN — BUDESONIDE AND FORMOTEROL FUMARATE DIHYDRATE 2 PUFF: 160; 4.5 AEROSOL RESPIRATORY (INHALATION) at 07:55

## 2025-01-07 RX ADMIN — WATER 40 MG: 1 INJECTION INTRAMUSCULAR; INTRAVENOUS; SUBCUTANEOUS at 02:27

## 2025-01-07 RX ADMIN — IPRATROPIUM BROMIDE AND ALBUTEROL SULFATE 1 DOSE: .5; 3 SOLUTION RESPIRATORY (INHALATION) at 07:55

## 2025-01-07 ASSESSMENT — PAIN SCALES - WONG BAKER: WONGBAKER_NUMERICALRESPONSE: NO HURT

## 2025-01-07 ASSESSMENT — PAIN SCALES - GENERAL: PAINLEVEL_OUTOF10: 0

## 2025-01-07 NOTE — PLAN OF CARE
Problem: Chronic Conditions and Co-morbidities  Goal: Patient's chronic conditions and co-morbidity symptoms are monitored and maintained or improved  1/7/2025 0034 by Sonya Santa RN  Outcome: Progressing  1/6/2025 1147 by Seema Hearn RN  Outcome: Progressing     Problem: Discharge Planning  Goal: Discharge to home or other facility with appropriate resources  1/7/2025 0034 by Sonya Santa RN  Outcome: Progressing  1/6/2025 1147 by Seema Hearn RN  Outcome: Progressing     Problem: Pain  Goal: Verbalizes/displays adequate comfort level or baseline comfort level  1/7/2025 0034 by Sonya Santa RN  Outcome: Progressing  1/6/2025 1147 by Seema Hearn RN  Outcome: Progressing     Problem: Safety - Adult  Goal: Free from fall injury  1/7/2025 0034 by Sonya Santa RN  Outcome: Progressing  1/6/2025 1147 by Seema Hearn RN  Outcome: Progressing     Problem: Skin/Tissue Integrity  Goal: Absence of new skin breakdown  Description: 1.  Monitor for areas of redness and/or skin breakdown  2.  Assess vascular access sites hourly  3.  Every 4-6 hours minimum:  Change oxygen saturation probe site  4.  Every 4-6 hours:  If on nasal continuous positive airway pressure, respiratory therapy assess nares and determine need for appliance change or resting period.  1/7/2025 0034 by Sonya Santa RN  Outcome: Progressing  1/6/2025 1147 by Seema Hearn RN  Outcome: Progressing     Problem: Respiratory - Adult  Goal: Achieves optimal ventilation and oxygenation  1/7/2025 0034 by Sonya Santa RN  Outcome: Progressing  1/6/2025 2130 by Eli Haney RCP  Outcome: Progressing  Flowsheets (Taken 1/6/2025 1527 by Barb Gonzalez RCP)  Achieves optimal ventilation and oxygenation:   Assess for changes in respiratory status   Assess for changes in mentation and behavior   Position to facilitate oxygenation and minimize respiratory effort   Oxygen supplementation based on oxygen saturation or arterial

## 2025-01-07 NOTE — PLAN OF CARE
Problem: Chronic Conditions and Co-morbidities  Goal: Patient's chronic conditions and co-morbidity symptoms are monitored and maintained or improved  1/7/2025 1426 by Mallory Herr RN  Outcome: Progressing  1/7/2025 0034 by Sonya Santa RN  Outcome: Progressing     Problem: Discharge Planning  Goal: Discharge to home or other facility with appropriate resources  1/7/2025 1426 by Mallory Herr RN  Outcome: Progressing  1/7/2025 0034 by Sonya Santa RN  Outcome: Progressing     Problem: Pain  Goal: Verbalizes/displays adequate comfort level or baseline comfort level  1/7/2025 1426 by Mallory Herr RN  Outcome: Progressing  1/7/2025 0034 by Sonya Santa RN  Outcome: Progressing     Problem: Safety - Adult  Goal: Free from fall injury  1/7/2025 1426 by Mallory Herr RN  Outcome: Progressing  1/7/2025 0034 by Sonya Santa RN  Outcome: Progressing     Problem: Skin/Tissue Integrity  Goal: Absence of new skin breakdown  Description: 1.  Monitor for areas of redness and/or skin breakdown  2.  Assess vascular access sites hourly  3.  Every 4-6 hours minimum:  Change oxygen saturation probe site  4.  Every 4-6 hours:  If on nasal continuous positive airway pressure, respiratory therapy assess nares and determine need for appliance change or resting period.  1/7/2025 1426 by Mallory Herr RN  Outcome: Progressing  1/7/2025 0034 by Sonya Santa RN  Outcome: Progressing     Problem: Respiratory - Adult  Goal: Achieves optimal ventilation and oxygenation  1/7/2025 1426 by Mallory Herr RN  Outcome: Progressing  1/7/2025 0034 by Sonya Santa RN  Outcome: Progressing     Problem: ABCDS Injury Assessment  Goal: Absence of physical injury  1/7/2025 1426 by Mallory Herr RN  Outcome: Progressing  1/7/2025 0034 by Sonya Santa RN  Outcome: Progressing

## 2025-01-07 NOTE — PROGRESS NOTES
PULMONARY & CRITICAL CARE MEDICINE PROGRESS  NOTE     Patient:  Mandeep Iverson  MRN: [id number]  Admit date: 1/4/2025  Primary Care Physician: Pepe Terrazas MD  Consulting Physician: Ana Greene MD  CODE Status: DNR-CCA  LOS: 1    SUBJECTIVE     I personally interviewed/examined the patient, reviewed interval history and interpreted all available radiographic, laboratory data at the time of service.      Chief Compliant/Reason for Initial Consult:     Acute respiratory failure/COPD extubation/lung nodules    Brief Hospital Course:  The patient is a 84 y.o. male with PMHx of dementia, confusion, bronchitis, recent pneumonia, type 2 diabetes mellitus, urinary incontinence presented to ER with chief complaints of respiratory distress.  Patient's wife contacted EMS as the patient slid out of his bed and she had difficulty getting him back.  The patient was in respiratory distress with use of accessory muscles and diminished breath sounds bilaterally as per report.    Of note, patient was recently admitted to hospital due to worsening confusion and was found to have RSV pneumonia and was treated symptomatically.  Neurology was consulted for acute encephalopathy and history of seizure disorder with subtherapeutic Depakote levels on admission and neurology recommended continuing donepezil, memantine, and Depakote to 50 mg twice daily.  There was also concern for aspiration and dysphagia diet with puréed solids and thin liquid consistency was started.  Patient was then discharged.    In ER, patient was hemodynamically stable.  Patient was started on BiPAP.  Initial VBG revealed pH 7.346, pCO2 59.3, pO2 15.0, and HCO3 32.5.  Repeat revealed pH 7.298, pCO2 61.4, pO2 30.2, and HCO3 29.4.  Labs revealed sodium 141, potassium 3.8, normal renal function, lactic acid 3.3, troponins, WBC 9.2, hemoglobin 13.2.  Chest x-ray was done which showed mild right basilar 
                                                                 PULMONARY & CRITICAL CARE MEDICINE PROGRESS  NOTE     Patient:  Mandeep Iverson  MRN: [id number]  Admit date: 1/4/2025  Primary Care Physician: Pepe Terrazas MD  Consulting Physician: Gen Rosales MD  CODE Status: DNR-CCA  LOS: 2    SUBJECTIVE     I personally interviewed/examined the patient, reviewed interval history and interpreted all available radiographic, laboratory data at the time of service.      Chief Compliant/Reason for Initial Consult:     Acute respiratory failure/COPD extubation/lung nodules    Brief Hospital Course:  The patient is a 84 y.o. male with PMHx of dementia, confusion, bronchitis, recent pneumonia, type 2 diabetes mellitus, urinary incontinence presented to ER with chief complaints of respiratory distress.  Patient's wife contacted EMS as the patient slid out of his bed and she had difficulty getting him back.  The patient was in respiratory distress with use of accessory muscles and diminished breath sounds bilaterally as per report.    Of note, patient was recently admitted to hospital due to worsening confusion and was found to have RSV pneumonia and was treated symptomatically.  Neurology was consulted for acute encephalopathy and history of seizure disorder with subtherapeutic Depakote levels on admission and neurology recommended continuing donepezil, memantine, and Depakote to 50 mg twice daily.  There was also concern for aspiration and dysphagia diet with puréed solids and thin liquid consistency was started.  Patient was then discharged.    In ER, patient was hemodynamically stable.  Patient was started on BiPAP.  Initial VBG revealed pH 7.346, pCO2 59.3, pO2 15.0, and HCO3 32.5.  Repeat revealed pH 7.298, pCO2 61.4, pO2 30.2, and HCO3 29.4.  Labs revealed sodium 141, potassium 3.8, normal renal function, lactic acid 3.3, troponins, WBC 9.2, hemoglobin 13.2.  Chest x-ray was done which showed mild right basilar 
    [facility]  Internal Medicine Teaching Residency Program  Inpatient Daily Progress Note  ______________________________________________________________________________    Patient: Mandeep Iverson  YOB: 1940   MRN: [id number]    Acct: [id number]     Room: 2004/2004-01  Admit date: 1/4/2025  Today's date: 01/05/25  Number of days in the hospital: 1  Current Code Status: DNR-CCA   SUBJECTIVE   Admitting Diagnosis: Acute hypoxic respiratory failure  CC: SOB    - Patient seen and examined at bedside. Chart and results reviewed.  -No acute events overnight  -Pt appears more awake today.     BRIEF HISTORY     The patient is a pleasant 84 y.o. male with a past medical history of HIV, diabetes, hyperlipidemia, seizures, nonverbal due to dementia who presented to the ED due to hypoxic.  History is provided by patient's wife at bedside given his mentation.  Wife states patient was slow to get out of bed this morning and had to help him get up to go to the bathroom.  She states that she was weak and he slipped to the floor she was unable to get him up and called EMS.  Upon EMS arrival, he was noted to be satting 84% on room air and was placed on CPAP.  When he arrived to the ED he was subsequently placed on BiPAP and given Solu-Medrol. Of note, patient was recently admitted into the hospital due to RSV pneumonia and was discharged on 12/30.     While in the ED, initial vbg showed PCO2 of 59.2Initial lactic acid 3.3  CMP and CBC unremarkable.Valproic acid 30. Ammonia WNL.Troponin negative x2  Initial d-dimer elevated at 1.44. CT PE shows no evidence of PE but does show multiple bilateral pulmonary nodules and ill-defined right hilar opacity. Findings are concerning for possible metastatic disease. Small right pleural effusion and right lower lobe atelectatic changes. Underlying emphysema. T11 moderate compression injury of indeterminate acuity. CT head does show acute 
   01/04/25 1516   Oxygen Therapy/Pulse Ox   O2 Therapy Oxygen   O2 Device Nasal cannula   O2 Flow Rate (L/min) (S)  3 L/min   Pulse 57   Respirations 15   SpO2 100 %     Pt brought up from ED on NRB, RT assessed pt, placed pt on 3LNC, sat 98%. No resp distress att. BIPAP circuit in room, if needed.  
   01/06/25 1527   Care Plan - Respiratory Goals   Achieves optimal ventilation and oxygenation Assess for changes in respiratory status;Assess for changes in mentation and behavior;Position to facilitate oxygenation and minimize respiratory effort;Oxygen supplementation based on oxygen saturation or arterial blood gases;Encourage broncho-pulmonary hygiene including cough, deep breathe, incentive spirometry;Assess the need for suctioning and aspirate as needed;Assess and instruct to report shortness of breath or any respiratory difficulty;Respiratory therapy support as indicated       
1/5/2025    Patient referred for ACP Planning. Upon entering room patient was sleeping and patient's wife was bedside.  Per patient's wife, patient has an executed HCPOA document already and she was requested by patient's nurse to bring in a copy tomorrow.  Patient's wife indicated document was completed sometime ago and that she will provide medical care team with a copy when she returns. Writer advised patient's wife if she is unable to locate [facility] is able to assist in completing new documents.     01/05/25 1542   Encounter Summary   Encounter Overview/Reason Advance Care Planning   Service Provided For Family   Referral/Consult From Nurse   Support System Spouse   Last Encounter  01/05/25   Complexity of Encounter Low   Begin Time 1415   End Time  1430   Total Time Calculated 15 min   Advance Care Planning   Type ACP conversation       
CLINICAL PHARMACY NOTE: MEDS TO BEDS    Total # of Prescriptions Filled: 1   The following medications were delivered to the patient:  levofloxacin    Additional Documentation:    
CLINICAL PHARMACY NOTE: MEDS TO BEDS    Total # of Prescriptions Filled: 4   The following medications were delivered to the patient:  MUCINEX 600MG  IPRATROPIUM ALBUTEROL   SYMBICORT 160  PREDNISONE 20MG    Additional Documentation:   
Facility/Department: [facility] CAR 2- STEPDOWN   CLINICAL BEDSIDE SWALLOW EVALUATION    NAME: Mandeep Iverson  : 1940  MRN: [id number]    ADMISSION DATE: 2025  ADMITTING DIAGNOSIS: has HIV (human immunodeficiency virus infection) ([facility]); Dementia without behavioral disturbance (HCC); Osteoporosis right hip fracture dec 08; Hemorrhoids; BPH (benign prostatic hyperplasia); GERD (gastroesophageal reflux disease); Mixed hyperlipidemia; Acute cystitis without hematuria; Occasional tremors; PVD (peripheral vascular disease) ([facility]); Seizure-like activity ([facility]); Alzheimer's dementia with behavioral disturbance ([facility]); Atrophy, cortical; Hyponatremia; Elevated d-dimer; Moderate malnutrition ([facility]); COVID; Elevated C-reactive protein (CRP); COPD with acute exacerbation ([facility]); Metabolic encephalopathy; Type 2 diabetes mellitus; Onychomycosis of multiple toenails with type 2 diabetes mellitus ([facility]); RSV (respiratory syncytial virus pneumonia); Altered mental status; Pneumonia of right lower lobe due to infectious organism; ACP (advance care planning); Encounter for palliative care; Severe dementia ([facility]); Acute hypoxic respiratory failure; Sinusitis; Pulmonary nodules; Compression fracture of thoracic vertebra, initial encounter ([facility]); Hypoxia; Shortness of breath; Acute on chronic respiratory failure with hypercapnia; Bronchiectasis with acute lower respiratory infection ([facility]); and Oropharyngeal dysphagia on their problem list.    Date of Eval: 2025  Evaluating Therapist: ANNA HOGAN SLP    Current Diet level:  Current Diet : NPO  Current Liquid Diet : NPO    Primary Complaint  The patient is a pleasant 84 y.o. male with a past medical history of HIV, diabetes, hyperlipidemia, seizures, nonverbal due to dementia who presented to the ED due to hypoxic.  History is provided by patient's wife at bedside given his mentation.  Wife states patient was slow to get out of bed this morning and had to help him get up to go to 
[facility]   Pharmacy Pharmacokinetic Monitoring Service - Vancomycin     Mandeep Iverson is a 84 y.o. male starting on vancomycin therapy for sepsis. Pharmacy consulted by Michael Agosto  for monitoring and adjustment.    Target Concentration: Goal AUC/MARY ALICE 400-600 mg*hr/L    Additional Antimicrobials: Zosyn    Pertinent Laboratory Values:   Wt Readings from Last 1 Encounters:   01/04/25 61.3 kg (135 lb 2.3 oz)     Temp Readings from Last 1 Encounters:   12/30/24 97.7 °F (36.5 °C) (Axillary)     Estimated Creatinine Clearance: 66 mL/min (based on SCr of 0.7 mg/dL).  Recent Labs     01/04/25  0640   CREATININE 0.7     Procalcitonin:     Pertinent Cultures:  Culture Date Source Results        MRSA Nasal Swab:     Plan:  Dosing recommendations based on Bayesian software  Start vancomycin 1500 mg IV x1 dose now, then followed by vancomycin 750 mg IV every 12 hours  Anticipated AUC of 498 and trough concentration of 15.6 at steady state  Renal labs as indicated    Pharmacy will continue to monitor patient and adjust therapy as indicated    Thank you for the consult,  Gene Brown RPH  1/4/2025 6:56 AM    
[facility] -  Progress note  Today's Date and Time: 1/7/2025, 8:07 AM    Impression :   HIV 1 on Biktarvy follows with Dr. Marie outpatient  In June ,Last CD4 count of 604 with a 15% helper cells and CD4/CD8 ratio 0.3  On last visit on 9/25 patient was found to be wheezing and was prescribed Omnicef and doxycycline for 7 days.  On last admission on 12/25:       CD4 % Clay T Cell  27 - 64 % 20 Low     Absolute CD 4 Clay  309 - 1571 /uL 554    WBC  3.5 - 11.0 k/uL 13.2 High  13.2 High  R   Lymphocytes  24 - 44 % 21 Low         HLD  DM on metformin  H/o of Seizures  Recent Admission for RSV pneumonia and acute metabolic encephalopathy along with subtheraputetic depakote levels on admission.  COPD  History of urinary incontinence  Moderate malnutrition  History of dementia on donepezil and memantine.    Recommendations:   Continue Biktarvy  If patient is unable to tolerate PO continue as dissolved through NG tube  Patient on Cefepime   Respiratory panel negative.  Patient afebrile, WBC count 9.8.  Patient off oxygen and saturating well  Discontinue Cefepime . Stop date 1/7/25  Patient can be discharged on Levaquin 500 mg a day for 5 days  D/C medication reconciled    Medical Decision Making/Summary/Discussion:1/7/2025       Infection Control Recommendations   [place] Precautions  Contact isolation    Antimicrobial Stewardship Recommendations     Simplification of therapy  Targeted therapy    Coordination of Outpatient Care:   Estimated Length of IV antimicrobials:TBD  Patient will need Midline Catheter Insertion: TBD  Patient will need PICC line Insertion:TBD  Patient will need: Home IV , Infusion Center,  SNF,  LTAC: TBD  Patient will need outpatient wound care: no     Chief complaint/reason for consultation:   History of HIV on Biktarvy, recent admission for RSV pneumonia.  Sinusitis on vancomycin and Zosyn.      History of Present Illness:   Mandeep Iverson is a 84 
Placed pt bk on bipap per Dr. Terrazas. Pt tolerating well.  
Writer went over discharge instructions with patient and patient's wife. Comments,questions, concerns addressed. Writer removed IV, dressed patient. Patient wheeled out by Eliza mendoza. Patient and wife with all belongings  
SIGNIFICANT GROWTH 01/04/2025 06:49 PM       Imaging:    CT CHEST PULMONARY EMBOLISM W CONTRAST    Result Date: 1/4/2025  1. No evidence of pulmonary embolism. 2. Multiple bilateral pulmonary nodules and ill-defined right hilar opacity. Findings are concerning for possible metastatic disease. 3. Small right pleural effusion and right lower lobe atelectatic changes. 4. Underlying emphysema. 5. T11 moderate compression injury of indeterminate acuity. 6. Prominent main pulmonary artery measuring 3.1 cm. This can be seen in the setting of pulmonary arterial hypertension. RECOMMENDATIONS: Pathology: Multiple pulmonary nodules. Most significant: Suspicious right solid pulmonary nodule measuring 19 mm.Per Fleischner Society Guidelines, consider a non-contrast Chest CT at 3 months, a PET/CT, or tissue sampling.These guidelines do not apply to immunocompromised patients and patients with cancer. Follow up in patients with significant comorbidities as clinically warranted. For lung cancer screening, adhere to Lung-RADS guidelines. Reference: Radiology. 2017; 284(1):228-43.     CT CERVICAL SPINE WO CONTRAST    Result Date: 1/4/2025  No acute abnormality of the cervical spine.     CT HEAD WO CONTRAST    Result Date: 1/4/2025  1. No acute intracranial findings. 2. Volume loss and chronic microvascular ischemic changes as noted on the prior study. 3. Paranasal sinus disease as described above with an air-fluid level in the sphenoid sinus which could represent acute sinusitis.     XR CHEST PORTABLE    Result Date: 1/4/2025  Mild right basilar atelectasis. No acute consolidation or pulmonary edema.     FL MODIFIED BARIUM SWALLOW W VIDEO    Result Date: 12/30/2024  No laryngeal penetration or aspiration of administered consistencies however patient holds administered bolus for prolonged time in the oral cavity prior to swallowing. Please see separate speech pathology report for full discussion of findings and recommendations.     XR 
12/29/2024  Right basilar atelectasis/infiltrate without significant change.       ASSESSMENT & PLAN     ASSESSMENT / PLAN:   Principal Problem:    Acute hypoxic respiratory failure  Active Problems:    COPD with acute exacerbation (HCC)    HIV (human immunodeficiency virus infection) (HCC)    BPH (benign prostatic hyperplasia)    Mixed hyperlipidemia    Alzheimer's dementia with behavioral disturbance (HCC)    Elevated d-dimer    Type 2 diabetes mellitus    Sinusitis    Pulmonary nodules    Compression fracture of thoracic vertebra, initial encounter ([facility])    Hypoxia    Shortness of breath    Acute on chronic respiratory failure with hypercapnia    Bronchiectasis with acute lower respiratory infection ([facility])    Oropharyngeal dysphagia    Goals of care, counseling/discussion  Resolved Problems:    * No resolved hospital problems. *     Acute hypoxic respiratory failure  Likely secondary to emphysema   -Pt sating 100% on RA.  -Pt DNR-CCA DNI  -Continue on Symbicort, Mucinex, Duoneb, Spirivia, IV solu-medol  -No need for abx at this time. Presentation likely secondary to emphysema. Recommend OP f/u with pulm for repeat imaging studies.     DNR-CCA DNI  -Palliative care on board.    H/o HIV  -Cont Biktarvy  -Recent CD4 count on 12/25 was 500  -ID on board     Multiple pulmonary nodules  -Pulm on board, recommends OP f.u for repeat imaging  -Low suspicion for mets at this time, believes more likely due to infection vs. Inflammation.      New T11 compression fractures  -Seen on CT  -Neurosurgery consulted, believes this to be old and signed off. No further recommendations.     Dementia  -Continue home medications of Donepezil and Memantine.      H/o seizures  -Continue Depakote     Elevated d-dimer  -CT PE negative for PE  -Vascular duplex ordered negative.     HLD  -Continue Statin     H/o DM  -Glucose well controlled here.   -Add LDSS  -Add Lantus as indicated  -Cont to monitor     DVT ppx: Lovenox  Diet: ADULT DIET; 
      Echocardiogram:   No results found for this or any previous visit.      Cardiac Catheterization:   No results found for this or any previous visit.      ASSESSMENT AND PLAN     Assessment:    Acute hypoxic respiratory failure improved.  Likely chronic hypercapnia with compensation.  Pulmonary emphysema/COPD severity not known significant changes present on CT.  Bilateral small pulmonary nodules.  Right midlung and right lower lobe opacity infectious or inflammatory.  Bilateral lower lobe bronchiectasis.  Baseline Alzheimer dementia.  HIV related cognitive impairment.  History of seizures on Depakote.  Dysphagia.  Likely recurrent aspiration.    Discussion:  CT scan of the chest done which was negative for pulm embolism but showed areas of lung nodule on review of the images there is small nodules present in right upper lobe left lower lobe and left upper lobe peripherally and they are very small.  There are other areas of nodules/peribronchial-itis and bronchiectasis present in lower lobes especially left lower lobe and right lower lobe some of those are filled with mucous and look like nodules.  There is an opacity present in right lower lobe measured to be 9 mm soft tissue and likely inflammatory.  There is another area in the right upper lobe which is also an opacity in the right midlung which also look like inflammatory especially with recent RSV infection there is no previous CT scan to compare with.  He has severe centrilobular and panlobular emphysematous changes present.  Doubt that patient has metastatic disease the nodules are very small cannot be biopsied, patient has advanced dementia, HIV, severe emphysema advanced age and poor functional status does not understand the diagnosis the nodules and not a candidate for biopsy if desired CT scan of the chest can be done in 3 months for follow-up as patient has recent infection and he will likely have recurrent aspiration as bronchiectasis present in 
   Tobacco comments:      cigerettes per day   Vaping Use    Vaping status: Never Used   Substance and Sexual Activity    Alcohol use: Not Currently     Alcohol/week: 4.0 standard drinks of alcohol     Types: 4 Cans of beer per week    Drug use: Not Currently    Sexual activity: Not Currently     Partners: Female   Other Topics Concern    Not on file   Social History Narrative    Not on file     Social Determinants of Health     Financial Resource Strain: Low Risk  (5/9/2024)    Overall Financial Resource Strain (CARDIA)     Difficulty of Paying Living Expenses: Not very hard   Food Insecurity: No Food Insecurity (12/25/2024)    Hunger Vital Sign     Worried About Running Out of Food in the Last Year: Never true     Ran Out of Food in the Last Year: Never true   Transportation Needs: No Transportation Needs (12/25/2024)    PRAPARE - Transportation     Lack of Transportation (Medical): No     Lack of Transportation (Non-Medical): No   Physical Activity: Inactive (11/2/2023)    Exercise Vital Sign     Days of Exercise per Week: 0 days     Minutes of Exercise per Session: 0 min   Stress: No Stress Concern Present (10/22/2019)    [place] [employer] - Occupational Stress Questionnaire     Feeling of Stress : Not at all   Social Connections: Socially Integrated (10/22/2019)    Social Connection and Isolation Panel [NHANES]     Frequency of Communication with Friends and Family: More than three times a week     Frequency of Social Gatherings with Friends and Family: Once a week     Attends Sabianism Services: More than 4 times per year     Active Member of Clubs or Organizations: Yes     Attends Club or Organization Meetings: More than 4 times per year     Marital Status:    Intimate Partner Violence: Not on file   Housing Stability: Low Risk  (12/25/2024)    Housing Stability Vital Sign     Unable to Pay for Housing in the Last Year: No     Number of Times Moved in the Last Year: 1     
12/29/2024 12:59 pm COMPARISON: 12/26/2024 HISTORY: ORDERING SYSTEM PROVIDED HISTORY: f/u infiltrates, SOB TECHNOLOGIST PROVIDED HISTORY: f/u infiltrates, SOB FINDINGS: The cardiac and mediastinal contours appear unchanged. Streaky opacities in the right lung base appear without significant change.  No new airspace disease identified in the interval.  No evidence for pneumothorax.     Right basilar atelectasis/infiltrate without significant change.       Medical Decision Lnmeve-Ungdfdfc-Fxumb:     Results       Procedure Component Value Units Date/Time    Strep Pneumoniae Antigen [[id number]] Collected: 01/06/25 0829    Order Status: Completed Specimen: Urine, clean catch Updated: 01/06/25 0923     Source .URINE     Strep pneumo Ag NEGATIVE     Comment: Strep pneumoniae antigen not detected       LEGIONELLA ANTIGEN, URINE [[id number]] Collected: 01/06/25 0829    Order Status: Sent Specimen: Urine Updated: 01/06/25 0829    Culture, Urine [[id number]] Collected: 01/04/25 1849    Order Status: Completed Specimen: Urine, clean catch Updated: 01/05/25 1620     Specimen Description .CLEAN CATCH URINE     Special Requests Site: Urine     Culture NO SIGNIFICANT GROWTH    Infectious Disease Intervention [[id number]] Collected: 01/04/25 1600    Order Status: Completed Updated: 01/06/25 1118     Intervention De-escalation    LEGIONELLA ANTIGEN, URINE [[id number]] Collected: 01/04/25 1500    Order Status: Canceled Specimen: Urine     Strep Pneumoniae Antigen [[id number]] Collected: 01/04/25 1500    Order Status: Canceled Specimen: Urine, clean catch     Culture, Urine [[id number]] Collected: 01/04/25 1345    Order Status: Canceled Specimen: Urine, clean catch     Respiratory Panel, Molecular, with COVID-19 (Restricted: peds pts or suitable admitted adults) [[id number]] Collected: 01/04/25 0752    Order Status: Completed Specimen: Nasopharyngeal Swab Updated: 01/04/25 0847     Specimen Description .NASOPHARYNGEAL SWAB

## 2025-01-07 NOTE — DISCHARGE INSTR - COC
support    Rehab Therapies: Physical Therapy and Occupational Therapy  Weight Bearing Status/Restrictions: No weight bearing restrictions  Other Medical Equipment (for information only, NOT a DME order):  n/a  Other Treatments: n/a    Patient's personal belongings (please select all that are sent with patient):  None    RN SIGNATURE:  Electronically signed by Mallory Herr RN on 1/7/25 at 1:26 PM EST    CASE MANAGEMENT/SOCIAL WORK SECTION    Inpatient Status Date: ***    Readmission Risk Assessment Score:  [facility] RISK OF UNPLANNED READMISSION 2.0             17.2 Total Score        Discharging to Facility/ Agency   Name: [facility]   Address:  Phone:  Fax:    Dialysis Facility (if applicable)   Name:  Address:  Dialysis Schedule:  Phone:  Fax:    / signature: Electronically signed by Liseth Martines RN on 1/7/25 at 10:19 AM EST    PHYSICIAN SECTION    Prognosis: {Prognosis:9031323086}    Condition at Discharge: { Patient Condition:844945218}    Rehab Potential (if transferring to Rehab): {Prognosis:7467696212}    Recommended Labs or Other Treatments After Discharge: ***    Physician Certification: I certify the above information and transfer of Mandeep Iverson  is necessary for the continuing treatment of the diagnosis listed and that he requires {Admit to Appropriate Level of Care:66865} for {GREATER/LESS:152475977} 30 days.     Update Admission H&P: {CHP DME Changes in HandP:216044887}    PHYSICIAN SIGNATURE:  {Esignature:625326658}

## 2025-01-07 NOTE — PLAN OF CARE
Problem: Respiratory - Adult  Goal: Achieves optimal ventilation and oxygenation  1/6/2025 2130 by Eli Haney, RCJOAQUIN  Outcome: Progressing  BRONCHOSPASM/BRONCHOCONSTRICTION     [x]         IMPROVE AERATION/BREATH SOUNDS  [x]   ADMINISTER BRONCHODILATOR THERAPY AS APPROPRIATE  [x]   ASSESS BREATH SOUNDS  []   IMPLEMENT AEROSOL/MDI PROTOCOL  [x]   PATIENT EDUCATION AS NEEDED

## 2025-01-07 NOTE — CARE COORDINATION
TRANSITIONAL CARE/DISCHARGE PLANNING ONGOING EVALUATION      Reason for Admission: Shortness of breath [R06.02]  Hypoxia [R09.02]  Elevated d-dimer [R79.89]  Acute hypoxic respiratory failure [J96.01]     Hospital day:3    Patient goals/Transitional Plan:    Spoke with patient's wife about transition and discharge planning, plan remains home with [facility], orders and clinicals sent to [facility] per request, wife stated she has [facility], [facility], stated would like home care, PT/OT from same company if available.    Spoke with [facility], will need official referral to run insurance for PT/O, however will continue aide service reguardless.    Spoke with patients wife she is comfortable and feels safe taking patient home.        [facility] RISK OF UNPLANNED READMISSION 2.0             17.2 Total Score      1200 Spoke with [facility] unable to provide PT/ OT at this time, however will continue HHA    1300 Spoke with patients wife, stated she would prefer [facility]. Referral sent     Spoke with Debbie from [facility] able to accept

## 2025-01-08 ENCOUNTER — CARE COORDINATION (OUTPATIENT)
Dept: CARE COORDINATION | Age: 85
End: 2025-01-08

## 2025-01-08 DIAGNOSIS — J96.01 ACUTE HYPOXIC RESPIRATORY FAILURE: Primary | ICD-10-CM

## 2025-01-08 PROCEDURE — 1111F DSCHRG MED/CURRENT MED MERGE: CPT

## 2025-01-08 NOTE — CARE COORDINATION
Care Transitions Note    Initial Call - Call within 2 business days of discharge: Yes    Patient Current Location:  Home: [address] [place] [id number]    Care Transition Nurse contacted the spouse/partner  by telephone to perform post hospital discharge assessment, verified name and  as identifiers. Provided introduction to self, and explanation of the Care Transition Nurse role.     Patient: Mandeep Iverson    Patient : 1940   MRN: [id number]    Reason for Admission: Respiratory Distress  Discharge Date: 25  RURS: Readmission Risk Score: 17.9      Last Discharge Facility       Date Complaint Diagnosis Description Type Department Provider    25 Respiratory Distress Hypoxia ... ED to Hosp-Admission (Discharged) (ADMITTED) [facility] 2 Gen Rosales MD; Mekhi Bacon...            Was this an external facility discharge? No    Additional needs identified to be addressed with provider   High priority:    *[facility] Hospital Follow-up    Discharge date: 25    Discharge hospital: [facility]    Diagnosis: hypoxia    Initial appointment date offered:   Reason patient wasn't scheduled:     Patient needs: needs HFU appt    *Please schedule within 7 days of discharge and contact the patient.             Method of communication with provider: chart routing.    Patients top risk factors for readmission: functional cognitive ability, functional physical ability, falls, medication management, support system, and utilization of services    Interventions to address risk factors:   Education:   If you have a bacterial infection, your doctor may prescribe an antibiotic. Antibiotics are powerful drugs and  when used correctly, they can save lives. But like all medications, they can have side effects.  FINISH TAKING ALL YOUR ANTIBIOTICS  Feeling better doesn't mean your infection is gone and if you stop taking your antibiotics without your  physician's direction, your infection could still be active in your body, causing

## 2025-01-08 NOTE — DISCHARGE SUMMARY
Patient was evaluated by infectious disease and pulmonology.  Pulmonology felt that this was less likely to be metastatic disease but rather secondary to bronchiectasis.  Patient had significant improvement in his oxygenation and mentation.  He was able to tolerated diet and was resting comfortably on room air satting 100%.  His CT C-spine also showed a T11 compression fracture he was evaluated by neurosurgery who felt like this was a chronic issue.  Patient was given outpatient follow-up with pulmonology to follow-up on his nodules and neurosurgery to follow-up on CT spine findings.      Procedures/ Significant Interventions:    None    Consults:     Consults:     Final Specialist Recommendations/Findings:   PHARMACY TO DOSE VANCOMYCIN  IP CONSULT TO INTERNAL MEDICINE  IP CONSULT TO INFECTIOUS DISEASES  IP CONSULT TO PULMONOLOGY  IP CONSULT TO NEUROSURGERY  IP CONSULT TO PALLIATIVE CARE  IP CONSULT TO SPIRITUAL SERVICES      Any Hospital Acquired Infections: none    Discharge Functional Status:  stable    DISCHARGE PLAN     Disposition: Home or Self Care      Patient Instructions:   Discharge Medication List as of 1/7/2025  1:39 PM        START taking these medications    Details   levoFLOXacin (LEVAQUIN) 500 MG tablet Take 1 tablet by mouth daily for 5 days, Disp-5 tablet, R-0Normal      predniSONE (DELTASONE) 20 MG tablet Take 2 tablets by mouth 2 times daily for 3 days, Disp-10 tablet, R-0Normal      guaiFENesin (MUCINEX) 600 MG extended release tablet Take 1 tablet by mouth 2 times daily for 5 days, Disp-10 tablet, R-0Normal      ipratropium 0.5 mg-albuterol 2.5 mg (DUONEB) 0.5-2.5 (3) MG/3ML SOLN nebulizer solution Inhale 3 mLs into the lungs every 6 hours as needed for Shortness of Breath, Disp-360 mL, R-0Normal      budesonide-formoterol (SYMBICORT) 160-4.5 MCG/ACT AERO Inhale 2 puffs into the lungs in the morning and 2 puffs in the evening., Disp-10.2 g, R-3Normal           CONTINUE these medications which

## 2025-01-09 LAB
MICROORGANISM SPEC CULT: NORMAL
SERVICE CMNT-IMP: NORMAL
SPECIMEN DESCRIPTION: NORMAL

## 2025-01-15 ENCOUNTER — CARE COORDINATION (OUTPATIENT)
Dept: CARE COORDINATION | Age: 85
End: 2025-01-15

## 2025-01-15 NOTE — CARE COORDINATION
Care Transitions Note    Follow Up Call     Patient Current Location:  Home: [address] [place] [address]    [facility] Care Coordinator contacted the spouse/partner  by telephone. Verified name and  as identifiers.    Additional needs identified to be addressed with provider   No needs identified                 Method of communication with provider: none.    Care Summary Note: Writer spoke with patient's wife Caridad for a follow up care transitions call. She states Mandeep is doing okay-per wife he is still not back to his baseline but he continues to improve. She states she has been giving him his Duoneb at hs which seems to help. He still has some wheezing but she states it is improving. He continues with [facility] and she states they are happy with his recovery. He is not scheduled to see his PCP until Feb but they do not want to move this up due to taking him out in the severe cold. She states he doesn't have any increased SOB or dyspnea. No chest pain or palpitations. She also denies having any new needs or concerns.     Plan of care updates since last contact:  Review of patient management of conditions/medications:         Advance Care Planning:   Does patient have an Advance Directive: reviewed and current.    Medication Review:  No changes since last call.     Remote Patient Monitoring:  Offered patient enrollment in the Remote Patient Monitoring (RPM) program for in-home monitoring: Yes, but did not enroll at this time: limited patient ability to navigate RPM/equipment.    Assessments:  Care Transitions Subsequent and Final Call    Subsequent and Final Calls  Do you have any ongoing symptoms?: No  Have your medications changed?: No  Do you have any questions related to your medications?: No  Do you currently have any active services?: No  Are you currently active with any services?: Home Health  Do you have any needs or concerns that I can assist you with?: No  Identified Barriers: None  Care Transitions

## 2025-01-22 ENCOUNTER — CARE COORDINATION (OUTPATIENT)
Dept: CARE COORDINATION | Age: 85
End: 2025-01-22

## 2025-01-22 ENCOUNTER — TELEPHONE (OUTPATIENT)
Dept: INTERNAL MEDICINE | Age: 85
End: 2025-01-22

## 2025-01-22 NOTE — CARE COORDINATION
Sumanth Rankin MD Infectious Diseases [phone]            Care Transition Nurse provided contact information.  Plan for follow-up call in 6-10 days based on severity of symptoms and risk factors.  Plan for next call: symptom management-follow up on breathing, sob, cp  F/u on DME, did receive masks    Rhonda Hughes RN

## 2025-01-22 NOTE — TELEPHONE ENCOUNTER
Pt transitional nurse is calling the office requesting an order for a mask for pt nebulizer, his nurse states that the pt can not hold the mouthpiece in his mouth, so they need a mask to go over his nose and mouth.  Thanks

## 2025-01-23 DIAGNOSIS — E11.9 TYPE 2 DIABETES MELLITUS WITHOUT COMPLICATION, WITHOUT LONG-TERM CURRENT USE OF INSULIN (HCC): ICD-10-CM

## 2025-01-24 NOTE — TELEPHONE ENCOUNTER
Last visit: 10/4/24  Last Med refill: 11/26/24  Does patient have enough medication for 72 hours: No:     Next Visit Date: 2/14/25  Future Appointments   Date Time Provider Department Center   1/27/2025  3:15 PM Reny Arredondo DPM ACC Podiatry [facility]   2/14/2025  1:00 PM Pepe Terrazas MD [facility] ECC DEP   9/24/2025 10:30 AM Sumnath Marie MD INFT DISEASE [facility]       Health Maintenance   Topic Date Due    Meningococcal (ACWY) vaccine (1 - Risk 2-dose series) Never done    Measles,Mumps,Rubella (MMR) vaccine (1 of 2 - Risk 2-dose series) Never done    Hepatitis A vaccine (1 of 2 - Risk 2-dose series) Never done    Shingles vaccine (1 of 2) Never done    Hepatitis B vaccine (1 of 3 - Risk 3-dose series) Never done    Respiratory Syncytial Virus (RSV) Pregnant or age 60 yrs+ (1 - 1-dose 75+ series) Never done    Lipids  08/06/2023    Flu vaccine (1) 08/01/2024    COVID-19 Vaccine (4 - 2023-24 season) 09/01/2024    Annual Wellness Visit (Medicare Advantage)  01/01/2025    A1C test (Diabetic or Prediabetic)  06/17/2025    Depression Screen  10/04/2025    DTaP/Tdap/Td vaccine (2 - Td or Tdap) 02/21/2027    Pneumococcal 65+ years Vaccine  Completed    Hib vaccine  Aged Out    Polio vaccine  Aged Out       Hemoglobin A1C (%)   Date Value   06/17/2024 6.4 (H)   03/08/2023 7.5 (H)   08/06/2022 6.0             ( goal A1C is < 7)   No components found for: \"LABMICR\"  No components found for: \"LDLCHOLESTEROL\", \"LDLCALC\"    (goal LDL is <100)   AST (U/L)   Date Value   01/04/2025 37     ALT (U/L)   Date Value   01/04/2025 8 (L)     BUN (mg/dL)   Date Value   01/07/2025 9     BP Readings from Last 3 Encounters:   01/07/25 (!) 150/58   12/30/24 (!) 154/78   10/04/24 (!) 155/105          (goal 120/80)    All Future Testing planned in [facility]  Lab Frequency Next Occurrence   CBC Once 07/01/2025   Comprehensive Metabolic Panel with Bilirubin Once 07/01/2025   HIV RNA, Quantitative, PCR Once 07/01/2025  English

## 2025-01-28 DIAGNOSIS — E11.9 TYPE 2 DIABETES MELLITUS WITHOUT COMPLICATION, WITHOUT LONG-TERM CURRENT USE OF INSULIN: ICD-10-CM

## 2025-01-30 ENCOUNTER — CARE COORDINATION (OUTPATIENT)
Dept: CARE COORDINATION | Age: 85
End: 2025-01-30

## 2025-01-30 NOTE — CARE COORDINATION
Care Transitions Note    Follow Up Call     Attempted to reach patient for transitions of care follow up.  Unable to reach patient.      Outreach Attempts:   Unable to leave message.     Care Summary Note: Attempted to call x2-phone is answered then disconnected before writer can introduce self.     Follow Up Appointment:   Future Appointments         Provider Specialty Dept Phone    2/14/2025 1:00 PM Pepe Terrazas MD Internal Medicine [phone]    9/24/2025 10:30 AM Sumanth Marie MD Infectious Diseases [phone]            Plan for follow-up on next business day.  based on severity of symptoms and risk factors. Plan for next call:  symptom management-follow up on breathing, sob, cp  F/u on DME, did receive masks    Charlotte Lord LPN

## 2025-01-31 ENCOUNTER — CARE COORDINATION (OUTPATIENT)
Dept: CARE COORDINATION | Age: 85
End: 2025-01-31

## 2025-01-31 ENCOUNTER — CARE COORDINATION (OUTPATIENT)
Dept: INTERNAL MEDICINE | Age: 85
End: 2025-01-31

## 2025-01-31 NOTE — CARE COORDINATION
Care Transitions Note    Follow Up Call     Patient Current Location:  Home: [address] [place] [address]    [facility] Care Coordinator contacted the spouse/partner  by telephone. Verified name and  as identifiers.    Patient graduated from the Care Transitions program on 24.  Patient/family verbalizes confidence in the ability to self-manage at this time..      Advance Care Planning:   Does patient have an Advance Directive: reviewed during previous call, see note. .    Handoff:   Patient/family agreeable to [facility] outreach.      Care Summary Note: Writer spouse to patient's spouse Caridad for follow up transitional care call. She states that patient is doing well accept he feels warm she does not have a thermometer and states that he was fine yesterday and that he had a nurse visit from [facility].He also has [facility] according to spouse.She states she was told all she had to is to call the nurse and that she will come out,  will be calling [facility] once off the phone. She denies that he has complaints of being sick of having any other symptoms accept being warm. She states he has been up this am, eating and drinking fine. Bowels are moving voiding fine.     Writer asked if she wanted to to see if patient could get a same day sick appointment .Declined states he has a pcp appointment on 25, She states his breathing has been fine and that he received face mask from DME company. She denies he has complained of chest pain or sob.Advised ER/911 if patient has new or worsening emergent concerns.      She Is okay with referral to [facility]. Writer called [facility] spoke to Samira patient is active and next visit tentative for 25 writer advised that spouse stated patient is warm and that spouse stated she was going to give them a call. Samira stated it depends if patient has PRN visits she will let him nurse know. .     Assessments:  Care Transitions Subsequent and Final Call    Subsequent and Final Calls  Are you currently

## 2025-02-03 PROBLEM — J32.9 SINUSITIS: Status: RESOLVED | Noted: 2025-01-04 | Resolved: 2025-02-03

## 2025-02-04 ENCOUNTER — CARE COORDINATION (OUTPATIENT)
Dept: INTERNAL MEDICINE | Age: 85
End: 2025-02-04

## 2025-02-04 NOTE — CARE COORDINATION
Ambulatory Care Coordination Note     2/4/2025 2:38 PM     Patient outreach attempt by this AC today to offer care management services. [facility] was unable to reach the patient, spouse/partner  by telephone today;   left voice message requesting a return phone call to this ACM.     ACM: Jalen Navarrete RN     Care Summary Note: called number, it's the same for his spouse. Message left on VM with contact number. If no return call today, will reach out 2/7/24.  Letter Mailed 1/31/25  Patients My Chart has not been used since 5/28/2019    PCP/Specialist follow up:   Future Appointments         Provider Specialty Dept Phone    2/14/2025 1:00 PM Pepe Terrazas MD Internal Medicine [phone]    9/24/2025 10:30 AM Sumanth Marie MD Infectious Diseases [phone]            Follow Up:   Plan for next AC outreach in approximately 1 week to complete:  - outreach attempt to offer care management services.

## 2025-02-12 ENCOUNTER — CARE COORDINATION (OUTPATIENT)
Dept: INTERNAL MEDICINE | Age: 85
End: 2025-02-12

## 2025-02-12 NOTE — CARE COORDINATION
Ambulatory Care Coordination Note     2/12/2025 1:05 PM     Patient and Spouse/Partner outreach attempt by this AC today to offer care management services. AC was unable to reach the patient, spouse/partner  by telephone today;   left voice message requesting a return phone call to this ACM.     ACM: Jalen Navarrete RN     Care Summary Note: called, Patient and spouse have same number.  Will remove from needs to enroll.  Letter mailed 1/31/25  Last My Chart use 5/18/2019    PCP/Specialist follow up:   Future Appointments         Provider Specialty Dept Phone    2/21/2025 3:00 PM Pepe Terrazas MD Internal Medicine [phone]    9/24/2025 10:30 AM Sumanth Marie MD Infectious Diseases [phone]

## 2025-02-24 DIAGNOSIS — Z21 ASYMPTOMATIC HIV INFECTION (HCC): ICD-10-CM

## 2025-02-24 RX ORDER — BICTEGRAVIR SODIUM, EMTRICITABINE, AND TENOFOVIR ALAFENAMIDE FUMARATE 50; 200; 25 MG/1; MG/1; MG/1
1 TABLET ORAL DAILY
Qty: 30 TABLET | Refills: 6 | Status: SHIPPED | OUTPATIENT
Start: 2025-02-24

## 2025-02-24 NOTE — TELEPHONE ENCOUNTER
Patients wife called and requested a refill on Biktarvy.  Patient was last seen 9/24/24 and has a year follow up 9/25/25.  I did pend the script for your approval if you agree please sign

## 2025-02-25 RX ORDER — MULTIVIT-MIN/IRON FUM/FOLIC AC 19 MG-400
1 TABLET ORAL DAILY
Qty: 28 TABLET | Refills: 3 | Status: SHIPPED | OUTPATIENT
Start: 2025-02-25

## 2025-02-25 NOTE — TELEPHONE ENCOUNTER
Last visit: 10/44/24  Last Med refill:   Does patient have enough medication for 72 hours: No:     Next Visit Date: 2/28/25  Future Appointments   Date Time Provider Department Center   2/28/2025  2:20 PM Pepe Terrazas MD [facility]   9/24/2025 10:30 AM Sumanth Marie MD INFT DISEASE [facility]       Health Maintenance   Topic Date Due    Meningococcal (ACWY) vaccine (1 - Risk 2-dose series) Never done    Measles,Mumps,Rubella (MMR) vaccine (1 of 2 - Risk 2-dose series) Never done    Hepatitis A vaccine (1 of 2 - Risk 2-dose series) Never done    Shingles vaccine (1 of 2) Never done    Hepatitis B vaccine (1 of 3 - Risk 3-dose series) Never done    Respiratory Syncytial Virus (RSV) Pregnant or age 60 yrs+ (1 - 1-dose 75+ series) Never done    Lipids  08/06/2023    Flu vaccine (1) 08/01/2024    COVID-19 Vaccine (4 - 2024-25 season) 09/01/2024    Annual Wellness Visit (Medicare Advantage)  01/01/2025    A1C test (Diabetic or Prediabetic)  06/17/2025    Depression Screen  10/04/2025    DTaP/Tdap/Td vaccine (2 - Td or Tdap) 02/21/2027    Pneumococcal 50+ years Vaccine  Completed    Hib vaccine  Aged Out    Polio vaccine  Aged Out       Hemoglobin A1C (%)   Date Value   06/17/2024 6.4 (H)   03/08/2023 7.5 (H)   08/06/2022 6.0             ( goal A1C is < 7)   No components found for: \"LABMICR\"  No components found for: \"LDLCHOLESTEROL\", \"LDLCALC\"    (goal LDL is <100)   AST (U/L)   Date Value   01/04/2025 37     ALT (U/L)   Date Value   01/04/2025 8 (L)     BUN (mg/dL)   Date Value   01/07/2025 9     BP Readings from Last 3 Encounters:   01/07/25 (!) 150/58   12/30/24 (!) 154/78   10/04/24 (!) 155/105          (goal 120/80)    All Future Testing planned in [facility]  Lab Frequency Next Occurrence   CBC Once 07/01/2025   Comprehensive Metabolic Panel with Bilirubin Once 07/01/2025   HIV RNA, Quantitative, PCR Once 07/01/2025   Lymphocyte Subset Once 07/01/2025               Patient Active Problem List:

## 2025-02-28 ENCOUNTER — OFFICE VISIT (OUTPATIENT)
Dept: INTERNAL MEDICINE | Age: 85
End: 2025-02-28

## 2025-02-28 VITALS
DIASTOLIC BLOOD PRESSURE: 80 MMHG | TEMPERATURE: 97.3 F | BODY MASS INDEX: 20.4 KG/M2 | HEART RATE: 100 BPM | HEIGHT: 66 IN | SYSTOLIC BLOOD PRESSURE: 148 MMHG

## 2025-02-28 DIAGNOSIS — F02.818 ALZHEIMER'S DEMENTIA WITH BEHAVIORAL DISTURBANCE (HCC): Primary | ICD-10-CM

## 2025-02-28 DIAGNOSIS — G30.9 ALZHEIMER'S DEMENTIA WITH BEHAVIORAL DISTURBANCE (HCC): Primary | ICD-10-CM

## 2025-02-28 DIAGNOSIS — L72.3 INFECTED SEBACEOUS CYST: ICD-10-CM

## 2025-02-28 DIAGNOSIS — G93.41 METABOLIC ENCEPHALOPATHY: ICD-10-CM

## 2025-02-28 DIAGNOSIS — R56.9 SEIZURE-LIKE ACTIVITY (HCC): ICD-10-CM

## 2025-02-28 DIAGNOSIS — K21.9 GASTROESOPHAGEAL REFLUX DISEASE WITHOUT ESOPHAGITIS: ICD-10-CM

## 2025-02-28 DIAGNOSIS — L08.9 INFECTED SEBACEOUS CYST: ICD-10-CM

## 2025-02-28 DIAGNOSIS — R91.8 PULMONARY NODULES: ICD-10-CM

## 2025-02-28 DIAGNOSIS — E11.9 TYPE 2 DIABETES MELLITUS WITHOUT COMPLICATION, WITHOUT LONG-TERM CURRENT USE OF INSULIN (HCC): ICD-10-CM

## 2025-02-28 DIAGNOSIS — B20 HUMAN IMMUNODEFICIENCY VIRUS (HIV) DISEASE (HCC): ICD-10-CM

## 2025-02-28 DIAGNOSIS — F02.C0 SEVERE DEMENTIA ASSOCIATED WITH OTHER UNDERLYING DISEASE, WITHOUT BEHAVIORAL DISTURBANCE, PSYCHOTIC DISTURBANCE, MOOD DISTURBANCE, OR ANXIETY (HCC): ICD-10-CM

## 2025-02-28 PROBLEM — J41.1 MUCOPURULENT CHRONIC BRONCHITIS (HCC): Status: ACTIVE | Noted: 2025-02-28

## 2025-02-28 PROBLEM — B35.1 ONYCHOMYCOSIS OF MULTIPLE TOENAILS WITH TYPE 2 DIABETES MELLITUS (HCC): Status: RESOLVED | Noted: 2024-05-09 | Resolved: 2025-02-28

## 2025-02-28 PROBLEM — J42 UNSPECIFIED CHRONIC BRONCHITIS (HCC): Status: ACTIVE | Noted: 2025-02-28

## 2025-02-28 PROBLEM — E11.69 ONYCHOMYCOSIS OF MULTIPLE TOENAILS WITH TYPE 2 DIABETES MELLITUS (HCC): Status: RESOLVED | Noted: 2024-05-09 | Resolved: 2025-02-28

## 2025-02-28 PROBLEM — J41.8 MIXED SIMPLE AND MUCOPURULENT CHRONIC BRONCHITIS (HCC): Status: ACTIVE | Noted: 2025-02-28

## 2025-02-28 PROBLEM — J41.0 SIMPLE CHRONIC BRONCHITIS (HCC): Status: ACTIVE | Noted: 2025-02-28

## 2025-02-28 RX ORDER — DOXYCYCLINE HYCLATE 100 MG
100 TABLET ORAL 2 TIMES DAILY
Qty: 14 TABLET | Refills: 0 | Status: SHIPPED | OUTPATIENT
Start: 2025-02-28 | End: 2025-03-07

## 2025-02-28 RX ORDER — GAUZE BANDAGE 4" X 4"
1 BANDAGE TOPICAL DAILY PRN
Qty: 30 EACH | Refills: 0 | Status: SHIPPED | OUTPATIENT
Start: 2025-02-28

## 2025-02-28 ASSESSMENT — PATIENT HEALTH QUESTIONNAIRE - PHQ9: DEPRESSION UNABLE TO ASSESS: FUNCTIONAL CAPACITY MOTIVATION LIMITS ACCURACY

## 2025-02-28 NOTE — PROGRESS NOTES
Attending Physician Statement  I have discussed the care of Mandeep Iverson, including pertinent history and exam findings with the resident. I have reviewed the key elements of all parts of the encounter with the resident.  I agree with the assessment, and status of the problem list as documented. The plan and orders should include   Orders Placed This Encounter   Procedures    DME Order for Wheel Chair as OP    and this was also documented by the resident..The medication list was reviewed with the resident and is up to date.      Diagnosis Orders   1. Alzheimer's dementia with behavioral disturbance (HCC)  DME Order for Wheel Chair as OP      2. Pulmonary nodules        3. Gastroesophageal reflux disease without esophagitis        4. Metabolic encephalopathy  DME Order for Wheel Chair as OP      5. Human immunodeficiency virus (HIV) disease (HCC)        6. Type 2 diabetes mellitus without complication, without long-term current use of insulin (HCC)        7. Infected sebaceous cyst  doxycycline hyclate (VIBRA-TABS) 100 MG tablet    Gauze Pads & Dressings (GAUZE DRESSING) 4\"X4\" PADS      8. Severe dementia associated with other underlying disease, without behavioral disturbance, psychotic disturbance, mood disturbance, or anxiety (HCC)        9. Seizure-like activity (HCC)             Nirali Zamarripa MD   Attending Physician, [facility]   Faculty, Internal Medicine Residency Program  [facility]     
was hospitalized recently, underwent CT chest which showed multiple pulmonary nodules.  She was evaluated by he was evaluate by pulmonology, recommended just treatment for bronchitis and recommended repeat CT in 3 months.  Also recommended patient is not on candidate for biopsy or any other aggressive measures at this time due to her multiple underlying comorbid medical conditions.    Gastroesophageal reflux disease without esophagitis  -Stable.  Continue Protonix.    Metabolic encephalopathy  -     DME Order for Wheel Chair as OP    Human immunodeficiency virus (HIV) disease (HCC)  -Stable.  Continue Biktarvy.  Continue follow-up with infectious disease.    Type 2 diabetes mellitus without complication, without long-term current use of insulin (HCC)  -Stable.  Continue metformin.    Infected sebaceous cyst  -     doxycycline hyclate (VIBRA-TABS) 100 MG tablet; Take 1 tablet by mouth 2 times daily for 7 days  -     Gauze Pads & Dressings (GAUZE DRESSING) 4\"X4\" PADS; 1 each by Does not apply route daily as needed (wound dressing)        FOLLOW UP AND INSTRUCTIONS:  No follow-ups on file.    Mandeep received counseling on the following healthy behaviors: nutrition    Discussed use, benefit, and side effects of prescribed medications.  Barriers to medication compliance addressed.  All patient questions answered.  Pt voiced understanding.    Patient given educational materials - see patient instructions    Pepe Terrazas MD  Internal Medicine Resident, PGY- 3  [facility]; [place], [place]  2/28/2025, 3:05 PM      This note is created with the assistance of a speech-recognition program. While intending to generate a document that actually reflects the content of thevisit, the document can still have some mistakes which may not have been identified and corrected by editing.

## 2025-03-13 DIAGNOSIS — E11.9 TYPE 2 DIABETES MELLITUS WITHOUT COMPLICATION, WITHOUT LONG-TERM CURRENT USE OF INSULIN (HCC): ICD-10-CM

## 2025-03-13 DIAGNOSIS — I73.9 PVD (PERIPHERAL VASCULAR DISEASE): ICD-10-CM

## 2025-03-13 RX ORDER — ATORVASTATIN CALCIUM 40 MG/1
40 TABLET, FILM COATED ORAL DAILY
Qty: 28 TABLET | Refills: 6 | Status: SHIPPED | OUTPATIENT
Start: 2025-03-13

## 2025-03-13 RX ORDER — DONEPEZIL HYDROCHLORIDE 10 MG/1
TABLET, FILM COATED ORAL
Qty: 28 TABLET | Refills: 6 | Status: SHIPPED | OUTPATIENT
Start: 2025-03-13

## 2025-03-13 RX ORDER — MEMANTINE HYDROCHLORIDE 10 MG/1
10 TABLET ORAL 2 TIMES DAILY
Qty: 56 TABLET | Refills: 6 | Status: SHIPPED | OUTPATIENT
Start: 2025-03-13

## 2025-03-13 RX ORDER — FINASTERIDE 5 MG/1
5 TABLET, FILM COATED ORAL DAILY
Qty: 28 TABLET | Refills: 6 | Status: SHIPPED | OUTPATIENT
Start: 2025-03-13

## 2025-03-13 RX ORDER — OMEPRAZOLE 20 MG/1
20 CAPSULE, DELAYED RELEASE ORAL DAILY
Qty: 28 CAPSULE | Refills: 6 | Status: SHIPPED | OUTPATIENT
Start: 2025-03-13

## 2025-03-13 NOTE — TELEPHONE ENCOUNTER
Mandeep Iverson is calling to request a refill on the following medication(s):    Medication Request:  Multiple Medications      Last Visit Date (If Applicable):  2/28/2025    Next Visit Date:    9/8/2025

## 2025-04-15 NOTE — TELEPHONE ENCOUNTER
..Request for Omeprazole, Atorvastatin, Donepezil, Finasteride, Memantine and Multi-Vitamins.    Please review and e-scribe to pharmacy listed in chart if appropriate. Thank you.      Last Visit Date: 11/2/2023  Next Visit Date: Visit date not found    Future Appointments   Date Time Provider Department Center   9/25/2024 10:30 AM Sumanth Marie MD INFT DISEASE [facility]       Health Maintenance   Topic Date Due    Meningococcal (ACWY) vaccine (1 - Risk 2-dose series) Never done    Measles,Mumps,Rubella (MMR) vaccine (1 of 2 - Risk 2-dose series) Never done    Hepatitis A vaccine (1 of 2 - Risk 2-dose series) Never done    Shingles vaccine (1 of 2) Never done    Hepatitis B vaccine (1 of 3 - Risk 3-dose series) Never done    Respiratory Syncytial Virus (RSV) Pregnant or age 60 yrs+ (1 - 1-dose 60+ series) Never done    Lipids  08/06/2023    COVID-19 Vaccine (4 - 2023-24 season) 09/01/2023    Annual Wellness Visit (Medicare Advantage)  01/01/2024    Depression Screen  11/02/2024    DTaP/Tdap/Td vaccine (2 - Td or Tdap) 02/21/2027    Flu vaccine  Completed    Pneumococcal 65+ years Vaccine  Completed    Hib vaccine  Aged Out    Polio vaccine  Aged Out       Hemoglobin A1C (%)   Date Value   03/08/2023 7.5 (H)   08/06/2022 6.0   11/24/2021 6.0             ( goal A1C is < 7)   No components found for: \"LABMICR\"  LDL Cholesterol (mg/dL)   Date Value   08/06/2022 64       (goal LDL is <100)   AST (U/L)   Date Value   11/22/2023 23     ALT (U/L)   Date Value   11/22/2023 9     BUN (mg/dL)   Date Value   11/22/2023 12     BP Readings from Last 3 Encounters:   11/22/23 (!) 144/73   11/02/23 126/76   09/20/23 116/72          (goal 120/80)    All Future Testing planned in [facility]  Lab Frequency Next Occurrence   CBC Once 08/05/2024   Comprehensive Metabolic Panel with Bilirubin Once 08/05/2024   HIV RNA, Quantitative, PCR Once 08/05/2024   Lymphocyte Subset Once 08/05/2024         Patient Active Problem List:     HIV 
Pt states he is feeling somewhat better this morning, however he would like to eat breakfast in bed rather than getting up to conserve energy for therapy. Pt is low aspiration risk. Will reassess if will get up to chair again as time allows. Will continue to monitor.
hard copy, drawn during this pregnancy

## 2025-04-18 DIAGNOSIS — N40.0 BENIGN PROSTATIC HYPERPLASIA WITHOUT LOWER URINARY TRACT SYMPTOMS: ICD-10-CM

## 2025-04-21 RX ORDER — TAMSULOSIN HYDROCHLORIDE 0.4 MG/1
0.4 CAPSULE ORAL DAILY
Qty: 28 CAPSULE | Refills: 5 | Status: SHIPPED | OUTPATIENT
Start: 2025-04-21

## 2025-04-21 RX ORDER — DIVALPROEX SODIUM 125 MG/1
250 CAPSULE, COATED PELLETS ORAL 2 TIMES DAILY
Qty: 112 CAPSULE | Refills: 5 | Status: SHIPPED | OUTPATIENT
Start: 2025-04-21

## 2025-05-07 NOTE — TELEPHONE ENCOUNTER
Request for nebulizer solution  Requested Prescriptions      No prescriptions requested or ordered in this encounter    .      Please review and e-scribe to pharmacy listed in chart if appropriate. Thank you.      Last Visit Date: Visit date not found  Next Visit Date: 9/8/2025    Future Appointments   Date Time Provider Department Center   6/30/2025  2:45 PM Reny Arredondo DPM ACC Podiatry [facility] [place]   9/8/2025  1:00 PM Sreedhar Montanez MD [facility] DEP   9/24/2025 10:30 AM Sumanth Marie MD INFT DISEASE [facility] [place]       Health Maintenance   Topic Date Due    Meningococcal (ACWY) vaccine (1 - Risk 2-dose series) Never done    Measles,Mumps,Rubella (MMR) vaccine (1 of 2 - Risk 2-dose series) Never done    Hepatitis A vaccine (1 of 2 - Risk 2-dose series) Never done    Shingles vaccine (1 of 2) Never done    Hepatitis B vaccine (1 of 3 - Risk 3-dose series) Never done    Respiratory Syncytial Virus (RSV) Pregnant or age 60 yrs+ (1 - 1-dose 75+ series) Never done    Lipids  08/06/2023    COVID-19 Vaccine (4 - 2024-25 season) 09/01/2024    Annual Wellness Visit (Medicare Advantage)  01/01/2025    A1C test (Diabetic or Prediabetic)  06/17/2025    Flu vaccine (Season Ended) 08/01/2025    Depression Screen  10/04/2025    DTaP/Tdap/Td vaccine (2 - Td or Tdap) 02/21/2027    Pneumococcal 50+ years Vaccine  Completed    Hib vaccine  Aged Out    Polio vaccine  Aged Out    Meningococcal B vaccine  Aged Out       Hemoglobin A1C (%)   Date Value   06/17/2024 6.4 (H)   03/08/2023 7.5 (H)   08/06/2022 6.0             ( goal A1C is < 7)   No components found for: \"LABMICR\"  No components found for: \"LDLCHOLESTEROL\", \"LDLCALC\"    (goal LDL is <100)   AST (U/L)   Date Value   01/04/2025 37     ALT (U/L)   Date Value   01/04/2025 8 (L)     BUN (mg/dL)   Date Value   01/07/2025 9     BP Readings from Last 3 Encounters:   02/28/25 (!) 148/80   01/07/25 (!) 150/58   12/30/24 (!) 154/78          (goal 120/80)    All Future

## 2025-05-09 RX ORDER — IPRATROPIUM BROMIDE AND ALBUTEROL SULFATE 2.5; .5 MG/3ML; MG/3ML
3 SOLUTION RESPIRATORY (INHALATION) EVERY 6 HOURS PRN
Qty: 360 ML | Refills: 0 | Status: SHIPPED | OUTPATIENT
Start: 2025-05-09 | End: 2025-06-08

## 2025-05-19 DIAGNOSIS — E11.9 TYPE 2 DIABETES MELLITUS WITHOUT COMPLICATION, WITHOUT LONG-TERM CURRENT USE OF INSULIN (HCC): ICD-10-CM

## 2025-05-19 NOTE — TELEPHONE ENCOUNTER
Mandeep Iverson is calling to request a refill on the following medication(s):    Medication Request:  Metformin      Last Visit Date (If Applicable):  Visit date not found    Next Visit Date:    9/8/2025

## 2025-06-06 NOTE — PROGRESS NOTES
Patient instructed to remove shoes and socks and instructed to sit in exam chair.  Current PCP is Pepe Terrazas MD and date of last visit was 2/28/2025.   Do you have a follow up visit scheduled?  Yes  If yes, the date is 9/8/2025 with Sreedhar Montanez MD new provider

## 2025-06-13 RX ORDER — MULTIVIT-MIN/IRON FUM/FOLIC AC 19 MG-400
1 TABLET ORAL DAILY
Qty: 28 TABLET | Refills: 3 | Status: SHIPPED | OUTPATIENT
Start: 2025-06-13

## 2025-06-13 NOTE — TELEPHONE ENCOUNTER
Mandeep Iverson is calling to request a refill on the following medication(s):    Medication Request:  Requested Prescriptions     Pending Prescriptions Disp Refills    Multiple Vitamins-Minerals (MULTIVITAMIN) TABS [Pharmacy Med Name: THERA-TABS M TABS Tablet] 28 tablet 3     Sig: TAKE 1 TABLET BY MOUTH DAILY       Last Visit Date (If Applicable):  Visit date not found    Next Visit Date:    9/8/2025

## 2025-06-30 ENCOUNTER — OFFICE VISIT (OUTPATIENT)
Age: 85
End: 2025-06-30
Payer: MEDICARE

## 2025-06-30 VITALS — HEIGHT: 66 IN | WEIGHT: 135 LBS | BODY MASS INDEX: 21.69 KG/M2

## 2025-06-30 DIAGNOSIS — E11.69 ONYCHOMYCOSIS OF MULTIPLE TOENAILS WITH TYPE 2 DIABETES MELLITUS (HCC): Primary | ICD-10-CM

## 2025-06-30 DIAGNOSIS — B35.1 PAIN DUE TO ONYCHOMYCOSIS OF NAIL: ICD-10-CM

## 2025-06-30 DIAGNOSIS — B35.1 ONYCHOMYCOSIS OF MULTIPLE TOENAILS WITH TYPE 2 DIABETES MELLITUS (HCC): Primary | ICD-10-CM

## 2025-06-30 DIAGNOSIS — M79.609 PAIN DUE TO ONYCHOMYCOSIS OF NAIL: ICD-10-CM

## 2025-06-30 PROCEDURE — 11721 DEBRIDE NAIL 6 OR MORE: CPT

## 2025-06-30 PROCEDURE — NBSRV NON-BILLABLE SERVICE: Performed by: PODIATRIST

## 2025-06-30 NOTE — PROGRESS NOTES
[facility]  [address]   [address] [place] [address]  Tel: [phone]   Fax: [phone]    Subjective     CC: Diabetic foot exam and painful and elongated toe nails    Interval history:  Patient returns to clinic for diabetic foot examination and requests trimming of painful elongated toenails. Patient does not respond to questions, however his wife states that he has been complaining of pain to his feet related to his toenails. No other complaints at this time.    HPI:  Mandeep Iverson is a 84 y.o. year old male who presents to clinic today for diabetic foot examination and also complaining of painful and elongated toenails.  The patient has dementia and HIV. The patient states their digits are painful when the toenails are elongated, causing rubbing in shoe gear. The patient denies tingling and numbness in the lower extremities. Patient not able to relay any rest pain or claudication symptoms.The patient denies any history of acute trauma. The patient denies any other pedal complaints. Accompanied w wife and granddaughter who request nail debridement.    Primary care physician is Pepe Terrazas MD.    ROS:    Constitutional: Denies nausea, vomiting, fever, chills.  Neurologic: No numbness, tingling, and burning in the feet.    Vascular: Denies symptoms of lower extremity claudication.    Skin: Denies open wounds.  Otherwise negative except as noted in the HPI.     PMH:  Past Medical History:   Diagnosis Date    Acute delirium 2/11/2019    Acute metabolic encephalopathy 2/11/2019    Alzheimer's dementia with behavioral disturbance (HCC) 2/11/2019    Atrophy, cortical     BPH (benign prostatic hyperplasia) 2/1/2013    Dementia ([facility])     Dementia without behavioral disturbance (HCC) 7/27/2012    GERD (gastroesophageal reflux disease) 4/28/2015    Hemorrhoids     HIV (human immunodeficiency virus infection) ([facility])     Meralgia paraesthetica 2/4/2016    Mixed hyperlipidemia 5/5/2016

## 2025-06-30 NOTE — TELEPHONE ENCOUNTER
Mandeep Iverson is calling to request a refill on the following medication(s):    Medication Request:  Requested Prescriptions     Pending Prescriptions Disp Refills    ipratropium 0.5 mg-albuterol 2.5 mg (DUONEB) 0.5-2.5 (3) MG/3ML SOLN nebulizer solution [Pharmacy Med Name: ALBUTEROL/IPRATRO SOL 60CT 0.5-2.5 (3) Solution] 360 mL 0     Sig: INHALE 3 MLS INTO THE LUNGS EVERY 6 HOURS AS NEEDED FOR SHORTNESS OF BREATH       Last Visit Date (If Applicable):  Visit date not found    Next Visit Date:    9/8/2025

## 2025-06-30 NOTE — PATIENT INSTRUCTIONS
donepezil (ARICEPT) 10 MG tablet TAKE 1 TABLET IN THE EVENING 3/13/25   Pepe Terrazas MD   atorvastatin (LIPITOR) 40 MG tablet TAKE 1 TABLET BY MOUTH DAILY 3/13/25   Pepe Trerazas MD   finasteride (PROSCAR) 5 MG tablet TAKE 1 TABLET BY MOUTH DAILY 3/13/25   Pepe Terrazas MD   memantine (NAMENDA) 10 MG tablet TAKE 1 TABLET BY MOUTH 2 TIMES DAILY 3/13/25   Pepe Terrazas MD   omeprazole (PRILOSEC) 20 MG delayed release capsule TAKE 1 CAPSULE BY MOUTH DAILY 3/13/25   Pepe Terrazas MD   Gauze Pads & Dressings (GAUZE DRESSING) 4\"X4\" PADS 1 each by Does not apply route daily as needed (wound dressing) 2/28/25   Pepe Terrazas MD   bictegravir-emtricitab-tenofovir alafenamide (BIKTARVY) -25 MG TABS per tablet Take 1 tablet by mouth daily 2/24/25   Sumanth Marie MD   budesonide-formoterol (SYMBICORT) 160-4.5 MCG/ACT AERO Inhale 2 puffs into the lungs in the morning and 2 puffs in the evening. 1/6/25   Uyen Reich MD   Incontinence Supply Disposable (BRIEF OVERNIGHT LARGE) MISC 1 each by Does not apply route 2 times daily at 0800 and 1400 1 Box 4/10/24   Pepe Terrazas MD   Incontinence Supply Disposable (BRIEFS OVERNIGHT MEDIUM) MISC 1 box  by Does not apply route daily 12/29/23   Pepe Terrazas MD   Nutritional Supplements (ENSURE HIGH PROTEIN) LIQD Take 1 Bottle by mouth 3 times daily 11/2/23   Pepe Terrazas MD   Multiple Vitamin (HIGH POTENCY MULTIVITAMIN) TABS Take 1 tablet by mouth daily 8/17/23   Pepe Terrazas MD       Objective     There were no vitals filed for this visit.      Lab Results   Component Value Date    LABA1C 6.4 (H) 06/17/2024       Physical Exam:  General:  Alert and oriented x3. In no acute distress.     Lower Extremity Physical Exam:    Vascular: DP and PT pulses are palpable, Bilateral. CFT <3 seconds to all digits, Bilateral.  No edema, Bilateral.  Hair growth is absent to the level of the digits, Bilateral.     Neuro:

## 2025-07-09 DIAGNOSIS — Z21 ASYMPTOMATIC HIV INFECTION (HCC): ICD-10-CM

## 2025-07-09 RX ORDER — BICTEGRAVIR SODIUM, EMTRICITABINE, AND TENOFOVIR ALAFENAMIDE FUMARATE 50; 200; 25 MG/1; MG/1; MG/1
1 TABLET ORAL DAILY
Qty: 30 TABLET | Refills: 3 | Status: SHIPPED | OUTPATIENT
Start: 2025-07-09

## 2025-07-18 DIAGNOSIS — F03.90 DEMENTIA WITHOUT BEHAVIORAL DISTURBANCE (HCC): ICD-10-CM

## 2025-07-18 RX ORDER — IPRATROPIUM BROMIDE AND ALBUTEROL SULFATE 2.5; .5 MG/3ML; MG/3ML
3 SOLUTION RESPIRATORY (INHALATION) EVERY 6 HOURS PRN
Qty: 360 ML | Refills: 0 | Status: SHIPPED | OUTPATIENT
Start: 2025-07-18 | End: 2025-08-17

## 2025-07-18 RX ORDER — IPRATROPIUM BROMIDE AND ALBUTEROL SULFATE 2.5; .5 MG/3ML; MG/3ML
3 SOLUTION RESPIRATORY (INHALATION) EVERY 6 HOURS PRN
Qty: 360 ML | Refills: 0 | OUTPATIENT
Start: 2025-07-18

## 2025-07-18 RX ORDER — BROMPHENIRAMIN/PSEUDOEPHEDRINE 1-15MG/5ML
1 LIQUID (ML) ORAL 2 TIMES DAILY
Qty: 50 EACH | Refills: 2 | Status: SHIPPED | OUTPATIENT
Start: 2025-07-18

## 2025-07-25 RX ORDER — IPRATROPIUM BROMIDE AND ALBUTEROL SULFATE 2.5; .5 MG/3ML; MG/3ML
3 SOLUTION RESPIRATORY (INHALATION) EVERY 6 HOURS PRN
Qty: 360 ML | Refills: 0 | Status: SHIPPED | OUTPATIENT
Start: 2025-07-25 | End: 2025-08-24

## 2025-07-25 NOTE — TELEPHONE ENCOUNTER
Request for   Requested Prescriptions      No prescriptions requested or ordered in this encounter    .      Please review and e-scribe to pharmacy listed in chart if appropriate. Thank you.      Last Visit Date: Visit date not found  Next Visit Date: 10/3/2025    Future Appointments   Date Time Provider Department Center   9/8/2025  3:15 PM Reny Arredondo DPM ACC Podiatry [facility]   9/24/2025 10:30 AM Sumanth Marie MD INFT DISEASE [facility]   10/3/2025  2:30 PM Hanna Cook MD [facility] DEP       Health Maintenance   Topic Date Due    Meningococcal (ACWY) vaccine (1 - Risk 2-dose series) Never done    Measles,Mumps,Rubella (MMR) vaccine (1 of 2 - Risk 2-dose series) Never done    Hepatitis A vaccine (1 of 2 - Risk 2-dose series) Never done    Shingles vaccine (1 of 2) Never done    Hepatitis B vaccine (1 of 3 - Risk 3-dose series) Never done    Respiratory Syncytial Virus (RSV) Pregnant or age 60 yrs+ (1 - 1-dose 75+ series) Never done    Lipids  08/06/2023    COVID-19 Vaccine (4 - 2024-25 season) 09/01/2024    Annual Wellness Visit (Medicare Advantage)  01/01/2025    A1C test (Diabetic or Prediabetic)  06/17/2025    Flu vaccine (1) 08/01/2025    Depression Screen  10/04/2025    DTaP/Tdap/Td vaccine (2 - Td or Tdap) 02/21/2027    Pneumococcal 50+ years Vaccine  Completed    Hib vaccine  Aged Out    Polio vaccine  Aged Out    Meningococcal B vaccine  Aged Out       Hemoglobin A1C (%)   Date Value   06/17/2024 6.4 (H)   03/08/2023 7.5 (H)   08/06/2022 6.0             ( goal A1C is < 7)   No components found for: \"LABMICR\"  No components found for: \"LDLCHOLESTEROL\", \"LDLCALC\"    (goal LDL is <100)   AST (U/L)   Date Value   01/04/2025 37     ALT (U/L)   Date Value   01/04/2025 8 (L)     BUN (mg/dL)   Date Value   01/07/2025 9     BP Readings from Last 3 Encounters:   02/28/25 (!) 148/80   01/07/25 (!) 150/58   12/30/24 (!) 154/78          (goal 120/80)    All Future Testing planned in

## (undated) DEVICE — ELECTRODE PT RET AD L9FT HI MOIST COND ADH HYDRGEL CORDED

## (undated) DEVICE — CONE TIP URETERAL CATHETER WITH OPEN-END: Brand: CONE TIP

## (undated) DEVICE — PACK PROCEDURE SURG CYSTO SVMMC LF

## (undated) DEVICE — DUP USE 240185 SOLUTION IV IRRIG WATER 1000ML IRRIG BAG 2B7114

## (undated) DEVICE — CATHETER URET 5FR L70CM TIP 8FR OPN END CONE TIP INJ HUB

## (undated) DEVICE — DRAPE,REIN 53X77,STERILE: Brand: MEDLINE

## (undated) DEVICE — GUIDEWIRE URO L150CM DIA0.035IN STIFF NIT HYDRPHLC STR TIP

## (undated) DEVICE — BAG URIN DRNGE 2000ML VYN INF CTRL GRAD ANTI REFLX VLV

## (undated) DEVICE — SNARE ENDOSCP M L240CM LOOP W27MM SHTH DIA2.4MM OVL FLX